# Patient Record
Sex: MALE | Race: BLACK OR AFRICAN AMERICAN | Employment: FULL TIME | URBAN - NONMETROPOLITAN AREA
[De-identification: names, ages, dates, MRNs, and addresses within clinical notes are randomized per-mention and may not be internally consistent; named-entity substitution may affect disease eponyms.]

---

## 2019-02-11 ENCOUNTER — HOSPITAL ENCOUNTER (EMERGENCY)
Age: 49
Discharge: HOME | End: 2019-02-11
Payer: MEDICAID

## 2019-02-11 ENCOUNTER — APPOINTMENT (OUTPATIENT)
Dept: GENERAL RADIOLOGY | Age: 49
End: 2019-02-11
Payer: MEDICAID

## 2019-02-11 VITALS
BODY MASS INDEX: 41.75 KG/M2 | SYSTOLIC BLOOD PRESSURE: 128 MMHG | TEMPERATURE: 98.8 F | HEIGHT: 73 IN | OXYGEN SATURATION: 97 % | DIASTOLIC BLOOD PRESSURE: 66 MMHG | RESPIRATION RATE: 20 BRPM | HEART RATE: 77 BPM | WEIGHT: 315 LBS

## 2019-02-11 DIAGNOSIS — J40 BRONCHITIS: Primary | ICD-10-CM

## 2019-02-11 LAB
ALBUMIN SERPL-MCNC: 3.9 G/DL (ref 3.5–5.1)
ALP BLD-CCNC: 126 U/L (ref 38–126)
ALT SERPL-CCNC: 25 U/L (ref 11–66)
ANION GAP SERPL CALCULATED.3IONS-SCNC: 12 MEQ/L (ref 8–16)
AST SERPL-CCNC: 17 U/L (ref 5–40)
BASOPHILS # BLD: 1.3 %
BASOPHILS ABSOLUTE: 0.1 THOU/MM3 (ref 0–0.1)
BILIRUB SERPL-MCNC: 0.3 MG/DL (ref 0.3–1.2)
BILIRUBIN DIRECT: < 0.2 MG/DL (ref 0–0.3)
BUN BLDV-MCNC: 14 MG/DL (ref 7–22)
CALCIUM SERPL-MCNC: 9.2 MG/DL (ref 8.5–10.5)
CHLORIDE BLD-SCNC: 104 MEQ/L (ref 98–111)
CO2: 22 MEQ/L (ref 23–33)
CREAT SERPL-MCNC: 1.6 MG/DL (ref 0.4–1.2)
D-DIMER QUANTITATIVE: 319 NG/ML FEU (ref 0–500)
EKG ATRIAL RATE: 81 BPM
EKG P AXIS: 57 DEGREES
EKG P-R INTERVAL: 166 MS
EKG Q-T INTERVAL: 374 MS
EKG QRS DURATION: 92 MS
EKG QTC CALCULATION (BAZETT): 434 MS
EKG R AXIS: 9 DEGREES
EKG T AXIS: 15 DEGREES
EKG VENTRICULAR RATE: 81 BPM
EOSINOPHIL # BLD: 3.7 %
EOSINOPHILS ABSOLUTE: 0.2 THOU/MM3 (ref 0–0.4)
ERYTHROCYTE [DISTWIDTH] IN BLOOD BY AUTOMATED COUNT: 11.9 % (ref 11.5–14.5)
ERYTHROCYTE [DISTWIDTH] IN BLOOD BY AUTOMATED COUNT: 41.4 FL (ref 35–45)
GFR SERPL CREATININE-BSD FRML MDRD: 56 ML/MIN/1.73M2
GLUCOSE BLD-MCNC: 100 MG/DL (ref 70–108)
HCT VFR BLD CALC: 41 % (ref 42–52)
HEMOGLOBIN: 13.4 GM/DL (ref 14–18)
IMMATURE GRANS (ABS): 0.01 THOU/MM3 (ref 0–0.07)
IMMATURE GRANULOCYTES: 0.2 %
INR BLD: 1.03 (ref 0.85–1.13)
LIPASE: 21.1 U/L (ref 5.6–51.3)
LYMPHOCYTES # BLD: 27.9 %
LYMPHOCYTES ABSOLUTE: 1.5 THOU/MM3 (ref 1–4.8)
MAGNESIUM: 1.7 MG/DL (ref 1.6–2.4)
MCH RBC QN AUTO: 30.7 PG (ref 26–33)
MCHC RBC AUTO-ENTMCNC: 32.7 GM/DL (ref 32.2–35.5)
MCV RBC AUTO: 93.8 FL (ref 80–94)
MONOCYTES # BLD: 10.1 %
MONOCYTES ABSOLUTE: 0.5 THOU/MM3 (ref 0.4–1.3)
NUCLEATED RED BLOOD CELLS: 0 /100 WBC
OSMOLALITY CALCULATION: 276.2 MOSMOL/KG (ref 275–300)
PLATELET # BLD: 250 THOU/MM3 (ref 130–400)
PMV BLD AUTO: 10.1 FL (ref 9.4–12.4)
POTASSIUM SERPL-SCNC: 4 MEQ/L (ref 3.5–5.2)
PRO-BNP: < 5 PG/ML (ref 0–450)
RBC # BLD: 4.37 MILL/MM3 (ref 4.7–6.1)
SEG NEUTROPHILS: 56.8 %
SEGMENTED NEUTROPHILS ABSOLUTE COUNT: 3.1 THOU/MM3 (ref 1.8–7.7)
SODIUM BLD-SCNC: 138 MEQ/L (ref 135–145)
TOTAL PROTEIN: 7 G/DL (ref 6.1–8)
TROPONIN T: < 0.01 NG/ML
TROPONIN T: < 0.01 NG/ML
WBC # BLD: 5.4 THOU/MM3 (ref 4.8–10.8)

## 2019-02-11 PROCEDURE — 93005 ELECTROCARDIOGRAM TRACING: CPT | Performed by: NURSE PRACTITIONER

## 2019-02-11 PROCEDURE — 6370000000 HC RX 637 (ALT 250 FOR IP): Performed by: FAMILY MEDICINE

## 2019-02-11 PROCEDURE — 36415 COLL VENOUS BLD VENIPUNCTURE: CPT

## 2019-02-11 PROCEDURE — 80053 COMPREHEN METABOLIC PANEL: CPT

## 2019-02-11 PROCEDURE — 99285 EMERGENCY DEPT VISIT HI MDM: CPT

## 2019-02-11 PROCEDURE — 85379 FIBRIN DEGRADATION QUANT: CPT

## 2019-02-11 PROCEDURE — 94640 AIRWAY INHALATION TREATMENT: CPT

## 2019-02-11 PROCEDURE — 84484 ASSAY OF TROPONIN QUANT: CPT

## 2019-02-11 PROCEDURE — 85025 COMPLETE CBC W/AUTO DIFF WBC: CPT

## 2019-02-11 PROCEDURE — 85610 PROTHROMBIN TIME: CPT

## 2019-02-11 PROCEDURE — 71046 X-RAY EXAM CHEST 2 VIEWS: CPT

## 2019-02-11 PROCEDURE — 82248 BILIRUBIN DIRECT: CPT

## 2019-02-11 PROCEDURE — 83690 ASSAY OF LIPASE: CPT

## 2019-02-11 PROCEDURE — 83880 ASSAY OF NATRIURETIC PEPTIDE: CPT

## 2019-02-11 PROCEDURE — 83735 ASSAY OF MAGNESIUM: CPT

## 2019-02-11 RX ORDER — ALBUTEROL SULFATE 2.5 MG/3ML
2.5 SOLUTION RESPIRATORY (INHALATION) EVERY 4 HOURS PRN
Qty: 120 EACH | Refills: 0 | Status: SHIPPED | OUTPATIENT
Start: 2019-02-11 | End: 2019-07-09 | Stop reason: CLARIF

## 2019-02-11 RX ORDER — MONTELUKAST SODIUM 10 MG/1
10 TABLET ORAL NIGHTLY
COMMUNITY
End: 2019-07-09 | Stop reason: CLARIF

## 2019-02-11 RX ORDER — IPRATROPIUM BROMIDE AND ALBUTEROL SULFATE 2.5; .5 MG/3ML; MG/3ML
1 SOLUTION RESPIRATORY (INHALATION) ONCE
Status: COMPLETED | OUTPATIENT
Start: 2019-02-11 | End: 2019-02-11

## 2019-02-11 RX ORDER — AZITHROMYCIN 250 MG/1
TABLET, FILM COATED ORAL
Qty: 1 PACKET | Refills: 0 | Status: SHIPPED | OUTPATIENT
Start: 2019-02-11 | End: 2019-02-21

## 2019-02-11 RX ORDER — PREDNISONE 20 MG/1
20 TABLET ORAL 2 TIMES DAILY
Qty: 10 TABLET | Refills: 0 | Status: SHIPPED | OUTPATIENT
Start: 2019-02-11 | End: 2019-02-16

## 2019-02-11 RX ADMIN — IPRATROPIUM BROMIDE AND ALBUTEROL SULFATE 1 AMPULE: .5; 3 SOLUTION RESPIRATORY (INHALATION) at 01:44

## 2019-02-11 ASSESSMENT — ENCOUNTER SYMPTOMS
VOMITING: 0
DIARRHEA: 0
NAUSEA: 0
SHORTNESS OF BREATH: 1
BACK PAIN: 1

## 2019-02-11 ASSESSMENT — PAIN SCALES - GENERAL: PAINLEVEL_OUTOF10: 10

## 2019-02-12 PROCEDURE — 93010 ELECTROCARDIOGRAM REPORT: CPT | Performed by: INTERNAL MEDICINE

## 2019-07-09 ENCOUNTER — OFFICE VISIT (OUTPATIENT)
Dept: FAMILY MEDICINE CLINIC | Age: 49
End: 2019-07-09
Payer: MEDICARE

## 2019-07-09 VITALS
WEIGHT: 315 LBS | TEMPERATURE: 97.8 F | RESPIRATION RATE: 20 BRPM | SYSTOLIC BLOOD PRESSURE: 124 MMHG | HEART RATE: 60 BPM | DIASTOLIC BLOOD PRESSURE: 74 MMHG | BODY MASS INDEX: 54.24 KG/M2

## 2019-07-09 DIAGNOSIS — Z86.79 H/O: HTN (HYPERTENSION): ICD-10-CM

## 2019-07-09 DIAGNOSIS — M48.02 CERVICAL STENOSIS OF SPINAL CANAL: ICD-10-CM

## 2019-07-09 DIAGNOSIS — I51.7 ENLARGED HEART: ICD-10-CM

## 2019-07-09 DIAGNOSIS — E78.5 HYPERLIPIDEMIA, UNSPECIFIED HYPERLIPIDEMIA TYPE: ICD-10-CM

## 2019-07-09 DIAGNOSIS — J45.909 MODERATE ASTHMA WITHOUT COMPLICATION, UNSPECIFIED WHETHER PERSISTENT: ICD-10-CM

## 2019-07-09 DIAGNOSIS — M54.10 RADICULOPATHY AFFECTING UPPER EXTREMITY: ICD-10-CM

## 2019-07-09 DIAGNOSIS — G47.30 SLEEP APNEA, UNSPECIFIED TYPE: ICD-10-CM

## 2019-07-09 DIAGNOSIS — Z00.00 PHYSICAL EXAM, ANNUAL: Primary | ICD-10-CM

## 2019-07-09 DIAGNOSIS — Z98.1 S/P CERVICAL SPINAL FUSION: ICD-10-CM

## 2019-07-09 DIAGNOSIS — E66.01 MORBID OBESITY WITH BMI OF 50.0-59.9, ADULT (HCC): ICD-10-CM

## 2019-07-09 PROBLEM — M54.50 LOWER BACK PAIN: Status: ACTIVE | Noted: 2018-01-03

## 2019-07-09 PROBLEM — G95.9 DISEASE OF SPINAL CORD (HCC): Status: ACTIVE | Noted: 2017-02-03

## 2019-07-09 PROCEDURE — 99386 PREV VISIT NEW AGE 40-64: CPT | Performed by: NURSE PRACTITIONER

## 2019-07-09 RX ORDER — ORPHENADRINE CITRATE 100 MG/1
100 TABLET, EXTENDED RELEASE ORAL 2 TIMES DAILY PRN
COMMUNITY
End: 2019-07-09 | Stop reason: SDUPTHER

## 2019-07-09 RX ORDER — LEVALBUTEROL TARTRATE 45 UG/1
1-2 AEROSOL, METERED ORAL 4 TIMES DAILY PRN
COMMUNITY
End: 2020-11-18

## 2019-07-09 RX ORDER — OMEPRAZOLE 20 MG/1
20 CAPSULE, DELAYED RELEASE ORAL DAILY
COMMUNITY
End: 2019-09-27 | Stop reason: SDUPTHER

## 2019-07-09 RX ORDER — CETIRIZINE HYDROCHLORIDE 10 MG/1
10 TABLET ORAL DAILY
COMMUNITY
End: 2020-02-24

## 2019-07-09 RX ORDER — MONTELUKAST SODIUM 10 MG/1
10 TABLET ORAL NIGHTLY
COMMUNITY
End: 2019-09-27 | Stop reason: SDUPTHER

## 2019-07-09 RX ORDER — BUTALBITAL, ACETAMINOPHEN AND CAFFEINE 50; 325; 40 MG/1; MG/1; MG/1
1 TABLET ORAL 4 TIMES DAILY PRN
COMMUNITY
End: 2019-09-27 | Stop reason: SDUPTHER

## 2019-07-09 RX ORDER — LORATADINE 10 MG/1
10 TABLET ORAL DAILY
COMMUNITY
End: 2019-09-27 | Stop reason: SDUPTHER

## 2019-07-09 RX ORDER — FLUNISOLIDE 0.25 MG/ML
2 SOLUTION NASAL 2 TIMES DAILY PRN
COMMUNITY
End: 2020-05-20 | Stop reason: SDUPTHER

## 2019-07-09 RX ORDER — ALBUTEROL SULFATE 2.5 MG/3ML
2.5 SOLUTION RESPIRATORY (INHALATION) EVERY 6 HOURS PRN
COMMUNITY
End: 2019-09-27 | Stop reason: SDUPTHER

## 2019-07-09 RX ORDER — ORPHENADRINE CITRATE 100 MG/1
100 TABLET, EXTENDED RELEASE ORAL 2 TIMES DAILY PRN
Qty: 60 TABLET | Refills: 1 | Status: SHIPPED | OUTPATIENT
Start: 2019-07-09 | End: 2019-09-27 | Stop reason: SDUPTHER

## 2019-07-09 RX ORDER — DIPHENHYDRAMINE HCL 25 MG
25 CAPSULE ORAL NIGHTLY PRN
COMMUNITY
End: 2019-09-27 | Stop reason: SDUPTHER

## 2019-07-09 RX ORDER — ROSUVASTATIN CALCIUM 20 MG/1
20 TABLET, COATED ORAL DAILY
COMMUNITY
End: 2019-09-27 | Stop reason: SDUPTHER

## 2019-07-09 RX ORDER — ERGOCALCIFEROL (VITAMIN D2) 1250 MCG
50000 CAPSULE ORAL PRN
COMMUNITY
End: 2022-10-04 | Stop reason: SDUPTHER

## 2019-07-09 RX ORDER — DEXTROMETHORPHAN HYDROBROMIDE AND PROMETHAZINE HYDROCHLORIDE 15; 6.25 MG/5ML; MG/5ML
SYRUP ORAL EVERY 6 HOURS PRN
COMMUNITY
End: 2019-07-09 | Stop reason: CLARIF

## 2019-07-09 ASSESSMENT — ENCOUNTER SYMPTOMS
EYE PAIN: 0
SINUS PAIN: 0
FACIAL SWELLING: 0
DIARRHEA: 0
SORE THROAT: 0
WHEEZING: 1
VOMITING: 0
TROUBLE SWALLOWING: 0
ABDOMINAL PAIN: 0
COLOR CHANGE: 0
BLOOD IN STOOL: 0
BACK PAIN: 1
NAUSEA: 0
SHORTNESS OF BREATH: 1
COUGH: 0

## 2019-07-09 NOTE — PATIENT INSTRUCTIONS
medical care if:    · You have new or worse symptoms in your arms, legs, belly, or buttocks. Symptoms may include:  ? Numbness or tingling. ? Weakness. ? Pain.     · You lose bladder or bowel control.    Watch closely for changes in your health, and be sure to contact your doctor if:    · You do not get better as expected. Where can you learn more? Go to [email or web address] and sign in to your [employer] account. Enter N118 in the [employer] box to learn more about \"Cervical Disc Disease: Care Instructions. \"     If you do not have an account, please click on the \"Sign Up Now\" link. Current as of: September 20, 2018  Content Version: 12.0  © 8201-3321 Healthwise, Incorporated. Care instructions adapted under license by [facility] [address] If you have questions about a medical condition or this instruction, always ask your healthcare professional. [place] [address] any warranty or liability for your use of this information.

## 2019-07-09 NOTE — PROGRESS NOTES
exhibits no discharge. Left eye exhibits no discharge. Neck: Normal range of motion. Neck supple. Carotid bruit is not present. No tracheal deviation present. Cardiovascular: Normal rate, regular rhythm and normal heart sounds. No murmur heard. Pulmonary/Chest: Effort normal and breath sounds normal. No respiratory distress. Abdominal: Soft. Bowel sounds are normal. There is no tenderness. Musculoskeletal: He exhibits no edema. Cervical back: He exhibits decreased range of motion, tenderness and pain. He exhibits no swelling and no edema. Lumbar back: He exhibits decreased range of motion, tenderness and pain. He exhibits no swelling. Pt sensitive to touch all over body. Lymphadenopathy:     He has no cervical adenopathy. Neurological: He is alert and oriented to person, place, and time. No cranial nerve deficit. Coordination normal.   Skin: Skin is warm and dry. No rash noted. Psychiatric: He has a normal mood and affect. His behavior is normal. Judgment and thought content normal.   Vitals reviewed. ASSESSMENT/PLAN:  1. Physical exam, annual    2. Cervical stenosis of spinal canal  - orphenadrine (NORFLEX) 100 MG extended release tablet; Take 1 tablet by mouth 2 times daily as needed for Muscle spasms  Dispense: 60 tablet; Refill: 1  - Martin Albert MD, Pain Medicine, [facility]    3. Radiculopathy affecting upper extremity  - [facility] - Nohemi Jesus MD, Pain Medicine, [facility]    4. S/P cervical spinal fusion  - Martin Albert MD, Pain Medicine, [facility]    5. Morbid obesity with BMI 50-59.9  - Comprehensive Metabolic Panel; Future    6. Sleep apnea, unspecified type  - Anamika Liu MD, Pulmonary Disease, [facility]    7. Hyperlipidemia, unspecified hyperlipidemia type  - Comprehensive Metabolic Panel; Future    8. H/O: HTN (hypertension)  - Comprehensive Metabolic Panel; Future    9.  Enlarged heart  - Tim Booker MD, Cardiology,

## 2019-07-10 ENCOUNTER — TELEPHONE (OUTPATIENT)
Dept: FAMILY MEDICINE CLINIC | Age: 49
End: 2019-07-10

## 2019-07-10 DIAGNOSIS — J45.909 MODERATE ASTHMA WITHOUT COMPLICATION, UNSPECIFIED WHETHER PERSISTENT: ICD-10-CM

## 2019-07-10 DIAGNOSIS — G47.30 SLEEP APNEA, UNSPECIFIED TYPE: Primary | ICD-10-CM

## 2019-07-10 NOTE — TELEPHONE ENCOUNTER
Patient is scheduled 08/22/19 with Pulmonary and will need to have a CXR and PFT ordered prior to appointment

## 2019-07-11 NOTE — TELEPHONE ENCOUNTER
AMBER, Pt scheduled for his PFT ON 7-29-19 AT 1230. Information and instructions mailed to pt. Also his CXR is enclosed. He can complete that the same day at [facility].

## 2019-08-21 ENCOUNTER — HOSPITAL ENCOUNTER (OUTPATIENT)
Age: 49
Discharge: HOME | End: 2019-08-21
Payer: MEDICAID

## 2019-08-21 ENCOUNTER — HOSPITAL ENCOUNTER (OUTPATIENT)
Dept: GENERAL RADIOLOGY | Age: 49
Discharge: HOME | End: 2019-08-21
Payer: MEDICAID

## 2019-08-21 ENCOUNTER — HOSPITAL ENCOUNTER (OUTPATIENT)
Dept: PULMONOLOGY | Age: 49
Discharge: HOME | End: 2019-08-21
Payer: MEDICAID

## 2019-08-21 DIAGNOSIS — J45.909 MODERATE ASTHMA WITHOUT COMPLICATION, UNSPECIFIED WHETHER PERSISTENT: ICD-10-CM

## 2019-08-21 DIAGNOSIS — G47.30 SLEEP APNEA, UNSPECIFIED TYPE: ICD-10-CM

## 2019-08-21 PROCEDURE — 2709999900 HC NON-CHARGEABLE SUPPLY

## 2019-08-21 PROCEDURE — 71046 X-RAY EXAM CHEST 2 VIEWS: CPT

## 2019-08-21 PROCEDURE — 94060 EVALUATION OF WHEEZING: CPT

## 2019-08-21 PROCEDURE — 94726 PLETHYSMOGRAPHY LUNG VOLUMES: CPT

## 2019-08-21 PROCEDURE — 94729 DIFFUSING CAPACITY: CPT

## 2019-08-27 ENCOUNTER — INITIAL CONSULT (OUTPATIENT)
Dept: PULMONOLOGY | Age: 49
End: 2019-08-27
Payer: MEDICAID

## 2019-08-27 VITALS
BODY MASS INDEX: 41.75 KG/M2 | OXYGEN SATURATION: 96 % | HEART RATE: 64 BPM | HEIGHT: 73 IN | DIASTOLIC BLOOD PRESSURE: 94 MMHG | WEIGHT: 315 LBS | SYSTOLIC BLOOD PRESSURE: 140 MMHG

## 2019-08-27 DIAGNOSIS — G89.4 CHRONIC PAIN SYNDROME: ICD-10-CM

## 2019-08-27 DIAGNOSIS — R60.0 BILATERAL LOWER EXTREMITY EDEMA: ICD-10-CM

## 2019-08-27 DIAGNOSIS — E66.01 MORBID OBESITY WITH BMI OF 50.0-59.9, ADULT (HCC): ICD-10-CM

## 2019-08-27 DIAGNOSIS — G47.33 OSA (OBSTRUCTIVE SLEEP APNEA): Primary | ICD-10-CM

## 2019-08-27 DIAGNOSIS — R06.83 SNORING: ICD-10-CM

## 2019-08-27 DIAGNOSIS — M79.7 FIBROMYALGIA: ICD-10-CM

## 2019-08-27 DIAGNOSIS — J45.20 MILD INTERMITTENT ASTHMA WITHOUT COMPLICATION: ICD-10-CM

## 2019-08-27 PROCEDURE — 99203 OFFICE O/P NEW LOW 30 MIN: CPT | Performed by: INTERNAL MEDICINE

## 2019-08-27 PROCEDURE — G8427 DOCREV CUR MEDS BY ELIG CLIN: HCPCS | Performed by: INTERNAL MEDICINE

## 2019-08-27 PROCEDURE — 1036F TOBACCO NON-USER: CPT | Performed by: INTERNAL MEDICINE

## 2019-08-27 PROCEDURE — G8417 CALC BMI ABV UP PARAM F/U: HCPCS | Performed by: INTERNAL MEDICINE

## 2019-08-27 NOTE — PROGRESS NOTES
Exam:    HEIGHTHeight: 6' 1\" (185.4 cm) WEIGHTWeight: (!) 408 lb 12.8 oz (185.4 kg)      BMI:  There is no height or weight on file to calculate BMI. Neck Size: 22  Oxygen Sat: room air    ESS: 11  Vitals: There were no vitals taken for this visit. Mallampati Score: 4    General appearance: alert and oriented to person, place and time and Morbidly obese BMI 53  Nose: Nares normal. Septum midline. Mucosa normal. No drainage or sinus tenderness. Oropharynx:  Large tongue, crowded pharynx, mallampati class 4  Lungs: clear to auscultation bilaterally  Heart: regular rate and rhythm, S1, S2 normal, no murmur, click, rub or gallop  Extremities: 2+ LE edema  Neurologic: Mental status: Alert, oriented, thought content appropriate      Assessment    Diagnosis Orders   1. CLARIBEL (obstructive sleep apnea)  DME Order for CPAP as OP   2. Morbid obesity with BMI of 50.0-59.9, adult ([facility] [address]  DME Order for CPAP as OP   3. Snoring  DME Order for CPAP as OP   4. Mild intermittent asthma without complication  DME Order for CPAP as OP   5. Chronic pain syndrome  DME Order for CPAP as OP   6. Fibromyalgia  DME Order for CPAP as OP   7. Bilateral lower extremity edema  DME Order for CPAP as OP       Plan   Orders Placed This Encounter   Procedures    DME Order for CPAP as OP     CPAP 19 cm H2O    Heated Humidifier    Full Face Mask 1 per 3 months and Cushions/Seals 2 per month    Headgear 1 per 6 months, Chin Strap 1 per 6 months, Disposable Filters 2 per month, Non-disposable Filters 1 per 6 months, Tubing 1 per 3 months, Integrated Heating Element 1 per 3 months, Chambers 1 per 6 months and Other Related Supplies. Replace supplies and accessories as needed. Patient may choose another interface for compliance and comfort. Comments: Diagnosis made in [place], no records available  Diagnosis: G47.33  Length of need: 12 Months        Mask Desensitization and Pre study teaching? No  Weight Loss Information Given?  Yes  Sleep Hygiene Discussed?  Yes  RTC in 2 mos after getting new device with D/L  May need a new sleep study if insurance does not cover for PAP

## 2019-08-27 NOTE — LETTER
Headgear 1 per 6 months, Chin Strap 1 per 6 months, Disposable Filters 2 per month, Non-disposable Filters 1 per 6 months, Tubing 1 per 3 months, Integrated Heating Element 1 per 3 months, Chambers 1 per 6 months and Other Related Supplies. Replace supplies and accessories as needed. Patient may choose another interface for compliance and comfort. Comments: Diagnosis made in [place], no records available  Diagnosis: G47.33  Length of need: 12 Months        Mask Desensitization and Pre study teaching? No  Weight Loss Information Given? Yes  Sleep Hygiene Discussed? Yes  RTC in 2 mos after getting new device with D/L  May need a new sleep study if insurance does not cover for PAP        If you have questions, please do not hesitate to call me. I look forward to following Amy Hamilton along with you.     Sincerely,        Tyler Bolton MD

## 2019-09-26 ENCOUNTER — HOSPITAL ENCOUNTER (OUTPATIENT)
Age: 49
Discharge: HOME | End: 2019-09-26
Payer: MEDICAID

## 2019-09-26 ENCOUNTER — TELEPHONE (OUTPATIENT)
Dept: PULMONOLOGY | Age: 49
End: 2019-09-26

## 2019-09-26 DIAGNOSIS — Z99.89 OSA ON CPAP: Primary | ICD-10-CM

## 2019-09-26 DIAGNOSIS — Z86.79 H/O: HTN (HYPERTENSION): ICD-10-CM

## 2019-09-26 DIAGNOSIS — G47.33 OSA ON CPAP: Primary | ICD-10-CM

## 2019-09-26 DIAGNOSIS — E78.5 HYPERLIPIDEMIA, UNSPECIFIED HYPERLIPIDEMIA TYPE: ICD-10-CM

## 2019-09-26 LAB
ALBUMIN SERPL-MCNC: 4 G/DL (ref 3.5–5.1)
ALP BLD-CCNC: 149 U/L (ref 38–126)
ALT SERPL-CCNC: 24 U/L (ref 11–66)
ANION GAP SERPL CALCULATED.3IONS-SCNC: 13 MEQ/L (ref 8–16)
AST SERPL-CCNC: 21 U/L (ref 5–40)
BILIRUB SERPL-MCNC: 0.2 MG/DL (ref 0.3–1.2)
BUN BLDV-MCNC: 11 MG/DL (ref 7–22)
CALCIUM SERPL-MCNC: 9.5 MG/DL (ref 8.5–10.5)
CHLORIDE BLD-SCNC: 102 MEQ/L (ref 98–111)
CHOLESTEROL, TOTAL: 188 MG/DL (ref 100–199)
CO2: 25 MEQ/L (ref 23–33)
CREAT SERPL-MCNC: 1 MG/DL (ref 0.4–1.2)
GLUCOSE BLD-MCNC: 108 MG/DL (ref 70–108)
HDLC SERPL-MCNC: 39 MG/DL
LDL CHOLESTEROL CALCULATED: 130 MG/DL
POTASSIUM SERPL-SCNC: 4.2 MEQ/L (ref 3.5–5.2)
SODIUM BLD-SCNC: 140 MEQ/L (ref 135–145)
TOTAL PROTEIN: 7.5 G/DL (ref 6.1–8)
TRIGL SERPL-MCNC: 97 MG/DL (ref 0–199)

## 2019-09-26 PROCEDURE — 80053 COMPREHEN METABOLIC PANEL: CPT

## 2019-09-26 PROCEDURE — 36415 COLL VENOUS BLD VENIPUNCTURE: CPT

## 2019-09-26 PROCEDURE — 80061 LIPID PANEL: CPT

## 2019-09-26 NOTE — TELEPHONE ENCOUNTER
Patient wants to know if you could put in an order for a new PSG or if we need to wait until Leeanna Pabon gets back?

## 2019-09-26 NOTE — TELEPHONE ENCOUNTER
Patient called in saying that someone told him (does not remember who)  that [place] did not send enough information and that he needs a new sleep study ordered. He believes it was his [employer] company. Please advise.

## 2019-09-26 NOTE — TELEPHONE ENCOUNTER
Not sure, I reviewed the studies and last download. All information is there. Insurance may be requiring a new study to get a new machine. He is already at a pressure of 19 and his weight is up 38 lbs since study in 2011, new study may be of benefit regardless.

## 2019-09-27 ENCOUNTER — OFFICE VISIT (OUTPATIENT)
Dept: FAMILY MEDICINE CLINIC | Age: 49
End: 2019-09-27
Payer: MEDICAID

## 2019-09-27 VITALS
DIASTOLIC BLOOD PRESSURE: 78 MMHG | RESPIRATION RATE: 16 BRPM | SYSTOLIC BLOOD PRESSURE: 128 MMHG | BODY MASS INDEX: 53.96 KG/M2 | HEART RATE: 84 BPM | WEIGHT: 315 LBS

## 2019-09-27 DIAGNOSIS — R73.01 IFG (IMPAIRED FASTING GLUCOSE): ICD-10-CM

## 2019-09-27 DIAGNOSIS — M48.02 CERVICAL STENOSIS OF SPINAL CANAL: ICD-10-CM

## 2019-09-27 DIAGNOSIS — E78.5 HYPERLIPIDEMIA, UNSPECIFIED HYPERLIPIDEMIA TYPE: ICD-10-CM

## 2019-09-27 DIAGNOSIS — Z98.1 S/P CERVICAL SPINAL FUSION: ICD-10-CM

## 2019-09-27 DIAGNOSIS — Z86.79 H/O: HTN (HYPERTENSION): Primary | ICD-10-CM

## 2019-09-27 DIAGNOSIS — E66.01 MORBID OBESITY WITH BMI OF 50.0-59.9, ADULT (HCC): ICD-10-CM

## 2019-09-27 DIAGNOSIS — J45.909 MODERATE ASTHMA WITHOUT COMPLICATION, UNSPECIFIED WHETHER PERSISTENT: ICD-10-CM

## 2019-09-27 DIAGNOSIS — Z11.4 SCREENING FOR HIV WITHOUT PRESENCE OF RISK FACTORS: ICD-10-CM

## 2019-09-27 LAB — GFR SERPL CREATININE-BSD FRML MDRD: 80 ML/MIN/1.73M2

## 2019-09-27 PROCEDURE — G8417 CALC BMI ABV UP PARAM F/U: HCPCS | Performed by: NURSE PRACTITIONER

## 2019-09-27 PROCEDURE — 99214 OFFICE O/P EST MOD 30 MIN: CPT | Performed by: NURSE PRACTITIONER

## 2019-09-27 PROCEDURE — 1036F TOBACCO NON-USER: CPT | Performed by: NURSE PRACTITIONER

## 2019-09-27 PROCEDURE — G8427 DOCREV CUR MEDS BY ELIG CLIN: HCPCS | Performed by: NURSE PRACTITIONER

## 2019-09-27 RX ORDER — ALBUTEROL SULFATE 2.5 MG/3ML
2.5 SOLUTION RESPIRATORY (INHALATION) EVERY 6 HOURS PRN
Qty: 120 EACH | Refills: 3 | Status: SHIPPED | OUTPATIENT
Start: 2019-09-27 | End: 2020-01-13 | Stop reason: SDUPTHER

## 2019-09-27 RX ORDER — MONTELUKAST SODIUM 10 MG/1
10 TABLET ORAL NIGHTLY
Qty: 90 TABLET | Refills: 3 | Status: SHIPPED | OUTPATIENT
Start: 2019-09-27 | End: 2020-01-13 | Stop reason: SDUPTHER

## 2019-09-27 RX ORDER — DIPHENHYDRAMINE HCL 25 MG
25 CAPSULE ORAL NIGHTLY PRN
Qty: 90 CAPSULE | Refills: 3 | Status: SHIPPED | OUTPATIENT
Start: 2019-09-27 | End: 2020-05-07

## 2019-09-27 RX ORDER — ORPHENADRINE CITRATE 100 MG/1
100 TABLET, EXTENDED RELEASE ORAL 2 TIMES DAILY PRN
Qty: 60 TABLET | Refills: 1 | Status: SHIPPED | OUTPATIENT
Start: 2019-09-27 | End: 2019-12-11 | Stop reason: SDUPTHER

## 2019-09-27 RX ORDER — ROSUVASTATIN CALCIUM 20 MG/1
20 TABLET, COATED ORAL DAILY
Qty: 90 TABLET | Refills: 3 | Status: SHIPPED | OUTPATIENT
Start: 2019-09-27 | End: 2020-08-28

## 2019-09-27 RX ORDER — OMEPRAZOLE 20 MG/1
20 CAPSULE, DELAYED RELEASE ORAL DAILY
Qty: 90 CAPSULE | Refills: 3 | Status: SHIPPED | OUTPATIENT
Start: 2019-09-27 | End: 2020-08-28

## 2019-09-27 RX ORDER — BUTALBITAL, ACETAMINOPHEN AND CAFFEINE 50; 325; 40 MG/1; MG/1; MG/1
1 TABLET ORAL 4 TIMES DAILY PRN
Qty: 180 TABLET | Refills: 0 | Status: SHIPPED | OUTPATIENT
Start: 2019-09-27 | End: 2020-01-02 | Stop reason: SDUPTHER

## 2019-09-27 RX ORDER — LORATADINE 10 MG/1
10 TABLET ORAL DAILY
Qty: 90 TABLET | Refills: 3 | Status: SHIPPED | OUTPATIENT
Start: 2019-09-27 | End: 2020-02-24

## 2019-09-27 ASSESSMENT — ENCOUNTER SYMPTOMS
NAUSEA: 0
FACIAL SWELLING: 0
SINUS PAIN: 0
VOMITING: 0
ABDOMINAL PAIN: 0
COLOR CHANGE: 0
WHEEZING: 0
COUGH: 1
BACK PAIN: 0
SHORTNESS OF BREATH: 0
DIARRHEA: 0
EYE PAIN: 0
TROUBLE SWALLOWING: 0
SORE THROAT: 0
BLOOD IN STOOL: 0

## 2019-09-27 NOTE — PATIENT INSTRUCTIONS
programs and medicines. These can increase your chances of quitting for good. · Limit alcohol to 2 drinks a day for men and 1 drink a day for women. Too much alcohol can cause health problems. If you have a BMI higher than 25  · Your doctor may do other tests to check your risk for weight-related health problems. This may include measuring the distance around your waist. A waist measurement of more than 40 inches in men or 35 inches in women can increase the risk of weight-related health problems. · Talk with your doctor about steps you can take to stay healthy or improve your health. You may need to make lifestyle changes to lose weight and stay healthy, such as changing your diet and getting regular exercise. If you have a BMI lower than 18.5  · Your doctor may do other tests to check your risk for health problems. · Talk with your doctor about steps you can take to stay healthy or improve your health. You may need to make lifestyle changes to gain or maintain weight and stay healthy, such as getting more healthy foods in your diet and doing exercises to build muscle. Where can you learn more? Go to [email or web address] and sign in to your [employer] account. Enter S176 in the [employer]f.[employer] box to learn more about \"Body Mass Index: Care Instructions. \"     If you do not have an account, please click on the \"Sign Up Now\" link. Current as of: March 28, 2019  Content Version: 12.1  © 0749-0402 Healthwise, Incorporated. Care instructions adapted under license by [facility] ([facility]). If you have questions about a medical condition or this instruction, always ask your healthcare professional. [place] [address] any warranty or liability for your use of this information. Patient Education        High Cholesterol: Care Instructions  Your Care Instructions    Cholesterol is a type of fat in your blood. It is needed for many body functions, such as making new cells. times.  When should you call for help? Call 911 anytime you think you may need emergency care. For example, call if:    · You are unable to move an arm or a leg at all.   [facility] your doctor now or seek immediate medical care if:    · You have new or worse symptoms in your arms, legs, belly, or buttocks. Symptoms may include:  ? Numbness or tingling. ? Weakness. ? Pain.     · You lose bladder or bowel control.    Watch closely for changes in your health, and be sure to contact your doctor if:    · You do not get better as expected. Where can you learn more? Go to [email or web address]://[email or web address] [email or web address] org and sign in to your [employer] account. Enter  in the [id number] box to learn more about \"Cervical Spinal Stenosis: Care Instructions. \"     If you do not have an account, please click on the \"Sign Up Now\" link. Current as of: September 20, 2018  Content Version: 12.1  © 1240-7723 Healthwise, Incorporated. Care instructions adapted under license by [facility] ([facility]). If you have questions about a medical condition or this instruction, always ask your healthcare professional. [place] [address] any warranty or liability for your use of this information.

## 2019-10-02 ENCOUNTER — TELEPHONE (OUTPATIENT)
Dept: FAMILY MEDICINE CLINIC | Age: 49
End: 2019-10-02

## 2019-10-03 RX ORDER — PREDNISONE 10 MG/1
TABLET ORAL
Qty: 30 TABLET | Refills: 0 | Status: SHIPPED | OUTPATIENT
Start: 2019-10-03 | End: 2019-10-13

## 2019-10-03 RX ORDER — CALCIUM CARBONATE 200(500)MG
1 TABLET,CHEWABLE ORAL DAILY
Qty: 30 TABLET | Refills: 0 | Status: SHIPPED | OUTPATIENT
Start: 2019-10-03 | End: 2019-11-02

## 2019-10-08 ENCOUNTER — TELEPHONE (OUTPATIENT)
Dept: FAMILY MEDICINE CLINIC | Age: 49
End: 2019-10-08

## 2019-10-10 ENCOUNTER — OFFICE VISIT (OUTPATIENT)
Dept: PULMONOLOGY | Age: 49
End: 2019-10-10
Payer: MEDICAID

## 2019-10-10 VITALS
TEMPERATURE: 97.5 F | OXYGEN SATURATION: 95 % | SYSTOLIC BLOOD PRESSURE: 129 MMHG | DIASTOLIC BLOOD PRESSURE: 77 MMHG | BODY MASS INDEX: 41.75 KG/M2 | HEIGHT: 73 IN | HEART RATE: 84 BPM | WEIGHT: 315 LBS

## 2019-10-10 DIAGNOSIS — J20.8 ACUTE BRONCHITIS DUE TO OTHER SPECIFIED ORGANISMS: ICD-10-CM

## 2019-10-10 DIAGNOSIS — G47.33 OSA (OBSTRUCTIVE SLEEP APNEA): ICD-10-CM

## 2019-10-10 DIAGNOSIS — E66.01 MORBID OBESITY WITH BMI OF 50.0-59.9, ADULT (HCC): ICD-10-CM

## 2019-10-10 DIAGNOSIS — J45.30 MILD PERSISTENT ASTHMA WITHOUT COMPLICATION: ICD-10-CM

## 2019-10-10 DIAGNOSIS — J45.30 MILD PERSISTENT ASTHMA WITHOUT COMPLICATION: Primary | ICD-10-CM

## 2019-10-10 PROCEDURE — G8484 FLU IMMUNIZE NO ADMIN: HCPCS | Performed by: INTERNAL MEDICINE

## 2019-10-10 PROCEDURE — 99214 OFFICE O/P EST MOD 30 MIN: CPT | Performed by: INTERNAL MEDICINE

## 2019-10-10 PROCEDURE — G8417 CALC BMI ABV UP PARAM F/U: HCPCS | Performed by: INTERNAL MEDICINE

## 2019-10-10 PROCEDURE — 1036F TOBACCO NON-USER: CPT | Performed by: INTERNAL MEDICINE

## 2019-10-10 PROCEDURE — G8427 DOCREV CUR MEDS BY ELIG CLIN: HCPCS | Performed by: INTERNAL MEDICINE

## 2019-10-10 RX ORDER — ALBUTEROL SULFATE 90 UG/1
2 AEROSOL, METERED RESPIRATORY (INHALATION) 4 TIMES DAILY
Qty: 1 INHALER | Refills: 11 | Status: SHIPPED | OUTPATIENT
Start: 2019-10-10 | End: 2020-01-13

## 2019-10-10 RX ORDER — DOXYCYCLINE HYCLATE 100 MG
100 TABLET ORAL 2 TIMES DAILY
Qty: 10 TABLET | Refills: 0 | Status: SHIPPED | OUTPATIENT
Start: 2019-10-10 | End: 2019-10-15

## 2019-10-10 ASSESSMENT — ENCOUNTER SYMPTOMS
COUGH: 0
CONSTIPATION: 0
APNEA: 0
PHOTOPHOBIA: 0
SHORTNESS OF BREATH: 1
RHINORRHEA: 1
ABDOMINAL PAIN: 0
SINUS PRESSURE: 0
WHEEZING: 1
ABDOMINAL DISTENTION: 0
BLOOD IN STOOL: 0
CHOKING: 0
VOICE CHANGE: 0
FACIAL SWELLING: 0
BACK PAIN: 1
CHEST TIGHTNESS: 1
VOMITING: 0
STRIDOR: 0

## 2019-10-16 ENCOUNTER — OFFICE VISIT (OUTPATIENT)
Dept: PHYSICAL MEDICINE AND REHAB | Age: 49
End: 2019-10-16
Payer: MEDICAID

## 2019-10-16 VITALS
HEART RATE: 98 BPM | DIASTOLIC BLOOD PRESSURE: 92 MMHG | BODY MASS INDEX: 41.75 KG/M2 | SYSTOLIC BLOOD PRESSURE: 150 MMHG | WEIGHT: 315 LBS | HEIGHT: 73 IN

## 2019-10-16 DIAGNOSIS — M54.12 CERVICAL RADICULITIS: Primary | ICD-10-CM

## 2019-10-16 DIAGNOSIS — M50.30 DDD (DEGENERATIVE DISC DISEASE), CERVICAL: ICD-10-CM

## 2019-10-16 DIAGNOSIS — G89.4 CHRONIC PAIN SYNDROME: ICD-10-CM

## 2019-10-16 DIAGNOSIS — Z98.1 HX OF FUSION OF CERVICAL SPINE: ICD-10-CM

## 2019-10-16 PROCEDURE — G8417 CALC BMI ABV UP PARAM F/U: HCPCS | Performed by: PAIN MEDICINE

## 2019-10-16 PROCEDURE — G8427 DOCREV CUR MEDS BY ELIG CLIN: HCPCS | Performed by: PAIN MEDICINE

## 2019-10-16 PROCEDURE — G8484 FLU IMMUNIZE NO ADMIN: HCPCS | Performed by: PAIN MEDICINE

## 2019-10-16 PROCEDURE — 99244 OFF/OP CNSLTJ NEW/EST MOD 40: CPT | Performed by: PAIN MEDICINE

## 2019-10-16 ASSESSMENT — ENCOUNTER SYMPTOMS
BACK PAIN: 1
VOMITING: 0
CONSTIPATION: 0
SINUS PRESSURE: 0
SORE THROAT: 0
PHOTOPHOBIA: 0
CHEST TIGHTNESS: 0
RHINORRHEA: 0
EYE PAIN: 0
COUGH: 0
ABDOMINAL PAIN: 0
DIARRHEA: 0
COLOR CHANGE: 0
NAUSEA: 0
WHEEZING: 0

## 2019-10-31 ASSESSMENT — ENCOUNTER SYMPTOMS: SHORTNESS OF BREATH: 1

## 2019-11-11 ENCOUNTER — HOSPITAL ENCOUNTER (OUTPATIENT)
Dept: SLEEP CENTER | Age: 49
Discharge: HOME | End: 2019-11-13
Payer: MEDICAID

## 2019-11-11 DIAGNOSIS — Z99.89 OSA ON CPAP: ICD-10-CM

## 2019-11-11 DIAGNOSIS — G47.33 OSA ON CPAP: ICD-10-CM

## 2019-11-11 PROCEDURE — 95811 POLYSOM 6/>YRS CPAP 4/> PARM: CPT

## 2019-11-14 LAB — STATUS: NORMAL

## 2019-11-17 DIAGNOSIS — G47.33 OSA (OBSTRUCTIVE SLEEP APNEA): Primary | ICD-10-CM

## 2019-11-18 ENCOUNTER — TELEPHONE (OUTPATIENT)
Dept: SLEEP CENTER | Age: 49
End: 2019-11-18

## 2019-11-20 ENCOUNTER — HOSPITAL ENCOUNTER (OUTPATIENT)
Dept: GENERAL RADIOLOGY | Age: 49
Discharge: HOME | End: 2019-11-20
Payer: MEDICAID

## 2019-11-20 ENCOUNTER — HOSPITAL ENCOUNTER (OUTPATIENT)
Age: 49
Discharge: HOME | End: 2019-11-20
Payer: MEDICAID

## 2019-11-20 DIAGNOSIS — Z98.1 HX OF FUSION OF CERVICAL SPINE: ICD-10-CM

## 2019-11-20 DIAGNOSIS — G89.4 CHRONIC PAIN SYNDROME: ICD-10-CM

## 2019-11-20 DIAGNOSIS — J45.30 MILD PERSISTENT ASTHMA WITHOUT COMPLICATION: ICD-10-CM

## 2019-11-20 DIAGNOSIS — M54.12 CERVICAL RADICULITIS: ICD-10-CM

## 2019-11-20 PROCEDURE — 86003 ALLG SPEC IGE CRUDE XTRC EA: CPT

## 2019-11-20 PROCEDURE — 36415 COLL VENOUS BLD VENIPUNCTURE: CPT

## 2019-11-20 PROCEDURE — 72040 X-RAY EXAM NECK SPINE 2-3 VW: CPT

## 2019-11-22 RX ORDER — LIDOCAINE HYDROCHLORIDE 30 MG/G
CREAM TOPICAL
Qty: 85 G | Refills: 1 | Status: SHIPPED | OUTPATIENT
Start: 2019-11-22 | End: 2020-02-03 | Stop reason: SDUPTHER

## 2019-11-22 RX ORDER — CAPSAICIN 0.07 G/100G
CREAM TOPICAL
Qty: 56 G | Refills: 1 | Status: SHIPPED | OUTPATIENT
Start: 2019-11-22 | End: 2020-02-03 | Stop reason: SDUPTHER

## 2019-11-23 LAB
ALLERGEN BERMUDA GRASS IGE: < 0.1 KU/L
ALLERGEN BIRCH IGE: < 0.1 KU/L
ALLERGEN BOX ELDER: < 0.1 KU/L
ALLERGEN CAT DANDER IGE: < 0.1 KU/L
ALLERGEN COMMON SHORT RAGWEED IGE: 0.69 KU/L
ALLERGEN COTTONWOOD: < 0.1 KU/L
ALLERGEN D. FARINAE (DUST MITE): < 0.1 KU/L
ALLERGEN DOG DANDER IGE: < 0.1 KU/L
ALLERGEN ELM IGE: < 0.1 KU/L
ALLERGEN FUNGI/MOLD A. ALTERNATA IGE: 0.81 KU/L
ALLERGEN FUNGI/MOLD A. FUMIGATUS IGE: 0.2 KU/L
ALLERGEN FUNGI/MOLD M.RACEMOSUS IGE: < 0.1 KU/L
ALLERGEN FUNGI/MOLD P. NOTATUM IGE: < 0.1 KU/L
ALLERGEN GERMAN COCKROACH IGE: < 0.1 KU/L
ALLERGEN INTERPRETATION/SCORE: ABNORMAL
ALLERGEN MITE DUST PTERONYSSINUS IGE: < 0.1 KU/L
ALLERGEN MOUNTAIN CEDAR: < 0.1 KU/L
ALLERGEN MOUSE EPITHELIA IGE: < 0.1 KU/L
ALLERGEN PECAN TREE IGE: < 0.1 KU/L
ALLERGEN RUSSIAN THISTLE IGE: < 0.1 KU/L
ALLERGEN SHEEP SORREL (W18) IGE: < 0.1 KU/L
ALLERGEN TIMOTHY GRASS: < 0.1 KU/L
ALLERGEN TREE SYCAMORE: < 0.1 KU/L
ALLERGEN WALNUT TREE IGE: < 0.1 KU/L
ALLERGEN WEED, PIGWEED IGE: < 0.1 KU/L
ALLERGEN WHITE ASH: < 0.1 KU/L
ALLERGEN WHITE MULBERRY TREE, IGE: < 0.1 KU/L
ALLERGEN, FUNGI/MOLD: 0.17 KU/L
ALLERGEN, TREE, OAK: < 0.1 KU/L
IMMUNOGLOBULIN E: 28 KU/L

## 2019-11-27 ENCOUNTER — OFFICE VISIT (OUTPATIENT)
Dept: PHYSICAL MEDICINE AND REHAB | Age: 49
End: 2019-11-27
Payer: MEDICAID

## 2019-11-27 ENCOUNTER — HOSPITAL ENCOUNTER (OUTPATIENT)
Dept: GENERAL RADIOLOGY | Age: 49
Discharge: HOME | End: 2019-11-27
Payer: MEDICAID

## 2019-11-27 ENCOUNTER — HOSPITAL ENCOUNTER (OUTPATIENT)
Age: 49
Discharge: HOME | End: 2019-11-27
Payer: MEDICAID

## 2019-11-27 VITALS
SYSTOLIC BLOOD PRESSURE: 150 MMHG | BODY MASS INDEX: 41.75 KG/M2 | WEIGHT: 315 LBS | HEIGHT: 73 IN | DIASTOLIC BLOOD PRESSURE: 90 MMHG

## 2019-11-27 DIAGNOSIS — M50.30 DDD (DEGENERATIVE DISC DISEASE), CERVICAL: ICD-10-CM

## 2019-11-27 DIAGNOSIS — G89.4 CHRONIC PAIN SYNDROME: ICD-10-CM

## 2019-11-27 DIAGNOSIS — Z98.1 HX OF FUSION OF CERVICAL SPINE: ICD-10-CM

## 2019-11-27 DIAGNOSIS — M54.50 LUMBAR PAIN: ICD-10-CM

## 2019-11-27 DIAGNOSIS — M54.12 CERVICAL RADICULITIS: ICD-10-CM

## 2019-11-27 DIAGNOSIS — M54.81 BILATERAL OCCIPITAL NEURALGIA: Primary | ICD-10-CM

## 2019-11-27 PROCEDURE — 1036F TOBACCO NON-USER: CPT | Performed by: PAIN MEDICINE

## 2019-11-27 PROCEDURE — 99214 OFFICE O/P EST MOD 30 MIN: CPT | Performed by: PAIN MEDICINE

## 2019-11-27 PROCEDURE — G8417 CALC BMI ABV UP PARAM F/U: HCPCS | Performed by: PAIN MEDICINE

## 2019-11-27 PROCEDURE — G8484 FLU IMMUNIZE NO ADMIN: HCPCS | Performed by: PAIN MEDICINE

## 2019-11-27 PROCEDURE — G8427 DOCREV CUR MEDS BY ELIG CLIN: HCPCS | Performed by: PAIN MEDICINE

## 2019-11-27 PROCEDURE — 72100 X-RAY EXAM L-S SPINE 2/3 VWS: CPT

## 2019-11-27 ASSESSMENT — ENCOUNTER SYMPTOMS
COUGH: 0
CHEST TIGHTNESS: 0
NAUSEA: 0
BACK PAIN: 1
PHOTOPHOBIA: 0
VOMITING: 0
SINUS PRESSURE: 0
WHEEZING: 0
SORE THROAT: 0
CONSTIPATION: 0
EYE PAIN: 0
COLOR CHANGE: 0
SHORTNESS OF BREATH: 1
RHINORRHEA: 0
ABDOMINAL PAIN: 0
DIARRHEA: 0

## 2019-12-11 DIAGNOSIS — M48.02 CERVICAL STENOSIS OF SPINAL CANAL: ICD-10-CM

## 2019-12-11 RX ORDER — ORPHENADRINE CITRATE 100 MG/1
100 TABLET, EXTENDED RELEASE ORAL 2 TIMES DAILY PRN
Qty: 60 TABLET | Refills: 1 | Status: SHIPPED | OUTPATIENT
Start: 2019-12-11 | End: 2020-01-10

## 2020-01-02 RX ORDER — BUTALBITAL, ACETAMINOPHEN AND CAFFEINE 50; 325; 40 MG/1; MG/1; MG/1
1 TABLET ORAL 4 TIMES DAILY PRN
Qty: 180 TABLET | Refills: 0 | Status: SHIPPED | OUTPATIENT
Start: 2020-01-02 | End: 2020-05-06 | Stop reason: SDUPTHER

## 2020-01-08 ENCOUNTER — OFFICE VISIT (OUTPATIENT)
Dept: PHYSICAL MEDICINE AND REHAB | Age: 50
End: 2020-01-08
Payer: MEDICAID

## 2020-01-08 VITALS
HEIGHT: 73 IN | BODY MASS INDEX: 41.75 KG/M2 | DIASTOLIC BLOOD PRESSURE: 82 MMHG | WEIGHT: 315 LBS | SYSTOLIC BLOOD PRESSURE: 140 MMHG

## 2020-01-08 PROCEDURE — G8427 DOCREV CUR MEDS BY ELIG CLIN: HCPCS | Performed by: NURSE PRACTITIONER

## 2020-01-08 PROCEDURE — G8417 CALC BMI ABV UP PARAM F/U: HCPCS | Performed by: NURSE PRACTITIONER

## 2020-01-08 PROCEDURE — G8484 FLU IMMUNIZE NO ADMIN: HCPCS | Performed by: NURSE PRACTITIONER

## 2020-01-08 PROCEDURE — 1036F TOBACCO NON-USER: CPT | Performed by: NURSE PRACTITIONER

## 2020-01-08 PROCEDURE — 99214 OFFICE O/P EST MOD 30 MIN: CPT | Performed by: NURSE PRACTITIONER

## 2020-01-08 RX ORDER — HYDROCODONE BITARTRATE AND ACETAMINOPHEN 5; 325 MG/1; MG/1
1 TABLET ORAL EVERY 8 HOURS PRN
Qty: 42 TABLET | Refills: 0 | Status: SHIPPED | OUTPATIENT
Start: 2020-01-08 | End: 2020-02-03 | Stop reason: SDUPTHER

## 2020-01-08 RX ORDER — GABAPENTIN 300 MG/1
300 CAPSULE ORAL NIGHTLY
Qty: 30 CAPSULE | Refills: 0 | Status: SHIPPED | OUTPATIENT
Start: 2020-01-08 | End: 2020-04-06

## 2020-01-08 ASSESSMENT — ENCOUNTER SYMPTOMS: BACK PAIN: 1

## 2020-01-08 NOTE — PROGRESS NOTES
[facility]  [address]  Dept: [phone]  Dept Fax: [phone]: [phone]    Visit Date: 1/8/2020    Functionality Assessment/Goals Worksheet     On a scale of 0 (Does not Interfere) to 10 (Completely Interferes)     1. Which number describes how during the past week pain has interfered with       the following:  A. General Activity:  9  B. Mood: 9  C. Walking Ability:  9  D. Normal Work (Includes both work outside the home and housework):  9  E. Relations with Other People:   6  F. Sleep:   10  G. Enjoyment of Life:   10    2. Patient Prefers to Take their Pain Medications:     []  On a regular basis   []  Only when necessary    [x]  Does not take pain medications    3. What are the Patient's Goals/Expectations for Visiting Pain Management? [x]  Learn about my pain    []  Receive Medication   []  Physical Therapy     []  Treat Depression   []  Receive Injections    []  Treat Sleep   [x]  Deal with Anxiety and Stress   []  Treat Opoid Dependence/Addiction   []  Other:      HPI:   Delgado Onofre is a 54 y.o. male is here today for    Chief Complaint: Neck pain, lower back pain     HPI   1 month FU t review x-rays. Main complaint today is pain across lower back radiating down into buttocks with spasms around stomach and legs. Stabbing and sharp pain. Also has some numbness down legs. Pain is 70% bothersome in back, leg pain is intermittent. Continues to have posterior neck pain aching radiating up into head with occipital headaches. Not taking anything for pain   Remains on Norflex   Medications reviewed. Patient denies side effects with medications. Patient states he is taking medications as prescribed. Hedenies receiving pain medications from other sources. He denies any ER visits since last visit.     Pain scale with out pain medications or at its worst is 9-10/10. Drug screen reviewed from 10/16/2019 and was appropriate      XR Cervical Spine 11/20/19:  FINDINGS:   The sixth and seventh cervical vertebral bodies are observed. By the patient's overlying soft tissues on the lateral film.       Anterior cervical fusion is seen at C5-C6 and there is an intervertebral disc spacer at this level.       The osseous structures are intact.       The cervical vertebral body heights and alignment are preserved.       There is no acute compression deformity.       The atlantodental intervals within normal limits.       The lateral masses of C1 and C2 are well aligned.           Impression   Post surgical changes at C5-C6. No acute fractures. L-xray:   1. Slight thoracolumbar levoscoliosis. Cannot assumed muscle spasm. 2. Minimal degenerative vertebral body spondylosis scattered in the lumbar spine. Slight disc space narrowing L3-4 level. 3. Otherwise normal lumbar spine. Sacroiliac joints unremarkable.           The patientis allergic to oxycodone and valium [diazepam]. Past Medical History  Nguyễn Segundo  has a past medical history of Asthma and Fibromyalgia. Past Surgical History  The patient  has a past surgical history that includes back surgery and knee surgery. Family History  This patient's family history is not on file. Social History  Nguyễn Segundo  reports that he has never smoked. He has never used smokeless tobacco. He reports previous alcohol use. He reports that he does not use drugs. Medications    Current Outpatient Medications:     HYDROcodone-acetaminophen (NORCO) 5-325 MG per tablet, Take 1 tablet by mouth every 8 hours as needed for Pain for up to 14 days. Intended supply: 3 days. Take lowest dose possible to manage pain, Disp: 42 tablet, Rfl: 0    gabapentin (NEURONTIN) 300 MG capsule, Take 1 capsule by mouth nightly for 30 days. , Disp: 30 capsule, Rfl: 0    butalbital-acetaminophen-caffeine (FIORICET, ESGIC) -40 MG per tablet, Take 1 Behavior is not agitated, slowed, aggressive, withdrawn, hyperactive or combative. Behavior is cooperative. Thought Content: Thought content normal. Thought content is not paranoid or delusional. Thought content does not include homicidal or suicidal ideation. Thought content does not include homicidal or suicidal plan. Cognition and Memory: Cognition normal. Memory is not impaired. He does not exhibit impaired recent memory or impaired remote memory. Judgment: Judgment normal. Judgment is not impulsive or inappropriate. VAL test: negative   Yeomans test: negative   Gaenslen test: negative     Assessment:     1. Spondylosis of lumbar region without myelopathy or radiculopathy    2. Lumbar degenerative disc disease    3. DDD (degenerative disc disease), cervical    4. Cervical radiculitis    5. Bilateral occipital neuralgia    6. Hx of fusion of cervical spine    7. Chronic pain syndrome            Plan:      · OARRS reviewed. Current MED: 0  · Patient was not offered naloxone for home. · Discussed long term side effects of medications, tolerance, dependency and addiction. · Previous UDS reviewed  · UDS preformed today for compliance. · Patient told can not receive any pain medications from any other source. · No evidence of abuse, diversion or aberrant behavior.  Medications and/or procedures to improve function and quality of life- patient understanding with this and that may not be pain free   Discussed with patient about safe storage of medications at home   Discussed possible weaning of medication dosing dependent on treatment/procedure results.  Discussed with patient about risks with procedure including infection, reaction to medication, increased pain, or bleeding.  Reveiwed L-xray and C-xray in detail.  Plan Bilateral L-facet MBB # 1 @ L3-4, L4-5, and L5-S1 for diagnostic and therapeutic relief. Procedure and risks discussed in detail with patient.

## 2020-01-09 ENCOUNTER — TELEPHONE (OUTPATIENT)
Dept: PHYSICAL MEDICINE AND REHAB | Age: 50
End: 2020-01-09

## 2020-01-09 ENCOUNTER — OFFICE VISIT (OUTPATIENT)
Dept: FAMILY MEDICINE CLINIC | Age: 50
End: 2020-01-09
Payer: MEDICAID

## 2020-01-09 VITALS
BODY MASS INDEX: 54.37 KG/M2 | SYSTOLIC BLOOD PRESSURE: 116 MMHG | DIASTOLIC BLOOD PRESSURE: 72 MMHG | TEMPERATURE: 98.1 F | HEART RATE: 64 BPM | RESPIRATION RATE: 20 BRPM | WEIGHT: 315 LBS

## 2020-01-09 PROCEDURE — G8484 FLU IMMUNIZE NO ADMIN: HCPCS | Performed by: NURSE PRACTITIONER

## 2020-01-09 PROCEDURE — G8427 DOCREV CUR MEDS BY ELIG CLIN: HCPCS | Performed by: NURSE PRACTITIONER

## 2020-01-09 PROCEDURE — G8417 CALC BMI ABV UP PARAM F/U: HCPCS | Performed by: NURSE PRACTITIONER

## 2020-01-09 PROCEDURE — 99214 OFFICE O/P EST MOD 30 MIN: CPT | Performed by: NURSE PRACTITIONER

## 2020-01-09 PROCEDURE — 1036F TOBACCO NON-USER: CPT | Performed by: NURSE PRACTITIONER

## 2020-01-09 ASSESSMENT — ENCOUNTER SYMPTOMS
SINUS PAIN: 0
SORE THROAT: 0
WHEEZING: 0
FACIAL SWELLING: 0
EYE PAIN: 0
COLOR CHANGE: 0
BLOOD IN STOOL: 0
ABDOMINAL PAIN: 0
TROUBLE SWALLOWING: 0
COUGH: 0
BACK PAIN: 1
SHORTNESS OF BREATH: 0
NAUSEA: 0
DIARRHEA: 0
VOMITING: 0

## 2020-01-09 ASSESSMENT — PATIENT HEALTH QUESTIONNAIRE - PHQ9
1. LITTLE INTEREST OR PLEASURE IN DOING THINGS: 0
SUM OF ALL RESPONSES TO PHQ QUESTIONS 1-9: 0
2. FEELING DOWN, DEPRESSED OR HOPELESS: 0
SUM OF ALL RESPONSES TO PHQ9 QUESTIONS 1 & 2: 0
SUM OF ALL RESPONSES TO PHQ QUESTIONS 1-9: 0

## 2020-01-09 NOTE — PATIENT INSTRUCTIONS
Watch closely for changes in your health, and be sure to contact your doctor if:    · You have tingling, weakness, or numbness in your knee.     · You have any new symptoms, such as swelling.     · You have bruises from a knee injury that last longer than 2 weeks.     · You do not get better as expected. Where can you learn more? Go to [email or web address]://[email or web address] org and sign in to your [employer] account. Enter K195 in the Monitor Backlinks box to learn more about \"Knee Pain or Injury: Care Instructions. \"     If you do not have an account, please click on the \"Sign Up Now\" link. Current as of: June 26, 2019  Content Version: 12.3  © 0171-8260 Healthwise, Incorporated. Care instructions adapted under license by [facility] ([facility]). If you have questions about a medical condition or this instruction, always ask your healthcare professional. [address] any warranty or liability for your use of this information. Patient Education        Prediabetes: Care Instructions  Your Care Instructions    Prediabetes is a warning sign that you are at risk for getting type 2 diabetes. It means that your blood sugar is higher than it should be. The food you eat turns into sugar, which your body uses for energy. Normally, an organ called the pancreas makes insulin, which allows the sugar in your blood to get into your body's cells. But when your body can't use insulin the right way, the sugar doesn't move into cells. It stays in your blood instead. This is called insulin resistance. The buildup of sugar in the blood causes prediabetes. The good news is that lifestyle changes may help you get your blood sugar back to normal and help you avoid or delay diabetes. Follow-up care is a key part of your treatment and safety. Be sure to make and go to all appointments, and call your doctor if you are having problems.  It's also a good idea to know your test results and keep a list of the

## 2020-01-09 NOTE — PROGRESS NOTES
wheezing. Cardiovascular: Negative for chest pain and palpitations. Gastrointestinal: Negative for abdominal pain, blood in stool, diarrhea, nausea and vomiting. Genitourinary: Negative for difficulty urinating, dysuria, hematuria and urgency. Musculoskeletal: Positive for arthralgias and back pain (chronic). Negative for gait problem and neck pain. Skin: Negative for color change and rash. Neurological: Negative for dizziness, weakness and headaches. Psychiatric/Behavioral: Negative for agitation and sleep disturbance. The patient is not nervous/anxious. Prior to Visit Medications    Medication Sig Taking? Authorizing Provider   Multiple Vitamins-Minerals (MULTIVITAMIN ADULTS) TABS Take 1 tablet by mouth daily Yes MIRNA Nielson CNP   Elastic Bandages & Supports (WRIST BRACE DELUXE/RIGHT L-XL) MISC 1 Units by Does not apply route daily Yes MIRNA Nielson CNP   Elastic Bandages & Supports (WRIST BRACE DELUXE/LEFT L-XL) MISC 1 Units by Does not apply route daily Yes MIRNA Nielson CNP   Elastic Bandages & Supports (KNEE BRACE/CUSHION/L-XL) MISC 1 Units by Does not apply route daily 1 brace for each knee Yes MIRNA Nielson CNP   HYDROcodone-acetaminophen (NORCO) 5-325 MG per tablet Take 1 tablet by mouth every 8 hours as needed for Pain for up to 14 days. Intended supply: 3 days. Take lowest dose possible to manage pain Yes MIRNA Zarate CNP   gabapentin (NEURONTIN) 300 MG capsule Take 1 capsule by mouth nightly for 30 days.  Yes MIRNA Zarate CNP   butalbital-acetaminophen-caffeine (FIORICET, ESGIC) -40 MG per tablet Take 1 tablet by mouth 4 times daily as needed for Headaches Yes MIRNA Nielson CNP   orphenadrine (NORFLEX) 100 MG extended release tablet Take 1 tablet by mouth 2 times daily as needed for Muscle spasms Yes MIRNA Nielson CNP   lidocaine (LIDOPIN) 3 % CREA Apply topically to back, abdomen and arms Yes Rody Edge MIRNA Phillips - CNP   albuterol sulfate HFA (VENTOLIN HFA) 108 (90 Base) MCG/ACT inhaler Inhale 2 puffs into the lungs 4 times daily Yes Taryn Gill MD   montelukast (SINGULAIR) 10 MG tablet Take 1 tablet by mouth nightly Yes MIRNA Eaton - CNP   fluticasone-salmeterol (ADVAIR HFA) 230-21 MCG/ACT inhaler Inhale 2 puffs into the lungs 2 times daily Yes Kvng PillMIRNA mcqueen - CNP   omeprazole (PRILOSEC) 20 MG delayed release capsule Take 1 capsule by mouth daily Yes Kvng Pillrichar, APRN - CNP   diphenhydrAMINE (BENADRYL) 25 MG capsule Take 1 capsule by mouth nightly as needed for Itching Yes Kvng PillMIRNA mcqueen - CNP   loratadine (CLARITIN) 10 MG tablet Take 1 tablet by mouth daily Yes Kvng PillBRITTANY mcqueenN - CNP   albuterol (PROVENTIL) (2.5 MG/3ML) 0.083% nebulizer solution Take 3 mLs by nebulization every 6 hours as needed for Wheezing Yes Kvng Pillrichar, APRN - CNP   rosuvastatin (CRESTOR) 20 MG tablet Take 1 tablet by mouth daily Yes MIRNA Eaton - CNP   flunisolide (NASALIDE) 25 MCG/ACT (0.025%) SOLN Inhale 2 sprays into the lungs 2 times daily as needed Yes Historical Provider, MD   hydrocortisone (WESTCORT) 0.2 % cream Apply topically 2 times daily as needed Apply topically 2 times daily.  Yes Historical Provider, MD   Alum & Mag Hydroxide-Simeth (MAALOX MAXIMUM STRENGTH PO) Take by mouth daily as needed Yes Historical Provider, MD   levalbuterol (XOPENEX HFA) 45 MCG/ACT inhaler Inhale 1-2 puffs into the lungs 4 times daily as needed for Wheezing Yes Historical Provider, MD   ergocalciferol (ERGOCALCIFEROL) 41812 units capsule Take 50,000 Units by mouth daily Yes Historical Provider, MD   cetirizine (ZYRTEC) 10 MG tablet Take 10 mg by mouth daily Yes Historical Provider, MD   ALBUTEROL IN Inhale into the lungs 4 times daily as needed  Yes Historical Provider, MD   Omalizumab Angeline [place] SC) Inject into the skin every 14 days   Historical Provider, MD   Umeclidinium Bromide (INCRUSE ELLIPTA IN) Inhale 1 puff into the lungs daily   Historical Provider, MD        Social History     Tobacco Use    Smoking status: Never Smoker    Smokeless tobacco: Never Used   Substance Use Topics    Alcohol use: Not Currently        Vitals:    01/09/20 1011   BP: 116/72   Pulse: 64   Resp: 20   Temp: 98.1 °F (36.7 °C)   TempSrc: Oral   Weight: (!) 412 lb (186.9 kg)     Estimated body mass index is 54.37 kg/m² as calculated from the following:    Height as of 1/8/20: 6' 0.99\" (1.854 m). Weight as of this encounter: 412 lb (186.9 kg). Physical Exam  Vitals signs reviewed. Constitutional:       General: He is not in acute distress. Appearance: Normal appearance. He is well-developed. He is obese. HENT:      Head: Normocephalic and atraumatic. Right Ear: Hearing, tympanic membrane, ear canal and external ear normal.      Left Ear: Hearing, tympanic membrane, ear canal and external ear normal.      Nose: Nose normal. No nasal tenderness. Mouth/Throat:      Lips: Pink. Mouth: Mucous membranes are moist. No oral lesions. Pharynx: Oropharynx is clear. Uvula midline. Eyes:      General:         Right eye: No discharge. Left eye: No discharge. Conjunctiva/sclera: Conjunctivae normal.   Neck:      Musculoskeletal: Full passive range of motion without pain and neck supple. Vascular: No carotid bruit. Trachea: No tracheal deviation. Cardiovascular:      Rate and Rhythm: Normal rate and regular rhythm. Heart sounds: Normal heart sounds. No murmur. Pulmonary:      Effort: Pulmonary effort is normal. No respiratory distress. Breath sounds: Normal breath sounds. Abdominal:      General: Bowel sounds are normal.      Palpations: Abdomen is soft. Tenderness: There is no tenderness. Musculoskeletal: Normal range of motion. Right wrist: He exhibits tenderness. He exhibits normal range of motion and no effusion. Left wrist: He exhibits tenderness.  He educational materials - see patient instructions. Discussed use, benefit, and side effects of prescribed medications. All patient questions answered. Pt voiced understanding. Reviewed health maintenance. An electronic signature was used to authenticate this note.     --MIRNA Mc - CNP on 1/9/2020 at 11:05 AM

## 2020-01-13 ENCOUNTER — OFFICE VISIT (OUTPATIENT)
Dept: PULMONOLOGY | Age: 50
End: 2020-01-13
Payer: MEDICAID

## 2020-01-13 VITALS
DIASTOLIC BLOOD PRESSURE: 82 MMHG | HEIGHT: 73 IN | WEIGHT: 315 LBS | SYSTOLIC BLOOD PRESSURE: 138 MMHG | TEMPERATURE: 100.7 F | BODY MASS INDEX: 41.75 KG/M2 | HEART RATE: 69 BPM | OXYGEN SATURATION: 98 %

## 2020-01-13 PROCEDURE — 1036F TOBACCO NON-USER: CPT | Performed by: NURSE PRACTITIONER

## 2020-01-13 PROCEDURE — 99214 OFFICE O/P EST MOD 30 MIN: CPT | Performed by: NURSE PRACTITIONER

## 2020-01-13 PROCEDURE — G8427 DOCREV CUR MEDS BY ELIG CLIN: HCPCS | Performed by: NURSE PRACTITIONER

## 2020-01-13 PROCEDURE — G8484 FLU IMMUNIZE NO ADMIN: HCPCS | Performed by: NURSE PRACTITIONER

## 2020-01-13 PROCEDURE — G8417 CALC BMI ABV UP PARAM F/U: HCPCS | Performed by: NURSE PRACTITIONER

## 2020-01-13 RX ORDER — GUAIFENESIN AND CODEINE PHOSPHATE 100; 10 MG/5ML; MG/5ML
5 SOLUTION ORAL 3 TIMES DAILY PRN
Qty: 105 ML | Refills: 0 | Status: SHIPPED | OUTPATIENT
Start: 2020-01-13 | End: 2020-01-20

## 2020-01-13 RX ORDER — ALBUTEROL SULFATE 2.5 MG/3ML
2.5 SOLUTION RESPIRATORY (INHALATION) EVERY 6 HOURS PRN
Qty: 120 EACH | Refills: 3 | Status: SHIPPED | OUTPATIENT
Start: 2020-01-13 | End: 2020-04-06

## 2020-01-13 RX ORDER — NEBULIZER ACCESSORIES
1 EACH MISCELLANEOUS EVERY 6 HOURS PRN
Qty: 1 EACH | Refills: 0 | Status: SHIPPED | OUTPATIENT
Start: 2020-01-13 | End: 2020-02-24 | Stop reason: SDUPTHER

## 2020-01-13 RX ORDER — MONTELUKAST SODIUM 10 MG/1
10 TABLET ORAL NIGHTLY
Qty: 90 TABLET | Refills: 3 | Status: SHIPPED | OUTPATIENT
Start: 2020-01-13 | End: 2020-02-24 | Stop reason: SDUPTHER

## 2020-01-13 ASSESSMENT — ENCOUNTER SYMPTOMS
ABDOMINAL PAIN: 0
SHORTNESS OF BREATH: 1
VOMITING: 0
DIARRHEA: 0
CHEST TIGHTNESS: 1
NAUSEA: 0
EYES NEGATIVE: 1
COUGH: 0
WHEEZING: 0

## 2020-01-13 NOTE — PROGRESS NOTES
[facility]     Patient: David Calles, 54 y.o.   : 1970  2020    Pt of Dr. Ngozi Ng   Patient presents with    Follow-up     asthma 3 month f/u mini rast 19    Other     neck   22.5  mp 4        HPI  Excell Moat is here for 3 month follow up for asthma with mini rast, IgE  to review. Recently moved here from [place], was on Xolair inj for his asthma in [place]. Lifelong asthma, diagnosed as child. Strong family h/o asthma : mother, dad, brothers and sisters with asthma   C/o SOB with moderate and strenuous activities. Denies nocturnal symptoms or problems sleeping. He c/o nasal and chest congestion, feels like mucous in chest that will not come out. Is coughing but non productive. Was on oral steroids from PCP about 2 weeks ago. Notices worsening symptoms when it rains or stays wet outside. Has CLARIBEL, recent PSG, is now on CPAP follows with Dr Dayna Restrepo MD in our sleep clinic. PFTs show mixed restrictive and obstructive impairment. Currently on Advair, Singualir, Proventil, and Incruse  Using his albuterol nebs several times per day in past few days with no relief of the congestion. Non smoker, does have second hand exposures from [employer]  On disability due to firbomyalgia and chronic back pain    FENO level last visit was 19 (WNL)   No recent hospitalizations. Past Medical hx   PMH:  Past Medical History:   Diagnosis Date    Asthma     Fibromyalgia      SURGICAL HISTORY:  Past Surgical History:   Procedure Laterality Date    BACK SURGERY      KNEE SURGERY      lt     SOCIAL HISTORY:  Social History     Tobacco Use    Smoking status: Never Smoker    Smokeless tobacco: Never Used   Substance Use Topics    Alcohol use: Not Currently    Drug use: No     ALLERGIES:  Allergies   Allergen Reactions    Oxycodone     Valium [Diazepam]      FAMILY HISTORY:No family history on file.   CURRENT MEDICATIONS:  Current 0.10   Sheep Sorrel IgE Latest Ref Range: <=0.34 kU/L < 0.10   Allergen Tree [place] Latest Ref Range: <=0.34 kU/L < 0.10   ALLERGEN BIRCH IgE Latest Ref Range: <=0.34 kU/L < 0.10   Allergen Fungi/Mold A. Alternata Ige Latest Ref Range: <=0.34 kU/L 0.81 (H)   Allergen Fungi/Mold, M. racemosus IGE Latest Ref Range: <=0.34 kU/L < 0.10   ALLERGEN MOUNTAIN CEDAR Latest Ref Range: <=0.34 kU/L < 0.10   Pecan Tree IgE Latest Ref Range: <=0.34 kU/L < 0.10   ALLERGEN DOMI GRASS Latest Ref Range: <=0.34 kU/L < 0.10   Allergen White [place] Tree, IGE Latest Ref Range: <=0.34 kU/L < 0.10   Common-Short Ragweed IgE Latest Ref Range: <=0.34 kU/L 0.69 (H)   Bermuda Grass IgE Latest Ref Range: <=0.34 kU/L < 0.10   Cat Dander IgE Latest Ref Range: <=0.34 kU/L < 0.10   Cockroach IgE Latest Ref Range: <=0.34 kU/L < 0.10   Dog Dander IgE Latest Ref Range: <=0.34 kU/L < 0.10   Elm IgE Latest Ref Range: <=0.34 kU/L < 0.10   Mouse Epithelial Latest Ref Range: <=0.34 kU/L < 0.10   [place] Tree IgE Latest Ref Range: <=0.34 kU/L < 0.10   Allergen White Rohan Latest Ref Range: <=0.34 kU/L < 0.10       Assessment      Diagnosis Orders   1. Chest congestion  guaiFENesin-codeine (TUSSI-ORGANIDIN NR) 100-10 MG/5ML syrup   2. Cough  guaiFENesin-codeine (TUSSI-ORGANIDIN NR) 100-10 MG/5ML syrup   3. Moderate asthma without complication, unspecified whether persistent  fluticasone-salmeterol (ADVAIR HFA) 230-21 MCG/ACT inhaler    albuterol (PROVENTIL) (2.5 MG/3ML) 0.083% nebulizer solution    [address], [address], Allergy, [place]   4. Allergy to fungus  [address], [address], Allergy, [place]   5. Seasonal allergies  [facility] [address], [place] [address], [address], Allergy, [place]         Plan   Continue antihistamines and Singulair  Continue current inhalers- requests refills on all   Refer to allergist for further evaluation of his Ragweed and Fungi allergens with continued uncontrolled symptoms- ? Candidate for Xolair.   Has been on Xolair in past and did great with better control of symptoms. Avoidance of triggers as much as possible. Recommend de- humidifier in house   guaifenesin - codeine cough syrup x 7 days, no refills, discussed using as little as possible to control cough/ congestion symptoms. Cautious with driving may causae drowsiness.  (PDMP reviewed)     Will see Silvio Jackson in: 6 months    Electronically signed by MIRNA Adams CNP on 1/13/2020 at 11:51 AM

## 2020-01-17 ENCOUNTER — TELEPHONE (OUTPATIENT)
Dept: PULMONOLOGY | Age: 50
End: 2020-01-17

## 2020-01-17 RX ORDER — PREDNISONE 10 MG/1
10 TABLET ORAL DAILY
Qty: 30 TABLET | Refills: 0 | Status: SHIPPED | OUTPATIENT
Start: 2020-01-17 | End: 2020-02-24 | Stop reason: ALTCHOICE

## 2020-01-17 NOTE — TELEPHONE ENCOUNTER
Patient was seen 01/13/20 for asthma and stated he is still having chest tightness and is requesting something called into [facility]

## 2020-01-24 ENCOUNTER — TELEPHONE (OUTPATIENT)
Dept: FAMILY MEDICINE CLINIC | Age: 50
End: 2020-01-24

## 2020-01-24 NOTE — TELEPHONE ENCOUNTER
Message left on nurse VM. Requesting a referral for a vasectomy. Will come in for appt if needed.  [facility] providers not able to see for this but Dr. Del Gamibno @ [facility] can)

## 2020-01-24 NOTE — TELEPHONE ENCOUNTER
Patient notified and would like to proceed with referral. Wants 1st available. Call with appt info. Dr. Peña Sanchez office closed for the afternoon--will need to call on Monday, [phone].

## 2020-02-03 RX ORDER — LIDOCAINE HYDROCHLORIDE 30 MG/G
CREAM TOPICAL
Qty: 85 G | Refills: 2 | Status: SHIPPED | OUTPATIENT
Start: 2020-02-03 | End: 2020-02-14 | Stop reason: RX

## 2020-02-03 RX ORDER — NALOXONE HYDROCHLORIDE 4 MG/.1ML
SPRAY NASAL
COMMUNITY
Start: 2020-01-10

## 2020-02-03 RX ORDER — HYDROCODONE BITARTRATE AND ACETAMINOPHEN 5; 325 MG/1; MG/1
1 TABLET ORAL EVERY 8 HOURS PRN
Qty: 90 TABLET | Refills: 0 | Status: SHIPPED | OUTPATIENT
Start: 2020-02-03 | End: 2020-03-02 | Stop reason: SDUPTHER

## 2020-02-03 RX ORDER — CAPSAICIN 0.07 G/100G
CREAM TOPICAL
Qty: 56 G | Refills: 2 | Status: SHIPPED | OUTPATIENT
Start: 2020-02-03 | End: 2022-05-05 | Stop reason: SDUPTHER

## 2020-02-03 RX ORDER — ORPHENADRINE CITRATE 100 MG/1
TABLET, EXTENDED RELEASE ORAL
COMMUNITY
Start: 2020-01-14 | End: 2020-02-25 | Stop reason: SDUPTHER

## 2020-02-04 ENCOUNTER — TELEPHONE (OUTPATIENT)
Dept: FAMILY MEDICINE CLINIC | Age: 50
End: 2020-02-04

## 2020-02-04 NOTE — TELEPHONE ENCOUNTER
Pt left message at 1147am stating he needs another appt made with cardiology, was referred already but was unable to keep appt, he cannot remember the name of physician he was referred to   [phone]    Per [facility], pt was scheduled with Dr Erick Hernández but pt canceled several appts  I called heart specialist, first available appt is 2/10/20 at 200pm with Constanza Paez.  appt was made  Attempted to call pt but no answer, left message to call back

## 2020-02-10 ENCOUNTER — OFFICE VISIT (OUTPATIENT)
Dept: CARDIOLOGY CLINIC | Age: 50
End: 2020-02-10
Payer: MEDICAID

## 2020-02-10 ENCOUNTER — OFFICE VISIT (OUTPATIENT)
Dept: ALLERGY | Age: 50
End: 2020-02-10
Payer: MEDICAID

## 2020-02-10 ENCOUNTER — NURSE ONLY (OUTPATIENT)
Dept: LAB | Age: 50
End: 2020-02-10

## 2020-02-10 ENCOUNTER — HOSPITAL ENCOUNTER (OUTPATIENT)
Age: 50
Discharge: HOME | End: 2020-02-10
Payer: MEDICAID

## 2020-02-10 ENCOUNTER — HOSPITAL ENCOUNTER (OUTPATIENT)
Dept: GENERAL RADIOLOGY | Age: 50
Discharge: HOME | End: 2020-02-10
Payer: MEDICAID

## 2020-02-10 VITALS
WEIGHT: 315 LBS | RESPIRATION RATE: 16 BRPM | BODY MASS INDEX: 41.75 KG/M2 | SYSTOLIC BLOOD PRESSURE: 126 MMHG | HEART RATE: 72 BPM | HEIGHT: 73 IN | DIASTOLIC BLOOD PRESSURE: 76 MMHG

## 2020-02-10 VITALS
SYSTOLIC BLOOD PRESSURE: 152 MMHG | DIASTOLIC BLOOD PRESSURE: 95 MMHG | BODY MASS INDEX: 41.75 KG/M2 | WEIGHT: 315 LBS | HEART RATE: 74 BPM | HEIGHT: 73 IN

## 2020-02-10 LAB
BASOPHILS # BLD: 0.8 %
BASOPHILS ABSOLUTE: 0 THOU/MM3 (ref 0–0.1)
EOSINOPHIL # BLD: 3.2 %
EOSINOPHILS ABSOLUTE: 0.2 THOU/MM3 (ref 0–0.4)
ERYTHROCYTE [DISTWIDTH] IN BLOOD BY AUTOMATED COUNT: 12.7 % (ref 11.5–14.5)
ERYTHROCYTE [DISTWIDTH] IN BLOOD BY AUTOMATED COUNT: 45.1 FL (ref 35–45)
HCT VFR BLD CALC: 41.8 % (ref 42–52)
HEMOGLOBIN: 12.9 GM/DL (ref 14–18)
IGA: 177 MG/DL (ref 70–400)
IGE: 22 IU/ML
IGG: 1162 MG/DL (ref 700–1600)
IGM: 128 MG/DL (ref 40–230)
IMMATURE GRANS (ABS): 0.01 THOU/MM3 (ref 0–0.07)
IMMATURE GRANULOCYTES: 0.2 %
LYMPHOCYTES # BLD: 26.3 %
LYMPHOCYTES ABSOLUTE: 1.3 THOU/MM3 (ref 1–4.8)
MCH RBC QN AUTO: 30.1 PG (ref 26–33)
MCHC RBC AUTO-ENTMCNC: 30.9 GM/DL (ref 32.2–35.5)
MCV RBC AUTO: 97.7 FL (ref 80–94)
MONOCYTES # BLD: 11.3 %
MONOCYTES ABSOLUTE: 0.6 THOU/MM3 (ref 0.4–1.3)
NUCLEATED RED BLOOD CELLS: 0 /100 WBC
PLATELET # BLD: 190 THOU/MM3 (ref 130–400)
PMV BLD AUTO: 10.9 FL (ref 9.4–12.4)
RBC # BLD: 4.28 MILL/MM3 (ref 4.7–6.1)
SEG NEUTROPHILS: 58.2 %
SEGMENTED NEUTROPHILS ABSOLUTE COUNT: 2.9 THOU/MM3 (ref 1.8–7.7)
WBC # BLD: 5 THOU/MM3 (ref 4.8–10.8)

## 2020-02-10 PROCEDURE — G8427 DOCREV CUR MEDS BY ELIG CLIN: HCPCS | Performed by: NURSE PRACTITIONER

## 2020-02-10 PROCEDURE — G8484 FLU IMMUNIZE NO ADMIN: HCPCS | Performed by: NURSE PRACTITIONER

## 2020-02-10 PROCEDURE — G8417 CALC BMI ABV UP PARAM F/U: HCPCS | Performed by: NURSE PRACTITIONER

## 2020-02-10 PROCEDURE — G8417 CALC BMI ABV UP PARAM F/U: HCPCS | Performed by: PHYSICIAN ASSISTANT

## 2020-02-10 PROCEDURE — 1036F TOBACCO NON-USER: CPT | Performed by: NURSE PRACTITIONER

## 2020-02-10 PROCEDURE — 99214 OFFICE O/P EST MOD 30 MIN: CPT | Performed by: NURSE PRACTITIONER

## 2020-02-10 PROCEDURE — G8427 DOCREV CUR MEDS BY ELIG CLIN: HCPCS | Performed by: PHYSICIAN ASSISTANT

## 2020-02-10 PROCEDURE — 1036F TOBACCO NON-USER: CPT | Performed by: PHYSICIAN ASSISTANT

## 2020-02-10 PROCEDURE — G8484 FLU IMMUNIZE NO ADMIN: HCPCS | Performed by: PHYSICIAN ASSISTANT

## 2020-02-10 PROCEDURE — 99203 OFFICE O/P NEW LOW 30 MIN: CPT | Performed by: PHYSICIAN ASSISTANT

## 2020-02-10 PROCEDURE — 70220 X-RAY EXAM OF SINUSES: CPT

## 2020-02-10 RX ORDER — EPINEPHRINE 0.3 MG/.3ML
INJECTION SUBCUTANEOUS
Qty: 4 EACH | Refills: 2 | Status: SHIPPED | OUTPATIENT
Start: 2020-02-10 | End: 2021-02-25

## 2020-02-10 ASSESSMENT — ENCOUNTER SYMPTOMS
EYE ITCHING: 1
RHINORRHEA: 1
EYE PAIN: 0
SINUS PRESSURE: 1
STRIDOR: 0
SORE THROAT: 0
ABDOMINAL PAIN: 0
NAUSEA: 0
DIARRHEA: 0
CONSTIPATION: 0
BACK PAIN: 0
EYE DISCHARGE: 0
VOMITING: 0
WHEEZING: 0
SINUS PAIN: 0
COUGH: 0
SHORTNESS OF BREATH: 1
EYE REDNESS: 1

## 2020-02-10 NOTE — PROGRESS NOTES
rash.   Allergic/Immunologic: Positive for environmental allergies. Neurological: Negative for dizziness and headaches. Hematological: Does not bruise/bleed easily. Psychiatric/Behavioral: The patient is not nervous/anxious. All other systems reviewed and are negative. Past Medical History:    Past Medical History:   Diagnosis Date    Asthma     Fibromyalgia        Past Surgical History:    Past Surgical History:   Procedure Laterality Date    BACK SURGERY      KNEE SURGERY      lt       Family History:   History reviewed. No pertinent family history. Social History:   Social History     Tobacco Use    Smoking status: Never Smoker    Smokeless tobacco: Never Used   Substance Use Topics    Alcohol use: Not Currently        Allergies: Allergies   Allergen Reactions    Dilantin [Phenytoin] Anaphylaxis and Swelling    Oxycodone     Valium [Diazepam]        Current Medications:   Prior to Visit Medications    Medication Sig Taking? Authorizing Provider   EPINEPHrine (AUVI-Q) 0.3 MG/0.3ML SOAJ injection Dispense 2 packs of 2 (total 4 devices). Use as directed, STAT for allergic reaction. Yes MIRNA Richmond CNP   HYDROcodone-acetaminophen (NORCO) 5-325 MG per tablet Take 1 tablet by mouth every 8 hours as needed for Pain for up to 30 days. Intended supply: 3 days.  Take lowest dose possible to manage pain Yes MIRNA Chakraborty CNP   lidocaine (LIDOPIN) 3 % CREA Apply topically to back, abdomen and arms Yes MIRNA Curiel CNP   capsicum (TRIXAICIN HP) 0.075 % topical cream Apply topically to back, abdomen and arms TID PRN Yes MIRNA Curiel CNP   NARCAN 4 MG/0.1ML LIQD nasal spray  Yes Historical Provider, MD   orphenadrine (NORFLEX) 100 MG extended release tablet  Yes Historical Provider, MD   predniSONE (DELTASONE) 10 MG tablet Take 1 tablet by mouth daily 4 tablets x 3 days, 3 tablets daily x 3 days, 2 tablets daily x 3 days, 1 tablet daily x 3 days Yes Swati MIRNA Mills CNP   montelukast (SINGULAIR) 10 MG tablet Take 1 tablet by mouth nightly Yes MIRNA Peoples CNP   Umeclidinium Bromide (INCRUSE ELLIPTA) 62.5 MCG/INH AEPB Inhale 1 puff into the lungs daily Yes MIRNA Peoples CNP   fluticasone-salmeterol (ADVAIR HFA) 230-21 MCG/ACT inhaler Inhale 2 puffs into the lungs 2 times daily Yes MIRNA Peoples CNP   albuterol (PROVENTIL) (2.5 MG/3ML) 0.083% nebulizer solution Take 3 mLs by nebulization every 6 hours as needed for Wheezing Yes MIRNA Morrison CNP   Respiratory Therapy Supplies (NEBULIZER AIR TUBE/PLUGS) MISC 1 Package by Does not apply route every 6 hours as needed (Wheezing/shortness of breath) Please provide nebulizer kit and supplies.  Yes MIRNA Morrison CNP   Multiple Vitamins-Minerals (MULTIVITAMIN ADULTS) TABS Take 1 tablet by mouth daily Yes MIRNA Euceda CNP   Elastic Bandages & Supports (WRIST BRACE DELUXE/RIGHT L-XL) MISC 1 Units by Does not apply route daily Yes MIRNA Euceda CNP   Elastic Bandages & Supports (WRIST BRACE DELUXE/LEFT L-XL) MISC 1 Units by Does not apply route daily Yes MIRNA Euceda CNP   Elastic Bandages & Supports (KNEE BRACE/CUSHION/L-XL) MISC 1 Units by Does not apply route daily 1 brace for each knee Yes MIRNA Euceda CNP   butalbital-acetaminophen-caffeine (FIORICET, ESGIC) -40 MG per tablet Take 1 tablet by mouth 4 times daily as needed for Headaches Yes MIRNA Euceda CNP   Omalizumab [place]ama [place] SC) Inject into the skin every 14 days  Yes Historical Provider, MD   omeprazole (PRILOSEC) 20 MG delayed release capsule Take 1 capsule by mouth daily Yes MIRNA Euceda CNP   diphenhydrAMINE (BENADRYL) 25 MG capsule Take 1 capsule by mouth nightly as needed for Itching Yes MIRNA Euceda CNP   loratadine (CLARITIN) 10 MG tablet Take 1 tablet by mouth daily Yes Bisi Cerrato APRN - CNP   rosuvastatin (CRESTOR) 20 MG tablet Take 1 tablet by mouth daily Yes MIRNA Quiñones CNP   flunisolide (NASALIDE) 25 MCG/ACT (0.025%) SOLN Inhale 2 sprays into the lungs 2 times daily as needed Yes Historical Provider, MD   hydrocortisone (WESTCORT) 0.2 % cream Apply topically 2 times daily as needed Apply topically 2 times daily. Yes Historical Provider, MD   Alum & Mag Hydroxide-Simeth (MAALOX MAXIMUM STRENGTH PO) Take by mouth daily as needed Yes Historical Provider, MD   levalbuterol (XOPENEX HFA) 45 MCG/ACT inhaler Inhale 1-2 puffs into the lungs 4 times daily as needed for Wheezing Yes Historical Provider, MD   ergocalciferol (ERGOCALCIFEROL) 62071 units capsule Take 50,000 Units by mouth daily Yes Historical Provider, MD   cetirizine (ZYRTEC) 10 MG tablet Take 10 mg by mouth daily Yes Historical Provider, MD   ALBUTEROL IN Inhale into the lungs 4 times daily as needed  Yes Historical Provider, MD   gabapentin (NEURONTIN) 300 MG capsule Take 1 capsule by mouth nightly for 30 days. MIRNA Fregoso CNP       Vitals:  Vitals:    02/10/20 0802   BP: 126/76   Pulse: 72   Resp: 16       (!) 417 lb 3.2 oz (189.2 kg)           Physical Exam:  Physical Exam  Vitals signs and nursing note reviewed. Constitutional:       Appearance: Normal appearance. He is obese. HENT:      Head: Normocephalic and atraumatic. Right Ear: Tympanic membrane, ear canal and external ear normal.      Left Ear: Tympanic membrane, ear canal and external ear normal.      Nose: Congestion and rhinorrhea present. Mouth/Throat:      Mouth: Mucous membranes are moist.      Pharynx: Oropharynx is clear. Eyes:      Pupils: Pupils are equal, round, and reactive to light. Comments: Injected sclera. Neck:      Musculoskeletal: Normal range of motion and neck supple. Cardiovascular:      Rate and Rhythm: Normal rate and regular rhythm. Heart sounds: Normal heart sounds.    Pulmonary:      Effort: Pulmonary effort is normal.      Breath sounds: Normal breath sounds. Comments: Breath sounds were difficult to hear related to patient's body habitus however there was no wheezing  Musculoskeletal: Normal range of motion. Skin:     General: Skin is warm and dry. Neurological:      General: No focal deficit present. Mental Status: He is alert and oriented to person, place, and time. Mental status is at baseline. Psychiatric:         Mood and Affect: Mood normal.         Behavior: Behavior normal.         Thought Content: Thought content normal.         Judgment: Judgment normal.           DATA:  Lab Review:   Results for orders placed or performed during the hospital encounter of 11/20/19   ALLERGEN INHALANT [place] COMP 1   Result Value Ref Range    Allergen Fungi/Mold A. Alternata Ige 0.81 (H) <=0.34 kU/L    Allergen [place] < 0.10 <=0.34 kU/L    Cat Dander IgE < 0.10 <=0.34 kU/L    ALLERGEN MOUNTAIN CEDAR < 0.10 <=0.34 kU/L    ALLERGEN [place] IGE < 0.10 <=0.34 kU/L    ALLERGEN WEED, PIGWEED IGE < 0.10 <=0.34 kU/L    [place] Thistle IgE < 0.10 <=0.34 kU/L    ALLERGEN DOMI GRASS < 0.10 <=0.34 kU/L    Allergen, Fungi/Mold 0.17 <=0.34 kU/L    Elm IgE < 0.10 <=0.34 kU/L    Allergen, Tree, Oak < 0.10 <=0.34 kU/L    ALLERGEN BIRCH IgE < 0.10 <=0.34 kU/L    Allergen Fungi/Mold A. Fumigatus IgE 0.20 <=0.34 kU/L    Mite Dust Pteronyssinus IgE < 0.10 <=0.34 kU/L    ALLERGEN D. FARINAE (DUST MITE) < 0.10 <=0.34 kU/L    [place] Grass IgE < 0.10 <=0.34 kU/L    Allergen White Rohan < 0.10 <=0.34 kU/L    Allergen Fungi/Mold P.  [place] IgE < 0.10 <=0.34 kU/L    Common-Short Ragweed IgE 0.69 (H) <=0.34 kU/L    Cockroach IgE < 0.10 <=0.34 kU/L    Allergen Tree [place] < 0.10 <=0.34 kU/L    [place] Tree IgE < 0.10 <=0.34 kU/L    Pecan Tree IgE < 0.10 <=0.34 kU/L    Mouse Epithelial < 0.10 <=0.34 kU/L    Allergen Fungi/Mold, M. racemosus IGE < 0.10 <=0.34 kU/L    Allergen White [place] Tree, IGE < 0.10 <=0.34 kU/L    Dog Dander IgE < 0.10 <=0.34 kU/L    Sheep Sorrel IgE

## 2020-02-10 NOTE — PROGRESS NOTES
manage pain 90 tablet 0    lidocaine (LIDOPIN) 3 % CREA Apply topically to back, abdomen and arms 85 g 2    capsicum (TRIXAICIN HP) 0.075 % topical cream Apply topically to back, abdomen and arms TID PRN 56 g 2    NARCAN 4 MG/0.1ML LIQD nasal spray       orphenadrine (NORFLEX) 100 MG extended release tablet       predniSONE (DELTASONE) 10 MG tablet Take 1 tablet by mouth daily 4 tablets x 3 days, 3 tablets daily x 3 days, 2 tablets daily x 3 days, 1 tablet daily x 3 days 30 tablet 0    montelukast (SINGULAIR) 10 MG tablet Take 1 tablet by mouth nightly 90 tablet 3    Umeclidinium Bromide (INCRUSE ELLIPTA) 62.5 MCG/INH AEPB Inhale 1 puff into the lungs daily 30 each 11    fluticasone-salmeterol (ADVAIR HFA) 230-21 MCG/ACT inhaler Inhale 2 puffs into the lungs 2 times daily 1 Inhaler 11    albuterol (PROVENTIL) (2.5 MG/3ML) 0.083% nebulizer solution Take 3 mLs by nebulization every 6 hours as needed for Wheezing 120 each 3    Respiratory Therapy Supplies (NEBULIZER AIR TUBE/PLUGS) MISC 1 Package by Does not apply route every 6 hours as needed (Wheezing/shortness of breath) Please provide nebulizer kit and supplies.  1 each 0    Multiple Vitamins-Minerals (MULTIVITAMIN ADULTS) TABS Take 1 tablet by mouth daily 90 tablet 3    Elastic Bandages & Supports (WRIST BRACE DELUXE/RIGHT L-XL) MISC 1 Units by Does not apply route daily 1 each 0    Elastic Bandages & Supports (WRIST BRACE DELUXE/LEFT L-XL) MISC 1 Units by Does not apply route daily 1 each 0    Elastic Bandages & Supports (KNEE BRACE/CUSHION/L-XL) MISC 1 Units by Does not apply route daily 1 brace for each knee 2 each 0    butalbital-acetaminophen-caffeine (FIORICET, ESGIC) -40 MG per tablet Take 1 tablet by mouth 4 times daily as needed for Headaches 180 tablet 0    Omalizumab (XOLAIR SC) Inject into the skin every 14 days       omeprazole (PRILOSEC) 20 MG delayed release capsule Take 1 capsule by mouth daily 90 capsule 3    diphenhydrAMINE risk assessment. The patient is educated on symptoms of heart disease that include chest pain and passing out, dizziness, etc and to report them if there is any change or go to emergency room.            Follow up with myself in 3 months or sooner if needed  (Please note that portions of this note were completed with a voice recognition program.  Efforts were made to edit the dictation but occasionally words are mis-transcribed.)      Soledad Park PA-C

## 2020-02-11 ENCOUNTER — NURSE ONLY (OUTPATIENT)
Dept: LAB | Age: 50
End: 2020-02-11

## 2020-02-11 RX ORDER — AMLODIPINE BESYLATE 5 MG/1
5 TABLET ORAL DAILY
Qty: 30 TABLET | Refills: 3 | Status: SHIPPED | OUTPATIENT
Start: 2020-02-11 | End: 2020-06-03

## 2020-02-12 ENCOUNTER — TELEPHONE (OUTPATIENT)
Dept: FAMILY MEDICINE CLINIC | Age: 50
End: 2020-02-12

## 2020-02-12 LAB — MISC. #1 REFERENCE GROUP TEST: NORMAL

## 2020-02-12 NOTE — LETTER
[address] [address]  Phone: [phone]  Fax: [phone]    MIRNA Davis CNP        February 14, 2020     Patient: Pieter Begum   YOB: 1970         To Whom It May Concern: It is my medical opinion that James Sepulveda requires a disability parking placard for the following reasons:  Medical reasons   Duration of need: 5 years    If you have any questions or concerns, please don't hesitate to call.     Sincerely,        MIRNA Davis CNP

## 2020-02-14 ENCOUNTER — TELEPHONE (OUTPATIENT)
Dept: FAMILY MEDICINE CLINIC | Age: 50
End: 2020-02-14

## 2020-02-14 RX ORDER — LIDOCAINE 40 MG/G
CREAM TOPICAL
Qty: 120 G | Refills: 2 | Status: SHIPPED | OUTPATIENT
Start: 2020-02-14 | End: 2020-11-05 | Stop reason: SDUPTHER

## 2020-02-18 ENCOUNTER — OFFICE VISIT (OUTPATIENT)
Dept: PULMONOLOGY | Age: 50
End: 2020-02-18
Payer: MEDICAID

## 2020-02-18 VITALS
OXYGEN SATURATION: 98 % | BODY MASS INDEX: 41.75 KG/M2 | WEIGHT: 315 LBS | DIASTOLIC BLOOD PRESSURE: 78 MMHG | HEIGHT: 73 IN | HEART RATE: 64 BPM | SYSTOLIC BLOOD PRESSURE: 128 MMHG

## 2020-02-18 PROCEDURE — G8484 FLU IMMUNIZE NO ADMIN: HCPCS | Performed by: INTERNAL MEDICINE

## 2020-02-18 PROCEDURE — G8417 CALC BMI ABV UP PARAM F/U: HCPCS | Performed by: INTERNAL MEDICINE

## 2020-02-18 PROCEDURE — G8427 DOCREV CUR MEDS BY ELIG CLIN: HCPCS | Performed by: INTERNAL MEDICINE

## 2020-02-18 PROCEDURE — 99213 OFFICE O/P EST LOW 20 MIN: CPT | Performed by: INTERNAL MEDICINE

## 2020-02-18 PROCEDURE — 1036F TOBACCO NON-USER: CPT | Performed by: INTERNAL MEDICINE

## 2020-02-18 NOTE — PROGRESS NOTES
Sleep Medicine Follow Up  Nena Melendrez MD    Nidhi Garland, 54 y.o.  [id number]     Nurses Notes   Kb Avendaño is here for a 8 week shana follow up with a download    Date of Last Visit  10/10/19      Scan of Download      Summary of PAP Download     Dates  1/18/20  -   2/16/20  Four Hour Compliance - 93 %. Total Hours -  612  Average Hours/Day -  7:15       AHI -  2.0    Original AHI -  49.2     Initial Study Date -  11/11/19  PAP Type -  CPAP       Machine Type - ResMed     Pressure Settings -  13 cwp  Interface -  FFM    Provider  [x][facility]  [][facility]  [][facility]  [][facility]         [][facility] []Other:     ESS: 4   Neck Circumference:  22.5 in    INTERVAL HISTORY         Nidhi Garland is a 54 y.o. old male who comes in for follow up regarding his obstructive sleep apnea. He is currently doing well using his positive airway pressure device. He is sleeping better at night with his machine and says he has less daytime sleepiness. Kb Avendaño loves his CPAP 13 cwp, 4h compliance 93%, average nightly use 7.5h, residual AHI 2. Cannot sleep without PAP. Has not change her mask in over a year. Asthma well controlled, needs RAST test to go back to [facility]  Wt 419 lbs         The machine pressure settings are comfortable, the mask is reasonably comfortable and he is using the humidity. There have been no ER visits, hospital visits, any new issues or medication changes since the last visit here in sleep clinic.     Past Medical History:   Diagnosis Date    Asthma     Fibromyalgia      Past Surgical History:   Procedure Laterality Date    BACK SURGERY      KNEE SURGERY      lt       Social History     Tobacco Use    Smoking status: Never Smoker    Smokeless tobacco: Never Used   Substance Use Topics    Alcohol use: Not Currently    Drug use: No       ALLERGIES  Allergies   Allergen Reactions    Dilantin [Phenytoin] Anaphylaxis and Swelling    Oxycodone     Valium [Diazepam]      Current Outpatient Medications

## 2020-02-20 ENCOUNTER — OFFICE VISIT (OUTPATIENT)
Dept: BEHAVIORAL/MENTAL HEALTH CLINIC | Age: 50
End: 2020-02-20
Payer: MEDICAID

## 2020-02-20 PROCEDURE — 90791 PSYCH DIAGNOSTIC EVALUATION: CPT | Performed by: SOCIAL WORKER

## 2020-02-20 ASSESSMENT — PATIENT HEALTH QUESTIONNAIRE - PHQ9
2. FEELING DOWN, DEPRESSED OR HOPELESS: 3
1. LITTLE INTEREST OR PLEASURE IN DOING THINGS: 2
6. FEELING BAD ABOUT YOURSELF - OR THAT YOU ARE A FAILURE OR HAVE LET YOURSELF OR YOUR FAMILY DOWN: 3
7. TROUBLE CONCENTRATING ON THINGS, SUCH AS READING THE NEWSPAPER OR WATCHING TELEVISION: 3
5. POOR APPETITE OR OVEREATING: 3
8. MOVING OR SPEAKING SO SLOWLY THAT OTHER PEOPLE COULD HAVE NOTICED. OR THE OPPOSITE, BEING SO FIGETY OR RESTLESS THAT YOU HAVE BEEN MOVING AROUND A LOT MORE THAN USUAL: 3
SUM OF ALL RESPONSES TO PHQ9 QUESTIONS 1 & 2: 5
SUM OF ALL RESPONSES TO PHQ QUESTIONS 1-9: 22
4. FEELING TIRED OR HAVING LITTLE ENERGY: 2
10. IF YOU CHECKED OFF ANY PROBLEMS, HOW DIFFICULT HAVE THESE PROBLEMS MADE IT FOR YOU TO DO YOUR WORK, TAKE CARE OF THINGS AT HOME, OR GET ALONG WITH OTHER PEOPLE: 3
9. THOUGHTS THAT YOU WOULD BE BETTER OFF DEAD, OR OF HURTING YOURSELF: 0
3. TROUBLE FALLING OR STAYING ASLEEP: 3
SUM OF ALL RESPONSES TO PHQ QUESTIONS 1-9: 22

## 2020-02-20 ASSESSMENT — COLUMBIA-SUICIDE SEVERITY RATING SCALE - C-SSRS
6. HAVE YOU EVER DONE ANYTHING, STARTED TO DO ANYTHING, OR PREPARED TO DO ANYTHING TO END YOUR LIFE?: NO
1. WITHIN THE PAST MONTH, HAVE YOU WISHED YOU WERE DEAD OR WISHED YOU COULD GO TO SLEEP AND NOT WAKE UP?: NO
2. HAVE YOU ACTUALLY HAD ANY THOUGHTS OF KILLING YOURSELF?: NO

## 2020-02-20 NOTE — PATIENT INSTRUCTIONS
Depression: Facts, Myths & Tips for Feeling Better    Facts    Depression is very common  Nearly 20% of the [place] population experiences a significant depression during their lifetime. Depression is treatable  Because depression affects so many, and can have such a powerful and negative impact on life, there has been an enormous amount of research conducted on how to reduce symptoms and improve functioning. As a result, we now know there are behaviors YOU can engage in to make yourself feel better. Depression is not a weakness  People suffer from depression for a variety of reasons, biological, environmental and behavioral.  Research indicates that [employer] is NOT one of the reasons people become depressed. Depression is not something you are powerless against  Evidence suggests that you can directly impact the intensity and duration of depression by what you do and by altering the way you think about certain things. The Downward Spiral    Depression often begins as a drop in mood due to an environmental or biological trigger that makes people feel less like being active. Being less active, in turn, often causes people to experience an even lower mood and feel even less like being active, and so the cycle begins. How can I start feeling better? The first and best way to reverse the downward cycle is to get active! Your body produces its own anti-depressants every time you exercise or do something pleasurable. Regular exercise is one of the very best ways to improve your mood. In fact some studies have shown that a solid exercise program is as effective as psychotherapy or anti-depressant medication for some people. *See physical activity section of handout    Force yourself to do something you found pleasurable before depression. This may be different for everyone and it doesnt matter if its gardening, playing bridge, walking, reading a novel, or simply talking to a close friend.  What matters is that YOU find the activity pleasurable! Even if you dont feel like doing something pleasurable for yourself, DO IT ANYWAY. We call this the fake it until you make it principle. Educate yourself! Often people feel powerless against medical conditions because they do not understand what is happening in their body. Just by reading this handout you know more than most people about depression. Knowledge is power when you can apply it, and make yourself feel better. *See recommended reading list at the bottom of this handout\"    Begin to notice unhealthy and unhelpful thoughts! In addition to how we behave, how we think influences our mood directly. Notice recurrent or alarming thoughts that have an impact on your mood. Ask yourself is this type of thinking helping me or hurting me?  if your answer is it's hurting me here are some things you can do: *see disputation of negative thoughts section of handout  - Challenge the negative thought. Is it truly accurate? Wheres the proof? Become your own  and collect the facts.  - Reframe the negative thought. How can I think differently about this problem, situation, or view of myself? Allow yourself to view a situation from more than one angle, how might my spouse, friend, or someone I admire view this same problem?  - Use the best friend scenario. How would you help your best friend if he or she was having these same thoughts? Would you criticize him or her as harshly as you criticize yourself? Remember, YOU know YOU better than anyone else. You likely know what kinds of activities, thoughts and reinforcement you respond to. Doing whats easiest and most doable is the key. Pick 1 or 2 things that are easy and get started feeling better TODAY!     * Use the following handout sections to guide you through behavioral and thinking exercises to help you manage your depressive symptoms, improve your functioning and to begin living friend or someone I loved knew I was thinking this thought, what would they say to me? What evidence would they point out to me that would suggest that my thought is not completely true? 14. Are there strengths in me or positives in the situation that I am ignoring? Am I underestimating my ability to cope with unfortunate circumstances? 15. When I am not feeling this way, do I think about this situation any differently? How?  16. Have I been in this type of situation before? What happened? What have I learned from prior experiences that could help me now? 17. Five years from now, if I look back on this situation, will I look at it any differently? Will I focus on any different part of my experience? 25. Am I blaming myself for something over which I do not have complete control? 2. Pt will manage effective self care habits-eat, sleep, enjoyable activities, relaxation. 3. Pt will follow up with JOSE Chavez next week.

## 2020-02-20 NOTE — PROGRESS NOTES
indications of depressive symptoms and instructed to monitor if symptoms worsen or interfere with daily functioning, to consider following-up with either your primary care team or a behavioral health provider for possible counseling or medication management. If suicidal thoughts are experienced, call 911. An additional 24/7 resource is the [facility] at [phone] ([phone]). Pt will attend a follow up appointment next week. O:  MSE:    Appearance    cooperative  Appetite abnormal: overeating/poor appetite  Sleep disturbance Yes  Fatigue Yes  Loss of pleasure Yes  Impulsive behavior No  Speech    normal rate  Mood    euthymic   Affect    normal affect  Thought Content    intact  Thought Process    goal directed  Associations    logical connections  Insight    Good  Judgment    Intact  Orientation    oriented to person, place, time, and general circumstances  Memory    recent and remote memory intact  Attention/Concentration    intact  Morbid ideation No  Suicide Assessment    no suicidal ideation    History:    TOBACCO:   reports that he has never smoked. He has never used smokeless tobacco.  ETOH:   reports previous alcohol use. Family History:   Family History   Problem Relation Age of Onset    High Blood Pressure Mother     Emphysema Mother     Other Mother         she was hit and killed by car    High Blood Pressure Father     Emphysema Father     Asthma Father        A:    Diagnosis:    1. Severe episode of recurrent major depressive disorder, without psychotic features ([facility] [address]          Diagnosis Date    Asthma     Fibromyalgia        Plan: . Pt will attend a follow up appointment next week to assess symptoms and evaluate effectiveness of coping skills. Pt interventions:  Provided handout on  depression and Discussed self-care (sleep, nutrition, rewarding activities, social support, exercise)    Pt Behavioral Change Plan:   1.  Pt will read handouts about depression and

## 2020-02-24 ENCOUNTER — NURSE ONLY (OUTPATIENT)
Dept: LAB | Age: 50
End: 2020-02-24

## 2020-02-24 ENCOUNTER — PROCEDURE VISIT (OUTPATIENT)
Dept: ALLERGY | Age: 50
End: 2020-02-24
Payer: MEDICAID

## 2020-02-24 VITALS
HEIGHT: 73 IN | RESPIRATION RATE: 14 BRPM | TEMPERATURE: 97.9 F | WEIGHT: 315 LBS | DIASTOLIC BLOOD PRESSURE: 90 MMHG | SYSTOLIC BLOOD PRESSURE: 156 MMHG | BODY MASS INDEX: 41.75 KG/M2 | HEART RATE: 72 BPM

## 2020-02-24 LAB
FERRITIN: 182 NG/ML (ref 22–322)
FOLATE: 13.6 NG/ML (ref 4.8–24.2)
IRON SATURATION: 20 % (ref 20–50)
IRON: 59 UG/DL (ref 65–195)
TOTAL IRON BINDING CAPACITY: 292 UG/DL (ref 171–450)
VITAMIN B-12: 325 PG/ML (ref 211–911)

## 2020-02-24 PROCEDURE — 95004 PERQ TESTS W/ALRGNC XTRCS: CPT | Performed by: NURSE PRACTITIONER

## 2020-02-24 PROCEDURE — 99214 OFFICE O/P EST MOD 30 MIN: CPT | Performed by: NURSE PRACTITIONER

## 2020-02-24 PROCEDURE — G8427 DOCREV CUR MEDS BY ELIG CLIN: HCPCS | Performed by: NURSE PRACTITIONER

## 2020-02-24 PROCEDURE — G8484 FLU IMMUNIZE NO ADMIN: HCPCS | Performed by: NURSE PRACTITIONER

## 2020-02-24 PROCEDURE — G8417 CALC BMI ABV UP PARAM F/U: HCPCS | Performed by: NURSE PRACTITIONER

## 2020-02-24 PROCEDURE — 1036F TOBACCO NON-USER: CPT | Performed by: NURSE PRACTITIONER

## 2020-02-24 RX ORDER — NEBULIZER ACCESSORIES
1 EACH MISCELLANEOUS EVERY 6 HOURS PRN
Qty: 1 EACH | Refills: 11 | Status: SHIPPED | OUTPATIENT
Start: 2020-02-24 | End: 2020-02-26

## 2020-02-24 RX ORDER — CETIRIZINE HYDROCHLORIDE 10 MG/1
10 TABLET ORAL DAILY
Qty: 30 TABLET | Refills: 11 | Status: SHIPPED | OUTPATIENT
Start: 2020-02-24 | End: 2020-05-07

## 2020-02-24 RX ORDER — MONTELUKAST SODIUM 10 MG/1
10 TABLET ORAL NIGHTLY
Qty: 30 TABLET | Refills: 11 | Status: SHIPPED | OUTPATIENT
Start: 2020-02-24 | End: 2020-08-31 | Stop reason: SDUPTHER

## 2020-02-24 RX ORDER — AZELASTINE HYDROCHLORIDE 0.5 MG/ML
1 SOLUTION/ DROPS OPHTHALMIC 2 TIMES DAILY
Qty: 1 BOTTLE | Refills: 11 | Status: SHIPPED | OUTPATIENT
Start: 2020-02-24 | End: 2020-08-31 | Stop reason: SDUPTHER

## 2020-02-24 RX ORDER — ALBUTEROL SULFATE 2.5 MG/3ML
2.5 SOLUTION RESPIRATORY (INHALATION) EVERY 6 HOURS PRN
Qty: 120 EACH | Refills: 3 | Status: SHIPPED | OUTPATIENT
Start: 2020-02-24 | End: 2020-08-03

## 2020-02-24 RX ORDER — ALBUTEROL SULFATE 90 UG/1
2 AEROSOL, METERED RESPIRATORY (INHALATION) 4 TIMES DAILY PRN
Qty: 3 INHALER | Refills: 1 | Status: SHIPPED | OUTPATIENT
Start: 2020-02-24 | End: 2020-08-31 | Stop reason: SDUPTHER

## 2020-02-24 ASSESSMENT — ENCOUNTER SYMPTOMS
NAUSEA: 0
ROS SKIN COMMENTS: URTICARIA
RHINORRHEA: 1
SINUS PAIN: 0
VOMITING: 0
SORE THROAT: 0
SHORTNESS OF BREATH: 1
DIARRHEA: 0
EYE PAIN: 0
STRIDOR: 0
BACK PAIN: 0
EYE REDNESS: 0
WHEEZING: 0
COUGH: 0
ABDOMINAL PAIN: 0
CONSTIPATION: 0
EYE DISCHARGE: 0

## 2020-02-24 NOTE — PROGRESS NOTES
[facility]    Allergy & Asthma   [address] [facility], [address]  Ph:   [phone]  Fax:[phone]    Provider:  Dr. Fadi Alex:   Chief Complaint   Patient presents with    Follow-up     Patient is here for return testing, patient need refill of asthma and allergy medications           HISTORY OF PRESENT ILLNESS: ESTABLISHED PATIENT HERE FOR EVALUATION   51-year-old male here today for follow-up on allergies and asthma. Patient states that he would like to be considered for allergy injections. This is to help him with his allergic rhinoconjunctivitis as well as his asthma. He reports severity of his symptoms are moderate. Patient states that when he was doing allergy injections previously this helped him. He reports that spring and summer are really bad seasons for him however he does have year-round allergies. He has tried to take allergy medications including loratadine and other antihistamines and nothing really has helped. He is formally also been on Singulair. Patient states that allergies make his asthma uncontrolled. He has been short of breath since he did stop the medications to have allergy testing performed today. He has noticed the symptoms change in symptoms of increasing shortness of breath. He does not report any wheezing or bronchospasm. Patient had PFT 08/2019. He had a significant response post bronchodilator (albuterol) test of greater then 21%. Patient continues to have uncontrolled asthma despite correct use of inhalers and adherence to medication regime. Patient would benefit greatly from dupixent for his severe uncontrolled asthma. Patient has been on multiple steroids (oral) burst packs for his uncontrolled  Asthma. Nothing has helped improve his symptoms. Review of Systems:  Review of Systems   Constitutional: Negative for fever. HENT: Positive for congestion, postnasal drip and rhinorrhea.  Negative for provide nebulizer kit and supplies. , Disp: 1 each, Rfl: 11    montelukast (SINGULAIR) 10 MG tablet, Take 1 tablet by mouth nightly, Disp: 30 tablet, Rfl: 11    fluticasone-salmeterol (ADVAIR HFA) 230-21 MCG/ACT inhaler, Inhale 2 puffs into the lungs 2 times daily, Disp: 1 Inhaler, Rfl: 2    cetirizine (ZYRTEC) 10 MG tablet, Take 1 tablet by mouth daily, Disp: 30 tablet, Rfl: 11    albuterol sulfate  (90 Base) MCG/ACT inhaler, Inhale 2 puffs into the lungs 4 times daily as needed for Wheezing, Disp: 3 Inhaler, Rfl: 1    albuterol (PROVENTIL) (2.5 MG/3ML) 0.083% nebulizer solution, Take 3 mLs by nebulization every 6 hours as needed for Wheezing (for asthma), Disp: 120 each, Rfl: 3    lidocaine (LMX) 4 % cream, Apply topically to back, arms and abs as needed, Disp: 120 g, Rfl: 2    amLODIPine (NORVASC) 5 MG tablet, Take 1 tablet by mouth daily, Disp: 30 tablet, Rfl: 3    EPINEPHrine (AUVI-Q) 0.3 MG/0.3ML SOAJ injection, Dispense 2 packs of 2 (total 4 devices). Use as directed, STAT for allergic reaction. , Disp: 4 each, Rfl: 2    HYDROcodone-acetaminophen (NORCO) 5-325 MG per tablet, Take 1 tablet by mouth every 8 hours as needed for Pain for up to 30 days. Intended supply: 3 days.  Take lowest dose possible to manage pain, Disp: 90 tablet, Rfl: 0    capsicum (TRIXAICIN HP) 0.075 % topical cream, Apply topically to back, abdomen and arms TID PRN, Disp: 56 g, Rfl: 2    NARCAN 4 MG/0.1ML LIQD nasal spray, , Disp: , Rfl:     orphenadrine (NORFLEX) 100 MG extended release tablet, , Disp: , Rfl:     albuterol (PROVENTIL) (2.5 MG/3ML) 0.083% nebulizer solution, Take 3 mLs by nebulization every 6 hours as needed for Wheezing, Disp: 120 each, Rfl: 3    Multiple Vitamins-Minerals (MULTIVITAMIN ADULTS) TABS, Take 1 tablet by mouth daily, Disp: 90 tablet, Rfl: 3    Elastic Bandages & Supports (WRIST BRACE DELUXE/RIGHT L-XL) MISC, 1 Units by Does not apply route daily, Disp: 1 each, Rfl: 0    Elastic Bandages percentiles is 20 years. I     Facility age limit for growth percentiles is 20 years. Facility age limit for growth percentiles is 20 years. Facility age limit for growth percentiles is 20 years. Facility age limit for growth percentiles is 20 years. Physical Exam:    Physical Exam  Vitals signs and nursing note reviewed. Constitutional:       Appearance: Normal appearance. He is normal weight. HENT:      Head: Normocephalic and atraumatic. Right Ear: Tympanic membrane, ear canal and external ear normal.      Left Ear: Tympanic membrane, ear canal and external ear normal.      Nose: Nose normal.      Mouth/Throat:      Mouth: Mucous membranes are dry. Pharynx: Oropharynx is clear. Eyes:      Extraocular Movements: Extraocular movements intact. Conjunctiva/sclera: Conjunctivae normal.      Pupils: Pupils are equal, round, and reactive to light. Neck:      Musculoskeletal: Normal range of motion and neck supple. Cardiovascular:      Rate and Rhythm: Normal rate and regular rhythm. Pulmonary:      Effort: Pulmonary effort is normal.      Breath sounds: Normal breath sounds. Comments: Breath sounds diminished, no wheezing  Musculoskeletal: Normal range of motion. Skin:     General: Skin is warm and dry. Neurological:      General: No focal deficit present. Mental Status: He is alert and oriented to person, place, and time. Mental status is at baseline. Psychiatric:         Mood and Affect: Mood normal.         Thought Content:  Thought content normal.         Judgment: Judgment normal.             DATA:  Lab Review:    CBC:   Lab Results   Component Value Date    WBC 5.0 02/10/2020    RBC 4.28 02/10/2020    HGB 12.9 02/10/2020    HCT 41.8 02/10/2020    MCV 97.7 02/10/2020    MCH 30.1 02/10/2020    MCHC 30.9 02/10/2020     02/10/2020          IgE   Date/Time Value Ref Range Status   02/10/2020 09:07 AM 22 <101 IU/mL Final     Comment:     [facility] [address] [phone]      IgG   Date/Time Value Ref Range Status   02/10/2020 09:07 AM 1162 700 - 1600 mg/dL Final     Comment:     [facility] [address] [address] [phone]     IgA   Date/Time Value Ref Range Status   02/10/2020 09:06  70 - 400 mg/dL Final     Comment:     [facility] [address] [address] [phone]      IgM   Date/Time Value Ref Range Status   02/10/2020 09:08  40 - 230 mg/dL Final     Comment:     [facility] [address] [address] [phone]       No results found for: CL   No results found for: RF       Results for orders placed during the hospital encounter of 02/10/20   XR SINUSES (MIN 3 VIEWS )    Narrative PROCEDURE: XR SINUSES (MIN 3 VIEWS )    CLINICAL INFORMATION: Chronic pansinusitis . COMPARISON: No prior study. TECHNIQUE: 4 projections including a Dolores's view Whaley' view submentovertex view and AP projection    FINDINGS: The paranasal sinuses are normally developed and pneumatized. There is no air-fluid levels identified in the bone destruction. Orbital rims are intact. Missing dentition which appears to be in the left side based on the submentovertex view. Impression Unremarkable paranasal sinuses        **This report has been created using voice recognition software. It may contain minor errors which are inherent in voice recognition technology. **    Final report electronically signed by Dr. Tanja Green on 2/10/2020 3:39 PM      No results found for this or any previous visit. Data from outside facility: yes/PFT and labs    PROCEDURES:     Patient had PFT at hospital August 21, 2019. He demonstrated 77% on his FEV1/FVC. Postbronchodilator patient had a 21% change.   Patient's asthma is being managed by pulmonary    ACT score 18    Skin Testing performed on: 02/24/2020      Last use of antihistamine (or other medication affecting response to histamine): greater than one week    Patient informed of risks of skin allergy testing mild, moderate, and severe including potential for anaphylaxis. Patient wishes to proceed.   Consent signed: Yes    Location: Back  Testing preformed: Intradermal    Panel A Epictuaneous   Panel A  Epictuaneous    Site Allergen  W (mm) F  Site Allergen  W (mm) F   A1 Histamine positive control 7.5 27  A6 Cockroach Mix 0 5   A2 Glycerin negative control 0 5.4  A7 Feather Mix 0 6.5   A3 Dust mite Mix 0 3  A8 Mouse epithelia 0 3   A4 Cat Hair 0 3  A9 Cattle Epithelia 0 3   A5 Dog Ep. 4.5 6  A10 Horse Epithelia 0 3              Panel B Epictuaneous   Panel B  Epictuaneous    Site Allergen  W (mm) F  Site Allergen  W (mm) F   B11 Rohan Red/[place] 3.9 5.4  B16 Hackleberry Tree 4 6   B12 [place], Eastern Tree 5.5 7  B17 RENETTA, [place]ark Tree 0 3   B13 Birch mix tree 0 3  B18 Frannie, red Tree 0 3   B14 [place] mix, [place] Tree 0 3  B19 [place], white/eastern Tree 0 3   B15 Elm mix tree 0 5  B20 [place], eastern Tree 5.5 7              Panel C Epictuaneous   Panel C  Epictuaneous    Site Allergen  W (mm) F  Site Allergen  W (mm) F   C21 [place], Black Tree 0 3  C26 Maple 0 3   C22 [place], Black Tree 0 3  C27 Haider Grass 0 3   C23 [place] 0 3  C28 B[place]uda 0 3   C24 [address] Mtn.  0 3  C29 7 grass mix 3 7   C25 Pecan 4.7 6  C30 Canary Grass 0 3              Panel D Epictuaneous   Panel D  Epictuaneous    Site Allergen  W (mm) F  Site Allergen  W (mm) F   D31 [place] Cult.  0 3  D36 [place] weed 4.7 8   D32 Cocklebur [place] 3.8 5  D37 Nettle weed 0 3   D33 Kochia/Firebush weed 0 3  D38 Pigweed Rough, Redroot weed 0 3   D34 Dock-Sorrel Mix weed 5 7  D39 Plantain, English weed 0 3   D35 Ragweed mix 8.8 20  D40 Sagebrush, Vance weed 4.7 7              Panel E Epictuaneous   Panel E  Epictuaneous    Site Allergen  W (mm) F  Site Allergen  W (mm) F   E41 Russian Thistle 4.8 14  E46 Aureobasidium pllulans mold 5.8 13   E42 Mugwort Common 5.2 14  E47 Bipolaris/Helminthosporium . Mold 5.4 10   E43 Sheep [place], Red 4.3 16  E48 Cladosporium herb, mold 10.8 21   E44 Acremoniumstrictum, Mold 6 16.5  E49 Cladosporium sphaero mold 6.6 14   E45 Aspergillus fumiga, Mold 4.9 13  E50 Drechsleraspicifera, mold 13.2 21              Panel F Epictuaneous   Panel F  Epictuaneous    Site Allergen  W (mm) F  Site Allergen  W (mm) F   F51 Penicillium chrysog Mold 4.4 9.4  F56 GS Fish mix, food 0 3   F52 Alternaria Alternata 7.3 18  F57 GS Shellfish Mix Food 0 3   F53 Howard Food 0 3  F58 Milk, Cow Food 0 3   F54 Corn Food 0 3  F59 Peanut Food 0 3   F55 Egg, Whole Chicken food 0 3  F60 Sesame Seed Food 0 3               Panel G Epicutaneous    Panel G Epicutaneous    Site Allergen W (mm) F  Site Allergen W (mm) F   G61 Soybean Food 0 3        G62 Wheat, Whole Food 0 3            62 Panel Skin test performed. All results interpreted as 0 mm unless noted above. Controls performed with Histamine (positive) and Glycerin (negative). Assessment/Orders:    Diagnosis Orders   1. Anemia, unspecified type  Vitamin B12 & Folate    Ferritin    Iron    Iron Binding Capacity    Iron Saturation   2. Moderate asthma without complication, unspecified whether persistent  Umeclidinium Bromide (INCRUSE ELLIPTA) 62.5 MCG/INH AEPB    Respiratory Therapy Supplies (NEBULIZER AIR TUBE/PLUGS) MISC    montelukast (SINGULAIR) 10 MG tablet    fluticasone-salmeterol (ADVAIR HFA) 230-21 MCG/ACT inhaler   3. Severe persistent asthma without complication  albuterol sulfate  (90 Base) MCG/ACT inhaler    albuterol (PROVENTIL) (2.5 MG/3ML) 0.083% nebulizer solution   4. Allergic rhinoconjunctivitis  azelastine (OPTIVAR) 0.05 % ophthalmic solution   5. Allergy to dog dander     6. Acute seasonal allergic rhinitis due to pollen     7. Multiple allergies     8. Allergy to trees     9.  Allergy to mold           Plan:  Follow Up:3 months  Repeat PFT in 3 months  Allergy injection followup in 8 However, situations may arise in between visits that may be important to the success of your treatment. Therefore, we ask your cooperation in alerting the injection personnel before receiving an allergy injection of any changes in your health (heart disease, diabetes, cancer, pregnancy etc.) or medications (specifically beta-blockers prescribed by other providers and any over-the-counter, homeopathic, herbal or alternative medicines). This booklet is designed as a resource for you to refer to, for questions that may arise over the course of your treatment. However, do not assume the extent of the information contained within these pages is  your only resource. If you have questions or situations that arise please feel free to ask. Sincerely,  [facility]  Charisse Amanda, VICTOR MANUEL    General Allergy Injection Procedure  1. We will confirm your name and date of birth along with asking you to by checking the name on your vials and date of birth at each visit. 2.. We will ask if you had any problems with your last injection after you left the office. Specifically:   Bumps at the site that may have developed or enlarged - especially the day after the injection.  Itching away from the injection site   Hives   Sneezing   Shortness of breath, wheezing or chest tightness   Throat tightness  If any of these symptoms, or anything you may be concerned or have questions about has arisen, now is the time to discuss these concerns, NOT after you have received your injection. (See Allergy Injection Reactions)  3. We will need to be able to get to your upper arm to give your injection, so you may want to consider wearing short sleeves and/or easy clothing to remove on days you plan on getting an injection. 4. Allergy injections are given in the outer aspect of the upper arm. Other areas of the arm will increase the irritation or may affect local nerves. Please do not ask for an injection in any other place.   5. After Running a fever   Experiencing asthma symptoms of any degree   Feel ill enough to miss school/work   Have a rash or hives. Call if you have any questions about receiving allergy injections when sick. (See Frequently Asked Questions). 17.  Always have your Epi-pen or Auvi-Q with you. For Patients Receiving Allergy Shots Outside Our Office  At times patients request to receive injections at an office other than one of ours. We are happy to work with your PCP so that you may receive injections. In these instances, certain things will become your responsibility to facilitate this process. 1. To receive injections at a MEDICAL office other than ours there must be a provider, provider assistant (PA), or nurse practitioner (NP) on duty at the time of the injection and for the mandatory 30-minute waiting period. 3. Insure the provider (PA or NP) will agree to give the allergy injections. Not all providers (PA or NP) are comfortable giving allergy injections or have the staff available to provide this service. Please do this before asking us to mail the antigen. 4. The antigen will be mailed via certified mail to the provider's office. The provider's office will be asked not to relinquish the antigen to you, but to mail it directly back to our office should it be necessary. 5. While receiving allergy injections outside our office, follow the recommended schedule of increasing dose by 0.05 per injection and do not exceed a maximum of 0.50 ml per dose. 6. DO NOT GIVE  ANTIGEN. Each antigen bottle is labeled with an expiration date, please dispose of  antigen. 7.  If there are any problems in regards to receiving the antigen you must come back to our office to receive any additional injections. 8. REORDERING - Approximately 2 weeks before the expiration date on the antigen bottles notify our office a refill is needed.  We MUST receive paperwork back from the outside office for injections given instances you will progress right past this same dose without difficulty. Occasionally, some patients may have difficulty progressing through a dilution, and if so, we will slow the progression and advance you  slower by either repeating a dose or advancing in smaller amounts. Local reactions of bumps bigger than a dime in size or an uncomfortable amount of itching are required to be reported to the injection personnel before you leave the office. If the reaction gets larger or itchier after leaving the office, please report the final size to the injection personnel before your next  injection. Systemic Reaction  This reaction is life threatening. Almost all systemic reactions occur within in the first 30-minutes of receiving an allergy injection, hence the reason for the mandatory 30-minute wait in our office or any medical facility after receiving an allergy  injection. A systemic reaction may occur at any time during your allergy treatment. Even with being on your maintenance dose for several years, it is still possible (and does occur) for a systemic reaction to occur. A systemic reaction may have any or all of the following:   Sneezing   Runny and/or itchy nose   Nasal congestion   Itchy and/or watery eyes   Itchy ears or palate (roof of the mouth)   Coughing   Shortness of breath or wheezing   Fainting   Dizziness   Itchy throat   Throat tightness   Hives or itchy skin   Flushing of the skin    If these symptoms develop in our office, please tell the injection personnel or nearest employee immediately. If these symptoms develop outside our office, you will require treatment at the nearest emergency facility immediately. USE YOUR EPIPEN OR AUVI-Q IF NECESSARY. A systemic reaction will necessitate a reduction of one full dilution in your allergy injections. Delayed Reactions  Not all reactions occur within the 30-minute waiting period.  You may have a delayed reaction (local or systemic) are with injections the better the response you will receive. When going on vacation or business trips try as much as possible to get an injection just prior to leaving, and as soon as possible upon return. For most short trips (< 21 days) allergy injections will be minimally affected. 3. Should I receive an injection if I'm sick? If you are ill enough not to go to work or school, postpone your shot. If you are running a fever or having asthma symptoms, do not get an injection until these symptoms have completely resolved. You may receive an injection if you are on an antibiotic or prednisone as long as any fever and/or asthma symptoms have resolved completely. 4. Should I tell my other providers that I'm on allergy injections? Yes, even though this is not a medication. There are medications that should not be given with allergy injections, so please always list your allergy injections on medication forms or whenever asked about medications. 5. How long will I be on allergy injections. The average is 3-5 years. Remaining consistent (weekly) with injections will help achieve a quicker response. Those who have few allergies may respond faster than those who have many allergies. Do not stop the injections on your own; if concerns arise please contact our office to discuss them. 6. What to do if my son/daughter is going away to college and they are on allergy injections? Allergy injections are given at most colleges. Frequently, the student health department will give allergy injections. We will send the allergy extract on to them with complete instructions. If the institution has any questions, they are instructed to contact us before giving any allergy injection. We will then see the student for regular checkups during school breaks when they are home. 7. What do I do if I'm moving? If you are moving, you will need to contact an allergist local to the area in which you are moving.  We will need their

## 2020-02-26 ENCOUNTER — OFFICE VISIT (OUTPATIENT)
Dept: FAMILY MEDICINE CLINIC | Age: 50
End: 2020-02-26
Payer: MEDICAID

## 2020-02-26 VITALS
BODY MASS INDEX: 54.54 KG/M2 | WEIGHT: 315 LBS | RESPIRATION RATE: 16 BRPM | TEMPERATURE: 97.4 F | DIASTOLIC BLOOD PRESSURE: 84 MMHG | HEART RATE: 80 BPM | SYSTOLIC BLOOD PRESSURE: 134 MMHG

## 2020-02-26 PROBLEM — J32.4 CHRONIC PANSINUSITIS: Status: ACTIVE | Noted: 2020-02-26

## 2020-02-26 PROCEDURE — G8484 FLU IMMUNIZE NO ADMIN: HCPCS | Performed by: NURSE PRACTITIONER

## 2020-02-26 PROCEDURE — 1036F TOBACCO NON-USER: CPT | Performed by: NURSE PRACTITIONER

## 2020-02-26 PROCEDURE — 95165 ANTIGEN THERAPY SERVICES: CPT | Performed by: NURSE PRACTITIONER

## 2020-02-26 PROCEDURE — G8427 DOCREV CUR MEDS BY ELIG CLIN: HCPCS | Performed by: NURSE PRACTITIONER

## 2020-02-26 PROCEDURE — G8417 CALC BMI ABV UP PARAM F/U: HCPCS | Performed by: NURSE PRACTITIONER

## 2020-02-26 PROCEDURE — 99214 OFFICE O/P EST MOD 30 MIN: CPT | Performed by: NURSE PRACTITIONER

## 2020-02-26 RX ORDER — GABAPENTIN 300 MG/1
300 CAPSULE ORAL NIGHTLY
COMMUNITY
End: 2020-03-12

## 2020-02-26 RX ORDER — FERROUS SULFATE 325(65) MG
325 TABLET ORAL
Qty: 90 TABLET | Refills: 1 | Status: SHIPPED | OUTPATIENT
Start: 2020-02-26 | End: 2020-08-03 | Stop reason: SDUPTHER

## 2020-02-26 RX ORDER — ORPHENADRINE CITRATE 100 MG/1
100 TABLET, EXTENDED RELEASE ORAL 2 TIMES DAILY
Qty: 180 TABLET | Refills: 0 | Status: SHIPPED | OUTPATIENT
Start: 2020-02-26 | End: 2020-04-23 | Stop reason: ALTCHOICE

## 2020-02-26 ASSESSMENT — ENCOUNTER SYMPTOMS
SHORTNESS OF BREATH: 0
COUGH: 0
TROUBLE SWALLOWING: 0
SORE THROAT: 0
SINUS PAIN: 0
NAUSEA: 0
DIARRHEA: 0
COLOR CHANGE: 0
FACIAL SWELLING: 0
VOMITING: 0
WHEEZING: 0
EYE PAIN: 0
ABDOMINAL PAIN: 0

## 2020-02-26 NOTE — PATIENT INSTRUCTIONS
Theresa account. Enter [id number] in the [facility] box to learn more about \"Iron-Rich Diet: Care Instructions. \"     If you do not have an account, please click on the \"Sign Up Now\" link. Current as of: August 21, 2019  Content Version: 12.3  © 8697-9284 [employer]. Care instructions adapted under license by [facility] ([facility] If you have questions about a medical condition or this instruction, always ask your healthcare professional. [place] [address] any warranty or liability for your use of this information. Patient Education        Learning About Iron Supplements  Introduction    People take iron supplements when their bodies do not have enough iron. Your body uses iron to make hemoglobin. Hemoglobin is part of red blood cells. It carries oxygen through the body. Without enough oxygen, you may feel weak, dizzy, and short of breath. You may tire easily. You also may feel grumpy, have headaches, and have trouble concentrating. Most people begin to feel normal after a few weeks of taking iron pills. But you need to take the pills for several months to build up the iron supply in your body. This can take up to 6 months. Examples  · Ferrous fumarate (Ferrets, Hemocyte, Ircon)  · Ferrous gluconate (Ferate, Fergon)  · Ferrous sulfate (Feosol, Alcides-Gen-Sol, Alcides-in-Sol)  You can buy iron supplements without a prescription. Your doctor will give you directions on how much to take and for how long. Your doctor will also tell you whether you need any testing for iron levels. Possible side effects  Common side effects may include:  · Stomachache. · Nausea. · Constipation. · Black stools. You may have other side effects or reactions not listed here. Check the information that comes with your medicine. What to know about taking this medicine  · Take your medicines exactly as prescribed. Call your doctor if you think you are having a problem with your medicine.   ? Try to take

## 2020-02-26 NOTE — PROGRESS NOTES
[facility]  [address] [facility] [address]  Dept: [phone]  Dept Fax: [phone]  Loc: [phone]     2020     Dilan Bray (:  1970) is a 54 y.o. male, here for evaluation of the following medical concerns:    Chief Complaint   Patient presents with   [facility] [employer] [place]     check up and review labs. HPI  Pt presents to the office today for follow up for anemia lab work that was completed this week. He was seen by his allergist and CBC was completed that showed he was slightly anemic. Iron studies show that his iron level is slightly decreased. He denies any bleeding any where. He is having some fatigue, but he figured it was from pain and winter. He denies any dizziness or weakness.      Component      Latest Ref Rng & Units 2020   Vitamin B-12      211 - 911 pg/mL 325   Folate      4.8 - 24.2 ng/mL 13.6   Ferritin      22 - 322 ng/mL 182   Iron      65 - 195 ug/dL 59 (L)   TIBC      171 - 450 ug/dL 292   Iron Saturation      20 - 50 % 20     Component      Latest Ref Rng & Units 2/10/2020   WBC      4.8 - 10.8 thou/mm3 5.0   RBC      4.70 - 6.10 mill/mm3 4.28 (L)   Hemoglobin Quant      14.0 - 18.0 gm/dl 12.9 (L)   Hematocrit      42.0 - 52.0 % 41.8 (L)   MCV      80.0 - 94.0 fL 97.7 (H)   MCH      26.0 - 33.0 pg 30.1   MCHC      32.2 - 35.5 gm/dl 30.9 (L)   RDW-CV      11.5 - 14.5 % 12.7   RDW-SD      35.0 - 45.0 fL 45.1 (H)   Platelet Count      337 - 400 thou/mm3 190   MPV      9.4 - 12.4 fL 10.9   Seg Neutrophils      % 58.2   Lymphocytes      % 26.3   Monocytes      % 11.3   Eosinophils      % 3.2   Basophils      % 0.8   Immature Granulocytes      % 0.2   Segs Absolute      1.8 - 7.7 thou/mm3 2.9   Lymphocytes Absolute      1.0 - 4.8 thou/mm3 1.3   Monocytes Absolute      0.4 - 1.3 thou/mm3 0.6   Eosinophils Absolute      0.0 - 0.4 thou/mm3 0.2   Basophils Absolute      0.0 - 0.1 thou/mm3 0.0   Immature Grans (Abs)      0.00 - 0.07 thou/mm3 0.01   Nucleated Red Blood Cells      /100 wbc 0     Review of Systems   Constitutional: Negative for chills, fatigue and fever. HENT: Negative for congestion, facial swelling, sinus pain, sore throat and trouble swallowing. Eyes: Negative for pain and visual disturbance. Respiratory: Negative for cough, shortness of breath and wheezing. Cardiovascular: Negative for chest pain and palpitations. Gastrointestinal: Negative for abdominal pain, blood in stool, diarrhea, nausea and vomiting. Genitourinary: Negative for difficulty urinating, dysuria, hematuria and urgency. Musculoskeletal: Positive for back pain (chronic) and neck pain (chronic). Negative for gait problem. Skin: Negative for color change and rash. Neurological: Negative for dizziness, weakness and headaches. Psychiatric/Behavioral: Negative for agitation and sleep disturbance. The patient is not nervous/anxious. Prior to Visit Medications    Medication Sig Taking? Authorizing Provider   gabapentin (NEURONTIN) 300 MG capsule Take 300 mg by mouth nightly.  Yes Historical Provider, MD   ferrous sulfate 325 (65 Fe) MG tablet Take 1 tablet by mouth daily (with breakfast) Yes MIRNA Herman CNP   azelastine (OPTIVAR) 0.05 % ophthalmic solution Place 1 drop into both eyes 2 times daily Yes MIRNA Paz CNP   Umeclidinium Bromide (INCRUSE ELLIPTA) 62.5 MCG/INH AEPB Inhale 1 puff into the lungs daily Yes MIRNA Paz CNP   montelukast (SINGULAIR) 10 MG tablet Take 1 tablet by mouth nightly Yes MIRNA Paz CNP   fluticasone-salmeterol (ADVAIR HFA) 230-21 MCG/ACT inhaler Inhale 2 puffs into the lungs 2 times daily Yes MIRNA Paz CNP   cetirizine (ZYRTEC) 10 MG tablet Take 1 tablet by mouth daily Yes MIRNA Paz CNP   albuterol sulfate  (90 Base) MCG/ACT inhaler Inhale 2 puffs into the lungs 4 times daily as needed for Wheezing Yes David Noe CNP   flunisolide (NASALIDE) 25 MCG/ACT (0.025%) SOLN Inhale 2 sprays into the lungs 2 times daily as needed Yes Historical Provider, MD   hydrocortisone (WESTCORT) 0.2 % cream Apply topically 2 times daily as needed Apply topically 2 times daily. Yes Historical Provider, MD   Alum & Mag Hydroxide-Simeth (MAALOX MAXIMUM STRENGTH PO) Take by mouth daily as needed Yes Historical Provider, MD   levalbuterol (XOPENEX HFA) 45 MCG/ACT inhaler Inhale 1-2 puffs into the lungs 4 times daily as needed for Wheezing Yes Historical Provider, MD   ALBUTEROL IN Inhale into the lungs 4 times daily as needed  Yes Historical Provider, MD   orphenadrine (NORFLEX) 100 MG extended release tablet Take 1 tablet by mouth 2 times daily  MIRNA Brooks CNP   gabapentin (NEURONTIN) 300 MG capsule Take 1 capsule by mouth nightly for 30 days. MIRNA Pete Arm, CNP   ergocalciferol (ERGOCALCIFEROL) 04697 units capsule Take 50,000 Units by mouth daily  Historical Provider, MD        Social History     Tobacco Use    Smoking status: Never Smoker    Smokeless tobacco: Never Used   Substance Use Topics    Alcohol use: Not Currently        Vitals:    02/26/20 1518   BP: 134/84   Pulse: 80   Resp: 16   Temp: 97.4 °F (36.3 °C)   TempSrc: Temporal   Weight: (!) 413 lb 6 oz (187.5 kg)     Estimated body mass index is 54.54 kg/m² as calculated from the following:    Height as of 2/24/20: 6' 1\" (1.854 m). Weight as of this encounter: 413 lb 6 oz (187.5 kg). Physical Exam  Vitals signs reviewed. Constitutional:       General: He is not in acute distress. Appearance: He is well-developed. He is not ill-appearing. HENT:      Head: Normocephalic and atraumatic. Nose: Nose normal. No congestion or rhinorrhea. Eyes:      General:         Right eye: No discharge. Left eye: No discharge. Conjunctiva/sclera: Conjunctivae normal.   Cardiovascular:      Rate and Rhythm: Normal rate and regular rhythm.       Pulses:

## 2020-02-26 NOTE — PROGRESS NOTES
serum is reviewed and documented at every injection appointment. When down to the last 1/3 of the bottle of the red 1:1 concentration the patient should be scheduled an appointment for new orders for allergy maintenance concentrations. If it is the patients preference to receive and injection at an outside medical office, is is allowed only after the patient receives the first dose from each vial at this practice. Patients requesting refills that receive injections at outside medical facilities must include the patient's demographic sheet, current insurance information and the injection records. Allow 2-3 weeks for delivery after the refill has been requested. PROTOCOL FOR LATE INJECTIONS  Days late determined from the due date of the injection    Shot Frequency 5-10 Days Late 11-16 Days Late 17-30 Days Late 31-60 Days Late 61+ Days Late   Twice Weekly Repeat last dose Reduce last dose . 2 Reduce last dose . 4 Dilute back 1 vial, start at .05 Patient must start over     Weekly Repeat last dose Reduce last dose . 2 Reduce last dose . 4 Dilute back 1 vial, start at .05 Patient must start over   Every 2 Weeks Repeat last dose Reduce last dose . 2. Reduce last dose . 4 Ask provider for instruction Ask provider for instruction   Every 3 Weeks Repeat last dose Reduce last dose . 2 Reduce last dose . 4 Ask provider for instruction Ask provider for instruction   Every 4 Weeks Repeat last dose Reduce last dose . 2 Reduce last dose . 4 Ask provider for instruction Ask provider for instruction     Patient/gaurdian made aware of potential anaphylaxis risks associated with receiving allergy injections and wishes to proceed.   SHOT REACTION TREATMENT INSTRUCTIONS    During the 30 minute wait after an allergy injection the following symptoms should be reported:    Itching other than at the injection site  Hives or swelling other than at the injection site  Redness other than at the injection site  Difficulty breathing  Chest

## 2020-02-27 ASSESSMENT — ENCOUNTER SYMPTOMS
BLOOD IN STOOL: 0
BACK PAIN: 1

## 2020-03-02 ENCOUNTER — NURSE ONLY (OUTPATIENT)
Dept: ALLERGY | Age: 50
End: 2020-03-02
Payer: MEDICAID

## 2020-03-02 VITALS — RESPIRATION RATE: 16 BRPM | DIASTOLIC BLOOD PRESSURE: 84 MMHG | HEART RATE: 78 BPM | SYSTOLIC BLOOD PRESSURE: 134 MMHG

## 2020-03-02 PROCEDURE — 95117 IMMUNOTHERAPY INJECTIONS: CPT | Performed by: NURSE PRACTITIONER

## 2020-03-02 RX ORDER — HYDROCODONE BITARTRATE AND ACETAMINOPHEN 5; 325 MG/1; MG/1
1 TABLET ORAL EVERY 8 HOURS PRN
Qty: 90 TABLET | Refills: 0 | Status: SHIPPED | OUTPATIENT
Start: 2020-03-04 | End: 2020-03-25

## 2020-03-02 NOTE — TELEPHONE ENCOUNTER
OARRS reviewed. UDS:negative screen-initial screen. Narcan offered: yes  Last seen: 1/8/2020.  Follow-up:   Future Appointments   Date Time Provider Jesus Simmons   3/5/2020  9:00 AM SCHEDULE, [address] [facility] [place]   3/5/2020  9:30 AM CHARLENE Miner [facility] [address] [facility]   3/9/2020  9:00 AM SCHEDULE, [facility] [place]   3/12/2020  9:00 AM SCHEDULE, [facility] [place]   3/16/2020  8:45 AM SCHEDULE, [facility] [place]   3/19/2020  9:00 AM SCHEDULE, [facility] [place]   3/23/2020  8:45 AM SCHEDULE, [facility] [place]   3/26/2020  8:45 AM SCHEDULE, [facility] [place]   3/30/2020  8:45 AM SCHEDULE, [facility] [place]   4/2/2020  8:45 AM SCHEDULE, [facility] [place]   4/6/2020  9:00 AM SCHEDULE, [facility] [place]   4/9/2020  9:00 AM SCHEDULE, [facility] [place]   4/13/2020  8:45 AM SCHEDULE, [facility] [place]   4/16/2020  9:00 AM SCHEDULE, [facility] [place]   4/20/2020  9:00 AM SCHEDULE, [facility] [place]   4/23/2020  9:00 AM SCHEDULE, [facility] [place]   4/27/2020  9:00 AM SCHEDULE, [facility] [place]   4/30/2020  9:00 AM SCHEDULE, [facility] [place]   5/11/2020  9:45 AM Amol Chu PA-C [facility] [place]   5/27/2020  8:30 AM MIRNA Eaton - CNP [facility] [place]   5/28/2020  9:00 AM MIRNA Nino - CNP [facility] [place]   7/9/2020  9:30 AM MIRNA Eaton - CNP [facility] [place]   7/13/2020  9:30 AM Chacha White APRN - CNP Pulm Med P - [place]   10/29/2020  9:00 AM MIRNA Nino - CNP [facility] [place]   2/18/2021 10:20 AM MIRNA Dawson - CNP Pulm Med [facility] [place]

## 2020-03-09 ENCOUNTER — NURSE ONLY (OUTPATIENT)
Dept: ALLERGY | Age: 50
End: 2020-03-09
Payer: MEDICAID

## 2020-03-09 VITALS
DIASTOLIC BLOOD PRESSURE: 82 MMHG | TEMPERATURE: 98.3 F | SYSTOLIC BLOOD PRESSURE: 140 MMHG | HEART RATE: 88 BPM | RESPIRATION RATE: 16 BRPM

## 2020-03-09 PROCEDURE — 95117 IMMUNOTHERAPY INJECTIONS: CPT | Performed by: NURSE PRACTITIONER

## 2020-03-12 ENCOUNTER — NURSE ONLY (OUTPATIENT)
Dept: ALLERGY | Age: 50
End: 2020-03-12
Payer: MEDICAID

## 2020-03-12 ENCOUNTER — HOSPITAL ENCOUNTER (OUTPATIENT)
Dept: NON INVASIVE DIAGNOSTICS | Age: 50
Discharge: HOME | End: 2020-03-12
Payer: MEDICAID

## 2020-03-12 VITALS — HEART RATE: 80 BPM | SYSTOLIC BLOOD PRESSURE: 132 MMHG | DIASTOLIC BLOOD PRESSURE: 88 MMHG | RESPIRATION RATE: 16 BRPM

## 2020-03-12 LAB
LV EF: 55 %
LVEF MODALITY: NORMAL

## 2020-03-12 PROCEDURE — 93306 TTE W/DOPPLER COMPLETE: CPT

## 2020-03-12 PROCEDURE — 95117 IMMUNOTHERAPY INJECTIONS: CPT | Performed by: NURSE PRACTITIONER

## 2020-03-12 RX ORDER — GABAPENTIN 300 MG/1
CAPSULE ORAL
Qty: 30 CAPSULE | Refills: 0 | Status: SHIPPED | OUTPATIENT
Start: 2020-03-12 | End: 2020-03-31 | Stop reason: ALTCHOICE

## 2020-03-12 NOTE — TELEPHONE ENCOUNTER
OARRS reviewed. UDS: + for  Clean - appropriate       Narcan offered: no       Last seen: 1/8/2020.        Follow-up:

## 2020-03-13 ENCOUNTER — TELEPHONE (OUTPATIENT)
Dept: ENT CLINIC | Age: 50
End: 2020-03-13

## 2020-03-13 NOTE — TELEPHONE ENCOUNTER
Patient called and stated that he has a cold with thick mucus coming up, it is yellow and more clear. He does not have a fever, he has asthma and he is hearing some wheezing. Please advise if you would prescribe something for him.

## 2020-03-16 ENCOUNTER — NURSE ONLY (OUTPATIENT)
Dept: ALLERGY | Age: 50
End: 2020-03-16

## 2020-03-16 VITALS
SYSTOLIC BLOOD PRESSURE: 136 MMHG | TEMPERATURE: 98.6 F | RESPIRATION RATE: 18 BRPM | HEART RATE: 88 BPM | DIASTOLIC BLOOD PRESSURE: 82 MMHG

## 2020-03-16 RX ORDER — METHYLPREDNISOLONE 4 MG/1
TABLET ORAL
Qty: 21 TABLET | Refills: 0 | Status: SHIPPED | OUTPATIENT
Start: 2020-03-16 | End: 2020-05-28

## 2020-03-16 RX ORDER — DOXYCYCLINE HYCLATE 100 MG
100 TABLET ORAL 2 TIMES DAILY
Qty: 20 TABLET | Refills: 0 | Status: SHIPPED | OUTPATIENT
Start: 2020-03-16 | End: 2020-03-26

## 2020-03-16 NOTE — TELEPHONE ENCOUNTER
Please call patient and let him know I sent in an antibiotic and steroids to [address] [place] [address]

## 2020-03-17 ENCOUNTER — TELEPHONE (OUTPATIENT)
Dept: CARDIOLOGY CLINIC | Age: 50
End: 2020-03-17

## 2020-03-17 RX ORDER — BENZONATATE 200 MG/1
200 CAPSULE ORAL 3 TIMES DAILY PRN
Qty: 30 CAPSULE | Refills: 0 | Status: SHIPPED | OUTPATIENT
Start: 2020-03-17 | End: 2020-03-24

## 2020-03-17 RX ORDER — GUAIFENESIN AND CODEINE PHOSPHATE 10; 100 MG/5ML; MG/5ML
LIQUID ORAL
Qty: 105 ML | OUTPATIENT
Start: 2020-03-17

## 2020-03-23 ENCOUNTER — NURSE ONLY (OUTPATIENT)
Dept: ALLERGY | Age: 50
End: 2020-03-23
Payer: MEDICAID

## 2020-03-23 VITALS
TEMPERATURE: 98.3 F | SYSTOLIC BLOOD PRESSURE: 130 MMHG | HEART RATE: 80 BPM | DIASTOLIC BLOOD PRESSURE: 80 MMHG | RESPIRATION RATE: 14 BRPM

## 2020-03-23 PROCEDURE — 95117 IMMUNOTHERAPY INJECTIONS: CPT | Performed by: NURSE PRACTITIONER

## 2020-03-25 RX ORDER — HYDROCODONE BITARTRATE AND ACETAMINOPHEN 5; 325 MG/1; MG/1
TABLET ORAL
Qty: 90 TABLET | Refills: 0 | Status: SHIPPED | OUTPATIENT
Start: 2020-04-03 | End: 2020-05-03

## 2020-03-25 NOTE — TELEPHONE ENCOUNTER
OARRS reviewed. UDS: + for  Clean - initial screen. Narcan offered: offered  Last seen: 1/8/2020. Follow-up: procedure when able.

## 2020-03-26 ENCOUNTER — TELEPHONE (OUTPATIENT)
Dept: PHYSICAL MEDICINE AND REHAB | Age: 50
End: 2020-03-26

## 2020-03-26 RX ORDER — HYDROCODONE BITARTRATE AND ACETAMINOPHEN 5; 325 MG/1; MG/1
TABLET ORAL
Qty: 90 TABLET | Refills: 0 | Status: CANCELLED | OUTPATIENT
Start: 2020-04-03 | End: 2020-05-03

## 2020-03-26 NOTE — TELEPHONE ENCOUNTER
Gabapentin and Robaxin are not in the same class. Gabapentin is for nerve pain and I is the only thing he is on for that. Robaxin is a muscle relaxer and so is Norflex which he is already on. I could change him from Norflex. Please schedule him a video visit next week to discuss this.

## 2020-03-26 NOTE — TELEPHONE ENCOUNTER
Jeancarlos Wells called requesting a refill on the following medications:  Requested Prescriptions     Pending Prescriptions Disp Refills    HYDROcodone-acetaminophen (NORCO) 5-325 MG per tablet 90 tablet 0     Pharmacy verified:  [address] [place] [address] on [facility]      Date of last visit: 11/27/19  Date of next visit (if applicable): Visit date not found

## 2020-03-26 NOTE — TELEPHONE ENCOUNTER
Patient called in today to find out if he could change his Gabapentin to Robaxin. He states that the Gabapentin upsets his stomach.      He also stated that the cardiologist called him and said he was cleared for his procedure which he would like the office to call him and get scheduled

## 2020-03-27 RX ORDER — HYDROCODONE BITARTRATE AND ACETAMINOPHEN 5; 325 MG/1; MG/1
1 TABLET ORAL EVERY 8 HOURS PRN
Qty: 45 TABLET | Refills: 0 | Status: SHIPPED | OUTPATIENT
Start: 2020-04-03 | End: 2020-04-14 | Stop reason: SDUPTHER

## 2020-03-27 NOTE — TELEPHONE ENCOUNTER
I dont have a UDS on the website for January or in the media tab. He has a video visit already on 03/31/2020.

## 2020-03-27 NOTE — TELEPHONE ENCOUNTER
OARRS reviewed. UDS: + for None (Screen from 10/16/19). Narcan offered: Offered  Last seen: 1/8/2020.  Follow-up: 03/31/2020

## 2020-03-31 ENCOUNTER — VIRTUAL VISIT (OUTPATIENT)
Dept: PHYSICAL MEDICINE AND REHAB | Age: 50
End: 2020-03-31
Payer: MEDICAID

## 2020-03-31 PROCEDURE — G8427 DOCREV CUR MEDS BY ELIG CLIN: HCPCS | Performed by: NURSE PRACTITIONER

## 2020-03-31 PROCEDURE — 99213 OFFICE O/P EST LOW 20 MIN: CPT | Performed by: NURSE PRACTITIONER

## 2020-03-31 RX ORDER — PREGABALIN 25 MG/1
25 CAPSULE ORAL 2 TIMES DAILY
Qty: 28 CAPSULE | Refills: 0 | Status: SHIPPED | OUTPATIENT
Start: 2020-03-31 | End: 2020-04-14

## 2020-03-31 ASSESSMENT — ENCOUNTER SYMPTOMS
NAUSEA: 1
RESPIRATORY NEGATIVE: 1
BACK PAIN: 1
EYES NEGATIVE: 1

## 2020-03-31 NOTE — PROGRESS NOTES
HPI:   Mary Mary is a 52 y.o. male is here today for    Chief Complaint: Neck pain, low back pain     HPI   Video Visit. TELEHEALTH EVALUATION -- Audio/Visual (During EYWVH-17 public health emergency). This visit was completed using doxy. me. Location of visit was patient home and provider home. 3 month FU. Continues to have pain across lower back radiating down into buttocks with spasms around stomach and legs. Stabbing and sharp pain. Also has some numbness down legs. Pain is 70% bothersome in back, leg pain is intermittent. Was unable to have bilateral L-facet MBB ordered last visit because was not cleared from cardiologist but is not cleared. Continues to have posterior neck pain aching radiating up into head with occipital headaches  Pain has been up and down  Norco is effective in decreasing pain, Norflex is effective for spasms. Patient feels that Neurontin is upsetting his stomach and is not tolerating it. Medications reviewed. Patient nausea and upset stomach with Neurontin side effects with medications. Patient states he is taking medications as prescribed. Hedenies receiving pain medications from other sources. He denies any ER visits since last visit. Pain scale with out pain medications or at its worst is 10/10. Pain scale with pain medications or at its best is 7/10. Last dose of Norco, Neurontin and Norflex was  yesterday  Drug screen reviewed from 1/8/2020 and was appropriate  Pill count was not completed today d/t video visit  Patient does have naloxone available at home. Patient has not required use of naloxone at home since last office visit. The patientis allergic to dilantin [phenytoin]; oxycodone; and valium [diazepam]. Past Medical History  Bigg Jesus  has a past medical history of Asthma and Fibromyalgia. Past Surgical History  The patient  has a past surgical history that includes back surgery and knee surgery.     Family History  This patient's family levalbuterol (XOPENEX HFA) 45 MCG/ACT inhaler, Inhale 1-2 puffs into the lungs 4 times daily as needed for Wheezing, Disp: , Rfl:     ergocalciferol (ERGOCALCIFEROL) 99363 units capsule, Take 50,000 Units by mouth daily, Disp: , Rfl:     ALBUTEROL IN, Inhale into the lungs 4 times daily as needed , Disp: , Rfl:     Subjective:      Review of Systems   Constitutional: Positive for appetite change. HENT: Negative. Eyes: Negative. Respiratory: Negative. Cardiovascular: Negative. Gastrointestinal: Positive for nausea. Endocrine: Negative. Genitourinary: Negative. Musculoskeletal: Positive for arthralgias, back pain, myalgias, neck pain and neck stiffness. Neurological: Positive for numbness and headaches. Psychiatric/Behavioral: Negative. Objective: There were no vitals filed for this visit. Physical Exam  Constitutional:       Appearance: Normal appearance. He is obese. HENT:      Head: Normocephalic. Neurological:      Mental Status: He is alert and oriented to person, place, and time. Psychiatric:         Mood and Affect: Mood normal.            Assessment:     1. Spondylosis of lumbar region without myelopathy or radiculopathy    2. Lumbar degenerative disc disease    3. DDD (degenerative disc disease), cervical    4. Cervical radiculitis    5. Bilateral occipital neuralgia    6. Hx of fusion of cervical spine    7. Lumbar pain    8. Chronic pain syndrome            Plan:      · OARRS reviewed. Current MED: 15.00  · Patient was offered naloxone for home. Already has. · Discussed long term side effects of medications, tolerance, dependency and addiction. · Previous UDS reviewed  · UDS preformed today for compliance. · Patient told can not receive any pain medications from any other source. · No evidence of abuse, diversion or aberrant behavior.    Medications and/or procedures to improve function and quality of life- patient understanding with this and that may not be pain free   Discussed with patient about safe storage of medications at home   Discussed possible weaning of medication dosing dependent on treatment/procedure results.  Discussed with patient about risks with procedure including infection, reaction to medication, increased pain, or bleeding.  Procedures on hold at this time d/t virus.  Patient recently cleard from Cardiologist to move forward with procedure.  Will plan Bilateral L-facet MBB # 1 @ L3-4, L4-5, and L5-S1 for diagnostic and therapeutic relief when able to    Discussed possible occipital nerve block in future, C-facet MBB   Discontinued Neurontin as patient is not tolerating   Trial Lyrica 25 mg BID for nerve pain- 2 week trial   Continue Norco 5/325 TID prn- patient is compliant- Just refilled   Continue Norflex     Meds. Prescribed:   Orders Placed This Encounter   Medications    pregabalin (LYRICA) 25 MG capsule     Sig: Take 1 capsule by mouth 2 times daily for 14 days. Dispense:  28 capsule     Refill:  0       Return in about 2 weeks (around 4/14/2020) for follow up  for medications, medication changes .          Electronically signed by MIRNA Wen CNP on3/31/2020 at 10:33 AM

## 2020-04-06 ENCOUNTER — NURSE ONLY (OUTPATIENT)
Dept: ALLERGY | Age: 50
End: 2020-04-06
Payer: MEDICAID

## 2020-04-06 VITALS — DIASTOLIC BLOOD PRESSURE: 82 MMHG | HEART RATE: 88 BPM | SYSTOLIC BLOOD PRESSURE: 136 MMHG | RESPIRATION RATE: 16 BRPM

## 2020-04-06 PROCEDURE — 95117 IMMUNOTHERAPY INJECTIONS: CPT | Performed by: NURSE PRACTITIONER

## 2020-04-06 RX ORDER — PREGABALIN 25 MG/1
25 CAPSULE ORAL 2 TIMES DAILY
COMMUNITY
End: 2020-04-14

## 2020-04-14 ENCOUNTER — VIRTUAL VISIT (OUTPATIENT)
Dept: PHYSICAL MEDICINE AND REHAB | Age: 50
End: 2020-04-14
Payer: MEDICAID

## 2020-04-14 PROCEDURE — G8427 DOCREV CUR MEDS BY ELIG CLIN: HCPCS | Performed by: NURSE PRACTITIONER

## 2020-04-14 PROCEDURE — 99213 OFFICE O/P EST LOW 20 MIN: CPT | Performed by: NURSE PRACTITIONER

## 2020-04-14 RX ORDER — LIDOCAINE 50 MG/G
OINTMENT TOPICAL
Qty: 1 TUBE | Refills: 2 | Status: SHIPPED | OUTPATIENT
Start: 2020-04-14 | End: 2020-05-21 | Stop reason: SDUPTHER

## 2020-04-14 RX ORDER — HYDROCODONE BITARTRATE AND ACETAMINOPHEN 5; 325 MG/1; MG/1
1 TABLET ORAL EVERY 8 HOURS PRN
Qty: 90 TABLET | Refills: 0 | Status: SHIPPED | OUTPATIENT
Start: 2020-04-17 | End: 2020-05-07 | Stop reason: SDUPTHER

## 2020-04-14 ASSESSMENT — ENCOUNTER SYMPTOMS
RESPIRATORY NEGATIVE: 1
GASTROINTESTINAL NEGATIVE: 1
EYES NEGATIVE: 1
BACK PAIN: 1

## 2020-04-14 NOTE — PROGRESS NOTES
HPI:   Vel Arnold is a 52 y.o. male is here today for    Chief Complaint: Neck pain, low back pain     HPI   Video Visit. TELEHEALTH EVALUATION -- Audio/Visual (During CZGAE-88 public health emergency). This visit was completed virtually using doxy. me. Location of visit: patients home, providers home. 2 week FU to eval pain from medication changes. Had side effects with Lyrica with increased headache and drowsiness. Continues to have posterior neck pain aching radiating up into head with occipital headaches  Continues to have pain across lower back radiating down into buttocks with spasms around stomach and legs. Stabbing and sharp pain. Also has some numbness down legs. Pain is 70% bothersome in back, leg pain is intermittent. Norco and Norflex is helping     When able to wants to have bilateral L-facet MBB completed first. Currently on hold d/t virus     Medications reviewed. Patient denies side effects with medications. Patient states he is taking medications as prescribed. Hedenies receiving pain medications from other sources. He denies any ER visits since last visit. Pain scale with out pain medications or at its worst is 10/10. Pain scale with pain medications or at its best is 6-7/10. Last dose of Norco and Norflex was yesterday  Drug screen reviewed from 1/8/2020 and was appropriate  Pill count was not completed today d/t virusPatient does not have naloxone available at home. Patient has not required use of naloxone at home since last office visit. The patientis allergic to dilantin [phenytoin]; oxycodone; and valium [diazepam]. Past Medical History  Celia Beltran  has a past medical history of Asthma and Fibromyalgia. Past Surgical History  The patient  has a past surgical history that includes back surgery and knee surgery.     Family History  This patient's family history includes Asthma in his father; Emphysema in his father and mother; High Blood Pressure in his father and mother; Other in his mother. Social History  Wolf Chambers  reports that he has never smoked. He has never used smokeless tobacco. He reports previous alcohol use. He reports that he does not use drugs. Medications    Current Outpatient Medications:     [START ON 4/17/2020] HYDROcodone-acetaminophen (NORCO) 5-325 MG per tablet, Take 1 tablet by mouth every 8 hours as needed for Pain for up to 30 days. , Disp: 90 tablet, Rfl: 0    lidocaine (XYLOCAINE) 5 % ointment, Apply topically as needed to painful areas of lower back and neck, Disp: 1 Tube, Rfl: 2    HYDROcodone-acetaminophen (NORCO) 5-325 MG per tablet, take 1 tablet by mouth every 8 hours if needed, Disp: 90 tablet, Rfl: 0    methylPREDNISolone (MEDROL DOSEPACK) 4 MG tablet, Take by mouth., Disp: 21 tablet, Rfl: 0    orphenadrine (NORFLEX) 100 MG extended release tablet, Take 1 tablet by mouth 2 times daily, Disp: 180 tablet, Rfl: 0    ferrous sulfate 325 (65 Fe) MG tablet, Take 1 tablet by mouth daily (with breakfast), Disp: 90 tablet, Rfl: 1    azelastine (OPTIVAR) 0.05 % ophthalmic solution, Place 1 drop into both eyes 2 times daily, Disp: 1 Bottle, Rfl: 11    Umeclidinium Bromide (INCRUSE ELLIPTA) 62.5 MCG/INH AEPB, Inhale 1 puff into the lungs daily, Disp: 30 each, Rfl: 2    montelukast (SINGULAIR) 10 MG tablet, Take 1 tablet by mouth nightly, Disp: 30 tablet, Rfl: 11    fluticasone-salmeterol (ADVAIR HFA) 230-21 MCG/ACT inhaler, Inhale 2 puffs into the lungs 2 times daily, Disp: 1 Inhaler, Rfl: 2    cetirizine (ZYRTEC) 10 MG tablet, Take 1 tablet by mouth daily, Disp: 30 tablet, Rfl: 11    albuterol sulfate  (90 Base) MCG/ACT inhaler, Inhale 2 puffs into the lungs 4 times daily as needed for Wheezing, Disp: 3 Inhaler, Rfl: 1    albuterol (PROVENTIL) (2.5 MG/3ML) 0.083% nebulizer solution, Take 3 mLs by nebulization every 6 hours as needed for Wheezing (for asthma), Disp: 120 each, Rfl: 3    dupilumab (DUPIXENT) 300 MG/2ML SOSY

## 2020-04-20 ENCOUNTER — NURSE ONLY (OUTPATIENT)
Dept: ALLERGY | Age: 50
End: 2020-04-20
Payer: MEDICAID

## 2020-04-20 VITALS
TEMPERATURE: 98 F | SYSTOLIC BLOOD PRESSURE: 138 MMHG | DIASTOLIC BLOOD PRESSURE: 84 MMHG | RESPIRATION RATE: 16 BRPM | HEART RATE: 88 BPM

## 2020-04-20 PROCEDURE — 95117 IMMUNOTHERAPY INJECTIONS: CPT | Performed by: NURSE PRACTITIONER

## 2020-04-22 ENCOUNTER — TELEPHONE (OUTPATIENT)
Dept: ENT CLINIC | Age: 50
End: 2020-04-22

## 2020-04-22 RX ORDER — HYDROCORTISONE VALERATE 2 MG/G
OINTMENT TOPICAL
Qty: 1 TUBE | Refills: 1 | Status: SHIPPED | OUTPATIENT
Start: 2020-04-22 | End: 2020-05-20 | Stop reason: SDUPTHER

## 2020-04-22 NOTE — TELEPHONE ENCOUNTER
Patient requests refill of hydrocortisone topical cream.  Prescription has been sent to [facility]

## 2020-04-23 ENCOUNTER — TELEPHONE (OUTPATIENT)
Dept: PHYSICAL MEDICINE AND REHAB | Age: 50
End: 2020-04-23

## 2020-04-23 ENCOUNTER — VIRTUAL VISIT (OUTPATIENT)
Dept: PHYSICAL MEDICINE AND REHAB | Age: 50
End: 2020-04-23
Payer: MEDICAID

## 2020-04-23 PROCEDURE — 99213 OFFICE O/P EST LOW 20 MIN: CPT | Performed by: NURSE PRACTITIONER

## 2020-04-23 PROCEDURE — G8427 DOCREV CUR MEDS BY ELIG CLIN: HCPCS | Performed by: NURSE PRACTITIONER

## 2020-04-23 RX ORDER — TIZANIDINE 2 MG/1
2-4 TABLET ORAL EVERY 6 HOURS PRN
Qty: 56 TABLET | Refills: 0 | Status: SHIPPED | OUTPATIENT
Start: 2020-04-23 | End: 2020-04-30

## 2020-04-23 ASSESSMENT — ENCOUNTER SYMPTOMS
EYES NEGATIVE: 1
GASTROINTESTINAL NEGATIVE: 1
RESPIRATORY NEGATIVE: 1
BACK PAIN: 1

## 2020-04-23 NOTE — PROGRESS NOTES
HPI:   Lizet Haile is a 52 y.o. male is here today for    Chief Complaint: Neck pain, lower back pain     HPI   Video Visit. TELEHEALTH EVALUATION -- Audio/Visual (During ASGLY-20 public health emergency). This visit was completed virtually using doxy. me. Location of visit: patients home, providers home. 10 day FU for increased pain. Patient scheduled appointment earlier because he has been having increased spasms and burning pain from neck down to lower back. Pain has been elevated and patient has been uncomfortable. Continues to have posterior neck pain aching radiating up into head with occipital headaches  Continues to have pain across lower back radiating down into buttocks    Norco is helping pain but not increased spasms   Medications reviewed. Patient denies side effects with medications. Patient states he is taking medications as prescribed. Hedenies receiving pain medications from other sources. He denies any ER visits since last visit. Pain scale with out pain medications or at its worst is 10/10. Pain scale with pain medications or at its best is 7/10. Last dose of Norco was today  Drug screen reviewed from 1/8/2020 and was appropriate  Pill count was not completed today d/t virus  Patient does not have naloxone available at home. Patient has not required use of naloxone at home since last office visit. The patientis allergic to dilantin [phenytoin]; oxycodone; and valium [diazepam]. Past Medical History  Carlo Bush  has a past medical history of Asthma and Fibromyalgia. Past Surgical History  The patient  has a past surgical history that includes back surgery and knee surgery. Family History  This patient's family history includes Asthma in his father; Emphysema in his father and mother; High Blood Pressure in his father and mother; Other in his mother. Social History  Carlo Bush  reports that he has never smoked.  He has never used smokeless tobacco. He reports previous alcohol use. He reports that he does not use drugs. Medications    Current Outpatient Medications:     tiZANidine (ZANAFLEX) 2 MG tablet, Take 1-2 tablets by mouth every 6 hours as needed (spasms), Disp: 56 tablet, Rfl: 0    hydrocortisone valerate (WESTCORT) 0.2 % ointment, Apply topically daily. , Disp: 1 Tube, Rfl: 1    HYDROcodone-acetaminophen (NORCO) 5-325 MG per tablet, Take 1 tablet by mouth every 8 hours as needed for Pain for up to 30 days. , Disp: 90 tablet, Rfl: 0    lidocaine (XYLOCAINE) 5 % ointment, Apply topically as needed to painful areas of lower back and neck, Disp: 1 Tube, Rfl: 2    HYDROcodone-acetaminophen (NORCO) 5-325 MG per tablet, take 1 tablet by mouth every 8 hours if needed, Disp: 90 tablet, Rfl: 0    methylPREDNISolone (MEDROL DOSEPACK) 4 MG tablet, Take by mouth., Disp: 21 tablet, Rfl: 0    ferrous sulfate 325 (65 Fe) MG tablet, Take 1 tablet by mouth daily (with breakfast), Disp: 90 tablet, Rfl: 1    azelastine (OPTIVAR) 0.05 % ophthalmic solution, Place 1 drop into both eyes 2 times daily, Disp: 1 Bottle, Rfl: 11    Umeclidinium Bromide (INCRUSE ELLIPTA) 62.5 MCG/INH AEPB, Inhale 1 puff into the lungs daily, Disp: 30 each, Rfl: 2    montelukast (SINGULAIR) 10 MG tablet, Take 1 tablet by mouth nightly, Disp: 30 tablet, Rfl: 11    fluticasone-salmeterol (ADVAIR HFA) 230-21 MCG/ACT inhaler, Inhale 2 puffs into the lungs 2 times daily, Disp: 1 Inhaler, Rfl: 2    cetirizine (ZYRTEC) 10 MG tablet, Take 1 tablet by mouth daily, Disp: 30 tablet, Rfl: 11    albuterol sulfate  (90 Base) MCG/ACT inhaler, Inhale 2 puffs into the lungs 4 times daily as needed for Wheezing, Disp: 3 Inhaler, Rfl: 1    albuterol (PROVENTIL) (2.5 MG/3ML) 0.083% nebulizer solution, Take 3 mLs by nebulization every 6 hours as needed for Wheezing (for asthma), Disp: 120 each, Rfl: 3    dupilumab (DUPIXENT) 300 MG/2ML SOSY injection, Inject 2 mLs into the skin every 14 days, Disp: 2 Monday or Tuesday to reeval      Meds. Prescribed:   Orders Placed This Encounter   Medications    tiZANidine (ZANAFLEX) 2 MG tablet     Sig: Take 1-2 tablets by mouth every 6 hours as needed (spasms)     Dispense:  56 tablet     Refill:  0       Return in about 4 days (around 4/27/2020) for FU Monday to reeval., follow up  for medications.            Electronically signed by MIRNA Ogden CNP on4/23/2020 at 1:58 PM

## 2020-04-28 ENCOUNTER — TELEPHONE (OUTPATIENT)
Dept: ENT CLINIC | Age: 50
End: 2020-04-28

## 2020-04-28 ENCOUNTER — TELEPHONE (OUTPATIENT)
Dept: PHYSICAL MEDICINE AND REHAB | Age: 50
End: 2020-04-28

## 2020-04-28 NOTE — TELEPHONE ENCOUNTER
PA started for tizanidine. Covermymeds states that it can not find the patient. \"It does appear that you are on the most specific prior authorization form. I recommend reaching out to [facility] directly to confirm eligibility and patient information. They can be reached at [phone]\". Called [facility]. Given number [phone] to call for PA. Spoke with Med and they guided me to there web site to print off the PA form to fax to them. Form filled out and faxed to [phone].

## 2020-04-30 ENCOUNTER — NURSE ONLY (OUTPATIENT)
Dept: ALLERGY | Age: 50
End: 2020-04-30
Payer: MEDICAID

## 2020-04-30 VITALS
SYSTOLIC BLOOD PRESSURE: 130 MMHG | HEART RATE: 88 BPM | TEMPERATURE: 97.8 F | RESPIRATION RATE: 14 BRPM | DIASTOLIC BLOOD PRESSURE: 82 MMHG

## 2020-04-30 PROCEDURE — 95117 IMMUNOTHERAPY INJECTIONS: CPT | Performed by: NURSE PRACTITIONER

## 2020-04-30 NOTE — PROGRESS NOTES
After consent obtained/verified, allergy injection given in back of R/L arm(s). Documentation of vial injection specific to arm(s) noted on Allergy Immunotherapy Administration Form. Patient waited 30 minutes for observation. Patient tolerated silver vials well at 0.40ml without adverse reaction. SHOT REACTION TREATMENT INSTRUCTIONS    During the 30 minute wait after an allergy injection the following symptoms should be reported:    Itching other than at the injection site  Hives or swelling other than at the injection site  Redness other than at the injection site  Difficulty breathing  Chest tightness  Difficulty swallowing  Throat tightness    If these symptoms occur, NOTIFY PROVIDER and the following treatment should be administered:    1. Epinephrine 1:1000 IM - 0.3 ml if > 66 lbs or more, 0.15 ml if 33 - 63 lbs, or 0.1 ml if <33 lbs   2. Diphenhydramine - give all intramuscular:     2 to <6 years (off-label use): 6.25 mg,    6 to <12 years: 12.5 to 25 mg;    ?12 years: 25-50 mg.  3.  Famotidine:  Adults 40 mg oral    Adolescents age 12 years and >88 lbs: 40 mg    Children and Adolescents ? 12years of age: Initial: 0.25 mg/kg/dose   every 12 hours (maximum daily dose: 40 mg/day)    Epi dose may me repeated in 5-15 minutes if adequate resolution of symptoms does not occur    Patient should be observed for at least one hour after final epi dose and must be seen by provider. Patients cannot drive themselves if they have received diphenhydramine.

## 2020-05-04 ENCOUNTER — NURSE ONLY (OUTPATIENT)
Dept: ALLERGY | Age: 50
End: 2020-05-04
Payer: MEDICAID

## 2020-05-04 VITALS
HEART RATE: 90 BPM | SYSTOLIC BLOOD PRESSURE: 140 MMHG | DIASTOLIC BLOOD PRESSURE: 88 MMHG | RESPIRATION RATE: 16 BRPM | TEMPERATURE: 98.1 F

## 2020-05-04 PROCEDURE — 95117 IMMUNOTHERAPY INJECTIONS: CPT | Performed by: NURSE PRACTITIONER

## 2020-05-04 PROCEDURE — 96372 THER/PROPH/DIAG INJ SC/IM: CPT | Performed by: NURSE PRACTITIONER

## 2020-05-04 NOTE — PROGRESS NOTES
Following obtaining written consent for Dupixent 600 mg SQ administered the following site: RT & LT Abdomen. If the patient received Dupixent 600 mg this was given into separate 300 mg injections. Recommended injections sites for Dupixent is into the upper arm, thigh, or abdomen. Patient waited and was observed for one hour following Dupixent injection. Dupixent 600 mg SQ is given initially and then every 2 weeks patient receives 300 mg. .      Patient has tolerated injection well. No signs and symptoms of reaction noted in the office. Patient has EpiPen. Patient was instructed to report any problems questions or concerns to the office immediately. [address] is indicated for the add on maintenance treatment of this patient who has severe asthma and is age 15 or older with eosinophilic phenotype or oral corticosteroid dependent. It is also recommended for atopic dermatitis for patients 12 and older with moderate to severe atopic dermatitis who disease is not adequately controlled with topical prescription therapies or when therapies are not advisable. Patient does understand this is not for treatment of status asthmaticus. Patient understands adverse reactions may include reaction at the injection site, conjunctivitis, blepharitis, oral herpes, keratitis, eye pruritus, other herpes simplex virus infections and dry eyes. [facility]: [id number]  LOT: Elverna Royals  EXP: 09/2022                                                                                         After consent obtained/verified, allergy injection given in back of R/L arm(s). Documentation of vial injection specific to arm(s) noted on Allergy Immunotherapy Administration Form. Patient waited 30 minutes for observation. Patient tolerated well without adverse reaction.     SHOT REACTION TREATMENT INSTRUCTIONS    During the 30 minute wait after an allergy injection the following symptoms should be reported:    Itching other

## 2020-05-06 RX ORDER — BUTALBITAL, ACETAMINOPHEN AND CAFFEINE 50; 325; 40 MG/1; MG/1; MG/1
1 TABLET ORAL 4 TIMES DAILY PRN
Qty: 180 TABLET | Refills: 0 | Status: SHIPPED | OUTPATIENT
Start: 2020-05-06 | End: 2020-08-21

## 2020-05-06 NOTE — TELEPHONE ENCOUNTER
This medication refill is regarding a fax request from [facility]:    Requested Prescriptions     Pending Prescriptions Disp Refills    butalbital-acetaminophen-caffeine (FIORICET, ESGIC) -40 MG per tablet 180 tablet 0     Sig: Take 1 tablet by mouth 4 times daily as needed for Headaches       Date of last visit: 2/26/2020  Date of next visit: 5/27/2020  Date of last refill: 1/2/20 for #180 no refill     Rx verified, ordered and set to EP.

## 2020-05-07 ENCOUNTER — TELEPHONE (OUTPATIENT)
Dept: PHYSICAL MEDICINE AND REHAB | Age: 50
End: 2020-05-07

## 2020-05-07 ENCOUNTER — TELEPHONE (OUTPATIENT)
Dept: ENT CLINIC | Age: 50
End: 2020-05-07

## 2020-05-07 RX ORDER — PREDNISONE 10 MG/1
TABLET ORAL
Qty: 21 TABLET | Refills: 0 | Status: SHIPPED | OUTPATIENT
Start: 2020-05-07 | End: 2020-05-28

## 2020-05-07 RX ORDER — AMOXICILLIN AND CLAVULANATE POTASSIUM 875; 125 MG/1; MG/1
1 TABLET, FILM COATED ORAL 2 TIMES DAILY
Qty: 20 TABLET | Refills: 0 | Status: SHIPPED | OUTPATIENT
Start: 2020-05-07 | End: 2020-05-17

## 2020-05-07 RX ORDER — CETIRIZINE HYDROCHLORIDE 10 MG/1
10 TABLET ORAL DAILY
Qty: 30 TABLET | Refills: 11 | Status: SHIPPED | OUTPATIENT
Start: 2020-05-07 | End: 2020-08-31 | Stop reason: SDUPTHER

## 2020-05-07 RX ORDER — DIPHENHYDRAMINE HCL 25 MG
25 TABLET ORAL EVERY 6 HOURS PRN
Qty: 90 TABLET | Refills: 5 | Status: SHIPPED | OUTPATIENT
Start: 2020-05-07 | End: 2020-06-06

## 2020-05-07 RX ORDER — HYDROCODONE BITARTRATE AND ACETAMINOPHEN 5; 325 MG/1; MG/1
1 TABLET ORAL EVERY 8 HOURS PRN
Qty: 90 TABLET | Refills: 0 | Status: SHIPPED | OUTPATIENT
Start: 2020-05-07 | End: 2020-06-10 | Stop reason: SDUPTHER

## 2020-05-07 NOTE — TELEPHONE ENCOUNTER
Patient says his norco rx was changed from tid to qid. He does not have the dosage. He is asking for a new rx because he is now running low due to the change. He says he is still having a lot of pain and the other medications that were prescribed have still not had the PA done.    [facility] is [facility]  dolv 4/23  adalberto 5/19

## 2020-05-07 NOTE — TELEPHONE ENCOUNTER
Since patient does not have Zanaflex yet can have an increased frequency of Norco to QID prn for 7 more days and then resume TID please. Please order a follow up appointment around that time.

## 2020-05-07 NOTE — TELEPHONE ENCOUNTER
I HAVE DISCONTINUED THE [place] ON [address]. HE HAS BEEN INFORMED TO CONTACT THE PHARMACY TO HALT ALL FUTURE SHIPMENTS.

## 2020-05-07 NOTE — TELEPHONE ENCOUNTER
Patient called and stated that he got his allergy injection and dupixent on Monday and he is now having congestion and wheezing. He stated that his asthma symptoms have increased since the shot he wanted to know if that was normal. Informed patient I would forward his concerns to [facility] and also informed the pollen count is high with everything blooming we have more patient complaining of increased symptoms. He verbalized understanding. Patient is use the albuterol but 20 mins or so after he has wheezing again, he is also taking an antihistamine. He wanted to know if we could send in benadryl to his pharmacy.

## 2020-05-11 ENCOUNTER — NURSE ONLY (OUTPATIENT)
Dept: ALLERGY | Age: 50
End: 2020-05-11
Payer: MEDICAID

## 2020-05-11 VITALS
HEART RATE: 88 BPM | DIASTOLIC BLOOD PRESSURE: 82 MMHG | RESPIRATION RATE: 16 BRPM | TEMPERATURE: 97.8 F | SYSTOLIC BLOOD PRESSURE: 130 MMHG

## 2020-05-11 PROCEDURE — 95117 IMMUNOTHERAPY INJECTIONS: CPT | Performed by: NURSE PRACTITIONER

## 2020-05-14 ENCOUNTER — VIRTUAL VISIT (OUTPATIENT)
Dept: PHYSICAL MEDICINE AND REHAB | Age: 50
End: 2020-05-14
Payer: MEDICAID

## 2020-05-14 PROCEDURE — 99213 OFFICE O/P EST LOW 20 MIN: CPT | Performed by: NURSE PRACTITIONER

## 2020-05-14 PROCEDURE — G8427 DOCREV CUR MEDS BY ELIG CLIN: HCPCS | Performed by: NURSE PRACTITIONER

## 2020-05-14 ASSESSMENT — ENCOUNTER SYMPTOMS
GASTROINTESTINAL NEGATIVE: 1
BACK PAIN: 1
RESPIRATORY NEGATIVE: 1
EYES NEGATIVE: 1

## 2020-05-14 NOTE — TELEPHONE ENCOUNTER
Called number below to check on the status of PA. Was approved for 04/28/2020 to 05/03/2020. Since it was not filled during this approval, speaking with pharmacy to see about getting medication for the patient. Moved the date to today for approval, it will go through as soon as patient gets approval.   Patient notified.

## 2020-05-15 ENCOUNTER — TELEPHONE (OUTPATIENT)
Dept: ENT CLINIC | Age: 50
End: 2020-05-15

## 2020-05-15 NOTE — TELEPHONE ENCOUNTER
Attempted to call patient in regards to their upcoming appointment, got voicemail. Left detailed message advising them to remain in their vehicle upon arriving to their appointment and a Medical Assistant will call them to come up for the scheduled appointment. Patient informed to call the office with any questions.

## 2020-05-19 ENCOUNTER — TELEPHONE (OUTPATIENT)
Dept: FAMILY MEDICINE CLINIC | Age: 50
End: 2020-05-19

## 2020-05-19 ENCOUNTER — TELEPHONE (OUTPATIENT)
Dept: ENT CLINIC | Age: 50
End: 2020-05-19

## 2020-05-20 ENCOUNTER — TELEPHONE (OUTPATIENT)
Dept: ENT CLINIC | Age: 50
End: 2020-05-20

## 2020-05-20 RX ORDER — HYDROCORTISONE VALERATE 2 MG/G
OINTMENT TOPICAL
Qty: 1 TUBE | Refills: 5 | Status: SHIPPED | OUTPATIENT
Start: 2020-05-20 | End: 2020-11-18 | Stop reason: SDUPTHER

## 2020-05-20 RX ORDER — FLUNISOLIDE 0.25 MG/ML
2 SOLUTION NASAL 2 TIMES DAILY
Qty: 1 BOTTLE | Refills: 5 | Status: SHIPPED | OUTPATIENT
Start: 2020-05-20 | End: 2020-06-29 | Stop reason: ALTCHOICE

## 2020-05-20 NOTE — TELEPHONE ENCOUNTER
Lenore Cook called requesting a refill on the following medications:  Requested Prescriptions     Pending Prescriptions Disp Refills    lidocaine (XYLOCAINE) 5 % ointment 1 Tube 2     Sig: Apply topically as needed to painful areas of lower back and neck     Pharmacy verified:  .pv    [facility]    PATIENT IS REQUESTING REFILL OF LIDOCAINE PATCHES     Date of last visit: 05/14/20  Date of next visit (if applicable): Visit date not found

## 2020-05-21 RX ORDER — LIDOCAINE 50 MG/G
OINTMENT TOPICAL
Qty: 1 TUBE | Refills: 2 | Status: SHIPPED | OUTPATIENT
Start: 2020-05-21 | End: 2020-05-22 | Stop reason: SDUPTHER

## 2020-05-22 RX ORDER — LIDOCAINE 50 MG/G
OINTMENT TOPICAL
Qty: 258 G | Refills: 2 | Status: SHIPPED | OUTPATIENT
Start: 2020-05-22 | End: 2020-06-22 | Stop reason: SDUPTHER

## 2020-05-22 NOTE — TELEPHONE ENCOUNTER
Patient called back to ask about the lidocaine patches and the creme needs to be written for 258 grams or as a large tube, per the pharmacy.   [facility] [address]  Please advise  [phone]

## 2020-05-26 ENCOUNTER — TELEPHONE (OUTPATIENT)
Dept: ENT CLINIC | Age: 50
End: 2020-05-26

## 2020-05-27 ENCOUNTER — NURSE TRIAGE (OUTPATIENT)
Dept: OTHER | Facility: CLINIC | Age: 50
End: 2020-05-27

## 2020-05-28 ENCOUNTER — APPOINTMENT (OUTPATIENT)
Dept: INTERVENTIONAL RADIOLOGY/VASCULAR | Age: 50
End: 2020-05-28
Payer: MEDICAID

## 2020-05-28 ENCOUNTER — HOSPITAL ENCOUNTER (EMERGENCY)
Age: 50
Discharge: HOME | End: 2020-05-28
Attending: EMERGENCY MEDICINE
Payer: MEDICAID

## 2020-05-28 ENCOUNTER — APPOINTMENT (OUTPATIENT)
Dept: GENERAL RADIOLOGY | Age: 50
End: 2020-05-28
Payer: MEDICAID

## 2020-05-28 VITALS
DIASTOLIC BLOOD PRESSURE: 67 MMHG | OXYGEN SATURATION: 96 % | HEART RATE: 69 BPM | SYSTOLIC BLOOD PRESSURE: 148 MMHG | TEMPERATURE: 97 F | RESPIRATION RATE: 18 BRPM | WEIGHT: 315 LBS | BODY MASS INDEX: 55.41 KG/M2

## 2020-05-28 LAB
ALBUMIN SERPL-MCNC: 3.8 G/DL (ref 3.5–5.1)
ALP BLD-CCNC: 121 U/L (ref 38–126)
ALT SERPL-CCNC: 59 U/L (ref 11–66)
ANION GAP SERPL CALCULATED.3IONS-SCNC: 11 MEQ/L (ref 8–16)
AST SERPL-CCNC: 45 U/L (ref 5–40)
BASOPHILS # BLD: 1.2 %
BASOPHILS ABSOLUTE: 0.1 THOU/MM3 (ref 0–0.1)
BILIRUB SERPL-MCNC: 0.3 MG/DL (ref 0.3–1.2)
BILIRUBIN DIRECT: < 0.2 MG/DL (ref 0–0.3)
BUN BLDV-MCNC: 12 MG/DL (ref 7–22)
CALCIUM SERPL-MCNC: 9 MG/DL (ref 8.5–10.5)
CHLORIDE BLD-SCNC: 106 MEQ/L (ref 98–111)
CO2: 24 MEQ/L (ref 23–33)
CREAT SERPL-MCNC: 1.1 MG/DL (ref 0.4–1.2)
EKG ATRIAL RATE: 62 BPM
EKG P AXIS: 66 DEGREES
EKG P-R INTERVAL: 152 MS
EKG Q-T INTERVAL: 434 MS
EKG QRS DURATION: 86 MS
EKG QTC CALCULATION (BAZETT): 440 MS
EKG R AXIS: 31 DEGREES
EKG T AXIS: 3 DEGREES
EKG VENTRICULAR RATE: 62 BPM
EOSINOPHIL # BLD: 3.8 %
EOSINOPHILS ABSOLUTE: 0.2 THOU/MM3 (ref 0–0.4)
ERYTHROCYTE [DISTWIDTH] IN BLOOD BY AUTOMATED COUNT: 12.3 % (ref 11.5–14.5)
ERYTHROCYTE [DISTWIDTH] IN BLOOD BY AUTOMATED COUNT: 42.8 FL (ref 35–45)
GLUCOSE BLD-MCNC: 95 MG/DL (ref 70–108)
HCT VFR BLD CALC: 37.4 % (ref 42–52)
HEMOGLOBIN: 12.1 GM/DL (ref 14–18)
IMMATURE GRANS (ABS): 0.01 THOU/MM3 (ref 0–0.07)
IMMATURE GRANULOCYTES: 0.2 %
LYMPHOCYTES # BLD: 24.7 %
LYMPHOCYTES ABSOLUTE: 1.4 THOU/MM3 (ref 1–4.8)
MCH RBC QN AUTO: 31 PG (ref 26–33)
MCHC RBC AUTO-ENTMCNC: 32.4 GM/DL (ref 32.2–35.5)
MCV RBC AUTO: 95.9 FL (ref 80–94)
MONOCYTES # BLD: 11 %
MONOCYTES ABSOLUTE: 0.6 THOU/MM3 (ref 0.4–1.3)
NUCLEATED RED BLOOD CELLS: 0 /100 WBC
OSMOLALITY CALCULATION: 280.8 MOSMOL/KG (ref 275–300)
PLATELET # BLD: 212 THOU/MM3 (ref 130–400)
PMV BLD AUTO: 10.1 FL (ref 9.4–12.4)
POTASSIUM REFLEX MAGNESIUM: 3.6 MEQ/L (ref 3.5–5.2)
PRO-BNP: 14.9 PG/ML (ref 0–450)
RBC # BLD: 3.9 MILL/MM3 (ref 4.7–6.1)
SEG NEUTROPHILS: 59.1 %
SEGMENTED NEUTROPHILS ABSOLUTE COUNT: 3.4 THOU/MM3 (ref 1.8–7.7)
SODIUM BLD-SCNC: 141 MEQ/L (ref 135–145)
TOTAL PROTEIN: 6.5 G/DL (ref 6.1–8)
TROPONIN T: < 0.01 NG/ML
URIC ACID: 6.6 MG/DL (ref 3.7–7)
WBC # BLD: 5.8 THOU/MM3 (ref 4.8–10.8)

## 2020-05-28 PROCEDURE — 99281 EMR DPT VST MAYX REQ PHY/QHP: CPT

## 2020-05-28 PROCEDURE — 93970 EXTREMITY STUDY: CPT

## 2020-05-28 PROCEDURE — 84550 ASSAY OF BLOOD/URIC ACID: CPT

## 2020-05-28 PROCEDURE — 71045 X-RAY EXAM CHEST 1 VIEW: CPT

## 2020-05-28 PROCEDURE — 83880 ASSAY OF NATRIURETIC PEPTIDE: CPT

## 2020-05-28 PROCEDURE — 84484 ASSAY OF TROPONIN QUANT: CPT

## 2020-05-28 PROCEDURE — 80076 HEPATIC FUNCTION PANEL: CPT

## 2020-05-28 PROCEDURE — 99215 OFFICE O/P EST HI 40 MIN: CPT

## 2020-05-28 PROCEDURE — 99214 OFFICE O/P EST MOD 30 MIN: CPT | Performed by: EMERGENCY MEDICINE

## 2020-05-28 PROCEDURE — 93010 ELECTROCARDIOGRAM REPORT: CPT | Performed by: INTERNAL MEDICINE

## 2020-05-28 PROCEDURE — 93005 ELECTROCARDIOGRAM TRACING: CPT | Performed by: NURSE PRACTITIONER

## 2020-05-28 PROCEDURE — 80048 BASIC METABOLIC PNL TOTAL CA: CPT

## 2020-05-28 PROCEDURE — 36415 COLL VENOUS BLD VENIPUNCTURE: CPT

## 2020-05-28 PROCEDURE — 85025 COMPLETE CBC W/AUTO DIFF WBC: CPT

## 2020-05-28 RX ORDER — BUMETANIDE 1 MG/1
0.5 TABLET ORAL DAILY
Qty: 30 TABLET | Refills: 0 | Status: SHIPPED | OUTPATIENT
Start: 2020-05-28

## 2020-05-28 RX ORDER — TRAZODONE HYDROCHLORIDE 150 MG/1
150 TABLET ORAL NIGHTLY
COMMUNITY
End: 2020-07-09 | Stop reason: ALTCHOICE

## 2020-05-28 ASSESSMENT — ENCOUNTER SYMPTOMS
EYE REDNESS: 0
SHORTNESS OF BREATH: 0
CHEST TIGHTNESS: 0
SINUS PAIN: 0
EYE DISCHARGE: 0
WHEEZING: 0
VOMITING: 0
ABDOMINAL DISTENTION: 0
ABDOMINAL PAIN: 0
SINUS PRESSURE: 0
EYE PAIN: 0
SORE THROAT: 0
RHINORRHEA: 0
TROUBLE SWALLOWING: 0
APNEA: 0
COUGH: 0
PHOTOPHOBIA: 0
VOICE CHANGE: 0
DIARRHEA: 0
STRIDOR: 0
BACK PAIN: 0
FACIAL SWELLING: 0
COLOR CHANGE: 0
CONSTIPATION: 0
NAUSEA: 0

## 2020-05-28 ASSESSMENT — PAIN DESCRIPTION - LOCATION: LOCATION: LEG

## 2020-05-28 ASSESSMENT — PAIN DESCRIPTION - PAIN TYPE: TYPE: ACUTE PAIN

## 2020-05-28 ASSESSMENT — PAIN SCALES - GENERAL: PAINLEVEL_OUTOF10: 8

## 2020-05-28 ASSESSMENT — PAIN DESCRIPTION - ORIENTATION: ORIENTATION: RIGHT;LEFT

## 2020-05-28 NOTE — ED PROVIDER NOTES
[address] [facility] [address] [address] EMERGENCY DEPT  UrgentCare Encounter      [address] [facility]       Chief Complaint   Patient presents with    Leg Swelling     Bilateral leg swelling. Left leg worse. Throbbing and pain goes down into ankle. Right leg hurts when you touch it. Started last night. Nurses Notes reviewed and I agree except as noted in the HPI. HISTORY OF PRESENT ILLNESS   Vasile Ling is a 52 y.o. male who presents with 1 week history of bilateral lower extremity swelling and pain he currently rates at 8 out of 10 in severity, with left leg more painful than right. Patient recently discontinued antihypertensive and symptoms developed. No chest pain, shortness of breath, abdominal pain vomiting, fever, hemoptysis, dizziness, syncope. No  symptoms. No motor or sensory deficits. Last GFR 80. No history of diabetes, hypothyroidism, DVT/PE, CHF. Non-smoker    REVIEW OF SYSTEMS     Review of Systems   Constitutional: Negative for appetite change, chills, fatigue, fever and unexpected weight change. HENT: Negative for congestion, ear discharge, ear pain, sinus pressure, sneezing, sore throat, trouble swallowing and voice change. Eyes: Negative for pain, discharge and redness. Respiratory: Negative for cough, shortness of breath, wheezing and stridor. Cardiovascular: Positive for leg swelling. Negative for chest pain. Gastrointestinal: Negative for abdominal pain, diarrhea, nausea and vomiting. Genitourinary: Negative for dysuria, frequency, hematuria and urgency. Musculoskeletal: Negative for arthralgias, back pain, myalgias and neck pain. Bilateral leg pain and leg swelling   Skin: Negative for rash. Neurological: Negative for dizziness, syncope, weakness and headaches. Hematological: Negative for adenopathy. Psychiatric/Behavioral: Negative for behavioral problems, confusion, sleep disturbance and suicidal ideas. The patient is not nervous/anxious.     All other systems reviewed and Neurological:      Mental Status: He is alert and oriented to person, place, and time. Cranial Nerves: No cranial nerve deficit. Motor: No abnormal muscle tone. Coordination: Coordination normal.      Deep Tendon Reflexes: Reflexes are normal and symmetric. Reflexes normal.      Comments: Appropriate, no focal finding   Psychiatric:         Behavior: Behavior normal.         Thought Content: Thought content normal.         Judgment: Judgment normal.         DIAGNOSTIC RESULTS   Labs: No results found for this visit on 05/28/20. IMAGING:  No orders to display     URGENT CARE COURSE:     Vitals:    05/28/20 1602   BP: (!) 159/74   Pulse: 69   Resp: 18   Temp: 97 °F (36.1 °C)   TempSrc: Temporal   SpO2: 95%   Weight: (!) 420 lb (190.5 kg)       Medications - No data to display  PROCEDURES:  None  FINALIMPRESSION      1. Bilateral leg pain    2. Swelling of left lower extremity    3. Swelling of right lower extremity    4. Elevated blood pressure reading        DISPOSITION/PLAN   DISPOSITION Decision To Transfer 05/28/2020 04:23:09 PM  Patient transferred to [facility] ED per his request.  He is stable for private vehicle transfer.   Patient accepted in transfer by [facility] ED charge nurse at [address]:  to [facility] ED      to [facility] ED    DISCHARGE MEDICATIONS:  New Prescriptions    No medications on file     Current Discharge Medication List          MD Matt Walsh MD  05/28/20 4164

## 2020-05-29 ENCOUNTER — TELEPHONE (OUTPATIENT)
Dept: FAMILY MEDICINE CLINIC | Age: 50
End: 2020-05-29

## 2020-05-29 ENCOUNTER — CARE COORDINATION (OUTPATIENT)
Dept: CARE COORDINATION | Age: 50
End: 2020-05-29

## 2020-05-29 NOTE — ED PROVIDER NOTES
R-2Normal      albuterol sulfate  (90 Base) MCG/ACT inhaler Inhale 2 puffs into the lungs 4 times daily as needed for Wheezing, Disp-3 Inhaler, R-1Normal      albuterol (PROVENTIL) (2.5 MG/3ML) 0.083% nebulizer solution Take 3 mLs by nebulization every 6 hours as needed for Wheezing (for asthma), Disp-120 each, R-3Normal      lidocaine (LMX) 4 % cream Apply topically to back, arms and abs as needed, Disp-120 g, R-2, Normal      amLODIPine (NORVASC) 5 MG tablet Take 1 tablet by mouth daily, Disp-30 tablet, R-3Normal      EPINEPHrine (AUVI-Q) 0.3 MG/0.3ML SOAJ injection Dispense 2 packs of 2 (total 4 devices). Use as directed, STAT for allergic reaction. , Disp-4 each, R-2Normal      NARCAN 4 MG/0.1ML LIQD nasal spray DAWHistorical Med      Multiple Vitamins-Minerals (MULTIVITAMIN ADULTS) TABS Take 1 tablet by mouth daily, Disp-90 tablet, R-3Normal      omeprazole (PRILOSEC) 20 MG delayed release capsule Take 1 capsule by mouth daily, Disp-90 capsule, R-3Normal      rosuvastatin (CRESTOR) 20 MG tablet Take 1 tablet by mouth daily, Disp-90 tablet, R-3Normal      Alum & Mag Hydroxide-Simeth (MAALOX MAXIMUM STRENGTH PO) Take by mouth daily as neededHistorical Med      levalbuterol (XOPENEX HFA) 45 MCG/ACT inhaler Inhale 1-2 puffs into the lungs 4 times daily as needed for WheezingHistorical Med      ergocalciferol (ERGOCALCIFEROL) 70838 units capsule Take 50,000 Units by mouth dailyHistorical Med      ALBUTEROL IN Inhale into the lungs 4 times daily as needed Historical Med             ALLERGIES     is allergic to dilantin [phenytoin]; dupixent [dupilumab]; oxycodone; and valium [diazepam]. FAMILY HISTORY     He indicated that his mother is . He indicated that his father is . family history includes Asthma in his father; Emphysema in his father and mother; High Blood Pressure in his father and mother; Other in his mother. SOCIAL HISTORY      reports that he has never smoked.  He has never components within normal limits   HEPATIC FUNCTION PANEL - Abnormal; Notable for the following components:    AST 45 (*)     All other components within normal limits   GLOMERULAR FILTRATION RATE, ESTIMATED - Abnormal; Notable for the following components:    Est, Glom Filt Rate 86 (*)     All other components within normal limits   BASIC METABOLIC PANEL W/ REFLEX TO MG FOR LOW K   BRAIN NATRIURETIC PEPTIDE   TROPONIN   URIC ACID   ANION GAP   OSMOLALITY       EMERGENCY DEPARTMENT COURSE:   Vitals:    Vitals:    05/28/20 1602 05/28/20 1714   BP: (!) 159/74 (!) 148/67   Pulse: 69 69   Resp: 18 18   Temp: 97 °F (36.1 °C)    TempSrc: Temporal    SpO2: 95% 96%   Weight: (!) 420 lb (190.5 kg)                MDM:  Patient seen and evaluated for painful leg swelling. Patient is morbidly obese, weighing 420 pounds. He is not currently on a water pills. States that he has been on one in the past, he is not sure what. He is in no acute distress. Legs are noted to be swollen with +2 pitting edema bilaterally and venous stasis. Appropriate labs and imaging were ordered, chest x-ray negative for acute findings. Patient is noncompliant with blood pressure medication as it causes him headaches, instructed to follow with PCP for management of hypertension. Patient placed on low-dose Bumex for what I believed to be fluid retention. I discussed all findings with him, he was amenable to this plan and discharged in stable condition    CRITICAL CARE:   None    CONSULTS:  None    PROCEDURES:  None    FINAL IMPRESSION      1. Bilateral leg pain    2. Swelling of left lower extremity    3. Swelling of right lower extremity    4. Elevated blood pressure reading          DISPOSITION/PLAN   Discharge    PATIENT REFERRED TO:  MIRNA Giles - VITA  [address] [facility] [address]  [phone]    Schedule an appointment as soon as possible for a visit in 1 week  For management of hypertension    [address] [facility] [address] [address] EMERGENCY DEPT  730

## 2020-05-29 NOTE — LETTER
MIRNA Vivar CNP        June 9, 2020         Patient: Saira Gray   YOB: 1970   Date of Visit: 5/29/2020       To Whom It May Concern: It is my medical opinion that Ravni Valadez requires a disability parking placard for the following reasons:  He has limited walking ability due to an orthopedic condition. Duration of need: 5 years    If you have any questions or concerns, please don't hesitate to call.     Sincerely,        MIRNA Vivar - CNP

## 2020-05-29 NOTE — TELEPHONE ENCOUNTER
Spoke to the pt and he states that he is unable to come in on Monday to be seen because he just started a new job and he can't leave work. He is agreeable to a video visit any day, has one scheduled for today with WS at 3:15 PM. Pt wants to know if he needs to keep the appt with WS today or reschedule it for next week. Please advise. Pt states the swelling has gone down. He has a 30 day supply of the Bumex.

## 2020-05-29 NOTE — CARE COORDINATION
Has appt with PCP scheduled    Patient contacted regarding recent discharge and COVID-19 risk. Discussed COVID-19 related testing which was available at this time. Test results were not done. Patient informed of results, if available? No     Care Transition Nurse/ Ambulatory Care Manager contacted the patient by telephone to perform post discharge assessment. Verified name and  with patient as identifiers. Patient has following risk factors of: other chronic conditions. CTN/ACM reviewed discharge instructions, medical action plan and red flags related to discharge diagnosis. Reviewed and educated them on any new and changed medications related to discharge diagnosis. Advised obtaining a 90-day supply of all daily and as-needed medications. Education provided regarding infection prevention, and signs and symptoms of COVID-19 and when to seek medical attention with patient who verbalized understanding. Discussed exposure protocols and quarantine from [address] you at higher risk for severe illness  and given an opportunity for questions and concerns. The patient agrees to contact the COVID-19 hotline [phone] or PCP office for questions related to their healthcare. CTN/ACM provided contact information for future reference. From CDC: Are you at higher risk for severe illness?  Wash your hands often.  Avoid close contact (6 feet, which is about two arm lengths) with people who are sick.  Put distance between yourself and other people if COVID-19 is spreading in your community.  Clean and disinfect frequently touched surfaces.  Avoid all cruise travel and non-essential air travel.  Call your healthcare professional if you have concerns about COVID-19 and your underlying condition or if you are sick.     For more information on steps you can take to protect yourself, see CDC's How to Radha for follow-up call in 7-14 days based on severity of symptoms and risk factors.

## 2020-05-29 NOTE — TELEPHONE ENCOUNTER
Pt states he went to [facility] last levon. They did chest xray, EKG, US on leg, No blood clots. Advised pt to take water pill and advised to see PCP for refill on water pills. Pt can not come in Monday. Can do vv today.  Pharmacy [facility]

## 2020-06-01 ENCOUNTER — TELEPHONE (OUTPATIENT)
Dept: ENT CLINIC | Age: 50
End: 2020-06-01

## 2020-06-01 RX ORDER — UMECLIDINIUM 62.5 UG/1
AEROSOL, POWDER ORAL
Qty: 3 EACH | Refills: 1 | Status: SHIPPED | OUTPATIENT
Start: 2020-06-01 | End: 2020-08-31

## 2020-06-03 ENCOUNTER — APPOINTMENT (OUTPATIENT)
Dept: GENERAL RADIOLOGY | Age: 50
End: 2020-06-03
Payer: MEDICAID

## 2020-06-03 ENCOUNTER — TELEPHONE (OUTPATIENT)
Dept: PHYSICAL MEDICINE AND REHAB | Age: 50
End: 2020-06-03

## 2020-06-03 ENCOUNTER — NURSE ONLY (OUTPATIENT)
Dept: ALLERGY | Age: 50
End: 2020-06-03
Payer: MEDICAID

## 2020-06-03 ENCOUNTER — HOSPITAL ENCOUNTER (EMERGENCY)
Age: 50
Discharge: HOME | End: 2020-06-03
Payer: MEDICAID

## 2020-06-03 VITALS
HEIGHT: 73 IN | SYSTOLIC BLOOD PRESSURE: 133 MMHG | OXYGEN SATURATION: 97 % | HEART RATE: 58 BPM | WEIGHT: 315 LBS | BODY MASS INDEX: 41.75 KG/M2 | TEMPERATURE: 98.1 F | RESPIRATION RATE: 18 BRPM | DIASTOLIC BLOOD PRESSURE: 69 MMHG

## 2020-06-03 VITALS
HEART RATE: 80 BPM | DIASTOLIC BLOOD PRESSURE: 82 MMHG | TEMPERATURE: 97.5 F | SYSTOLIC BLOOD PRESSURE: 126 MMHG | RESPIRATION RATE: 14 BRPM

## 2020-06-03 LAB
ALBUMIN SERPL-MCNC: 3.9 G/DL (ref 3.5–5.1)
ALP BLD-CCNC: 122 U/L (ref 38–126)
ALT SERPL-CCNC: 40 U/L (ref 11–66)
AMPHETAMINE+METHAMPHETAMINE URINE SCREEN: NEGATIVE
ANION GAP SERPL CALCULATED.3IONS-SCNC: 13 MEQ/L (ref 8–16)
AST SERPL-CCNC: 29 U/L (ref 5–40)
BARBITURATE QUANTITATIVE URINE: NORMAL
BASOPHILS # BLD: 0.7 %
BASOPHILS ABSOLUTE: 0 THOU/MM3 (ref 0–0.1)
BENZODIAZEPINE QUANTITATIVE URINE: NEGATIVE
BILIRUB SERPL-MCNC: 0.6 MG/DL (ref 0.3–1.2)
BILIRUBIN DIRECT: < 0.2 MG/DL (ref 0–0.3)
BILIRUBIN URINE: NEGATIVE
BLOOD, URINE: NEGATIVE
BUN BLDV-MCNC: 14 MG/DL (ref 7–22)
C-REACTIVE PROTEIN: 0.99 MG/DL (ref 0–1)
CALCIUM SERPL-MCNC: 9.3 MG/DL (ref 8.5–10.5)
CANNABINOID QUANTITATIVE URINE: NEGATIVE
CHARACTER, URINE: CLEAR
CHLORIDE BLD-SCNC: 102 MEQ/L (ref 98–111)
CO2: 25 MEQ/L (ref 23–33)
COCAINE METABOLITE QUANTITATIVE URINE: NEGATIVE
COLOR: YELLOW
CREAT SERPL-MCNC: 0.9 MG/DL (ref 0.4–1.2)
EKG ATRIAL RATE: 65 BPM
EKG P AXIS: 66 DEGREES
EKG P-R INTERVAL: 174 MS
EKG Q-T INTERVAL: 444 MS
EKG QRS DURATION: 82 MS
EKG QTC CALCULATION (BAZETT): 461 MS
EKG R AXIS: 14 DEGREES
EKG T AXIS: 29 DEGREES
EKG VENTRICULAR RATE: 65 BPM
EOSINOPHIL # BLD: 3.9 %
EOSINOPHILS ABSOLUTE: 0.2 THOU/MM3 (ref 0–0.4)
ERYTHROCYTE [DISTWIDTH] IN BLOOD BY AUTOMATED COUNT: 12.2 % (ref 11.5–14.5)
ERYTHROCYTE [DISTWIDTH] IN BLOOD BY AUTOMATED COUNT: 43.3 FL (ref 35–45)
GFR SERPL CREATININE-BSD FRML MDRD: 71 ML/MIN/1.73M2
GLUCOSE BLD-MCNC: 117 MG/DL (ref 70–108)
GLUCOSE URINE: NEGATIVE MG/DL
HCT VFR BLD CALC: 41.6 % (ref 42–52)
HEMOGLOBIN: 13.3 GM/DL (ref 14–18)
IMMATURE GRANS (ABS): 0.01 THOU/MM3 (ref 0–0.07)
IMMATURE GRANULOCYTES: 0.2 %
KETONES, URINE: NEGATIVE
LEUKOCYTE ESTERASE, URINE: NEGATIVE
LIPASE: 20.1 U/L (ref 5.6–51.3)
LYMPHOCYTES # BLD: 27.1 %
LYMPHOCYTES ABSOLUTE: 1.2 THOU/MM3 (ref 1–4.8)
MAGNESIUM: 1.9 MG/DL (ref 1.6–2.4)
MCH RBC QN AUTO: 30.9 PG (ref 26–33)
MCHC RBC AUTO-ENTMCNC: 32 GM/DL (ref 32.2–35.5)
MCV RBC AUTO: 96.5 FL (ref 80–94)
MONOCYTES # BLD: 15.2 %
MONOCYTES ABSOLUTE: 0.7 THOU/MM3 (ref 0.4–1.3)
NITRITE, URINE: NEGATIVE
NUCLEATED RED BLOOD CELLS: 0 /100 WBC
OPIATES, URINE: NORMAL
OSMOLALITY CALCULATION: 280.9 MOSMOL/KG (ref 275–300)
OXYCODONE: NEGATIVE
PH UA: 6 (ref 5–9)
PHENCYCLIDINE QUANTITATIVE URINE: NEGATIVE
PLATELET # BLD: 250 THOU/MM3 (ref 130–400)
PMV BLD AUTO: 9.7 FL (ref 9.4–12.4)
POTASSIUM SERPL-SCNC: 4 MEQ/L (ref 3.5–5.2)
PRO-BNP: < 5 PG/ML (ref 0–450)
PROTEIN UA: NEGATIVE
RBC # BLD: 4.31 MILL/MM3 (ref 4.7–6.1)
SEG NEUTROPHILS: 52.9 %
SEGMENTED NEUTROPHILS ABSOLUTE COUNT: 2.4 THOU/MM3 (ref 1.8–7.7)
SODIUM BLD-SCNC: 140 MEQ/L (ref 135–145)
SPECIFIC GRAVITY, URINE: >= 1.03 (ref 1–1.03)
TOTAL CK: 336 U/L (ref 55–170)
TOTAL PROTEIN: 7 G/DL (ref 6.1–8)
TROPONIN T: < 0.01 NG/ML
TROPONIN T: < 0.01 NG/ML
UROBILINOGEN, URINE: 0.2 EU/DL (ref 0–1)
WBC # BLD: 4.6 THOU/MM3 (ref 4.8–10.8)

## 2020-06-03 PROCEDURE — 86140 C-REACTIVE PROTEIN: CPT

## 2020-06-03 PROCEDURE — 99283 EMERGENCY DEPT VISIT LOW MDM: CPT

## 2020-06-03 PROCEDURE — 36415 COLL VENOUS BLD VENIPUNCTURE: CPT

## 2020-06-03 PROCEDURE — 81003 URINALYSIS AUTO W/O SCOPE: CPT

## 2020-06-03 PROCEDURE — 80053 COMPREHEN METABOLIC PANEL: CPT

## 2020-06-03 PROCEDURE — 95117 IMMUNOTHERAPY INJECTIONS: CPT | Performed by: NURSE PRACTITIONER

## 2020-06-03 PROCEDURE — 80305 DRUG TEST PRSMV DIR OPT OBS: CPT

## 2020-06-03 PROCEDURE — 84484 ASSAY OF TROPONIN QUANT: CPT

## 2020-06-03 PROCEDURE — 2580000003 HC RX 258: Performed by: PHYSICIAN ASSISTANT

## 2020-06-03 PROCEDURE — 6370000000 HC RX 637 (ALT 250 FOR IP): Performed by: PHYSICIAN ASSISTANT

## 2020-06-03 PROCEDURE — 96372 THER/PROPH/DIAG INJ SC/IM: CPT

## 2020-06-03 PROCEDURE — 83735 ASSAY OF MAGNESIUM: CPT

## 2020-06-03 PROCEDURE — 82550 ASSAY OF CK (CPK): CPT

## 2020-06-03 PROCEDURE — 96374 THER/PROPH/DIAG INJ IV PUSH: CPT

## 2020-06-03 PROCEDURE — 83880 ASSAY OF NATRIURETIC PEPTIDE: CPT

## 2020-06-03 PROCEDURE — 71046 X-RAY EXAM CHEST 2 VIEWS: CPT

## 2020-06-03 PROCEDURE — 82248 BILIRUBIN DIRECT: CPT

## 2020-06-03 PROCEDURE — 93005 ELECTROCARDIOGRAM TRACING: CPT | Performed by: PHYSICIAN ASSISTANT

## 2020-06-03 PROCEDURE — 85025 COMPLETE CBC W/AUTO DIFF WBC: CPT

## 2020-06-03 PROCEDURE — 83690 ASSAY OF LIPASE: CPT

## 2020-06-03 PROCEDURE — 6360000002 HC RX W HCPCS: Performed by: PHYSICIAN ASSISTANT

## 2020-06-03 RX ORDER — HYDROCODONE BITARTRATE AND ACETAMINOPHEN 5; 325 MG/1; MG/1
1 TABLET ORAL ONCE
Status: COMPLETED | OUTPATIENT
Start: 2020-06-03 | End: 2020-06-03

## 2020-06-03 RX ORDER — KETOROLAC TROMETHAMINE 30 MG/ML
30 INJECTION, SOLUTION INTRAMUSCULAR; INTRAVENOUS ONCE
Status: COMPLETED | OUTPATIENT
Start: 2020-06-03 | End: 2020-06-03

## 2020-06-03 RX ORDER — KETOROLAC TROMETHAMINE 30 MG/ML
30 INJECTION, SOLUTION INTRAMUSCULAR; INTRAVENOUS ONCE
Status: DISCONTINUED | OUTPATIENT
Start: 2020-06-03 | End: 2020-06-03

## 2020-06-03 RX ORDER — ORPHENADRINE CITRATE 100 MG/1
100 TABLET, EXTENDED RELEASE ORAL 2 TIMES DAILY
Qty: 60 TABLET | Refills: 0 | Status: SHIPPED | OUTPATIENT
Start: 2020-06-03 | End: 2020-06-22 | Stop reason: SDUPTHER

## 2020-06-03 RX ORDER — 0.9 % SODIUM CHLORIDE 0.9 %
500 INTRAVENOUS SOLUTION INTRAVENOUS ONCE
Status: COMPLETED | OUTPATIENT
Start: 2020-06-03 | End: 2020-06-03

## 2020-06-03 RX ORDER — ORPHENADRINE CITRATE 30 MG/ML
60 INJECTION INTRAMUSCULAR; INTRAVENOUS ONCE
Status: COMPLETED | OUTPATIENT
Start: 2020-06-03 | End: 2020-06-03

## 2020-06-03 RX ORDER — TIZANIDINE 2 MG/1
2 TABLET ORAL EVERY 6 HOURS PRN
COMMUNITY
End: 2020-11-05 | Stop reason: ALTCHOICE

## 2020-06-03 RX ADMIN — KETOROLAC TROMETHAMINE 30 MG: 30 INJECTION, SOLUTION INTRAMUSCULAR at 06:45

## 2020-06-03 RX ADMIN — SODIUM CHLORIDE 500 ML: 9 INJECTION, SOLUTION INTRAVENOUS at 08:05

## 2020-06-03 RX ADMIN — HYDROCODONE BITARTRATE AND ACETAMINOPHEN 1 TABLET: 5; 325 TABLET ORAL at 09:07

## 2020-06-03 RX ADMIN — ORPHENADRINE CITRATE 60 MG: 30 INJECTION INTRAMUSCULAR; INTRAVENOUS at 06:45

## 2020-06-03 ASSESSMENT — ENCOUNTER SYMPTOMS
BACK PAIN: 0
BLOOD IN STOOL: 0
CONSTIPATION: 0
COUGH: 0
SHORTNESS OF BREATH: 0
ABDOMINAL PAIN: 1
COLOR CHANGE: 0
RHINORRHEA: 0
VOMITING: 0
SORE THROAT: 0
NAUSEA: 0
DIARRHEA: 0

## 2020-06-03 ASSESSMENT — PAIN DESCRIPTION - PAIN TYPE
TYPE: ACUTE PAIN
TYPE: ACUTE PAIN

## 2020-06-03 ASSESSMENT — PAIN SCALES - GENERAL
PAINLEVEL_OUTOF10: 10
PAINLEVEL_OUTOF10: 8
PAINLEVEL_OUTOF10: 10
PAINLEVEL_OUTOF10: 9

## 2020-06-03 ASSESSMENT — PAIN DESCRIPTION - FREQUENCY: FREQUENCY: INTERMITTENT

## 2020-06-03 ASSESSMENT — PAIN DESCRIPTION - DESCRIPTORS: DESCRIPTORS: SHOOTING;STABBING

## 2020-06-03 NOTE — DISCHARGE INSTR - COC
Discharging to Facility/ Agency   · Name:   · Address:  · Phone:  · Fax:    Dialysis Facility (if applicable)   · Name:  · Address:  · Dialysis Schedule:  · Phone:  · Fax:    / signature: {Esignature:[id number]}    PHYSICIAN SECTION    Prognosis: {Prognosis:9512133377}    Condition at Discharge: Charles Flores Patient Condition:270476753}    Rehab Potential (if transferring to Rehab): {Prognosis:8927649234}    Recommended Labs or Other Treatments After Discharge: ***    Physician Certification: I certify the above information and transfer of Kenji Katz  is necessary for the continuing treatment of the diagnosis listed and that he requires {Admit to Appropriate Level of Care:82721} for {GREATER/LESS:055573134} 30 days.      Update Admission H&P: {CHP DME Changes in [facility]:567726955}    PHYSICIAN SIGNATURE:  {Esignature:[id number]}

## 2020-06-03 NOTE — ED PROVIDER NOTES
abdominal pain (muscle spasm). Negative for blood in stool, constipation, diarrhea, nausea and vomiting. Genitourinary: Negative for decreased urine volume, difficulty urinating and dysuria. Musculoskeletal: Positive for myalgias (muscle spasm). Negative for arthralgias, back pain and neck pain. Skin: Negative for color change and pallor. Neurological: Negative for dizziness, weakness and light-headedness. Hematological: Negative for adenopathy. PAST MEDICAL HISTORY    has a past medical history of Asthma and Fibromyalgia. SURGICAL HISTORY      has a past surgical history that includes back surgery and knee surgery. CURRENT MEDICATIONS       Discharge Medication List as of 6/3/2020 10:26 AM      CONTINUE these medications which have NOT CHANGED    Details   tiZANidine (ZANAFLEX) 2 MG tablet Take 2 mg by mouth every 6 hours as neededHistorical Med      INCRUSE ELLIPTA 62.5 MCG/INH AEPB inhale 1 puff once daily, Disp-3 each, R-1Normal      traZODone (DESYREL) 150 MG tablet Take 150 mg by mouth nightlyHistorical Med      bumetanide (BUMEX) 1 MG tablet Take 0.5 tablets by mouth daily, Disp-30 tablet, R-0Print      lidocaine (XYLOCAINE) 5 % ointment Apply topically as needed to painful areas of lower back and neck, Disp-258 g, R-2, Normal      hydrocortisone valerate (WESTCORT) 0.2 % ointment Apply topically daily. , Disp-1 Tube, R-5, Normal      flunisolide (NASALIDE) 25 MCG/ACT (0.025%) SOLN Inhale 2 sprays into the lungs 2 times daily, Disp-1 Bottle, R-5Normal      diphenhydrAMINE (BENADRYL) 25 MG tablet Take 1 tablet by mouth every 6 hours as needed for Itching, Disp-90 tablet, R-5Normal      cetirizine (ZYRTEC) 10 MG tablet Take 1 tablet by mouth daily, Disp-30 tablet, R-11Normal      HYDROcodone-acetaminophen (NORCO) 5-325 MG per tablet Take 1 tablet by mouth every 8 hours as needed for Pain for up to 30 days. , Disp-90 tablet, R-0Normal      butalbital-acetaminophen-caffeine (FIORICET, ESGIC) -40 MG per tablet Take 1 tablet by mouth 4 times daily as needed for Headaches, Disp-180 tablet, R-0Normal      ferrous sulfate 325 (65 Fe) MG tablet Take 1 tablet by mouth daily (with breakfast), Disp-90 tablet, R-1Normal      azelastine (OPTIVAR) 0.05 % ophthalmic solution Place 1 drop into both eyes 2 times daily, Disp-1 Bottle, R-11Normal      montelukast (SINGULAIR) 10 MG tablet Take 1 tablet by mouth nightly, Disp-30 tablet, R-11Normal      fluticasone-salmeterol (ADVAIR HFA) 230-21 MCG/ACT inhaler Inhale 2 puffs into the lungs 2 times daily, Disp-1 Inhaler, R-2Normal      albuterol sulfate  (90 Base) MCG/ACT inhaler Inhale 2 puffs into the lungs 4 times daily as needed for Wheezing, Disp-3 Inhaler, R-1Normal      albuterol (PROVENTIL) (2.5 MG/3ML) 0.083% nebulizer solution Take 3 mLs by nebulization every 6 hours as needed for Wheezing (for asthma), Disp-120 each, R-3Normal      lidocaine (LMX) 4 % cream Apply topically to back, arms and abs as needed, Disp-120 g, R-2, Normal      EPINEPHrine (AUVI-Q) 0.3 MG/0.3ML SOAJ injection Dispense 2 packs of 2 (total 4 devices). Use as directed, STAT for allergic reaction. , Disp-4 each, R-2Normal      NARCAN 4 MG/0.1ML LIQD nasal spray DAWHistorical Med      Multiple Vitamins-Minerals (MULTIVITAMIN ADULTS) TABS Take 1 tablet by mouth daily, Disp-90 tablet, R-3Normal      omeprazole (PRILOSEC) 20 MG delayed release capsule Take 1 capsule by mouth daily, Disp-90 capsule, R-3Normal      rosuvastatin (CRESTOR) 20 MG tablet Take 1 tablet by mouth daily, Disp-90 tablet, R-3Normal      Alum & Mag Hydroxide-Simeth (MAALOX MAXIMUM STRENGTH PO) Take by mouth daily as neededHistorical Med      levalbuterol (XOPENEX HFA) 45 MCG/ACT inhaler Inhale 1-2 puffs into the lungs 4 times daily as needed for WheezingHistorical Med      ergocalciferol (ERGOCALCIFEROL) 16984 units capsule Take 50,000 Units by mouth dailyHistorical Med      ALBUTEROL IN Inhale into the lungs 4 There is no abdominal tenderness. There is no guarding or rebound. Hernia: No hernia is present. Musculoskeletal: Normal range of motion. Right lower leg: Edema present. Left lower leg: Edema present. Comments: There is 2+ mildly pitting edema of the bilateral lower extremities without significant tenderness. Skin:     General: Skin is warm and dry. Coloration: Skin is not pale. Findings: No erythema or rash. Neurological:      Mental Status: He is alert and oriented to person, place, and time. GCS: GCS eye subscore is 4. GCS verbal subscore is 5. GCS motor subscore is 6. Motor: No abnormal muscle tone. Coordination: Coordination normal.   Psychiatric:         Behavior: Behavior normal.         Thought Content: Thought content normal.               DIFFERENTIAL DIAGNOSIS:   Includes but not limited to: Costochondritis, musculoskeletal pain, muscle spasms, ACS, pneumonia, pleuritis, COPD, asthma, CHF, pulmonary edema.  Low suspicion for thoracic aortic dissection, pericarditis, or myocarditis based on history and physical exam.    DIAGNOSTIC RESULTS     EKG: All EKG's are interpreted by the Emergency Department Physician who either signs or Co-signsthis chart in the absence of a cardiologist.  EKG Chest Pain (Final result)    Component (Lab Inquiry)   Collection Time Result Time Ventricular Rate Atrial Rate P-R Interval QRS Duration Q-T Interval   06/03/20 06:15:50 06/03/20 06:15:50 65 65 174 82 444       Collection Time Result Time QTc Calculation (Bazett) P Axis R Axis T Axis   06/03/20 06:15:50 06/03/20 06:15:50 461 66 14 29         Final result                Narrative:    Poor data quality, interpretation may be adversely affected  Normal sinus rhythm  Normal ECG  When compared with ECG of 28-MAY-2020 18:01,  No significant change was found  Confirmed by Clinton Goldstein ([id number]) on 6/3/2020 8:17:01 PM                RADIOLOGY: non-plain film images(s) such as CT, Ultrasound and MRI are read by the radiologist.  Remi Molina images are visualized and preliminarily interpreted by the emergency physician unless otherwise stated below. XR CHEST STANDARD (2 VW)   Final Result   1.. Stable chest negative for active pathology. **This report has been created using voice recognition software. It may contain minor errors which are inherent in voice recognition technology. **      Final report electronically signed by Dr. Angela Conn on 6/3/2020 6:54 AM          LABS:   Labs Reviewed   CBC WITH AUTO DIFFERENTIAL - Abnormal; Notable for the following components:       Result Value    WBC 4.6 (*)     RBC 4.31 (*)     Hemoglobin 13.3 (*)     Hematocrit 41.6 (*)     MCV 96.5 (*)     MCHC 32.0 (*)     All other components within normal limits   BASIC METABOLIC PANEL - Abnormal; Notable for the following components:    Glucose 117 (*)     All other components within normal limits   CK - Abnormal; Notable for the following components: Total  (*)     All other components within normal limits   BRAIN NATRIURETIC PEPTIDE   C-REACTIVE PROTEIN   HEPATIC FUNCTION PANEL   LIPASE   TROPONIN   MAGNESIUM   URINE RT REFLEX TO CULTURE   ANION GAP   GLOMERULAR FILTRATION RATE, ESTIMATED   OSMOLALITY   RAPID DRUG SCREEN, URINE   TROPONIN       EMERGENCY DEPARTMENT COURSE:   Vitals:    Vitals:    06/03/20 0621 06/03/20 0707 06/03/20 0810 06/03/20 0850   BP:  137/72 133/69 133/69   Pulse:  62 60 58   Resp:  15 25 18   Temp: 98.1 °F (36.7 °C)      TempSrc: Oral      SpO2:  97% 97% 97%   Weight:       Height:         The patient was seen and evaluated within the ED today with also spasms of the chest, arms, abdomen, and right leg. Within the department, I observed the patient's vital signs to be within acceptable range, and he was afebrile. On exam, I appreciated clear lung sounds. There is no chest wall or abdominal tenderness.   There is 2+ mildly pitting edema of the bilateral

## 2020-06-04 ENCOUNTER — TELEPHONE (OUTPATIENT)
Dept: CARDIOLOGY CLINIC | Age: 50
End: 2020-06-04

## 2020-06-04 ENCOUNTER — CARE COORDINATION (OUTPATIENT)
Dept: CARE COORDINATION | Age: 50
End: 2020-06-04

## 2020-06-04 NOTE — TELEPHONE ENCOUNTER
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 Pt was cleared 3-17-20 by Xu Pap but clearance form was never signed. Pt is needing a clearance signed for injections with Dr. Gab Jennings. Renee spoke with pt 6/3/20 and pt did not want an appt since he was already cleared. Spoke with [employer]. [employer] would like pt to atleast do a VV with one of the cardiologists (preferably Dr. Magda Lorenz)  If pt can not or does not want to do a VV- please let [employer] know and she will talk to Dr. Magda Lorenz. Papers in Dr. Ana Meyer box  LM for pt to return call.

## 2020-06-08 NOTE — TELEPHONE ENCOUNTER
Janette Chisholm called requesting a refill on the following medications:  Requested Prescriptions     Pending Prescriptions Disp Refills    HYDROcodone-acetaminophen (NORCO) 5-325 MG per tablet 90 tablet 0     Sig: Take 1 tablet by mouth every 8 hours as needed for Pain for up to 30 days. Pharmacy verified:[facility]  . pv      Date of last visit: 5/20/20  Date of next visit (if applicable): Visit date not found

## 2020-06-08 NOTE — TELEPHONE ENCOUNTER
Previous placard was done 2/14/20. [id number] Torri Sow for new placard letter? Last seen 2/23/20.  Next appt 7/9/20

## 2020-06-10 RX ORDER — HYDROCODONE BITARTRATE AND ACETAMINOPHEN 5; 325 MG/1; MG/1
1 TABLET ORAL EVERY 8 HOURS PRN
Qty: 90 TABLET | Refills: 0 | Status: SHIPPED | OUTPATIENT
Start: 2020-06-14 | End: 2020-07-09

## 2020-06-18 ENCOUNTER — CARE COORDINATION (OUTPATIENT)
Dept: CARE COORDINATION | Age: 50
End: 2020-06-18

## 2020-06-22 ENCOUNTER — TELEPHONE (OUTPATIENT)
Dept: PHYSICAL MEDICINE AND REHAB | Age: 50
End: 2020-06-22

## 2020-06-22 ENCOUNTER — TELEPHONE (OUTPATIENT)
Dept: ENT CLINIC | Age: 50
End: 2020-06-22

## 2020-06-22 RX ORDER — LIDOCAINE 50 MG/G
OINTMENT TOPICAL
Qty: 258 G | Refills: 2 | Status: SHIPPED | OUTPATIENT
Start: 2020-06-22 | End: 2020-08-27 | Stop reason: SDUPTHER

## 2020-06-22 RX ORDER — ORPHENADRINE CITRATE 100 MG/1
100 TABLET, EXTENDED RELEASE ORAL 2 TIMES DAILY
Qty: 60 TABLET | Refills: 0 | Status: SHIPPED | OUTPATIENT
Start: 2020-06-22 | End: 2020-08-03 | Stop reason: SDUPTHER

## 2020-06-22 NOTE — TELEPHONE ENCOUNTER
OARRS reviewed. UDS: + for  none. Narcan offered: Offered  Last seen: 5/14/2020.  Follow-up: 07/29/2020

## 2020-06-22 NOTE — TELEPHONE ENCOUNTER
Hollie Thomas called regarding patient's allergy injections. He stated patient's last allergy injection was on 06/03/2020. He stated he only received half of the allergy sheet and wanted another one faxed over. Fax was sent to [phone]. Hollie Thomas also questioned when patient gets to red [facility]. Questions were answered.

## 2020-06-23 ENCOUNTER — TELEPHONE (OUTPATIENT)
Dept: ALLERGY | Age: 50
End: 2020-06-23

## 2020-06-29 ENCOUNTER — HOSPITAL ENCOUNTER (OUTPATIENT)
Age: 50
Discharge: HOME | End: 2020-06-29
Payer: MEDICAID

## 2020-06-29 VITALS
BODY MASS INDEX: 53.7 KG/M2 | TEMPERATURE: 97 F | DIASTOLIC BLOOD PRESSURE: 61 MMHG | HEART RATE: 62 BPM | WEIGHT: 315 LBS | SYSTOLIC BLOOD PRESSURE: 133 MMHG | RESPIRATION RATE: 16 BRPM | OXYGEN SATURATION: 97 %

## 2020-06-29 LAB — GFR SERPL CREATININE-BSD FRML MDRD: > 90 ML/MIN/1.73M2

## 2020-06-29 PROCEDURE — 95117 IMMUNOTHERAPY INJECTIONS: CPT

## 2020-06-29 RX ORDER — FLUTICASONE PROPIONATE 50 MCG
2 SPRAY, SUSPENSION (ML) NASAL DAILY
Qty: 3 BOTTLE | Refills: 1 | Status: SHIPPED | OUTPATIENT
Start: 2020-06-29 | End: 2020-08-31 | Stop reason: SDUPTHER

## 2020-06-29 NOTE — PROGRESS NOTES
Vial a 0.35ml of allergen right posterior right arm subcutaneously  And vial b in left upper posterior arm(bottom) sub q, then c vial I left upper posterior arm (upper)

## 2020-06-29 NOTE — PROGRESS NOTES
____ Safety:       (Environmental)   [place] to environment   Ensure ID band is correct and in place/ allergy band as needed   Assess for fall risk   Initiate fall precautions as applicable (fall band, side rails, etc.)   Call light within reach   Bed in low position/ wheels locked    ____ Pain:        Assess pain level and characteristics   Administer analgesics as ordered   Assess effectiveness of pain management and report to MD as needed    ____ Knowledge Deficit:   Assess baseline knowledge   Provide teaching at level of understanding   Provide teaching via preferred learning method   Evaluate teaching effectiveness    ____ Hemodynamic/Respiratory Status:       (Pre and Post Procedure Monitoring)   Assess/Monitor vital signs and LOC   Assess Baseline SpO2 prior to any sedation   Obtain weight/height   Assess vital signs/ LOC until patient meets discharge criteria   Monitor procedure site and notify MD of any issues    ____ Infection-Risk of Central Venous Catheter:   Monitor for infection signs and symptoms (catheter site redness, temperature elevation, etc)   Assess for infection risks   Educate regarding infection prevention   Manage central venous catheter (flushes/ dressing changes per protocol)  ____ Safety:       (Environmental)   [place] to environment   Ensure ID band is correct and in place/ allergy band as needed   Assess for fall risk   Initiate fall precautions as applicable (fall band, side rails, etc.)   Call light within reach   Bed in low position/ wheels locked    ____ Pain:        Assess pain level and characteristics   Administer analgesics as ordered   Assess effectiveness of pain management and report to MD as needed    ____ Knowledge Deficit:   Assess baseline knowledge   Provide teaching at level of understanding   Provide teaching via preferred learning method   Evaluate teaching effectiveness    ____ Hemodynamic/Respiratory Status:       (Pre and Post Procedure Monitoring)   Assess/Monitor vital signs and LOC   Assess Baseline SpO2 prior to any sedation   Obtain weight/height   Assess vital signs/ LOC until patient meets discharge criteria   Monitor procedure site and notify MD of any issues    ____ Infection-Risk of Central Venous Catheter:   Monitor for infection signs and symptoms (catheter site redness, temperature elevation, etc)   Assess for infection risks   Educate regarding infection prevention   Manage central venous catheter (flushes/ dressing changes per protocol)  ____ Safety:       (Environmental)   [place] to environment   Ensure ID band is correct and in place/ allergy band as needed   Assess for fall risk   Initiate fall precautions as applicable (fall band, side rails, etc.)   Call light within reach   Bed in low position/ wheels locked    ____ Pain:        Assess pain level and characteristics   Administer analgesics as ordered   Assess effectiveness of pain management and report to MD as needed    ____ Knowledge Deficit:   Assess baseline knowledge   Provide teaching at level of understanding   Provide teaching via preferred learning method   Evaluate teaching effectiveness    ____ Hemodynamic/Respiratory Status:       (Pre and Post Procedure Monitoring)   Assess/Monitor vital signs and LOC   Assess Baseline SpO2 prior to any sedation   Obtain weight/height   Assess vital signs/ LOC until patient meets discharge criteria   Monitor procedure site and notify MD of any issues    ____ Infection-Risk of Central Venous Catheter:   Monitor for infection signs and symptoms (catheter site redness, temperature elevation, etc)   Assess for infection risks   Educate regarding infection prevention   Manage central venous catheter (flushes/ dressing changes per protocol)  ____met__ Safety   GOAL: Patient will have ID or Allergy band on in the Urgent Care       (Environmental)   [place] to environment   Ensure ID band is correct and in place/ allergy band as needed   Assess for fall risk   Initiate fall precautions as applicable (fall band, side rails, etc.)   Call light within reach   Bed in low position/ wheels locked    __met__ Pain   GOAL:  Patient pain will be under control while here in the Urgent Care      Assess pain level and characteristics   Administer analgesics as ordered   Assess effectiveness of pain management and report to MD as needed    _met__ Knowledge Deficit:   GOAL: Patient will be educated on the medication they are receiving here in the Urgent Care   Assess baseline knowledge   Provide teaching at level of understanding   Provide teaching via preferred learning method   Evaluate teaching effectiveness    __met__ Hemodynamic/Respiratory Status:   GOAL: Patient vital signs will be assessed and monitored in the Urgent Care       (Pre and Post Procedure Monitoring)   Assess/Monitor vital signs and LOC   Assess Baseline SpO2 prior to any sedation   Obtain weight/height   Assess vital signs/ LOC until patient meets discharge criteria   Monitor procedure site and notify MD of any issues    __met__ Infection-Risk of Central Venous Catheter:   GOAL: Patient will be monitored for infection while in the Urgent Care   Monitor for infection signs and symptoms (catheter site redness, temperature elevation, etc)   Assess for infection risks   Educate regarding infection prevention   Manage central venous catheter (flushes/ dressing changes per protocol)    CARE PLAN END DATE: 6/29/2020

## 2020-06-30 ENCOUNTER — TELEPHONE (OUTPATIENT)
Dept: ENT CLINIC | Age: 50
End: 2020-06-30

## 2020-06-30 NOTE — TELEPHONE ENCOUNTER
Patient called in this morning stating he got his allergy injection yesterday around 6:30 pm, ate something at 8:30 pm and then within one and a half hours later he was feeling queasy. I asked patient if he did any physical activity after his allergy injection. He stated he did not do anything. He was only sitting. Patient is afraid to get another allergy injection because he was still sick to his stomach and missed work today due to it. Informed patient Jonathan Birchdon is not in today but we will check with her tomorrow when she is back in the office and call him back. Patient verbalized understanding and thanked me.

## 2020-07-01 ENCOUNTER — TELEPHONE (OUTPATIENT)
Dept: PHYSICAL MEDICINE AND REHAB | Age: 50
End: 2020-07-01

## 2020-07-01 NOTE — TELEPHONE ENCOUNTER
I called Urgent Care they said patient is still in silver vials and received 0.35ml. The patient did not have any knots or reaction on the arms.

## 2020-07-01 NOTE — TELEPHONE ENCOUNTER
Call Patient he stated he did not have any knots on his arms. However he felt very queasy, lightheaded, and had diarrhea after getting allergy shots. He felt the same way on Tuesday.

## 2020-07-01 NOTE — TELEPHONE ENCOUNTER
I need to know the vial and last dose the patient received to make adjustments in his dose. Did we inform the office where he is receiving his injections? Did he have any other type of reaction such as a knot?

## 2020-07-02 RX ORDER — LIDOCAINE 50 MG/G
1-2 PATCH TOPICAL DAILY
Qty: 60 PATCH | Refills: 0 | Status: SHIPPED | OUTPATIENT
Start: 2020-07-02 | End: 2020-07-22

## 2020-07-02 NOTE — TELEPHONE ENCOUNTER
Hold all his allergy injections for 2 weeks then I recommend the patient start over at silver vial 0.05 ml dose and rebuild from there  He must pre-medicate with his anti-histamine before his allergy injections. I recommend to take his antihistamines in the morning before the allergy injection. If it continues to occur notify us immediately for further instruction.   Make sure he has his epipen

## 2020-07-02 NOTE — TELEPHONE ENCOUNTER
Patient wanting to know if he can also have the Lidocaine patches because that helps with his sciatic pain a lot. He had them before from a previous doctor before he came here and had some left over so had be using them. Please advise. PA Approved. Patient notified.

## 2020-07-02 NOTE — TELEPHONE ENCOUNTER
Informed patient to hold allergy shots for 2 weeks. When he starts allergy shots again it will be the lowest dose at 0.05ml in silver vials. Start taking antihistamine in the morning before getting allergy shots. Be sure to have Epipen on him at all times especially when getting allergy shots. Patient verbalized understanding and agreed to call if he has any issues.

## 2020-07-06 ENCOUNTER — TELEPHONE (OUTPATIENT)
Dept: ALLERGY | Age: 50
End: 2020-07-06

## 2020-07-06 NOTE — TELEPHONE ENCOUNTER
[facility] urgent [facility] called wanting to know about the orders for the patient's allergy injections. Their staff wanted to know how much to increase the dose for each injection. They also wanted to know, and have a signed order, the maintenance dose; 0.5ml?; [facility] urgent [facility] would like to have a signed order, stating when the patient may begin allergy injections following his reaction from injection at their office.

## 2020-07-08 ENCOUNTER — OFFICE VISIT (OUTPATIENT)
Dept: BEHAVIORAL/MENTAL HEALTH CLINIC | Age: 50
End: 2020-07-08
Payer: MEDICAID

## 2020-07-08 PROCEDURE — 1036F TOBACCO NON-USER: CPT | Performed by: SOCIAL WORKER

## 2020-07-08 PROCEDURE — 90837 PSYTX W PT 60 MINUTES: CPT | Performed by: SOCIAL WORKER

## 2020-07-08 NOTE — TELEPHONE ENCOUNTER
Pt is asking for a refill of amitriotyline hcl 50 mg qd pm   Pt says it does help him   p  [address] [facility],[address]

## 2020-07-08 NOTE — TELEPHONE ENCOUNTER
I spoke with Dorothy Burroughs. He states that he used to take amitriptyline 50 mg every night at bedtime. Has been about a year since taking it. He did well on this and has an old Rx. He is wanting to restart this and would like a new Rx. He uses [facility].     No need to call ela back unless there is an issue. Okay to send to [facility] and will check there tomorrow afternoon.

## 2020-07-08 NOTE — PATIENT INSTRUCTIONS
Depression: Facts, Myths & Tips for Feeling Better    Facts    Depression is very common  Nearly 20% of the [place] population experiences a significant depression during their lifetime. Depression is treatable  Because depression affects so many, and can have such a powerful and negative impact on life, there has been an enormous amount of research conducted on how to reduce symptoms and improve functioning. As a result, we now know there are behaviors YOU can engage in to make yourself feel better. Depression is not a weakness  People suffer from depression for a variety of reasons, biological, environmental and behavioral.  Research indicates that [employer] is NOT one of the reasons people become depressed. Depression is not something you are powerless against  Evidence suggests that you can directly impact the intensity and duration of depression by what you do and by altering the way you think about certain things. The Downward Spiral    Depression often begins as a drop in mood due to an environmental or biological trigger that makes people feel less like being active. Being less active, in turn, often causes people to experience an even lower mood and feel even less like being active, and so the cycle begins. How can I start feeling better? The first and best way to reverse the downward cycle is to get active! Your body produces its own anti-depressants every time you exercise or do something pleasurable. Regular exercise is one of the very best ways to improve your mood. In fact some studies have shown that a solid exercise program is as effective as psychotherapy or anti-depressant medication for some people. *See physical activity section of handout    Force yourself to do something you found pleasurable before depression. This may be different for everyone and it doesnt matter if its gardening, playing bridge, walking, reading a novel, or simply talking to a close friend.  What matters is that YOU find the activity pleasurable! Even if you dont feel like doing something pleasurable for yourself, DO IT ANYWAY. We call this the fake it until you make it principle. Educate yourself! Often people feel powerless against medical conditions because they do not understand what is happening in their body. Just by reading this handout you know more than most people about depression. Knowledge is power when you can apply it, and make yourself feel better. *See recommended reading list at the bottom of this handout\"    Begin to notice unhealthy and unhelpful thoughts! In addition to how we behave, how we think influences our mood directly. Notice recurrent or alarming thoughts that have an impact on your mood. Ask yourself is this type of thinking helping me or hurting me?  if your answer is it's hurting me here are some things you can do: *see disputation of negative thoughts section of handout  - Challenge the negative thought. Is it truly accurate? Wheres the proof? Become your own  and collect the facts.  - Reframe the negative thought. How can I think differently about this problem, situation, or view of myself? Allow yourself to view a situation from more than one angle, how might my spouse, friend, or someone I admire view this same problem?  - Use the best friend scenario. How would you help your best friend if he or she was having these same thoughts? Would you criticize him or her as harshly as you criticize yourself? Remember, YOU know YOU better than anyone else. You likely know what kinds of activities, thoughts and reinforcement you respond to. Doing whats easiest and most doable is the key. Pick 1 or 2 things that are easy and get started feeling better TODAY!     * Use the following handout sections to guide you through behavioral and thinking exercises to help you manage your depressive symptoms, improve your functioning and to begin living your life well again. Recommended readings on managing depression    - Feeling Good by Dr. Charles Hernandez. Shetty     - Mind over [employer] by Jose Andres and [facility] by Dr. Sue French by Dr. Lorriane Cooks. Sapolsky      Disputation:  Challenging Upsetting Thinking    Examine your thoughts for key words:  1. must, need, got to, have to, should (unrealistic standards)  2. never, always, completely, totally, all everything, everyone (predictions /  labeling)  3. awful, terrible, horrible, unbearable, disaster, worst ever (labeling / predictions)  4. jerk, slob, creep, hypocrite, bully, stupid (labels)  Dispute or question the accuracy of the questionable thoughts. 1. Am I upsetting myself unnecessarily? How can I see this another way? 2. Is my thinking working for or against me? How could I view this in a less upsetting way? 3. What am I demanding must happen? What do I want or prefer, rather than need? 4. Am I making something too terrible? Is it really that awful? What would be so terrible about that? 5. Am I labeling a person? What is the action that I dont like? 6. Whats untrue about my thoughts? How can I stick to the facts? Whats the proof for what I am thinking or believing about this? 7. Am I using extreme, black-and-white language? What less extreme words might be more accurate? 8. Am I fortune telling or mind reading in a way that gets me upset or unhappy? What are the odds (percent chance -- e.g., there is a 5% chance. ..) that it will really turn out the way Im thinking or imagining? 9. What are my options in this situation? How would I like to respond? 10. Create more moderate, helpful, or realistic statements to replace the upsetting ones. 11. Have I had any experiences that show that this thought might not be totally true? 12. If my best friend or someone I loved had this thought, what would I tell them?   13. If my best

## 2020-07-08 NOTE — Clinical Note
Pt participated in an appointment today. He did process areas of high stress in his life impacting his mood, but determined that his primary need in contact the office was in request of a prescription for what he assesses works best for his pain management. He explained this was changed by another provider b/c of new policies or practices in that office. He asked that I follow up with you to make sure you got his message about the prescription request. It appeared you have an appointment with him on the 17th. I did reschedule him for next week for a follow up.

## 2020-07-08 NOTE — PROGRESS NOTES
suicidal thoughts are experienced, call 911. An additional 24/7 resource is the [facility] at [phone] ([phone]). Pt will attend a follow up appointment next week. O:  MSE:    Appearance   Distress  Appetite abnormal: overeating/poor appetite  Sleep disturbance Yes  Fatigue Yes  Loss of pleasure Yes  Impulsive behavior No  Speech    normal rate  Mood    Anxious/upset  Affect    normal affect  Thought Content    intact  Thought Process    goal directed  Associations    logical connections  Insight    Good  Judgment    Intact  Orientation    oriented to person, place, time, and general circumstances  Memory    recent and remote memory intact  Attention/Concentration    intact  Morbid ideation No  Suicide Assessment    no suicidal ideation    History:    TOBACCO:   reports that he has never smoked. He has never used smokeless tobacco.  ETOH:   reports previous alcohol use. Family History:   Family History   Problem Relation Age of Onset    High Blood Pressure Mother     Emphysema Mother     Other Mother         she was hit and killed by car    High Blood Pressure Father     Emphysema Father     Asthma Father        A:    Diagnosis:    1. Severe episode of recurrent major depressive disorder, without psychotic features ([facility] [address]          Diagnosis Date    Asthma     Fibromyalgia        Plan: . Pt will attend a follow up appointment next week to assess symptoms and evaluate effectiveness of coping skills. Pt interventions:  Provided handout on  depression and Discussed self-care (sleep, nutrition, rewarding activities, social support, exercise)    Pt Behavioral Change Plan:   1. Pt will read handouts about depression and self care. 2. Pt will manage effective self care habits-eat, sleep, enjoyable activities, relaxation. 3. Pt will assess when more effective communication with his partner is needed. 3. Pt will follow up with JOSE Prince next week.

## 2020-07-08 NOTE — TELEPHONE ENCOUNTER
I faxed a new immunotherapy form schedule to [facility]. Nata Tilley is not on maintenance vials he is on silver vials. When he starts allergy shots again I believe he will get 0.05ml and build from there if tolerated.

## 2020-07-09 PROBLEM — M79.604 PAIN IN BOTH LOWER EXTREMITIES: Status: ACTIVE | Noted: 2020-07-09

## 2020-07-09 PROBLEM — M79.605 PAIN IN BOTH LOWER EXTREMITIES: Status: ACTIVE | Noted: 2020-07-09

## 2020-07-09 RX ORDER — MULTIVITAMIN WITH IRON
TABLET ORAL
COMMUNITY
Start: 2020-06-25 | End: 2020-11-18

## 2020-07-09 RX ORDER — HYDROCODONE BITARTRATE AND ACETAMINOPHEN 5; 325 MG/1; MG/1
TABLET ORAL
Qty: 90 TABLET | Refills: 0 | Status: SHIPPED | OUTPATIENT
Start: 2020-07-14 | End: 2020-08-10 | Stop reason: SDUPTHER

## 2020-07-09 RX ORDER — AMITRIPTYLINE HYDROCHLORIDE 50 MG/1
50 TABLET, FILM COATED ORAL NIGHTLY
Qty: 90 TABLET | Refills: 1 | Status: SHIPPED | OUTPATIENT
Start: 2020-07-09 | End: 2020-11-18

## 2020-07-09 RX ORDER — DIPHENHYDRAMINE HYDROCHLORIDE 25 MG/1
CAPSULE ORAL
COMMUNITY
Start: 2020-06-24 | End: 2020-11-18

## 2020-07-09 NOTE — TELEPHONE ENCOUNTER
Can we check with patient if he is taking trazodone? Did he take both trazodone and elavil? Is he only taking the elavil as needed?   Thanks -WS

## 2020-07-13 ENCOUNTER — VIRTUAL VISIT (OUTPATIENT)
Dept: PULMONOLOGY | Age: 50
End: 2020-07-13
Payer: MEDICAID

## 2020-07-13 PROCEDURE — 99214 OFFICE O/P EST MOD 30 MIN: CPT | Performed by: NURSE PRACTITIONER

## 2020-07-13 PROCEDURE — G8428 CUR MEDS NOT DOCUMENT: HCPCS | Performed by: NURSE PRACTITIONER

## 2020-07-13 ASSESSMENT — ENCOUNTER SYMPTOMS
EYES NEGATIVE: 1
SHORTNESS OF BREATH: 1
DIARRHEA: 0
ABDOMINAL PAIN: 0
COUGH: 0
VOMITING: 0
CHEST TIGHTNESS: 0
NAUSEA: 0
WHEEZING: 1

## 2020-07-13 NOTE — PROGRESS NOTES
[facility]     Patient: Sameer Gardner, 54 y.o.   : 1970    2020   Pt of Dr. Angelito Ng   Patient presents with    Follow-up     asthma 3 month f/u mini rast 19    Other     neck   22.5  mp 4      TELEHEALTH EVALUATION -- Audio/Visual (During XPYBW-94 public health emergency)     Pt on video encounter with doxy. me, pt outside of his work place, no one else present in video. Provider at home, no one else present. HPI    Kassandra [place] is here for 6 month asthma follow up. Referred to Itzel Taylor DNP with  allergy at last visit for evaluation to get started on biologics for his asthma. Had been on Xolair in past while living in [place]. Has had lifelong asthma. States was on allergy shots, but had reaction to the shots, so stopped. Not on any biologics at this time. Next f/u with allergy is in Oct .  Has had 3 ER visits in past 60 days, states had reaction to a BP med and this caused a flare up of his asthma, required oral steroids. PFTs show restriction and component of obstructions. His is on Advair, Singulair, Incruse, and Proventil. Non smoker. Using his Proventil frequently at work due to working in heat with mask on . Previous FENO on Advair WNL with level of 19   On CPAP for CLARIBEL, c/o morning stuffiness of nose. Is using Flonase at night. Past Medical hx   PMH:  Past Medical History:   Diagnosis Date    Asthma     Fibromyalgia      SURGICAL HISTORY:  Past Surgical History:   Procedure Laterality Date    BACK SURGERY      KNEE SURGERY      lt     SOCIAL HISTORY:  Social History     Tobacco Use    Smoking status: Never Smoker    Smokeless tobacco: Never Used   Substance Use Topics    Alcohol use: Not Currently    Drug use: No     ALLERGIES:  Allergies   Allergen Reactions    Oxycodone     Valium [Diazepam]      FAMILY HISTORY:No family history on file.   CURRENT MEDICATIONS:  Current Outpatient capsule 3    diphenhydrAMINE (BENADRYL) 25 MG capsule Take 1 capsule by mouth nightly as needed for Itching 90 capsule 3    loratadine (CLARITIN) 10 MG tablet Take 1 tablet by mouth daily 90 tablet 3    rosuvastatin (CRESTOR) 20 MG tablet Take 1 tablet by mouth daily 90 tablet 3    flunisolide (NASALIDE) 25 MCG/ACT (0.025%) SOLN Inhale 2 sprays into the lungs 2 times daily as needed      hydrocortisone (WESTCORT) 0.2 % cream Apply topically 2 times daily as needed Apply topically 2 times daily.  Alum & Mag Hydroxide-Simeth (MAALOX MAXIMUM STRENGTH PO) Take by mouth daily as needed      levalbuterol (XOPENEX HFA) 45 MCG/ACT inhaler Inhale 1-2 puffs into the lungs 4 times daily as needed for Wheezing      ergocalciferol (ERGOCALCIFEROL) 10514 units capsule Take 50,000 Units by mouth daily      cetirizine (ZYRTEC) 10 MG tablet Take 10 mg by mouth daily      ALBUTEROL IN Inhale into the lungs 4 times daily as needed       Omalizumab (XOLAIR SC) Inject into the skin every 14 days        No current facility-administered medications for this visit. Tonye Cogan ROS   Review of Systems   Constitutional: Negative for activity change, chills, fatigue, fever and unexpected weight change. HENT: Positive for congestion. Eyes: Negative. Respiratory: Positive for shortness of breath and wheezing. Negative for cough and chest tightness. Cardiovascular: Negative for chest pain, palpitations and leg swelling. Gastrointestinal: Negative for abdominal pain, diarrhea, nausea and vomiting. Genitourinary: Negative. Musculoskeletal: Negative. Skin: Negative. Allergic/Immunologic: Positive for environmental allergies. Neurological: Negative. Hematological: Does not bruise/bleed easily. Psychiatric/Behavioral: Positive for sleep disturbance. Negative for suicidal ideas. Physical exam   No vitals taken today, due to virtual visit.      Physical Exam  Constitutional:       Appearance: Normal appearance. He is obese. HENT:      Head: Normocephalic and atraumatic. Eyes:      Conjunctiva/sclera: Conjunctivae normal.   Pulmonary:      Effort: No tachypnea, bradypnea or respiratory distress. Comments: Talking in full sentences without difficulty, no audible wheezing or signs of distress   Skin:     Comments: Skin color to face normal    Neurological:      Mental Status: He is alert and oriented to person, place, and time. Psychiatric:         Attention and Perception: Attention normal.         Mood and Affect: Mood normal.         Speech: Speech normal.         Behavior: Behavior normal.         Thought Content: Thought content normal.         Cognition and Memory: Cognition normal.         Judgment: Judgment normal.           Assessment    Diagnoses and all orders for this visit:    Moderate persistent asthma without complication    Morbid obesity with BMI of 50.0-59.9, adult (HCC)    Seasonal allergies    CLARIBEL on CPAP      Plan   -continue Advair 230/21 BID, Incruse, Singulair PO, and Proventil PRN  -recommend he call allergy for sooner appt with continued symptoms, now off allergy shots  -recommend wt loss  -continue CPAP nightly, contact sleep clinic for sooner appt if issues   -avoidance of known triggers   -will see Janelle Swan back in 3 months, he wishes for another vv vist with doxy. me  -he was instructed to call office for sooner appt if any worsening asthma symptoms      Janelle Swan is being evaluated by a Virtual Visit (video visit) encounter to address concerns as mentioned above. A caregiver was present when appropriate. Due to this being a TeleHealth encounter ([facility]), evaluation of the following organ systems was limited: Vitals/Constitutional/EENT/Resp/CV/GI//MS/Neuro/Skin/Heme-Lymph-Imm.   Pursuant to the emergency declaration under the [facility] waiver authority and the [employer] and Response Supplemental Appropriations Act, this Virtual Visit was conducted with patient's (and/or legal guardian's) consent, to reduce the patient's risk of exposure to COVID-19 and provide necessary medical care. The patient (and/or legal guardian) has also been advised to contact this office for worsening conditions or problems, and seek emergency medical treatment and/or call 911 if deemed necessary. Services were provided through a video synchronous discussion virtually to substitute for in-person clinic visit. Patient and provider were located at their individual homes. Patient identification was verified at the start of the visit.      Electronically signed by MIRNA Witt CNP on 7/13/2020 at 9:57 AM    [facility]

## 2020-07-16 ENCOUNTER — TELEPHONE (OUTPATIENT)
Dept: PULMONOLOGY | Age: 50
End: 2020-07-16

## 2020-07-16 RX ORDER — PREDNISONE 20 MG/1
20 TABLET ORAL DAILY
Qty: 5 TABLET | Refills: 0 | Status: SHIPPED | OUTPATIENT
Start: 2020-07-16 | End: 2020-07-21

## 2020-07-16 NOTE — TELEPHONE ENCOUNTER
Spoke to patient and let him know that prednisone was sent in to [facility]. He acknowledges understanding.

## 2020-07-16 NOTE — TELEPHONE ENCOUNTER
Patient called and left a  that he is in need of prednisone for increased SOB and wheezing. Was just seen as  Vv 7/13/2020. Please notify him that I have e-scribed Prednisone burst for 5 days to [address] [place] [address] on [facility] .

## 2020-07-17 ENCOUNTER — TELEPHONE (OUTPATIENT)
Dept: ENT CLINIC | Age: 50
End: 2020-07-17

## 2020-07-17 ENCOUNTER — VIRTUAL VISIT (OUTPATIENT)
Dept: FAMILY MEDICINE CLINIC | Age: 50
End: 2020-07-17
Payer: MEDICAID

## 2020-07-17 PROCEDURE — 99214 OFFICE O/P EST MOD 30 MIN: CPT | Performed by: NURSE PRACTITIONER

## 2020-07-17 PROCEDURE — G8427 DOCREV CUR MEDS BY ELIG CLIN: HCPCS | Performed by: NURSE PRACTITIONER

## 2020-07-17 PROCEDURE — G8417 CALC BMI ABV UP PARAM F/U: HCPCS | Performed by: NURSE PRACTITIONER

## 2020-07-17 PROCEDURE — 1036F TOBACCO NON-USER: CPT | Performed by: NURSE PRACTITIONER

## 2020-07-17 ASSESSMENT — ENCOUNTER SYMPTOMS
SINUS PAIN: 1
SHORTNESS OF BREATH: 0
FACIAL SWELLING: 0
NAUSEA: 0
WHEEZING: 0
TROUBLE SWALLOWING: 0
BACK PAIN: 0
COUGH: 1
COLOR CHANGE: 0
EYE PAIN: 0
SORE THROAT: 0
DIARRHEA: 0
ABDOMINAL PAIN: 0
VOMITING: 0

## 2020-07-17 NOTE — TELEPHONE ENCOUNTER
Patient called my personal cell phone again today, think he got it from when I did virtual visit with him and had to call to finish instructions. Please notify patient that is my cell and please not call it , give him office number for future issues/ questions. He is asking that we reach out to [facility] office to see if they would consider Xolair for his asthma management. I will write a letter to fax to office. Please notify Ligia Waller of above.    Thanks

## 2020-07-17 NOTE — PATIENT INSTRUCTIONS
Patient Education        Learning About High Cholesterol  What is high cholesterol? High cholesterol means that you have too much cholesterol in your blood. Cholesterol is a type of fat. It's needed for many body functions, such as making new cells. Cholesterol is made by your body. It also comes from food you eat. Having high cholesterol can lead to the buildup of plaque in artery walls. This can increase your risk of heart disease and stroke. When your doctor talks about high cholesterol levels, he or she is talking about your total cholesterol and LDL cholesterol (the \"bad\" cholesterol) levels. Your doctor may also speak about HDL (the \"good\" cholesterol) levels. High HDL is linked with a lower risk for heart disease, heart attack, and stroke. Your cholesterol levels help your doctor find out your risk for having a heart attack or stroke. How can you prevent high cholesterol? A heart-healthy lifestyle can help you prevent high cholesterol. This lifestyle helps lower your risk for a heart attack and stroke. · Eat heart-healthy foods. ? Eat fruits, vegetables, whole grains (like oatmeal), dried beans and peas, nuts and seeds, soy products (like tofu), and fat-free or low-fat dairy products. ? Replace butter, margarine, and hydrogenated or partially hydrogenated oils with olive and canola oils. (Canola oil margarine without trans fat is fine.)  ? Replace red meat with fish, poultry, and soy protein (like tofu). ? Limit processed and packaged foods like chips, crackers, and cookies. · Be active. Exercise can improve your cholesterol level. Get at least 30 minutes of exercise on most days of the week. Walking is a good choice. You also may want to do other activities, such as running, swimming, cycling, or playing tennis or team sports. · Stay at a healthy weight. Lose weight if you need to. · Don't smoke. If you need help quitting, talk to your doctor about stop-smoking programs and medicines.  These can increase your chances of quitting for good. How is high cholesterol treated? The goal of treatment is to reduce your chances of having a heart attack or stroke. The goal is not to lower your cholesterol numbers only. · You may make lifestyle changes, such as eating healthy foods, not smoking, losing weight, and being more active. · You may have to take medicine. Follow-up care is a key part of your treatment and safety. Be sure to make and go to all appointments, and call your doctor if you are having problems. It's also a good idea to know your test results and keep a list of the medicines you take. Where can you learn more? Go to https://[employer]theAIR. org and sign in to your [employer] account. Enter R402 in the [employer] box to learn more about \"Learning About High Cholesterol. \"     If you do not have an account, please click on the \"Sign Up Now\" link. Current as of: December 16, 2019               Content Version: 12.5  © 4311-8225 Healthwise, Incorporated. Care instructions adapted under license by [facility] ([facility]). If you have questions about a medical condition or this instruction, always ask your healthcare professional. [place] [address] any warranty or liability for your use of this information.

## 2020-07-17 NOTE — PROGRESS NOTES
2020    TELEHEALTH EVALUATION -- Audio/Visual (During RXQQB-03 public health emergency)    HPI:    Daniel Guerrero (:  1970) has requested an audio/video evaluation for the following concern(s): 6 month follow up hyperlipidemia and HTN. Treatment Adherence:   Medication compliance:  compliant all of the time  Diet compliance:  compliant most of the time  Weight trend: stable    Hypertension:  Home blood pressure monitoring: No.   Pt is adherent to a low sodium diet. Patient complains of dry cough, but no chest pain, SOB or leg swelling. Antihypertensive medication side effects: no medication side effects noted. Use of agents associated with hypertension: none. Hyperlipidemia:  No new myalgias or GI upset on rosuvastatin (Crestor). Pt follows up with Pulmonary, allergist, and pain management. Pt did call pulmonary about his allergies and congestion, was put on prednisone and is going to follow up with Dr Mirta Sullivan about an allergy shot in the future. BP Readings from Last 3 Encounters:   20 133/61   20 126/82   20 133/69     Wt Readings from Last 3 Encounters:   20 (!) 407 lb (184.6 kg)   20 (!) 420 lb (190.5 kg)   20 (!) 420 lb (190.5 kg)     Sleep- just restarted elavil for sleep. Going well, denies any side effects. Review of Systems   Constitutional: Negative for chills, fatigue and fever. HENT: Positive for sinus pain. Negative for congestion, facial swelling, sore throat and trouble swallowing. Eyes: Negative for pain and visual disturbance. Respiratory: Positive for cough. Negative for shortness of breath and wheezing. Cardiovascular: Negative for chest pain and palpitations. Gastrointestinal: Negative for abdominal pain, diarrhea, nausea and vomiting. Genitourinary: Negative for difficulty urinating, dysuria and urgency. Musculoskeletal: Negative for back pain, gait problem and neck pain.    Skin: Negative for color change and rash. Neurological: Negative for dizziness, weakness and headaches. Psychiatric/Behavioral: Negative for agitation and sleep disturbance. The patient is not nervous/anxious. Prior to Visit Medications    Medication Sig Taking? Authorizing Provider   predniSONE (DELTASONE) 20 MG tablet Take 1 tablet by mouth daily for 5 days  MIRNA Solorzano CNP   Multiple Vitamin (MULTIVITAMIN) TABS tablet take 1 tablet by mouth once daily  Historical Provider, MD   BANOPHEN 25 MG capsule take 1 tablet by mouth every 6 hours if needed for itching  Historical Provider, MD   amitriptyline (ELAVIL) 50 MG tablet Take 1 tablet by mouth nightly  Jason Babinski, APRN - CNP   HYDROcodone-acetaminophen (NORCO) 5-325 MG per tablet take 1 tablet by mouth every 8 hours if needed for pain (EFFECTIVE 6/14/2020)  MIRNA Gallo CNP   lidocaine (LIDODERM) 5 % Place 1-2 patches onto the skin daily 12 hours on, 12 hours off. MIRNA Gallo CNP   fluticasone (FLONASE) 50 MCG/ACT nasal spray 2 sprays by Each Nostril route daily  MIRNA Robbins CNP   orphenadrine (NORFLEX) 100 MG extended release tablet Take 1 tablet by mouth 2 times daily  MIRNA Gallo CNP   lidocaine (XYLOCAINE) 5 % ointment Apply topically as needed to painful areas of lower back and neck  MIRNA Gallo CNP   tiZANidine (ZANAFLEX) 2 MG tablet Take 2 mg by mouth every 6 hours as needed  Historical Provider, MD DAY ELLIPTA 62.5 MCG/INH AEPB inhale 1 puff once daily  MIRNA Robbins CNP   bumetanide (BUMEX) 1 MG tablet Take 0.5 tablets by mouth daily  Pancho MIRNA Weller CNP   hydrocortisone valerate (WESTCORT) 0.2 % ointment Apply topically daily.   MIRNA Robbins CNP   cetirizine (ZYRTEC) 10 MG tablet Take 1 tablet by mouth daily  MIRNA Robbins CNP   butalbital-acetaminophen-caffeine (FIORICET, ESGIC) -40 MG per tablet Take 1 tablet by mouth 4 times daily as needed for Headaches  MIRNA Mixon CNP   ferrous sulfate 325 (65 Fe) MG tablet Take 1 tablet by mouth daily (with breakfast)  MIRNA Mixon CNP   azelastine (OPTIVAR) 0.05 % ophthalmic solution Place 1 drop into both eyes 2 times daily  MIRNA Gonzalez CNP   montelukast (SINGULAIR) 10 MG tablet Take 1 tablet by mouth nightly  MIRNA Gonzalez CNP   fluticasone-salmeterol (ADVAIR HFA) 230-21 MCG/ACT inhaler Inhale 2 puffs into the lungs 2 times daily  MIRNA Gonzalez CNP   albuterol sulfate  (90 Base) MCG/ACT inhaler Inhale 2 puffs into the lungs 4 times daily as needed for Wheezing  MIRNA Gonzalez CNP   albuterol (PROVENTIL) (2.5 MG/3ML) 0.083% nebulizer solution Take 3 mLs by nebulization every 6 hours as needed for Wheezing (for asthma)  MIRNA Gonzalez CNP   lidocaine (LMX) 4 % cream Apply topically to back, arms and abs as needed  MIRNA Mixon CNP   EPINEPHrine (AUVI-Q) 0.3 MG/0.3ML SOAJ injection Dispense 2 packs of 2 (total 4 devices). Use as directed, STAT for allergic reaction.   MIRNA Gonzalez CNP   NARCAN 4 MG/0.1ML LIQD nasal spray   Historical Provider, MD   Multiple Vitamins-Minerals (MULTIVITAMIN ADULTS) TABS Take 1 tablet by mouth daily  MIRNA Mixon CNP   omeprazole (PRILOSEC) 20 MG delayed release capsule Take 1 capsule by mouth daily  MIRNA Mixon CNP   rosuvastatin (CRESTOR) 20 MG tablet Take 1 tablet by mouth daily  MIRNA Mixon CNP   Alum & Mag Hydroxide-Simeth (MAALOX MAXIMUM STRENGTH PO) Take by mouth daily as needed  Historical Provider, MD   levalbuterol (XOPENEX HFA) 45 MCG/ACT inhaler Inhale 1-2 puffs into the lungs 4 times daily as needed for Wheezing  Historical Provider, MD   ergocalciferol (ERGOCALCIFEROL) 70411 units capsule Take 50,000 Units by mouth daily  Historical Provider, MD   ALBUTEROL IN Inhale into the lungs 4 times daily as needed   Historical Provider, MD       Social History     Tobacco Use    Smoking status: Never Smoker    Smokeless tobacco: Never Used   Substance Use Topics    Alcohol use: Not Currently    Drug use: No        Allergies   Allergen Reactions    Dilantin [Phenytoin] Anaphylaxis and Swelling    Dupixent [Dupilumab]     Oxycodone     Valium [Diazepam]    ,   Past Medical History:   Diagnosis Date    Asthma     Fibromyalgia    ,   Past Surgical History:   Procedure Laterality Date    BACK SURGERY      KNEE SURGERY      lt       PHYSICAL EXAMINATION:  [ INSTRUCTIONS:  \"[x]\" Indicates a positive item  \"[]\" Indicates a negative item  -- DELETE ALL ITEMS NOT EXAMINED]  Vital Signs: (As obtained by patient/caregiver or practitioner observation)  Respiratory rate- 16 observed     Constitutional: [x] Appears well-developed and well-nourished [x] No apparent distress      [] Abnormal-   Mental status  [x] Alert and awake  [x] Oriented to person/place/time [x]Able to follow commands      Eyes:  EOM    [x]  Normal  [] Abnormal-  Sclera  [x]  Normal  [] Abnormal -         Discharge [x]  None visible  [] Abnormal -    HENT:   [x] Normocephalic, atraumatic.   [] Abnormal   [x] Mouth/Throat: Mucous membranes are moist.     External Ears [x] Normal  [] Abnormal-     Neck: [x] No visualized mass     Pulmonary/Chest: [x] Respiratory effort normal.  [x] No visualized signs of difficulty breathing or respiratory distress        [] Abnormal-      Musculoskeletal:   [x] Normal gait with no signs of ataxia         [x] Normal range of motion of neck        [] Abnormal-       Neurological:        [x] No Facial Asymmetry (Cranial nerve 7 motor function) (limited exam to video visit)          [x] No gaze palsy        [] Abnormal-         Skin:        [x] No significant exanthematous lesions or discoloration noted on facial skin         [] Abnormal-            Psychiatric:       [x] Normal Affect [x] No Hallucinations        [] Abnormal-     Other pertinent observable physical exam findings- ASSESSMENT/PLAN:  1. Hyperlipidemia, unspecified hyperlipidemia type    2. H/O: HTN (hypertension)    3. Morbid obesity with BMI of 50.0-59.9, adult ([facility] [address]    - Follow up with specialities as planned  - No refills needed today  - Call office with any questions or concerns, or if symptoms are getting worse or changing  - Patient given educational materials - see patient instructions. Discussed use, benefit, and side effects of prescribed medications. All patient questions answered. Pt voiced understanding. Reviewed Health Maintenance. Return in about 6 months (around 1/17/2021) for Routine follow up, Medication check, Result discussion. Charbel Stockton is a 52 y.o. male being evaluated by a Virtual Visit (video visit) encounter to address concerns as mentioned above. A caregiver was present when appropriate. Due to this being a TeleHealth encounter (During UJMEQ-92 public health emergency), evaluation of the following organ systems was limited: Vitals/Constitutional/EENT/Resp/CV/GI//MS/Neuro/Skin/Heme-Lymph-Imm. Pursuant to the emergency declaration under the [facility], [address] [place] [address] authority and the [employer] Act, this Virtual Visit was conducted with patient's (and/or legal guardian's) consent, to reduce the patient's risk of exposure to COVID-19 and provide necessary medical care. The patient (and/or legal guardian) has also been advised to contact this office for worsening conditions or problems, and seek emergency medical treatment and/or call 911 if deemed necessary. Patient identification was verified at the start of the visit: Yes    Total time spent on this encounter: Not billed by time    Services were provided through a video synchronous discussion virtually to substitute for in-person clinic visit. Patient and provider were located at their individual homes.     --MIRNA Harris - CNP on 7/17/2020 at 3:34 PM    An electronic signature was used to authenticate this note.

## 2020-07-17 NOTE — TELEPHONE ENCOUNTER
Patient called and stated that his Pulmonary doctor told him that they wanted him to see [facility] and to start shots again. He said he also does them at [address] [place] [address] Urgent Care. He wants to know does he have to go there for the shots or can he come here. I explained to him that as of right now [facility] is out for 2 weeks due to surgery and I would leave a message to see if he has to start out here again or if he can  at Urgent Care. Please advise.

## 2020-07-22 RX ORDER — LIDOCAINE 50 MG/G
PATCH TOPICAL
Qty: 60 PATCH | Refills: 0 | Status: SHIPPED | OUTPATIENT
Start: 2020-07-22 | End: 2020-08-27 | Stop reason: SDUPTHER

## 2020-07-23 NOTE — TELEPHONE ENCOUNTER
Juan R Rebollar, APRN - CNP  [facility] Clinical Support Pool    Caller: Unspecified (Today, 10:00 AM)               I would like to see the patient before he starts back on immunotherapy

## 2020-07-29 ENCOUNTER — VIRTUAL VISIT (OUTPATIENT)
Dept: PHYSICAL MEDICINE AND REHAB | Age: 50
End: 2020-07-29
Payer: MEDICAID

## 2020-07-29 PROCEDURE — 99213 OFFICE O/P EST LOW 20 MIN: CPT | Performed by: NURSE PRACTITIONER

## 2020-07-29 PROCEDURE — G8428 CUR MEDS NOT DOCUMENT: HCPCS | Performed by: NURSE PRACTITIONER

## 2020-07-29 ASSESSMENT — ENCOUNTER SYMPTOMS
RESPIRATORY NEGATIVE: 1
EYES NEGATIVE: 1
BACK PAIN: 1
GASTROINTESTINAL NEGATIVE: 1

## 2020-07-29 NOTE — TELEPHONE ENCOUNTER
Appointment scheduled with Glenn Schuler as a virtual visit. The patient request this appointment type because of work.

## 2020-07-29 NOTE — PROGRESS NOTES
Medications:     lidocaine (LIDODERM) 5 %, APPLY 1 TO 2 PATCHES TO SKIN DAILY. 12 HOURS ON 12 HOURS OFF, Disp: 60 patch, Rfl: 0    Multiple Vitamin (MULTIVITAMIN) TABS tablet, take 1 tablet by mouth once daily, Disp: , Rfl:     BANOPHEN 25 MG capsule, take 1 tablet by mouth every 6 hours if needed for itching, Disp: , Rfl:     amitriptyline (ELAVIL) 50 MG tablet, Take 1 tablet by mouth nightly, Disp: 90 tablet, Rfl: 1    HYDROcodone-acetaminophen (NORCO) 5-325 MG per tablet, take 1 tablet by mouth every 8 hours if needed for pain (EFFECTIVE 6/14/2020), Disp: 90 tablet, Rfl: 0    fluticasone (FLONASE) 50 MCG/ACT nasal spray, 2 sprays by Each Nostril route daily, Disp: 3 Bottle, Rfl: 1    lidocaine (XYLOCAINE) 5 % ointment, Apply topically as needed to painful areas of lower back and neck, Disp: 258 g, Rfl: 2    tiZANidine (ZANAFLEX) 2 MG tablet, Take 2 mg by mouth every 6 hours as needed, Disp: , Rfl:     INCRUSE ELLIPTA 62.5 MCG/INH AEPB, inhale 1 puff once daily, Disp: 3 each, Rfl: 1    bumetanide (BUMEX) 1 MG tablet, Take 0.5 tablets by mouth daily, Disp: 30 tablet, Rfl: 0    hydrocortisone valerate (WESTCORT) 0.2 % ointment, Apply topically daily. , Disp: 1 Tube, Rfl: 5    cetirizine (ZYRTEC) 10 MG tablet, Take 1 tablet by mouth daily, Disp: 30 tablet, Rfl: 11    butalbital-acetaminophen-caffeine (FIORICET, ESGIC) -40 MG per tablet, Take 1 tablet by mouth 4 times daily as needed for Headaches, Disp: 180 tablet, Rfl: 0    ferrous sulfate 325 (65 Fe) MG tablet, Take 1 tablet by mouth daily (with breakfast), Disp: 90 tablet, Rfl: 1    azelastine (OPTIVAR) 0.05 % ophthalmic solution, Place 1 drop into both eyes 2 times daily, Disp: 1 Bottle, Rfl: 11    montelukast (SINGULAIR) 10 MG tablet, Take 1 tablet by mouth nightly, Disp: 30 tablet, Rfl: 11    fluticasone-salmeterol (ADVAIR HFA) 230-21 MCG/ACT inhaler, Inhale 2 puffs into the lungs 2 times daily, Disp: 1 Inhaler, Rfl: 2    albuterol sulfate  (90 Base) MCG/ACT inhaler, Inhale 2 puffs into the lungs 4 times daily as needed for Wheezing, Disp: 3 Inhaler, Rfl: 1    albuterol (PROVENTIL) (2.5 MG/3ML) 0.083% nebulizer solution, Take 3 mLs by nebulization every 6 hours as needed for Wheezing (for asthma), Disp: 120 each, Rfl: 3    lidocaine (LMX) 4 % cream, Apply topically to back, arms and abs as needed, Disp: 120 g, Rfl: 2    EPINEPHrine (AUVI-Q) 0.3 MG/0.3ML SOAJ injection, Dispense 2 packs of 2 (total 4 devices). Use as directed, STAT for allergic reaction. , Disp: 4 each, Rfl: 2    NARCAN 4 MG/0.1ML LIQD nasal spray, , Disp: , Rfl:     Multiple Vitamins-Minerals (MULTIVITAMIN ADULTS) TABS, Take 1 tablet by mouth daily, Disp: 90 tablet, Rfl: 3    omeprazole (PRILOSEC) 20 MG delayed release capsule, Take 1 capsule by mouth daily, Disp: 90 capsule, Rfl: 3    rosuvastatin (CRESTOR) 20 MG tablet, Take 1 tablet by mouth daily, Disp: 90 tablet, Rfl: 3    Alum & Mag Hydroxide-Simeth (MAALOX MAXIMUM STRENGTH PO), Take by mouth daily as needed, Disp: , Rfl:     levalbuterol (XOPENEX HFA) 45 MCG/ACT inhaler, Inhale 1-2 puffs into the lungs 4 times daily as needed for Wheezing, Disp: , Rfl:     ergocalciferol (ERGOCALCIFEROL) 72234 units capsule, Take 50,000 Units by mouth daily, Disp: , Rfl:     ALBUTEROL IN, Inhale into the lungs 4 times daily as needed , Disp: , Rfl:     Subjective:      Review of Systems   Constitutional: Negative. HENT: Negative. Eyes: Negative. Respiratory: Negative. Cardiovascular: Negative. Gastrointestinal: Negative. Endocrine: Negative. Genitourinary: Negative. Musculoskeletal: Positive for arthralgias, back pain, gait problem, myalgias, neck pain and neck stiffness. Skin: Negative. Neurological: Positive for weakness and numbness. Negative for headaches. Psychiatric/Behavioral: Negative. Objective: There were no vitals filed for this visit.     Physical Exam  Constitutional:       Appearance: Normal appearance. He is normal weight. HENT:      Head: Normocephalic and atraumatic. Neurological:      General: No focal deficit present. Mental Status: He is alert and oriented to person, place, and time. Psychiatric:         Mood and Affect: Mood normal.          Assessment:     1. Spondylosis of lumbar region without myelopathy or radiculopathy    2. Lumbar degenerative disc disease    3. DDD (degenerative disc disease), cervical    4. Lumbar pain    5. Cervical radiculitis    6. Chronic pain syndrome            Plan:      · OARRS reviewed. Current MED: 15.00  · Patient was offered naloxone for home. Has  · Discussed long term side effects of medications, tolerance, dependency and addiction. · Previous UDS reviewed  · UDS preformed today for compliance. · Patient told can not receive any pain medications from any other source. · No evidence of abuse, diversion or aberrant behavior.  Medications and/or procedures to improve function and quality of life- patient understanding with this and that may not be pain free   Discussed with patient about safe storage of medications at home   Discussed possible weaning of medication dosing dependent on treatment/procedure results.  Discussed with patient about risks with procedure including infection, reaction to medication, increased pain, or bleeding. · Reveiwed L-xray and C-xray in detail. · Pain remains tolerable with medications. Continue Norco 5/325 TID prn- Filled 7/14/2020  · Continue Zanaflex today 2-4 mg every 6 hours prn spasms- Has pleny  · Continue Lidoderm patches   · Plan Bilateral L-facet MBB # 1 @ L3-4, L4-5, and L5-S1 for diagnostic and therapeutic relief when able to d/t new job   · Need a updated UDS, patient has been compliant       Meds. Prescribed:   No orders of the defined types were placed in this encounter.       Return in about 4 weeks (around 8/26/2020), or if symptoms worsen or fail to improve, for follow up  for medications, will need updated UDS at this visit . Time spent with patient was 15 minutes, more than 50% was spent Counseling/coordinated the patient'scare.       Electronically signed by MIRNA Cho CNP on7/29/2020 at 8:44 AM

## 2020-08-03 ENCOUNTER — TELEPHONE (OUTPATIENT)
Dept: PHYSICAL MEDICINE AND REHAB | Age: 50
End: 2020-08-03

## 2020-08-03 RX ORDER — HYDROCODONE BITARTRATE AND ACETAMINOPHEN 5; 325 MG/1; MG/1
1 TABLET ORAL EVERY 8 HOURS PRN
Qty: 90 TABLET | Refills: 0 | Status: CANCELLED | OUTPATIENT
Start: 2020-08-13 | End: 2020-09-12

## 2020-08-03 RX ORDER — ORPHENADRINE CITRATE 100 MG/1
100 TABLET, EXTENDED RELEASE ORAL 2 TIMES DAILY
Qty: 60 TABLET | Refills: 0 | Status: SHIPPED | OUTPATIENT
Start: 2020-08-03 | End: 2020-08-27 | Stop reason: SDUPTHER

## 2020-08-03 NOTE — TELEPHONE ENCOUNTER
This medication refill is regarding a refill  Refill requested by [facility]  Requested Prescriptions     Pending Prescriptions Disp Refills    ferrous sulfate (IRON 325) 325 (65 Fe) MG tablet 90 tablet 1     Sig: Take 1 tablet by mouth daily (with breakfast)       Date of last visit: 2/26/2020  Date of next visit: 1/18/2021  Date of last refill: 2/26/20  Pharmacy Name: [facility]

## 2020-08-03 NOTE — TELEPHONE ENCOUNTER
Francisco Jaimes called requesting a refill on the following medications:  Requested Prescriptions     Pending Prescriptions Disp Refills    HYDROcodone-acetaminophen (NORCO) 5-325 MG per tablet 90 tablet 0    orphenadrine (NORFLEX) 100 MG extended release tablet 60 tablet 0     Sig: Take 1 tablet by mouth 2 times daily     Pharmacy verified:  .pv  [facility] [address]    Date of last visit: 07/29/2020  Date of next visit (if applicable): 6/22/0468

## 2020-08-04 ENCOUNTER — TELEPHONE (OUTPATIENT)
Dept: PULMONOLOGY | Age: 50
End: 2020-08-04

## 2020-08-04 RX ORDER — PREDNISONE 10 MG/1
10 TABLET ORAL DAILY
Qty: 30 TABLET | Refills: 0 | Status: SHIPPED | OUTPATIENT
Start: 2020-08-04 | End: 2020-08-31

## 2020-08-04 RX ORDER — FERROUS SULFATE 325(65) MG
325 TABLET ORAL
Qty: 90 TABLET | Refills: 3 | Status: SHIPPED | OUTPATIENT
Start: 2020-08-04 | End: 2021-06-28

## 2020-08-04 NOTE — TELEPHONE ENCOUNTER
Please notify patient I sent a prednisone taper dose to [facility].

## 2020-08-10 RX ORDER — HYDROCODONE BITARTRATE AND ACETAMINOPHEN 5; 325 MG/1; MG/1
1 TABLET ORAL EVERY 8 HOURS PRN
Qty: 90 TABLET | Refills: 0 | OUTPATIENT
Start: 2020-08-13 | End: 2020-09-12

## 2020-08-10 NOTE — TELEPHONE ENCOUNTER
OARRS reviewed. UDS:negative screen. Narcan offered: yes  Last seen: 7/29/2020.  Follow-up:   Future Appointments   Date Time Provider Jesus Manjarrezi   8/27/2020 11:15 AM MIRNA Patterson - CNP [facility] - [address] [place] [address]   8/31/2020 10:30 AM MIRNA Ngo - CNP [facility] [address]   10/12/2020  9:00 AM MIRNA Guo - CNP Pulm Med [address]   10/29/2020  9:00 AM MIRNA Ngo CNP [facility] [address]   1/18/2021  3:00 PM MIRNA James - CNP Fam Medicine [address]   2/18/2021 10:20 AM MIRNA Albert - CNP Pulm Med [address]

## 2020-08-11 RX ORDER — HYDROCODONE BITARTRATE AND ACETAMINOPHEN 5; 325 MG/1; MG/1
1 TABLET ORAL EVERY 8 HOURS PRN
Qty: 45 TABLET | Refills: 0 | Status: SHIPPED | OUTPATIENT
Start: 2020-08-11 | End: 2020-08-27 | Stop reason: SDUPTHER

## 2020-08-21 RX ORDER — BUTALBITAL, ACETAMINOPHEN AND CAFFEINE 50; 325; 40 MG/1; MG/1; MG/1
TABLET ORAL
Qty: 180 TABLET | Refills: 0 | Status: SHIPPED | OUTPATIENT
Start: 2020-08-21 | End: 2021-01-08

## 2020-08-26 ENCOUNTER — TELEPHONE (OUTPATIENT)
Dept: PHYSICAL MEDICINE AND REHAB | Age: 50
End: 2020-08-26

## 2020-08-26 NOTE — TELEPHONE ENCOUNTER
So can only be seen in person? He was asking because he can not miss work for visit. Please advise. Stated he could come in after work for the [address].

## 2020-08-27 ENCOUNTER — VIRTUAL VISIT (OUTPATIENT)
Dept: PHYSICAL MEDICINE AND REHAB | Age: 50
End: 2020-08-27
Payer: MEDICAID

## 2020-08-27 PROCEDURE — 99213 OFFICE O/P EST LOW 20 MIN: CPT | Performed by: NURSE PRACTITIONER

## 2020-08-27 PROCEDURE — G8427 DOCREV CUR MEDS BY ELIG CLIN: HCPCS | Performed by: NURSE PRACTITIONER

## 2020-08-27 RX ORDER — HYDROCODONE BITARTRATE AND ACETAMINOPHEN 5; 325 MG/1; MG/1
1 TABLET ORAL EVERY 8 HOURS PRN
Qty: 90 TABLET | Refills: 0 | Status: SHIPPED | OUTPATIENT
Start: 2020-08-27 | End: 2020-09-30 | Stop reason: SDUPTHER

## 2020-08-27 RX ORDER — LIDOCAINE 50 MG/G
PATCH TOPICAL
Qty: 60 PATCH | Refills: 0 | Status: SHIPPED | OUTPATIENT
Start: 2020-08-27 | End: 2020-09-30 | Stop reason: SDUPTHER

## 2020-08-27 RX ORDER — ORPHENADRINE CITRATE 100 MG/1
100 TABLET, EXTENDED RELEASE ORAL 2 TIMES DAILY
Qty: 60 TABLET | Refills: 0 | Status: SHIPPED | OUTPATIENT
Start: 2020-09-02 | End: 2020-09-30 | Stop reason: SDUPTHER

## 2020-08-27 RX ORDER — LIDOCAINE 50 MG/G
OINTMENT TOPICAL
Qty: 258 G | Refills: 2 | Status: SHIPPED | OUTPATIENT
Start: 2020-08-27 | End: 2020-09-30 | Stop reason: SDUPTHER

## 2020-08-27 ASSESSMENT — ENCOUNTER SYMPTOMS
EYES NEGATIVE: 1
GASTROINTESTINAL NEGATIVE: 1
BACK PAIN: 1
RESPIRATORY NEGATIVE: 1

## 2020-08-27 NOTE — PROGRESS NOTES
HPI:   Justina Vaca is a 52 y.o. male is here today for    Chief Complaint: Lower back pain, leg pain     HPI   Video Visit. TELEHEALTH EVALUATION -- Audio/Visual (During QUYHY-66 public health emergency). This visit was completed virtually using doxy. me. Location of visit: patients home, providers office. 1 month FU. Patient asked for video visit d/t his new job as a new hire he is mandated to work and can't come in office for an appointment until mid October. Main complaint to remains pain across lower back and radiated in right lower extremity. Sharp and burning pain. Leg pain has been elevated with more spasms this week. Medications remain effective came in yesterday for UDS   Medications reviewed. Patient denies side effects with medications. Patient states he is taking medications as prescribed. Hedenies receiving pain medications from other sources. He denies any ER visits since last visit. Pain scale with out pain medications or at its worst is 8-10/10. Pain scale with pain medications or at its best is 5/10. Last dose of [place] was today  Drug screen reviewed from 1/8/2020 and was appropriate  Pill count was not completed today d/t video visit  Patient does have naloxone available at home. Patient has not required use of naloxone at home since last office visit. The patientis allergic to dilantin [phenytoin]; dupixent [dupilumab]; oxycodone; and valium [diazepam]. Past Medical History  Artur Shah  has a past medical history of Asthma and Fibromyalgia. Past Surgical History  The patient  has a past surgical history that includes back surgery and knee surgery. Family History  This patient's family history includes Asthma in his father; Emphysema in his father and mother; High Blood Pressure in his father and mother; Other in his mother. Social History  Artur Shah  reports that he has never smoked. He has never used smokeless tobacco. He reports previous alcohol use.  He reports that he does not use drugs. Medications    Current Outpatient Medications:     lidocaine (LIDODERM) 5 %, APPLY 1 TO 2 PATCHES TO SKIN DAILY. 12 HOURS ON 12 HOURS OFF, Disp: 60 patch, Rfl: 0    lidocaine (XYLOCAINE) 5 % ointment, Apply topically as needed to painful areas of lower back and neck, Disp: 258 g, Rfl: 2    HYDROcodone-acetaminophen (NORCO) 5-325 MG per tablet, Take 1 tablet by mouth every 8 hours as needed for Pain for up to 30 days. , Disp: 90 tablet, Rfl: 0    [START ON 9/2/2020] orphenadrine (NORFLEX) 100 MG extended release tablet, Take 1 tablet by mouth 2 times daily, Disp: 60 tablet, Rfl: 0    butalbital-acetaminophen-caffeine (FIORICET, ESGIC) -40 MG per tablet, take 1 tablet by mouth four times a day if needed for headache, Disp: 180 tablet, Rfl: 0    ferrous sulfate (IRON 325) 325 (65 Fe) MG tablet, Take 1 tablet by mouth daily (with breakfast), Disp: 90 tablet, Rfl: 3    predniSONE (DELTASONE) 10 MG tablet, Take 1 tablet by mouth daily 4 tablets x 3 days, 3 tablets daily x 3 days, 2 tablets daily x 3 days, 1 tablet daily x 3 days, Disp: 30 tablet, Rfl: 0    albuterol (PROVENTIL) (2.5 MG/3ML) 0.083% nebulizer solution, inhale contents of 1 vial ( 3 milliliters ) in nebulizer by mouth and INTO THE LUNGS every 6 hours if needed for wheezing, Disp: 375 mL, Rfl: 0    Multiple Vitamin (MULTIVITAMIN) TABS tablet, take 1 tablet by mouth once daily, Disp: , Rfl:     BANOPHEN 25 MG capsule, take 1 tablet by mouth every 6 hours if needed for itching, Disp: , Rfl:     amitriptyline (ELAVIL) 50 MG tablet, Take 1 tablet by mouth nightly, Disp: 90 tablet, Rfl: 1    fluticasone (FLONASE) 50 MCG/ACT nasal spray, 2 sprays by Each Nostril route daily, Disp: 3 Bottle, Rfl: 1    tiZANidine (ZANAFLEX) 2 MG tablet, Take 2 mg by mouth every 6 hours as needed, Disp: , Rfl:     INCRUSE ELLIPTA 62.5 MCG/INH AEPB, inhale 1 puff once daily, Disp: 3 each, Rfl: 1    bumetanide (BUMEX) 1 MG tablet, Take Cardiovascular: Negative. Gastrointestinal: Negative. Endocrine: Negative. Genitourinary: Negative. Musculoskeletal: Positive for arthralgias, back pain, gait problem, myalgias, neck pain and neck stiffness. Skin: Negative. Neurological: Positive for weakness and numbness. Negative for headaches. Psychiatric/Behavioral: Negative. Objective: There were no vitals filed for this visit. Physical Exam  Constitutional:       Appearance: Normal appearance. He is normal weight. HENT:      Head: Normocephalic and atraumatic. Neurological:      General: No focal deficit present. Mental Status: He is alert and oriented to person, place, and time. Psychiatric:         Mood and Affect: Mood normal.         Assessment:     1. Spondylosis of lumbar region without myelopathy or radiculopathy    2. Lumbar degenerative disc disease    3. DDD (degenerative disc disease), cervical    4. Lumbar pain    5. Cervical radiculitis    6. Cervical radiculopathy    7. Chronic pain syndrome    8. Exacerbation of chronic back pain    9. Back muscle spasm            Plan:      · OARRS reviewed. Current MED: 15.00  · Patient was offered naloxone for home. Has  · Discussed long term side effects of medications, tolerance, dependency and addiction. · Previous UDS reviewed  · UDS preformed today for compliance. · Patient told can not receive any pain medications from any other source. · No evidence of abuse, diversion or aberrant behavior.  Medications and/or procedures to improve function and quality of life- patient understanding with this and that may not be pain free   Discussed with patient about safe storage of medications at home   Discussed possible weaning of medication dosing dependent on treatment/procedure results.  Discussed with patient about risks with procedure including infection, reaction to medication, increased pain, or bleeding. · Reveiwed L-xray and C-xray in detail. · Pain remains tolerable with medications. Continue Norco 5/325 TID prn- ordered refill  · Continue Zanaflex today 2-4 mg every 6 hours prn spasms- Has pleny  · Continue Lidoderm patches   · Plan Bilateral L-facet MBB # 1 @ L3-4, L4-5, and L5-S1 for diagnostic and therapeutic relief when able to d/t new job   · Patient came in for UDS yesterday and awaiting results. In good will continue medications   · Unable to take a day off of work at this time until mid October       Meds. Prescribed:   Orders Placed This Encounter   Medications    lidocaine (LIDODERM) 5 %     Sig: APPLY 1 TO 2 PATCHES TO SKIN DAILY. 12 HOURS ON 12 HOURS OFF     Dispense:  60 patch     Refill:  0    lidocaine (XYLOCAINE) 5 % ointment     Sig: Apply topically as needed to painful areas of lower back and neck     Dispense:  258 g     Refill:  2    HYDROcodone-acetaminophen (NORCO) 5-325 MG per tablet     Sig: Take 1 tablet by mouth every 8 hours as needed for Pain for up to 30 days. Dispense:  90 tablet     Refill:  0     Reduce doses taken as pain becomes manageable    orphenadrine (NORFLEX) 100 MG extended release tablet     Sig: Take 1 tablet by mouth 2 times daily     Dispense:  60 tablet     Refill:  0       Return in about 7 weeks (around 10/15/2020), or if symptoms worsen or fail to improve, for follow up  for medications.              Electronically signed by MIRNA Rodgers CNP on8/27/2020 at 11:41 AM

## 2020-08-28 RX ORDER — ROSUVASTATIN CALCIUM 20 MG/1
TABLET, COATED ORAL
Qty: 90 TABLET | Refills: 3 | Status: SHIPPED | OUTPATIENT
Start: 2020-08-28 | End: 2021-08-19

## 2020-08-28 RX ORDER — OMEPRAZOLE 20 MG/1
CAPSULE, DELAYED RELEASE ORAL
Qty: 90 CAPSULE | Refills: 3 | Status: SHIPPED | OUTPATIENT
Start: 2020-08-28 | End: 2021-08-26

## 2020-08-31 ENCOUNTER — TELEPHONE (OUTPATIENT)
Dept: ALLERGY | Age: 50
End: 2020-08-31

## 2020-08-31 ENCOUNTER — VIRTUAL VISIT (OUTPATIENT)
Dept: ALLERGY | Age: 50
End: 2020-08-31
Payer: MEDICAID

## 2020-08-31 PROCEDURE — G8428 CUR MEDS NOT DOCUMENT: HCPCS | Performed by: NURSE PRACTITIONER

## 2020-08-31 PROCEDURE — 99213 OFFICE O/P EST LOW 20 MIN: CPT | Performed by: NURSE PRACTITIONER

## 2020-08-31 RX ORDER — AZELASTINE HYDROCHLORIDE 0.5 MG/ML
1 SOLUTION/ DROPS OPHTHALMIC 2 TIMES DAILY
Qty: 1 BOTTLE | Refills: 11 | Status: SHIPPED | OUTPATIENT
Start: 2020-08-31 | End: 2021-08-19 | Stop reason: SDUPTHER

## 2020-08-31 RX ORDER — ALBUTEROL SULFATE 2.5 MG/3ML
2.5 SOLUTION RESPIRATORY (INHALATION) EVERY 4 HOURS PRN
Qty: 375 ML | Refills: 3 | Status: SHIPPED | OUTPATIENT
Start: 2020-08-31 | End: 2021-02-05 | Stop reason: SDUPTHER

## 2020-08-31 RX ORDER — MONTELUKAST SODIUM 10 MG/1
10 TABLET ORAL NIGHTLY
Qty: 30 TABLET | Refills: 5 | Status: SHIPPED | OUTPATIENT
Start: 2020-08-31 | End: 2020-11-18 | Stop reason: SDUPTHER

## 2020-08-31 RX ORDER — TIOTROPIUM BROMIDE INHALATION SPRAY 1.56 UG/1
2 SPRAY, METERED RESPIRATORY (INHALATION) DAILY
Qty: 1 INHALER | Refills: 0
Start: 2020-08-31 | End: 2020-11-18

## 2020-08-31 RX ORDER — PREDNISONE 10 MG/1
TABLET ORAL
Qty: 30 TABLET | Refills: 0 | OUTPATIENT
Start: 2020-08-31

## 2020-08-31 RX ORDER — FLUTICASONE PROPIONATE 50 MCG
2 SPRAY, SUSPENSION (ML) NASAL DAILY
Qty: 3 BOTTLE | Refills: 11 | Status: SHIPPED | OUTPATIENT
Start: 2020-08-31 | End: 2021-11-17 | Stop reason: SDUPTHER

## 2020-08-31 RX ORDER — CETIRIZINE HYDROCHLORIDE 10 MG/1
10 TABLET ORAL DAILY
Qty: 30 TABLET | Refills: 11 | Status: SHIPPED | OUTPATIENT
Start: 2020-08-31

## 2020-08-31 RX ORDER — BUDESONIDE AND FORMOTEROL FUMARATE DIHYDRATE 160; 4.5 UG/1; UG/1
AEROSOL RESPIRATORY (INHALATION)
Qty: 1 INHALER | Refills: 5 | Status: SHIPPED | OUTPATIENT
Start: 2020-08-31 | End: 2020-11-18 | Stop reason: SDUPTHER

## 2020-08-31 RX ORDER — ALBUTEROL SULFATE 90 UG/1
2 AEROSOL, METERED RESPIRATORY (INHALATION) 4 TIMES DAILY PRN
Qty: 3 INHALER | Refills: 3 | Status: SHIPPED | OUTPATIENT
Start: 2020-08-31 | End: 2021-08-19 | Stop reason: SDUPTHER

## 2020-08-31 ASSESSMENT — ENCOUNTER SYMPTOMS
SHORTNESS OF BREATH: 0
EYE REDNESS: 1
RHINORRHEA: 1
DIARRHEA: 0
CHOKING: 0
NAUSEA: 0
COUGH: 0

## 2020-08-31 NOTE — TELEPHONE ENCOUNTER
He needs to contact Savanah Landis, he saw her today for Vv , and she changed his inhalers.  So I feel he needs to discuss symptoms with her

## 2020-08-31 NOTE — PROGRESS NOTES
[facility]    Allergy & Asthma   [address] [facility], [address]  Ph:   [phone]  Fax:[phone]    Provider:  Dr. Kristopher Bowers: No chief complaint on file. HISTORY OF PRESENT ILLNESS: ESTABLISHED PATIENT HERE FOR EVALUATION   53-year-old male virtual visit done today. Patient would like to discuss going back on allergy shots. He has not been able to tolerate Dupixent and stated he developed throat tightness and some injection site irritation related to that injection. As result his Biologics were stopped. Patient has been on Advair which is helped minimally. He also been on Incruse. Patient has not had effective control in his asthma regime related to inability to tolerate Biologics, not being successful with adherence with allergy injections due to work schedule, weight and ineffective medications to control his asthma. Patient would like to get refills on all his medications today. He denies any nausea vomiting fever today. Patient reports he has not had a pulmonary function test in quite some time and is willing to perform one if ordered. Patient has had asthma for several years. In [place] he was on Xolair without any problems or complications. When he moved to this area patient was placed on Dupixent which he has subsequently developed an allergy to. Severity of asthmatic symptoms are moderately persistent. At times patient states they do become severe and he has to have his albuterol treatment. Patient is unable to come into the office for at least 60 to 90 days due to being on work probation with a new job        Review of Systems:  Review of Systems   HENT: Positive for congestion and rhinorrhea. Eyes: Positive for redness. Respiratory: Negative for cough, choking and shortness of breath. Cardiovascular: Negative for chest pain and leg swelling. Gastrointestinal: Negative for diarrhea and nausea. take 1 tablet by mouth once daily, Disp: 90 tablet, Rfl: 3    omeprazole (PRILOSEC) 20 MG delayed release capsule, take 1 capsule by mouth once daily, Disp: 90 capsule, Rfl: 3    lidocaine (LIDODERM) 5 %, APPLY 1 TO 2 PATCHES TO SKIN DAILY. 12 HOURS ON 12 HOURS OFF, Disp: 60 patch, Rfl: 0    lidocaine (XYLOCAINE) 5 % ointment, Apply topically as needed to painful areas of lower back and neck, Disp: 258 g, Rfl: 2    HYDROcodone-acetaminophen (NORCO) 5-325 MG per tablet, Take 1 tablet by mouth every 8 hours as needed for Pain for up to 30 days. , Disp: 90 tablet, Rfl: 0    [START ON 9/2/2020] orphenadrine (NORFLEX) 100 MG extended release tablet, Take 1 tablet by mouth 2 times daily, Disp: 60 tablet, Rfl: 0    butalbital-acetaminophen-caffeine (FIORICET, ESGIC) -40 MG per tablet, take 1 tablet by mouth four times a day if needed for headache, Disp: 180 tablet, Rfl: 0    ferrous sulfate (IRON 325) 325 (65 Fe) MG tablet, Take 1 tablet by mouth daily (with breakfast), Disp: 90 tablet, Rfl: 3    Multiple Vitamin (MULTIVITAMIN) TABS tablet, take 1 tablet by mouth once daily, Disp: , Rfl:     BANOPHEN 25 MG capsule, take 1 tablet by mouth every 6 hours if needed for itching, Disp: , Rfl:     amitriptyline (ELAVIL) 50 MG tablet, Take 1 tablet by mouth nightly, Disp: 90 tablet, Rfl: 1    tiZANidine (ZANAFLEX) 2 MG tablet, Take 2 mg by mouth every 6 hours as needed, Disp: , Rfl:     bumetanide (BUMEX) 1 MG tablet, Take 0.5 tablets by mouth daily, Disp: 30 tablet, Rfl: 0    hydrocortisone valerate (WESTCORT) 0.2 % ointment, Apply topically daily. , Disp: 1 Tube, Rfl: 5    lidocaine (LMX) 4 % cream, Apply topically to back, arms and abs as needed, Disp: 120 g, Rfl: 2    EPINEPHrine (AUVI-Q) 0.3 MG/0.3ML SOAJ injection, Dispense 2 packs of 2 (total 4 devices). Use as directed, STAT for allergic reaction. , Disp: 4 each, Rfl: 2    NARCAN 4 MG/0.1ML LIQD nasal spray, , Disp: , Rfl:     Multiple Vitamins-Minerals (MULTIVITAMIN ADULTS) TABS, Take 1 tablet by mouth daily, Disp: 90 tablet, Rfl: 3    Alum & Mag Hydroxide-Simeth (MAALOX MAXIMUM STRENGTH PO), Take by mouth daily as needed, Disp: , Rfl:     levalbuterol (XOPENEX HFA) 45 MCG/ACT inhaler, Inhale 1-2 puffs into the lungs 4 times daily as needed for Wheezing, Disp: , Rfl:     ergocalciferol (ERGOCALCIFEROL) 90487 units capsule, Take 50,000 Units by mouth daily, Disp: , Rfl:     ALBUTEROL IN, Inhale into the lungs 4 times daily as needed , Disp: , Rfl:       Physical Exam:      Vitals: There were no vitals filed for this visit. I @CXRQCGVL(8)@ I  I @LZGDPDIS(6)@ I   I @WODEAR(97)@; @JHUNPZ(10)@ I   I @FLOWSTAT(9)@ I   I @FLOWSTAT(10)@ I   I   I   I Facility age limit for growth percentiles is 20 years. I     Facility age limit for growth percentiles is 20 years. Facility age limit for growth percentiles is 20 years. Facility age limit for growth percentiles is 20 years. No height and weight on file for this encounter. Physical Exam:    Physical Exam  Vitals signs and nursing note reviewed. Constitutional:       Appearance: Normal appearance. He is well-developed. He is obese. HENT:      Head: Normocephalic. Right Ear: External ear normal.      Left Ear: External ear normal.      Nose: Congestion present. Mouth/Throat:      Mouth: Mucous membranes are moist.      Pharynx: No oropharyngeal exudate. Eyes:      General: No scleral icterus. Right eye: No discharge. Left eye: No discharge. Extraocular Movements: Extraocular movements intact. Conjunctiva/sclera: Conjunctivae normal.      Pupils: Pupils are equal, round, and reactive to light. Neck:      Musculoskeletal: Normal range of motion and neck supple. Thyroid: No thyromegaly. Cardiovascular:      Rate and Rhythm: Normal rate and regular rhythm. Heart sounds: Normal heart sounds.    Pulmonary:      Effort: Pulmonary effort is normal. No respiratory distress. Breath sounds: Normal breath sounds. No wheezing or rales. Abdominal:      Palpations: Abdomen is soft. Tenderness: There is no abdominal tenderness. Musculoskeletal: Normal range of motion. General: No tenderness. Skin:     General: Skin is warm and dry. Findings: No rash. Neurological:      General: No focal deficit present. Mental Status: He is alert and oriented to person, place, and time. Mental status is at baseline. Deep Tendon Reflexes: Reflexes are normal and symmetric. Psychiatric:         Mood and Affect: Mood normal.         Behavior: Behavior normal.         Thought Content: Thought content normal.         Judgment: Judgment normal.             DATA:  Lab Review:    CBC:   Lab Results   Component Value Date    WBC 4.6 06/03/2020    RBC 4.31 06/03/2020    HGB 13.3 06/03/2020    HCT 41.6 06/03/2020    MCV 96.5 06/03/2020    MCH 30.9 06/03/2020    MCHC 32.0 06/03/2020     06/03/2020          IgE   Date/Time Value Ref Range Status   02/10/2020 09:07 AM 22 <101 IU/mL Final     Comment:     [address] [phone]      IgG   Date/Time Value Ref Range Status   02/10/2020 09:07 AM 1162 700 - 1600 mg/dL Final     Comment:     [address] [phone]     IgA   Date/Time Value Ref Range Status   02/10/2020 09:06  70 - 400 mg/dL Final     Comment:     [address] [phone]      IgM   Date/Time Value Ref Range Status   02/10/2020 09:08  40 - 230 mg/dL Final     Comment:     [address] [phone]       No results found for: CL   No results found for: RF       Results for orders placed during the hospital encounter of 02/10/20   XR SINUSES (MIN 3 VIEWS )    Narrative PROCEDURE: XR SINUSES (MIN 3 VIEWS )    CLINICAL INFORMATION: Chronic pansinusitis .     COMPARISON: appointments due to work schedule and is not able to come in to discuss. Patient does want to start back on allergy injections. Patient needs to give allergy shots time to work and be consistent with receiving allergy injections. Patient was only through his silver vial and partially into his green vial.  Typically we do not see a change in allergens until patient has reached minimally of the blue vial and more likely in the yellow or the red vial to prove beneficial.  I highly recommend patient stick with his allergy injection regime and allow at least 6 to 8 months for the allergy injections to be effective. Total time sent with patient 30 minutes with greater than 50% in patient education    (Please note that portions of this note may have been completed with a voice recognition program.  Efforts were made to edit the dictation but occasionally words are mis-transcribed.)     Patient to continue to be followed by pulmonary and at their direction will change his inhalers around     Jakob Yanes is a 52 y.o. male being evaluated by a Virtual Visit (video visit) encounter to address concerns as mentioned above. A caregiver was present when appropriate. Due to this being a TeleHealth encounter (During BYS-60 public health emergency), evaluation of the following organ systems was limited: Vitals/Constitutional/EENT/Resp/CV/GI//MS/Neuro/Skin/Heme-Lymph-Imm. Pursuant to the emergency declaration under the [facility], [address] [place] [address] authority and the [employer] General Act, this Virtual Visit was conducted with patient's (and/or legal guardian's) consent, to reduce the patient's risk of exposure to COVID-19 and provide necessary medical care.   The patient (and/or legal guardian) has also been advised to contact this office for worsening conditions or problems, and seek emergency medical treatment and/or call 911 if deemed necessary. Services were provided through a video synchronous discussion virtually to substitute for in-person clinic visit. Patient was located at their home while the provider was in their medical practice office. The patient gave verbal consent to participate in the telehealth visit. Total time of the visit was approximately 20 minutes.       --MIRNA Wilkes CNP on 8/31/2020 at 11:08 AM    An electronic signature was used to authenticate this note.    signed:  MIRNA Wilkes CNP  8/31/2020  10:53 AM

## 2020-08-31 NOTE — TELEPHONE ENCOUNTER
Patient is wanting to get his PFT done just prior to following up in 3 months/ November  (the order expires 10/31/20 so can you place a new order)    He is also wanting to know if you want to see him in person or can he do the virtual visit in November?     Please advise

## 2020-09-02 PROBLEM — Z51.6 ALLERGY DESENSITIZATION THERAPY: Status: ACTIVE | Noted: 2020-09-02

## 2020-09-02 PROBLEM — J30.1 NON-SEASONAL ALLERGIC RHINITIS DUE TO POLLEN: Status: ACTIVE | Noted: 2020-09-02

## 2020-09-02 PROCEDURE — 95165 ANTIGEN THERAPY SERVICES: CPT | Performed by: NURSE PRACTITIONER

## 2020-09-02 NOTE — PROGRESS NOTES
orders. This is documented on the injection log. Patients on build-up plan should be seen every 3 months or sooner if necessary. The injection record and serum is reviewed and documented at every injection appointment. When down to the last 1/3 of the bottle of the red 1:1 concentration the patient should be scheduled an appointment for new orders for allergy maintenance concentrations. If it is the patients preference to receive and injection at an outside medical office, is is allowed only after the patient receives the first dose from each vial at this practice. Patients requesting refills that receive injections at outside medical facilities must include the patient's demographic sheet, current insurance information and the injection records. Allow 2-3 weeks for delivery after the refill has been requested. PROTOCOL FOR LATE INJECTIONS  Days late determined from the due date of the injection    Shot Frequency 5-10 Days Late 11-16 Days Late 17-30 Days Late 31-60 Days Late 61+ Days Late   Twice Weekly Repeat last dose Reduce last dose . 2 Reduce last dose . 4 Dilute back 1 vial, start at .05 Patient must start over     Weekly Repeat last dose Reduce last dose . 2 Reduce last dose . 4 Dilute back 1 vial, start at .05 Patient must start over   Every 2 Weeks Repeat last dose Reduce last dose . 2. Reduce last dose . 4 Ask provider for instruction Ask provider for instruction   Every 3 Weeks Repeat last dose Reduce last dose . 2 Reduce last dose . 4 Ask provider for instruction Ask provider for instruction   Every 4 Weeks Repeat last dose Reduce last dose . 2 Reduce last dose . 4 Ask provider for instruction Ask provider for instruction     Patient/gaurdian made aware of potential anaphylaxis risks associated with receiving allergy injections and wishes to proceed.   SHOT REACTION TREATMENT INSTRUCTIONS    During the 30 minute wait after an allergy injection the following symptoms should be reported:    Itching other than at the injection site  Hives or swelling other than at the injection site  Redness other than at the injection site  Difficulty breathing  Chest tightness  Difficulty swallowing  Throat tightness    If these symptoms occur, NOTIFY PROVIDER and the following treatment should be administered:    1. Epinephrine 1:1000 IM - 0.3 ml if > 66 lbs or more, 0.15 ml if 33 - 63 lbs, or 0.1 ml if <33 lbs   2. Diphenhydramine - give all intramuscular:     2 to <6 years (off-label use): 6.25 mg,    6 to <12 years: 12.5 to 25 mg;    ?12 years: 25-50 mg.  3.  Famotidine:  Adults 40 mg oral    Adolescents age 12 years and >88 lbs: 40 mg    Children and Adolescents ? 12years of age: Initial: 0.25 mg/kg/dose   every 12 hours (maximum daily dose: 40 mg/day)    Epi dose may me repeated in 5-15 minutes if adequate resolution of symptoms does not occur    Patient should be observed for at least one hour after final epi dose and must be seen by provider. Patients cannot drive themselves if they have received diphenhydramine.

## 2020-09-08 ENCOUNTER — TELEPHONE (OUTPATIENT)
Dept: ENT CLINIC | Age: 50
End: 2020-09-08

## 2020-09-09 RX ORDER — PREDNISONE 20 MG/1
20 TABLET ORAL 2 TIMES DAILY
Qty: 10 TABLET | Refills: 0 | Status: SHIPPED | OUTPATIENT
Start: 2020-09-09 | End: 2020-09-14

## 2020-09-09 NOTE — PROGRESS NOTES
Patient asthmatic and complains of cough. Requesting prednisone Dosepak continuation help with his cough which is helped and was formally prescribed by PCP.   I will go ahead and approve this request however if cough does not improve he will need to follow-up for evaluation along with imaging and x-ray of his chest

## 2020-09-30 ENCOUNTER — TELEPHONE (OUTPATIENT)
Dept: PHYSICAL MEDICINE AND REHAB | Age: 50
End: 2020-09-30

## 2020-09-30 RX ORDER — ORPHENADRINE CITRATE 100 MG/1
100 TABLET, EXTENDED RELEASE ORAL 2 TIMES DAILY
Qty: 60 TABLET | Refills: 0 | Status: SHIPPED | OUTPATIENT
Start: 2020-10-02 | End: 2020-11-05 | Stop reason: SDUPTHER

## 2020-09-30 RX ORDER — HYDROCODONE BITARTRATE AND ACETAMINOPHEN 5; 325 MG/1; MG/1
1 TABLET ORAL EVERY 8 HOURS PRN
Qty: 21 TABLET | Refills: 0 | Status: SHIPPED | OUTPATIENT
Start: 2020-09-30 | End: 2020-10-12 | Stop reason: SDUPTHER

## 2020-09-30 RX ORDER — LIDOCAINE 50 MG/G
PATCH TOPICAL
Qty: 60 PATCH | Refills: 0 | Status: SHIPPED | OUTPATIENT
Start: 2020-09-30 | End: 2020-12-09

## 2020-09-30 RX ORDER — LIDOCAINE 50 MG/G
OINTMENT TOPICAL
Qty: 258 G | Refills: 2 | Status: SHIPPED | OUTPATIENT
Start: 2020-09-30 | End: 2020-11-05 | Stop reason: SDUPTHER

## 2020-09-30 NOTE — TELEPHONE ENCOUNTER
OARRS reviewed. UDS: + for  Norco consistent and Alcohol inconsistent. Narcan offered: offered  Last seen: 8/27/2020. Follow-up: 10/19/2020    Patient in today for UDS informed will only send in 7 day supply and will have to be free of alcohol to prescribe further.

## 2020-10-12 RX ORDER — LIDOCAINE 50 MG/G
OINTMENT TOPICAL
Qty: 258 G | Refills: 2 | Status: CANCELLED | OUTPATIENT
Start: 2020-10-12

## 2020-10-12 RX ORDER — HYDROCODONE BITARTRATE AND ACETAMINOPHEN 5; 325 MG/1; MG/1
1 TABLET ORAL EVERY 8 HOURS PRN
Qty: 90 TABLET | Refills: 0 | Status: SHIPPED | OUTPATIENT
Start: 2020-10-12 | End: 2020-11-05 | Stop reason: SDUPTHER

## 2020-10-12 NOTE — TELEPHONE ENCOUNTER
OARRS reviewed. UDS: + for  norhydrocodone 9/30/2020. Last seen: 8/27/2020.  Follow-up:   Future Appointments   Date Time Provider Jesus Simmons   10/19/2020 11:15 AM MIRNA Tenorio CNP [facility] [place]   10/29/2020  9:00 AM MIRNA Richards CNP [facility] ALLERGY [facility] [place]   11/16/2020  1:00 PM MIRNA Tesfaye CNP Pulm Med [facility] [place]   11/17/2020  5:30 PM [facility] [place] [facility]   12/3/2020  2:00 PM MIRNA Richards CNP [facility] ALLERGY [facility] [place]   1/18/2021  3:00 PM MIRNA Carvajal CNP Fam Medicine [facility] [place]   2/18/2021 10:20 AM MIRNA Carvalho CNP Pulm Med [facility] [place]

## 2020-10-12 NOTE — TELEPHONE ENCOUNTER
Patient reported that he came in for his drug screen and is waiting on this prescription to be refilled. He said he was supposed to hear something back and hasnt? Please advise    Bebe Cristobal called requesting a refill on the following medications:  Requested Prescriptions     Pending Prescriptions Disp Refills    HYDROcodone-acetaminophen (NORCO) 5-325 MG per tablet 21 tablet 0     Sig: Take 1 tablet by mouth every 8 hours as needed for Pain for up to 7 days.     lidocaine (XYLOCAINE) 5 % ointment 258 g 2     Sig: Apply topically as needed to painful areas of lower back and neck     Pharmacy verified:  .pv  [facility] on [address]    Date of last visit: 08/27/2020  Date of next visit (if applicable): 11/80/0773

## 2020-10-23 ENCOUNTER — VIRTUAL VISIT (OUTPATIENT)
Dept: FAMILY MEDICINE CLINIC | Age: 50
End: 2020-10-23
Payer: MEDICAID

## 2020-10-23 LAB — SARS-COV-2: NOT DETECTED

## 2020-10-23 PROCEDURE — G8427 DOCREV CUR MEDS BY ELIG CLIN: HCPCS | Performed by: NURSE PRACTITIONER

## 2020-10-23 PROCEDURE — 99213 OFFICE O/P EST LOW 20 MIN: CPT | Performed by: NURSE PRACTITIONER

## 2020-10-23 ASSESSMENT — ENCOUNTER SYMPTOMS
TROUBLE SWALLOWING: 0
SINUS PAIN: 0
SHORTNESS OF BREATH: 0
CONSTIPATION: 0
SORE THROAT: 1
ABDOMINAL PAIN: 0
VOMITING: 0
WHEEZING: 0
NAUSEA: 1
SWOLLEN GLANDS: 0
CHANGE IN BOWEL HABIT: 1
SINUS PRESSURE: 0
VISUAL CHANGE: 0
RHINORRHEA: 0
COUGH: 1

## 2020-10-23 NOTE — PATIENT INSTRUCTIONS
Patient Education        Coronavirus (JJJTI-44): Care Instructions  Overview  The coronavirus disease (COVID-19) is caused by a virus. Symptoms may include a fever, a cough, and shortness of breath. It mainly spreads person-to-person through droplets from coughing and sneezing. The virus also can spread when people are in close contact with someone who is infected. Most people have mild symptoms and can take care of themselves at home. If their symptoms get worse, they may need care in a hospital. Treatment may include medicines to reduce symptoms, plus breathing support such as oxygen therapy or a ventilator. It's important to not spread the virus to others. If you have COVID-19, wear a face cover anytime you are around other people. You need to isolate yourself while you are sick. Leave your home only if you need to get medical care or testing. Follow-up care is a key part of your treatment and safety. Be sure to make and go to all appointments, and call your doctor if you are having problems. It's also a good idea to know your test results and keep a list of the medicines you take. How can you care for yourself at home? · Get extra rest. It can help you feel better. · Drink plenty of fluids. This helps replace fluids lost from fever. Fluids also help ease a scratchy throat. Water, soup, fruit juice, and hot tea with lemon are good choices. · Take acetaminophen (such as Tylenol) to reduce a fever. It may also help with muscle aches. Read and follow all instructions on the label. · Use petroleum jelly on sore skin. This can help if the skin around your nose and lips becomes sore from rubbing a lot with tissues. Tips for self-isolation  · Limit contact with people in your home. If possible, stay in a separate bedroom and use a separate bathroom. · Wear a cloth face cover when you are around other people. It can help stop the spread of the virus when you cough or sneeze.   · If you have to leave home, avoid crowds and try to stay at least 6 feet away from other people. · Avoid contact with pets and other animals. · Cover your mouth and nose with a tissue when you cough or sneeze. Then throw it in the trash right away. · Wash your hands often, especially after you cough or sneeze. Use soap and water, and scrub for at least 20 seconds. If soap and water aren't available, use an alcohol-based hand . · Don't share personal household items. These include bedding, towels, cups and glasses, and eating utensils. · [address] in the warmest water allowed for the fabric type, and dry it completely. It's okay to wash other people's laundry with yours. · Clean and disinfect your home every day. Use household  and disinfectant wipes or sprays. Take special care to clean things that you grab with your hands. These include doorknobs, remote controls, phones, and handles on your refrigerator and microwave. And don't forget countertops, tabletops, bathrooms, and computer keyboards. When you can end self-isolation  · If you know or suspect that you have COVID-19, stay in self-isolation until:  ? You haven't had a fever for 3 days, and  ? Your symptoms have improved, and  ? It's been at least 10 days since your symptoms started. · Talk to your doctor about whether you also need testing, especially if you have a weakened immune system. When should you call for help? Call 911 anytime you think you may need emergency care. For example, call if you have life-threatening symptoms, such as:    · You have severe trouble breathing. (You can't talk at all.)     · You have constant chest pain or pressure.     · You are severely dizzy or lightheaded.     · You are confused or can't think clearly.     · Your face and lips have a blue color.     · You pass out (lose consciousness) or are very hard to wake up. Call your doctor now or seek immediate medical care if:    · You have moderate trouble breathing.  (You can't speak a full sentence.)     · You are coughing up blood (more than about 1 teaspoon).     · You have signs of low blood pressure. These include feeling lightheaded; being too weak to stand; and having cold, pale, clammy skin. Watch closely for changes in your health, and be sure to contact your doctor if:    · Your symptoms get worse.     · You are not getting better as expected. Call before you go to the doctor's office. Follow their instructions. And wear a cloth face cover. Current as of: July 10, 2020               Content Version: 12.6  © 2006-2020 [employer]. Care instructions adapted under license by [facility] [address] If you have questions about a medical condition or this instruction, always ask your healthcare professional. [place] [address] any warranty or liability for your use of this information. Patient Education        Cough: Care Instructions  Your Care Instructions     A cough is your body's response to something that bothers your throat or airways. Many things can cause a cough. You might cough because of a cold or the flu, bronchitis, or asthma. Smoking, postnasal drip, allergies, and stomach acid that backs up into your throat also can cause coughs. A cough is a symptom, not a disease. Most coughs stop when the cause, such as a cold, goes away. You can take a few steps at home to cough less and feel better. Follow-up care is a key part of your treatment and safety. Be sure to make and go to all appointments, and call your doctor if you are having problems. It's also a good idea to know your test results and keep a list of the medicines you take. How can you care for yourself at home? · Drink lots of water and other fluids. This helps thin the mucus and soothes a dry or sore throat. Honey or lemon juice in hot water or tea may ease a dry cough. · Take cough medicine as directed by your doctor.   · Prop up your head on pillows to help you breathe

## 2020-10-23 NOTE — LETTER
[facility]  [address]  [facility] [address]  Phone: [phone]  Fax: [phone]    MIRNA Leone CNP        October 23, 2020     Patient: Christianne Simmons   YOB: 1970   Date of Visit: 10/23/2020       To Whom It May Concern: It is my medical opinion that Patrice Lewis should remain out of work until 10/25/2020 due to COVID testing. If you have any questions or concerns, please don't hesitate to call.     Sincerely,          MIRNA Leone CNP

## 2020-10-23 NOTE — PROGRESS NOTES
[facility]  [address] [address]  Dept: [phone]  Dept Fax: [phone]  Loc: [phone]      10/23/2020    TELEHEALTH EVALUATION -- Audio/Visual (During AKVFN-17 public health emergency)    HPI:    Christianne Simmons (:  1970) has requested an audio/video evaluation for the following concern(s):    Pt presents via video visit for 3 day HX of Cough, yellow drainage, sore throat and Diarrhea. Pt denies any fever or chills. He has lost his sense of taste and smell. He denies any chest pain, but does have some SOB due to the cough and his asthma. 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 Pharyngitis   This is a new problem. Episode onset: 3 days. The problem occurs daily. The problem has been unchanged. Associated symptoms include a change in bowel habit, congestion, coughing, fatigue, headaches, nausea and a sore throat. Pertinent negatives include no abdominal pain, arthralgias, chest pain, chills, diaphoresis, fever, neck pain, numbness, swollen glands, urinary symptoms, vertigo, visual change, vomiting or weakness. The symptoms are aggravated by drinking. He has tried rest, sleep and acetaminophen for the symptoms. The treatment provided mild relief. Review of Systems   Constitutional: Positive for fatigue. Negative for chills, diaphoresis and fever. HENT: Positive for congestion, postnasal drip and sore throat. Negative for ear pain, rhinorrhea, sinus pressure, sinus pain and trouble swallowing. Respiratory: Positive for cough. Negative for shortness of breath and wheezing. Cardiovascular: Negative for chest pain and palpitations. Gastrointestinal: Positive for change in bowel habit and nausea. Negative for abdominal pain, constipation and vomiting. Musculoskeletal: Negative for arthralgias and neck pain.    Neurological: Positive tablet take 1 tablet by mouth four times a day if needed for headache  MIRNA Villegas CNP   ferrous sulfate (IRON 325) 325 (65 Fe) MG tablet Take 1 tablet by mouth daily (with breakfast)  MIRNA Villegas CNP   Multiple Vitamin (MULTIVITAMIN) TABS tablet take 1 tablet by mouth once daily  Historical Provider, MD   BANOPHEN 25 MG capsule take 1 tablet by mouth every 6 hours if needed for itching  Historical Provider, MD   amitriptyline (ELAVIL) 50 MG tablet Take 1 tablet by mouth nightly  MIRNA Villegas CNP   tiZANidine (ZANAFLEX) 2 MG tablet Take 2 mg by mouth every 6 hours as needed  Historical Provider, MD   bumetanide (BUMEX) 1 MG tablet Take 0.5 tablets by mouth daily  MIRNA Ortega CNP   hydrocortisone valerate (WESTCORT) 0.2 % ointment Apply topically daily. MIRNA Vanegas CNP   lidocaine (LMX) 4 % cream Apply topically to back, arms and abs as needed  MIRNA Villegas CNP   EPINEPHrine (AUVI-Q) 0.3 MG/0.3ML SOAJ injection Dispense 2 packs of 2 (total 4 devices). Use as directed, STAT for allergic reaction.   MIRNA Vanegas CNP   NARCAN 4 MG/0.1ML LIQD nasal spray   Historical Provider, MD   Multiple Vitamins-Minerals (MULTIVITAMIN ADULTS) TABS Take 1 tablet by mouth daily  MIRNA Villegas CNP   Alum & Mag Hydroxide-Simeth (MAALOX MAXIMUM STRENGTH PO) Take by mouth daily as needed  Historical Provider, MD   levalbuterol (XOPENEX HFA) 45 MCG/ACT inhaler Inhale 1-2 puffs into the lungs 4 times daily as needed for Wheezing  Historical Provider, MD   ergocalciferol (ERGOCALCIFEROL) 80307 units capsule Take 50,000 Units by mouth daily  Historical Provider, MD   ALBUTEROL IN Inhale into the lungs 4 times daily as needed   Historical Provider, MD       Social History     Tobacco Use    Smoking status: Never Smoker    Smokeless tobacco: Never Used   Substance Use Topics    Alcohol use: Not Currently    Drug use: No        Allergies   Allergen Reactions    Dilantin [Phenytoin] Anaphylaxis and Swelling    Dupixent [Dupilumab]     Oxycodone     Valium [Diazepam]    ,   Past Medical History:   Diagnosis Date    Asthma     Fibromyalgia    ,   Past Surgical History:   Procedure Laterality Date    BACK SURGERY      KNEE SURGERY      lt       PHYSICAL EXAMINATION:  [ INSTRUCTIONS:  \"[x]\" Indicates a positive item  \"[]\" Indicates a negative item  -- DELETE ALL ITEMS NOT EXAMINED]  Vital Signs: (As obtained by patient/caregiver or practitioner observation)    Blood pressure-  Heart rate-    Respiratory rate- 16 observed   Temperature-  Pulse oximetry-     Constitutional: [x] Appears well-developed and well-nourished [x] No apparent distress      [] Abnormal-   Mental status  [x] Alert and awake  [x] Oriented to person/place/time [x]Able to follow commands      Eyes:  EOM    [x]  Normal  [] Abnormal-  Sclera  [x]  Normal  [] Abnormal -         Discharge [x]  None visible  [] Abnormal -    HENT:   [x] Normocephalic, atraumatic. [] Abnormal   [x] Mouth/Throat: Mucous membranes are moist.     External Ears [x] Normal  [] Abnormal-     Neck: [x] No visualized mass     Pulmonary/Chest: [x] Respiratory effort normal.  [x] No visualized signs of difficulty breathing or respiratory distress        [] Abnormal-      Musculoskeletal:   [x] Normal gait with no signs of ataxia         [x] Normal range of motion of neck        [] Abnormal-       Neurological:        [x] No Facial Asymmetry (Cranial nerve 7 motor function) (limited exam to video visit)          [x] No gaze palsy        [] Abnormal-         Skin:        [x] No significant exanthematous lesions or discoloration noted on facial skin         [] Abnormal-            Psychiatric:       [x] Normal Affect [x] No Hallucinations        [] Abnormal-     Other pertinent observable physical exam findings-     ASSESSMENT/PLAN:  1. Sore throat  - COVID-19 Ambulatory; Future    2. Cough  - COVID-19 Ambulatory; Future    3.  Diarrhea,

## 2020-10-26 ENCOUNTER — TELEPHONE (OUTPATIENT)
Dept: FAMILY MEDICINE CLINIC | Age: 50
End: 2020-10-26

## 2020-10-30 RX ORDER — ALCOHOL 62 ML/100ML
LIQUID TOPICAL
Qty: 90 TABLET | Refills: 3 | Status: SHIPPED | OUTPATIENT
Start: 2020-10-30 | End: 2020-11-18

## 2020-11-05 ENCOUNTER — OFFICE VISIT (OUTPATIENT)
Dept: PHYSICAL MEDICINE AND REHAB | Age: 50
End: 2020-11-05
Payer: MEDICAID

## 2020-11-05 ENCOUNTER — HOSPITAL ENCOUNTER (OUTPATIENT)
Dept: GENERAL RADIOLOGY | Age: 50
Discharge: HOME | End: 2020-11-05
Payer: COMMERCIAL

## 2020-11-05 ENCOUNTER — TELEPHONE (OUTPATIENT)
Dept: PHYSICAL MEDICINE AND REHAB | Age: 50
End: 2020-11-05

## 2020-11-05 ENCOUNTER — HOSPITAL ENCOUNTER (OUTPATIENT)
Age: 50
Discharge: HOME | End: 2020-11-05
Payer: COMMERCIAL

## 2020-11-05 VITALS
BODY MASS INDEX: 41.75 KG/M2 | SYSTOLIC BLOOD PRESSURE: 140 MMHG | HEIGHT: 73 IN | TEMPERATURE: 97.1 F | DIASTOLIC BLOOD PRESSURE: 90 MMHG | WEIGHT: 315 LBS

## 2020-11-05 PROCEDURE — 71046 X-RAY EXAM CHEST 2 VIEWS: CPT

## 2020-11-05 PROCEDURE — 99213 OFFICE O/P EST LOW 20 MIN: CPT | Performed by: NURSE PRACTITIONER

## 2020-11-05 PROCEDURE — G8417 CALC BMI ABV UP PARAM F/U: HCPCS | Performed by: NURSE PRACTITIONER

## 2020-11-05 PROCEDURE — G8427 DOCREV CUR MEDS BY ELIG CLIN: HCPCS | Performed by: NURSE PRACTITIONER

## 2020-11-05 PROCEDURE — G8484 FLU IMMUNIZE NO ADMIN: HCPCS | Performed by: NURSE PRACTITIONER

## 2020-11-05 PROCEDURE — 1036F TOBACCO NON-USER: CPT | Performed by: NURSE PRACTITIONER

## 2020-11-05 RX ORDER — LIDOCAINE 50 MG/G
OINTMENT TOPICAL
Qty: 258 G | Refills: 2 | Status: SHIPPED | OUTPATIENT
Start: 2020-11-05 | End: 2021-01-13 | Stop reason: SDUPTHER

## 2020-11-05 RX ORDER — HYDROCODONE BITARTRATE AND ACETAMINOPHEN 5; 325 MG/1; MG/1
1 TABLET ORAL EVERY 8 HOURS PRN
Qty: 90 TABLET | Refills: 0 | Status: SHIPPED | OUTPATIENT
Start: 2020-11-11 | End: 2020-12-14 | Stop reason: SDUPTHER

## 2020-11-05 RX ORDER — ORPHENADRINE CITRATE 100 MG/1
100 TABLET, EXTENDED RELEASE ORAL 2 TIMES DAILY
Qty: 60 TABLET | Refills: 0 | Status: SHIPPED | OUTPATIENT
Start: 2020-11-05 | End: 2020-12-02

## 2020-11-05 RX ORDER — LIDOCAINE 40 MG/G
CREAM TOPICAL
Qty: 120 G | Refills: 2 | Status: SHIPPED | OUTPATIENT
Start: 2020-11-05 | End: 2020-11-18

## 2020-11-05 ASSESSMENT — ENCOUNTER SYMPTOMS
EYES NEGATIVE: 1
RESPIRATORY NEGATIVE: 1
BACK PAIN: 1
GASTROINTESTINAL NEGATIVE: 1

## 2020-11-05 NOTE — TELEPHONE ENCOUNTER
Yessi Kunz called - Per the pharmacy; insurance won't pay for Lidocaine cream, but they will pay for the ointment

## 2020-11-05 NOTE — PROGRESS NOTES
[facility]  [address]  Dept: [phone]  Dept Fax: [phone]: [phone]    Visit Date: 11/5/2020    Functionality Assessment/Goals Worksheet     On a scale of 0 (Does not Interfere) to 10 (Completely Interferes)     1. Which number describes how during the past week pain has interfered with       the following:  A. General Activity:  9  B. Mood: 9  C. Walking Ability:  8  D. Normal Work (Includes both work outside the home and housework):  0  E. Relations with Other People:   7  F. Sleep:   10  G. Enjoyment of Life:   9    2. Patient Prefers to Take their Pain Medications:     [x]  On a regular basis   []  Only when necessary    []  Does not take pain medications    3. What are the Patient's Goals/Expectations for Visiting Pain Management? []  Learn about my pain    []  Receive Medication   []  Physical Therapy     []  Treat Depression   []  Receive Injections    []  Treat Sleep   []  Deal with Anxiety and Stress   []  Treat Opoid Dependence/Addiction   []  Other:      HPI:   Kip Lepe is a 52 y.o. male is here today for    Chief Complaint: Lower back and leg pain     HPI   2 month FU. Continues to have pain across lower back and radiated in right lower extremity. Sharp and burning pain. States that over the last week pain has been increased d/t weather changes. Pain is worse after work. Pain remains more bothersome across lower back. Continues to have a lot of muscle spasms. Continues to work full time. Current pain medications remain effective. Discussed injections again but patient is still consider it. Medications reviewed. Patient denies side effects with medications. Patient states he is taking medications as prescribed. Hedenies receiving pain medications from other sources. He denies any ER visits since last visit.     Pain scale with out pain medications or at its worst is 10/10. Pain scale with pain medications or at its best is 5-7/10. Last dose of Norco was today  Drug screen reviewed from 9/30/2020 and was appropriate  Pill count was not completed today and patient was educated to bring medications in   Patient does have naloxone available at home. Patient has not required use of naloxone at home since last office visit. The patientis allergic to dilantin [phenytoin]; dupixent [dupilumab]; oxycodone; and valium [diazepam]. Past Medical History  Clinton Hedrick  has a past medical history of Asthma and Fibromyalgia. Past Surgical History  The patient  has a past surgical history that includes back surgery and knee surgery. Family History  This patient's family history includes Asthma in his father; Emphysema in his father and mother; High Blood Pressure in his father and mother; Other in his mother. Social History  Clinton Hedrick  reports that he has never smoked. He has never used smokeless tobacco. He reports previous alcohol use. He reports that he does not use drugs. Medications    Current Outpatient Medications:     [START ON 11/11/2020] HYDROcodone-acetaminophen (NORCO) 5-325 MG per tablet, Take 1 tablet by mouth every 8 hours as needed for Pain for up to 30 days. , Disp: 90 tablet, Rfl: 0    orphenadrine (NORFLEX) 100 MG extended release tablet, Take 1 tablet by mouth 2 times daily, Disp: 60 tablet, Rfl: 0    lidocaine (LMX) 4 % cream, Apply topically to back, arms and abs as needed, Disp: 120 g, Rfl: 2    lidocaine (XYLOCAINE) 5 % ointment, Apply topically as needed to painful areas of lower back and neck, Disp: 258 g, Rfl: 2    RA LORATADINE 10 MG tablet, take 1 tablet by mouth once daily, Disp: 90 tablet, Rfl: 3    lidocaine (LIDODERM) 5 %, APPLY 1 TO 2 PATCHES TO SKIN DAILY.  12 HOURS ON 12 HOURS OFF, Disp: 60 patch, Rfl: 0    montelukast (SINGULAIR) 10 MG tablet, Take 1 tablet by mouth nightly, Disp: 30 tablet, Rfl: 5    fluticasone (FLONASE) 50 MCG/ACT nasal spray, 2 sprays by Each Nostril route daily, Disp: 3 Bottle, Rfl: 11    azelastine (OPTIVAR) 0.05 % ophthalmic solution, Place 1 drop into both eyes 2 times daily, Disp: 1 Bottle, Rfl: 11    budesonide-formoterol (SYMBICORT) 160-4.5 MCG/ACT AERO, 2 puffs inhaled twice daily. Rinse mouth well after use., Disp: 1 Inhaler, Rfl: 5    cetirizine (ZYRTEC) 10 MG tablet, Take 1 tablet by mouth daily, Disp: 30 tablet, Rfl: 11    albuterol sulfate  (90 Base) MCG/ACT inhaler, Inhale 2 puffs into the lungs 4 times daily as needed for Wheezing, Disp: 3 Inhaler, Rfl: 3    tiotropium (SPIRIVA RESPIMAT) 1.25 MCG/ACT AERS inhaler, Inhale 2 puffs into the lungs daily, Disp: 1 Inhaler, Rfl: 0    albuterol (PROVENTIL) (2.5 MG/3ML) 0.083% nebulizer solution, Take 3 mLs by nebulization every 4 hours as needed for Wheezing, Disp: 375 mL, Rfl: 3    rosuvastatin (CRESTOR) 20 MG tablet, take 1 tablet by mouth once daily, Disp: 90 tablet, Rfl: 3    omeprazole (PRILOSEC) 20 MG delayed release capsule, take 1 capsule by mouth once daily, Disp: 90 capsule, Rfl: 3    butalbital-acetaminophen-caffeine (FIORICET, ESGIC) -40 MG per tablet, take 1 tablet by mouth four times a day if needed for headache, Disp: 180 tablet, Rfl: 0    ferrous sulfate (IRON 325) 325 (65 Fe) MG tablet, Take 1 tablet by mouth daily (with breakfast), Disp: 90 tablet, Rfl: 3    Multiple Vitamin (MULTIVITAMIN) TABS tablet, take 1 tablet by mouth once daily, Disp: , Rfl:     BANOPHEN 25 MG capsule, take 1 tablet by mouth every 6 hours if needed for itching, Disp: , Rfl:     amitriptyline (ELAVIL) 50 MG tablet, Take 1 tablet by mouth nightly, Disp: 90 tablet, Rfl: 1    bumetanide (BUMEX) 1 MG tablet, Take 0.5 tablets by mouth daily, Disp: 30 tablet, Rfl: 0    hydrocortisone valerate (WESTCORT) 0.2 % ointment, Apply topically daily. , Disp: 1 Tube, Rfl: 5    EPINEPHrine (AUVI-Q) 0.3 MG/0.3ML SOAJ injection, Dispense 2 packs of 2 (total 4 devices). Use as directed, STAT for allergic reaction. , Disp: 4 each, Rfl: 2    NARCAN 4 MG/0.1ML LIQD nasal spray, , Disp: , Rfl:     Multiple Vitamins-Minerals (MULTIVITAMIN ADULTS) TABS, Take 1 tablet by mouth daily, Disp: 90 tablet, Rfl: 3    Alum & Mag Hydroxide-Simeth (MAALOX MAXIMUM STRENGTH PO), Take by mouth daily as needed, Disp: , Rfl:     levalbuterol (XOPENEX HFA) 45 MCG/ACT inhaler, Inhale 1-2 puffs into the lungs 4 times daily as needed for Wheezing, Disp: , Rfl:     ergocalciferol (ERGOCALCIFEROL) 57170 units capsule, Take 50,000 Units by mouth daily, Disp: , Rfl:     ALBUTEROL IN, Inhale into the lungs 4 times daily as needed , Disp: , Rfl:     Subjective:      Review of Systems   Constitutional: Positive for activity change. HENT: Negative. Eyes: Negative. Respiratory: Negative. Cardiovascular: Positive for leg swelling. Gastrointestinal: Negative. Endocrine: Negative. Genitourinary: Negative. Musculoskeletal: Positive for arthralgias, back pain, gait problem, myalgias, neck pain and neck stiffness. Skin: Negative. Neurological: Positive for weakness and numbness. Negative for headaches. Psychiatric/Behavioral: Negative. Objective:     Vitals:    11/05/20 0725 11/05/20 0730   BP: (!) 140/90 (!) 140/90   Temp: 97.1 °F (36.2 °C)    Weight: (!) 428 lb (194.1 kg)    Height: 6' 1\" (1.854 m)        Physical Exam  Vitals signs and nursing note reviewed. Constitutional:       General: He is not in acute distress. Appearance: He is obese. He is not diaphoretic. Comments: Obese   HENT:      Head: Normocephalic and atraumatic. Right Ear: External ear normal.      Left Ear: External ear normal.      Nose: Nose normal.      Mouth/Throat:      Mouth: Mucous membranes are moist.      Pharynx: No oropharyngeal exudate. Eyes:      General: No scleral icterus. Right eye: No discharge. Left eye: No discharge. Conjunctiva/sclera: Conjunctivae normal.      Pupils: Pupils are equal, round, and reactive to light. Neck:      Musculoskeletal: Full passive range of motion without pain, normal range of motion and neck supple. Normal range of motion. No edema, erythema, neck rigidity or muscular tenderness. Thyroid: No thyromegaly. Cardiovascular:      Rate and Rhythm: Normal rate and regular rhythm. Heart sounds: Normal heart sounds. No murmur. No friction rub. No gallop. Pulmonary:      Effort: Pulmonary effort is normal. No respiratory distress. Breath sounds: Normal breath sounds. No wheezing or rales. Chest:      Chest wall: No tenderness. Abdominal:      General: Bowel sounds are normal. There is no distension. Palpations: Abdomen is soft. Tenderness: There is no abdominal tenderness. There is no guarding or rebound. Musculoskeletal:         General: Tenderness present. Right knee: Tenderness found. Left knee: Tenderness found. Right ankle: He exhibits swelling. Left ankle: He exhibits swelling. Cervical back: He exhibits decreased range of motion, tenderness, pain and spasm. Thoracic back: He exhibits tenderness and spasm. Lumbar back: He exhibits decreased range of motion, tenderness and pain. Back:       Right lower leg: He exhibits swelling. Edema present. Left lower leg: He exhibits swelling. Edema present. Skin:     General: Skin is warm. Coloration: Skin is not pale. Findings: No erythema or rash. Neurological:      General: No focal deficit present. Mental Status: He is alert and oriented to person, place, and time. He is not disoriented. Cranial Nerves: No cranial nerve deficit. Sensory: No sensory deficit. Motor: No atrophy or abnormal muscle tone. Coordination: Coordination normal.      Gait: Gait normal.      Deep Tendon Reflexes: Babinski sign absent on the right side.  Babinski sign addiction. · Previous UDS reviewed  · UDS preformed today for compliance. · Patient told can not receive any pain medications from any other source. · No evidence of abuse, diversion or aberrant behavior.  Medications and/or procedures to improve function and quality of life- patient understanding with this and that may not be pain free   Discussed with patient about safe storage of medications at home   Discussed possible weaning of medication dosing dependent on treatment/procedure results.  Discussed with patient about risks with procedure including infection, reaction to medication, increased pain, or bleeding. · Reveiwed L-xray and C-xray  Again in detail.   · Pain remains tolerable with medications. Continue Norco 5/325 TID prn- ordered refill  · Continue Norflex   · Continue Lidoderm patches   · Discussed Bilateral L-facet MBB # 1 @ L3-4, L4-5, and L5-S1, discussed carpel tunnel injections, LESI. Patient wants to wait on injections at this time  · Ordered another UDS. Meds. Prescribed:   Orders Placed This Encounter   Medications    HYDROcodone-acetaminophen (NORCO) 5-325 MG per tablet     Sig: Take 1 tablet by mouth every 8 hours as needed for Pain for up to 30 days. Dispense:  90 tablet     Refill:  0     Reduce doses taken as pain becomes manageable    orphenadrine (NORFLEX) 100 MG extended release tablet     Sig: Take 1 tablet by mouth 2 times daily     Dispense:  60 tablet     Refill:  0    lidocaine (LMX) 4 % cream     Sig: Apply topically to back, arms and abs as needed     Dispense:  120 g     Refill:  2    lidocaine (XYLOCAINE) 5 % ointment     Sig: Apply topically as needed to painful areas of lower back and neck     Dispense:  258 g     Refill:  2       Return in about 2 months (around 1/5/2021), or if symptoms worsen or fail to improve, for follow up  for medications.          Electronically signed by MIRNA Park CNP on11/5/2020 at 7:46 AM

## 2020-11-16 ENCOUNTER — TELEPHONE (OUTPATIENT)
Dept: ALLERGY | Age: 50
End: 2020-11-16

## 2020-11-16 NOTE — TELEPHONE ENCOUNTER
Patient returned phone call and is scheduled for a PFT 12/10/20; the appointment 12/03/20 has been cancelled. The patient received the appointment date and time of 11/18/20.

## 2020-11-16 NOTE — TELEPHONE ENCOUNTER
Patient is calling regarding getting his PFT rescheduled. The order he has expires on 11/30/20 and they are scheduling in December. Please place a new order.     Thanks

## 2020-11-18 ENCOUNTER — OFFICE VISIT (OUTPATIENT)
Dept: ALLERGY | Age: 50
End: 2020-11-18
Payer: MEDICAID

## 2020-11-18 VITALS
SYSTOLIC BLOOD PRESSURE: 142 MMHG | WEIGHT: 315 LBS | TEMPERATURE: 96.1 F | RESPIRATION RATE: 18 BRPM | BODY MASS INDEX: 55.94 KG/M2 | HEART RATE: 88 BPM | DIASTOLIC BLOOD PRESSURE: 90 MMHG

## 2020-11-18 PROCEDURE — 99214 OFFICE O/P EST MOD 30 MIN: CPT | Performed by: NURSE PRACTITIONER

## 2020-11-18 PROCEDURE — G8427 DOCREV CUR MEDS BY ELIG CLIN: HCPCS | Performed by: NURSE PRACTITIONER

## 2020-11-18 PROCEDURE — S8096 PORTABLE PEAK FLOW METER: HCPCS | Performed by: NURSE PRACTITIONER

## 2020-11-18 PROCEDURE — G8484 FLU IMMUNIZE NO ADMIN: HCPCS | Performed by: NURSE PRACTITIONER

## 2020-11-18 PROCEDURE — 1036F TOBACCO NON-USER: CPT | Performed by: NURSE PRACTITIONER

## 2020-11-18 PROCEDURE — G8417 CALC BMI ABV UP PARAM F/U: HCPCS | Performed by: NURSE PRACTITIONER

## 2020-11-18 RX ORDER — DIPHENHYDRAMINE HCL 25 MG
25 CAPSULE ORAL EVERY 6 HOURS PRN
COMMUNITY
End: 2020-11-18 | Stop reason: SDUPTHER

## 2020-11-18 RX ORDER — TIOTROPIUM BROMIDE INHALATION SPRAY 1.56 UG/1
2 SPRAY, METERED RESPIRATORY (INHALATION) DAILY
Qty: 1 INHALER | Refills: 1 | Status: SHIPPED | OUTPATIENT
Start: 2020-11-18 | End: 2022-10-04 | Stop reason: SINTOL

## 2020-11-18 RX ORDER — HYDROCORTISONE VALERATE 2 MG/G
OINTMENT TOPICAL
Qty: 1 TUBE | Refills: 11 | Status: SHIPPED | OUTPATIENT
Start: 2020-11-18 | End: 2021-05-20 | Stop reason: SDUPTHER

## 2020-11-18 RX ORDER — DIPHENHYDRAMINE HCL 25 MG
25 CAPSULE ORAL EVERY 6 HOURS PRN
Qty: 60 CAPSULE | Refills: 5 | Status: SHIPPED | OUTPATIENT
Start: 2020-11-18 | End: 2021-11-02 | Stop reason: SDUPTHER

## 2020-11-18 RX ORDER — BUDESONIDE AND FORMOTEROL FUMARATE DIHYDRATE 160; 4.5 UG/1; UG/1
AEROSOL RESPIRATORY (INHALATION)
Qty: 1 INHALER | Refills: 5 | Status: SHIPPED | OUTPATIENT
Start: 2020-11-18 | End: 2022-02-17 | Stop reason: SDUPTHER

## 2020-11-18 RX ORDER — BUDESONIDE AND FORMOTEROL FUMARATE DIHYDRATE 160; 4.5 UG/1; UG/1
AEROSOL RESPIRATORY (INHALATION)
Qty: 1 INHALER | Refills: 5 | Status: SHIPPED | OUTPATIENT
Start: 2020-11-18 | End: 2020-11-18

## 2020-11-18 RX ORDER — MONTELUKAST SODIUM 10 MG/1
10 TABLET ORAL NIGHTLY
Qty: 30 TABLET | Refills: 5 | Status: SHIPPED | OUTPATIENT
Start: 2020-11-18 | End: 2021-11-17 | Stop reason: SDUPTHER

## 2020-11-18 ASSESSMENT — ENCOUNTER SYMPTOMS
EYE ITCHING: 1
COUGH: 1
RHINORRHEA: 1
WHEEZING: 1

## 2020-11-18 NOTE — PROGRESS NOTES
[facility]    Allergy & Asthma   [address] [facility], [address]  Ph:   [phone]  Fax:[phone]    Provider:  Dr. Maru Bose:   Chief Complaint   Patient presents with    Immunotherapy     Patient is here for 8 month follow up and injection from 10/29/20           HISTORY OF PRESENT ILLNESS: ESTABLISHED PATIENT HERE FOR EVALUATION   72-year-old male here today for asthma. Patient has been somewhat noncompliant in his care due to his \"work\". Patient was seen in this clinic for asthma and allergies. He states that his asthma has been poorly controlled. He has been requested to get a PFT which she has not been able to get yet. Patient denies any nausea, vomiting, fever. He states his asthma is severe. His last pulmonary function test showed a difference in his FEV1 FVC of a difference of 21%. Patient complains of shortness of breath frequently and uses his albuterol inhaler. He states he is compliant with his medications although he does not hear to his medical appointments and allergy immunotherapy injections. Onset of asthma has been his whole life. Patient states that pollens and allergens make it worse. He becomes short of breath with activity. He also states that wearing the mask due to Covid is not helped. Patient works at [employer]. Review of Systems:  Review of Systems   HENT: Positive for congestion, postnasal drip, rhinorrhea and sneezing. Eyes: Positive for itching. Respiratory: Positive for cough and wheezing. Neurological: Positive for headaches. All other systems reviewed and are negative.         Past MedicalHistory:    Past Medical History:   Diagnosis Date    Asthma     Fibromyalgia        Past Surgical History:  Past Surgical History:   Procedure Laterality Date    BACK SURGERY      KNEE SURGERY      lt       Family History:   Family History   Problem Relation Age of Onset    High Blood Pressure Mother  Emphysema Mother     Other Mother         she was hit and killed by car    High Blood Pressure Father     Emphysema Father     Asthma Father        Social History:   Social History     Tobacco Use    Smoking status: Never Smoker    Smokeless tobacco: Never Used   Substance Use Topics    Alcohol use: Not Currently        Allergies:  Dilantin [phenytoin]; Brea Rupa [dupilumab]; Oxycodone; and Valium [diazepam]    CurrentMedications:     Current Outpatient Medications:     budesonide-formoterol (SYMBICORT) 160-4.5 MCG/ACT AERO, 2 puffs inhaled twice daily. Rinse mouth well after use., Disp: 1 Inhaler, Rfl: 5    montelukast (SINGULAIR) 10 MG tablet, Take 1 tablet by mouth nightly, Disp: 30 tablet, Rfl: 5    hydrocortisone valerate (WESTCORT) 0.2 % ointment, Apply topically daily. , Disp: 1 Tube, Rfl: 11    diphenhydrAMINE (BENADRYL) 25 MG capsule, Take 1 capsule by mouth every 6 hours as needed for Itching, Disp: 60 capsule, Rfl: 5    HYDROcodone-acetaminophen (NORCO) 5-325 MG per tablet, Take 1 tablet by mouth every 8 hours as needed for Pain for up to 30 days. , Disp: 90 tablet, Rfl: 0    orphenadrine (NORFLEX) 100 MG extended release tablet, Take 1 tablet by mouth 2 times daily, Disp: 60 tablet, Rfl: 0    lidocaine (XYLOCAINE) 5 % ointment, Apply topically as needed to painful areas of lower back and neck, Disp: 258 g, Rfl: 2    lidocaine (LIDODERM) 5 %, APPLY 1 TO 2 PATCHES TO SKIN DAILY.  12 HOURS ON 12 HOURS OFF, Disp: 60 patch, Rfl: 0    fluticasone (FLONASE) 50 MCG/ACT nasal spray, 2 sprays by Each Nostril route daily, Disp: 3 Bottle, Rfl: 11    azelastine (OPTIVAR) 0.05 % ophthalmic solution, Place 1 drop into both eyes 2 times daily, Disp: 1 Bottle, Rfl: 11    cetirizine (ZYRTEC) 10 MG tablet, Take 1 tablet by mouth daily, Disp: 30 tablet, Rfl: 11    albuterol sulfate  (90 Base) MCG/ACT inhaler, Inhale 2 puffs into the lungs 4 times daily as needed for Wheezing, Disp: 3 Inhaler, Rfl: 3    albuterol (PROVENTIL) (2.5 MG/3ML) 0.083% nebulizer solution, Take 3 mLs by nebulization every 4 hours as needed for Wheezing, Disp: 375 mL, Rfl: 3    rosuvastatin (CRESTOR) 20 MG tablet, take 1 tablet by mouth once daily, Disp: 90 tablet, Rfl: 3    omeprazole (PRILOSEC) 20 MG delayed release capsule, take 1 capsule by mouth once daily, Disp: 90 capsule, Rfl: 3    butalbital-acetaminophen-caffeine (FIORICET, ESGIC) -40 MG per tablet, take 1 tablet by mouth four times a day if needed for headache, Disp: 180 tablet, Rfl: 0    ferrous sulfate (IRON 325) 325 (65 Fe) MG tablet, Take 1 tablet by mouth daily (with breakfast), Disp: 90 tablet, Rfl: 3    bumetanide (BUMEX) 1 MG tablet, Take 0.5 tablets by mouth daily, Disp: 30 tablet, Rfl: 0    EPINEPHrine (AUVI-Q) 0.3 MG/0.3ML SOAJ injection, Dispense 2 packs of 2 (total 4 devices). Use as directed, STAT for allergic reaction. , Disp: 4 each, Rfl: 2    NARCAN 4 MG/0.1ML LIQD nasal spray, , Disp: , Rfl:     Multiple Vitamins-Minerals (MULTIVITAMIN ADULTS) TABS, Take 1 tablet by mouth daily, Disp: 90 tablet, Rfl: 3    ergocalciferol (ERGOCALCIFEROL) 33723 units capsule, Take 50,000 Units by mouth daily, Disp: , Rfl:     ALBUTEROL IN, Inhale into the lungs 4 times daily as needed , Disp: , Rfl:       Physical Exam:      Vitals:    Vitals:    11/18/20 0855   BP: (!) 142/90   Pulse: 88   Resp: 18   Temp: 96.1 °F (35.6 °C)       (!) 424 lb (192.3 kg)       Temp: 96.1 °F (35.6 °C) I @FLOWSTAT(6)@ IPulse: 88 I @FLOWSTAT(8)@ I BP: (!) 142/90 I @ZKFXJQ(36)@; @XBVMSI(47)@ I Resp: 18 I @FLOWSTAT(9)@ I   I @FLOWSTAT(10)@ I   I   I   I Facility age limit for growth percentiles is 20 years. I     Facility age limit for growth percentiles is 20 years. Facility age limit for growth percentiles is 20 years. Facility age limit for growth percentiles is 20 years. Facility age limit for growth percentiles is 20 years.     Physical Exam:    Physical Exam  Vitals signs and nursing note reviewed. Constitutional:       Appearance: Normal appearance. He is well-developed. HENT:      Head: Normocephalic. Right Ear: Tympanic membrane, ear canal and external ear normal.      Left Ear: Tympanic membrane, ear canal and external ear normal.      Nose: Nose normal.      Mouth/Throat:      Mouth: Mucous membranes are moist.      Pharynx: No oropharyngeal exudate. Eyes:      General: No scleral icterus. Right eye: No discharge. Left eye: No discharge. Extraocular Movements: Extraocular movements intact. Conjunctiva/sclera: Conjunctivae normal.      Pupils: Pupils are equal, round, and reactive to light. Neck:      Musculoskeletal: Normal range of motion and neck supple. Thyroid: No thyromegaly. Cardiovascular:      Rate and Rhythm: Normal rate and regular rhythm. Pulses: Normal pulses. Heart sounds: Normal heart sounds. Pulmonary:      Effort: Pulmonary effort is normal. No respiratory distress. Breath sounds: Normal breath sounds. No wheezing or rales. Comments: Breath sounds diminished at bases and middle lobes. Patient's body size very large and difficult to hear. No wheezing or rhonchi auscultated  Abdominal:      Palpations: Abdomen is soft. Tenderness: There is no abdominal tenderness. Musculoskeletal: Normal range of motion. General: No tenderness. Skin:     General: Skin is warm and dry. Findings: No rash. Neurological:      General: No focal deficit present. Mental Status: He is alert and oriented to person, place, and time. Mental status is at baseline. Deep Tendon Reflexes: Reflexes are normal and symmetric. Psychiatric:         Behavior: Behavior normal.         Thought Content:  Thought content normal.         Judgment: Judgment normal.       Act score 11    DATA:  Lab Review:    CBC:   Lab Results   Component Value Date    WBC 4.6 06/03/2020    RBC 4.31 06/03/2020    HGB 13.3 06/03/2020    HCT 41.6 06/03/2020    MCV 96.5 06/03/2020    MCH 30.9 06/03/2020    MCHC 32.0 06/03/2020     06/03/2020          IgE   Date/Time Value Ref Range Status   02/10/2020 09:07 AM 22 <101 IU/mL Final     Comment:     [address], [address] [place] [address] [phone]      IgG   Date/Time Value Ref Range Status   02/10/2020 09:07 AM 1162 700 - 1600 mg/dL Final     Comment:     [address] [phone]     IgA   Date/Time Value Ref Range Status   02/10/2020 09:06  70 - 400 mg/dL Final     Comment:     [address], [address] [place] [address] [phone]      IgM   Date/Time Value Ref Range Status   02/10/2020 09:08  40 - 230 mg/dL Final     Comment:     [address] [phone]       No results found for: CL   No results found for: RF       Results for orders placed during the hospital encounter of 02/10/20   XR SINUSES (MIN 3 VIEWS )    Narrative PROCEDURE: XR SINUSES (MIN 3 VIEWS )    CLINICAL INFORMATION: Chronic pansinusitis . COMPARISON: No prior study. TECHNIQUE: 4 projections including a Dolores's view Whaley' view submentovertex view and AP projection    FINDINGS: The paranasal sinuses are normally developed and pneumatized. There is no air-fluid levels identified in the bone destruction. Orbital rims are intact. Missing dentition which appears to be in the left side based on the submentovertex view. Impression Unremarkable paranasal sinuses        **This report has been created using voice recognition software. It may contain minor errors which are inherent in voice recognition technology. **    Final report electronically signed by Dr. Milli Gonzalez on 2/10/2020 3:39 PM      No results found for this or any previous visit. PROCEDURES:        Skin Testing performed on:    Assessment/Orders:    Diagnosis Orders   1.  Severe persistent asthma, unspecified whether complicated         Plan:  Follow Up:7 weeks    Patient given Spiriva samples x2 months and instructed how to use. Patient was given Spiriva samples based on act score of 11. Patient has not completed his pulmonary function test.  He states he is scheduled to have one in December we will see back at that time  Patient given samples of Spiriva. He states that he still he is not controlled on his Symbicort. Using his nebulizer and rescue inhaler. He states that he gets up 4 more nights per week related to his asthma  He uses his rescue inhaler 3 or more times per day  He states his asthma is poorly controlled  He also states that he has had shortness of breath 3-6 times a week      Spent 25 minutes of face-to-face time with the patient with well more than half of the visit being dedicated to the discussion of the various symptom problems, provided education of medications and disease process, as well as discussion of a therapeutic plan for each.     (Please note that portions of this note may have been completed with a voice recognition program.  Efforts were made to edit the dictation but occasionally words are mis-transcribed.)       Peak flow given to patient as patient states he no longer has 1  Green 455 and above  Yellow to [phone]  Red zone less than 228    Signed:  MIRNA West CNP  11/18/2020  9:45 AM

## 2020-12-02 ENCOUNTER — TELEPHONE (OUTPATIENT)
Dept: ENT CLINIC | Age: 50
End: 2020-12-02

## 2020-12-02 RX ORDER — ORPHENADRINE CITRATE 100 MG/1
100 TABLET, EXTENDED RELEASE ORAL 2 TIMES DAILY
Qty: 60 TABLET | Refills: 0 | Status: SHIPPED | OUTPATIENT
Start: 2020-12-05 | End: 2021-01-11

## 2020-12-02 RX ORDER — BENZONATATE 100 MG/1
200 CAPSULE ORAL 3 TIMES DAILY PRN
Qty: 60 CAPSULE | Refills: 0 | Status: SHIPPED | OUTPATIENT
Start: 2020-12-02 | End: 2020-12-12

## 2020-12-02 NOTE — TELEPHONE ENCOUNTER
Patient called in asking for a prescription for cough medicine. Patient states he has had a cough for about a month. Patient has tried over the counter cough medicine with no improvement. Patient uses [facility] on [address] here [place] for [place] pharmacy. Please advise.

## 2020-12-02 NOTE — TELEPHONE ENCOUNTER
Patient is chronically complained of cough. Has asthma. We will go ahead and call patient in some Tessalon Perles for his cough. I am also going to get schedule patient a CT scan of the chest due to his asthma and chronic shortness of breath along with cough. Please notify patient that he needs to keep his appointment for his pulmonary function test and complete the CT of his chest.  I will see him back in January to discuss findings.   He needs to keep all these appointments

## 2020-12-02 NOTE — TELEPHONE ENCOUNTER
OARRS reviewed. UDS: + for  Hydrocodone, -consistent.      Last seen: 11/5/2020    Follow-up: 1/7/2021

## 2020-12-03 ENCOUNTER — HOSPITAL ENCOUNTER (OUTPATIENT)
Age: 50
Discharge: HOME | End: 2020-12-03
Payer: COMMERCIAL

## 2020-12-03 PROCEDURE — U0003 INFECTIOUS AGENT DETECTION BY NUCLEIC ACID (DNA OR RNA); SEVERE ACUTE RESPIRATORY SYNDROME CORONAVIRUS 2 (SARS-COV-2) (CORONAVIRUS DISEASE [COVID-19]), AMPLIFIED PROBE TECHNIQUE, MAKING USE OF HIGH THROUGHPUT TECHNOLOGIES AS DESCRIBED BY CMS-2020-01-R: HCPCS

## 2020-12-03 NOTE — TELEPHONE ENCOUNTER
Spoke with Go and she said I am also going to get schedule patient a CT scan of the chest due to his asthma and chronic shortness of breath along with cough. This would be a more in depth and diagnostic image of his chest than the xray. Attempted to call patient. Got voicemail, left message to call the office.

## 2020-12-03 NOTE — TELEPHONE ENCOUNTER
The patient called the office back and I informed him that Davin Duarte sent Prosper Kidney to the pharmacy. The patient is informed that Davin Duarte would like him to have a chest ct scan completed and I provided him with Giovanna's explanation. The patient stated that he had a chest x ray completed 11/05/20 and wants additional reasoning as to why he should have a chest ct scan.

## 2020-12-05 LAB — SARS-COV-2: NOT DETECTED

## 2020-12-07 ENCOUNTER — TELEPHONE (OUTPATIENT)
Dept: PHYSICAL MEDICINE AND REHAB | Age: 50
End: 2020-12-07

## 2020-12-07 NOTE — TELEPHONE ENCOUNTER
Left message to make appointment with office to discuss pain. According to Herber's last note it does not state he is taking extra Norco. He has also been offered procedures to control pain and has refused at this time. Medications will not be filled early.  Patient can make appointment to see Annie Hinojosa and discuss

## 2020-12-08 NOTE — TELEPHONE ENCOUNTER
[facility] that he received a vm from our office; the patient was informed that a telephone note was not listed in his chart from this office. The patient has an appointment tomorrow for his allergy shot.

## 2020-12-09 RX ORDER — LIDOCAINE 50 MG/G
PATCH TOPICAL
Qty: 60 PATCH | Refills: 0 | Status: SHIPPED | OUTPATIENT
Start: 2020-12-09 | End: 2021-01-13 | Stop reason: SDUPTHER

## 2020-12-09 NOTE — TELEPHONE ENCOUNTER
OARRS reviewed. UDS: + for  Hydrocodone -consistent.      Last seen: 11/5/2020    Follow-up: 1/20/2021

## 2020-12-10 ENCOUNTER — TELEPHONE (OUTPATIENT)
Dept: ENT CLINIC | Age: 50
End: 2020-12-10

## 2020-12-10 ENCOUNTER — NURSE ONLY (OUTPATIENT)
Dept: ALLERGY | Age: 50
End: 2020-12-10
Payer: MEDICAID

## 2020-12-10 VITALS
TEMPERATURE: 97.6 F | DIASTOLIC BLOOD PRESSURE: 82 MMHG | HEART RATE: 80 BPM | BODY MASS INDEX: 54.49 KG/M2 | WEIGHT: 315 LBS | RESPIRATION RATE: 16 BRPM | SYSTOLIC BLOOD PRESSURE: 132 MMHG

## 2020-12-10 PROCEDURE — 95117 IMMUNOTHERAPY INJECTIONS: CPT | Performed by: NURSE PRACTITIONER

## 2020-12-10 RX ORDER — ALCOHOL 62 ML/100ML
LIQUID TOPICAL
COMMUNITY
Start: 2020-11-27 | End: 2021-11-28

## 2020-12-10 NOTE — TELEPHONE ENCOUNTER
Tell the patient this is the third one I am ordering.   He needs to get this one or else I will need to refer him to pulmonary

## 2020-12-14 RX ORDER — HYDROCODONE BITARTRATE AND ACETAMINOPHEN 5; 325 MG/1; MG/1
1 TABLET ORAL EVERY 8 HOURS PRN
Qty: 90 TABLET | Refills: 0 | Status: CANCELLED | OUTPATIENT
Start: 2020-12-14 | End: 2021-01-13

## 2020-12-14 RX ORDER — HYDROCODONE BITARTRATE AND ACETAMINOPHEN 5; 325 MG/1; MG/1
1 TABLET ORAL EVERY 8 HOURS PRN
Qty: 90 TABLET | Refills: 0 | Status: SHIPPED | OUTPATIENT
Start: 2020-12-14 | End: 2021-01-13 | Stop reason: SDUPTHER

## 2020-12-14 NOTE — TELEPHONE ENCOUNTER
OARRS reviewed. UDS: + for hydrocodone consistent. Last seen: 11/5/2020.  Follow-up:   Future Appointments   Date Time Provider Jesus Simmons   12/16/2020  8:00 AM STR CT IMAGING RM1  [facility] OUT EXP STR Radiolog   12/16/2020  9:00 AM SCHEDULE, [facility] ALLERGY [facility]   12/23/2020  9:20 AM SCHEDULE, [facility] ALLERGY LAB [facility]   1/6/2021  9:00 AM SCHEDULE, [facility] ALLERGY LAB N [facility] ALLER [facility]   1/7/2021  8:00 AM MIRNA Rivas CNP N [facility] Pain [facility]   1/14/2021  9:00 AM MIRNA Wellington CNP N [facility] ALLER [facility]   1/18/2021  3:00 PM MIRNA Corona CNP Fam Medicine [facility]   1/20/2021  9:45 AM MIRNA Rivas CNP N [facility] Pain [facility]   2/18/2021 10:20 AM MIRNA White CNP [facility] [address] [facility]

## 2020-12-14 NOTE — TELEPHONE ENCOUNTER
Patient called back to check on his prescription. He said that he had called in last week to request this, I am only seeing the request on 12/01/2020 that was denied due to request to early. Patient stated he has an appointment next month. I see that he is scheduled for 01/07/2020. He stated that he isn't able to the injections yet because he is not able to take that much time off of work right now. Please call patient to advise about his medication if it is not able to be refilled. Jonelle Resendiz called requesting a refill on the following medications:  Requested Prescriptions     Pending Prescriptions Disp Refills    HYDROcodone-acetaminophen (NORCO) 5-325 MG per tablet 90 tablet 0     Sig: Take 1 tablet by mouth every 8 hours as needed for Pain for up to 30 days.      Pharmacy verified:  .pv  [facility] on [address]    Date of last visit: 11/05/2020  Date of next visit (if applicable): 22/51/1100

## 2020-12-22 RX ORDER — AMITRIPTYLINE HYDROCHLORIDE 50 MG/1
TABLET, FILM COATED ORAL
Qty: 90 TABLET | Refills: 1 | Status: SHIPPED | OUTPATIENT
Start: 2020-12-22 | End: 2021-06-21

## 2020-12-22 NOTE — TELEPHONE ENCOUNTER
EP refill request from [facility]  for refills on amitriptyline  Last seen 10/23/20  Next appt 1/18/21  Order pending

## 2020-12-23 ENCOUNTER — TELEPHONE (OUTPATIENT)
Dept: PHYSICAL MEDICINE AND REHAB | Age: 50
End: 2020-12-23

## 2020-12-23 NOTE — TELEPHONE ENCOUNTER
Toanwilder Clark will be out of the office on 01/07/2021. Called patient to reschedule his appointment. No answer. Left a voice message requesting a call back.

## 2020-12-30 NOTE — TELEPHONE ENCOUNTER
The patient called the [address] [facility] office to verify his next appointments- allergy injection appointment, 1/6/21 and PFT, 1/13/21.

## 2021-01-06 DIAGNOSIS — Z98.1 S/P CERVICAL SPINAL FUSION: ICD-10-CM

## 2021-01-07 ENCOUNTER — HOSPITAL ENCOUNTER (OUTPATIENT)
Age: 51
Discharge: HOME | End: 2021-01-07
Payer: COMMERCIAL

## 2021-01-07 ENCOUNTER — NURSE ONLY (OUTPATIENT)
Dept: ALLERGY | Age: 51
End: 2021-01-07
Payer: COMMERCIAL

## 2021-01-07 VITALS
WEIGHT: 315 LBS | TEMPERATURE: 97.7 F | SYSTOLIC BLOOD PRESSURE: 138 MMHG | BODY MASS INDEX: 55.15 KG/M2 | HEART RATE: 88 BPM | DIASTOLIC BLOOD PRESSURE: 84 MMHG | RESPIRATION RATE: 16 BRPM

## 2021-01-07 DIAGNOSIS — Z51.6 ALLERGY DESENSITIZATION THERAPY: ICD-10-CM

## 2021-01-07 DIAGNOSIS — G89.4 CHRONIC PAIN SYNDROME: ICD-10-CM

## 2021-01-07 DIAGNOSIS — J30.1 ALLERGY TO TREES: ICD-10-CM

## 2021-01-07 DIAGNOSIS — Z91.048 ALLERGY TO MOLD: ICD-10-CM

## 2021-01-07 DIAGNOSIS — J30.81 ALLERGY TO DOG DANDER: ICD-10-CM

## 2021-01-07 DIAGNOSIS — Z51.6 ENCOUNTER FOR DESENSITIZATION TO ALLERGEN: Primary | ICD-10-CM

## 2021-01-07 PROCEDURE — 95117 IMMUNOTHERAPY INJECTIONS: CPT | Performed by: NURSE PRACTITIONER

## 2021-01-07 PROCEDURE — U0003 INFECTIOUS AGENT DETECTION BY NUCLEIC ACID (DNA OR RNA); SEVERE ACUTE RESPIRATORY SYNDROME CORONAVIRUS 2 (SARS-COV-2) (CORONAVIRUS DISEASE [COVID-19]), AMPLIFIED PROBE TECHNIQUE, MAKING USE OF HIGH THROUGHPUT TECHNOLOGIES AS DESCRIBED BY CMS-2020-01-R: HCPCS

## 2021-01-07 NOTE — PROGRESS NOTES
After consent obtained/verified, allergy injection given in back of R/L arm(s). Documentation of vial injection specific to arm(s) noted on Allergy Immunotherapy Administration Form. Patient waited 30 minutes for observation. Patient tolerated Silver vials well at 0.05ml without adverse reaction. SHOT REACTION TREATMENT INSTRUCTIONS    During the 30 minute wait after an allergy injection the following symptoms should be reported:    Itching other than at the injection site  Hives or swelling other than at the injection site  Redness other than at the injection site  Difficulty breathing  Chest tightness  Difficulty swallowing  Throat tightness    If these symptoms occur, NOTIFY PROVIDER and the following treatment should be administered:    1. Epinephrine 1:1000 IM - 0.3 ml if > 66 lbs or more, 0.15 ml if 33 - 63 lbs, or 0.1 ml if <33 lbs   2. Diphenhydramine - give all intramuscular:     2 to <6 years (off-label use): 6.25 mg,    6 to <12 years: 12.5 to 25 mg;    ?12 years: 25-50 mg.  3.  Famotidine:  Adults 40 mg oral    Adolescents age 12 years and >88 lbs: 40 mg    Children and Adolescents ? 12years of age: Initial: 0.25 mg/kg/dose   every 12 hours (maximum daily dose: 40 mg/day)    Epi dose may me repeated in 5-15 minutes if adequate resolution of symptoms does not occur    Patient should be observed for at least one hour after final epi dose and must be seen by provider. Patients cannot drive themselves if they have received diphenhydramine.

## 2021-01-07 NOTE — TELEPHONE ENCOUNTER
EP refill request from [facility]  for refills on fioricet  Last seen 10/23/20 acute VV  Next appt 1/18/21  Order pending  Last rx 8/21/20 #180/0

## 2021-01-08 LAB — SARS-COV-2: NOT DETECTED

## 2021-01-08 RX ORDER — BUTALBITAL, ACETAMINOPHEN AND CAFFEINE 50; 325; 40 MG/1; MG/1; MG/1
TABLET ORAL
Qty: 180 TABLET | Refills: 0 | Status: SHIPPED | OUTPATIENT
Start: 2021-01-08 | End: 2021-04-09

## 2021-01-11 ENCOUNTER — HOSPITAL ENCOUNTER (EMERGENCY)
Age: 51
Discharge: HOME | End: 2021-01-11
Attending: EMERGENCY MEDICINE
Payer: COMMERCIAL

## 2021-01-11 ENCOUNTER — NURSE TRIAGE (OUTPATIENT)
Dept: OTHER | Facility: CLINIC | Age: 51
End: 2021-01-11

## 2021-01-11 ENCOUNTER — TELEPHONE (OUTPATIENT)
Dept: FAMILY MEDICINE CLINIC | Age: 51
End: 2021-01-11

## 2021-01-11 VITALS
HEART RATE: 60 BPM | TEMPERATURE: 98.7 F | OXYGEN SATURATION: 98 % | RESPIRATION RATE: 18 BRPM | DIASTOLIC BLOOD PRESSURE: 95 MMHG | SYSTOLIC BLOOD PRESSURE: 146 MMHG

## 2021-01-11 DIAGNOSIS — M54.31 SCIATICA OF RIGHT SIDE: Primary | ICD-10-CM

## 2021-01-11 DIAGNOSIS — G89.29 CHRONIC RIGHT-SIDED LOW BACK PAIN WITH RIGHT-SIDED SCIATICA: ICD-10-CM

## 2021-01-11 DIAGNOSIS — G89.4 CHRONIC PAIN SYNDROME: ICD-10-CM

## 2021-01-11 DIAGNOSIS — M54.41 CHRONIC RIGHT-SIDED LOW BACK PAIN WITH RIGHT-SIDED SCIATICA: ICD-10-CM

## 2021-01-11 LAB
ANION GAP SERPL CALCULATED.3IONS-SCNC: 7 MEQ/L (ref 8–16)
BASOPHILS # BLD: 0.8 %
BASOPHILS ABSOLUTE: 0 THOU/MM3 (ref 0–0.1)
BUN BLDV-MCNC: 13 MG/DL (ref 7–22)
CALCIUM SERPL-MCNC: 9.2 MG/DL (ref 8.5–10.5)
CHLORIDE BLD-SCNC: 103 MEQ/L (ref 98–111)
CO2: 28 MEQ/L (ref 23–33)
CREAT SERPL-MCNC: 1 MG/DL (ref 0.4–1.2)
EOSINOPHIL # BLD: 2.9 %
EOSINOPHILS ABSOLUTE: 0.1 THOU/MM3 (ref 0–0.4)
ERYTHROCYTE [DISTWIDTH] IN BLOOD BY AUTOMATED COUNT: 12 % (ref 11.5–14.5)
ERYTHROCYTE [DISTWIDTH] IN BLOOD BY AUTOMATED COUNT: 41.6 FL (ref 35–45)
GLUCOSE BLD-MCNC: 100 MG/DL (ref 70–108)
HCT VFR BLD CALC: 42.6 % (ref 42–52)
HEMOGLOBIN: 13.7 GM/DL (ref 14–18)
IMMATURE GRANS (ABS): 0.02 THOU/MM3 (ref 0–0.07)
IMMATURE GRANULOCYTES: 0.4 %
LYMPHOCYTES # BLD: 21.3 %
LYMPHOCYTES ABSOLUTE: 1 THOU/MM3 (ref 1–4.8)
MCH RBC QN AUTO: 30.8 PG (ref 26–33)
MCHC RBC AUTO-ENTMCNC: 32.2 GM/DL (ref 32.2–35.5)
MCV RBC AUTO: 95.7 FL (ref 80–94)
MONOCYTES # BLD: 11.4 %
MONOCYTES ABSOLUTE: 0.5 THOU/MM3 (ref 0.4–1.3)
NUCLEATED RED BLOOD CELLS: 0 /100 WBC
OSMOLALITY CALCULATION: 275.9 MOSMOL/KG (ref 275–300)
PLATELET # BLD: 227 THOU/MM3 (ref 130–400)
PMV BLD AUTO: 10.4 FL (ref 9.4–12.4)
POTASSIUM REFLEX MAGNESIUM: 4.1 MEQ/L (ref 3.5–5.2)
PRO-BNP: 9 PG/ML (ref 0–450)
RBC # BLD: 4.45 MILL/MM3 (ref 4.7–6.1)
SEG NEUTROPHILS: 63.2 %
SEGMENTED NEUTROPHILS ABSOLUTE COUNT: 3 THOU/MM3 (ref 1.8–7.7)
SODIUM BLD-SCNC: 138 MEQ/L (ref 135–145)
WBC # BLD: 4.8 THOU/MM3 (ref 4.8–10.8)

## 2021-01-11 PROCEDURE — 36415 COLL VENOUS BLD VENIPUNCTURE: CPT

## 2021-01-11 PROCEDURE — 99282 EMERGENCY DEPT VISIT SF MDM: CPT

## 2021-01-11 PROCEDURE — 80048 BASIC METABOLIC PNL TOTAL CA: CPT

## 2021-01-11 PROCEDURE — 83880 ASSAY OF NATRIURETIC PEPTIDE: CPT

## 2021-01-11 PROCEDURE — 85025 COMPLETE CBC W/AUTO DIFF WBC: CPT

## 2021-01-11 RX ORDER — ORPHENADRINE CITRATE 100 MG/1
100 TABLET, EXTENDED RELEASE ORAL 2 TIMES DAILY
Qty: 60 TABLET | Refills: 0 | Status: SHIPPED | OUTPATIENT
Start: 2021-01-11 | End: 2021-01-11 | Stop reason: SDUPTHER

## 2021-01-11 RX ORDER — PREDNISONE 10 MG/1
TABLET ORAL
Qty: 24 TABLET | Refills: 0 | Status: ON HOLD | OUTPATIENT
Start: 2021-01-11 | End: 2021-02-11

## 2021-01-11 RX ORDER — ORPHENADRINE CITRATE 100 MG/1
100 TABLET, EXTENDED RELEASE ORAL 2 TIMES DAILY
Qty: 14 TABLET | Refills: 0 | Status: SHIPPED | OUTPATIENT
Start: 2021-01-11 | End: 2021-01-18

## 2021-01-11 ASSESSMENT — ENCOUNTER SYMPTOMS
BACK PAIN: 1
VOMITING: 0
DIARRHEA: 0
CHEST TIGHTNESS: 0
NAUSEA: 0
COLOR CHANGE: 0
WHEEZING: 0
EYE PAIN: 0
SHORTNESS OF BREATH: 0
ABDOMINAL PAIN: 0
SORE THROAT: 0

## 2021-01-11 ASSESSMENT — PAIN SCALES - GENERAL: PAINLEVEL_OUTOF10: 10

## 2021-01-11 NOTE — TELEPHONE ENCOUNTER
[facility] received a call from:    Name of Caller: Zafar    Relationship to patient: Patient    Organization name: N/A    Best contact number: [phone]    Reason for call: Patient called about Right Ankle / Heel pain and swelling and NT Dinesh Bean advised he be seen in the next 2-3 days.  Pt is requesting a call back about getting orders for getting an X-Ray done before his appt

## 2021-01-11 NOTE — ED TRIAGE NOTES
Pt to the ED for a med refill of his muscle relaxer. Prescribed by his PCP but cannot get it filled for a couple days. States his muscle spasms have been worsening. Was on norflex. Hasn't taken x 4 days now. Also notes bilat swelling in both feet and increased pain.

## 2021-01-11 NOTE — ED PROVIDER NOTES
temperature is 98.7 °F (37.1 °C). His blood pressure is 146/95 (abnormal) and his pulse is 60. His respiration is 18 and oxygen saturation is 98%. Gen:    Non-toxic, well appearing  Head:  Atraumatic, normocephalic              Extraocular muscles intact, pupils equally reactive              Oropharynx Clear, mucous membranes moist  Neck:  Supple, no meningismus; No LAD  Chest: Clear to auscultation bilateral  Heart. Regular rate and rhythm, no heave  Abd:    Soft nontender ,normal bowel sound   Back  showed tenderness on the lower lumbar spine and SI joint  Extrem: No edema, neg homans. Neuro:   Alert, Awake, no lateralizing deficts               CN's grossly intact bilaterally    DIAGNOSTIC RESULTS     RADIOLOGY:   No orders to display       LABS:  Labs Reviewed   CBC WITH AUTO DIFFERENTIAL - Abnormal; Notable for the following components:       Result Value    RBC 4.45 (*)     Hemoglobin 13.7 (*)     MCV 95.7 (*)     All other components within normal limits   ANION GAP - Abnormal; Notable for the following components:    Anion Gap 7.0 (*)     All other components within normal limits   GLOMERULAR FILTRATION RATE, ESTIMATED - Abnormal; Notable for the following components:    Est, Glom Filt Rate 79 (*)     All other components within normal limits   BRAIN NATRIURETIC PEPTIDE   BASIC METABOLIC PANEL W/ REFLEX TO MG FOR LOW K   OSMOLALITY         EMERGENCY DEPARTMENT COURSE:     Vitals:    Vitals:    01/11/21 1624   BP: (!) 146/95   Pulse: 60   Resp: 18   Temp: 98.7 °F (37.1 °C)   TempSrc: Oral   SpO2: 98%       Medications - No data to display    The pt was seen and evaluated by me. Within the department, I observed the pt's vital signs to be within acceptable range . Laboratory studies were performed, results were reviewed with the patient. . I observed the pt's condition to be hemodynamically stable during the duration of their stay.  I explained my proposed course of treatment to the pt, and they were amenable to my decision. They were discharged home, and they will return to the ED if their symptoms become more severein nature, or otherwise change. Medical Decision-Making:       FINAL IMPRESSION       1. Sciatica of right side    2. Chronic right-sided low back pain with right-sided sciatica    3. Chronic pain syndrome            DISPOSITION/PLAN  PATIENT REFERRED TO:  Janel Cheng, APRN - CNP  [address] [facility] [address]  [phone]    In 1 week      DISCHARGE MEDICATIONS:  Discharge Medication List as of 1/11/2021  5:58 PM      START taking these medications    Details   predniSONE (DELTASONE) 10 MG tablet Take 40 mg (4 tablets) for the first 3 days; take 30 mg (3 tablets) for the next 2 days; take 20 mg (2 tablets) for the next day; take 10 mg (1 tablet) the last day, Disp-24 tablet, R-0Normal               (Please note that portions of this note were completed with a voice recognition program and electronically transcribed. Efforts were [facility] edit the dictations but occasionally words are mis-transcribed . The transcription may contain errors not detected in proofreading.   This transcription was electronically signed.)        Rohan León MD  Attending Emergency Physician          Rohan León MD  01/21/21 8633

## 2021-01-11 NOTE — H&P
[address] [facility] COMPLAINT    Chief Complaint   Patient presents with    Ankle Pain     and swelling bilat    Medication Refill       Nurses Notes reviewed and I agree except as noted in the HPI. HPI    Higinio Mason is a 52 y.o. male who presents for evaluation of continued pain to the right heel and back of leg. 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 Patient is being seen at [facility] pain management for the pain to his right heel. Patient states that the pain in the back of his right heel is \"12 out of 10.\"  Patient states that he was taking Norflex, but ran out. He called the pain management NP, who told him to follow-up to the ED to get a refill for a muscle relaxant shot for the pain. Patient has no other complaints. REVIEW OF SYSTEMS    Review of Systems   Constitutional: Negative for chills, fatigue and fever. HENT: Negative for sneezing and sore throat. Eyes: Negative for pain. Respiratory: Negative for chest tightness, shortness of breath and wheezing. Cardiovascular: Positive for leg swelling. Negative for chest pain and palpitations. Gastrointestinal: Negative for abdominal pain, diarrhea, nausea and vomiting. Musculoskeletal: Positive for back pain and myalgias. Negative for arthralgias. Skin: Negative for color change and pallor. Neurological: Negative for dizziness, weakness, numbness and headaches. Psychiatric/Behavioral: Negative for behavioral problems. PAST MEDICAL HISTORY     has a past medical history of Asthma and Fibromyalgia. SURGICAL HISTORY     has a past surgical history that includes back surgery and knee surgery.     CURRENT MEDICATIONS    Previous Medications    ALBUTEROL (PROVENTIL) (2.5 MG/3ML) 0.083% NEBULIZER SOLUTION    Take 3 mLs by nebulization every 4 hours as needed for Wheezing    ALBUTEROL IN    Inhale into the lungs 4 times daily as needed     ALBUTEROL SULFATE  (90 BASE) MCG/ACT INHALER    Inhale 2 puffs into the lungs 4 times daily as needed for Wheezing    AMITRIPTYLINE (ELAVIL) 50 MG TABLET    take 1 tablet by mouth at bedtime    AZELASTINE (OPTIVAR) 0.05 % OPHTHALMIC SOLUTION    Place 1 drop into both eyes 2 times daily    BUDESONIDE-FORMOTEROL (SYMBICORT) 160-4.5 MCG/ACT AERO    2 puffs inhaled twice daily. Rinse mouth well after use. BUMETANIDE (BUMEX) 1 MG TABLET    Take 0.5 tablets by mouth daily    BUTALBITAL-ACETAMINOPHEN-CAFFEINE (FIORICET, ESGIC) -40 MG PER TABLET    take 1 tablet by mouth four times a day if needed for headache    CETIRIZINE (ZYRTEC) 10 MG TABLET    Take 1 tablet by mouth daily    DIPHENHYDRAMINE (BENADRYL) 25 MG CAPSULE    Take 1 capsule by mouth every 6 hours as needed for Itching    EPINEPHRINE (AUVI-Q) 0.3 MG/0.3ML SOAJ INJECTION    Dispense 2 packs of 2 (total 4 devices). Use as directed, STAT for allergic reaction. ERGOCALCIFEROL (ERGOCALCIFEROL) 29169 UNITS CAPSULE    Take 50,000 Units by mouth daily    FERROUS SULFATE (IRON 325) 325 (65 FE) MG TABLET    Take 1 tablet by mouth daily (with breakfast)    FLUTICASONE (FLONASE) 50 MCG/ACT NASAL SPRAY    2 sprays by Each Nostril route daily    HYDROCODONE-ACETAMINOPHEN (NORCO) 5-325 MG PER TABLET    Take 1 tablet by mouth every 8 hours as needed for Pain for up to 30 days. HYDROCORTISONE VALERATE (WESTCORT) 0.2 % OINTMENT    Apply topically daily.     LIDOCAINE (XYLOCAINE) 5 % OINTMENT    Apply topically as needed to painful areas of lower back and neck    MONTELUKAST (SINGULAIR) 10 MG TABLET    Take 1 tablet by mouth nightly    MULTIPLE VITAMINS-MINERALS (MULTIVITAMIN ADULTS) TABS    Take 1 tablet by mouth daily    NARCAN 4 MG/0.1ML LIQD NASAL SPRAY        OMEPRAZOLE (PRILOSEC) 20 MG DELAYED RELEASE CAPSULE    take 1 capsule by mouth once daily    ORPHENADRINE (NORFLEX) 100 MG EXTENDED RELEASE TABLET    Take 1 tablet by mouth 2 times daily    RA LORATADINE 10 MG TABLET    take 1 tablet by mouth once daily    ROSUVASTATIN (CRESTOR) 20 MG TABLET    take 1 tablet by mouth once daily    TIOTROPIUM (SPIRIVA RESPIMAT) 1.25 MCG/ACT AERS INHALER    Inhale 2 puffs into the lungs daily       ALLERGIES    is allergic to dilantin [phenytoin]; dupixent [dupilumab]; oxycodone; and valium [diazepam]. FAMILY HISTORY    He indicated that his mother is . He indicated that his father is . family history includes Asthma in his father; Emphysema in his father and mother; High Blood Pressure in his father and mother; Other in his mother. SOCIAL HISTORY     reports that he has never smoked. He has never used smokeless tobacco. He reports previous alcohol use. He reports that he does not use drugs. PHYSICAL EXAM      INITIAL VITALS: BP (!) 146/95   Pulse 60   Temp 98.7 °F (37.1 °C) (Oral)   Resp 18   SpO2 98%  Estimated body mass index is 55.15 kg/m² as calculated from the following:    Height as of 20: 6' 1\" (1.854 m). Weight as of 21: 418 lb (189.6 kg). Physical Exam  Constitutional:       General: He is not in acute distress. Appearance: Normal appearance. He is obese. HENT:      Head: Normocephalic and atraumatic. Right Ear: External ear normal.      Left Ear: External ear normal.      Nose: Nose normal.      Mouth/Throat:      Mouth: Mucous membranes are moist.   Eyes:      Extraocular Movements: Extraocular movements intact. Conjunctiva/sclera: Conjunctivae normal.      Pupils: Pupils are equal, round, and reactive to light. Neck:      Musculoskeletal: Normal range of motion. No neck rigidity. Cardiovascular:      Rate and Rhythm: Normal rate and regular rhythm. Pulses: Normal pulses. Pulmonary:      Effort: Pulmonary effort is normal. No respiratory distress. Breath sounds: No wheezing.    Abdominal: Palpations: Abdomen is soft. Musculoskeletal:      Right lower leg: Edema present. Left lower leg: Edema present. Skin:     General: Skin is warm and dry. Capillary Refill: Capillary refill takes 2 to 3 seconds. Findings: No lesion or rash. Neurological:      General: No focal deficit present. Mental Status: He is alert and oriented to person, place, and time. Cranial Nerves: No cranial nerve deficit. Sensory: No sensory deficit. Psychiatric:         Mood and Affect: Mood normal.         Behavior: Behavior normal.         MEDICAL DECISION MAKING    DIFFERENTIAL DIAGNOSIS:  Chronic low back pain with sciatica, right leg      DIAGNOSTIC RESULTS    RADIOLOGY:  I have reviewedradiologic plain film image(s). The plain films will be read or overread by the radiologist.  All other non-plain film images(s) such as CT, Ultrasound and MRI have been read by the radiologist.  No orders to display         Vitals:    Vitals:    01/11/21 1624   BP: (!) 146/95   Pulse: 60   Resp: 18   Temp: 98.7 °F (37.1 °C)   TempSrc: Oral   SpO2: 98%       EMERGENCY DEPARTMENT COURSE:    Medications - No data to display    The pt was seen and evaluated by me. Within the department, I observed the pt's vitalsigns to be within acceptable range. Laboratory and Radiological studies were performed, results were reviewed with the patient. I explained my proposed course of treatment to the pt, and they were amenable to my decision. Discussed with patient and he will be sent home with a 7-day dose of prednisone (taper dose), and a 7-day refill of the Norflex. Patient was instructed to follow-up with his PCP for further medication management. They were discharged home, and they will return to the ED if their symptoms become more severein nature, or otherwise change. Controlled Substances Monitoring:        CRITICAL CARE:   None. CONSULTS:  None    PROCEDURES:  None.     FINAL IMPRESSION     No diagnosis found.      DISPOSITION/PLAN  PATIENT REFERRED TO:  No follow-up provider specified. DISCHARGEMEDICATIONS:  New Prescriptions    No medications on file         (Please note that portions of this note were completed with a voice recognition program and electronically transcribed. Efforts were [facility] edit the dictations but occasionally words are mis-transcribed . The transcription may contain errors not detected in proofreading.   This transcription was electronically signed.)         01/11/21 5:27 PM      Travis Hawkins MD      Emergency room physician      Oz Vargas DPM PGY-1  Foot and Ankle Surgery Resident

## 2021-01-11 NOTE — TELEPHONE ENCOUNTER
C/o right ankle and heel swelling. Limping d/t pain and muscle spasms. Reason for Disposition   [1] MODERATE pain (e.g., interferes with normal activities, limping) AND [2] present > 3 days    Answer Assessment - Initial Assessment Questions  1. LOCATION: \"Which joint is swollen? \"      Right ankle    2. ONSET: \"When did the swelling start?\"      1 week    3. SIZE: \"How large is the swelling? \"      Moderate    4. PAIN: \"Is there any pain? \" If so, ask: \"How bad is it? \" (Scale 1-10; or mild, moderate, severe)      8/10    5. CAUSE: \"What do you think caused the swollen joint? \"      Not sure    6. OTHER SYMPTOMS: \"Do you have any other symptoms? \" (e.g., fever, chest pain, difficulty breathing, calf pain)      Calf pain    7. PREGNANCY: \"Is there any chance you are pregnant? \" \"When was your last menstrual period? \"      Na    Protocols used: ANKLE SWELLING-ADULT-AH      Patient called pre-service center [facility]) to schedule appointment, with red flag complaint, transferred to RN access for triage. See above questions and answers. Caller talking full sentences without any distress on phone. Discussed disposition and patient agreeable. Discussed potential consequences for not following disposition recommendation. Aware to call back with any concerns or persistent, worsening, or new symptoms develop. Warm transfer to [facility] scheduling for appointment. Attention Provider: Thank you for allowing me to participate in the care of your patient. The  patient was connected to triage in response to information provided to the [facility]. Please do not respond through this encounter as the response is not directed to a shared pool.

## 2021-01-11 NOTE — TELEPHONE ENCOUNTER
OARRS reviewed. UDS: + for  Hydrocodone consistent. Last seen: 11/5/2020.  Follow-up:   Future Appointments   Date Time Provider Jesus Simmons   1/12/2021  8:30 AM MIRNA Cantu CNP Medicine P - [place]   1/13/2021  9:45 AM STR [facility] ROOM [facility] [place] [facility] [place]   1/14/2021  9:00 AM MIRNA Rebolledo CNP N SRPX ALLER [address]   1/18/2021  3:00 PM MIRNA Cantu CNP Medicine P - [place]   1/20/2021  9:45 AM MIRNA Cardenas CNPX Pain [facility] - [place]   2/18/2021 10:20 AM MIRNA Rodrigues CNP [facility] - [place]

## 2021-01-11 NOTE — TELEPHONE ENCOUNTER
Did he have an injury? Recent long trips, or flying? Pain into his calf? OK for x-ray order, unless there is concern for a DVT, then pt should be seen in the ER for evaluation ASAP. Thanks -LIVIER.

## 2021-01-12 ENCOUNTER — TELEPHONE (OUTPATIENT)
Dept: PHYSICAL MEDICINE AND REHAB | Age: 51
End: 2021-01-12

## 2021-01-12 ENCOUNTER — OFFICE VISIT (OUTPATIENT)
Dept: FAMILY MEDICINE CLINIC | Age: 51
End: 2021-01-12
Payer: COMMERCIAL

## 2021-01-12 ENCOUNTER — TELEPHONE (OUTPATIENT)
Dept: ALLERGY | Age: 51
End: 2021-01-12

## 2021-01-12 VITALS
RESPIRATION RATE: 16 BRPM | HEART RATE: 76 BPM | SYSTOLIC BLOOD PRESSURE: 126 MMHG | WEIGHT: 315 LBS | TEMPERATURE: 96.9 F | DIASTOLIC BLOOD PRESSURE: 76 MMHG | BODY MASS INDEX: 56.15 KG/M2

## 2021-01-12 DIAGNOSIS — G89.4 CHRONIC PAIN SYNDROME: ICD-10-CM

## 2021-01-12 DIAGNOSIS — J45.50 SEVERE PERSISTENT ASTHMA, UNSPECIFIED WHETHER COMPLICATED: Primary | ICD-10-CM

## 2021-01-12 DIAGNOSIS — M54.50 LUMBAR PAIN: ICD-10-CM

## 2021-01-12 DIAGNOSIS — M54.41 CHRONIC MIDLINE LOW BACK PAIN WITH RIGHT-SIDED SCIATICA: Primary | ICD-10-CM

## 2021-01-12 DIAGNOSIS — G89.29 CHRONIC MIDLINE LOW BACK PAIN WITH RIGHT-SIDED SCIATICA: Primary | ICD-10-CM

## 2021-01-12 DIAGNOSIS — G89.4 CHRONIC PAIN SYNDROME: Primary | ICD-10-CM

## 2021-01-12 LAB — GFR SERPL CREATININE-BSD FRML MDRD: 79 ML/MIN/1.73M2

## 2021-01-12 PROCEDURE — G8484 FLU IMMUNIZE NO ADMIN: HCPCS | Performed by: NURSE PRACTITIONER

## 2021-01-12 PROCEDURE — 99213 OFFICE O/P EST LOW 20 MIN: CPT | Performed by: NURSE PRACTITIONER

## 2021-01-12 PROCEDURE — G8417 CALC BMI ABV UP PARAM F/U: HCPCS | Performed by: NURSE PRACTITIONER

## 2021-01-12 PROCEDURE — 1036F TOBACCO NON-USER: CPT | Performed by: NURSE PRACTITIONER

## 2021-01-12 PROCEDURE — G8427 DOCREV CUR MEDS BY ELIG CLIN: HCPCS | Performed by: NURSE PRACTITIONER

## 2021-01-12 ASSESSMENT — PATIENT HEALTH QUESTIONNAIRE - PHQ9
SUM OF ALL RESPONSES TO PHQ QUESTIONS 1-9: 0
2. FEELING DOWN, DEPRESSED OR HOPELESS: 0
SUM OF ALL RESPONSES TO PHQ9 QUESTIONS 1 & 2: 0

## 2021-01-12 ASSESSMENT — ENCOUNTER SYMPTOMS
BACK PAIN: 1
COLOR CHANGE: 0
BOWEL INCONTINENCE: 0

## 2021-01-12 NOTE — PATIENT INSTRUCTIONS

## 2021-01-12 NOTE — LETTER
[address] [address]  Phone: [phone]  Fax: MIRNA Reece CNP        January 12, 2021     Patient: jet Sims   YOB: 1970   Date of Visit: 1/12/2021       To Whom It May Concern: It is my medical opinion that Xavier Linares may return to full duty immediately with no restrictions. If you have any questions or concerns, please don't hesitate to call.     Sincerely,          MIRNA Swartz CNP

## 2021-01-12 NOTE — TELEPHONE ENCOUNTER
Patient called in to let us know that he needs to cancel his PFT. He was in ER for leg pain and muscle spasm from sciatica. They started him on prednisone. He will call back to reschedule once he gets the pain under control.

## 2021-01-12 NOTE — TELEPHONE ENCOUNTER
Patient requested to make note that his norco hasnt run out yet but will be, and he was allowing for the 3 to 4 days it could take to get sent to the pharmacy. He was concerned about it getting declined for being requested to early. He said he just wants it sent there but not able to fill until the refill date. Suzette Stafford called requesting a refill on the following medications:  Requested Prescriptions     Pending Prescriptions Disp Refills    HYDROcodone-acetaminophen (NORCO) 5-325 MG per tablet 90 tablet 0     Sig: Take 1 tablet by mouth every 8 hours as needed for Pain for up to 30 days.     lidocaine (XYLOCAINE) 5 % ointment 258 g 2     Sig: Apply topically as needed to painful areas of lower back and neck    lidocaine (LIDODERM) 5 % 60 patch 0     Sig: APPLY 1 TO 2 PATCHES TO SKIN DAILY *12 HOURS ON 12 HOURS OFF*     Pharmacy verified:  .pv  [facility] on [address]    Date of last visit: 11/05/2020  Date of next visit (if applicable): 3/35/1462

## 2021-01-12 NOTE — PROGRESS NOTES
tablet by mouth four times a day if needed for headache, Disp: 180 tablet, Rfl: 0    amitriptyline (ELAVIL) 50 MG tablet, take 1 tablet by mouth at bedtime, Disp: 90 tablet, Rfl: 1    HYDROcodone-acetaminophen (NORCO) 5-325 MG per tablet, Take 1 tablet by mouth every 8 hours as needed for Pain for up to 30 days. , Disp: 90 tablet, Rfl: 0    RA LORATADINE 10 MG tablet, take 1 tablet by mouth once daily, Disp: , Rfl:     montelukast (SINGULAIR) 10 MG tablet, Take 1 tablet by mouth nightly, Disp: 30 tablet, Rfl: 5    hydrocortisone valerate (WESTCORT) 0.2 % ointment, Apply topically daily. , Disp: 1 Tube, Rfl: 11    diphenhydrAMINE (BENADRYL) 25 MG capsule, Take 1 capsule by mouth every 6 hours as needed for Itching, Disp: 60 capsule, Rfl: 5    tiotropium (SPIRIVA RESPIMAT) 1.25 MCG/ACT AERS inhaler, Inhale 2 puffs into the lungs daily, Disp: 1 Inhaler, Rfl: 1    budesonide-formoterol (SYMBICORT) 160-4.5 MCG/ACT AERO, 2 puffs inhaled twice daily.   Rinse mouth well after use., Disp: 1 Inhaler, Rfl: 5    lidocaine (XYLOCAINE) 5 % ointment, Apply topically as needed to painful areas of lower back and neck, Disp: 258 g, Rfl: 2    fluticasone (FLONASE) 50 MCG/ACT nasal spray, 2 sprays by Each Nostril route daily, Disp: 3 Bottle, Rfl: 11    azelastine (OPTIVAR) 0.05 % ophthalmic solution, Place 1 drop into both eyes 2 times daily, Disp: 1 Bottle, Rfl: 11    cetirizine (ZYRTEC) 10 MG tablet, Take 1 tablet by mouth daily, Disp: 30 tablet, Rfl: 11    albuterol sulfate  (90 Base) MCG/ACT inhaler, Inhale 2 puffs into the lungs 4 times daily as needed for Wheezing, Disp: 3 Inhaler, Rfl: 3    albuterol (PROVENTIL) (2.5 MG/3ML) 0.083% nebulizer solution, Take 3 mLs by nebulization every 4 hours as needed for Wheezing, Disp: 375 mL, Rfl: 3    rosuvastatin (CRESTOR) 20 MG tablet, take 1 tablet by mouth once daily, Disp: 90 tablet, Rfl: 3    omeprazole (PRILOSEC) 20 MG delayed release capsule, take 1 capsule by mouth once daily, Disp: 90 capsule, Rfl: 3    ferrous sulfate (IRON 325) 325 (65 Fe) MG tablet, Take 1 tablet by mouth daily (with breakfast), Disp: 90 tablet, Rfl: 3    bumetanide (BUMEX) 1 MG tablet, Take 0.5 tablets by mouth daily, Disp: 30 tablet, Rfl: 0    EPINEPHrine (AUVI-Q) 0.3 MG/0.3ML SOAJ injection, Dispense 2 packs of 2 (total 4 devices). Use as directed, STAT for allergic reaction. , Disp: 4 each, Rfl: 2    NARCAN 4 MG/0.1ML LIQD nasal spray, , Disp: , Rfl:     Multiple Vitamins-Minerals (MULTIVITAMIN ADULTS) TABS, Take 1 tablet by mouth daily, Disp: 90 tablet, Rfl: 3    ergocalciferol (ERGOCALCIFEROL) 66230 units capsule, Take 50,000 Units by mouth daily, Disp: , Rfl:     ALBUTEROL IN, Inhale into the lungs 4 times daily as needed , Disp: , Rfl:     predniSONE (DELTASONE) 10 MG tablet, Take 40 mg (4 tablets) for the first 3 days; take 30 mg (3 tablets) for the next 2 days; take 20 mg (2 tablets) for the next day; take 10 mg (1 tablet) the last day (Patient not taking: Reported on 1/12/2021), Disp: 24 tablet, Rfl: 0    The patient is allergic to dilantin [phenytoin]; dupixent [dupilumab]; oxycodone; and valium [diazepam]. Past Medical History  Alberto Box  has a past medical history of Asthma and Fibromyalgia. Subjective:      Review of Systems   Constitutional: Negative for chills, fatigue and fever. Gastrointestinal: Negative for bowel incontinence. Genitourinary: Negative for bladder incontinence. Musculoskeletal: Positive for arthralgias, back pain and myalgias. Negative for neck pain. Skin: Negative for color change and rash. Neurological: Positive for headaches. Negative for dizziness, tingling, weakness and numbness. Objective:     /76   Pulse 76   Temp 96.9 °F (36.1 °C) (Temporal)   Resp 16   Wt (!) 425 lb 9.6 oz (193.1 kg)   BMI 56.15 kg/m²     Physical Exam  Vitals signs reviewed. Constitutional:       General: He is not in acute distress.      Appearance: He is well-developed. HENT:      Head: Normocephalic and atraumatic. Eyes:      General:         Right eye: No discharge. Left eye: No discharge. Conjunctiva/sclera: Conjunctivae normal.   Cardiovascular:      Rate and Rhythm: Normal rate and regular rhythm. Heart sounds: Normal heart sounds. Pulmonary:      Effort: Pulmonary effort is normal. No respiratory distress. Breath sounds: Normal breath sounds. Musculoskeletal:      Lumbar back: He exhibits decreased range of motion, tenderness and pain. He exhibits no swelling and no edema. Back:    Skin:     General: Skin is warm and dry. Neurological:      General: No focal deficit present. Mental Status: He is alert and oriented to person, place, and time. Coordination: Coordination normal.   Psychiatric:         Mood and Affect: Mood normal.         Behavior: Behavior normal.         Thought Content: Thought content normal.         Judgment: Judgment normal.         Assessment/Plan:      Harley Callahan was seen today for follow-up. Diagnoses and all orders for this visit:    Chronic midline low back pain with right-sided sciatica    - Long discussion with patient about his pain and follow up. Follow up with pain management as planned, I would recommend having a discussion with them about an MRI of lumbar spine for these symptoms. Pt was agreeable. - Continue prednisone as directed until gone and take norco for pain as directed. - Increase activity as able. - OK to return to work. - Call office with any questions or concerns, or if symptoms are getting worse or changing      Return if symptoms worsen or fail to improve. Patient given educational materials - see patient instructions. Discussed use, benefit, and side effects of prescribed medications. All patient questions answered. Pt voiced understanding.         Electronically signed by MIRNA Medeiros CNP on 1/12/2021 at 9:10 AM

## 2021-01-13 RX ORDER — LIDOCAINE 50 MG/G
OINTMENT TOPICAL
Qty: 258 G | Refills: 2 | Status: SHIPPED | OUTPATIENT
Start: 2021-01-13 | End: 2021-02-12

## 2021-01-13 RX ORDER — LIDOCAINE 50 MG/G
PATCH TOPICAL
Qty: 60 PATCH | Refills: 0 | Status: SHIPPED | OUTPATIENT
Start: 2021-01-13 | End: 2021-02-15

## 2021-01-13 RX ORDER — HYDROCODONE BITARTRATE AND ACETAMINOPHEN 5; 325 MG/1; MG/1
1 TABLET ORAL EVERY 8 HOURS PRN
Qty: 90 TABLET | Refills: 0 | Status: SHIPPED | OUTPATIENT
Start: 2021-01-13 | End: 2021-02-08 | Stop reason: SDUPTHER

## 2021-01-13 NOTE — TELEPHONE ENCOUNTER
Why didn't pcp order this then?  Please place an order for Lumbar MRI for increased low back and radicular pain

## 2021-01-13 NOTE — TELEPHONE ENCOUNTER
OARRS reviewed. UDS: + for  Hydrocodone consistent. Last seen: 11/5/2020.  Follow-up:   Future Appointments   Date Time Provider Jesus Simmons   1/14/2021  9:40 AM SCHEDULE, SRPX ALLERGY LAB N SRPX ALLER [address]   1/18/2021  3:00 PM MIRNA Leon CNP [facility] Medicine [address]   1/20/2021  9:45 AM MIRNA Anna CNPX Pain [address]   2/18/2021 10:20 AM MIRNA Gipson CNP [facility] [address] [facility]

## 2021-01-13 NOTE — TELEPHONE ENCOUNTER
Call the patient and let him know I am referring him to pulmonary for them to manage his asthma. I cannot manage his asthma if he cannot keep his appointments.

## 2021-01-14 ENCOUNTER — NURSE ONLY (OUTPATIENT)
Dept: ALLERGY | Age: 51
End: 2021-01-14
Payer: COMMERCIAL

## 2021-01-14 VITALS
TEMPERATURE: 97.5 F | HEART RATE: 88 BPM | SYSTOLIC BLOOD PRESSURE: 142 MMHG | DIASTOLIC BLOOD PRESSURE: 90 MMHG | RESPIRATION RATE: 16 BRPM

## 2021-01-14 DIAGNOSIS — Z91.048 ALLERGY TO MOLD: ICD-10-CM

## 2021-01-14 DIAGNOSIS — J30.81 ALLERGY TO DOG DANDER: ICD-10-CM

## 2021-01-14 DIAGNOSIS — J30.1 ALLERGY TO TREES: ICD-10-CM

## 2021-01-14 DIAGNOSIS — Z51.6 ENCOUNTER FOR DESENSITIZATION TO ALLERGEN: Primary | ICD-10-CM

## 2021-01-14 PROCEDURE — 95117 IMMUNOTHERAPY INJECTIONS: CPT | Performed by: NURSE PRACTITIONER

## 2021-01-14 NOTE — TELEPHONE ENCOUNTER
Pre-service called the patient to schedule his pulmonary referral.  Per patient doesn't need, already est/lak

## 2021-01-14 NOTE — PROGRESS NOTES
After consent obtained/verified, allergy injection given in back of R/L arm(s). Documentation of vial injection specific to arm(s) noted on Allergy Immunotherapy Administration Form. Patient waited 30 minutes for observation. Patient tolerated Silver vials well at 0.10ml without adverse reaction. SHOT REACTION TREATMENT INSTRUCTIONS    During the 30 minute wait after an allergy injection the following symptoms should be reported:    Itching other than at the injection site  Hives or swelling other than at the injection site  Redness other than at the injection site  Difficulty breathing  Chest tightness  Difficulty swallowing  Throat tightness    If these symptoms occur, NOTIFY PROVIDER and the following treatment should be administered:    1. Epinephrine 1:1000 IM - 0.3 ml if > 66 lbs or more, 0.15 ml if 33 - 63 lbs, or 0.1 ml if <33 lbs   2. Diphenhydramine - give all intramuscular:     2 to <6 years (off-label use): 6.25 mg,    6 to <12 years: 12.5 to 25 mg;    ?12 years: 25-50 mg.  3.  Famotidine:  Adults 40 mg oral    Adolescents age 12 years and >88 lbs: 40 mg    Children and Adolescents ? 12years of age: Initial: 0.25 mg/kg/dose   every 12 hours (maximum daily dose: 40 mg/day)    Epi dose may me repeated in 5-15 minutes if adequate resolution of symptoms does not occur    Patient should be observed for at least one hour after final epi dose and must be seen by provider. Patients cannot drive themselves if they have received diphenhydramine.

## 2021-01-19 NOTE — TELEPHONE ENCOUNTER
The patient called the office to schedule an allergy injection appointment 1/27/21. The patient called to verify that he needs to be scheduled with pulmonary medicine.

## 2021-01-20 ENCOUNTER — OFFICE VISIT (OUTPATIENT)
Dept: PHYSICAL MEDICINE AND REHAB | Age: 51
End: 2021-01-20
Payer: COMMERCIAL

## 2021-01-20 VITALS
HEIGHT: 73 IN | BODY MASS INDEX: 41.75 KG/M2 | WEIGHT: 315 LBS | SYSTOLIC BLOOD PRESSURE: 138 MMHG | DIASTOLIC BLOOD PRESSURE: 86 MMHG | TEMPERATURE: 98 F

## 2021-01-20 DIAGNOSIS — M51.36 LUMBAR DEGENERATIVE DISC DISEASE: ICD-10-CM

## 2021-01-20 DIAGNOSIS — M54.12 CERVICAL RADICULOPATHY: ICD-10-CM

## 2021-01-20 DIAGNOSIS — M62.830 BACK MUSCLE SPASM: ICD-10-CM

## 2021-01-20 DIAGNOSIS — M50.30 DDD (DEGENERATIVE DISC DISEASE), CERVICAL: ICD-10-CM

## 2021-01-20 DIAGNOSIS — M54.12 CERVICAL RADICULITIS: ICD-10-CM

## 2021-01-20 DIAGNOSIS — G89.4 CHRONIC PAIN SYNDROME: ICD-10-CM

## 2021-01-20 DIAGNOSIS — M47.816 SPONDYLOSIS OF LUMBAR REGION WITHOUT MYELOPATHY OR RADICULOPATHY: Primary | ICD-10-CM

## 2021-01-20 DIAGNOSIS — Z98.1 HX OF FUSION OF CERVICAL SPINE: ICD-10-CM

## 2021-01-20 DIAGNOSIS — M54.50 LUMBAR PAIN: ICD-10-CM

## 2021-01-20 PROCEDURE — 99213 OFFICE O/P EST LOW 20 MIN: CPT | Performed by: NURSE PRACTITIONER

## 2021-01-20 PROCEDURE — G8484 FLU IMMUNIZE NO ADMIN: HCPCS | Performed by: NURSE PRACTITIONER

## 2021-01-20 PROCEDURE — 1036F TOBACCO NON-USER: CPT | Performed by: NURSE PRACTITIONER

## 2021-01-20 PROCEDURE — G8417 CALC BMI ABV UP PARAM F/U: HCPCS | Performed by: NURSE PRACTITIONER

## 2021-01-20 PROCEDURE — G8427 DOCREV CUR MEDS BY ELIG CLIN: HCPCS | Performed by: NURSE PRACTITIONER

## 2021-01-20 ASSESSMENT — ENCOUNTER SYMPTOMS
RESPIRATORY NEGATIVE: 1
EYES NEGATIVE: 1
GASTROINTESTINAL NEGATIVE: 1
BACK PAIN: 1

## 2021-01-20 NOTE — PROGRESS NOTES
[facility] [address]  Dept: [phone]  Dept Fax: [phone]: [phone]    Visit Date: 1/20/2021    Functionality Assessment/Goals Worksheet     On a scale of 0 (Does not Interfere) to 10 (Completely Interferes)     1. Which number describes how during the past week pain has interfered with       the following:  A. General Activity:  8  B. Mood: 9  C. Walking Ability:  8  D. Normal Work (Includes both work outside the home and housework):  9  E. Relations with Other People:   9  F. Sleep:   10  G. Enjoyment of Life:   8    2. Patient Prefers to Take their Pain Medications:     []  On a regular basis   [x]  Only when necessary    []  Does not take pain medications    3. What are the Patient's Goals/Expectations for Visiting Pain Management? []  Learn about my pain    []  Receive Medication   []  Physical Therapy     []  Treat Depression   []  Receive Injections    []  Treat Sleep   []  Deal with Anxiety and Stress   []  Treat Opoid Dependence/Addiction   []  Other:      HPI:   Abdias Love is a 52 y.o. male is here today for    Chief Complaint: Lower back pain, leg pain, neck pain     HPI   2.5 month FU. Continues to have pain across lower back and radiateing in right lower extremity. Sharp and burning pain, stabbing and aching pain. Pain is more bothersome across lower back 70% bothersome across and 30% bothersome in leg Pain has been increased as he has been working and more active more bending, twisting, standing. Pain has been so increased that he is ready to move forward to injections as he now has vacation days. Current medications continue to help. Medications reviewed. Patient denies side effects with medications. Patient states he is taking medications as prescribed. Hedenies receiving pain medications from other sources.  He had 1 ER visit since last visit     Pain scale with out pain medications or at its worst is 10/10. Pain scale with pain medications or at its best is 7-8/10. Last dose of Norco was today  Drug screen reviewed from 11/5/2020 and was appropriate  Pill count was completed today and was appropriate  Patient does have naloxone available at home. Patient has not required use of naloxone at home since last office visit. The patientis allergic to dilantin [phenytoin]; dupixent [dupilumab]; oxycodone; and valium [diazepam]. Past Medical History  Glenna Zheng  has a past medical history of Asthma and Fibromyalgia. Past Surgical History  The patient  has a past surgical history that includes back surgery and knee surgery. Family History  This patient's family history includes Asthma in his father; Emphysema in his father and mother; High Blood Pressure in his father and mother; Other in his mother. Social History  Glenna Zheng  reports that he has never smoked. He has never used smokeless tobacco. He reports previous alcohol use. He reports that he does not use drugs. Medications    Current Outpatient Medications:     HYDROcodone-acetaminophen (NORCO) 5-325 MG per tablet, Take 1 tablet by mouth every 8 hours as needed for Pain for up to 30 days. , Disp: 90 tablet, Rfl: 0    lidocaine (XYLOCAINE) 5 % ointment, Apply topically as needed to painful areas of lower back and neck, Disp: 258 g, Rfl: 2    lidocaine (LIDODERM) 5 %, APPLY 1 TO 2 PATCHES TO SKIN DAILY *12 HOURS ON 12 HOURS OFF*, Disp: 60 patch, Rfl: 0    predniSONE (DELTASONE) 10 MG tablet, Take 40 mg (4 tablets) for the first 3 days; take 30 mg (3 tablets) for the next 2 days; take 20 mg (2 tablets) for the next day; take 10 mg (1 tablet) the last day, Disp: 24 tablet, Rfl: 0    butalbital-acetaminophen-caffeine (FIORICET, ESGIC) -40 MG per tablet, take 1 tablet by mouth four times a day if needed for headache, Disp: 180 tablet, Rfl: 0    amitriptyline (ELAVIL) 50 MG tablet, take 1 tablet by mouth at bedtime, Disp: 90 tablet, Rfl: 1    RA LORATADINE 10 MG tablet, take 1 tablet by mouth once daily, Disp: , Rfl:     montelukast (SINGULAIR) 10 MG tablet, Take 1 tablet by mouth nightly, Disp: 30 tablet, Rfl: 5    hydrocortisone valerate (WESTCORT) 0.2 % ointment, Apply topically daily. , Disp: 1 Tube, Rfl: 11    diphenhydrAMINE (BENADRYL) 25 MG capsule, Take 1 capsule by mouth every 6 hours as needed for Itching, Disp: 60 capsule, Rfl: 5    tiotropium (SPIRIVA RESPIMAT) 1.25 MCG/ACT AERS inhaler, Inhale 2 puffs into the lungs daily, Disp: 1 Inhaler, Rfl: 1    budesonide-formoterol (SYMBICORT) 160-4.5 MCG/ACT AERO, 2 puffs inhaled twice daily. Rinse mouth well after use., Disp: 1 Inhaler, Rfl: 5    fluticasone (FLONASE) 50 MCG/ACT nasal spray, 2 sprays by Each Nostril route daily, Disp: 3 Bottle, Rfl: 11    azelastine (OPTIVAR) 0.05 % ophthalmic solution, Place 1 drop into both eyes 2 times daily, Disp: 1 Bottle, Rfl: 11    cetirizine (ZYRTEC) 10 MG tablet, Take 1 tablet by mouth daily, Disp: 30 tablet, Rfl: 11    albuterol sulfate  (90 Base) MCG/ACT inhaler, Inhale 2 puffs into the lungs 4 times daily as needed for Wheezing, Disp: 3 Inhaler, Rfl: 3    albuterol (PROVENTIL) (2.5 MG/3ML) 0.083% nebulizer solution, Take 3 mLs by nebulization every 4 hours as needed for Wheezing, Disp: 375 mL, Rfl: 3    rosuvastatin (CRESTOR) 20 MG tablet, take 1 tablet by mouth once daily, Disp: 90 tablet, Rfl: 3    omeprazole (PRILOSEC) 20 MG delayed release capsule, take 1 capsule by mouth once daily, Disp: 90 capsule, Rfl: 3    ferrous sulfate (IRON 325) 325 (65 Fe) MG tablet, Take 1 tablet by mouth daily (with breakfast), Disp: 90 tablet, Rfl: 3    bumetanide (BUMEX) 1 MG tablet, Take 0.5 tablets by mouth daily, Disp: 30 tablet, Rfl: 0    EPINEPHrine (AUVI-Q) 0.3 MG/0.3ML SOAJ injection, Dispense 2 packs of 2 (total 4 devices).  Use as directed, STAT for allergic reaction. , Disp: 4 each, Rfl: 2    NARCAN 4 MG/0.1ML LIQD nasal spray, , Disp: , Rfl:     Multiple Vitamins-Minerals (MULTIVITAMIN ADULTS) TABS, Take 1 tablet by mouth daily, Disp: 90 tablet, Rfl: 3    ergocalciferol (ERGOCALCIFEROL) 80047 units capsule, Take 50,000 Units by mouth daily, Disp: , Rfl:     ALBUTEROL IN, Inhale into the lungs 4 times daily as needed , Disp: , Rfl:     Subjective:      Review of Systems   Constitutional: Negative. Negative for activity change. HENT: Negative. Eyes: Negative. Respiratory: Negative. Cardiovascular: Positive for leg swelling. Gastrointestinal: Negative. Endocrine: Negative. Genitourinary: Negative. Musculoskeletal: Positive for arthralgias, back pain, gait problem, myalgias, neck pain and neck stiffness. Skin: Negative. Neurological: Positive for weakness and numbness. Negative for headaches. Psychiatric/Behavioral: Negative. Objective:     Vitals:    01/20/21 0951   BP: 138/86   Temp: 98 °F (36.7 °C)   Weight: (!) 425 lb (192.8 kg)   Height: 6' 1\" (1.854 m)       Physical Exam  Vitals signs and nursing note reviewed. Constitutional:       General: He is not in acute distress. Appearance: He is obese. He is not diaphoretic. Comments: Obese   HENT:      Head: Normocephalic and atraumatic. Right Ear: External ear normal.      Left Ear: External ear normal.      Nose: Nose normal.      Mouth/Throat:      Mouth: Mucous membranes are moist.      Pharynx: No oropharyngeal exudate. Eyes:      General: No scleral icterus. Right eye: No discharge. Left eye: No discharge. Conjunctiva/sclera: Conjunctivae normal.      Pupils: Pupils are equal, round, and reactive to light. Neck:      Musculoskeletal: Full passive range of motion without pain, normal range of motion and neck supple. Normal range of motion. No edema, erythema, neck rigidity or muscular tenderness. Thyroid: No thyromegaly. Cardiovascular:      Rate and Rhythm: Normal rate and regular rhythm. Heart sounds: Normal heart sounds. No murmur. No friction rub. No gallop. Pulmonary:      Effort: Pulmonary effort is normal. No respiratory distress. Breath sounds: Normal breath sounds. No wheezing or rales. Chest:      Chest wall: No tenderness. Abdominal:      General: Bowel sounds are normal. There is no distension. Palpations: Abdomen is soft. Tenderness: There is no abdominal tenderness. There is no guarding or rebound. Musculoskeletal:         General: Tenderness present. Right knee: Tenderness found. Left knee: Tenderness found. Right ankle: He exhibits swelling. Left ankle: He exhibits swelling. Cervical back: He exhibits decreased range of motion, tenderness, pain and spasm. Thoracic back: He exhibits tenderness and spasm. Lumbar back: He exhibits decreased range of motion, tenderness and pain. Back:       Right lower leg: He exhibits swelling. Edema present. Left lower leg: He exhibits swelling. Edema present. Skin:     General: Skin is warm. Coloration: Skin is not pale. Findings: No erythema or rash. Neurological:      General: No focal deficit present. Mental Status: He is alert and oriented to person, place, and time. He is not disoriented. Cranial Nerves: No cranial nerve deficit. Sensory: No sensory deficit. Motor: No atrophy or abnormal muscle tone. Coordination: Coordination normal.      Gait: Gait normal.      Deep Tendon Reflexes: Babinski sign absent on the right side. Babinski sign absent on the left side. Reflex Scores:       Tricep reflexes are 1+ on the right side and 1+ on the left side. Bicep reflexes are 1+ on the right side and 1+ on the left side. Brachioradialis reflexes are 1+ on the right side and 1+ on the left side.        Patellar reflexes are 1+ on the right side and 1+ on the left side. Achilles reflexes are 1+ on the right side and 1+ on the left side. Comments: Motor 4/5   Psychiatric:         Attention and Perception: Attention normal. He is attentive. Mood and Affect: Mood normal. Mood is not anxious or depressed. Affect is not labile, blunt, angry or inappropriate. Speech: Speech normal. He is communicative. Speech is not rapid and pressured, delayed, slurred or tangential.         Behavior: Behavior normal. Behavior is not agitated, slowed, aggressive, withdrawn, hyperactive or combative. Behavior is cooperative. Thought Content: Thought content normal. Thought content is not paranoid or delusional. Thought content does not include homicidal or suicidal ideation. Thought content does not include homicidal or suicidal plan. Cognition and Memory: Cognition normal. Memory is not impaired. He does not exhibit impaired recent memory or impaired remote memory. Judgment: Judgment normal. Judgment is not impulsive or inappropriate. VAL test: + right side   Yeomans test: + right side   Gaenslen test: + right side      Assessment:     1. Spondylosis of lumbar region without myelopathy or radiculopathy    2. Lumbar degenerative disc disease    3. Lumbar pain    4. DDD (degenerative disc disease), cervical    5. Cervical radiculitis    6. Cervical radiculopathy    7. Back muscle spasm    8. Hx of fusion of cervical spine    9. Chronic pain syndrome            Plan:      · OARRS reviewed. Current MED: 15.00  · Patient was offered naloxone for home. Has  · Discussed long term side effects of medications, tolerance, dependency and addiction. · Previous UDS reviewed  · UDS preformed today for compliance. · Patient told can not receive any pain medications from any other source. · No evidence of abuse, diversion or aberrant behavior.    Medications and/or procedures to improve function and quality of life- patient understanding with this and that may not be pain free   Discussed with patient about safe storage of medications at home   Discussed possible weaning of medication dosing dependent on treatment/procedure results.  Discussed with patient about risks with procedure including infection, reaction to medication, increased pain, or bleeding. · Reveiwed L-xray and C-xray    · Plan Bilateral L-facet MBB # 1 @ L3-4, L4-5, and L5-S1 for diagnostic purpose. Procedure and risks discussed in detail with patient. · Discussed possible LESI in future  · Continue current pain medication Norco 5/325 TID prn- Filled 1/13/2021  · Continue Norflex- recently filled  · Continue Lidoderm patches- recently filled   · Patient is compliant, updated UDS ordered today    If patient is on blood thinners will need Pre Op Clearance per Cardiologist or PCP to hold all blood thinners prior to procedure for the required time frame, this is to be completed by the nursing staff during the scheduling process. Meds. Prescribed:   No orders of the defined types were placed in this encounter. Return for Bilateral L-facet MBB # 1 @ L3-4, L4-5, and L5-S1. , follow up after procedure.                Electronically signed by Kristopher Claude, APRN - CNP on1/20/2021 at 10:23 AM

## 2021-01-24 NOTE — TELEPHONE ENCOUNTER
No the patient CAN receive allergy shots here. Before you tell the patient no you should have checked with me    first.  Do not do this again please.

## 2021-01-27 ENCOUNTER — CLINICAL DOCUMENTATION (OUTPATIENT)
Dept: ALLERGY | Age: 51
End: 2021-01-27

## 2021-01-27 NOTE — PROGRESS NOTES
Patient can receive allergy injections in this office    Due to multiple times I have asked and repeatedly ordered patient PFT and attempted to manage patient asthma with NO Shows and cancellations, I feel it is best for patient to have his asthma and lung issues to be treated at his pulmonary doctors which he is established. According to the patient he most recently cancelled his PFT related to going to ER, being placed on steroids and his PCP stated to cancel his PFT according to the patient/  This office was never contact. The patient has been non-compliant with multiple,le request for PFT and medical office exam visit.s  I cannot manage this patient due to his non participant[ateoy efforts. Perhaps pulmonary will be more successful in managing his lung issues. Patient has been informed both verbally and letter mailed.

## 2021-01-28 ENCOUNTER — NURSE ONLY (OUTPATIENT)
Dept: ALLERGY | Age: 51
End: 2021-01-28
Payer: COMMERCIAL

## 2021-01-28 VITALS
HEART RATE: 70 BPM | RESPIRATION RATE: 18 BRPM | TEMPERATURE: 96.6 F | SYSTOLIC BLOOD PRESSURE: 138 MMHG | DIASTOLIC BLOOD PRESSURE: 88 MMHG

## 2021-01-28 DIAGNOSIS — J30.81 ALLERGY TO DOG DANDER: ICD-10-CM

## 2021-01-28 DIAGNOSIS — Z51.6 ENCOUNTER FOR DESENSITIZATION TO ALLERGEN: Primary | ICD-10-CM

## 2021-01-28 DIAGNOSIS — J30.1 ALLERGY TO TREES: ICD-10-CM

## 2021-01-28 DIAGNOSIS — Z51.6 ALLERGY DESENSITIZATION THERAPY: ICD-10-CM

## 2021-01-28 DIAGNOSIS — Z91.048 ALLERGY TO MOLD: ICD-10-CM

## 2021-01-28 PROCEDURE — 95117 IMMUNOTHERAPY INJECTIONS: CPT | Performed by: NURSE PRACTITIONER

## 2021-02-04 ENCOUNTER — TELEPHONE (OUTPATIENT)
Dept: FAMILY MEDICINE CLINIC | Age: 51
End: 2021-02-04

## 2021-02-04 ENCOUNTER — TELEPHONE (OUTPATIENT)
Dept: ALLERGY | Age: 51
End: 2021-02-04

## 2021-02-04 DIAGNOSIS — J45.50 SEVERE PERSISTENT ASTHMA WITHOUT COMPLICATION: ICD-10-CM

## 2021-02-04 NOTE — TELEPHONE ENCOUNTER
Left a message with pt stating that if he would like to cancel bear today that would be fine as he follows with pain management and they have already ordered an MRI.

## 2021-02-04 NOTE — TELEPHONE ENCOUNTER
Ashely Bee called requesting a refill on the following medications:  Requested Prescriptions     Pending Prescriptions Disp Refills    albuterol (PROVENTIL) (2.5 MG/3ML) 0.083% nebulizer solution 375 mL 3     Sig: Take 3 mLs by nebulization every 4 hours as needed for Wheezing     Pharmacy verified: [facility] on [address]  .[address]    PATIENT SAID HE IS NO LONGER SEEING THE ALLERGIST FOR HIS ASTHMA, ONLY FOR ALLERGY INJECTIONS    Date of last visit: 7/13/2020  Date of next visit (if applicable): 5/33/5001

## 2021-02-04 NOTE — TELEPHONE ENCOUNTER
[facility] received a call from:    Name of Caller: Xenia Ray    Relationship to patient: Self    Organization name: na    Best contact number: [phone]    Reason for call: Patient calling to see if he still needs to come to his 10 am appointment today since he has not done his mri for back pain yet.  Please give him a call to advise

## 2021-02-05 RX ORDER — ALBUTEROL SULFATE 2.5 MG/3ML
2.5 SOLUTION RESPIRATORY (INHALATION) EVERY 4 HOURS PRN
Qty: 375 ML | Refills: 3 | Status: SHIPPED | OUTPATIENT
Start: 2021-02-05 | End: 2021-08-19 | Stop reason: SDUPTHER

## 2021-02-08 DIAGNOSIS — G89.4 CHRONIC PAIN SYNDROME: ICD-10-CM

## 2021-02-08 RX ORDER — HYDROCODONE BITARTRATE AND ACETAMINOPHEN 5; 325 MG/1; MG/1
1 TABLET ORAL EVERY 8 HOURS PRN
Qty: 90 TABLET | Refills: 0 | Status: SHIPPED | OUTPATIENT
Start: 2021-02-12 | End: 2021-03-15 | Stop reason: SDUPTHER

## 2021-02-08 NOTE — TELEPHONE ENCOUNTER
Dash Solis called requesting a refill on the following medications:  Requested Prescriptions     Pending Prescriptions Disp Refills    HYDROcodone-acetaminophen (NORCO) 5-325 MG per tablet 90 tablet 0     Sig: Take 1 tablet by mouth every 8 hours as needed for Pain for up to 30 days. Pharmacy verified[facility]      Date of last visit: 1/20/21  Date of next visit (if applicable): Visit date not found

## 2021-02-08 NOTE — TELEPHONE ENCOUNTER
OARRS reviewed. UDS: + for Dihydrocodeine, Hydrocodone, Norhydrocodone, Hydromorphone   Last seen: 1/20/2021.  Follow-up: 2/25/21

## 2021-02-11 ENCOUNTER — HOSPITAL ENCOUNTER (OUTPATIENT)
Age: 51
Setting detail: OUTPATIENT SURGERY
Discharge: HOME | End: 2021-02-11
Attending: PAIN MEDICINE | Admitting: PAIN MEDICINE
Payer: COMMERCIAL

## 2021-02-11 ENCOUNTER — ANESTHESIA EVENT (OUTPATIENT)
Dept: OPERATING ROOM | Age: 51
End: 2021-02-11
Payer: COMMERCIAL

## 2021-02-11 ENCOUNTER — ANESTHESIA (OUTPATIENT)
Dept: OPERATING ROOM | Age: 51
End: 2021-02-11
Payer: COMMERCIAL

## 2021-02-11 ENCOUNTER — TELEPHONE (OUTPATIENT)
Dept: PHYSICAL MEDICINE AND REHAB | Age: 51
End: 2021-02-11

## 2021-02-11 ENCOUNTER — APPOINTMENT (OUTPATIENT)
Dept: GENERAL RADIOLOGY | Age: 51
End: 2021-02-11
Attending: PAIN MEDICINE
Payer: COMMERCIAL

## 2021-02-11 VITALS
OXYGEN SATURATION: 95 % | HEART RATE: 63 BPM | RESPIRATION RATE: 16 BRPM | DIASTOLIC BLOOD PRESSURE: 84 MMHG | SYSTOLIC BLOOD PRESSURE: 145 MMHG | TEMPERATURE: 96.8 F

## 2021-02-11 VITALS
DIASTOLIC BLOOD PRESSURE: 61 MMHG | SYSTOLIC BLOOD PRESSURE: 131 MMHG | OXYGEN SATURATION: 98 % | RESPIRATION RATE: 17 BRPM

## 2021-02-11 PROCEDURE — 2500000003 HC RX 250 WO HCPCS: Performed by: NURSE ANESTHETIST, CERTIFIED REGISTERED

## 2021-02-11 PROCEDURE — 2709999900 HC NON-CHARGEABLE SUPPLY: Performed by: PAIN MEDICINE

## 2021-02-11 PROCEDURE — 7100000011 HC PHASE II RECOVERY - ADDTL 15 MIN: Performed by: PAIN MEDICINE

## 2021-02-11 PROCEDURE — 3209999900 FLUORO FOR SURGICAL PROCEDURES

## 2021-02-11 PROCEDURE — 64494 INJ PARAVERT F JNT L/S 2 LEV: CPT | Performed by: PAIN MEDICINE

## 2021-02-11 PROCEDURE — 2500000003 HC RX 250 WO HCPCS: Performed by: PAIN MEDICINE

## 2021-02-11 PROCEDURE — 3700000000 HC ANESTHESIA ATTENDED CARE: Performed by: PAIN MEDICINE

## 2021-02-11 PROCEDURE — 6360000002 HC RX W HCPCS: Performed by: PAIN MEDICINE

## 2021-02-11 PROCEDURE — 3600000055 HC PAIN LEVEL 3 ADDL 15 MIN: Performed by: PAIN MEDICINE

## 2021-02-11 PROCEDURE — 64495 INJ PARAVERT F JNT L/S 3 LEV: CPT | Performed by: PAIN MEDICINE

## 2021-02-11 PROCEDURE — 3700000001 HC ADD 15 MINUTES (ANESTHESIA): Performed by: PAIN MEDICINE

## 2021-02-11 PROCEDURE — 3600000054 HC PAIN LEVEL 3 BASE: Performed by: PAIN MEDICINE

## 2021-02-11 PROCEDURE — 64493 INJ PARAVERT F JNT L/S 1 LEV: CPT | Performed by: PAIN MEDICINE

## 2021-02-11 PROCEDURE — 7100000010 HC PHASE II RECOVERY - FIRST 15 MIN: Performed by: PAIN MEDICINE

## 2021-02-11 PROCEDURE — 6360000002 HC RX W HCPCS: Performed by: NURSE ANESTHETIST, CERTIFIED REGISTERED

## 2021-02-11 RX ORDER — LIDOCAINE HYDROCHLORIDE 10 MG/ML
INJECTION, SOLUTION INFILTRATION; PERINEURAL PRN
Status: DISCONTINUED | OUTPATIENT
Start: 2021-02-11 | End: 2021-02-11 | Stop reason: ALTCHOICE

## 2021-02-11 RX ORDER — BUPIVACAINE HYDROCHLORIDE 2.5 MG/ML
INJECTION, SOLUTION EPIDURAL; INFILTRATION; INTRACAUDAL PRN
Status: DISCONTINUED | OUTPATIENT
Start: 2021-02-11 | End: 2021-02-11 | Stop reason: ALTCHOICE

## 2021-02-11 RX ORDER — LIDOCAINE HYDROCHLORIDE 20 MG/ML
INJECTION, SOLUTION EPIDURAL; INFILTRATION; INTRACAUDAL; PERINEURAL PRN
Status: DISCONTINUED | OUTPATIENT
Start: 2021-02-11 | End: 2021-02-11 | Stop reason: SDUPTHER

## 2021-02-11 RX ORDER — PROPOFOL 10 MG/ML
INJECTION, EMULSION INTRAVENOUS PRN
Status: DISCONTINUED | OUTPATIENT
Start: 2021-02-11 | End: 2021-02-11 | Stop reason: SDUPTHER

## 2021-02-11 RX ORDER — METHYLPREDNISOLONE ACETATE 80 MG/ML
INJECTION, SUSPENSION INTRA-ARTICULAR; INTRALESIONAL; INTRAMUSCULAR; SOFT TISSUE PRN
Status: DISCONTINUED | OUTPATIENT
Start: 2021-02-11 | End: 2021-02-11 | Stop reason: ALTCHOICE

## 2021-02-11 RX ADMIN — PROPOFOL 50 MG: 10 INJECTION, EMULSION INTRAVENOUS at 10:19

## 2021-02-11 RX ADMIN — PROPOFOL 40 MG: 10 INJECTION, EMULSION INTRAVENOUS at 10:25

## 2021-02-11 RX ADMIN — PROPOFOL 40 MG: 10 INJECTION, EMULSION INTRAVENOUS at 10:27

## 2021-02-11 RX ADMIN — PROPOFOL 50 MG: 10 INJECTION, EMULSION INTRAVENOUS at 10:29

## 2021-02-11 RX ADMIN — PROPOFOL 40 MG: 10 INJECTION, EMULSION INTRAVENOUS at 10:21

## 2021-02-11 RX ADMIN — PROPOFOL 30 MG: 10 INJECTION, EMULSION INTRAVENOUS at 10:23

## 2021-02-11 RX ADMIN — LIDOCAINE HYDROCHLORIDE 60 MG: 20 INJECTION, SOLUTION EPIDURAL; INFILTRATION; INTRACAUDAL; PERINEURAL at 10:19

## 2021-02-11 ASSESSMENT — PULMONARY FUNCTION TESTS
PIF_VALUE: 0

## 2021-02-11 ASSESSMENT — PAIN SCALES - GENERAL: PAINLEVEL_OUTOF10: 0

## 2021-02-11 NOTE — H&P
H&P    Continues to have pain across lower back and radiateing in right lower extremity. Sharp and burning pain, stabbing and aching pain. Pain is more bothersome across lower back 70% bothersome across and 30% bothersome in leg Pain has been increased as he has been working and more active more bending, twisting, standing. Pain has been so increased that he is ready to move forward to injections as he now has vacation days.      Current medications continue to help.      Medications reviewed. Patient denies side effects with medications. Patient states he is taking medications as prescribed. Hedenies receiving pain medications from other sources. He had 1 ER visit since last visit      Pain scale with out pain medications or at its worst is 10/10. Pain scale with pain medications or at its best is 7-8/10. Last dose of Norco was today  Drug screen reviewed from 11/5/2020 and was appropriate  Pill count was completed today and was appropriate  Patient does have naloxone available at home. Patient has not required use of naloxone at home since last office visit.         The patientis allergic to dilantin [phenytoin]; dupixent [dupilumab]; oxycodone; and valium [diazepam].     Past Medical History  Daniel Hollins  has a past medical history of Asthma and Fibromyalgia.     Past Surgical History  The patient  has a past surgical history that includes back surgery and knee surgery.     Family History  This patient's family history includes Asthma in his father; Emphysema in his father and mother; High Blood Pressure in his father and mother; Other in his mother.  [address] [place] [address]  reports that he has never smoked. He has never used smokeless tobacco. He reports previous alcohol use.  He reports that he does not use drugs.     Medications    Current Medication      Current Outpatient Medications:     HYDROcodone-acetaminophen (NORCO) 5-325 MG per tablet, Take 1 tablet by mouth every 8 hours as needed for Pain for up to 30 days. , Disp: 90 tablet, Rfl: 0    lidocaine (XYLOCAINE) 5 % ointment, Apply topically as needed to painful areas of lower back and neck, Disp: 258 g, Rfl: 2    lidocaine (LIDODERM) 5 %, APPLY 1 TO 2 PATCHES TO SKIN DAILY *12 HOURS ON 12 HOURS OFF*, Disp: 60 patch, Rfl: 0    predniSONE (DELTASONE) 10 MG tablet, Take 40 mg (4 tablets) for the first 3 days; take 30 mg (3 tablets) for the next 2 days; take 20 mg (2 tablets) for the next day; take 10 mg (1 tablet) the last day, Disp: 24 tablet, Rfl: 0    butalbital-acetaminophen-caffeine (FIORICET, ESGIC) -40 MG per tablet, take 1 tablet by mouth four times a day if needed for headache, Disp: 180 tablet, Rfl: 0    amitriptyline (ELAVIL) 50 MG tablet, take 1 tablet by mouth at bedtime, Disp: 90 tablet, Rfl: 1    RA LORATADINE 10 MG tablet, take 1 tablet by mouth once daily, Disp: , Rfl:     montelukast (SINGULAIR) 10 MG tablet, Take 1 tablet by mouth nightly, Disp: 30 tablet, Rfl: 5    hydrocortisone valerate (WESTCORT) 0.2 % ointment, Apply topically daily. , Disp: 1 Tube, Rfl: 11    diphenhydrAMINE (BENADRYL) 25 MG capsule, Take 1 capsule by mouth every 6 hours as needed for Itching, Disp: 60 capsule, Rfl: 5    tiotropium (SPIRIVA RESPIMAT) 1.25 MCG/ACT AERS inhaler, Inhale 2 puffs into the lungs daily, Disp: 1 Inhaler, Rfl: 1    budesonide-formoterol (SYMBICORT) 160-4.5 MCG/ACT AERO, 2 puffs inhaled twice daily.   Rinse mouth well after use., Disp: 1 Inhaler, Rfl: 5    fluticasone (FLONASE) 50 MCG/ACT nasal spray, 2 sprays by Each Nostril route daily, Disp: 3 Bottle, Rfl: 11    azelastine (OPTIVAR) 0.05 % ophthalmic solution, Place 1 drop into both eyes 2 times daily, Disp: 1 Bottle, Rfl: 11    cetirizine (ZYRTEC) 10 MG tablet, Take 1 tablet by mouth daily, Disp: 30 tablet, Rfl: 11    albuterol sulfate  (90 Base) MCG/ACT inhaler, Inhale 2 puffs into the lungs 4 times daily as needed for Wheezing, Disp: 3 Inhaler, Rfl: 3    albuterol (PROVENTIL) (2.5 MG/3ML) 0.083% nebulizer solution, Take 3 mLs by nebulization every 4 hours as needed for Wheezing, Disp: 375 mL, Rfl: 3    rosuvastatin (CRESTOR) 20 MG tablet, take 1 tablet by mouth once daily, Disp: 90 tablet, Rfl: 3    omeprazole (PRILOSEC) 20 MG delayed release capsule, take 1 capsule by mouth once daily, Disp: 90 capsule, Rfl: 3    ferrous sulfate (IRON 325) 325 (65 Fe) MG tablet, Take 1 tablet by mouth daily (with breakfast), Disp: 90 tablet, Rfl: 3    bumetanide (BUMEX) 1 MG tablet, Take 0.5 tablets by mouth daily, Disp: 30 tablet, Rfl: 0    EPINEPHrine (AUVI-Q) 0.3 MG/0.3ML SOAJ injection, Dispense 2 packs of 2 (total 4 devices). Use as directed, STAT for allergic reaction. , Disp: 4 each, Rfl: 2    NARCAN 4 MG/0.1ML LIQD nasal spray, , Disp: , Rfl:     Multiple Vitamins-Minerals (MULTIVITAMIN ADULTS) TABS, Take 1 tablet by mouth daily, Disp: 90 tablet, Rfl: 3    ergocalciferol (ERGOCALCIFEROL) 06685 units capsule, Take 50,000 Units by mouth daily, Disp: , Rfl:     ALBUTEROL IN, Inhale into the lungs 4 times daily as needed , Disp: , Rfl:         Subjective:      Review of Systems   Constitutional: Negative. Negative for activity change. HENT: Negative. Eyes: Negative. Respiratory: Negative. Cardiovascular: Positive for leg swelling. Gastrointestinal: Negative. Endocrine: Negative. Genitourinary: Negative. Musculoskeletal: Positive for arthralgias, back pain, gait problem, myalgias, neck pain and neck stiffness. Skin: Negative. Neurological: Positive for weakness and numbness. Negative for headaches. Psychiatric/Behavioral: Negative.          Objective:      Vitals       Vitals:     01/20/21 0951   BP: 138/86   Temp: 98 °F (36.7 °C)   Weight: (!) 425 lb (192.8 kg)   Height: 6' 1\" (1.854 m)            Physical Exam  Vitals signs and nursing note reviewed. Constitutional:       General: He is not in acute distress. Appearance: He is obese.  He is not diaphoretic. Comments: Obese   HENT:      Head: Normocephalic and atraumatic. Right Ear: External ear normal.      Left Ear: External ear normal.      Nose: Nose normal.      Mouth/Throat:      Mouth: Mucous membranes are moist.      Pharynx: No oropharyngeal exudate. Eyes:      General: No scleral icterus. Right eye: No discharge. Left eye: No discharge. Conjunctiva/sclera: Conjunctivae normal.      Pupils: Pupils are equal, round, and reactive to light. Neck:      Musculoskeletal: Full passive range of motion without pain, normal range of motion and neck supple. Normal range of motion. No edema, erythema, neck rigidity or muscular tenderness. Thyroid: No thyromegaly. Cardiovascular:      Rate and Rhythm: Normal rate and regular rhythm. Heart sounds: Normal heart sounds. No murmur. No friction rub. No gallop. Pulmonary:      Effort: Pulmonary effort is normal. No respiratory distress. Breath sounds: Normal breath sounds. No wheezing or rales. Chest:      Chest wall: No tenderness. Abdominal:      General: Bowel sounds are normal. There is no distension. Palpations: Abdomen is soft. Tenderness: There is no abdominal tenderness. There is no guarding or rebound. Musculoskeletal:         General: Tenderness present. Right knee: Tenderness found. Left knee: Tenderness found. Right ankle: He exhibits swelling. Left ankle: He exhibits swelling. Cervical back: He exhibits decreased range of motion, tenderness, pain and spasm. Thoracic back: He exhibits tenderness and spasm. Lumbar back: He exhibits decreased range of motion, tenderness and pain. Back:       Right lower leg: He exhibits swelling. Edema present. Left lower leg: He exhibits swelling. Edema present. Skin:     General: Skin is warm. Coloration: Skin is not pale. Findings: No erythema or rash.    Neurological:      General: No focal deficit present. Mental Status: He is alert and oriented to person, place, and time. He is not disoriented. Cranial Nerves: No cranial nerve deficit. Sensory: No sensory deficit. Motor: No atrophy or abnormal muscle tone. Coordination: Coordination normal.      Gait: Gait normal.      Deep Tendon Reflexes: Babinski sign absent on the right side. Babinski sign absent on the left side. Reflex Scores:       Tricep reflexes are 1+ on the right side and 1+ on the left side. Bicep reflexes are 1+ on the right side and 1+ on the left side. Brachioradialis reflexes are 1+ on the right side and 1+ on the left side. Patellar reflexes are 1+ on the right side and 1+ on the left side. Achilles reflexes are 1+ on the right side and 1+ on the left side. Comments: Motor 4/5   Psychiatric:         Attention and Perception: Attention normal. He is attentive. Mood and Affect: Mood normal. Mood is not anxious or depressed. Affect is not labile, blunt, angry or inappropriate. Speech: Speech normal. He is communicative. Speech is not rapid and pressured, delayed, slurred or tangential.         Behavior: Behavior normal. Behavior is not agitated, slowed, aggressive, withdrawn, hyperactive or combative. Behavior is cooperative. Thought Content: Thought content normal. Thought content is not paranoid or delusional. Thought content does not include homicidal or suicidal ideation. Thought content does not include homicidal or suicidal plan. Cognition and Memory: Cognition normal. Memory is not impaired. He does not exhibit impaired recent memory or impaired remote memory. Judgment: Judgment normal. Judgment is not impulsive or inappropriate.         VAL test: + right side   Yeomans test: + right side   Gaenslen test: + right side   Assessment:      1. Spondylosis of lumbar region without myelopathy or radiculopathy    2.  Lumbar degenerative disc disease    3. Lumbar pain    4. DDD (degenerative disc disease), cervical    5. Cervical radiculitis    6. Cervical radiculopathy    7. Back muscle spasm    8. Hx of fusion of cervical spine    9. Chronic pain syndrome       Plan:      · OARRS reviewed. Current MED: 15.00  · Patient was offered naloxone for home. Has  · Discussed long term side effects of medications, tolerance, dependency and addiction. · Previous UDS reviewed  · UDS preformed today for compliance. · Patient told can not receive any pain medications from any other source. · No evidence of abuse, diversion or aberrant behavior. · Medications and/or procedures to improve function and quality of life- patient understanding with this and that may not be pain free  · Discussed with patient about safe storage of medications at home  · Discussed possible weaning of medication dosing dependent on treatment/procedure results. · Discussed with patient about risks with procedure including infection, reaction to medication, increased pain, or bleeding. · Reveiwed L-xray and C-xray    · Plan Bilateral L-facet MBB # 1 @ L3-4, L4-5, and L5-S1 for diagnostic purpose. Procedure and risks discussed in detail with patient.   · Discussed possible LESI in future  · Continue current pain medication Norco 5/325 TID prn- Filled 1/13/2021  · Continue Norflex- recently filled  · Continue Lidoderm patches- recently filled   · Patient is compliant, updated UDS ordered today   · If patient is on blood thinners will need Pre Op Clearance per Cardiologist or PCP to hold all blood thinners prior to procedure for the required time frame, this is to be completed by the nursing staff during the scheduling process.        Meds.  Prescribed:     Encounter Medications    No orders of the defined types were placed in this encounter.         Return for Bilateral L-facet MBB # 1 @ L3-4, L4-5, and L5-S1. , follow up after procedure.

## 2021-02-11 NOTE — TELEPHONE ENCOUNTER
LVM for patient regarding Pre-Procedure Travel Screening Questions. Advised to call back if experiencing any Covid symptoms. Matt Allan

## 2021-02-11 NOTE — ANESTHESIA POSTPROCEDURE EVALUATION
Department of Anesthesiology  Postprocedure Note    Patient: Meaghan Carrasco  MRN: [id number]  YOB: 1970  Date of evaluation: 2/11/2021  Time:  12:44 PM     Procedure Summary     Date: 02/11/21 Room / Location: [address] [place] [address] 03 / [facility]    Anesthesia Start: 1521 Anesthesia Stop: 9651    Procedure: Bilateral L-facet MBB # 1 @ L3-4, L4-5, and L5-S1 (Bilateral Back) Diagnosis: (Lumbar spondylosis)    Surgeons: Marion Gomez MD Responsible Provider: Go Alicea DO    Anesthesia Type: MAC ASA Status: 3          Anesthesia Type: MAC    To Phase I:      To Phase II: To Score: 10    Last vitals: Reviewed and per EMR flowsheets. Anesthesia Post Evaluation    Comments: Nimco Quinones 62  POST-ANESTHESIA NOTE       Name:  Meaghan Carrasco                                         Age:  52 y.o. MRN:  [id number]      Last Vitals:  BP (!) 145/84   Pulse 63   Temp 96.8 °F (36 °C) (Temporal)   Resp 16   SpO2 95%   Patient Vitals in the past 4 hrs:  02/11/21 1033, BP:(!) 145/84, Temp:96.8 °F (36 °C), Temp src:Temporal, Pulse:63, Resp:16, SpO2:95 %    Level of Consciousness:  Awake    Respiratory:  Stable    Oxygen Saturation:  Stable    Cardiovascular:  Stable    Hydration:  Adequate    PONV:  Stable    Post-op Pain:  Adequate analgesia    Post-op Assessment:  No apparent anesthetic complications    Additional Follow-Up / Treatment / Comment:  Vahid Venegas.  [address] [facility]   February 11, 2021   12:44 PM

## 2021-02-11 NOTE — ANESTHESIA PRE PROCEDURE
Department of Anesthesiology  Preprocedure Note       Name:  Gianna Geiger   Age:  52 y.o.  :  1970                                          MRN:  [id number]         Date:  2021      Surgeon: Umu Yu):  Eduardo Rock MD    Procedure: Procedure(s):  Bilateral L-facet MBB # 1 @ L3-4, L4-5, and L5-S1    Medications prior to admission:   Prior to Admission medications    Medication Sig Start Date End Date Taking? Authorizing Provider   HYDROcodone-acetaminophen (NORCO) 5-325 MG per tablet Take 1 tablet by mouth every 8 hours as needed for Pain for up to 30 days. 2/12/21 3/14/21 Yes MIRNA East CNP   albuterol (PROVENTIL) (2.5 MG/3ML) 0.083% nebulizer solution Take 3 mLs by nebulization every 4 hours as needed for Wheezing 21  Yes MIRNA Peoples CNP   lidocaine (XYLOCAINE) 5 % ointment Apply topically as needed to painful areas of lower back and neck 21 Yes MIRNA Quintero CNP   lidocaine (LIDODERM) 5 % APPLY 1 TO 2 PATCHES TO SKIN DAILY *12 HOURS ON 12 HOURS OFF* 21 Yes MIRNA Quintero CNP   butalbital-acetaminophen-caffeine (FIORICET, ESGIC) -40 MG per tablet take 1 tablet by mouth four times a day if needed for headache 21  Yes MIRNA Smith CNP   amitriptyline (ELAVIL) 50 MG tablet take 1 tablet by mouth at bedtime 20  Yes MIRNA Smith CNP   RA LORATADINE 10 MG tablet take 1 tablet by mouth once daily 20  Yes Historical Provider, MD   diphenhydrAMINE (BENADRYL) 25 MG capsule Take 1 capsule by mouth every 6 hours as needed for Itching 20  Yes MIRNA Ag CNP   tiotropium (SPIRIVA RESPIMAT) 1.25 MCG/ACT AERS inhaler Inhale 2 puffs into the lungs daily 20  Yes MIRNA Ag CNP   budesonide-formoterol (SYMBICORT) 160-4.5 MCG/ACT AERO 2 puffs inhaled twice daily. Rinse mouth well after use.  20  Yes MIRNA Ag - CNP   azelastine (OPTIVAR) 0.05 % ophthalmic solution Place 1 drop into both eyes 2 times daily 8/31/20  Yes MIRNA Salazar CNP   cetirizine (ZYRTEC) 10 MG tablet Take 1 tablet by mouth daily 8/31/20  Yes MIRNA Salazar CNP   albuterol sulfate  (90 Base) MCG/ACT inhaler Inhale 2 puffs into the lungs 4 times daily as needed for Wheezing 8/31/20  Yes MIRNA Salazar CNP   rosuvastatin (CRESTOR) 20 MG tablet take 1 tablet by mouth once daily 8/28/20  Yes MIRNA Ochoa CNP   omeprazole (PRILOSEC) 20 MG delayed release capsule take 1 capsule by mouth once daily 8/28/20  Yes MIRNA Ochoa CNP   ferrous sulfate (IRON 325) 325 (65 Fe) MG tablet Take 1 tablet by mouth daily (with breakfast) 8/4/20  Yes MIRNA Ochoa CNP   bumetanide (BUMEX) 1 MG tablet Take 0.5 tablets by mouth daily 5/28/20  Yes MIRNA Frazier CNP   NARCAN 4 MG/0.1ML LIQD nasal spray  1/10/20  Yes Historical Provider, MD   Multiple Vitamins-Minerals (MULTIVITAMIN ADULTS) TABS Take 1 tablet by mouth daily 1/9/20  Yes MIRNA Ochoa CNP   ergocalciferol (ERGOCALCIFEROL) 81558 units capsule Take 50,000 Units by mouth daily   Yes Historical Provider, MD   ALBUTEROL IN Inhale into the lungs 4 times daily as needed    Yes Historical Provider, MD   montelukast (SINGULAIR) 10 MG tablet Take 1 tablet by mouth nightly 11/18/20   MIRNA Salazar CNP   hydrocortisone valerate (WESTCORT) 0.2 % ointment Apply topically daily. 11/18/20   MIRNA Salazar CNP   fluticasone [facility]) 50 MCG/ACT nasal spray 2 sprays by Each Nostril route daily 8/31/20   MIRNA Salazar CNP   EPINEPHrine (AUVI-Q) 0.3 MG/0.3ML SOAJ injection Dispense 2 packs of 2 (total 4 devices). Use as directed, STAT for allergic reaction. 2/10/20   Lonie Whitehall, APRN - CNP       Current medications:    No current facility-administered medications for this encounter. Allergies:     Allergies   Allergen Reactions    Dilantin [Phenytoin] Anaphylaxis and Swelling    Dupixent [Dupilumab]      HIVES, THROAT ITCHING    Oxycodone      THROAT TIGHTNESS    Valium [Diazepam]        Problem List:    Patient Active Problem List   Diagnosis Code    Cervical stenosis of spinal canal M48.02    Lower back pain M54.5    Disease of spinal cord ([facility]) G95.9    Morbid obesity with BMI of 50.0-59.9, adult ([facility]) E66.01, Z68.43    Other cervical disc displacement, unspecified cervical region M50.20    Radiculopathy affecting upper extremity M54.10    S/P cervical spinal fusion Z98.1    Sleep apnea G47.30    Hyperlipidemia E78.5    H/O: HTN (hypertension) Z86.79    Moderate asthma without complication E08.520    Enlarged heart I51.7    Chronic pansinusitis J32.4    Pain in both lower extremities M79.604, M79.605    Non-seasonal allergic rhinitis due to pollen J30.1    Allergy desensitization therapy Z51.6       Past Medical History:        Diagnosis Date    Asthma     Fibromyalgia        Past Surgical History:        Procedure Laterality Date    BACK SURGERY      KNEE SURGERY      lt       Social History:    Social History     Tobacco Use    Smoking status: Never Smoker    Smokeless tobacco: Never Used   Substance Use Topics    Alcohol use: Not Currently                                Counseling given: Not Answered      Vital Signs (Current): There were no vitals filed for this visit.                                            BP Readings from Last 3 Encounters:   01/28/21 138/88   01/20/21 138/86   01/14/21 (!) 142/90       NPO Status: Time of last liquid consumption: 2355                        Time of last solid consumption: 2355                        Date of last liquid consumption: 02/10/21                        Date of last solid food consumption: 02/10/21    BMI:   Wt Readings from Last 3 Encounters:   01/20/21 (!) 425 lb (192.8 kg)   01/12/21 (!) 425 lb 9.6 oz (193.1 kg)   01/07/21 (!) 418 lb (189.6 kg)     There is no height or weight on file to calculate BMI.    CBC:   Lab Results   Component Value Date    WBC 4.8 01/11/2021    RBC 4.45 01/11/2021    HGB 13.7 01/11/2021    HCT 42.6 01/11/2021    MCV 95.7 01/11/2021     01/11/2021       CMP:   Lab Results   Component Value Date     01/11/2021    K 4.1 01/11/2021     01/11/2021    CO2 28 01/11/2021    BUN 13 01/11/2021    CREATININE 1.0 01/11/2021    LABGLOM 79 01/11/2021    GLUCOSE 100 01/11/2021    PROT 7.0 06/03/2020    CALCIUM 9.2 01/11/2021    BILITOT 0.6 06/03/2020    ALKPHOS 122 06/03/2020    AST 29 06/03/2020    ALT 40 06/03/2020       POC Tests: No results for input(s): POCGLU, POCNA, POCK, POCCL, POCBUN, POCHEMO, POCHCT in the last 72 hours. Coags:   Lab Results   Component Value Date    INR 1.03 02/11/2019       HCG (If Applicable): No results found for: PREGTESTUR, PREGSERUM, HCG, HCGQUANT     ABGs: No results found for: PHART, PO2ART, WEF8FGS, AZX6QIQ, BEART, [id number]     Type & Screen (If Applicable):  No results found for: LABABO, LABRH    Drug/Infectious Status (If Applicable):  No results found for: HIV, HEPCAB    COVID-19 Screening (If Applicable):   Lab Results   Component Value Date    COVID19 Not Detected 01/07/2021         Anesthesia Evaluation  Patient summary reviewed  Airway: Mallampati: III  TM distance: >3 FB   Neck ROM: full  Mouth opening: > = 3 FB Dental:          Pulmonary:   (+) sleep apnea:  asthma:                            Cardiovascular:                      Neuro/Psych:   (+) neuromuscular disease:,             GI/Hepatic/Renal:   (+) morbid obesity          Endo/Other:                     Abdominal:           Vascular:                                        Anesthesia Plan      MAC     ASA 3       Induction: intravenous. Anesthetic plan and risks discussed with patient. Plan discussed with BILL. Bridgett Rasmussen.  [facility],    2/11/2021

## 2021-02-11 NOTE — OP NOTE
Operative Note  Pre-Procedure Note    Patient Name: Efrain Smith   YOB: 1970  Room/Bed: [address] [place] [address] [facility]  Medical Record Number: [id number]  Date: 2/11/21      Indication:  Lower back pain  Consent: On file. Vital Signs: There were no vitals filed for this visit. Pre-Sedation Documentation and Exam:   Vital signs have been reviewed (see flow sheet for vitals). Sedation/ Anesthesia Plan:   MAC    Patient is an appropriate candidate for plan of sedation: yes    Preoperative Diagnosis:   L-spondylosis    Postoperative Diagnosis:   As above    Procedure Performed:  Diagnostic/Confirmatory median branch blocks at the levels of L3-4,L4-5 and L5-S1 bilateral under fluoroscopic guidance # 1. Indication for the Procedure: The patient has a history of chronic low back pain that is not responding well to the conservative treatment. The patient's pain is mostly axial in nature. Pain is interfering with the activities of daily living. Physical examination revealed facet tenderness and facet loading is positive. The procedure and risks  were discussed with the patient and an informed consent was obtained. Procedure:     Patient is placed in prone position and skin over  the back was prepped and draped in sterile manner. Then using fluoroscopy the junction of the transverse process of the vertebra with the superior process of the facet joint was observed and the view was optimized. The skin and deep tissues posterior were infiltrated with 30 ml of 1% lidocaine. Then a #22-gauge  5-1/2 inch spinal needle was introduced through the skin wheal under fluoroscopy guidance such that the tip of the needle lies at the junction of the transverse process with the superior processes of the facet joint. Then after negative aspiration a total of 12 ml of 0.25% Marcaine and depomedrol (total 80 mg) was injected through the needles in divided doses.    EBL-0     For L5 Median branch block the junction of the ala of  the sacrum with the superior articular process of the facet joint was taken as a reference point and L4 median branch the junction of the transverse process the L5 with the superolateral possible facet joint was taken to the point and healthy median branch the junction of the transverse process of L4 with the superior lateral process of the facet joint was taken as a reference point. For S1 median branch the most lateral and superior aspect of S1 foramina was taken as a reference point. Patient's vital signs and neurological status remained stable through  the procedure and post procedural  Period. Patient was instructed to keep track of pain for next 24 hours. every hour and bring it with next visit. Patient tollerated the procedure well and was discharged home in stable condition.     Electronically signed by Pascual Hernandez MD on 2/11/21 at 10:20 AM EST

## 2021-02-11 NOTE — H&P
[address] [address]  History and Physical Update    Pt Name: Rhianna Caceres  MRN: [id number]  YOB: 1970  Date of evaluation: 2/11/2021      I have examined the patient and reviewed the H&P/Consult and there are no changes to the patient or plans.         Electronically signed by Monik Avery MD on 2/11/2021 at 9:59 AM

## 2021-02-11 NOTE — PROGRESS NOTES
1033: Patient to phase 2 recovery room via cart. Patient is awake and alert. Report received from surgical RN, Terry Nath. Patient's vitals obtained, see charting. 1035: Patient is denying pain and nausea at this time. IV is INT'd.   7978: Offered drink and snack provided to the patient. Bed is in the lowest position and call light is within reach. 1049: Patient is resting in bed on his phone- no needs or complaints at this time. 1102: Patient is resting in bed quietly on his phone. IV removed from left hand with band aid in place. 1112: Patient is sitting on the edge of his bed, on the phone, waiting on his ride. 1117: Patient ambulated to the car and discharged home in stable condition with his significant other.

## 2021-02-15 RX ORDER — LIDOCAINE 50 MG/G
PATCH TOPICAL
Qty: 60 PATCH | Refills: 0 | Status: SHIPPED | OUTPATIENT
Start: 2021-02-15 | End: 2021-03-15 | Stop reason: SDUPTHER

## 2021-02-15 NOTE — TELEPHONE ENCOUNTER
OARRS reviewed. UDS: + for  Dihydrocodeine, Hydrocodone, norhydrocodone and hydromorphone. Last seen: 1/20/2021.  Follow-up: 2/25/21

## 2021-02-17 RX ORDER — ORPHENADRINE CITRATE 100 MG/1
100 TABLET, EXTENDED RELEASE ORAL 2 TIMES DAILY
Qty: 60 TABLET | Refills: 0 | Status: SHIPPED | OUTPATIENT
Start: 2021-02-17 | End: 2021-03-15 | Stop reason: SDUPTHER

## 2021-02-25 ENCOUNTER — NURSE ONLY (OUTPATIENT)
Dept: ALLERGY | Age: 51
End: 2021-02-25
Payer: MEDICAID

## 2021-02-25 ENCOUNTER — TELEPHONE (OUTPATIENT)
Dept: ALLERGY | Age: 51
End: 2021-02-25

## 2021-02-25 VITALS
DIASTOLIC BLOOD PRESSURE: 88 MMHG | RESPIRATION RATE: 16 BRPM | HEART RATE: 80 BPM | SYSTOLIC BLOOD PRESSURE: 132 MMHG | TEMPERATURE: 97 F

## 2021-02-25 DIAGNOSIS — J45.50 SEVERE PERSISTENT ASTHMA WITHOUT COMPLICATION: Primary | ICD-10-CM

## 2021-02-25 DIAGNOSIS — Z91.048 ALLERGY TO MOLD: ICD-10-CM

## 2021-02-25 DIAGNOSIS — J30.1 ALLERGY TO TREES: ICD-10-CM

## 2021-02-25 DIAGNOSIS — Z51.6 ENCOUNTER FOR DESENSITIZATION TO ALLERGEN: ICD-10-CM

## 2021-02-25 DIAGNOSIS — Z51.6 ALLERGY DESENSITIZATION THERAPY: ICD-10-CM

## 2021-02-25 DIAGNOSIS — J30.81 ALLERGY TO DOG DANDER: ICD-10-CM

## 2021-02-25 PROCEDURE — 95117 IMMUNOTHERAPY INJECTIONS: CPT | Performed by: NURSE PRACTITIONER

## 2021-02-25 RX ORDER — EPINEPHRINE 0.3 MG/.3ML
INJECTION SUBCUTANEOUS
Qty: 1 EACH | Refills: 0 | Status: SHIPPED | OUTPATIENT
Start: 2021-02-25

## 2021-02-25 RX ORDER — EPINEPHRINE 0.3 MG/.3ML
INJECTION SUBCUTANEOUS
Qty: 4 EACH | Refills: 2 | Status: CANCELLED | OUTPATIENT
Start: 2021-02-25

## 2021-02-25 RX ORDER — LIDOCAINE 50 MG/G
OINTMENT TOPICAL
COMMUNITY
Start: 2021-02-12 | End: 2021-03-15 | Stop reason: SDUPTHER

## 2021-02-25 NOTE — TELEPHONE ENCOUNTER
Denied. Patient is being discharged from clinic for non compliance do not give any further allergy injections.   He cannot keep his appointments

## 2021-02-25 NOTE — PROGRESS NOTES
After consent obtained/verified, allergy injection given in back of R/L arm(s). Documentation of vial injection specific to arm(s) noted on Allergy Immunotherapy Administration Form. Patient signed waiver and declined to wait 30 minutes for observation. Patient tolerated Silver vials well at 0.05ml without adverse reaction. SHOT REACTION TREATMENT INSTRUCTIONS    During the 30 minute wait after an allergy injection the following symptoms should be reported:    Itching other than at the injection site  Hives or swelling other than at the injection site  Redness other than at the injection site  Difficulty breathing  Chest tightness  Difficulty swallowing  Throat tightness    If these symptoms occur, NOTIFY PROVIDER and the following treatment should be administered:    1. Epinephrine 1:1000 IM - 0.3 ml if > 66 lbs or more, 0.15 ml if 33 - 63 lbs, or 0.1 ml if <33 lbs   2. Diphenhydramine - give all intramuscular:     2 to <6 years (off-label use): 6.25 mg,    6 to <12 years: 12.5 to 25 mg;    ?12 years: 25-50 mg.  3.  Famotidine:  Adults 40 mg oral    Adolescents age 12 years and >88 lbs: 40 mg    Children and Adolescents ? 12years of age: Initial: 0.25 mg/kg/dose   every 12 hours (maximum daily dose: 40 mg/day)    Epi dose may me repeated in 5-15 minutes if adequate resolution of symptoms does not occur    Patient should be observed for at least one hour after final epi dose and must be seen by provider. Patients cannot drive themselves if they have received diphenhydramine.

## 2021-02-25 NOTE — TELEPHONE ENCOUNTER
I spoke with patient and explained that Kaylene Mac wanted him  to come in for an appointment with her at next available opening. 4/19/21 @ 10:30 am    I did explain to him that if he has anymore no shows that Kaylene Needle will have to discharge him and he will no longer be able to receive his allergy injections.

## 2021-03-03 ENCOUNTER — VIRTUAL VISIT (OUTPATIENT)
Dept: PHYSICAL MEDICINE AND REHAB | Age: 51
End: 2021-03-03
Payer: COMMERCIAL

## 2021-03-03 DIAGNOSIS — M50.30 DDD (DEGENERATIVE DISC DISEASE), CERVICAL: ICD-10-CM

## 2021-03-03 DIAGNOSIS — M47.816 SPONDYLOSIS OF LUMBAR REGION WITHOUT MYELOPATHY OR RADICULOPATHY: Primary | ICD-10-CM

## 2021-03-03 DIAGNOSIS — G89.4 CHRONIC PAIN SYNDROME: ICD-10-CM

## 2021-03-03 DIAGNOSIS — Z98.1 HX OF FUSION OF CERVICAL SPINE: ICD-10-CM

## 2021-03-03 DIAGNOSIS — M51.36 LUMBAR DEGENERATIVE DISC DISEASE: ICD-10-CM

## 2021-03-03 DIAGNOSIS — M62.830 BACK MUSCLE SPASM: ICD-10-CM

## 2021-03-03 DIAGNOSIS — M54.50 LUMBAR PAIN: ICD-10-CM

## 2021-03-03 DIAGNOSIS — M54.12 CERVICAL RADICULITIS: ICD-10-CM

## 2021-03-03 DIAGNOSIS — M54.12 CERVICAL RADICULOPATHY: ICD-10-CM

## 2021-03-03 PROCEDURE — 99213 OFFICE O/P EST LOW 20 MIN: CPT | Performed by: NURSE PRACTITIONER

## 2021-03-03 PROCEDURE — G8427 DOCREV CUR MEDS BY ELIG CLIN: HCPCS | Performed by: NURSE PRACTITIONER

## 2021-03-03 ASSESSMENT — ENCOUNTER SYMPTOMS
EYES NEGATIVE: 1
BACK PAIN: 1
RESPIRATORY NEGATIVE: 1
GASTROINTESTINAL NEGATIVE: 1

## 2021-03-03 NOTE — PROGRESS NOTES
numbness and headaches. Psychiatric/Behavioral: Negative. Objective: There were no vitals filed for this visit. Physical Exam  Constitutional:       Appearance: Normal appearance. He is obese. HENT:      Head: Normocephalic and atraumatic. Neurological:      General: No focal deficit present. Mental Status: He is alert and oriented to person, place, and time. Psychiatric:         Mood and Affect: Mood normal.          Assessment:     1. Spondylosis of lumbar region without myelopathy or radiculopathy    2. Lumbar degenerative disc disease    3. Lumbar pain    4. DDD (degenerative disc disease), cervical    5. Cervical radiculitis    6. Cervical radiculopathy    7. Back muscle spasm    8. Hx of fusion of cervical spine    9. Chronic pain syndrome            Plan:      · OARRS reviewed. Current MED: 15.00  · Patient was offered naloxone for home. · Discussed long term side effects of medications, tolerance, dependency and addiction. · Previous UDS reviewed  · UDS preformed today for compliance. · Patient told can not receive any pain medications from any other source. · No evidence of abuse, diversion or aberrant behavior.  Medications and/or procedures to improve function and quality of life- patient understanding with this and that may not be pain free   Discussed with patient about safe storage of medications at home   Discussed possible weaning of medication dosing dependent on treatment/procedure results.  Discussed with patient about risks with procedure including infection, reaction to medication, increased pain, or bleeding.  Procedure notes reveiwed in detail   Received 85% relief of low back pain and also relief of leg pain from bilateral L-facet MBB # 1 for 7 days with improvement of mobility and activity.  Plan to repeat Bilateral L-facet MBB # 2 @ L3-4, L4-5, and L5-S1 for diagnostic purpose.  Procedure and risks discussed in detail with patient.   · Discussed possible LESI in future  · Continue current pain medication Norco 5/325 TID prn- Filled 2/14/2021 and still has plenty   · Continue Norflex- filled 2/17/2021  · Continue Lidoderm patches  · Patient is compliant. Will order next UDS at procedure FU. Meds. Prescribed:   No orders of the defined types were placed in this encounter. Return for Bilateral L-facet MBB # 2 @ L3-4, L4-5, and L5-S1. , follow up after procedure.                Electronically signed by MIRNA Purcell CNP on3/3/2021 at 12:16 PM

## 2021-03-04 ENCOUNTER — TELEPHONE (OUTPATIENT)
Dept: PHYSICAL MEDICINE AND REHAB | Age: 51
End: 2021-03-04

## 2021-03-04 ENCOUNTER — TELEPHONE (OUTPATIENT)
Dept: ALLERGY | Age: 51
End: 2021-03-04

## 2021-03-04 NOTE — TELEPHONE ENCOUNTER
The patient called to cancel his appointment for his allergy shot today, 3/4/21; he wants to cancel due to not feeling well. Omer Skaggs stated he will call us back to reschedule.

## 2021-03-15 ENCOUNTER — TELEPHONE (OUTPATIENT)
Dept: PHYSICAL MEDICINE AND REHAB | Age: 51
End: 2021-03-15

## 2021-03-15 DIAGNOSIS — G89.4 CHRONIC PAIN SYNDROME: ICD-10-CM

## 2021-03-15 RX ORDER — ORPHENADRINE CITRATE 100 MG/1
100 TABLET, EXTENDED RELEASE ORAL 2 TIMES DAILY
Qty: 60 TABLET | Refills: 0 | Status: SHIPPED | OUTPATIENT
Start: 2021-03-19 | End: 2021-04-14 | Stop reason: SDUPTHER

## 2021-03-15 RX ORDER — LIDOCAINE 50 MG/G
OINTMENT TOPICAL
Qty: 1 TUBE | Refills: 1 | Status: SHIPPED | OUTPATIENT
Start: 2021-03-15 | End: 2021-05-19 | Stop reason: SDUPTHER

## 2021-03-15 RX ORDER — HYDROCODONE BITARTRATE AND ACETAMINOPHEN 5; 325 MG/1; MG/1
1 TABLET ORAL EVERY 8 HOURS PRN
Qty: 90 TABLET | Refills: 0 | Status: SHIPPED | OUTPATIENT
Start: 2021-03-16 | End: 2021-04-14 | Stop reason: SDUPTHER

## 2021-03-15 RX ORDER — LIDOCAINE 50 MG/G
PATCH TOPICAL
Qty: 60 PATCH | Refills: 0 | Status: SHIPPED | OUTPATIENT
Start: 2021-03-19 | End: 2021-04-14 | Stop reason: SDUPTHER

## 2021-03-15 NOTE — TELEPHONE ENCOUNTER
OARRS reviewed. UDS: + for  Hydrocodone consistent. Last seen: 3/3/2021.  Follow-up:   Future Appointments   Date Time Provider Jesus Manjarrezi   4/14/2021  8:00 AM MIRNA Michelle CNP [facility] Pain [facility]   4/19/2021 10:30 AM MIRNA Mcpherson CNP N [facility]PX ALLER [facility]   5/7/2021  1:00 PM STR MRI RM1 STRZ MRI STR Radiolog   5/17/2021 10:00 AM MIRNA Dillon CNP N Pulm Med [facility]   5/28/2021 10:20 AM MIRNA Dillon CNP [facility] Med [facility]

## 2021-03-15 NOTE — TELEPHONE ENCOUNTER
DOLV=03-03-21. DONV=04-14-21. Harley London is calling to request a rf on his Licocaine ointment, 5%, prn, couldn't find on med list, He uses [facility].

## 2021-03-15 NOTE — TELEPHONE ENCOUNTER
Gregg Messer called requesting a refill on the following medications:  Requested Prescriptions     Pending Prescriptions Disp Refills    HYDROcodone-acetaminophen (NORCO) 5-325 MG per tablet 90 tablet 0     Sig: Take 1 tablet by mouth every 8 hours as needed for Pain for up to 30 days.  orphenadrine (NORFLEX) 100 MG extended release tablet 60 tablet 0     Sig: Take 1 tablet by mouth 2 times daily    lidocaine (LIDODERM) 5 % 60 patch 0     Sig: apply 1 to 2 patches to the affected area daily. Leave on for 12 hours and then off for 12 hours.      Pharmacy verified:  [facility]      Date of last visit: 03-03-21  Date of next visit (if applicable): 2/37/6084

## 2021-03-15 NOTE — TELEPHONE ENCOUNTER
OARRS reviewed. UDS: + for  Hydrocodone consistent. Last seen: 3/3/2021.  Follow-up:   Future Appointments   Date Time Provider Jesus Iris   4/14/2021  8:00 AM [facility] [place]   4/19/2021 10:30 AM MIRNA Turner [facility]NP[facility] ALLER [facility] [place]   5/7/2021  1:00 PM STR MRI RM1 STRZ MRI STR Radiolog   5/17/2021 10:00 AM MIRNA Quinonez CNP [facility] [place]   5/28/2021 10:20 AM MIRNA Quinonez CNP Med [facility] [facility]

## 2021-03-17 NOTE — TELEPHONE ENCOUNTER
Called patient and transferred patient to . Patient needs to sign a ABN before serums can get mixed just in case the insurance will not cover his serums.  is working on an estimate and CPT code to get to patient.

## 2021-03-26 NOTE — TELEPHONE ENCOUNTER
I called patient and left a message to call regarding estimate and cpt code. Please inform patient when he returns my call [place]pt [place] [address] and the estimate for mixing new serums is $3,600. Patient must sign an ABN before mixing new serums.

## 2021-03-29 NOTE — TELEPHONE ENCOUNTER
Patient notified of estimate and cpt code. Patient plans on calling insurance company on Wednesday to verify benefits and coverage.

## 2021-03-31 ENCOUNTER — TELEPHONE (OUTPATIENT)
Dept: PHYSICAL MEDICINE AND REHAB | Age: 51
End: 2021-03-31

## 2021-04-01 ENCOUNTER — TELEPHONE (OUTPATIENT)
Dept: PHYSICAL MEDICINE AND REHAB | Age: 51
End: 2021-04-01

## 2021-04-01 NOTE — TELEPHONE ENCOUNTER
Pt. LVM regarding procedure time. LVM for pt. @ 7:30. Noted that he did not come to procedure and again LVM for pt. to call office.

## 2021-04-01 NOTE — TELEPHONE ENCOUNTER
Pt. Returned call. States he had LVM that he was ill and unable to come to procedure. Procedure and follow up rescheduled. Verbalized understanding.

## 2021-04-05 ENCOUNTER — TELEPHONE (OUTPATIENT)
Dept: PHYSICAL MEDICINE AND REHAB | Age: 51
End: 2021-04-05

## 2021-04-05 NOTE — TELEPHONE ENCOUNTER
LVM for pt. That procedure time has been moved up to 11:25, arrive at 10:25. Advised to call office regarding this reschedule.

## 2021-04-06 NOTE — TELEPHONE ENCOUNTER
Jordana Bailey called to verify that he would try to come to [facility] tomorrow to sign the paper, ABN, so that he may schedule allergy injection appointments. Does Giovanna need to see the patient before he receives the allergy shots? Jordana Bailey verified that he is scheduled 4/19/21 for an appointment with DANNA Forrester. Jordana Bailey c/o a \"lot of symptoms of stuff because I am not getting allergy shots. He wants to know if the allergy serum is mixed; I informed him that no it is not, because he needs to sign the ABN.

## 2021-04-07 ENCOUNTER — TELEPHONE (OUTPATIENT)
Dept: PHYSICAL MEDICINE AND REHAB | Age: 51
End: 2021-04-07

## 2021-04-07 NOTE — TELEPHONE ENCOUNTER
Patient still needs to come in and sign the ABN before he can be scheduled for any allergy injections.

## 2021-04-08 ENCOUNTER — APPOINTMENT (OUTPATIENT)
Dept: GENERAL RADIOLOGY | Age: 51
End: 2021-04-08
Attending: PAIN MEDICINE
Payer: COMMERCIAL

## 2021-04-08 ENCOUNTER — HOSPITAL ENCOUNTER (OUTPATIENT)
Age: 51
Setting detail: OUTPATIENT SURGERY
Discharge: HOME | End: 2021-04-08
Attending: PAIN MEDICINE | Admitting: PAIN MEDICINE
Payer: COMMERCIAL

## 2021-04-08 ENCOUNTER — ANESTHESIA (OUTPATIENT)
Dept: OPERATING ROOM | Age: 51
End: 2021-04-08
Payer: COMMERCIAL

## 2021-04-08 ENCOUNTER — ANESTHESIA EVENT (OUTPATIENT)
Dept: OPERATING ROOM | Age: 51
End: 2021-04-08
Payer: COMMERCIAL

## 2021-04-08 VITALS
OXYGEN SATURATION: 97 % | TEMPERATURE: 98.3 F | HEART RATE: 79 BPM | BODY MASS INDEX: 41.75 KG/M2 | RESPIRATION RATE: 16 BRPM | DIASTOLIC BLOOD PRESSURE: 75 MMHG | WEIGHT: 315 LBS | HEIGHT: 73 IN | SYSTOLIC BLOOD PRESSURE: 140 MMHG

## 2021-04-08 VITALS
RESPIRATION RATE: 13 BRPM | DIASTOLIC BLOOD PRESSURE: 68 MMHG | SYSTOLIC BLOOD PRESSURE: 149 MMHG | OXYGEN SATURATION: 97 %

## 2021-04-08 PROCEDURE — 6360000002 HC RX W HCPCS: Performed by: NURSE ANESTHETIST, CERTIFIED REGISTERED

## 2021-04-08 PROCEDURE — 64493 INJ PARAVERT F JNT L/S 1 LEV: CPT | Performed by: PAIN MEDICINE

## 2021-04-08 PROCEDURE — 2709999900 HC NON-CHARGEABLE SUPPLY: Performed by: PAIN MEDICINE

## 2021-04-08 PROCEDURE — 2500000003 HC RX 250 WO HCPCS: Performed by: PAIN MEDICINE

## 2021-04-08 PROCEDURE — 7100000010 HC PHASE II RECOVERY - FIRST 15 MIN: Performed by: PAIN MEDICINE

## 2021-04-08 PROCEDURE — 3700000000 HC ANESTHESIA ATTENDED CARE: Performed by: PAIN MEDICINE

## 2021-04-08 PROCEDURE — 3700000001 HC ADD 15 MINUTES (ANESTHESIA): Performed by: PAIN MEDICINE

## 2021-04-08 PROCEDURE — 64494 INJ PARAVERT F JNT L/S 2 LEV: CPT | Performed by: PAIN MEDICINE

## 2021-04-08 PROCEDURE — 64495 INJ PARAVERT F JNT L/S 3 LEV: CPT | Performed by: PAIN MEDICINE

## 2021-04-08 PROCEDURE — 3600000055 HC PAIN LEVEL 3 ADDL 15 MIN: Performed by: PAIN MEDICINE

## 2021-04-08 PROCEDURE — 6360000002 HC RX W HCPCS: Performed by: PAIN MEDICINE

## 2021-04-08 PROCEDURE — 3209999900 FLUORO FOR SURGICAL PROCEDURES

## 2021-04-08 PROCEDURE — 3600000054 HC PAIN LEVEL 3 BASE: Performed by: PAIN MEDICINE

## 2021-04-08 PROCEDURE — 7100000011 HC PHASE II RECOVERY - ADDTL 15 MIN: Performed by: PAIN MEDICINE

## 2021-04-08 RX ORDER — PROPOFOL 10 MG/ML
INJECTION, EMULSION INTRAVENOUS PRN
Status: DISCONTINUED | OUTPATIENT
Start: 2021-04-08 | End: 2021-04-08 | Stop reason: SDUPTHER

## 2021-04-08 RX ORDER — METHYLPREDNISOLONE ACETATE 80 MG/ML
INJECTION, SUSPENSION INTRA-ARTICULAR; INTRALESIONAL; INTRAMUSCULAR; SOFT TISSUE PRN
Status: DISCONTINUED | OUTPATIENT
Start: 2021-04-08 | End: 2021-04-08 | Stop reason: ALTCHOICE

## 2021-04-08 RX ORDER — LIDOCAINE HYDROCHLORIDE 10 MG/ML
INJECTION, SOLUTION INFILTRATION; PERINEURAL PRN
Status: DISCONTINUED | OUTPATIENT
Start: 2021-04-08 | End: 2021-04-08 | Stop reason: ALTCHOICE

## 2021-04-08 RX ORDER — BUPIVACAINE HYDROCHLORIDE 2.5 MG/ML
INJECTION, SOLUTION EPIDURAL; INFILTRATION; INTRACAUDAL PRN
Status: DISCONTINUED | OUTPATIENT
Start: 2021-04-08 | End: 2021-04-08 | Stop reason: ALTCHOICE

## 2021-04-08 RX ADMIN — PROPOFOL 300 MG: 10 INJECTION, EMULSION INTRAVENOUS at 12:37

## 2021-04-08 ASSESSMENT — PULMONARY FUNCTION TESTS
PIF_VALUE: 0

## 2021-04-08 ASSESSMENT — PAIN - FUNCTIONAL ASSESSMENT: PAIN_FUNCTIONAL_ASSESSMENT: 0-10

## 2021-04-08 NOTE — H&P
General: No focal deficit present. Mental Status: He is alert and oriented to person, place, and time. Psychiatric:         Mood and Affect: Mood normal.         Assessment:      1. Spondylosis of lumbar region without myelopathy or radiculopathy    2. Lumbar degenerative disc disease    3. Lumbar pain    4. DDD (degenerative disc disease), cervical    5. Cervical radiculitis    6. Cervical radiculopathy    7. Back muscle spasm    8. Hx of fusion of cervical spine    9. Chronic pain syndrome       Plan:      · OARRS reviewed. Current MED: 15.00  · Patient was offered naloxone for home. · Discussed long term side effects of medications, tolerance, dependency and addiction. · Previous UDS reviewed  · UDS preformed today for compliance. · Patient told can not receive any pain medications from any other source. · No evidence of abuse, diversion or aberrant behavior. · Medications and/or procedures to improve function and quality of life- patient understanding with this and that may not be pain free  · Discussed with patient about safe storage of medications at home  · Discussed possible weaning of medication dosing dependent on treatment/procedure results. · Discussed with patient about risks with procedure including infection, reaction to medication, increased pain, or bleeding. · Procedure notes reveiwed in detail  · Received 85% relief of low back pain and also relief of leg pain from bilateral L-facet MBB # 1 for 7 days with improvement of mobility and activity. · Plan to repeat Bilateral L-facet MBB # 2 @ L3-4, L4-5, and L5-S1 for diagnostic purpose. Procedure and risks discussed in detail with patient.   · Discussed possible LESI in future  · Continue current pain medication Norco 5/325 TID prn- Filled 2/14/2021 and still has plenty   · Continue Norflex- filled 2/17/2021  · Continue Lidoderm patches  · Patient is compliant. Will order next UDS at procedure FU.         Meds.  Prescribed: Encounter Medications    No orders of the defined types were placed in this encounter.         Return for Bilateral L-facet MBB # 2 @ L3-4, L4-5, and L5-S1. , follow up after procedure.

## 2021-04-08 NOTE — H&P
[facility]  History and Physical Update    Pt Name: Jacinda Solo  MRN: [id number]  YOB: 1970  Date of evaluation: 4/8/2021      I have examined the patient and reviewed the H&P/Consult and there are no changes to the patient or plans.         Electronically signed by Dylon Peña MD on 4/8/2021 at 10:42 AM

## 2021-04-08 NOTE — ANESTHESIA PRE PROCEDURE
Department of Anesthesiology  Preprocedure Note       Name:  Ananya Jeff   Age:  54 y.o.  :  1970                                          MRN:  [id number]         Date:  2021      Surgeon: Dee Wiseman):  Shruti Hinkle MD    Procedure: Procedure(s):  Bilateral L-facet MBB # 2 @ L3-4, L4-5, and L5-S1    Medications prior to admission:   Prior to Admission medications    Medication Sig Start Date End Date Taking? Authorizing Provider   HYDROcodone-acetaminophen (NORCO) 5-325 MG per tablet Take 1 tablet by mouth every 8 hours as needed for Pain for up to 30 days. 3/16/21 4/15/21 Yes Marjean Sheets, APRN - CNP   orphenadrine (NORFLEX) 100 MG extended release tablet Take 1 tablet by mouth 2 times daily 3/19/21 4/18/21 Yes Marjean Sheets, APRN - CNP   lidocaine (LIDODERM) 5 % apply 1 to 2 patches to the affected area daily. Leave on for 12 hours and then off for 12 hours. 3/19/21 4/18/21 Yes Marjean Sheets, APRN - CNP   lidocaine (XYLOCAINE) 5 % ointment APPLY TOPICALLY TO AFFECTED AREAS OF LOWER BACK AND NECK if needed 3/15/21 4/14/21 Yes Marjean Sheets, APRN - CNP   albuterol (PROVENTIL) (2.5 MG/3ML) 0.083% nebulizer solution Take 3 mLs by nebulization every 4 hours as needed for Wheezing 21  Yes Gricelda Whitney APRN - CNP   butalbital-acetaminophen-caffeine (FIORICET, ESGIC) -40 MG per tablet take 1 tablet by mouth four times a day if needed for headache 21  Yes Mervat Daughters APRN - CNP   amitriptyline (ELAVIL) 50 MG tablet take 1 tablet by mouth at bedtime 20  Yes Elfreda DaughtersMIRNA - CNP   RA LORATADINE 10 MG tablet take 1 tablet by mouth once daily 20  Yes Historical Provider, MD   montelukast (SINGULAIR) 10 MG tablet Take 1 tablet by mouth nightly 20  Yes Jean Pierre Omer, APRN - CNP   hydrocortisone valerate (WESTCORT) 0.2 % ointment Apply topically daily.  20  Yes Jean Pierre Never, APRN - CNP   diphenhydrAMINE (BENADRYL) 25 MG capsule Take 1 capsule Current medications:    No current facility-administered medications for this encounter. Allergies:     Allergies   Allergen Reactions    Dilantin [Phenytoin] Anaphylaxis and Swelling    Dupixent [Dupilumab]      HIVES, THROAT ITCHING    Oxycodone      THROAT TIGHTNESS    Valium [Diazepam]        Problem List:    Patient Active Problem List   Diagnosis Code    Cervical stenosis of spinal canal M48.02    Lower back pain M54.5    Disease of spinal cord ([facility]) G95.9    Morbid obesity with BMI of 50.0-59.9, adult ([facility]) E66.01, Z68.43    Other cervical disc displacement, unspecified cervical region M50.20    Radiculopathy affecting upper extremity M54.10    S/P cervical spinal fusion Z98.1    Sleep apnea G47.30    Hyperlipidemia E78.5    H/O: HTN (hypertension) Z86.79    Moderate asthma without complication G92.562    Enlarged heart I51.7    Chronic pansinusitis J32.4    Pain in both lower extremities M79.604, M79.605    Non-seasonal allergic rhinitis due to pollen J30.1    Allergy desensitization therapy Z51.6    Lumbar spondylosis M47.816       Past Medical History:        Diagnosis Date    Asthma     Cardiomegaly     Fibromyalgia     CLARIBEL on CPAP        Past Surgical History:        Procedure Laterality Date    BACK SURGERY      KNEE SURGERY      lt    PAIN MANAGEMENT PROCEDURE Bilateral 2/11/2021    Bilateral L-facet MBB # 1 @ L3-4, L4-5, and L5-S1 performed by Jackie Miller MD at [address] [place] [address]       Social History:    Social History     Tobacco Use    Smoking status: Never Smoker    Smokeless tobacco: Never Used   Substance Use Topics    Alcohol use: Not Currently                                Counseling given: Not Answered      Vital Signs (Current):   Vitals:    04/08/21 1120   BP: (!) 172/92   Pulse: 60   Resp: 16   Temp: 97.4 °F (36.3 °C)   TempSrc: Temporal   SpO2: 96%   Weight: (!) 415 lb 6.4 oz (188.4 kg)   Height: 6' 1\" (1.854 m) BP Readings from Last 3 Encounters:   04/08/21 (!) 172/92   02/25/21 132/88   02/11/21 (!) 145/84       NPO Status: Time of last liquid consumption: 2330                        Time of last solid consumption: 2300                        Date of last liquid consumption: 04/07/21                        Date of last solid food consumption: 04/07/21    BMI:   Wt Readings from Last 3 Encounters:   04/08/21 (!) 415 lb 6.4 oz (188.4 kg)   01/20/21 (!) 425 lb (192.8 kg)   01/12/21 (!) 425 lb 9.6 oz (193.1 kg)     Body mass index is 54.81 kg/m². CBC:   Lab Results   Component Value Date    WBC 4.8 01/11/2021    RBC 4.45 01/11/2021    HGB 13.7 01/11/2021    HCT 42.6 01/11/2021    MCV 95.7 01/11/2021     01/11/2021       CMP:   Lab Results   Component Value Date     01/11/2021    K 4.1 01/11/2021     01/11/2021    CO2 28 01/11/2021    BUN 13 01/11/2021    CREATININE 1.0 01/11/2021    LABGLOM 79 01/11/2021    GLUCOSE 100 01/11/2021    PROT 7.0 06/03/2020    CALCIUM 9.2 01/11/2021    BILITOT 0.6 06/03/2020    ALKPHOS 122 06/03/2020    AST 29 06/03/2020    ALT 40 06/03/2020       POC Tests: No results for input(s): POCGLU, POCNA, POCK, POCCL, POCBUN, POCHEMO, POCHCT in the last 72 hours.     Coags:   Lab Results   Component Value Date    INR 1.03 02/11/2019       HCG (If Applicable): No results found for: PREGTESTUR, PREGSERUM, HCG, HCGQUANT     ABGs: No results found for: PHART, PO2ART, DKZ2UJY, PKY6DGG, BEART, P2PKTSYB     Type & Screen (If Applicable):  No results found for: LABABO, LABRH    Drug/Infectious Status (If Applicable):  No results found for: HIV, HEPCAB    COVID-19 Screening (If Applicable):   Lab Results   Component Value Date    COVID19 Not Detected 01/07/2021           Anesthesia Evaluation    Airway: Mallampati: III       Mouth opening: > = 3 FB Dental:          Pulmonary:   (+) sleep apnea:  asthma:                            Cardiovascular: Neuro/Psych:   (+) neuromuscular disease:,             GI/Hepatic/Renal:   (+) morbid obesity          Endo/Other:                     Abdominal:   (+) obese,         Vascular:                                        Anesthesia Plan      MAC     ASA 2             Anesthetic plan and risks discussed with patient. Plan discussed with CRNA.                   Tre Blanca MD   4/8/2021

## 2021-04-08 NOTE — PROGRESS NOTES
36- PT arrived to PACU phase 2,Alexa RN at bedside for report. 1304- Pt up to bathroom. RIde called.    1307- IV  Removed, pt sittingon edge  0748- PT discharged walked out to car with this RN

## 2021-04-08 NOTE — ANESTHESIA POSTPROCEDURE EVALUATION
Department of Anesthesiology  Postprocedure Note    Patient: Radha Beckett  MRN: [id number]  YOB: 1970  Date of evaluation: 4/8/2021  Time:  2:02 PM     Procedure Summary     Date: 04/08/21 Room / Location: [address] [place] [address] 03 / [facility]    Anesthesia Start: 9942 Anesthesia Stop: 5658    Procedure: Bilateral L-facet MBB # 2 @ L3-4, L4-5, and L5-S1 (Bilateral Back) Diagnosis: (Lumbar spondylosis)    Surgeons: Manda Whelan MD Responsible Provider: Gabriella Vargas MD    Anesthesia Type: MAC ASA Status: 2          Anesthesia Type: MAC    To Phase I:      To Phase II: To Score: 10    Last vitals: Reviewed and per EMR flowsheets.        Anesthesia Post Evaluation    Patient location during evaluation: PACU  Complications: no  Cardiovascular status: hemodynamically stable  Respiratory status: acceptable

## 2021-04-08 NOTE — OP NOTE
Operative Note  Pre-Procedure Note    Patient Name: Bozena Whitfield   YOB: 1970  Room/Bed: [address] [place] [address] Drive [facility]  Medical Record Number: [id number]  Date: 4/8/21      Indication: Lower back pain  Consent: On file. Vital Signs:   Vitals:    04/08/21 1120   BP: (!) 172/92   Pulse: 60   Resp: 16   Temp: 97.4 °F (36.3 °C)   SpO2: 96%       Pre-Sedation Documentation and Exam:   Vital signs have been reviewed (see flow sheet for vitals). Sedation/ Anesthesia Plan:   MAC    Patient is an appropriate candidate for plan of sedation: yes    Preoperative Diagnosis:   L-spondylosis     Postoperative Diagnosis:   As above     Procedure Performed:  Diagnostic/Confirmatory median branch blocks at the levels of L3-4,L4-5 and L5-S1 bilateral under fluoroscopic guidance # 2.     Indication for the Procedure: The patient has a history of chronic low back pain that is not responding well to the conservative treatment. The patient's pain is mostly axial in nature. Pain is interfering with the activities of daily living. Physical examination revealed facet tenderness and facet loading is positive. The procedure and risks  were discussed with the patient and an informed consent was obtained.     Procedure:      Patient is placed in prone position and skin over  the back was prepped and draped in sterile manner. Then using fluoroscopy the junction of the transverse process of the vertebra with the superior process of the facet joint was observed and the view was optimized. The skin and deep tissues posterior were infiltrated with 35 ml of 1% lidocaine. Then a #22-gauge  5-1/2 inch spinal needle was introduced through the skin wheal under fluoroscopy guidance such that the tip of the needle lies at the junction of the transverse process with the superior processes of the facet joint.  Then after negative aspiration a total of 12 ml of 0.25% Marcaine and depomedrol (total 80 mg) was injected through the needles in divided doses. EBL-0      For L5 Median branch block the junction of the ala of  the sacrum with the superior articular process of the facet joint was taken as a reference point and L4 median branch the junction of the transverse process the L5 with the superolateral possible facet joint was taken to the point and healthy median branch the junction of the transverse process of L4 with the superior lateral process of the facet joint was taken as a reference point. For S1 median branch the most lateral and superior aspect of S1 foramina was taken as a reference point.     Patient's vital signs and neurological status remained stable through  the procedure and post procedural  Period. Patient was instructed to keep track of pain for next 24 hours. every hour and bring it with next visit.  Patient tollerated the procedure well and was discharged home in stable condition.       Electronically signed by London Millan MD on 4/8/21 at 12:53 PM EDT

## 2021-04-09 DIAGNOSIS — Z98.1 S/P CERVICAL SPINAL FUSION: ICD-10-CM

## 2021-04-09 RX ORDER — BUTALBITAL, ACETAMINOPHEN AND CAFFEINE 50; 325; 40 MG/1; MG/1; MG/1
TABLET ORAL
Qty: 180 TABLET | Refills: 0 | Status: SHIPPED | OUTPATIENT
Start: 2021-04-09 | End: 2021-05-20 | Stop reason: SDUPTHER

## 2021-04-09 NOTE — TELEPHONE ENCOUNTER
This medication refill is regarding a electronic request by [employer]. Requested Prescriptions     Pending Prescriptions Disp Refills    butalbital-acetaminophen-caffeine (FIORICET, ESGIC) -40 MG per tablet [Pharmacy Med Name: [id number] -40] 180 tablet 0     Sig: take 1 tablet by mouth four times a day if needed for headache       Date of last visit: 1/12/2021  Date of next visit: None  Date of last refill: 1/8/2021  180/0    Rx verified, ordered and set to EP.

## 2021-04-13 DIAGNOSIS — G89.4 CHRONIC PAIN SYNDROME: ICD-10-CM

## 2021-04-14 ENCOUNTER — IMMUNIZATION (OUTPATIENT)
Dept: PRIMARY CARE CLINIC | Age: 51
End: 2021-04-14
Payer: COMMERCIAL

## 2021-04-14 ENCOUNTER — TELEPHONE (OUTPATIENT)
Dept: FAMILY MEDICINE CLINIC | Age: 51
End: 2021-04-14

## 2021-04-14 PROCEDURE — 91300 COVID-19, PFIZER VACCINE 30MCG/0.3ML DOSE: CPT | Performed by: FAMILY MEDICINE

## 2021-04-14 PROCEDURE — 0001A COVID-19, PFIZER VACCINE 30MCG/0.3ML DOSE: CPT | Performed by: FAMILY MEDICINE

## 2021-04-14 RX ORDER — HYDROCODONE BITARTRATE AND ACETAMINOPHEN 5; 325 MG/1; MG/1
1 TABLET ORAL EVERY 8 HOURS PRN
Qty: 90 TABLET | Refills: 0 | Status: SHIPPED | OUTPATIENT
Start: 2021-04-15 | End: 2021-05-19 | Stop reason: SDUPTHER

## 2021-04-14 RX ORDER — LIDOCAINE 50 MG/G
PATCH TOPICAL
Qty: 60 PATCH | Refills: 0 | Status: SHIPPED | OUTPATIENT
Start: 2021-04-18 | End: 2021-05-19 | Stop reason: SDUPTHER

## 2021-04-14 RX ORDER — ORPHENADRINE CITRATE 100 MG/1
100 TABLET, EXTENDED RELEASE ORAL 2 TIMES DAILY
Qty: 60 TABLET | Refills: 0 | Status: SHIPPED | OUTPATIENT
Start: 2021-04-18 | End: 2021-05-19 | Stop reason: SDUPTHER

## 2021-04-14 NOTE — TELEPHONE ENCOUNTER
Pt allergies are really bad right now, Pt is using breathing treatments, and allgy meds, nasal sprays. Pt is asking for prednisone dose pk if possible.     SHARIF Rubio see chart     wcp

## 2021-04-14 NOTE — TELEPHONE ENCOUNTER
Jordana Bailey called [facility] to cancel his allergy injection appointment. Jordana Bailey verified that he needs to sign the ABN prior to scheduling an appointment to receive allergy injections. Jordana Bailey verified the office closes at 4:30P. M.; he plans to come this afternoon to sign the ABN.

## 2021-04-19 NOTE — TELEPHONE ENCOUNTER
Gee Judd, APRN - CNP  You; John Marquez MA 24 minutes ago (12:40 PM)     This patient needs to be terminated from this practice for multiple missing appointments and no shows.  He is non compliant

## 2021-05-02 RX ORDER — MONTELUKAST SODIUM 10 MG/1
TABLET ORAL
Qty: 30 TABLET | Refills: 5 | OUTPATIENT
Start: 2021-05-02

## 2021-05-04 ENCOUNTER — IMMUNIZATION (OUTPATIENT)
Dept: PRIMARY CARE CLINIC | Age: 51
End: 2021-05-04
Payer: MEDICAID

## 2021-05-04 PROCEDURE — 91300 COVID-19, PFIZER VACCINE 30MCG/0.3ML DOSE: CPT | Performed by: PHARMACIST

## 2021-05-07 ENCOUNTER — HOSPITAL ENCOUNTER (OUTPATIENT)
Dept: MRI IMAGING | Age: 51
Discharge: HOME | End: 2021-05-07
Payer: COMMERCIAL

## 2021-05-07 DIAGNOSIS — M54.50 LUMBAR PAIN: ICD-10-CM

## 2021-05-07 DIAGNOSIS — G89.4 CHRONIC PAIN SYNDROME: ICD-10-CM

## 2021-05-07 PROCEDURE — 72148 MRI LUMBAR SPINE W/O DYE: CPT

## 2021-05-17 ENCOUNTER — TELEPHONE (OUTPATIENT)
Dept: PULMONOLOGY | Age: 51
End: 2021-05-17

## 2021-05-19 ENCOUNTER — OFFICE VISIT (OUTPATIENT)
Dept: PHYSICAL MEDICINE AND REHAB | Age: 51
End: 2021-05-19
Payer: COMMERCIAL

## 2021-05-19 VITALS
HEIGHT: 73 IN | DIASTOLIC BLOOD PRESSURE: 80 MMHG | SYSTOLIC BLOOD PRESSURE: 138 MMHG | WEIGHT: 315 LBS | BODY MASS INDEX: 41.75 KG/M2

## 2021-05-19 DIAGNOSIS — M54.12 CERVICAL RADICULOPATHY: ICD-10-CM

## 2021-05-19 DIAGNOSIS — M51.36 LUMBAR DEGENERATIVE DISC DISEASE: ICD-10-CM

## 2021-05-19 DIAGNOSIS — F11.90 CHRONIC, CONTINUOUS USE OF OPIOIDS: ICD-10-CM

## 2021-05-19 DIAGNOSIS — M54.12 CERVICAL RADICULITIS: ICD-10-CM

## 2021-05-19 DIAGNOSIS — M54.50 LUMBAR PAIN: ICD-10-CM

## 2021-05-19 DIAGNOSIS — M47.816 SPONDYLOSIS OF LUMBAR REGION WITHOUT MYELOPATHY OR RADICULOPATHY: Primary | ICD-10-CM

## 2021-05-19 DIAGNOSIS — G89.4 CHRONIC PAIN SYNDROME: ICD-10-CM

## 2021-05-19 DIAGNOSIS — Z98.1 HX OF FUSION OF CERVICAL SPINE: ICD-10-CM

## 2021-05-19 DIAGNOSIS — M50.30 DDD (DEGENERATIVE DISC DISEASE), CERVICAL: ICD-10-CM

## 2021-05-19 PROCEDURE — G8427 DOCREV CUR MEDS BY ELIG CLIN: HCPCS | Performed by: NURSE PRACTITIONER

## 2021-05-19 PROCEDURE — 99214 OFFICE O/P EST MOD 30 MIN: CPT | Performed by: NURSE PRACTITIONER

## 2021-05-19 PROCEDURE — G8417 CALC BMI ABV UP PARAM F/U: HCPCS | Performed by: NURSE PRACTITIONER

## 2021-05-19 PROCEDURE — 1036F TOBACCO NON-USER: CPT | Performed by: NURSE PRACTITIONER

## 2021-05-19 RX ORDER — HYDROCODONE BITARTRATE AND ACETAMINOPHEN 5; 325 MG/1; MG/1
1 TABLET ORAL EVERY 8 HOURS PRN
Qty: 90 TABLET | Refills: 0 | Status: SHIPPED | OUTPATIENT
Start: 2021-05-19 | End: 2021-06-17 | Stop reason: SDUPTHER

## 2021-05-19 RX ORDER — LIDOCAINE 50 MG/G
OINTMENT TOPICAL
Qty: 1 TUBE | Refills: 1 | Status: SHIPPED | OUTPATIENT
Start: 2021-05-19 | End: 2021-06-17 | Stop reason: SDUPTHER

## 2021-05-19 RX ORDER — ORPHENADRINE CITRATE 100 MG/1
100 TABLET, EXTENDED RELEASE ORAL 2 TIMES DAILY
Qty: 60 TABLET | Refills: 0 | Status: SHIPPED | OUTPATIENT
Start: 2021-05-19 | End: 2021-06-17 | Stop reason: SDUPTHER

## 2021-05-19 RX ORDER — LIDOCAINE 50 MG/G
PATCH TOPICAL
Qty: 60 PATCH | Refills: 0 | Status: SHIPPED | OUTPATIENT
Start: 2021-05-19 | End: 2021-06-15

## 2021-05-19 ASSESSMENT — ENCOUNTER SYMPTOMS
BACK PAIN: 1
RESPIRATORY NEGATIVE: 1
EYES NEGATIVE: 1
GASTROINTESTINAL NEGATIVE: 1

## 2021-05-19 NOTE — PROGRESS NOTES
last visit. Pain scale with out pain medications or at its worst is 10/10. Pain scale with pain medications or at its best is 7/10. Last dose of [place] was 2 days ago he ran out which is appropriate per OARRS  Drug screen reviewed from 1/20/2021 and was appropriate  Pill count was not completed today as patient out   Patient does not have naloxone available at home. Patient has not required use of naloxone at home since last office visit. Lumbar MRI:   FINDINGS:   There is anatomic vertebral body height and alignment. Marrow signal is within normal limits. There is congenital spinal canal narrowing throughout the lumbar spine with mildly prominent epidural fat. The conus terminates in a normal fashion at the    T12-L1 level. Paraspinal soft tissues are unremarkable. At T12-L1 there is no significant spinal canal or neuroforaminal stenosis. At L1-2 there is a minimal disc bulge. There is mildly prominent epidural fat which causes mild narrowing of the thecal sac. There is mild bilateral foraminal stenosis in association with mild facet hypertrophy and ligament flavum thickening. At L2-3 there is a minimal disc bulge which does not contact traversing nerve roots. There is moderate spinal canal stenosis in association with congenital spinal canal narrowing and mildly prominent epidural fat as well as superimposed facet hypertrophy    and ligamentum flavum thickening. There is mild bilateral foraminal stenosis. At L3-4 there is a minimal disc bulge which does not contact traversing nerve roots. There is moderate spinal canal stenosis in association with congenital spinal canal narrowing and mildly prominent epidural fat area there is mild bilateral foraminal    stenosis in association with facet hypertrophy and ligament flavum thickening. At L4-5 there is a minimal disc bulge which minimally indents thecal sac but does not contact traversing nerve roots.  However, there is moderate spinal canal stenosis from congenital spinal canal narrowing and mildly prominent epidural fat as well as    facet hypertrophy and ligament flavum thickening. There is mild to moderate bilateral foraminal stenosis. At L5-S1 there is a minimal central protrusion without significant spinal canal stenosis. There is mild bilateral neural foraminal stenosis in association with mild facet hypertrophy and ligament flavum thickening.           Impression    Congenital spinal canal narrowing with mild epidural lipomatosis with superimposed mild degenerative changes which combine to cause up to moderate spinal canal stenosis and mild to moderate neural foraminal stenosis at the L4-5 level.           The patientis allergic to dilantin [phenytoin], dupixent [dupilumab], oxycodone, and valium [diazepam]. Subjective:      Review of Systems   Constitutional: Negative. Negative for activity change. HENT: Negative. Eyes: Negative. Respiratory: Negative. Cardiovascular: Positive for leg swelling. Gastrointestinal: Negative. Endocrine: Negative. Genitourinary: Negative. Musculoskeletal: Positive for arthralgias, back pain, gait problem, joint swelling, myalgias, neck pain and neck stiffness. Skin: Negative. Neurological: Positive for weakness, numbness and headaches. Psychiatric/Behavioral: Negative. Objective:     Vitals:    05/19/21 1200   BP: 138/80   Weight: (!) 427 lb 12.8 oz (194 kg)   Height: 6' 1\" (1.854 m)       Physical Exam  Vitals and nursing note reviewed. Constitutional:       General: He is not in acute distress. Appearance: He is obese. He is not diaphoretic. Comments: Obese   HENT:      Head: Normocephalic and atraumatic. Right Ear: External ear normal.      Left Ear: External ear normal.      Nose: Nose normal.      Mouth/Throat:      Mouth: Mucous membranes are moist.      Pharynx: No oropharyngeal exudate. Eyes:      General: No scleral icterus.         Right eye: No discharge. Left eye: No discharge. Conjunctiva/sclera: Conjunctivae normal.      Pupils: Pupils are equal, round, and reactive to light. Neck:      Thyroid: No thyromegaly. Cardiovascular:      Rate and Rhythm: Normal rate and regular rhythm. Heart sounds: Normal heart sounds. No murmur heard. No friction rub. No gallop. Pulmonary:      Effort: Pulmonary effort is normal. No respiratory distress. Breath sounds: Normal breath sounds. No wheezing or rales. Chest:      Chest wall: No tenderness. Abdominal:      General: Bowel sounds are normal. There is no distension. Palpations: Abdomen is soft. Tenderness: There is no abdominal tenderness. There is no guarding or rebound. Musculoskeletal:         General: Tenderness present. Cervical back: Full passive range of motion without pain, normal range of motion and neck supple. Spasms and tenderness present. No edema, erythema or rigidity. No muscular tenderness. Normal range of motion. Thoracic back: Spasms and tenderness present. Lumbar back: Tenderness present. Decreased range of motion. Back:       Right knee: Tenderness present. Left knee: Tenderness present. Right lower leg: Swelling present. Edema present. Left lower leg: Swelling present. Edema present. Right ankle: Swelling present. Left ankle: Swelling present. Skin:     General: Skin is warm. Coloration: Skin is not pale. Findings: No erythema or rash. Neurological:      General: No focal deficit present. Mental Status: He is alert and oriented to person, place, and time. He is not disoriented. Cranial Nerves: No cranial nerve deficit. Sensory: No sensory deficit. Motor: No atrophy or abnormal muscle tone. Coordination: Coordination normal.      Gait: Gait normal.      Deep Tendon Reflexes: Babinski sign absent on the right side. Babinski sign absent on the left side.       Reflex addiction. · Previous UDS reviewed  · UDS preformed today for compliance. · Patient told can not receive any pain medications from any other source. · No evidence of abuse, diversion or aberrant behavior.  Medications and/or procedures to improve function and quality of life- patient understanding with this and that may not be pain free   Discussed with patient about safe storage of medications at home   Discussed possible weaning of medication dosing dependent on treatment/procedure results.  Discussed with patient about risks with procedure including infection, reaction to medication, increased pain, or bleeding. · Procedure notes reveiwed in detail  · Received 80% relief of low back pain and also relief of leg pain from bilateral L-facet MBB # 2 for 3 weekswith improvement of mobility and activity. · Plan Bilateral L-facet RFA @ L3-4, L4-5, and L5-S1 RIGHT SIDE FIRST for longer term pain relief. Procedure and risks discussed in detail with patient. · Reviewed Lumbar MRI   · Discussed possible LESI in future  · Continue current pain medication Norco 5/325 TID prn- ordered refill   · Continue Norflex- ordered refill   · Continue Lidoderm patches- ordered refill   · Updated UDS ordered today       Meds. Prescribed:   Orders Placed This Encounter   Medications    orphenadrine (NORFLEX) 100 MG extended release tablet     Sig: Take 1 tablet by mouth 2 times daily     Dispense:  60 tablet     Refill:  0    lidocaine (LIDODERM) 5 %     Sig: apply 1 to 2 patches to the affected area daily. Leave on for 12 hours and then off for 12 hours. Dispense:  60 patch     Refill:  0    HYDROcodone-acetaminophen (NORCO) 5-325 MG per tablet     Sig: Take 1 tablet by mouth every 8 hours as needed for Pain for up to 30 days.      Dispense:  90 tablet     Refill:  0     Reduce doses taken as pain becomes manageable    lidocaine (XYLOCAINE) 5 % ointment     Sig: APPLY TOPICALLY TO AFFECTED AREAS OF LOWER BACK AND NECK if needed     Dispense:  1 Tube     Refill:  1       Return for Bilateral L-facet RFA @ L3-4, L4-5, and L5-S1 RIGHT SIDE FIRST. , follow up after procedure.                Electronically signed by MIRNA Elizabeth CNP on5/19/2021 at 2:17 PM

## 2021-05-20 ENCOUNTER — OFFICE VISIT (OUTPATIENT)
Dept: FAMILY MEDICINE CLINIC | Age: 51
End: 2021-05-20
Payer: COMMERCIAL

## 2021-05-20 ENCOUNTER — TELEPHONE (OUTPATIENT)
Dept: FAMILY MEDICINE CLINIC | Age: 51
End: 2021-05-20

## 2021-05-20 VITALS
DIASTOLIC BLOOD PRESSURE: 90 MMHG | WEIGHT: 315 LBS | OXYGEN SATURATION: 98 % | BODY MASS INDEX: 55.73 KG/M2 | SYSTOLIC BLOOD PRESSURE: 150 MMHG | HEART RATE: 67 BPM

## 2021-05-20 DIAGNOSIS — G89.29 CHRONIC MIDLINE LOW BACK PAIN WITH RIGHT-SIDED SCIATICA: ICD-10-CM

## 2021-05-20 DIAGNOSIS — F33.2 SEVERE EPISODE OF RECURRENT MAJOR DEPRESSIVE DISORDER, WITHOUT PSYCHOTIC FEATURES (HCC): Primary | ICD-10-CM

## 2021-05-20 DIAGNOSIS — E66.01 MORBID OBESITY WITH BMI OF 50.0-59.9, ADULT (HCC): ICD-10-CM

## 2021-05-20 DIAGNOSIS — Z98.1 S/P CERVICAL SPINAL FUSION: ICD-10-CM

## 2021-05-20 DIAGNOSIS — E78.5 HYPERLIPIDEMIA, UNSPECIFIED HYPERLIPIDEMIA TYPE: ICD-10-CM

## 2021-05-20 DIAGNOSIS — J45.909 MODERATE ASTHMA WITHOUT COMPLICATION, UNSPECIFIED WHETHER PERSISTENT: ICD-10-CM

## 2021-05-20 DIAGNOSIS — M54.41 CHRONIC MIDLINE LOW BACK PAIN WITH RIGHT-SIDED SCIATICA: ICD-10-CM

## 2021-05-20 DIAGNOSIS — Z86.79 H/O: HTN (HYPERTENSION): ICD-10-CM

## 2021-05-20 DIAGNOSIS — J45.50 SEVERE PERSISTENT ASTHMA, UNSPECIFIED WHETHER COMPLICATED: ICD-10-CM

## 2021-05-20 DIAGNOSIS — G95.9 DISEASE OF SPINAL CORD (HCC): ICD-10-CM

## 2021-05-20 PROCEDURE — 99214 OFFICE O/P EST MOD 30 MIN: CPT | Performed by: NURSE PRACTITIONER

## 2021-05-20 PROCEDURE — G8427 DOCREV CUR MEDS BY ELIG CLIN: HCPCS | Performed by: NURSE PRACTITIONER

## 2021-05-20 PROCEDURE — G8417 CALC BMI ABV UP PARAM F/U: HCPCS | Performed by: NURSE PRACTITIONER

## 2021-05-20 PROCEDURE — 1036F TOBACCO NON-USER: CPT | Performed by: NURSE PRACTITIONER

## 2021-05-20 RX ORDER — HYDROCORTISONE VALERATE 2 MG/G
OINTMENT TOPICAL
Qty: 1 TUBE | Refills: 11 | Status: SHIPPED | OUTPATIENT
Start: 2021-05-20 | End: 2022-04-04 | Stop reason: SDUPTHER

## 2021-05-20 RX ORDER — METHYLPREDNISOLONE 4 MG/1
TABLET ORAL
Qty: 1 KIT | Refills: 0 | Status: SHIPPED | OUTPATIENT
Start: 2021-05-20 | End: 2021-05-26

## 2021-05-20 RX ORDER — BUTALBITAL, ACETAMINOPHEN AND CAFFEINE 50; 325; 40 MG/1; MG/1; MG/1
1 TABLET ORAL EVERY 4 HOURS PRN
Qty: 90 TABLET | Refills: 1 | Status: SHIPPED | OUTPATIENT
Start: 2021-05-20 | End: 2021-08-17

## 2021-05-20 ASSESSMENT — ENCOUNTER SYMPTOMS
DIARRHEA: 0
SINUS PAIN: 0
WHEEZING: 0
COUGH: 0
SORE THROAT: 0
SHORTNESS OF BREATH: 0
TROUBLE SWALLOWING: 0
COLOR CHANGE: 0
EYE PAIN: 0
NAUSEA: 0
VOMITING: 0
BACK PAIN: 1
FACIAL SWELLING: 0
ABDOMINAL PAIN: 0

## 2021-05-20 NOTE — PATIENT INSTRUCTIONS
Patient Education        Chronic Pain: Care Instructions  Your Care Instructions     Chronic pain is pain that lasts a long time (months or even years) and may or may not have a clear cause. It is different from acute pain, which usually does have a clear cause--like an injury or illness--and gets better over time. Chronic pain:  · Lasts over time but may vary from day to day. · Does not go away despite efforts to end it. · May disrupt your sleep and lead to fatigue. · May cause depression or anxiety. · May make your muscles tense, causing more pain. · Can disrupt your work, hobbies, home life, and relationships with friends and family. Chronic pain is a very real condition. It is not just in your head. Treatment can help and usually includes several methods used together, such as medicines, physical therapy, exercise, and other treatments. Learning how to relax and changing negative thought patterns can also help you cope. Chronic pain is complex. Taking an active role in your treatment will help you better manage your pain. Tell your doctor if you have trouble dealing with your pain. You may have to try several things before you find what works best for you. Follow-up care is a key part of your treatment and safety. Be sure to make and go to all appointments, and call your doctor if you are having problems. It's also a good idea to know your test results and keep a list of the medicines you take. How can you care for yourself at home? · Pace yourself. Break up large jobs into smaller tasks. Save harder tasks for days when you have less pain, or go back and forth between hard tasks and easier ones. Take rest breaks. · Relax, and reduce stress. Relaxation techniques such as deep breathing or meditation can help. · Keep moving. Gentle, daily exercise can help reduce pain over the long run. Try low- or no-impact exercises such as walking, swimming, and stationary biking.  Do stretches to stay flexible. · Try heat, cold packs, and massage. · Get enough sleep. Chronic pain can make you tired and drain your energy. Talk with your doctor if you have trouble sleeping because of pain. · Think positive. Your thoughts can affect your pain level. Do things that you enjoy to distract yourself when you have pain instead of focusing on the pain. See a movie, read a book, listen to music, or spend time with a friend. · If you think you are depressed, talk to your doctor about treatment. · Keep a daily pain diary. Record how your moods, thoughts, sleep patterns, activities, and medicine affect your pain. You may find that your pain is worse during or after certain activities or when you are feeling a certain emotion. Having a record of your pain can help you and your doctor find the best ways to treat your pain. · Take pain medicines exactly as directed. ? If the doctor gave you a prescription medicine for pain, take it as prescribed. ? If you are not taking a prescription pain medicine, ask your doctor if you can take an over-the-counter medicine. Reducing constipation caused by pain medicine  · Include fruits, vegetables, beans, and whole grains in your diet each day. These foods are high in fiber. · Drink plenty of fluids, enough so that your urine is light yellow or clear like water. If you have kidney, heart, or liver disease and have to limit fluids, talk with your doctor before you increase the amount of fluids you drink. · If your doctor recommends it, get more exercise. Walking is a good choice. Bit by bit, increase the amount you walk every day. Try for at least 30 minutes on most days of the week. · Schedule time each day for a bowel movement. A daily routine may help. Take your time and do not strain when having a bowel movement. When should you call for help?    Call your doctor now or seek immediate medical care if:    · Your pain gets worse or is out of control.     · You feel down or blue, or you do not enjoy things like you once did. You may be depressed, which is common in people with chronic pain. Depression can be treated.     · You have vomiting or cramps for more than 2 hours. Watch closely for changes in your health, and be sure to contact your doctor if:    · You cannot sleep because of pain.     · You are very worried or anxious about your pain.     · You have trouble taking your pain medicine.     · You have any concerns about your pain medicine.     · You have trouble with bowel movements, such as:  ? No bowel movement in 3 days. ? Blood in the anal area, in your stool, or on the toilet paper. ? Diarrhea for more than 24 hours. Where can you learn more? Go to https://[email or web address]. org and sign in to your [email or web address] account. Enter N004 in the [employer] box to learn more about \"Chronic Pain: Care Instructions. \"     If you do not have an account, please click on the \"Sign Up Now\" link. Current as of: August 4, 2020               Content Version: 12.8  © 0449-3039 [employer]wise, Incorporated. Care instructions adapted under license by [facility] ([facility]). If you have questions about a medical condition or this instruction, always ask your healthcare professional. [address] any warranty or liability for your use of this information.

## 2021-05-20 NOTE — PROGRESS NOTES
50. 0-59.9, adult [facility])    Other cervical disc displacement, unspecified cervical region    Radiculopathy affecting upper extremity    S/P cervical spinal fusion    Sleep apnea    Hyperlipidemia    H/O: HTN (hypertension)    Moderate asthma without complication    Enlarged heart    Chronic pansinusitis    Pain in both lower extremities    Non-seasonal allergic rhinitis due to pollen    Allergy desensitization therapy    Lumbar spondylosis    Severe episode of recurrent major depressive disorder, without psychotic features ([address]         Review of Systems   Constitutional: Negative for chills, fatigue and fever. HENT: Negative for congestion, facial swelling, sinus pain, sore throat and trouble swallowing. Eyes: Negative for pain and visual disturbance. Respiratory: Negative for cough, shortness of breath and wheezing. Cardiovascular: Positive for leg swelling. Negative for chest pain and palpitations. Gastrointestinal: Negative for abdominal pain, diarrhea, nausea and vomiting. Genitourinary: Negative for difficulty urinating, dysuria and urgency. Musculoskeletal: Positive for arthralgias, back pain and myalgias. Negative for gait problem and neck pain. Skin: Negative for color change and rash. Neurological: Negative for dizziness, weakness and headaches. Psychiatric/Behavioral: Negative for agitation and sleep disturbance. The patient is not nervous/anxious. Prior to Visit Medications    Medication Sig Taking? Authorizing Provider   methylPREDNISolone (MEDROL DOSEPACK) 4 MG tablet Take by mouth. Yes MIRNA Cardenas CNP   hydrocortisone valerate (WESTCORT) 0.2 % ointment Apply topically daily.  Yes MIRNA Cardenas CNP   butalbital-acetaminophen-caffeine (FIORICET, ESGIC) -40 MG per tablet Take 1 tablet by mouth every 4 hours as needed for Headaches Yes MIRNA Cardenas CNP   orphenadrine (NORFLEX) 100 MG extended release tablet Take 1 tablet by mouth 2 daily as needed for Wheezing Yes Shilpi MIRNA Conn CNP   rosuvastatin (CRESTOR) 20 MG tablet take 1 tablet by mouth once daily Yes MIRNA Fernandez CNP   omeprazole (PRILOSEC) 20 MG delayed release capsule take 1 capsule by mouth once daily Yes MIRNA Fernandez CNP   ferrous sulfate (IRON 325) 325 (65 Fe) MG tablet Take 1 tablet by mouth daily (with breakfast) Yes MIRNA Fernandez CNP   bumetanide (BUMEX) 1 MG tablet Take 0.5 tablets by mouth daily Yes MIRNA Ramirez CNP   NARCAN 4 MG/0.1ML LIQD nasal spray  Yes Historical Provider, MD   Multiple Vitamins-Minerals (MULTIVITAMIN ADULTS) TABS Take 1 tablet by mouth daily Yes MIRNA Fernandez CNP   ergocalciferol (ERGOCALCIFEROL) 03870 units capsule Take 50,000 Units by mouth daily Yes Historical Provider, MD   ALBUTEROL IN Inhale into the lungs 4 times daily as needed  Yes Historical Provider, MD        Social History     Tobacco Use    Smoking status: Never Smoker    Smokeless tobacco: Never Used   Substance Use Topics    Alcohol use: Not Currently        Vitals:    05/20/21 1031 05/20/21 1058   BP: (!) 142/88 (!) 150/90   Site: Right Upper Arm Left Upper Arm   Pulse: 67    SpO2: 98%    Weight: (!) 422 lb 6.4 oz (191.6 kg)      Estimated body mass index is 55.73 kg/m² as calculated from the following:    Height as of 5/19/21: 6' 1\" (1.854 m). Weight as of this encounter: 422 lb 6.4 oz (191.6 kg). Physical Exam  Vitals reviewed. Constitutional:       General: He is not in acute distress. Appearance: He is well-developed. HENT:      Head: Normocephalic and atraumatic. Eyes:      General:         Right eye: No discharge. Left eye: No discharge. Conjunctiva/sclera: Conjunctivae normal.   Cardiovascular:      Rate and Rhythm: Normal rate and regular rhythm. Heart sounds: Normal heart sounds. Pulmonary:      Effort: Pulmonary effort is normal. No respiratory distress.       Breath sounds: Normal breath sounds. Abdominal:      General: Bowel sounds are normal.      Palpations: Abdomen is soft. Tenderness: There is no right CVA tenderness or left CVA tenderness. Musculoskeletal:      Cervical back: Normal range of motion and neck supple. Skin:     General: Skin is warm and dry. Neurological:      General: No focal deficit present. Mental Status: He is alert and oriented to person, place, and time. Coordination: Coordination normal.   Psychiatric:         Mood and Affect: Mood normal.         Behavior: Behavior normal.         Thought Content: Thought content normal.         Judgment: Judgment normal.         ASSESSMENT/PLAN:  1. Severe episode of recurrent major depressive disorder, without psychotic features ([facility] [address]    2. Disease of spinal cord ([facility] [address]    3. Severe persistent asthma, unspecified whether complicated  - External Referral To Pulmonology    4. S/P cervical spinal fusion  - butalbital-acetaminophen-caffeine (FIORICET, ESGIC) -40 MG per tablet; Take 1 tablet by mouth every 4 hours as needed for Headaches  Dispense: 90 tablet; Refill: 1    5. Chronic midline low back pain with right-sided sciatica    6. Hyperlipidemia, unspecified hyperlipidemia type  - Lipid Panel; Future    7. H/O: HTN (hypertension)  - Comprehensive Metabolic Panel; Future  - TSH without Reflex; Future  - T4, Free; Future    8. Morbid obesity with BMI of 50.0-59.9, adult ([facility] [address]    9. Moderate asthma without complication, unspecified whether persistent  - External Referral To Pulmonology      - Off work for [employer] for 1-2 days per month. - Continue to follow up with pain management as planned   - Will refer to pulmonary with [facility] since pt needs a new provider. He was agreeable to referral.  - Call office with any questions or concerns, or if symptoms are getting worse or changing  - Have labs completed after 12 hours fasting.  - Monitor BP at home and call if >140/90 consistently.        Return in 6 months (on 11/20/2021), or if symptoms worsen or fail to improve, for Routine follow up, Medication check, Result discussion. Patient given educational materials - see patient instructions. Discussed use, benefit, and side effects of prescribed medications. All patient questions answered. Pt voiced understanding. Reviewed health maintenance. An electronic signature was used to authenticate this note.     --MIRNA Ruiz - CNP on 5/20/2021 at 11:23 AM

## 2021-05-24 ENCOUNTER — TELEPHONE (OUTPATIENT)
Dept: FAMILY MEDICINE CLINIC | Age: 51
End: 2021-05-24

## 2021-05-24 NOTE — TELEPHONE ENCOUNTER
Noted.  OK for new prescription. -WS    Orders Placed This Encounter   Medications    betamethasone valerate (VALISONE) 0.1 % cream     Sig: Apply topically 2 times daily.      Dispense:  45 g     Refill:  1

## 2021-05-24 NOTE — TELEPHONE ENCOUNTER
PA denied by ins. Spoke to [facility] she states she will send in Ericka Maldonadoes. [facility] for pt to inform him a new script will be sent.   FABIO

## 2021-06-03 ENCOUNTER — TELEPHONE (OUTPATIENT)
Dept: PHYSICAL MEDICINE AND REHAB | Age: 51
End: 2021-06-03

## 2021-06-03 NOTE — TELEPHONE ENCOUNTER
Pt. Called office requesting upcoming procedure cancelled. States he is having second thought about it. Follow up kept for medications and discuss options.

## 2021-06-15 RX ORDER — LIDOCAINE 50 MG/G
PATCH TOPICAL
Qty: 60 PATCH | Refills: 0 | Status: SHIPPED | OUTPATIENT
Start: 2021-06-18 | End: 2021-07-19 | Stop reason: SDUPTHER

## 2021-06-15 NOTE — TELEPHONE ENCOUNTER
OARRS reviewed. UDS: + for Dihydrocodeine, Hydrocodone, Norhydrocodone, Hydromorphone. Last seen: 5/19/2021.  Follow-up:   Future Appointments   Date Time Provider Jeuss Simmons   7/7/2021 12:15 PM MIRNA Huynh - CNP [facility]   11/17/2021 11:30 AM Lavina Gaucher, APRN - [id number]

## 2021-06-17 ENCOUNTER — OFFICE VISIT (OUTPATIENT)
Dept: FAMILY MEDICINE CLINIC | Age: 51
End: 2021-06-17
Payer: COMMERCIAL

## 2021-06-17 VITALS
DIASTOLIC BLOOD PRESSURE: 82 MMHG | HEART RATE: 77 BPM | WEIGHT: 315 LBS | SYSTOLIC BLOOD PRESSURE: 130 MMHG | OXYGEN SATURATION: 98 % | BODY MASS INDEX: 56.02 KG/M2

## 2021-06-17 DIAGNOSIS — Z98.1 S/P CERVICAL SPINAL FUSION: ICD-10-CM

## 2021-06-17 DIAGNOSIS — R22.43 LOCALIZED SWELLING OF BOTH LOWER LEGS: Primary | ICD-10-CM

## 2021-06-17 DIAGNOSIS — Z86.79 H/O: HTN (HYPERTENSION): ICD-10-CM

## 2021-06-17 DIAGNOSIS — G89.4 CHRONIC PAIN SYNDROME: ICD-10-CM

## 2021-06-17 DIAGNOSIS — E66.01 MORBID OBESITY WITH BMI OF 50.0-59.9, ADULT (HCC): ICD-10-CM

## 2021-06-17 DIAGNOSIS — G95.9 DISEASE OF SPINAL CORD (HCC): ICD-10-CM

## 2021-06-17 PROCEDURE — 1036F TOBACCO NON-USER: CPT | Performed by: NURSE PRACTITIONER

## 2021-06-17 PROCEDURE — G8427 DOCREV CUR MEDS BY ELIG CLIN: HCPCS | Performed by: NURSE PRACTITIONER

## 2021-06-17 PROCEDURE — 99214 OFFICE O/P EST MOD 30 MIN: CPT | Performed by: NURSE PRACTITIONER

## 2021-06-17 PROCEDURE — G8417 CALC BMI ABV UP PARAM F/U: HCPCS | Performed by: NURSE PRACTITIONER

## 2021-06-17 RX ORDER — LIDOCAINE 50 MG/G
PATCH TOPICAL
Qty: 60 PATCH | Refills: 0 | Status: CANCELLED | OUTPATIENT
Start: 2021-06-18

## 2021-06-17 RX ORDER — LIDOCAINE 50 MG/G
OINTMENT TOPICAL
Qty: 1 TUBE | Refills: 1 | Status: SHIPPED | OUTPATIENT
Start: 2021-06-18 | End: 2021-11-16 | Stop reason: SDUPTHER

## 2021-06-17 RX ORDER — ORPHENADRINE CITRATE 100 MG/1
100 TABLET, EXTENDED RELEASE ORAL 2 TIMES DAILY
Qty: 60 TABLET | Refills: 0 | Status: SHIPPED | OUTPATIENT
Start: 2021-06-18 | End: 2021-07-19 | Stop reason: SDUPTHER

## 2021-06-17 RX ORDER — PREDNISONE 10 MG/1
TABLET ORAL
Qty: 30 TABLET | Refills: 0 | Status: SHIPPED | OUTPATIENT
Start: 2021-06-17 | End: 2021-06-27

## 2021-06-17 RX ORDER — HYDROCODONE BITARTRATE AND ACETAMINOPHEN 5; 325 MG/1; MG/1
1 TABLET ORAL EVERY 8 HOURS PRN
Qty: 90 TABLET | Refills: 0 | Status: SHIPPED | OUTPATIENT
Start: 2021-06-18 | End: 2021-07-19 | Stop reason: SDUPTHER

## 2021-06-17 SDOH — ECONOMIC STABILITY: FOOD INSECURITY: WITHIN THE PAST 12 MONTHS, THE FOOD YOU BOUGHT JUST DIDN'T LAST AND YOU DIDN'T HAVE MONEY TO GET MORE.: NEVER TRUE

## 2021-06-17 SDOH — ECONOMIC STABILITY: FOOD INSECURITY: WITHIN THE PAST 12 MONTHS, YOU WORRIED THAT YOUR FOOD WOULD RUN OUT BEFORE YOU GOT MONEY TO BUY MORE.: NEVER TRUE

## 2021-06-17 ASSESSMENT — ENCOUNTER SYMPTOMS
TROUBLE SWALLOWING: 0
VISUAL CHANGE: 0
PHOTOPHOBIA: 0

## 2021-06-17 NOTE — PATIENT INSTRUCTIONS
Patient Education        Body Mass Index: Care Instructions  Your Care Instructions     Body mass index (BMI) can help you see if your weight is raising your risk for health problems. It uses a formula to compare how much you weigh with how tall you are. · A BMI lower than 18.5 is considered underweight. · A BMI between 18.5 and 24.9 is considered healthy. · A BMI between 25 and 29.9 is considered overweight. A BMI of 30 or higher is considered obese. If your BMI is in the normal range, it means that you have a lower risk for weight-related health problems. If your BMI is in the overweight or obese range, you may be at increased risk for weight-related health problems, such as high blood pressure, heart disease, stroke, arthritis or joint pain, and diabetes. If your BMI is in the underweight range, you may be at increased risk for health problems such as fatigue, lower protection (immunity) against illness, muscle loss, bone loss, hair loss, and hormone problems. BMI is just one measure of your risk for weight-related health problems. You may be at higher risk for health problems if you are not active, you eat an unhealthy diet, or you drink too much alcohol or use tobacco products. Follow-up care is a key part of your treatment and safety. Be sure to make and go to all appointments, and call your doctor if you are having problems. It's also a good idea to know your test results and keep a list of the medicines you take. How can you care for yourself at home? · Practice healthy eating habits. This includes eating plenty of fruits, vegetables, whole grains, lean protein, and low-fat dairy. · If your doctor recommends it, get more exercise. Walking is a good choice. Bit by bit, increase the amount you walk every day. Try for at least 30 minutes on most days of the week. · Do not smoke. Smoking can increase your risk for health problems.  If you need help quitting, talk to your doctor about stop-smoking programs and medicines. These can increase your chances of quitting for good. · Limit alcohol to 2 drinks a day for men and 1 drink a day for women. Too much alcohol can cause health problems. If you have a BMI higher than 25  · Your doctor may do other tests to check your risk for weight-related health problems. This may include measuring the distance around your waist. A waist measurement of more than 40 inches in men or 35 inches in women can increase the risk of weight-related health problems. · Talk with your doctor about steps you can take to stay healthy or improve your health. You may need to make lifestyle changes to lose weight and stay healthy, such as changing your diet and getting regular exercise. If you have a BMI lower than 18.5  · Your doctor may do other tests to check your risk for health problems. · Talk with your doctor about steps you can take to stay healthy or improve your health. You may need to make lifestyle changes to gain or maintain weight and stay healthy, such as getting more healthy foods in your diet and doing exercises to build muscle. Where can you learn more? Go to https://Yingying Licaiserena.[employer]. org and sign in to your [employer] account. Enter S176 in the SidelineSwap box to learn more about \"Body Mass Index: Care Instructions. \"     If you do not have an account, please click on the \"Sign Up Now\" link. Current as of: March 17, 2021               Content Version: 12.9  © 4889-7711 Healthwise, Incorporated. Care instructions adapted under license by [facility] ([facility]). If you have questions about a medical condition or this instruction, always ask your healthcare professional. [address] any warranty or liability for your use of this information.

## 2021-06-17 NOTE — TELEPHONE ENCOUNTER
Melo Padilla called requesting a refill on the following medications:  Requested Prescriptions     Pending Prescriptions Disp Refills    HYDROcodone-acetaminophen (NORCO) 5-325 MG per tablet 90 tablet 0     Sig: Take 1 tablet by mouth every 8 hours as needed for Pain for up to 30 days.  orphenadrine (NORFLEX) 100 MG extended release tablet 60 tablet 0     Sig: Take 1 tablet by mouth 2 times daily    lidocaine (LIDODERM) 5 % 60 patch 0     Si hours on, 12 hours off.     lidocaine (XYLOCAINE) 5 % ointment 1 Tube 1     Sig: APPLY TOPICALLY TO AFFECTED AREAS OF LOWER BACK AND NECK if needed     Pharmacy verified: [facility]  .pv      Date of last visit: 2021  Date of next visit (if applicable): 5/3/9457

## 2021-06-17 NOTE — PROGRESS NOTES
[address] [facility] [address]  Dept: [phone]  Dept Fax: [phone]: [phone]     Visit Date:  6/17/2021      Patient:  Jared Abreu  YOB: 1970    HPI:     Chief Complaint   Patient presents with    Other     Rt leg discolored       Pt presents to the office today for bilateral lower leg swelling. 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 When he puts his feet up, the swelling improves. He denies any pain with walking, but does have aches after working all day. Steroids have helped in the past with his back pain and leg pain. Pt has also is having increased neck pain and headaches with it. Neck Pain   This is a chronic problem. The current episode started more than 1 month ago. The problem occurs intermittently. The problem has been gradually worsening. The pain is associated with nothing. The pain is present in the midline. The quality of the pain is described as aching. The symptoms are aggravated by position. Associated symptoms include headaches. Pertinent negatives include no chest pain, fever, leg pain, numbness, pain with swallowing, photophobia, syncope, tingling, trouble swallowing, visual change or weakness. He has tried heat, home exercises, NSAIDs, neck support and acetaminophen for the symptoms. The treatment provided moderate relief. Medications    Current Outpatient Medications:     predniSONE (DELTASONE) 10 MG tablet, 4 po qd for 3 days, then 3 po qd for 3 days, then 2 po qd for 3 days, then 1 po qd for 3 days, Disp: 30 tablet, Rfl: 0    HYDROcodone-acetaminophen (NORCO) 5-325 MG per tablet, Take 1 tablet by mouth every 8 hours as needed for Pain for up to 30 days. , Disp: 90 tablet, Rfl: 0    orphenadrine (NORFLEX) 100 MG extended release tablet, Take 1 tablet by mouth 2 times daily, Disp: 60 tablet, Rfl: 0    lidocaine (XYLOCAINE) 5 % ointment, APPLY TOPICALLY TO AFFECTED AREAS OF LOWER BACK AND NECK if needed, Disp: 1 Tube, Rfl: 1    lidocaine (LIDODERM) 5 %, apply 1 to 2 patches TO THE AFFECTED AREA DAILY. LEAVE ON FOR 12 HOURS AND THEN OFF FOR 12 HOURS., Disp: 60 patch, Rfl: 0    hydrocortisone valerate (WESTCORT) 0.2 % ointment, Apply topically daily. , Disp: 1 Tube, Rfl: 11    butalbital-acetaminophen-caffeine (FIORICET, ESGIC) -40 MG per tablet, Take 1 tablet by mouth every 4 hours as needed for Headaches, Disp: 90 tablet, Rfl: 1    EPINEPHrine (EPIPEN 2-RAGHAV) 0.3 MG/0.3ML SOAJ injection, Inject one pen as directed STAT for allergic reaction, may disp generic NDC 83065-540-90, Disp: 1 each, Rfl: 0    albuterol (PROVENTIL) (2.5 MG/3ML) 0.083% nebulizer solution, Take 3 mLs by nebulization every 4 hours as needed for Wheezing, Disp: 375 mL, Rfl: 3    amitriptyline (ELAVIL) 50 MG tablet, take 1 tablet by mouth at bedtime, Disp: 90 tablet, Rfl: 1    RA LORATADINE 10 MG tablet, take 1 tablet by mouth once daily, Disp: , Rfl:     montelukast (SINGULAIR) 10 MG tablet, Take 1 tablet by mouth nightly, Disp: 30 tablet, Rfl: 5    diphenhydrAMINE (BENADRYL) 25 MG capsule, Take 1 capsule by mouth every 6 hours as needed for Itching, Disp: 60 capsule, Rfl: 5    tiotropium (SPIRIVA RESPIMAT) 1.25 MCG/ACT AERS inhaler, Inhale 2 puffs into the lungs daily, Disp: 1 Inhaler, Rfl: 1    budesonide-formoterol (SYMBICORT) 160-4.5 MCG/ACT AERO, 2 puffs inhaled twice daily.   Rinse mouth well after use., Disp: 1 Inhaler, Rfl: 5    fluticasone (FLONASE) 50 MCG/ACT nasal spray, 2 sprays by Each Nostril route daily, Disp: 3 Bottle, Rfl: 11    azelastine (OPTIVAR) 0.05 % ophthalmic solution, Place 1 drop into both eyes 2 times daily, Disp: 1 Bottle, Rfl: 11    cetirizine (ZYRTEC) 10 MG tablet, Take 1 tablet by mouth daily, Disp: 30 tablet, Rfl: 11    albuterol sulfate  (90 Base) MCG/ACT inhaler, Inhale 2 puffs into the lungs 4 times daily as needed for Wheezing, Disp: 3 Inhaler, Rfl: 3    rosuvastatin (CRESTOR) 20 MG tablet, take 1 tablet by mouth once daily, Disp: 90 tablet, Rfl: 3    omeprazole (PRILOSEC) 20 MG delayed release capsule, take 1 capsule by mouth once daily, Disp: 90 capsule, Rfl: 3    ferrous sulfate (IRON 325) 325 (65 Fe) MG tablet, Take 1 tablet by mouth daily (with breakfast), Disp: 90 tablet, Rfl: 3    bumetanide (BUMEX) 1 MG tablet, Take 0.5 tablets by mouth daily, Disp: 30 tablet, Rfl: 0    NARCAN 4 MG/0.1ML LIQD nasal spray, , Disp: , Rfl:     Multiple Vitamins-Minerals (MULTIVITAMIN ADULTS) TABS, Take 1 tablet by mouth daily, Disp: 90 tablet, Rfl: 3    ergocalciferol (ERGOCALCIFEROL) 71902 units capsule, Take 50,000 Units by mouth daily, Disp: , Rfl:     ALBUTEROL IN, Inhale into the lungs 4 times daily as needed , Disp: , Rfl:     The patient is allergic to dilantin [phenytoin], dupixent [dupilumab], oxycodone, and valium [diazepam]. Past Medical History  [facility]  has a past medical history of Asthma, Cardiomegaly, Fibromyalgia, and CLARIBLE on CPAP. Subjective:      Review of Systems   Constitutional: Negative for chills, fatigue and fever. HENT: Negative for trouble swallowing. Eyes: Negative for photophobia. Respiratory: Negative for cough, shortness of breath and wheezing. Cardiovascular: Positive for leg swelling. Negative for chest pain, palpitations and syncope. Gastrointestinal: Negative for abdominal pain, diarrhea, nausea and vomiting. Musculoskeletal: Positive for arthralgias, back pain and neck pain. Skin: Negative for color change and rash. Neurological: Positive for headaches. Negative for dizziness, tingling, weakness and numbness. Objective:     /82 (Site: Right Wrist)   Pulse 77   Wt (!) 424 lb 9.6 oz (192.6 kg)   SpO2 98%   BMI 56.02 kg/m²     Physical Exam  Vitals reviewed. Constitutional:       General: He is not in acute distress.      Appearance: He is well-developed. He is morbidly obese. He is not ill-appearing or toxic-appearing. HENT:      Head: Normocephalic and atraumatic. Nose: Nose normal.   Eyes:      General:         Right eye: No discharge. Left eye: No discharge. Conjunctiva/sclera: Conjunctivae normal.   Cardiovascular:      Rate and Rhythm: Normal rate and regular rhythm. Pulses: Normal pulses. Heart sounds: Normal heart sounds. Pulmonary:      Effort: Pulmonary effort is normal. No respiratory distress. Abdominal:      General: Bowel sounds are normal.      Tenderness: There is no right CVA tenderness or left CVA tenderness. Musculoskeletal:      Cervical back: Normal range of motion and neck supple. Tenderness present. No edema, deformity, erythema, signs of trauma, lacerations, rigidity or spasms. Pain with movement present. Normal range of motion. Thoracic back: Normal.      Right lower le+ Pitting Edema present. Left lower le+ Pitting Edema present. Lymphadenopathy:      Cervical: No cervical adenopathy. Skin:     General: Skin is warm and dry. Neurological:      General: No focal deficit present. Mental Status: He is alert and oriented to person, place, and time. Coordination: Coordination normal.   Psychiatric:         Mood and Affect: Mood normal.         Behavior: Behavior normal.         Thought Content: Thought content normal.         Judgment: Judgment normal.         Assessment/Plan:      Boston Goldmann was seen today for other. Diagnoses and all orders for this visit:    Localized swelling of both lower legs  -     CBC Auto Differential; Future  -     Compression Stocking;  Future  -     predniSONE (DELTASONE) 10 MG tablet; 4 po qd for 3 days, then 3 po qd for 3 days, then 2 po qd for 3 days, then 1 po qd for 3 days  -     AFL - Saul Landis MD, Vascular Medicine, [facility] TRENA MAURICIO    H/O: HTN (hypertension)    Morbid obesity with BMI of 50.0-59.9, adult ([address]    S/P cervical spinal fusion  -     XR CERVICAL SPINE (4-5 VIEWS); Future    Disease of spinal cord (HCC)  -     XR CERVICAL SPINE (4-5 VIEWS); Future    - Rest and elevated legs above the level of the heart for 20 min each time. Will refer to Dr Domenica Biswas for further evaluation of legs. Pt was agreeable. - Compression stockings daily while working  - Increase clear fluids, avoid salt in diet  - Continue to follow up with pain management for neck pain. We will order a follow up c-spine x-ray today and call with results. - Call office with any questions or concerns, or if symptoms are getting worse or changing    Return if symptoms worsen or fail to improve. Patient given educational materials - see patient instructions. Discussed use, benefit, and side effects of prescribed medications. All patient questions answered. Pt voiced understanding.         Electronically signed by MIRNA Vicente CNP on 6/18/2021 at 8:21 AM

## 2021-06-17 NOTE — TELEPHONE ENCOUNTER
OARRS reviewed. UDS: + for Dihydrocodeine, Hydrocodone, Norhydrocodone, Hydromorphone  Last seen: 5/19/2021.  Follow-up:   Future Appointments   Date Time Provider Jesus Simmons   7/7/2021 12:15 PM MIRNA Swartz - CNP [facility]   11/17/2021 11:30 AM MIRNA Pillai - [address]

## 2021-06-18 ASSESSMENT — ENCOUNTER SYMPTOMS
ABDOMINAL PAIN: 0
NAUSEA: 0
COLOR CHANGE: 0
VOMITING: 0
COUGH: 0
WHEEZING: 0
DIARRHEA: 0
SHORTNESS OF BREATH: 0
BACK PAIN: 1

## 2021-06-21 RX ORDER — AMITRIPTYLINE HYDROCHLORIDE 50 MG/1
TABLET, FILM COATED ORAL
Qty: 90 TABLET | Refills: 1 | Status: SHIPPED | OUTPATIENT
Start: 2021-06-21 | End: 2021-12-13

## 2021-06-21 NOTE — TELEPHONE ENCOUNTER
This medication refill is regarding an electronic request from [facility]:    Requested Prescriptions     Pending Prescriptions Disp Refills    amitriptyline (ELAVIL) 50 MG tablet [Pharmacy Med Name: AMITRIPTYLINE HCL 50 MG TAB] 90 tablet 1     Sig: take 1 tablet by mouth at bedtime       Date of last visit: 6/17/2021   Date of next visit: 11/17/21  Date of last refill: 12/22/20 for #90 with 1 refill    Rx verified, ordered and set to EP.

## 2021-06-27 DIAGNOSIS — D50.9 IRON DEFICIENCY ANEMIA, UNSPECIFIED IRON DEFICIENCY ANEMIA TYPE: ICD-10-CM

## 2021-06-28 RX ORDER — FERROUS SULFATE 325(65) MG
TABLET ORAL
Qty: 90 TABLET | Refills: 3 | Status: SHIPPED | OUTPATIENT
Start: 2021-06-28 | End: 2022-09-08

## 2021-07-07 ENCOUNTER — OFFICE VISIT (OUTPATIENT)
Dept: PHYSICAL MEDICINE AND REHAB | Age: 51
End: 2021-07-07
Payer: COMMERCIAL

## 2021-07-07 VITALS
WEIGHT: 315 LBS | BODY MASS INDEX: 41.75 KG/M2 | DIASTOLIC BLOOD PRESSURE: 82 MMHG | HEIGHT: 73 IN | SYSTOLIC BLOOD PRESSURE: 136 MMHG

## 2021-07-07 DIAGNOSIS — M47.816 SPONDYLOSIS OF LUMBAR REGION WITHOUT MYELOPATHY OR RADICULOPATHY: Primary | ICD-10-CM

## 2021-07-07 DIAGNOSIS — Z98.1 HX OF FUSION OF CERVICAL SPINE: ICD-10-CM

## 2021-07-07 DIAGNOSIS — M50.30 DDD (DEGENERATIVE DISC DISEASE), CERVICAL: ICD-10-CM

## 2021-07-07 DIAGNOSIS — M54.12 CERVICAL RADICULOPATHY: ICD-10-CM

## 2021-07-07 DIAGNOSIS — M51.36 LUMBAR DEGENERATIVE DISC DISEASE: ICD-10-CM

## 2021-07-07 DIAGNOSIS — M54.50 LUMBAR PAIN: ICD-10-CM

## 2021-07-07 DIAGNOSIS — G89.4 CHRONIC PAIN SYNDROME: ICD-10-CM

## 2021-07-07 DIAGNOSIS — F11.90 CHRONIC, CONTINUOUS USE OF OPIOIDS: ICD-10-CM

## 2021-07-07 DIAGNOSIS — M54.12 CERVICAL RADICULITIS: ICD-10-CM

## 2021-07-07 PROCEDURE — G8417 CALC BMI ABV UP PARAM F/U: HCPCS | Performed by: NURSE PRACTITIONER

## 2021-07-07 PROCEDURE — 1036F TOBACCO NON-USER: CPT | Performed by: NURSE PRACTITIONER

## 2021-07-07 PROCEDURE — G8427 DOCREV CUR MEDS BY ELIG CLIN: HCPCS | Performed by: NURSE PRACTITIONER

## 2021-07-07 PROCEDURE — 99214 OFFICE O/P EST MOD 30 MIN: CPT | Performed by: NURSE PRACTITIONER

## 2021-07-07 ASSESSMENT — ENCOUNTER SYMPTOMS
EYES NEGATIVE: 1
BACK PAIN: 1
GASTROINTESTINAL NEGATIVE: 1
RESPIRATORY NEGATIVE: 1

## 2021-07-07 NOTE — PROGRESS NOTES
[address] [facility] [address]  Dept: [phone]  Dept Fax: [phone]: [phone]    Visit Date: 7/7/2021    Functionality Assessment/Goals Worksheet     On a scale of 0 (Does not Interfere) to 10 (Completely Interferes)     1. Which number describes how during the past week pain has interfered with       the following:  A. General Activity:  6  B. Mood: 8  C. Walking Ability:  7  D. Normal Work (Includes both work outside the home and housework):  8  E. Relations with Other People:   9  F. Sleep:   9  G. Enjoyment of Life:   9    2. Patient Prefers to Take their Pain Medications:     [x]  On a regular basis   [x]  Only when necessary    []  Does not take pain medications    3. What are the Patient's Goals/Expectations for Visiting Pain Management? []  Learn about my pain    [x]  Receive Medication   []  Physical Therapy     []  Treat Depression   [x]  Receive Injections    []  Treat Sleep   []  Deal with Anxiety and Stress   []  Treat Opoid Dependence/Addiction   []  Other:      HPI:   Rush Valencia is a 54 y.o. male is here today for    Chief Complaint: Low back pain and leg pain     HPI   1.5 month FU Continues to have a main complaintof pain in low back all across radiating doen buttocks and down anterior legs to about calf. Pain is described as sharp aching pain with spasms and numbness in legs. Pain is most bothersome across low back. Patient canceled his bilateral L-facet RFA that was ordered last visit d/t work schedule but wants to move forward. 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 Norco prn continues to help take the edge off along with Norflex     Pain increases with bending, lifting, twisting , walking, standing, raising arms and getting up and down. Medications reviewed.  Patient denies side effects with medications. Patient states he is taking medications as prescribed. Hedenies receiving pain medications from other sources. He denies any ER visits since last visit. Pain scale with out pain medications or at its worst is 9-10/10. Pain scale with pain medications or at its best is 7/10. Last dose of Norco was today  Drug screen reviewed from 1/20/2021 and was appropriate  Pill count was not completed today and instructed to bring to appointments   Patient does not have naloxone available at home. Patient has not required use of naloxone at home since last office visit. The patientis allergic to dilantin [phenytoin], dupixent [dupilumab], oxycodone, and valium [diazepam]. Subjective:      Review of Systems   Constitutional: Negative. Negative for activity change. HENT: Negative. Eyes: Negative. Respiratory: Negative. Cardiovascular: Positive for leg swelling. Gastrointestinal: Negative. Endocrine: Negative. Genitourinary: Negative. Musculoskeletal: Positive for arthralgias, back pain, gait problem, joint swelling, myalgias, neck pain and neck stiffness. Skin: Negative. Neurological: Positive for weakness, numbness and headaches. Psychiatric/Behavioral: Negative. Objective:     Vitals:    07/07/21 1210   BP: 136/82   Weight: (!) 427 lb (193.7 kg)   Height: 6' 1\" (1.854 m)       Physical Exam  Vitals and nursing note reviewed. Constitutional:       General: He is not in acute distress. Appearance: He is obese. He is not diaphoretic. Comments: Obese   HENT:      Head: Normocephalic and atraumatic. Right Ear: External ear normal.      Left Ear: External ear normal.      Nose: Nose normal.      Mouth/Throat:      Mouth: Mucous membranes are moist.      Pharynx: No oropharyngeal exudate. Eyes:      General: No scleral icterus. Right eye: No discharge. Left eye: No discharge.       Conjunctiva/sclera: Conjunctivae normal. Pupils: Pupils are equal, round, and reactive to light. Neck:      Thyroid: No thyromegaly. Cardiovascular:      Rate and Rhythm: Normal rate and regular rhythm. Heart sounds: Normal heart sounds. No murmur heard. No friction rub. No gallop. Pulmonary:      Effort: Pulmonary effort is normal. No respiratory distress. Breath sounds: Normal breath sounds. No wheezing or rales. Chest:      Chest wall: No tenderness. Abdominal:      General: Bowel sounds are normal. There is no distension. Palpations: Abdomen is soft. Tenderness: There is no abdominal tenderness. There is no guarding or rebound. Musculoskeletal:         General: Tenderness present. Cervical back: Full passive range of motion without pain, normal range of motion and neck supple. Spasms and tenderness present. No edema, erythema or rigidity. No muscular tenderness. Normal range of motion. Thoracic back: Spasms and tenderness present. Lumbar back: Tenderness present. Decreased range of motion. Back:       Right knee: Tenderness present. Left knee: Tenderness present. Right lower leg: Swelling present. Edema present. Left lower leg: Swelling present. Edema present. Right ankle: Swelling present. Left ankle: Swelling present. Skin:     General: Skin is warm. Coloration: Skin is not pale. Findings: No erythema or rash. Neurological:      General: No focal deficit present. Mental Status: He is alert and oriented to person, place, and time. He is not disoriented. Cranial Nerves: No cranial nerve deficit. Sensory: No sensory deficit. Motor: No atrophy or abnormal muscle tone. Coordination: Coordination normal.      Gait: Gait normal.      Deep Tendon Reflexes: Babinski sign absent on the right side. Babinski sign absent on the left side. Reflex Scores:       Tricep reflexes are 1+ on the right side and 1+ on the left side.        Bicep reflexes are 1+ on the right side and 1+ on the left side. Brachioradialis reflexes are 1+ on the right side and 1+ on the left side. Patellar reflexes are 1+ on the right side and 1+ on the left side. Achilles reflexes are 1+ on the right side and 1+ on the left side. Comments: Motor 4/5   Psychiatric:         Attention and Perception: Attention normal. He is attentive. Mood and Affect: Mood normal. Mood is not anxious or depressed. Affect is not labile, blunt, angry or inappropriate. Speech: Speech normal. He is communicative. Speech is not rapid and pressured, delayed, slurred or tangential.         Behavior: Behavior normal. Behavior is not agitated, slowed, aggressive, withdrawn, hyperactive or combative. Behavior is cooperative. Thought Content: Thought content normal. Thought content is not paranoid or delusional. Thought content does not include homicidal or suicidal ideation. Thought content does not include homicidal or suicidal plan. Cognition and Memory: Cognition normal. Memory is not impaired. He does not exhibit impaired recent memory or impaired remote memory. Judgment: Judgment normal. Judgment is not impulsive or inappropriate. VAL test: + right   Yeomans test: + right   Gaenslen test: + right       Assessment:     1. Spondylosis of lumbar region without myelopathy or radiculopathy    2. Lumbar degenerative disc disease    3. DDD (degenerative disc disease), cervical    4. Lumbar pain    5. Cervical radiculopathy    6. Cervical radiculitis    7. Hx of fusion of cervical spine    8. Chronic, continuous use of opioids    9. Chronic pain syndrome            Plan:      · OARRS reviewed. Current MED: 15.00  · Patient was offered naloxone for home. · Discussed long term side effects of medications, tolerance, dependency and addiction. · Previous UDS reviewed  · UDS preformed today for compliance.   · Patient told can not receive any pain medications from any other source. · No evidence of abuse, diversion or aberrant behavior.  Medications and/or procedures to improve function and quality of life- patient understanding with this and that may not be pain free   Discussed with patient about safe storage of medications at home   Discussed possible weaning of medication dosing dependent on treatment/procedure results.  Discussed with patient about risks with procedure including infection, reaction to medication, increased pain, or bleeding. · Procedure notes reveiwed in detail  · Received 80% relief of low back pain and also relief of leg pain from bilateral L-facet MBB # 2 for 3 weeks with improvement of mobility and activity. · Plan Bilateral L-facet RFA @ L3-4, L4-5, and L5-S1 RIGHT SIDE FIRST for longer term pain relief. Procedure and risks discussed in detail with patient. · Discussed possible LESI in future   · Continue current pain medication- Norco 5/325 TID prn- filled 6/18/2021  · Continue Norflex- Filled 6/18/2021  · Continue Lidoderm patches- ordered refill   · Updated UDS ordered today. Patient is compliant. Meds. Prescribed:   No orders of the defined types were placed in this encounter. Return for Bilateral L-facet RFA @ L3-4, L4-5, and L5-S1 RIGHT SIDE FIRST. , follow up after procedure.                Electronically signed by MIRNA Otero CNP on7/7/2021 at 12:29 PM

## 2021-07-19 DIAGNOSIS — G89.4 CHRONIC PAIN SYNDROME: ICD-10-CM

## 2021-07-19 RX ORDER — LIDOCAINE 50 MG/G
PATCH TOPICAL
Qty: 60 PATCH | Refills: 0 | Status: SHIPPED | OUTPATIENT
Start: 2021-07-19 | End: 2021-08-18 | Stop reason: SDUPTHER

## 2021-07-19 RX ORDER — LIDOCAINE HYDROCHLORIDE 30 MG/G
CREAM TOPICAL
Qty: 85 G | Refills: 2 | Status: SHIPPED | OUTPATIENT
Start: 2021-07-19 | End: 2021-08-18 | Stop reason: SDUPTHER

## 2021-07-19 RX ORDER — ORPHENADRINE CITRATE 100 MG/1
100 TABLET, EXTENDED RELEASE ORAL 2 TIMES DAILY
Qty: 60 TABLET | Refills: 0 | Status: SHIPPED | OUTPATIENT
Start: 2021-07-19 | End: 2021-08-16

## 2021-07-19 RX ORDER — HYDROCODONE BITARTRATE AND ACETAMINOPHEN 5; 325 MG/1; MG/1
1 TABLET ORAL EVERY 8 HOURS PRN
Qty: 90 TABLET | Refills: 0 | Status: SHIPPED | OUTPATIENT
Start: 2021-07-19 | End: 2021-08-18 | Stop reason: SDUPTHER

## 2021-07-19 NOTE — TELEPHONE ENCOUNTER
Katty Jung called requesting a refill on the following medications:  Requested Prescriptions     Pending Prescriptions Disp Refills    lidocaine (LIDODERM) 5 % 60 patch 0     Si hours on, 12 hours off.  HYDROcodone-acetaminophen (NORCO) 5-325 MG per tablet 90 tablet 0     Sig: Take 1 tablet by mouth every 8 hours as needed for Pain for up to 30 days.     orphenadrine (NORFLEX) 100 MG extended release tablet 60 tablet 0     Sig: Take 1 tablet by mouth 2 times daily    lidocaine (LIDOPIN) 3 % CREA 85 g 2     Sig: Apply topically to back, abdomen and arms     Pharmacy verified:  .[email or web address]    Date of last visit: 21  Date of next visit (if applicable): 0/3/8150

## 2021-07-19 NOTE — TELEPHONE ENCOUNTER
OARRS reviewed. UDS: + for Dihydrocodeine, Hydrocodone, Norhydrocodone, Hydromorphone. Last seen: 7/7/2021.  Follow-up:   Future Appointments   Date Time Provider Jesus Simmons   9/1/2021  1:00 PM MIRNA Kingsley - CNP N [facility]   11/17/2021 11:30 AM MIRNA العلي - [address]

## 2021-07-21 ENCOUNTER — TELEPHONE (OUTPATIENT)
Dept: PHYSICAL MEDICINE AND REHAB | Age: 51
End: 2021-07-21

## 2021-07-26 ENCOUNTER — TELEPHONE (OUTPATIENT)
Dept: PHYSICAL MEDICINE AND REHAB | Age: 51
End: 2021-07-26

## 2021-07-27 ENCOUNTER — TELEPHONE (OUTPATIENT)
Dept: PHYSICAL MEDICINE AND REHAB | Age: 51
End: 2021-07-27

## 2021-07-27 NOTE — TELEPHONE ENCOUNTER
Approved.    Authorization number: [id number]  Valid August 5th through August 18 at [address] [facility]

## 2021-07-27 NOTE — TELEPHONE ENCOUNTER
Prior auth sent to plan    (Key: [id number])     orphenadrine (NORFLEX) 100 MG extended release tablet

## 2021-08-05 ENCOUNTER — TELEPHONE (OUTPATIENT)
Dept: PHYSICAL MEDICINE AND REHAB | Age: 51
End: 2021-08-05

## 2021-08-05 NOTE — TELEPHONE ENCOUNTER
LVM for Pt. Procedure cancelled due to provider out of office. Follow up kept for medications. Advised to call office regarding this.

## 2021-08-16 ENCOUNTER — APPOINTMENT (OUTPATIENT)
Dept: GENERAL RADIOLOGY | Age: 51
End: 2021-08-16
Payer: COMMERCIAL

## 2021-08-16 ENCOUNTER — HOSPITAL ENCOUNTER (EMERGENCY)
Age: 51
Discharge: HOME | End: 2021-08-16
Payer: COMMERCIAL

## 2021-08-16 VITALS
TEMPERATURE: 97.9 F | RESPIRATION RATE: 18 BRPM | DIASTOLIC BLOOD PRESSURE: 96 MMHG | SYSTOLIC BLOOD PRESSURE: 157 MMHG | OXYGEN SATURATION: 97 % | HEART RATE: 61 BPM

## 2021-08-16 DIAGNOSIS — J45.41 MODERATE PERSISTENT ASTHMA WITH EXACERBATION: Primary | ICD-10-CM

## 2021-08-16 DIAGNOSIS — Z98.1 S/P CERVICAL SPINAL FUSION: ICD-10-CM

## 2021-08-16 DIAGNOSIS — M79.10 MYALGIA: ICD-10-CM

## 2021-08-16 DIAGNOSIS — J45.50 SEVERE PERSISTENT ASTHMA WITHOUT COMPLICATION: ICD-10-CM

## 2021-08-16 LAB
ANION GAP SERPL CALCULATED.3IONS-SCNC: 13 MEQ/L (ref 8–16)
BASOPHILS # BLD: 0.8 %
BASOPHILS ABSOLUTE: 0 THOU/MM3 (ref 0–0.1)
BUN BLDV-MCNC: 11 MG/DL (ref 7–22)
CALCIUM SERPL-MCNC: 10.1 MG/DL (ref 8.5–10.5)
CHLORIDE BLD-SCNC: 101 MEQ/L (ref 98–111)
CO2: 25 MEQ/L (ref 23–33)
CREAT SERPL-MCNC: 0.9 MG/DL (ref 0.4–1.2)
EOSINOPHIL # BLD: 2.2 %
EOSINOPHILS ABSOLUTE: 0.1 THOU/MM3 (ref 0–0.4)
ERYTHROCYTE [DISTWIDTH] IN BLOOD BY AUTOMATED COUNT: 11.9 % (ref 11.5–14.5)
ERYTHROCYTE [DISTWIDTH] IN BLOOD BY AUTOMATED COUNT: 43.2 FL (ref 35–45)
GLUCOSE BLD-MCNC: 93 MG/DL (ref 70–108)
HCT VFR BLD CALC: 43 % (ref 42–52)
HEMOGLOBIN: 13.6 GM/DL (ref 14–18)
IMMATURE GRANS (ABS): 0.04 THOU/MM3 (ref 0–0.07)
IMMATURE GRANULOCYTES: 0.7 %
LYMPHOCYTES # BLD: 25.5 %
LYMPHOCYTES ABSOLUTE: 1.5 THOU/MM3 (ref 1–4.8)
MCH RBC QN AUTO: 31 PG (ref 26–33)
MCHC RBC AUTO-ENTMCNC: 31.6 GM/DL (ref 32.2–35.5)
MCV RBC AUTO: 97.9 FL (ref 80–94)
MONOCYTES # BLD: 11.8 %
MONOCYTES ABSOLUTE: 0.7 THOU/MM3 (ref 0.4–1.3)
NUCLEATED RED BLOOD CELLS: 0 /100 WBC
OSMOLALITY CALCULATION: 276.6 MOSMOL/KG (ref 275–300)
PLATELET # BLD: 212 THOU/MM3 (ref 130–400)
PMV BLD AUTO: 10.5 FL (ref 9.4–12.4)
POTASSIUM REFLEX MAGNESIUM: 4.3 MEQ/L (ref 3.5–5.2)
RBC # BLD: 4.39 MILL/MM3 (ref 4.7–6.1)
SEG NEUTROPHILS: 59 %
SEGMENTED NEUTROPHILS ABSOLUTE COUNT: 3.5 THOU/MM3 (ref 1.8–7.7)
SODIUM BLD-SCNC: 139 MEQ/L (ref 135–145)
TROPONIN T: < 0.01 NG/ML
WBC # BLD: 6 THOU/MM3 (ref 4.8–10.8)

## 2021-08-16 PROCEDURE — 85025 COMPLETE CBC W/AUTO DIFF WBC: CPT

## 2021-08-16 PROCEDURE — 99281 EMR DPT VST MAYX REQ PHY/QHP: CPT

## 2021-08-16 PROCEDURE — 84484 ASSAY OF TROPONIN QUANT: CPT

## 2021-08-16 PROCEDURE — 93005 ELECTROCARDIOGRAM TRACING: CPT | Performed by: NURSE PRACTITIONER

## 2021-08-16 PROCEDURE — 80048 BASIC METABOLIC PNL TOTAL CA: CPT

## 2021-08-16 PROCEDURE — 36415 COLL VENOUS BLD VENIPUNCTURE: CPT

## 2021-08-16 PROCEDURE — 71046 X-RAY EXAM CHEST 2 VIEWS: CPT

## 2021-08-16 RX ORDER — ORPHENADRINE CITRATE 100 MG/1
TABLET, EXTENDED RELEASE ORAL
Qty: 60 TABLET | Refills: 0 | Status: SHIPPED | OUTPATIENT
Start: 2021-08-18 | End: 2021-09-15 | Stop reason: SDUPTHER

## 2021-08-16 RX ORDER — ALBUTEROL SULFATE 2.5 MG/3ML
SOLUTION RESPIRATORY (INHALATION)
Qty: 375 ML | Refills: 3 | OUTPATIENT
Start: 2021-08-16

## 2021-08-16 RX ORDER — PREDNISONE 10 MG/1
TABLET ORAL
Qty: 20 TABLET | Refills: 0 | Status: SHIPPED | OUTPATIENT
Start: 2021-08-16 | End: 2021-08-26

## 2021-08-16 RX ORDER — MONTELUKAST SODIUM 10 MG/1
TABLET ORAL
Qty: 30 TABLET | Refills: 5 | OUTPATIENT
Start: 2021-08-16

## 2021-08-16 ASSESSMENT — ENCOUNTER SYMPTOMS
DIARRHEA: 0
NAUSEA: 1
CONSTIPATION: 0
CHEST TIGHTNESS: 1
BLOOD IN STOOL: 0
VOMITING: 0
SHORTNESS OF BREATH: 1
ABDOMINAL PAIN: 0

## 2021-08-16 NOTE — ED NOTES
Pt comes in through lobby. The past 2 days he has had increased SOB. He has history of asthma and states his home treatments have not helped. He has also been having muscle cramps and migraines.       Filemon Jones RN  08/16/21 4620

## 2021-08-16 NOTE — ED PROVIDER NOTES
[facility] [address] [place] [address]       Chief Complaint   Patient presents with    Shortness of Breath    Muscle Pain       Nurses Notes reviewed and I agree except as noted in the HPI. HISTORY OF PRESENT ILLNESS    Claritza Alvarado norbert 54 y.o. male who presents to the ED for evaluation of shortness of breath and body aches that have been going on for the last 8 days. He reports that he has experienced a gradual increase in shortness of breath that is non exertional. He is also is experiencing and increase in severity of his baseline muscle spasms. He reports increased muscle spasms in his neck and an occipital sharp stabbing headache that he has been taking Fioricet which is not providing much relief. He also reports 10/10 stabbing constant non exertional chest pain. He also reports having chills, diaphoresis, swelling, lightheadedness and increased wheezing. He denies: vomiting, fever, diarrhea, constipation, diarrhea, blood in the stool, urinary frequency, urgency, dysuria, dizziness, abdominal pain, sick contacts. Patient is vaccinated against COVID. HPI was provided by the patient. REVIEW OF SYSTEMS     Review of Systems   Constitutional: Positive for chills and diaphoresis. Negative for fever. Respiratory: Positive for chest tightness and shortness of breath. Cardiovascular: Positive for chest pain and leg swelling. Gastrointestinal: Positive for nausea. Negative for abdominal pain, blood in stool, constipation, diarrhea and vomiting. Genitourinary: Positive for urgency. Negative for dysuria, frequency and hematuria. Musculoskeletal: Positive for myalgias. Neurological: Positive for light-headedness and headaches. Negative for dizziness.         PAST MEDICAL HISTORY     Past Medical History:   Diagnosis Date    Asthma     Cardiomegaly     Fibromyalgia     CLARIBEL on CPAP        SURGICALHISTORY      has a past surgical history that includes back surgery; knee surgery; Pain management procedure (Bilateral, 2/11/2021); and Pain management procedure (Bilateral, 4/8/2021). CURRENT MEDICATIONS       Previous Medications    ALBUTEROL (PROVENTIL) (2.5 MG/3ML) 0.083% NEBULIZER SOLUTION    Take 3 mLs by nebulization every 4 hours as needed for Wheezing    ALBUTEROL IN    Inhale into the lungs 4 times daily as needed     ALBUTEROL SULFATE  (90 BASE) MCG/ACT INHALER    Inhale 2 puffs into the lungs 4 times daily as needed for Wheezing    AMITRIPTYLINE (ELAVIL) 50 MG TABLET    take 1 tablet by mouth at bedtime    AZELASTINE (OPTIVAR) 0.05 % OPHTHALMIC SOLUTION    Place 1 drop into both eyes 2 times daily    BUDESONIDE-FORMOTEROL (SYMBICORT) 160-4.5 MCG/ACT AERO    2 puffs inhaled twice daily. Rinse mouth well after use. BUMETANIDE (BUMEX) 1 MG TABLET    Take 0.5 tablets by mouth daily    BUTALBITAL-ACETAMINOPHEN-CAFFEINE (FIORICET, ESGIC) -40 MG PER TABLET    Take 1 tablet by mouth every 4 hours as needed for Headaches    CETIRIZINE (ZYRTEC) 10 MG TABLET    Take 1 tablet by mouth daily    DIPHENHYDRAMINE (BENADRYL) 25 MG CAPSULE    Take 1 capsule by mouth every 6 hours as needed for Itching    EPINEPHRINE (EPIPEN 2-RAGHAV) 0.3 MG/0.3ML SOAJ INJECTION    Inject one pen as directed STAT for allergic reaction, may disp generic NDC 40893-739-60    ERGOCALCIFEROL (ERGOCALCIFEROL) 12771 UNITS CAPSULE    Take 50,000 Units by mouth daily    FEROSUL 325 (65 FE) MG TABLET    take 1 tablet by mouth once daily    FLUTICASONE (FLONASE) 50 MCG/ACT NASAL SPRAY    2 sprays by Each Nostril route daily    HYDROCODONE-ACETAMINOPHEN (NORCO) 5-325 MG PER TABLET    Take 1 tablet by mouth every 8 hours as needed for Pain for up to 30 days. HYDROCORTISONE VALERATE (WESTCORT) 0.2 % OINTMENT    Apply topically daily. LIDOCAINE (LIDODERM) 5 %    12 hours on, 12 hours off.     LIDOCAINE (LIDOPIN) 3 % CREA    Apply topically to back, abdomen and arms    MONTELUKAST (SINGULAIR) 10 MG TABLET Take 1 tablet by mouth nightly    MULTIPLE VITAMINS-MINERALS (MULTIVITAMIN ADULTS) TABS    Take 1 tablet by mouth daily    NARCAN 4 MG/0.1ML LIQD NASAL SPRAY        OMEPRAZOLE (PRILOSEC) 20 MG DELAYED RELEASE CAPSULE    take 1 capsule by mouth once daily    RA LORATADINE 10 MG TABLET    take 1 tablet by mouth once daily    ROSUVASTATIN (CRESTOR) 20 MG TABLET    take 1 tablet by mouth once daily    TIOTROPIUM (SPIRIVA RESPIMAT) 1.25 MCG/ACT AERS INHALER    Inhale 2 puffs into the lungs daily       ALLERGIES     is allergic to dilantin [phenytoin], dupixent [dupilumab], oxycodone, and valium [diazepam]. FAMILY HISTORY     He indicated that his mother is . He indicated that his father is . family history includes Asthma in his father; Emphysema in his father and mother; High Blood Pressure in his father and mother; Other in his mother. SOCIAL HISTORY       Social History     Socioeconomic History    Marital status:      Spouse name: Not on file    Number of children: Not on file    Years of education: Not on file    Highest education level: Not on file   Occupational History    Not on file   Tobacco Use    Smoking status: Never Smoker    Smokeless tobacco: Never Used   Vaping Use    Vaping Use: Never used   Substance and Sexual Activity    Alcohol use: Not Currently    Drug use: No    Sexual activity: Not on file   Other Topics Concern    Not on file   Social History Narrative    Not on file     Social Determinants of Health     Financial Resource Strain:     Difficulty of Paying Living Expenses:    Food Insecurity: No Food Insecurity    Worried About Running Out of Food in the Last Year: Never true    Laith of Food in the Last Year: Never true   Transportation Needs:     Lack of Transportation (Medical):      Lack of Transportation (Non-Medical):    Physical Activity:     Days of Exercise per Week:     Minutes of Exercise per Session:    Stress:     Feeling of Stress :    Social Connections:     Frequency of Communication with Friends and Family:     Frequency of Social Gatherings with Friends and Family:     Attends Judaism Services:     Active Member of Clubs or Organizations:     Attends Club or Organization Meetings:     Marital Status:    Intimate Partner Violence:     Fear of Current or Ex-Partner:     Emotionally Abused:     Physically Abused:     Sexually Abused:        PHYSICAL EXAM     INITIAL VITALS:  oral temperature is 97.9 °F (36.6 °C). His blood pressure is 157/96 (abnormal) and his pulse is 61. His respiration is 18 and oxygen saturation is 97%. Physical Exam  Constitutional:       General: He is not in acute distress. Appearance: He is obese. HENT:      Head: Normocephalic and atraumatic. Mouth/Throat:      Mouth: Mucous membranes are moist.      Pharynx: No pharyngeal swelling or oropharyngeal exudate. Eyes:      Pupils: Pupils are equal, round, and reactive to light. Cardiovascular:      Rate and Rhythm: Normal rate and regular rhythm. Pulses: Normal pulses. Heart sounds: Normal heart sounds. No murmur heard. No friction rub. No gallop. Pulmonary:      Effort: Pulmonary effort is normal.      Breath sounds: Examination of the left-upper field reveals wheezing. Wheezing present. No rhonchi or rales. Chest:      Chest wall: No tenderness. Abdominal:      Palpations: Abdomen is soft. Tenderness: There is no abdominal tenderness. Skin:     General: Skin is warm and dry. Capillary Refill: Capillary refill takes less than 2 seconds. Neurological:      Mental Status: He is alert.          DIFFERENTIAL DIAGNOSIS:   Asthma exacerbation, , Covid-19, Pneumonia, Fibromyalgia     DIAGNOSTIC RESULTS       RADIOLOGY: non-plainfilm images(s) such as CT, Ultrasound and MRI are read by the radiologist.  Plain radiographic images are visualized and preliminarily interpreted by the emergency physician unless otherwise stated below. XR CHEST (2 VW)   Final Result   Mild cardiomegaly with no acute intrathoracic process. **This report has been created using voice recognition software. It may contain minor errors which are inherent in voice recognition technology. **      Final report electronically signed by Dr Farzaneh Gray on 8/16/2021 3:13 PM            LABS:   Labs Reviewed   CBC WITH AUTO DIFFERENTIAL - Abnormal; Notable for the following components:       Result Value    RBC 4.39 (*)     Hemoglobin 13.6 (*)     MCV 97.9 (*)     MCHC 31.6 (*)     All other components within normal limits   BASIC METABOLIC PANEL W/ REFLEX TO MG FOR LOW K   TROPONIN   ANION GAP   OSMOLALITY   GLOMERULAR FILTRATION RATE, ESTIMATED       EMERGENCY DEPARTMENT COURSE:   Vitals:    Vitals:    08/16/21 1440   BP: (!) 157/96   Pulse: 61   Resp: 18   Temp: 97.9 °F (36.6 °C)   TempSrc: Oral   SpO2: 97%       MDM    Patient was seen and evaluated in the emergency department, patient appeared to be in no acute distress. Vital signs reviewed, hypertension noted. Physical exam was completed, there was some diffuse wheezing mostly to the left side noted. Labs and imaging were performed, no significant findings noted. Patient appears to have an asthma exacerbation, will provide prednisone, patient requests next 2 days off given work note. Advised to return the emergency department new or worsening signs and symptoms. Patient verbalized understanding plan of care. Medications - No data to display    Patient was seenindependently by myself. The patient's final impression and disposition and plan was determined by myself. CRITICAL CARE:   None    CONSULTS:  None    PROCEDURES:  None    FINAL IMPRESSION     1. Moderate persistent asthma with exacerbation    2. Myalgia          DISPOSITION/PLAN   Patient discharged in stable condition    PATIENT REFERREDTO:  No follow-up provider specified.     DISCHARGE MEDICATIONS:  New Prescriptions    PREDNISONE (DELTASONE) 10 MG TABLET    Take 4 tablets by mouth once daily for 5 days       (Please note that portions of this note were completed with a voice recognition program.  Efforts were made to edit the dictations but occasionally words are mis-transcribed.)    Provider:  I personally performed the services described in the documentation,reviewed and edited the documentation which was dictated to the scribe in my presence, and it accurately records my words and actions.     Feliciano Hall CNP 08/16/21 3:59 PM    Pat Hlal, APRN - VITA         Identiv, MIRNA - CNP  08/16/21 8181

## 2021-08-16 NOTE — TELEPHONE ENCOUNTER
OARRS reviewed. UDS: + for  Dihydrocodeine, Hydrocodone, Norhydrocodone, Hydromorphone. Last seen: 7/7/2021.  Follow-up:   Future Appointments   Date Time Provider Jesus Simmons   9/1/2021  1:00 PM MIRNA Ribera - CNP N [facility] Pain P - [facility]   11/17/2021 11:30 AM MIRNA [place] - [address]

## 2021-08-16 NOTE — LETTER
[facility] [address] [address] EMERGENCY DEPT  [address] [place] [address]  Phone: [phone]               August 16, 2021    Patient: Guerda Terry   YOB: 1970   Date of Visit: 8/16/2021       To Whom It May Concern:    Janessa Dee was seen and treated in our emergency department on 8/16/2021. He may return to work on 8/18/2021.       Sincerely,       Dearl MIRNA Short - VITA         Signature:__________________________________

## 2021-08-17 LAB
EKG ATRIAL RATE: 60 BPM
EKG P AXIS: 52 DEGREES
EKG P-R INTERVAL: 162 MS
EKG Q-T INTERVAL: 444 MS
EKG QRS DURATION: 92 MS
EKG QTC CALCULATION (BAZETT): 444 MS
EKG R AXIS: 4 DEGREES
EKG T AXIS: 13 DEGREES
EKG VENTRICULAR RATE: 60 BPM

## 2021-08-17 PROCEDURE — 93010 ELECTROCARDIOGRAM REPORT: CPT | Performed by: INTERNAL MEDICINE

## 2021-08-17 RX ORDER — BUTALBITAL, ACETAMINOPHEN AND CAFFEINE 50; 325; 40 MG/1; MG/1; MG/1
TABLET ORAL
Qty: 90 TABLET | Refills: 1 | Status: SHIPPED | OUTPATIENT
Start: 2021-08-17 | End: 2021-12-02

## 2021-08-18 DIAGNOSIS — G89.4 CHRONIC PAIN SYNDROME: ICD-10-CM

## 2021-08-18 RX ORDER — LIDOCAINE HYDROCHLORIDE 30 MG/G
CREAM TOPICAL
Qty: 85 G | Refills: 2 | Status: SHIPPED | OUTPATIENT
Start: 2021-08-18 | End: 2021-09-15 | Stop reason: SDUPTHER

## 2021-08-18 RX ORDER — LIDOCAINE 50 MG/G
PATCH TOPICAL
Qty: 60 PATCH | Refills: 0 | Status: SHIPPED | OUTPATIENT
Start: 2021-08-20 | End: 2021-09-15 | Stop reason: SDUPTHER

## 2021-08-18 RX ORDER — HYDROCODONE BITARTRATE AND ACETAMINOPHEN 5; 325 MG/1; MG/1
1 TABLET ORAL EVERY 8 HOURS PRN
Qty: 90 TABLET | Refills: 0 | Status: SHIPPED | OUTPATIENT
Start: 2021-08-20 | End: 2021-09-15 | Stop reason: SDUPTHER

## 2021-08-18 NOTE — TELEPHONE ENCOUNTER
Paul Hussein called requesting a refill on the following medications:  Requested Prescriptions     Pending Prescriptions Disp Refills    HYDROcodone-acetaminophen (NORCO) 5-325 MG per tablet 90 tablet 0     Sig: Take 1 tablet by mouth every 8 hours as needed for Pain for up to 30 days.  lidocaine (LIDODERM) 5 % 60 patch 0     Si hours on, 12 hours off.     lidocaine (LIDOPIN) 3 % CREA 85 g 2     Sig: Apply topically to back, abdomen and arms     Pharmacy verified: [address] [place] [address]  .pv      Date of last visit: 2021  Date of next visit (if applicable): 8206

## 2021-08-18 NOTE — TELEPHONE ENCOUNTER
OARRS reviewed. UDS: + for Hydrocodone,    Last seen: 7/7/2021.  Follow-up:   Future Appointments   Date Time Provider Jesus Simmons   8/19/2021  7:45 AM MIRNA Adam - CNP Fam Medicine [facility] [place]   9/1/2021  1:00 PM MIRNA Earl CNP N [facility] Pain [facility] DEBORAH ALBRECHT II.[email or web address]   11/17/2021 11:30 AM MIRNA Adam - [address]

## 2021-08-19 ENCOUNTER — OFFICE VISIT (OUTPATIENT)
Dept: FAMILY MEDICINE CLINIC | Age: 51
End: 2021-08-19
Payer: COMMERCIAL

## 2021-08-19 ENCOUNTER — TELEPHONE (OUTPATIENT)
Dept: PHYSICAL MEDICINE AND REHAB | Age: 51
End: 2021-08-19

## 2021-08-19 VITALS
TEMPERATURE: 98 F | BODY MASS INDEX: 55.54 KG/M2 | SYSTOLIC BLOOD PRESSURE: 150 MMHG | WEIGHT: 315 LBS | RESPIRATION RATE: 16 BRPM | HEART RATE: 56 BPM | DIASTOLIC BLOOD PRESSURE: 90 MMHG

## 2021-08-19 DIAGNOSIS — J45.50 SEVERE PERSISTENT ASTHMA WITHOUT COMPLICATION: ICD-10-CM

## 2021-08-19 DIAGNOSIS — H10.10 ALLERGIC RHINOCONJUNCTIVITIS: ICD-10-CM

## 2021-08-19 DIAGNOSIS — J30.9 ALLERGIC RHINOCONJUNCTIVITIS: ICD-10-CM

## 2021-08-19 PROCEDURE — G8417 CALC BMI ABV UP PARAM F/U: HCPCS | Performed by: NURSE PRACTITIONER

## 2021-08-19 PROCEDURE — 1036F TOBACCO NON-USER: CPT | Performed by: NURSE PRACTITIONER

## 2021-08-19 PROCEDURE — 99213 OFFICE O/P EST LOW 20 MIN: CPT | Performed by: NURSE PRACTITIONER

## 2021-08-19 PROCEDURE — G8427 DOCREV CUR MEDS BY ELIG CLIN: HCPCS | Performed by: NURSE PRACTITIONER

## 2021-08-19 RX ORDER — ALBUTEROL SULFATE 90 UG/1
2 AEROSOL, METERED RESPIRATORY (INHALATION) 4 TIMES DAILY PRN
Qty: 3 INHALER | Refills: 3 | Status: SHIPPED | OUTPATIENT
Start: 2021-08-19 | End: 2022-04-26 | Stop reason: SDUPTHER

## 2021-08-19 RX ORDER — AZELASTINE HYDROCHLORIDE 0.5 MG/ML
1 SOLUTION/ DROPS OPHTHALMIC 2 TIMES DAILY
Qty: 1 BOTTLE | Refills: 11 | Status: SHIPPED | OUTPATIENT
Start: 2021-08-19 | End: 2022-09-09

## 2021-08-19 RX ORDER — ALBUTEROL SULFATE 2.5 MG/3ML
2.5 SOLUTION RESPIRATORY (INHALATION) EVERY 4 HOURS PRN
Qty: 375 ML | Refills: 3 | Status: SHIPPED | OUTPATIENT
Start: 2021-08-19 | End: 2022-01-31

## 2021-08-19 ASSESSMENT — ENCOUNTER SYMPTOMS
SORE THROAT: 0
FACIAL SWELLING: 0
NAUSEA: 0
BACK PAIN: 1
SINUS PAIN: 0
ABDOMINAL PAIN: 0
COLOR CHANGE: 0
SHORTNESS OF BREATH: 0
WHEEZING: 0
TROUBLE SWALLOWING: 0
DIARRHEA: 0
COUGH: 0
VOMITING: 0

## 2021-08-19 NOTE — PATIENT INSTRUCTIONS
Patient Education        Asthma in Adults: Care Instructions  Overview     Asthma makes it hard for you to breathe. During an asthma attack, the airways swell and narrow. Severe asthma attacks can be dangerous, but you can usually prevent them. Controlling asthma and treating symptoms before they get bad can help you avoid bad attacks. You may also avoid future trips to the doctor. Follow-up care is a key part of your treatment and safety. Be sure to make and go to all appointments, and call your doctor if you are having problems. It's also a good idea to know your test results and keep a list of the medicines you take. How can you care for yourself at home? · Follow your asthma action plan so you can manage your symptoms at home. An asthma action plan will help you prevent and control airway reactions and will tell you what to do during an asthma attack. If you do not have an asthma action plan, work with your doctor to build one. · Take your asthma medicine exactly as prescribed. Medicine plays an important role in controlling asthma. Talk to your doctor right away if you have any questions about what to take and how to take it. ? Use your quick-relief medicine when you have symptoms of an attack. Quick-relief medicine often is an albuterol inhaler. Some people need to use quick-relief medicine before they exercise. ? Take your controller medicine every day, not just when you have symptoms. Controller medicine is usually an inhaled corticosteroid. The goal is to prevent problems before they occur. ? If your doctor prescribed corticosteroid pills to use during an attack, take them as directed. They may take hours to work, but they may shorten the attack and help you breathe better. ? Keep your quick-relief medicine with you at all times. · Talk to your doctor before using other medicines. Some medicines, such as aspirin, can cause asthma attacks in some people.   · Check yourself for asthma symptoms to know which step to follow in your action plan. Watch for things like being short of breath, having chest tightness, coughing, and wheezing. Also notice if symptoms wake you up at night or if you get tired quickly when you exercise. · If you have a peak flow meter, use it to check how well you are breathing. This can help you predict when an asthma attack is going to occur. Then you can take medicine to prevent the asthma attack or make it less severe. · See your doctor regularly. These visits will help you learn more about asthma and what you can do to control it. Your doctor will monitor your treatment to make sure the medicine is helping you. · Keep track of your asthma attacks and your treatment. After you have had an attack, write down what triggered it, what helped end it, and any concerns you have about your asthma action plan. Take your diary when you see your doctor. You can then review your asthma action plan and decide if it is working. · Do not smoke or allow others to smoke around you. Avoid smoky places. Smoking makes asthma worse. If you need help quitting, talk to your doctor about stop-smoking programs and medicines. These can increase your chances of quitting for good. · Learn what triggers an asthma attack for you, and avoid the triggers when you can. Common triggers include colds, smoke, air pollution, dust, pollen, mold, pets, cockroaches, stress, and cold air. · Avoid colds and the flu. Talk to your doctor about getting a pneumococcal vaccine shot. If you have had one before, ask your doctor whether you need a second dose. Get a flu vaccine every fall. If you must be around people with colds or the flu, wash your hands often. When should you call for help? Call 911 anytime you think you may need emergency care. For example, call if:    · You have severe trouble breathing.    Call your doctor now or seek immediate medical care if:    · Your symptoms do not get better after you have followed your asthma action plan.     · You cough up yellow, dark brown, or bloody mucus (sputum). Watch closely for changes in your health, and be sure to contact your doctor if:    · Your coughing and wheezing get worse.     · You need to use quick-relief medicine on more than 2 days a week within a month (unless it is just for exercise).     · You need help figuring out what is triggering your asthma attacks. Where can you learn more? Go to https://[employer]. org and sign in to your [employer] Back account. Enter P597 in the Cheers In box to learn more about \"Asthma in Adults: Care Instructions. \"     If you do not have an account, please click on the \"Sign Up Now\" link. Current as of: October 26, 2020               Content Version: 12.9  © 4322-6949 [employer]wise, Incorporated. Care instructions adapted under license by [facility] ([facility]). If you have questions about a medical condition or this instruction, always ask your healthcare professional. [address] any warranty or liability for your use of this information.

## 2021-08-19 NOTE — TELEPHONE ENCOUNTER
Prior auth sent to plan    Key: [id number]    Sent to Plan today- [facility]         Drug- Lidopin 3% cream

## 2021-08-19 NOTE — PROGRESS NOTES
[address] [facility] [address]  Dept: [phone]  Dept Fax: [phone]: [phone]     Visit Date:  8/19/2021      Patient:  Ria Mckee  YOB: 1970    HPI:     Chief Complaint   Patient presents with   [facility] ED Follow-up     Here today for [facility] ED f/up, seen 8/16/21 for SOB and body aches. Feeling better, taking oral steroids. HPI     Pt presents to the office today for follow up from the ED visit for SOB on 8/16/21. Pt is much better and SOB improved- Using albuterol TID. He denies any chest pain or weakness. He does have back pain, but follow with pain magement for this. Narrative   PROCEDURE: XR CHEST (2 VW)       CLINICAL INFORMATION: 74-year-old male with shortness of breath.       COMPARISON: Chest x-ray 11/05/2020.       TECHNIQUE: PA and lateral views of the chest were obtained.       FINDINGS:    Fusion hardware is in the cervical spine.       There is cardiomegaly. The pulmonary vasculature is within normal limits.       There is no significant pleural effusion or pneumothorax.       Visualized portions of the upper abdomen are within normal limits.       The osseous structures are intact. There are degenerative changes of the spine. No acute fractures or suspicious osseous lesions.           Impression   Mild cardiomegaly with no acute intrathoracic process.                   **This report has been created using voice recognition software. It may contain minor errors which are inherent in voice recognition technology. **       Final report electronically signed by Dr Mira Garland on 8/16/2021 3:13 PM     Wt Readings from Last 3 Encounters:   08/19/21 (!) 421 lb (191 kg)   07/07/21 (!) 427 lb (193.7 kg)   06/17/21 (!) 424 lb 9.6 oz (192.6 kg)     BP Readings from Last 3 Encounters:   08/19/21 (!) 150/90   08/16/21 (!) 157/96   07/07/21 136/82         Medications    Current Outpatient Medications:     betamethasone valerate (VALISONE) 0.1 % cream, apply to affected area twice a day, Disp: , Rfl:     albuterol sulfate  (90 Base) MCG/ACT inhaler, Inhale 2 puffs into the lungs 4 times daily as needed for Wheezing, Disp: 3 Inhaler, Rfl: 3    albuterol (PROVENTIL) (2.5 MG/3ML) 0.083% nebulizer solution, Take 3 mLs by nebulization every 4 hours as needed for Wheezing, Disp: 375 mL, Rfl: 3    azelastine (OPTIVAR) 0.05 % ophthalmic solution, Place 1 drop into both eyes 2 times daily, Disp: 1 Bottle, Rfl: 11    [START ON 8/20/2021] HYDROcodone-acetaminophen (NORCO) 5-325 MG per tablet, Take 1 tablet by mouth every 8 hours as needed for Pain for up to 30 days. , Disp: 90 tablet, Rfl: 0    [START ON 8/20/2021] lidocaine (LIDODERM) 5 %, 12 hours on, 12 hours off., Disp: 60 patch, Rfl: 0    lidocaine (LIDOPIN) 3 % CREA, Apply topically to back, abdomen and arms, Disp: 85 g, Rfl: 2    butalbital-acetaminophen-caffeine (FIORICET, ESGIC) -40 MG per tablet, take 1 tablet by mouth every 4 hours if needed for headache, Disp: 90 tablet, Rfl: 1    orphenadrine (NORFLEX) 100 MG extended release tablet, take 1 tablet by mouth twice a day, Disp: 60 tablet, Rfl: 0    predniSONE (DELTASONE) 10 MG tablet, Take 4 tablets by mouth once daily for 5 days, Disp: 20 tablet, Rfl: 0    FEROSUL 325 (65 Fe) MG tablet, take 1 tablet by mouth once daily, Disp: 90 tablet, Rfl: 3    amitriptyline (ELAVIL) 50 MG tablet, take 1 tablet by mouth at bedtime, Disp: 90 tablet, Rfl: 1    hydrocortisone valerate (WESTCORT) 0.2 % ointment, Apply topically daily. , Disp: 1 Tube, Rfl: 11    EPINEPHrine (EPIPEN 2-RAGHAV) 0.3 MG/0.3ML SOAJ injection, Inject one pen as directed STAT for allergic reaction, may disp generic NDC 80723-689-23, Disp: 1 each, Rfl: 0    RA LORATADINE 10 MG tablet, take 1 tablet by mouth once daily, Disp: , Rfl:     montelukast (SINGULAIR) 10 MG tablet, Take 1 tablet by mouth nightly, Disp: 30 tablet, Rfl: 5    diphenhydrAMINE (BENADRYL) 25 MG capsule, Take 1 capsule by mouth every 6 hours as needed for Itching, Disp: 60 capsule, Rfl: 5    tiotropium (SPIRIVA RESPIMAT) 1.25 MCG/ACT AERS inhaler, Inhale 2 puffs into the lungs daily, Disp: 1 Inhaler, Rfl: 1    budesonide-formoterol (SYMBICORT) 160-4.5 MCG/ACT AERO, 2 puffs inhaled twice daily. Rinse mouth well after use., Disp: 1 Inhaler, Rfl: 5    fluticasone (FLONASE) 50 MCG/ACT nasal spray, 2 sprays by Each Nostril route daily, Disp: 3 Bottle, Rfl: 11    cetirizine (ZYRTEC) 10 MG tablet, Take 1 tablet by mouth daily, Disp: 30 tablet, Rfl: 11    omeprazole (PRILOSEC) 20 MG delayed release capsule, take 1 capsule by mouth once daily, Disp: 90 capsule, Rfl: 3    bumetanide (BUMEX) 1 MG tablet, Take 0.5 tablets by mouth daily, Disp: 30 tablet, Rfl: 0    Multiple Vitamins-Minerals (MULTIVITAMIN ADULTS) TABS, Take 1 tablet by mouth daily, Disp: 90 tablet, Rfl: 3    ergocalciferol (ERGOCALCIFEROL) 50743 units capsule, Take 50,000 Units by mouth as needed , Disp: , Rfl:     NARCAN 4 MG/0.1ML LIQD nasal spray, , Disp: , Rfl:     The patient is allergic to dilantin [phenytoin], dupixent [dupilumab], oxycodone, and valium [diazepam]. Past Medical History  Anabell Tobar  has a past medical history of Asthma, Cardiomegaly, Fibromyalgia, and CLARIBEL on CPAP. Subjective:      Review of Systems   Constitutional: Negative for chills, fatigue and fever. HENT: Negative for congestion, facial swelling, sinus pain, sore throat and trouble swallowing. Respiratory: Negative for cough, shortness of breath and wheezing. Cardiovascular: Negative for chest pain and palpitations. Gastrointestinal: Negative for abdominal pain, diarrhea, nausea and vomiting. Genitourinary: Negative for dysuria. Musculoskeletal: Positive for arthralgias and back pain. Negative for gait problem and neck pain. Skin: Negative for color change and rash.    Neurological: Negative for dizziness, weakness and headaches. Objective:     BP (!) 150/90   Pulse 56   Temp 98 °F (36.7 °C) (Oral)   Resp 16   Wt (!) 421 lb (191 kg)   BMI 55.54 kg/m²     Physical Exam  Vitals reviewed. Constitutional:       General: He is not in acute distress. Appearance: He is well-developed. HENT:      Head: Normocephalic and atraumatic. Eyes:      General:         Right eye: No discharge. Left eye: No discharge. Conjunctiva/sclera: Conjunctivae normal.   Cardiovascular:      Rate and Rhythm: Normal rate and regular rhythm. Heart sounds: Normal heart sounds. Pulmonary:      Effort: Pulmonary effort is normal. No respiratory distress. Breath sounds: Normal breath sounds. Abdominal:      General: Bowel sounds are normal.      Palpations: Abdomen is soft. Tenderness: There is no right CVA tenderness or left CVA tenderness. Skin:     General: Skin is warm and dry. Neurological:      General: No focal deficit present. Mental Status: He is alert and oriented to person, place, and time. Coordination: Coordination normal.   Psychiatric:         Mood and Affect: Mood normal.         Behavior: Behavior normal.         Thought Content: Thought content normal.         Judgment: Judgment normal.         Assessment/Plan:      Mitzi Danielle was seen today for ed follow-up. Diagnoses and all orders for this visit:    Severe persistent asthma without complication  -     albuterol sulfate  (90 Base) MCG/ACT inhaler; Inhale 2 puffs into the lungs 4 times daily as needed for Wheezing  -     albuterol (PROVENTIL) (2.5 MG/3ML) 0.083% nebulizer solution;  Take 3 mLs by nebulization every 4 hours as needed for Wheezing    Allergic rhinoconjunctivitis  -     azelastine (OPTIVAR) 0.05 % ophthalmic solution; Place 1 drop into both eyes 2 times daily    - Refills today  - Pt may return to work  - Call office with any questions or concerns, or if symptoms are getting worse or changing    Return if symptoms worsen or fail to improve. Patient given educational materials - see patient instructions. Discussed use, benefit, and side effects of prescribed medications. All patient questions answered. Pt voiced understanding.         Electronically signed by MIRNA Mcdonald CNP on 8/19/2021 at 8:37 AM

## 2021-08-19 NOTE — LETTER
[address] [facility] [place] [address]  Phone: [phone]  Fax: MIRNA Reece CNP        August 19, 2021     Patient: Coty Brower   YOB: 1970   Date of Visit: 8/19/2021       To Whom It May Concern:    Please excuse Nikunj Silva from work due to his current illness. He may return on 8/21/21 without restriction. If you have any questions or concerns, please don't hesitate to call.     Sincerely,        MIRNA Segura CNP

## 2021-08-26 DIAGNOSIS — E78.5 HYPERLIPIDEMIA, UNSPECIFIED HYPERLIPIDEMIA TYPE: ICD-10-CM

## 2021-08-26 NOTE — TELEPHONE ENCOUNTER
Requests sent from [facility] for refills of omeprazole 20 mg qd and crestor 20 mg qd. Last seen 8/19/21, next appt 11/17/21. Overdue for lipids. [id number] [address] for refills to get through until appt/labs completed? meds verified. Orders pended.

## 2021-08-27 RX ORDER — ROSUVASTATIN CALCIUM 20 MG/1
TABLET, COATED ORAL
Qty: 90 TABLET | Refills: 0 | Status: SHIPPED | OUTPATIENT
Start: 2021-08-27 | End: 2021-11-22

## 2021-08-27 RX ORDER — OMEPRAZOLE 20 MG/1
CAPSULE, DELAYED RELEASE ORAL
Qty: 90 CAPSULE | Refills: 0 | Status: SHIPPED | OUTPATIENT
Start: 2021-08-27 | End: 2021-11-22

## 2021-09-04 DIAGNOSIS — J30.1 NON-SEASONAL ALLERGIC RHINITIS DUE TO POLLEN: ICD-10-CM

## 2021-09-04 RX ORDER — FLUTICASONE PROPIONATE 50 MCG
SPRAY, SUSPENSION (ML) NASAL
Qty: 48 G | OUTPATIENT
Start: 2021-09-04

## 2021-09-04 RX ORDER — CETIRIZINE HYDROCHLORIDE 10 MG/1
TABLET ORAL
Qty: 30 TABLET | Refills: 11 | OUTPATIENT
Start: 2021-09-04

## 2021-09-08 ENCOUNTER — TELEPHONE (OUTPATIENT)
Dept: FAMILY MEDICINE CLINIC | Age: 51
End: 2021-09-08

## 2021-09-08 NOTE — TELEPHONE ENCOUNTER
Pt is requesting an Xray for his neck, has spinal fusion in 4 and 5 vertebrae. Pt has been having bad ha and neck pain x 3 mo and it is getting worse. Pt says he has seen you for this. Pt is going to hospital later to do his labs and would like to do the neck xray also. Pt is going to [facility]. Pt wants order faxed he is planning to go around 5 pm today   He also will need his lab order re-faxed.

## 2021-09-08 NOTE — TELEPHONE ENCOUNTER
Pt already has a pending order from 6/17/21 for a cervical spine x-ray. OK to fax that order to be completed.  Thanks -LIVIER

## 2021-09-09 ENCOUNTER — APPOINTMENT (OUTPATIENT)
Dept: GENERAL RADIOLOGY | Age: 51
End: 2021-09-09
Payer: COMMERCIAL

## 2021-09-09 ENCOUNTER — HOSPITAL ENCOUNTER (EMERGENCY)
Age: 51
Discharge: HOME | End: 2021-09-09
Payer: COMMERCIAL

## 2021-09-09 ENCOUNTER — OFFICE VISIT (OUTPATIENT)
Dept: FAMILY MEDICINE CLINIC | Age: 51
End: 2021-09-09
Payer: COMMERCIAL

## 2021-09-09 ENCOUNTER — APPOINTMENT (OUTPATIENT)
Dept: INTERVENTIONAL RADIOLOGY/VASCULAR | Age: 51
End: 2021-09-09
Payer: COMMERCIAL

## 2021-09-09 VITALS
BODY MASS INDEX: 41.75 KG/M2 | HEIGHT: 73 IN | OXYGEN SATURATION: 98 % | WEIGHT: 315 LBS | DIASTOLIC BLOOD PRESSURE: 90 MMHG | SYSTOLIC BLOOD PRESSURE: 154 MMHG | TEMPERATURE: 98.4 F | RESPIRATION RATE: 18 BRPM | HEART RATE: 73 BPM

## 2021-09-09 VITALS
BODY MASS INDEX: 56.07 KG/M2 | RESPIRATION RATE: 20 BRPM | HEART RATE: 80 BPM | WEIGHT: 315 LBS | SYSTOLIC BLOOD PRESSURE: 150 MMHG | DIASTOLIC BLOOD PRESSURE: 80 MMHG

## 2021-09-09 DIAGNOSIS — R74.8 ELEVATED ALKALINE PHOSPHATASE LEVEL: ICD-10-CM

## 2021-09-09 DIAGNOSIS — M79.604 RIGHT LEG PAIN: Primary | ICD-10-CM

## 2021-09-09 DIAGNOSIS — R22.43 LOCALIZED SWELLING OF BOTH LOWER LEGS: ICD-10-CM

## 2021-09-09 DIAGNOSIS — R26.89 BALANCE PROBLEM: ICD-10-CM

## 2021-09-09 DIAGNOSIS — R51.9 DAILY HEADACHE: Primary | ICD-10-CM

## 2021-09-09 DIAGNOSIS — M79.604 RIGHT LEG PAIN: ICD-10-CM

## 2021-09-09 LAB
ALBUMIN SERPL-MCNC: 4.1 G/DL (ref 3.5–5.1)
ALP BLD-CCNC: 136 U/L (ref 38–126)
ALT SERPL-CCNC: 25 U/L (ref 11–66)
ANION GAP SERPL CALCULATED.3IONS-SCNC: 11 MEQ/L (ref 8–16)
AST SERPL-CCNC: 23 U/L (ref 5–40)
BASOPHILS # BLD: 0.8 %
BASOPHILS ABSOLUTE: 0 THOU/MM3 (ref 0–0.1)
BILIRUB SERPL-MCNC: 0.2 MG/DL (ref 0.3–1.2)
BILIRUBIN DIRECT: < 0.2 MG/DL (ref 0–0.3)
BUN BLDV-MCNC: 13 MG/DL (ref 7–22)
CALCIUM SERPL-MCNC: 9.7 MG/DL (ref 8.5–10.5)
CHLORIDE BLD-SCNC: 103 MEQ/L (ref 98–111)
CO2: 26 MEQ/L (ref 23–33)
CREAT SERPL-MCNC: 0.9 MG/DL (ref 0.4–1.2)
EOSINOPHIL # BLD: 2.9 %
EOSINOPHILS ABSOLUTE: 0.1 THOU/MM3 (ref 0–0.4)
ERYTHROCYTE [DISTWIDTH] IN BLOOD BY AUTOMATED COUNT: 12 % (ref 11.5–14.5)
ERYTHROCYTE [DISTWIDTH] IN BLOOD BY AUTOMATED COUNT: 41.7 FL (ref 35–45)
GFR SERPL CREATININE-BSD FRML MDRD: 90 ML/MIN/1.73M2
GFR SERPL CREATININE-BSD FRML MDRD: 90 ML/MIN/1.73M2
GLUCOSE BLD-MCNC: 98 MG/DL (ref 70–108)
HCT VFR BLD CALC: 40.8 % (ref 42–52)
HEMOGLOBIN: 13.3 GM/DL (ref 14–18)
IMMATURE GRANS (ABS): 0.01 THOU/MM3 (ref 0–0.07)
IMMATURE GRANULOCYTES: 0.2 %
LYMPHOCYTES # BLD: 22.9 %
LYMPHOCYTES ABSOLUTE: 1.1 THOU/MM3 (ref 1–4.8)
MAGNESIUM: 1.9 MG/DL (ref 1.6–2.4)
MCH RBC QN AUTO: 31.4 PG (ref 26–33)
MCHC RBC AUTO-ENTMCNC: 32.6 GM/DL (ref 32.2–35.5)
MCV RBC AUTO: 96.5 FL (ref 80–94)
MONOCYTES # BLD: 12.3 %
MONOCYTES ABSOLUTE: 0.6 THOU/MM3 (ref 0.4–1.3)
NUCLEATED RED BLOOD CELLS: 0 /100 WBC
OSMOLALITY CALCULATION: 279.5 MOSMOL/KG (ref 275–300)
PLATELET # BLD: 204 THOU/MM3 (ref 130–400)
PMV BLD AUTO: 9.9 FL (ref 9.4–12.4)
POTASSIUM SERPL-SCNC: 4.4 MEQ/L (ref 3.5–5.2)
RBC # BLD: 4.23 MILL/MM3 (ref 4.7–6.1)
SEG NEUTROPHILS: 60.9 %
SEGMENTED NEUTROPHILS ABSOLUTE COUNT: 2.9 THOU/MM3 (ref 1.8–7.7)
SODIUM BLD-SCNC: 140 MEQ/L (ref 135–145)
TOTAL PROTEIN: 7.1 G/DL (ref 6.1–8)
WBC # BLD: 4.8 THOU/MM3 (ref 4.8–10.8)

## 2021-09-09 PROCEDURE — 93971 EXTREMITY STUDY: CPT

## 2021-09-09 PROCEDURE — 80053 COMPREHEN METABOLIC PANEL: CPT

## 2021-09-09 PROCEDURE — 99213 OFFICE O/P EST LOW 20 MIN: CPT | Performed by: NURSE PRACTITIONER

## 2021-09-09 PROCEDURE — 1036F TOBACCO NON-USER: CPT | Performed by: NURSE PRACTITIONER

## 2021-09-09 PROCEDURE — 85025 COMPLETE CBC W/AUTO DIFF WBC: CPT

## 2021-09-09 PROCEDURE — G8417 CALC BMI ABV UP PARAM F/U: HCPCS | Performed by: NURSE PRACTITIONER

## 2021-09-09 PROCEDURE — 82248 BILIRUBIN DIRECT: CPT

## 2021-09-09 PROCEDURE — 99282 EMERGENCY DEPT VISIT SF MDM: CPT

## 2021-09-09 PROCEDURE — G8427 DOCREV CUR MEDS BY ELIG CLIN: HCPCS | Performed by: NURSE PRACTITIONER

## 2021-09-09 PROCEDURE — 36415 COLL VENOUS BLD VENIPUNCTURE: CPT

## 2021-09-09 PROCEDURE — 72040 X-RAY EXAM NECK SPINE 2-3 VW: CPT

## 2021-09-09 PROCEDURE — 83735 ASSAY OF MAGNESIUM: CPT

## 2021-09-09 PROCEDURE — 73610 X-RAY EXAM OF ANKLE: CPT

## 2021-09-09 RX ORDER — AMOXICILLIN 500 MG/1
CAPSULE ORAL
COMMUNITY
Start: 2021-09-01 | End: 2021-12-09 | Stop reason: ALTCHOICE

## 2021-09-09 ASSESSMENT — ENCOUNTER SYMPTOMS
BACK PAIN: 0
SHORTNESS OF BREATH: 0
CHEST TIGHTNESS: 0
NAUSEA: 0
BACK PAIN: 1
BLOOD IN STOOL: 0
WHEEZING: 0
VOMITING: 0
ABDOMINAL PAIN: 0
EYE PAIN: 0
DIARRHEA: 0
RHINORRHEA: 0
CHEST TIGHTNESS: 0
COUGH: 0
EYE DISCHARGE: 0

## 2021-09-09 NOTE — ED TRIAGE NOTES
DISCOLORATION TO RIGHT AND LEFT LOWER LEG FOR A YEAR,  SAYS PAIN GOES UP TO MID CALF FOR LAST 6 MONTHS, WORSE THIS LAST WEEK.

## 2021-09-09 NOTE — PATIENT INSTRUCTIONS
balance. ? A sudden, severe headache that is different from past headaches.     · You have jaw pain and pain in your chest, shoulder, neck, or arm. Call your doctor now or seek immediate medical care if:    · You have a fever with a stiff neck or a severe headache.     · You have nausea and vomiting, or you cannot keep food or liquids down. Watch closely for changes in your health, and be sure to contact your doctor if:    · Your head or face pain does not get better as expected. Where can you learn more? Go to [email or web address] [email or web address] [email or web address] org and sign in to your [employer] account. Enter P568 in the [employer] box to learn more about \"Head or Face Pain: Care Instructions. \"     If you do not have an account, please click on the \"Sign Up Now\" link. Current as of: October 19, 2020               Content Version: 12.9  © 2901-5948 Healthwise, Incorporated. Care instructions adapted under license by [facility] ([facility]). If you have questions about a medical condition or this instruction, always ask your healthcare professional. [address] any warranty or liability for your use of this information.

## 2021-09-09 NOTE — ED PROVIDER NOTES
[facility] [address] [address] COMPLAINT       Chief Complaint   Patient presents with    Leg Pain       Nurses Notes reviewed and I agree except as notedin the HPI. HISTORY OF PRESENT ILLNESS    Deborah Tee is a 54 y.o. male who presents has had right leg pain on and off for the last 6 months. He states that in the last week he has noticed increasing swelling. Some discoloration on the  medial side of his right calf. He states that he denies any injury. He does notice pain with weightbearing his right ankle. Denies any injury. His family doctor also requested a x-ray of his neck because of his chronic neck pain. Location/Symptom: Right leg pain  Timing/Onset: 1 week  Context/Setting: home  Quality: ache  Duration: constant  Modifying Factors: none  Severity: 5    REVIEW OF SYSTEMS     Review of Systems   Constitutional: Negative for chills, fatigue and fever. HENT: Negative for congestion, ear pain and rhinorrhea. Eyes: Negative for pain and discharge. Respiratory: Negative for cough, chest tightness and wheezing. Cardiovascular: Negative for chest pain, palpitations and leg swelling. Gastrointestinal: Negative for diarrhea, nausea and vomiting. Genitourinary: Negative for dysuria and frequency. Musculoskeletal: Negative for arthralgias, back pain, myalgias and neck pain. Right leg pain   Skin: Negative for rash. Neurological: Negative for dizziness, weakness and headaches. All other systems reviewed and are negative. PAST MEDICAL HISTORY    has a past medical history of Asthma, Cardiomegaly, Fibromyalgia, and CLARIBEL on CPAP. SURGICAL HISTORY      has a past surgical history that includes back surgery; knee surgery; Pain management procedure (Bilateral, 2/11/2021); and Pain management procedure (Bilateral, 4/8/2021).     CURRENT MEDICATIONS       Discharge Medication List as of 9/9/2021  8:44 AM      CONTINUE these tablet by mouth once daily, DAWHistorical Med      montelukast (SINGULAIR) 10 MG tablet Take 1 tablet by mouth nightly, Disp-30 tablet,R-5Normal      diphenhydrAMINE (BENADRYL) 25 MG capsule Take 1 capsule by mouth every 6 hours as needed for Itching, Disp-60 capsule,R-5Normal      tiotropium (SPIRIVA RESPIMAT) 1.25 MCG/ACT AERS inhaler Inhale 2 puffs into the lungs daily, Disp-1 Inhaler,R-1Normal      budesonide-formoterol (SYMBICORT) 160-4.5 MCG/ACT AERO 2 puffs inhaled twice daily. Rinse mouth well after use., Disp-1 Inhaler,R-5Normal      fluticasone (FLONASE) 50 MCG/ACT nasal spray 2 sprays by Each Nostril route daily, Disp-3 Bottle,R-11Normal      cetirizine (ZYRTEC) 10 MG tablet Take 1 tablet by mouth daily, Disp-30 tablet,R-11Normal      bumetanide (BUMEX) 1 MG tablet Take 0.5 tablets by mouth daily, Disp-30 tablet, R-0Print      NARCAN 4 MG/0.1ML LIQD nasal spray DAWHistorical Med      Multiple Vitamins-Minerals (MULTIVITAMIN ADULTS) TABS Take 1 tablet by mouth daily, Disp-90 tablet, R-3Normal      ergocalciferol (ERGOCALCIFEROL) 22242 units capsule Take 50,000 Units by mouth as needed Historical Med             ALLERGIES     is allergic to dilantin [phenytoin], dupixent [dupilumab], oxycodone, and valium [diazepam]. HISTORY     He indicated that his mother is . He indicated that his father is . family history includes Asthma in his father; Emphysema in his father and mother; High Blood Pressure in his father and mother; Other in his mother. SOCIALHISTORY      reports that he has never smoked. He has never used smokeless tobacco. He reports previous alcohol use. He reports that he does not use drugs. PHYSICAL EXAM     INITIAL VITALS:  height is 6' 1\" (1.854 m) and weight is 415 lb (188.2 kg) (abnormal). His oral temperature is 98.4 °F (36.9 °C). His blood pressure is 154/90 (abnormal) and his pulse is 73. His respiration is 18 and oxygen saturation is 98%.     Physical Exam  Vitals and nursing note reviewed. Constitutional:       Comments: Well Developed Well Nourished Appearing     HENT:      Head: Normocephalic and atraumatic. Eyes:      Pupils: Pupils are equal, round, and reactive to light. Cardiovascular:      Rate and Rhythm: Normal rate and regular rhythm. Heart sounds: Normal heart sounds. Pulmonary:      Effort: Pulmonary effort is normal. No respiratory distress. Breath sounds: Normal breath sounds. No wheezing. Abdominal:      General: Bowel sounds are normal. There is no distension. Palpations: Abdomen is soft. Musculoskeletal:      Cervical back: Normal range of motion and neck supple. Comments: Tenderness and discoloration to the right medial calf is got a good dorsalis pedis pulse. There is no signs of arterial occlusion. DIFFERENTIAL DIAGNOSIS:   Right calf strain possible DVT rule out    DIAGNOSTIC RESULTS     EKG: All EKG's are interpreted by the Emergency Department Physician who either signs or Co-signs this chart in the absence of a cardiologist.      RADIOLOGY: non-plain film images(s) such as CT, Ultrasound and MRI are read by the radiologist.  X-rays and Dopplers read per radiology      DUP LOWER EXTREMITY VENOUS RIGHT (Final result)  Result time 09/09/21 14:17:00  Final result by Kamille Eduardo MD (09/09/21 73:56:44)                Impression:    No evidence of a DVT. **This report has been created using voice recognition software. It may contain minor errors which are inherent in voice recognition technology. **     Final report electronically signed by Dr Fabricio Menendez on 9/9/2021 8:29 AM            Narrative:    PROCEDURE: VL DUP LOWER EXTREMITY VENOUS RIGHT     CLINICAL INFORMATION: 42-year-old male with swelling in the right leg for a year     COMPARISON: Previous ultrasound 5/28/2020.      TECHNIQUE: Venous doppler ultrasound was performed of the right lower extremity using gray scale, color flow and spectral doppler imaging. FINDINGS:   There is normal color flow, spectral analysis and compressibility of the common femoral vein, superficial femoral vein and popliteal vein on the right . There is normal color flow and compressibility in the posterior tibial veins, anterior tibial veins and peroneal veins. There is normal color flow, spectral analysis and compressibility in the contralateral common femoral vein.                     XR CERVICAL SPINE (2-3 VIEWS) (Final result)  Result time 09/09/21 08:28:05  Final result by Lavella Goodell, MD (09/09/21 08:28:05)                Impression:    Postsurgical changes with no acute fractures. **This report has been created using voice recognition software. It may contain minor errors which are inherent in voice recognition technology. **     Final report electronically signed by Dr Vel Gu on 9/9/2021 8:28 AM            Narrative:    PROCEDURE: XR CERVICAL SPINE (2-3 VIEWS)     CLINICAL INFORMATION: 61-year-old male with neck pain. COMPARISON: No prior study. TECHNIQUE: 4 views of the cervical spine were obtained. FINDINGS: Anterior cervical fusion hardware spanning C5-C6 and there is an intervertebral disc spacer at this level. There is reversal of the normal lordotic curvature of the cervical spine which could be related to muscle spasm or patient positioning. There is a bulky osteophyte arising from the superior anterior endplate of the C5 vertebral body. There is no prevertebral soft tissue swelling. The atlantodental intervals within normal limits. The lateral masses of C1 and C2 are appropriately aligned.                     XR ANKLE RIGHT (MIN 3 VIEWS) (Final result)  Result time 09/09/21 08:27:07  Final result by Caden Allen MD (09/09/21 08:27:07)                Impression:        1. Mild degenerative change. 2. No acute fracture. 3. Soft tissue swelling over the distal leg.  Possible phleboliths in the soft tissues anterior and medial to the distal tibia. .           **This report has been created using voice recognition software. It may contain minor errors which are inherent in voice recognition technology. **     Final report electronically signed by DR Colin Woodward on 9/9/2021 8:27 AM            Narrative:    PROCEDURE: XR ANKLE RIGHT (MIN 3 VIEWS)     CLINICAL INFORMATION: Right ankle pain. COMPARISON: No prior study. TECHNIQUE: 3 views of the right ankle. FINDINGS:       There is no fracture or dislocation.  The ankle mortise is congruent with the talar dome.  There is mild degenerative change.  The calcaneus is normal.     The base of the fifth metatarsal is normal. There is soft tissue swelling over the distal leg. There are possible small phleboliths in the soft tissues anterior and medial to the distal tibia. LABS:   Labs Reviewed   CBC WITH AUTO DIFFERENTIAL - Abnormal; Notable for the following components:       Result Value    RBC 4.23 (*)     Hemoglobin 13.3 (*)     Hematocrit 40.8 (*)     MCV 96.5 (*)     All other components within normal limits   HEPATIC FUNCTION PANEL - Abnormal; Notable for the following components: Total Bilirubin 0.2 (*)     Alkaline Phosphatase 136 (*)     All other components within normal limits   BASIC METABOLIC PANEL   MAGNESIUM   ANION GAP   GLOMERULAR FILTRATION RATE, ESTIMATED   OSMOLALITY       EMERGENCY DEPARTMENT COURSE:   :    Vitals:    09/09/21 0726   BP: (!) 154/90   Pulse: 73   Resp: 18   Temp: 98.4 °F (36.9 °C)   TempSrc: Oral   SpO2: 98%   Weight: (!) 415 lb (188.2 kg)   Height: 6' 1\" (1.854 m)     Patient was seen history physical exam was performed. See disposition below    CRITICAL CARE:  None    CONSULTS:  None    PROCEDURES:  None    FINAL IMPRESSION      1. Right leg pain          DISPOSITION/PLAN   Discharge    PATIENT REFERRED TO:  Domingo Snellen, APRN - CNP  [address] [facility] [id number] [phone]    In 2 days        DISCHARGE MEDICATIONS:  Discharge Medication List as of 9/9/2021  8:44 AM          (Please note that portions of this note were completed with a voice recognitionprogram.  Efforts were made to edit the dictations but occasionally words are mis-transcribed.)    OJ Marr PA  09/09/21 1500

## 2021-09-09 NOTE — PROGRESS NOTES
Chief Complaint   Patient presents with    ED Follow-up     Went to ED earlier today, was not told on what to do next or even what was going on. Wants to discuss labs that were done in the ED. Lorna Yanez is a 54 y.o.male      Pt was evaluated in the emergency department this morning for right lower leg pain and discoloration. Patient was scared that he may have a DVT. X-ray of the ankle and a Doppler were completed, these were negative for anything acute. Patient states he wants to make sure there is no other testing that needs done and wants to know what his lab work looks like. He also notes that [facility] is aware of his leg complaints and has made referral to a specialist in the past but he did not go and would like to get this referral again. Patient complains of daily headaches that he states are getting worse with time. . They seem to waking him from sleep. He does note some vision changes when his head hurts. He does take Fioricet with minimal relief. Patient is questioning if he needs an MRI completed as he also had an x-ray of the cervical spine that he reports was fine. Discussed with patient that he had in the spine or 2 different areas for MRI purposes. Review of Systems   Constitutional: Positive for fatigue. Negative for chills, fever and unexpected weight change. HENT: Negative. Eyes: Positive for visual disturbance (with headaches). Respiratory: Negative for chest tightness and shortness of breath. Cardiovascular: Positive for leg swelling (chronic). Negative for chest pain and palpitations. Gastrointestinal: Negative for abdominal pain and blood in stool. Genitourinary: Negative for dysuria. Musculoskeletal: Positive for arthralgias, back pain and neck pain. Negative for joint swelling. Right shin pain and discoloration   Skin: Negative for rash. Neurological: Positive for headaches. Negative for dizziness.         Off balance with headaches   Psychiatric/Behavioral: Negative. All other systems reviewed and are negative. OBJECTIVE     BP (!) 150/80   Pulse 80   Resp 20   Wt (!) 425 lb (192.8 kg)   BMI 56.07 kg/m²     Physical Exam  Vitals and nursing note reviewed. Constitutional:       Appearance: He is well-developed. He is obese. HENT:      Head: Normocephalic and atraumatic. Right Ear: External ear normal.      Left Ear: External ear normal.      Nose: Nose normal.   Eyes:      Conjunctiva/sclera: Conjunctivae normal.      Pupils: Pupils are equal, round, and reactive to light. Cardiovascular:      Rate and Rhythm: Normal rate and regular rhythm. Pulmonary:      Effort: Pulmonary effort is normal.      Breath sounds: Normal breath sounds. Abdominal:      General: Bowel sounds are normal.      Palpations: Abdomen is soft. Musculoskeletal:         General: Normal range of motion. Cervical back: Normal range of motion and neck supple. Right lower leg: Edema (2+) present. Left lower leg: Edema (2+) present. Skin:     General: Skin is warm and dry. Comments: Dark discoloration to right shin/calf   Neurological:      Mental Status: He is alert and oriented to person, place, and time. Deep Tendon Reflexes: Reflexes are normal and symmetric. Psychiatric:         Behavior: Behavior normal.         Thought Content:  Thought content normal.         Judgment: Judgment normal.         Component      Latest Ref Rng & Units 9/9/2021   WBC      4.8 - 10.8 thou/mm3 4.8   RBC      4.70 - 6.10 mill/mm3 4.23 (L)   Hemoglobin Quant      14.0 - 18.0 gm/dl 13.3 (L)   Hematocrit      42.0 - 52.0 % 40.8 (L)   MCV      80.0 - 94.0 fL 96.5 (H)   MCH      26.0 - 33.0 pg 31.4   MCHC      32.2 - 35.5 gm/dl 32.6   RDW-CV      11.5 - 14.5 % 12.0   RDW-SD      35.0 - 45.0 fL 41.7   Platelet Count      763 - 400 thou/mm3 204   MPV      9.4 - 12.4 fL 9.9   Seg Neutrophils      % 60.9   Lymphocytes      % 22.9 Monocytes      % 12.3   Eosinophils      % 2.9   Basophils      % 0.8   Immature Granulocytes      % 0.2   Segs Absolute      1 - 7 thou/mm3 2.9   Lymphocytes Absolute      1.0 - 4.8 thou/mm3 1.1   Monocytes Absolute      0.4 - 1.3 thou/mm3 0.6   Eosinophils Absolute      0.0 - 0.4 thou/mm3 0.1   Basophils Absolute      0.0 - 0.1 thou/mm3 0.0   Immature Grans (Abs)      0.00 - 0.07 thou/mm3 0.01   Nucleated Red Blood Cells      /100 wbc 0   Sodium      135 - 145 meq/L 140   Potassium      3.5 - 5.2 meq/L 4.4   Chloride      98 - 111 meq/L 103   CO2      23 - 33 meq/L 26   Glucose      70 - 108 mg/dL 98   BUN      7 - 22 mg/dL 13   Creatinine      0.4 - 1.2 mg/dL 0.9   Calcium      8.5 - 10.5 mg/dL 9.7   Albumin      3.5 - 5.1 g/dL 4.1   Bilirubin      0.3 - 1.2 mg/dL 0.2 (L)   Bilirubin, Direct      0.0 - 0.3 mg/dL <0.2   Alk Phos      38 - 126 U/L 136 (H)   AST      5 - 40 U/L 23   ALT      11 - 66 U/L 25   Total Protein      6.1 - 8.0 g/dL 7.1   Magnesium      1.6 - 2.4 mg/dL 1.9   Anion Gap      8.0 - 16.0 meq/L 11.0   Est, Glom Filt Rate      ml/min/1.73m2 >90   Osmolality Calc      275.0 - 300.0 mOsmol/kg 279.5     Narrative   PROCEDURE: VL DUP LOWER EXTREMITY VENOUS RIGHT       CLINICAL INFORMATION: 68-year-old male with swelling in the right leg for a year       COMPARISON: Previous ultrasound 5/28/2020.       TECHNIQUE: Venous doppler ultrasound was performed of the right lower extremity using gray scale, color flow and spectral doppler imaging.       FINDINGS:   There is normal color flow, spectral analysis and compressibility of the common femoral vein, superficial femoral vein and popliteal vein on the right .       There is normal color flow and compressibility in the posterior tibial veins, anterior tibial veins and peroneal veins.       There is normal color flow, spectral analysis and compressibility in the contralateral common femoral vein.           Impression   No evidence of a DVT.                  **This report has been created using voice recognition software. It may contain minor errors which are inherent in voice recognition technology. **       Final report electronically signed by Dr Jimenez Shows on 9/9/2021 8:29 AM     Narrative   PROCEDURE: XR CERVICAL SPINE (2-3 VIEWS)       CLINICAL INFORMATION: 68-year-old male with neck pain.       COMPARISON: No prior study.       TECHNIQUE: 4 views of the cervical spine were obtained.       FINDINGS: Anterior cervical fusion hardware spanning C5-C6 and there is an intervertebral disc spacer at this level.       There is reversal of the normal lordotic curvature of the cervical spine which could be related to muscle spasm or patient positioning. There is a bulky osteophyte arising from the superior anterior endplate of the C5 vertebral body.       There is no prevertebral soft tissue swelling. The atlantodental intervals within normal limits. The lateral masses of C1 and C2 are appropriately aligned.           Impression   Postsurgical changes with no acute fractures. **This report has been created using voice recognition software. It may contain minor errors which are inherent in voice recognition technology. **       Final report electronically signed by Dr Jimenez Shows on 9/9/2021 8:28 AM     Narrative   PROCEDURE: XR ANKLE RIGHT (MIN 3 VIEWS)       CLINICAL INFORMATION: Right ankle pain.       COMPARISON: No prior study.       TECHNIQUE: 3 views of the right ankle.       FINDINGS:           There is no fracture or dislocation.  The ankle mortise is congruent with the talar dome.  There is mild degenerative change.  The calcaneus is normal.     The base of the fifth metatarsal is normal. There is soft tissue swelling over the distal leg. There are possible small phleboliths in the soft tissues anterior and medial to the distal tibia.                   Impression           1. Mild degenerative change. 2. No acute fracture.    3. Soft tissue

## 2021-09-10 ENCOUNTER — TELEPHONE (OUTPATIENT)
Dept: FAMILY MEDICINE CLINIC | Age: 51
End: 2021-09-10

## 2021-09-10 NOTE — TELEPHONE ENCOUNTER
Left detailed message for pt regarding MRI scheduled for 09/20/2021 at 8:30 PM. Arrival time is 7:30 PM. Prep was mailed out to pt. Pt is told to call with any questions.

## 2021-09-11 RX ORDER — BUDESONIDE AND FORMOTEROL FUMARATE DIHYDRATE 160; 4.5 UG/1; UG/1
AEROSOL RESPIRATORY (INHALATION)
Qty: 10.2 G | OUTPATIENT
Start: 2021-09-11

## 2021-09-13 ENCOUNTER — TELEPHONE (OUTPATIENT)
Dept: FAMILY MEDICINE CLINIC | Age: 51
End: 2021-09-13

## 2021-09-13 NOTE — LETTER
[facility]  [address] [facility], [address]  Phone: [phone]  Fax: [phone]    September 13, 2021    [address] [address]    Dear Sarah Gay,    Thank you for choosing our [facility] on 9/9/21. Dr. Graham Michael wanted to make sure that you understand your discharge instructions and that you were able to fill any prescriptions that may have been ordered for you. Please contact the office at the above phone number if you were advised to follow up with Dr. Graham Michael, or if you have any further questions or needs. Also, did you know -                             [facility] ([facility]) practices can often offer you an appointment on the same day that you call for acute issues. *We have some [facility] offices that offer Walk-in appointments; check our website for availability in your community, [email or web address].      *Evisits are now available for patients through [address] [facility]) also offers video visits through [address] If you do not have MyChart and are interested, please contact the office and a staff member may assist you or go to [email or web address].     Sincerely,   MIRNA Maya CNP and your [facility]

## 2021-09-13 NOTE — TELEPHONE ENCOUNTER
Pt. contacted. Procedure cancelled due to provider out of office. Follow up kept for medications. Verbalized understanding.

## 2021-09-15 DIAGNOSIS — M62.838 SPASM OF MUSCLE: ICD-10-CM

## 2021-09-15 DIAGNOSIS — M54.10 RADICULOPATHY AFFECTING UPPER EXTREMITY: Primary | ICD-10-CM

## 2021-09-15 DIAGNOSIS — M47.816 LUMBAR SPONDYLOSIS: ICD-10-CM

## 2021-09-15 DIAGNOSIS — G89.4 CHRONIC PAIN SYNDROME: ICD-10-CM

## 2021-09-15 RX ORDER — LIDOCAINE 50 MG/G
PATCH TOPICAL
Qty: 60 PATCH | Refills: 2 | Status: SHIPPED | OUTPATIENT
Start: 2021-09-19 | End: 2021-10-21 | Stop reason: SDUPTHER

## 2021-09-15 RX ORDER — LIDOCAINE HYDROCHLORIDE 30 MG/G
CREAM TOPICAL
Qty: 85 G | Refills: 2 | Status: SHIPPED | OUTPATIENT
Start: 2021-09-17 | End: 2021-10-21 | Stop reason: SDUPTHER

## 2021-09-15 RX ORDER — HYDROCODONE BITARTRATE AND ACETAMINOPHEN 5; 325 MG/1; MG/1
1 TABLET ORAL EVERY 8 HOURS PRN
Qty: 90 TABLET | Refills: 0 | Status: SHIPPED | OUTPATIENT
Start: 2021-09-19 | End: 2021-10-21 | Stop reason: SDUPTHER

## 2021-09-15 RX ORDER — ORPHENADRINE CITRATE 100 MG/1
TABLET, EXTENDED RELEASE ORAL
Qty: 60 TABLET | Refills: 0 | Status: SHIPPED | OUTPATIENT
Start: 2021-09-17 | End: 2021-10-21 | Stop reason: SDUPTHER

## 2021-09-15 NOTE — TELEPHONE ENCOUNTER
Bebe Cristobal called requesting a refill on the following medications:  Requested Prescriptions     Pending Prescriptions Disp Refills    HYDROcodone-acetaminophen (NORCO) 5-325 MG per tablet 90 tablet 0     Sig: Take 1 tablet by mouth every 8 hours as needed for Pain for up to 30 days.  orphenadrine (NORFLEX) 100 MG extended release tablet 60 tablet 0    lidocaine (LIDODERM) 5 % 60 patch 0     Si hours on, 12 hours off.  lidocaine (LIDOPIN) 3 % CREA 85 g 2     Sig: Apply topically to back, abdomen and arms     Pharmacy verified: [facility] [address]  Ilia roca      Date of last visit: 2021  Date of next visit (if applicable): 64/3/8301

## 2021-09-15 NOTE — TELEPHONE ENCOUNTER
OARRS reviewed. UDS: + for  Hydrocodone consistent. Last seen: 7/7/2021.  Follow-up:   Future Appointments   Date Time Provider Jesus Iris   9/20/2021  8:00 PM STR MRI RM1 STRZ MRI STR Radiolog   10/6/2021  1:00 PM MIRNA Bustos - CNP N SRPX Pain [address]   11/17/2021 11:30 AM MIRNA Maloney - CNP Fam Medicine [address]

## 2021-09-16 ENCOUNTER — TELEPHONE (OUTPATIENT)
Dept: PHYSICAL MEDICINE AND REHAB | Age: 51
End: 2021-09-16

## 2021-09-29 RX ORDER — DIPHENHYDRAMINE HYDROCHLORIDE 25 MG/1
CAPSULE ORAL
Qty: 60 CAPSULE | Refills: 5 | OUTPATIENT
Start: 2021-09-29

## 2021-10-19 ENCOUNTER — TELEPHONE (OUTPATIENT)
Dept: FAMILY MEDICINE CLINIC | Age: 51
End: 2021-10-19

## 2021-10-19 NOTE — TELEPHONE ENCOUNTER
Called pt. He said that his leg swelling has gotten worse in the past week. It seems to be causing him more pain. He has started work again, but today his legs hurt so bad he could not go. He is asking for a steroid to help with the swelling/pain so he can go to work. His pharmacy is [facility]. He would like a call back.

## 2021-10-20 RX ORDER — PREDNISONE 10 MG/1
TABLET ORAL
Qty: 30 TABLET | Refills: 0 | Status: SHIPPED | OUTPATIENT
Start: 2021-10-20 | End: 2021-10-30

## 2021-10-20 RX ORDER — DIPHENHYDRAMINE HYDROCHLORIDE 25 MG/1
CAPSULE ORAL
Qty: 60 CAPSULE | Refills: 5 | OUTPATIENT
Start: 2021-10-20

## 2021-10-20 NOTE — TELEPHONE ENCOUNTER
Noted.  Prescription sent to pharmacy. If pain continues, pt can follow up in the office for recheck.  -WS

## 2021-10-21 ENCOUNTER — OFFICE VISIT (OUTPATIENT)
Dept: PHYSICAL MEDICINE AND REHAB | Age: 51
End: 2021-10-21
Payer: MEDICAID

## 2021-10-21 VITALS
DIASTOLIC BLOOD PRESSURE: 78 MMHG | BODY MASS INDEX: 41.75 KG/M2 | WEIGHT: 315 LBS | HEIGHT: 73 IN | SYSTOLIC BLOOD PRESSURE: 124 MMHG

## 2021-10-21 DIAGNOSIS — M51.36 LUMBAR DEGENERATIVE DISC DISEASE: ICD-10-CM

## 2021-10-21 DIAGNOSIS — M62.838 SPASM OF MUSCLE: ICD-10-CM

## 2021-10-21 DIAGNOSIS — M54.50 LUMBAR PAIN: ICD-10-CM

## 2021-10-21 DIAGNOSIS — M54.10 RADICULOPATHY AFFECTING UPPER EXTREMITY: ICD-10-CM

## 2021-10-21 DIAGNOSIS — G89.4 CHRONIC PAIN SYNDROME: ICD-10-CM

## 2021-10-21 DIAGNOSIS — M47.816 SPONDYLOSIS OF LUMBAR REGION WITHOUT MYELOPATHY OR RADICULOPATHY: Primary | ICD-10-CM

## 2021-10-21 DIAGNOSIS — M54.12 CERVICAL RADICULOPATHY: ICD-10-CM

## 2021-10-21 DIAGNOSIS — M47.816 LUMBAR SPONDYLOSIS: ICD-10-CM

## 2021-10-21 PROCEDURE — G8427 DOCREV CUR MEDS BY ELIG CLIN: HCPCS | Performed by: NURSE PRACTITIONER

## 2021-10-21 PROCEDURE — 99214 OFFICE O/P EST MOD 30 MIN: CPT | Performed by: NURSE PRACTITIONER

## 2021-10-21 PROCEDURE — G8417 CALC BMI ABV UP PARAM F/U: HCPCS | Performed by: NURSE PRACTITIONER

## 2021-10-21 PROCEDURE — 1036F TOBACCO NON-USER: CPT | Performed by: NURSE PRACTITIONER

## 2021-10-21 PROCEDURE — G8484 FLU IMMUNIZE NO ADMIN: HCPCS | Performed by: NURSE PRACTITIONER

## 2021-10-21 RX ORDER — LIDOCAINE HYDROCHLORIDE 30 MG/G
CREAM TOPICAL
Qty: 85 G | Refills: 2 | Status: SHIPPED | OUTPATIENT
Start: 2021-10-21 | End: 2021-11-20

## 2021-10-21 RX ORDER — HYDROCODONE BITARTRATE AND ACETAMINOPHEN 5; 325 MG/1; MG/1
1 TABLET ORAL EVERY 8 HOURS PRN
Qty: 90 TABLET | Refills: 0 | Status: SHIPPED | OUTPATIENT
Start: 2021-10-21 | End: 2021-11-16 | Stop reason: SDUPTHER

## 2021-10-21 RX ORDER — LIDOCAINE 50 MG/G
PATCH TOPICAL
Qty: 60 PATCH | Refills: 2 | Status: SHIPPED | OUTPATIENT
Start: 2021-10-21 | End: 2021-11-16 | Stop reason: SDUPTHER

## 2021-10-21 RX ORDER — ORPHENADRINE CITRATE 100 MG/1
TABLET, EXTENDED RELEASE ORAL
Qty: 60 TABLET | Refills: 0 | Status: SHIPPED | OUTPATIENT
Start: 2021-10-21 | End: 2021-11-16 | Stop reason: SDUPTHER

## 2021-10-21 ASSESSMENT — ENCOUNTER SYMPTOMS
GASTROINTESTINAL NEGATIVE: 1
APNEA: 1
EYES NEGATIVE: 1
BACK PAIN: 1

## 2021-10-21 NOTE — PROGRESS NOTES
[address] [facility] [address]  Dept: [phone]  Dept Fax: [phone]: [phone]    Visit Date: 10/21/2021    Functionality Assessment/Goals Worksheet     On a scale of 0 (Does not Interfere) to 10 (Completely Interferes)     1. Which number describes how during the past week pain has interfered with       the following:  A. General Activity:  2  B. Mood: 2  C. Walking Ability:  3  D. Normal Work (Includes both work outside the home and housework):  4  E. Relations with Other People:   2  F. Sleep:   2  G. Enjoyment of Life:   6    2. Patient Prefers to Take their Pain Medications:     [x]  On a regular basis   []  Only when necessary    []  Does not take pain medications    3. What are the Patient's Goals/Expectations for Visiting Pain Management? []  Learn about my pain    [x]  Receive Medication   []  Physical Therapy     []  Treat Depression   [x]  Receive Injections    []  Treat Sleep   []  Deal with Anxiety and Stress   []  Treat Opoid Dependence/Addiction   []  Other:      HPI:   Trevin Graves is a 54 y.o. male is here today for    Chief Complaint: Low back pain, Leg pain     HPI   Little over 3 month FU. Patient did not get Lumbar RFA that was ordered at last visit because Dr. Wilson Level being off for medical leave and procedure had to be canceled. Continues to have pain across low back- constant sharp stabbing and aching and throbbing pain. Pain intermittently radiates in to buttocks and sometimes down right leg. Stabbing with some numbness  Pain is more bothersome in low back compared to leg. Worse on right side. Having increased right leg swelling and pain was started on medrol dose pack yesterday and feels that it is helping.  Continues to get a lot of spasms in right leg     Norco and Norflex continue to help make pain more tolerable   Pain increases with bending, lifting, twisting , walking, standing, getting up and down and laying. Medications reviewed. Patient denies side effects with medications. Patient states he is taking medications as prescribed. Hedenies receiving pain medications from other sources. He had 2 ER visits since last visit     Pain scale with out pain medications or at its worst is 10/10. Pain scale with pain medications or at its best is 6/10. Last dose of Norco 2 days ago \"Tuesday ran out\" which is appropriate per OARRS   Drug screen reviewed from 7/7/2021 and was appropriate  Pill count was not completed today as patient out of medications   Patient does not have naloxone available at home. Patient has not required use of naloxone at home since last office visit. The patientis allergic to dilantin [phenytoin], dupixent [dupilumab], oxycodone, and valium [diazepam]. Subjective:      Review of Systems   Constitutional: Negative. Negative for activity change. HENT: Negative. Eyes: Negative. Respiratory: Positive for apnea. Cardiovascular: Positive for leg swelling. Gastrointestinal: Negative. Endocrine: Negative. Genitourinary: Negative. Musculoskeletal: Positive for arthralgias, back pain, gait problem, joint swelling, myalgias, neck pain and neck stiffness. Not using any assist devices    Skin: Negative. Neurological: Positive for weakness, numbness and headaches. Psychiatric/Behavioral: Positive for sleep disturbance. Objective:     Vitals:    10/21/21 0725   BP: 124/78   Weight: (!) 432 lb (196 kg)   Height: 6' 1\" (1.854 m)       Physical Exam  Vitals and nursing note reviewed. Constitutional:       General: He is not in acute distress. Appearance: He is obese. He is not diaphoretic. Comments: Obese   HENT:      Head: Normocephalic and atraumatic.       Right Ear: External ear normal.      Left Ear: External ear normal.      Nose: Nose normal.      Mouth/Throat:      Mouth: Mucous membranes are moist.      Pharynx: No oropharyngeal exudate. Eyes:      General: No scleral icterus. Right eye: No discharge. Left eye: No discharge. Conjunctiva/sclera: Conjunctivae normal.      Pupils: Pupils are equal, round, and reactive to light. Neck:      Thyroid: No thyromegaly. Cardiovascular:      Rate and Rhythm: Normal rate and regular rhythm. Heart sounds: Normal heart sounds. No murmur heard. No friction rub. No gallop. Pulmonary:      Effort: Pulmonary effort is normal. No respiratory distress. Breath sounds: Normal breath sounds. No wheezing or rales. Chest:      Chest wall: No tenderness. Abdominal:      General: Bowel sounds are normal. There is no distension. Palpations: Abdomen is soft. Tenderness: There is no abdominal tenderness. There is no guarding or rebound. Musculoskeletal:         General: Tenderness present. Cervical back: Full passive range of motion without pain, normal range of motion and neck supple. Spasms and tenderness present. No edema, erythema or rigidity. No muscular tenderness. Normal range of motion. Thoracic back: Spasms and tenderness present. Lumbar back: Tenderness and bony tenderness present. Decreased range of motion. Negative right straight leg raise test and negative left straight leg raise test.        Back:       Right knee: Tenderness present. Left knee: Tenderness present. Right lower leg: Swelling present. Edema present. Left lower leg: Swelling present. Edema present. Right ankle: Swelling present. Left ankle: Swelling present. Skin:     General: Skin is warm. Coloration: Skin is not pale. Findings: No erythema or rash. Neurological:      General: No focal deficit present. Mental Status: He is alert and oriented to person, place, and time. He is not disoriented. Cranial Nerves: No cranial nerve deficit.       Sensory: No sensory deficit. Motor: Weakness present. No atrophy or abnormal muscle tone. Coordination: Coordination normal.      Gait: Gait normal.      Deep Tendon Reflexes: Babinski sign absent on the right side. Babinski sign absent on the left side. Reflex Scores:       Tricep reflexes are 1+ on the right side and 1+ on the left side. Bicep reflexes are 1+ on the right side and 1+ on the left side. Brachioradialis reflexes are 1+ on the right side and 1+ on the left side. Patellar reflexes are 1+ on the right side and 1+ on the left side. Achilles reflexes are 1+ on the right side and 1+ on the left side. Comments: Motor 4/5   Psychiatric:         Attention and Perception: Attention normal. He is attentive. Mood and Affect: Mood normal. Mood is not anxious or depressed. Affect is not labile, blunt, angry or inappropriate. Speech: Speech normal. He is communicative. Speech is not rapid and pressured, delayed, slurred or tangential.         Behavior: Behavior normal. Behavior is not agitated, slowed, aggressive, withdrawn, hyperactive or combative. Behavior is cooperative. Thought Content: Thought content normal. Thought content is not paranoid or delusional. Thought content does not include homicidal or suicidal ideation. Thought content does not include homicidal or suicidal plan. Cognition and Memory: Cognition normal. Memory is not impaired. He does not exhibit impaired recent memory or impaired remote memory. Judgment: Judgment normal. Judgment is not impulsive or inappropriate. VAL test: + right   Yeomans test: + right   Gaenslen test: + right       Assessment:     1. Spondylosis of lumbar region without myelopathy or radiculopathy    2. Lumbar degenerative disc disease    3. Lumbar pain    4. Cervical radiculopathy    5. Chronic pain syndrome    6. Radiculopathy affecting upper extremity    7. Lumbar spondylosis    8.  Spasm of muscle Plan:      · OARRS reviewed. Current MED: 15.00  · Patient was offered naloxone for home. · Discussed long term side effects of medications, tolerance, dependency and addiction. · Previous UDS reviewed  · UDS preformed today for compliance. · Patient told can not receive any pain medications from any other source. · No evidence of abuse, diversion or aberrant behavior.  Medications and/or procedures to improve function and quality of life- patient understanding with this and that may not be pain free   Discussed with patient about safe storage of medications at home   Discussed possible weaning of medication dosing dependent on treatment/procedure results.  Discussed with patient about risks with procedure including infection, reaction to medication, increased pain, or bleeding. · Procedure notes reveiwed in detail  · Received 80% relief of low back pain and also relief of leg pain from bilateral L-facet MBB # 2 for 3 weeks with improvement of mobility and activity.   · Plan Bilateral L-facet RFA @ L3-4, L4-5, and L5-S1 RIGHT SIDE FIRST for longer term pain relief when able to be completed as he wants to wait for Dr. Ysabel Blanchard at this time. Procedure and risks discussed in detail with patient. · Continue current pain medications at this time while awaiting procedures- [place] 5/325 TID prn- ordered refill   · Continue Norflex- Ordered refill   · Discussed therapy with dry needle therapy   · Continue Lidoderm patches and ointment - ordered refill   · Updated UDS ordered today      Meds. Prescribed:   Orders Placed This Encounter   Medications    HYDROcodone-acetaminophen (NORCO) 5-325 MG per tablet     Sig: Take 1 tablet by mouth every 8 hours as needed for Pain for up to 30 days.      Dispense:  90 tablet     Refill:  0     Reduce doses taken as pain becomes manageable    orphenadrine (NORFLEX) 100 MG extended release tablet     Sig: take 1 tablet by mouth twice a day     Dispense:  60 tablet Refill:  0    lidocaine (LIDODERM) 5 %     Si hours on, 12 hours off. Dispense:  60 patch     Refill:  2    lidocaine (LIDOPIN) 3 % CREA     Sig: Apply topically to back, abdomen and arms     Dispense:  85 g     Refill:  2       Return in about 11 weeks (around 2022), or if symptoms worsen or fail to improve, for Bilateral L-facet RFA @ L3-4, L4-5, and L5-S1 RIGHT SIDE FIRST. FU after procedure or 11 weeks.                Electronically signed by MIRNA Henry CNP on10/ at 7:51 AM

## 2021-10-27 RX ORDER — DIPHENHYDRAMINE HYDROCHLORIDE 25 MG/1
CAPSULE ORAL
Qty: 60 CAPSULE | Refills: 5 | OUTPATIENT
Start: 2021-10-27

## 2021-11-02 RX ORDER — DIPHENHYDRAMINE HCL 25 MG
25 CAPSULE ORAL EVERY 6 HOURS PRN
Qty: 60 CAPSULE | Refills: 5 | Status: SHIPPED | OUTPATIENT
Start: 2021-11-02 | End: 2022-05-11

## 2021-11-02 NOTE — TELEPHONE ENCOUNTER
This medication refill is regarding a electronic request.  Refill requested by patient. Please notify patient at [phone]     Requested Prescriptions     Pending Prescriptions Disp Refills    diphenhydrAMINE (BENADRYL) 25 MG capsule 60 capsule 5     Sig: Take 1 capsule by mouth every 6 hours as needed for Itching       Date of last visit: 9/9/2021   Date of next visit: 11/17/2021  Date of last refill: 11/18/2020 #60/5  Pharmacy Name: [facility]      Rx verified, ordered and set to EP.

## 2021-11-08 ENCOUNTER — TELEPHONE (OUTPATIENT)
Dept: FAMILY MEDICINE CLINIC | Age: 51
End: 2021-11-08

## 2021-11-08 DIAGNOSIS — R51.9 WORSENING HEADACHES: Primary | ICD-10-CM

## 2021-11-08 NOTE — TELEPHONE ENCOUNTER
New MRI order placed and [facility] will call pt to schedule. Please let pt know that the ER will not do the MRI unless they feel its necessary. OK to be seen in the ER, but most likely they will not do an MRI.  Thanks -WS    Orders Placed This Encounter   Procedures    MRI BRAIN WO CONTRAST     Standing Status:   Future     Standing Expiration Date:   11/8/2022     Order Specific Question:   Reason for exam:     Answer:   worsening headaches

## 2021-11-08 NOTE — TELEPHONE ENCOUNTER
Pt is having worsening migraines. He has been having daily headaches, but Saturday is when they got really severe. Pt has not been sleeping much due to them. He woke up yesterday with his eye being bloodshot red. He wants to know if he can get a order to go to the ED to completed a MRI today. Please advise.

## 2021-11-10 ENCOUNTER — TELEPHONE (OUTPATIENT)
Dept: PHYSICAL MEDICINE AND REHAB | Age: 51
End: 2021-11-10

## 2021-11-10 NOTE — TELEPHONE ENCOUNTER
Called pt regarding scheduling procedure. He will call back to schedule and his Insurance will be changing . Pt called backed he wants to wait til Jan, 2022 for his procedure.   He will call back

## 2021-11-12 ENCOUNTER — HOSPITAL ENCOUNTER (OUTPATIENT)
Dept: MRI IMAGING | Age: 51
Discharge: HOME | End: 2021-11-12
Payer: MEDICAID

## 2021-11-12 DIAGNOSIS — R51.9 WORSENING HEADACHES: ICD-10-CM

## 2021-11-12 PROCEDURE — 70551 MRI BRAIN STEM W/O DYE: CPT

## 2021-11-16 ENCOUNTER — TELEPHONE (OUTPATIENT)
Dept: FAMILY MEDICINE CLINIC | Age: 51
End: 2021-11-16

## 2021-11-16 DIAGNOSIS — R51.9 WORSENING HEADACHES: Primary | ICD-10-CM

## 2021-11-16 DIAGNOSIS — M54.10 RADICULOPATHY AFFECTING UPPER EXTREMITY: ICD-10-CM

## 2021-11-16 DIAGNOSIS — G89.4 CHRONIC PAIN SYNDROME: ICD-10-CM

## 2021-11-16 DIAGNOSIS — M47.816 LUMBAR SPONDYLOSIS: ICD-10-CM

## 2021-11-16 DIAGNOSIS — M62.838 SPASM OF MUSCLE: ICD-10-CM

## 2021-11-16 DIAGNOSIS — R51.9 DAILY HEADACHE: ICD-10-CM

## 2021-11-16 RX ORDER — ORPHENADRINE CITRATE 100 MG/1
100 TABLET, EXTENDED RELEASE ORAL 2 TIMES DAILY
Qty: 60 TABLET | Refills: 0 | Status: SHIPPED | OUTPATIENT
Start: 2021-11-20 | End: 2022-01-04

## 2021-11-16 RX ORDER — LIDOCAINE 50 MG/G
1 PATCH TOPICAL EVERY 12 HOURS
Qty: 60 PATCH | Refills: 2 | Status: SHIPPED | OUTPATIENT
Start: 2021-11-20 | End: 2022-01-06 | Stop reason: SDUPTHER

## 2021-11-16 RX ORDER — LIDOCAINE 50 MG/G
85 OINTMENT TOPICAL DAILY PRN
Qty: 85 G | Refills: 2 | Status: SHIPPED | OUTPATIENT
Start: 2021-11-20 | End: 2022-01-06 | Stop reason: SDUPTHER

## 2021-11-16 RX ORDER — HYDROCODONE BITARTRATE AND ACETAMINOPHEN 5; 325 MG/1; MG/1
1 TABLET ORAL EVERY 8 HOURS PRN
Qty: 90 TABLET | Refills: 0 | Status: SHIPPED | OUTPATIENT
Start: 2021-11-20 | End: 2021-12-20 | Stop reason: SDUPTHER

## 2021-11-16 NOTE — TELEPHONE ENCOUNTER
----- Message from MIRNA Ross CNP sent at 11/15/2021  7:06 AM EST -----  Please let pt know that his MRI showed 6 mm hyperintense area in his pituitary gland, its recommended that he have an MRI of the pituitary with contrast for further evaluation. OK for Order and to schedule if pt is willing. No other acute findings on MRI.  -WS

## 2021-11-16 NOTE — TELEPHONE ENCOUNTER
Mukund Jeter called requesting a refill on the following medications:  Requested Prescriptions     Pending Prescriptions Disp Refills    HYDROcodone-acetaminophen (NORCO) 5-325 MG per tablet 90 tablet 0     Sig: Take 1 tablet by mouth every 8 hours as needed for Pain for up to 30 days.  lidocaine (LIDODERM) 5 % 60 patch 2     Si hours on, 12 hours off.     orphenadrine (NORFLEX) 100 MG extended release tablet 60 tablet 0     Sig: take 1 tablet by mouth twice a day    lidocaine (XYLOCAINE) 5 % ointment       Sig: APPLY TOPICALLY TO AFFECTED AREAS OF LOWER BACK AND NECK if needed     Pharmacy verified:  .pv  [id number] [place], [place] - [address] [place] [address]    Date of last visit: 10/21/2021  Date of next visit (if applicable): 4903

## 2021-11-17 ENCOUNTER — OFFICE VISIT (OUTPATIENT)
Dept: FAMILY MEDICINE CLINIC | Age: 51
End: 2021-11-17
Payer: MEDICAID

## 2021-11-17 VITALS
SYSTOLIC BLOOD PRESSURE: 132 MMHG | RESPIRATION RATE: 20 BRPM | HEART RATE: 64 BPM | DIASTOLIC BLOOD PRESSURE: 76 MMHG | WEIGHT: 315 LBS | TEMPERATURE: 98.1 F | BODY MASS INDEX: 56.49 KG/M2

## 2021-11-17 DIAGNOSIS — J20.9 BRONCHITIS WITH BRONCHOSPASM: ICD-10-CM

## 2021-11-17 DIAGNOSIS — J30.1 NON-SEASONAL ALLERGIC RHINITIS DUE TO POLLEN: ICD-10-CM

## 2021-11-17 DIAGNOSIS — J45.50 SEVERE PERSISTENT ASTHMA, UNSPECIFIED WHETHER COMPLICATED: ICD-10-CM

## 2021-11-17 DIAGNOSIS — Z86.79 H/O: HTN (HYPERTENSION): Primary | ICD-10-CM

## 2021-11-17 DIAGNOSIS — E66.01 MORBID OBESITY WITH BMI OF 50.0-59.9, ADULT (HCC): ICD-10-CM

## 2021-11-17 DIAGNOSIS — F33.2 SEVERE EPISODE OF RECURRENT MAJOR DEPRESSIVE DISORDER, WITHOUT PSYCHOTIC FEATURES (HCC): ICD-10-CM

## 2021-11-17 PROCEDURE — G8427 DOCREV CUR MEDS BY ELIG CLIN: HCPCS | Performed by: NURSE PRACTITIONER

## 2021-11-17 PROCEDURE — G8484 FLU IMMUNIZE NO ADMIN: HCPCS | Performed by: NURSE PRACTITIONER

## 2021-11-17 PROCEDURE — 99214 OFFICE O/P EST MOD 30 MIN: CPT | Performed by: NURSE PRACTITIONER

## 2021-11-17 PROCEDURE — 1036F TOBACCO NON-USER: CPT | Performed by: NURSE PRACTITIONER

## 2021-11-17 PROCEDURE — G8417 CALC BMI ABV UP PARAM F/U: HCPCS | Performed by: NURSE PRACTITIONER

## 2021-11-17 RX ORDER — AZITHROMYCIN 250 MG/1
250 TABLET, FILM COATED ORAL SEE ADMIN INSTRUCTIONS
Qty: 6 TABLET | Refills: 0 | Status: SHIPPED | OUTPATIENT
Start: 2021-11-17 | End: 2021-11-22

## 2021-11-17 RX ORDER — DEXTROMETHORPHAN HYDROBROMIDE AND PROMETHAZINE HYDROCHLORIDE 15; 6.25 MG/5ML; MG/5ML
5 SYRUP ORAL 4 TIMES DAILY PRN
Qty: 118 ML | Refills: 0 | Status: SHIPPED | OUTPATIENT
Start: 2021-11-17 | End: 2021-11-24

## 2021-11-17 RX ORDER — MONTELUKAST SODIUM 10 MG/1
10 TABLET ORAL NIGHTLY
Qty: 90 TABLET | Refills: 3 | Status: SHIPPED | OUTPATIENT
Start: 2021-11-17 | End: 2022-10-12

## 2021-11-17 RX ORDER — FLUTICASONE PROPIONATE 50 MCG
2 SPRAY, SUSPENSION (ML) NASAL DAILY
Qty: 3 EACH | Refills: 3 | Status: SHIPPED | OUTPATIENT
Start: 2021-11-17 | End: 2022-10-18

## 2021-11-17 ASSESSMENT — ENCOUNTER SYMPTOMS
NAUSEA: 0
ABDOMINAL PAIN: 0
FACIAL SWELLING: 0
BACK PAIN: 0
SINUS PAIN: 1
VOMITING: 0
HEMOPTYSIS: 0
COLOR CHANGE: 0
HEARTBURN: 0
TROUBLE SWALLOWING: 0
SORE THROAT: 0
SINUS PRESSURE: 1
COUGH: 1
WHEEZING: 1
RHINORRHEA: 1
DIARRHEA: 0
EYE PAIN: 0
SHORTNESS OF BREATH: 0

## 2021-11-17 ASSESSMENT — SOCIAL DETERMINANTS OF HEALTH (SDOH): HOW HARD IS IT FOR YOU TO PAY FOR THE VERY BASICS LIKE FOOD, HOUSING, MEDICAL CARE, AND HEATING?: NOT HARD AT ALL

## 2021-11-17 NOTE — PROGRESS NOTES
[facility]  [address] [facility] [address]  Dept: [phone]  Dept Fax: [phone]  Loc: [phone]     2021     Veda Patiño (:  1970) is a 54 y.o. male, here for evaluation of the following medical concerns:    Chief Complaint   Patient presents with    6 Month Follow-Up     no concerns       Pt presents to the office today for 6 month follow up on chronic conditions. Pt also has a cough and some draining for the past week that is not improcing. Pt has a HX of asthma and allergies. Cough  This is a new problem. Episode onset: 1 week. The problem has been gradually worsening. The problem occurs every few minutes. The cough is productive of sputum. Associated symptoms include nasal congestion, postnasal drip, rhinorrhea and wheezing. Pertinent negatives include no chest pain, chills, ear congestion, ear pain, fever, headaches, heartburn, hemoptysis, myalgias, rash, sore throat, shortness of breath, sweats or weight loss. He has tried rest, OTC cough suppressant, prescription cough suppressant, cool air and a beta-agonist inhaler for the symptoms. The treatment provided mild relief. His past medical history is significant for asthma and environmental allergies. There is no history of bronchiectasis, bronchitis, COPD or emphysema. Treatment Adherence:   Medication compliance:  compliant all of the time  Diet compliance:  compliant most of the time  Weight trend: stable  Current exercise: walks 5 time(s) per week  Barriers: none      Hypertension:  Home blood pressure monitoring: No.  He is adherent to a low sodium diet. Patient denies chest pain, shortness of breath, headache and lightheadedness. Antihypertensive medication side effects: no medication side effects noted. Use of agents associated with hypertension: none. Hyperlipidemia:  No new myalgias or GI upset on rosuvastatin (Crestor).        No results found for: LABA1C  Lab Results   Component Value Date    CREATININE 0.9 09/09/2021     Lab Results   Component Value Date    ALT 25 09/09/2021    AST 23 09/09/2021     Lab Results   Component Value Date    CHOL 188 09/26/2019    TRIG 97 09/26/2019    HDL 39 09/26/2019    LDLCALC 130 09/26/2019        Patient Active Problem List   Diagnosis    Cervical stenosis of spinal canal    Lower back pain    Disease [facility] spinal cord [facility] [address]    Morbid obesity with BMI of 50.0-59.9, adult ([facility]    Other cervical disc displacement, unspecified cervical region    Radiculopathy affecting upper extremity    S/P cervical spinal fusion    Sleep apnea    Hyperlipidemia    H/O: HTN (hypertension)    Moderate asthma without complication    Enlarged heart    Chronic pansinusitis    Pain in both lower extremities    Non-seasonal allergic rhinitis due to pollen    Allergy desensitization therapy    Lumbar spondylosis    Severe episode of recurrent major depressive disorder, without psychotic features ([facility] [address]       Review of Systems   Constitutional: Negative for chills, fatigue, fever and weight loss. HENT: Positive for congestion, postnasal drip, rhinorrhea, sinus pressure and sinus pain. Negative for ear pain, facial swelling, sore throat and trouble swallowing. Eyes: Negative for pain and visual disturbance. Respiratory: Positive for cough and wheezing. Negative for hemoptysis and shortness of breath. Cardiovascular: Negative for chest pain, palpitations and leg swelling. Gastrointestinal: Negative for abdominal pain, diarrhea, heartburn, nausea and vomiting. Genitourinary: Negative for difficulty urinating, dysuria and urgency. Musculoskeletal: Negative for back pain, gait problem, myalgias and neck pain. Skin: Negative for color change and rash. Allergic/Immunologic: Positive for environmental allergies. Neurological: Negative for dizziness, weakness and headaches.    Psychiatric/Behavioral: Negative for agitation and sleep disturbance. The patient is not nervous/anxious. Prior to Visit Medications    Medication Sig Taking? Authorizing Provider   azithromycin (ZITHROMAX) 250 MG tablet Take 1 tablet by mouth See Admin Instructions for 5 days 500mg on day 1 followed by 250mg on days 2 - 5 Yes Dana Priestly, APRN - CNP   promethazine-dextromethorphan (PROMETHAZINE-DM) 6.25-15 MG/5ML syrup Take 5 mLs by mouth 4 times daily as needed for Cough Yes Dana Priestly, APRN - CNP   montelukast (SINGULAIR) 10 MG tablet Take 1 tablet by mouth nightly Yes Dana Priestly, APRMATTHEW - CNP   fluticasone (FLONASE) 50 MCG/ACT nasal spray 2 sprays by Each Nostril route daily Yes Dana Priestly, MIRNA - CNP   HYDROcodone-acetaminophen (NORCO) 5-325 MG per tablet Take 1 tablet by mouth every 8 hours as needed for Pain for up to 30 days. MIRNA Herman CNP   lidocaine (LIDODERM) 5 % Place 1 patch onto the skin every 12 hours 12 hours on, 12 hours off.   MIRNA Herman CNP   orphenadrine (NORFLEX) 100 MG extended release tablet Take 1 tablet by mouth 2 times daily take 1 tablet by mouth twice a day  MIRNA East CNP   lidocaine (XYLOCAINE) 5 % ointment Apply 85 g topically daily as needed for Pain APPLY TOPICALLY TO AFFECTED AREAS OF LOWER BACK AND NECK if needed  MIRNA Herman CNP   diphenhydrAMINE (BENADRYL) 25 MG capsule Take 1 capsule by mouth every 6 hours as needed for Itching  Dana PriestlyMIRNA - CNP   lidocaine (LIDOPIN) 3 % CREA Apply topically to back, abdomen and arms  MIRNA Galdamez CNP   amoxicillin (AMOXIL) 500 MG capsule take 1 capsule by mouth four times a day  Historical Provider, MD   betamethasone valerate (VALISONE) 0.1 % cream apply to affected area twice a day  Dana Priestly, MIRNA - CNP   rosuvastatin (CRESTOR) 20 MG tablet take 1 tablet by mouth once daily  Dana PriestlyMIRNA - CNP   omeprazole (PRILOSEC) 20 MG delayed release capsule take 1 capsule by mouth once daily MIRNA Ellsworth CNP   albuterol sulfate  (90 Base) MCG/ACT inhaler Inhale 2 puffs into the lungs 4 times daily as needed for Wheezing  MIRNA Ellsworth CNP   albuterol (PROVENTIL) (2.5 MG/3ML) 0.083% nebulizer solution Take 3 mLs by nebulization every 4 hours as needed for Wheezing  MIRNA Ellsworth CNP   azelastine (OPTIVAR) 0.05 % ophthalmic solution Place 1 drop into both eyes 2 times daily  MIRNA Ellsworth CNP   butalbital-acetaminophen-caffeine (FIORICET, ESGIC) -40 MG per tablet take 1 tablet by mouth every 4 hours if needed for headache  MIRNA Ellsworth CNP   FEROSUL 325 (65 Fe) MG tablet take 1 tablet by mouth once daily  Tyrel MIRNA Shelton CNP   amitriptyline (ELAVIL) 50 MG tablet take 1 tablet by mouth at bedtime  MIRNA Ellsworth CNP   hydrocortisone valerate (WESTCORT) 0.2 % ointment Apply topically daily. MIRNA Ellsworth CNP   EPINEPHrine (EPIPEN 2-RAGHAV) 0.3 MG/0.3ML SOAJ injection Inject one pen as directed STAT for allergic reaction, may disp generic NDC 00472-341-19  MIRNA Lauren CNP   RA LORATADINE 10 MG tablet take 1 tablet by mouth once daily  Historical Provider, MD   tiotropium (SPIRIVA RESPIMAT) 1.25 MCG/ACT AERS inhaler Inhale 2 puffs into the lungs daily  MIRNA Lauren CNP   budesonide-formoterol (SYMBICORT) 160-4.5 MCG/ACT AERO 2 puffs inhaled twice daily. Rinse mouth well after use.   MIRNA Lauren CNP   cetirizine (ZYRTEC) 10 MG tablet Take 1 tablet by mouth daily  MIRNA Lauren CNP   bumetanide (BUMEX) 1 MG tablet Take 0.5 tablets by mouth daily  MIRNA Corbin CNP   NARCAN 4 MG/0.1ML LIQD nasal spray   Historical Provider, MD   Multiple Vitamins-Minerals (MULTIVITAMIN ADULTS) TABS Take 1 tablet by mouth daily  Sherill Hoar, APRN - CNP   ergocalciferol (ERGOCALCIFEROL) 44783 units capsule Take 50,000 Units by mouth as needed   Historical Provider, MD        Social History     Tobacco Use    auricular or occipital adenopathy. Left side of head: No submental, submandibular, tonsillar, preauricular, posterior auricular or occipital adenopathy. Cervical: No cervical adenopathy. Skin:     General: Skin is warm and dry. Findings: No rash. Neurological:      General: No focal deficit present. Mental Status: He is alert and oriented to person, place, and time. Coordination: Coordination normal.   Psychiatric:         Mood and Affect: Mood normal.         Behavior: Behavior normal.         Thought Content: Thought content normal.         Judgment: Judgment normal.         ASSESSMENT/PLAN:  1. H/O: HTN (hypertension)    2. Morbid obesity with BMI of 50.0-59.9, adult ([facility] [address]    3. Severe episode of recurrent major depressive disorder, without psychotic features ([facility] [address]    4. Severe persistent asthma, unspecified whether complicated    5. Bronchitis with bronchospasm  - azithromycin (ZITHROMAX) 250 MG tablet; Take 1 tablet by mouth See Admin Instructions for 5 days 500mg on day 1 followed by 250mg on days 2 - 5  Dispense: 6 tablet; Refill: 0  - promethazine-dextromethorphan (PROMETHAZINE-DM) 6.25-15 MG/5ML syrup; Take 5 mLs by mouth 4 times daily as needed for Cough  Dispense: 118 mL; Refill: 0    6. Non-seasonal allergic rhinitis due to pollen  - montelukast (SINGULAIR) 10 MG tablet; Take 1 tablet by mouth nightly  Dispense: 90 tablet; Refill: 3  - fluticasone (FLONASE) 50 MCG/ACT nasal spray; 2 sprays by Each Nostril route daily  Dispense: 3 each; Refill: 3    - Rest and increase fluids  - Continue current medications  - Follow up with specialities as planned. Return in about 6 months (around 5/17/2022), or if symptoms worsen or fail to improve, for Routine follow up. Patient given educational materials - see patient instructions. Discussed use, benefit, and side effects of prescribed medications. All patient questions answered. Pt voiced understanding.  Reviewed health maintenance. An electronic signature was used to authenticate this note.     --London Haas, APRN - CNP on 11/17/2021 at 1:38 PM

## 2021-11-17 NOTE — PATIENT INSTRUCTIONS
Patient Education        Allergies: Care Instructions  Overview     Allergies occur when your body's defense system (immune system) overreacts to certain substances. The immune system treats a harmless substance as if it were a harmful germ or virus. Many things can make this happen. These include pollens, medicine, food, dust, animal dander, and mold. Allergies can be mild or severe. Mild allergies can be managed with home treatment. But medicine may be needed to prevent problems. Managing your allergies is an important part of staying healthy. Your doctor may suggest that you have allergy testing to help find out what is causing your allergies. Severe allergies can cause reactions that affect your whole body (anaphylactic reactions). Your doctor may prescribe a shot of epinephrine to carry with you in case you have a severe reaction. Learn how to give yourself the shot and keep it with you at all times. Make sure it is not . Follow-up care is a key part of your treatment and safety. Be sure to make and go to all appointments, and call your doctor if you are having problems. It's also a good idea to know your test results and keep a list of the medicines you take. How can you care for yourself at home? · If you have been told by your doctor that dust or dust mites are causing your allergy, decrease the dust around your bed:  ? Wash sheets, pillowcases, and other bedding in hot water every week. ? Use dust-proof covers for pillows, duvets, and mattresses. Avoid plastic covers because they tear easily and do not \"breathe. \" Wash as instructed on the label. ? Do not use any blankets and pillows that you do not need. ? Use blankets that you can wash in your washing machine. ? Consider removing drapes and carpets, which attract and hold dust, from your bedroom. · If you are allergic to house dust and mites, do not use home humidifiers.  Your doctor can suggest ways you can control dust and mites.  · Look for signs of cockroaches. Cockroaches cause allergic reactions. Use cockroach baits to get rid of them. Then, clean your home well. Cockroaches like areas where grocery bags, newspapers, empty bottles, or cardboard boxes are stored. Do not keep these inside your home, and keep trash and food containers sealed. Seal off any spots where cockroaches might enter your home. · If you are allergic to mold, get rid of furniture, rugs, and drapes that smell musty. Check for mold in the bathroom. · If you are allergic to outdoor pollen or mold spores, use air-conditioning. Change or clean all filters every month. Keep windows closed. · If you are allergic to pollen, stay inside when pollen counts are high. Use a vacuum  with a HEPA filter or a double-thickness filter at least two times each week. · Stay inside when air pollution is bad. Avoid paint fumes, perfumes, and other strong odors. · Avoid conditions that make your allergies worse. Stay away from smoke. Do not smoke or let anyone else smoke in your house. Do not use fireplaces or wood-burning stoves. · If you are allergic to your pets, change the air filter in your furnace every month. Use high-efficiency filters. · If you are allergic to pet dander, keep pets outside or out of your bedroom. Old carpet and cloth furniture can hold a lot of animal dander. You may need to replace them. When should you call for help? Give an epinephrine shot if:    · You think you are having a severe allergic reaction.     · You have symptoms in more than one body area, such as mild nausea and an itchy mouth. After giving an epinephrine shot call 911, even if you feel better. Call 911 if:    · You have symptoms of a severe allergic reaction. These may include:  ? Sudden raised, red areas (hives) all over your body. ? Swelling of the throat, mouth, lips, or tongue. ? Trouble breathing. ? Passing out (losing consciousness).  Or you may feel very underweight range, you may be at increased risk for health problems such as fatigue, lower protection (immunity) against illness, muscle loss, bone loss, hair loss, and hormone problems. BMI is just one measure of your risk for weight-related health problems. You may be at higher risk for health problems if you are not active, you eat an unhealthy diet, or you drink too much alcohol or use tobacco products. Follow-up care is a key part of your treatment and safety. Be sure to make and go to all appointments, and call your doctor if you are having problems. It's also a good idea to know your test results and keep a list of the medicines you take. How can you care for yourself at home? · Practice healthy eating habits. This includes eating plenty of fruits, vegetables, whole grains, lean protein, and low-fat dairy. · If your doctor recommends it, get more exercise. Walking is a good choice. Bit by bit, increase the amount you walk every day. Try for at least 30 minutes on most days of the week. · Do not smoke. Smoking can increase your risk for health problems. If you need help quitting, talk to your doctor about stop-smoking programs and medicines. These can increase your chances of quitting for good. · Limit alcohol to 2 drinks a day for men and 1 drink a day for women. Too much alcohol can cause health problems. If you have a BMI higher than 25  · Your doctor may do other tests to check your risk for weight-related health problems. This may include measuring the distance around your waist. A waist measurement of more than 40 inches in men or 35 inches in women can increase the risk of weight-related health problems. · Talk with your doctor about steps you can take to stay healthy or improve your health. You may need to make lifestyle changes to lose weight and stay healthy, such as changing your diet and getting regular exercise.   If you have a BMI lower than 18.5  · Your doctor may do other tests to check your risk for health problems. · Talk with your doctor about steps you can take to stay healthy or improve your health. You may need to make lifestyle changes to gain or maintain weight and stay healthy, such as getting more healthy foods in your diet and doing exercises to build muscle. Where can you learn more? Go to [email or web address] and sign in to your [employer] account. Enter S176 in the Muzy box to learn more about \"Body Mass Index: Care Instructions. \"     If you do not have an account, please click on the \"Sign Up Now\" link. Current as of: March 17, 2021               Content Version: 13.0  © 2006-2021 Healthwise[employer]. Care instructions adapted under license by [facility] ([facility]). If you have questions about a medical condition or this instruction, always ask your healthcare professional. [place] [address] any warranty or liability for your use of this information.

## 2021-11-21 RX ORDER — EPINEPHRINE 0.3 MG/.3ML
INJECTION SUBCUTANEOUS
Qty: 2 EACH | OUTPATIENT
Start: 2021-11-21

## 2021-11-22 DIAGNOSIS — E78.5 HYPERLIPIDEMIA, UNSPECIFIED HYPERLIPIDEMIA TYPE: ICD-10-CM

## 2021-11-22 RX ORDER — ROSUVASTATIN CALCIUM 20 MG/1
TABLET, COATED ORAL
Qty: 90 TABLET | Refills: 0 | Status: SHIPPED | OUTPATIENT
Start: 2021-11-22 | End: 2022-02-28

## 2021-11-22 RX ORDER — OMEPRAZOLE 20 MG/1
CAPSULE, DELAYED RELEASE ORAL
Qty: 90 CAPSULE | Refills: 0 | Status: SHIPPED | OUTPATIENT
Start: 2021-11-22 | End: 2022-02-28

## 2021-11-22 NOTE — TELEPHONE ENCOUNTER
This medication refill is regarding a electronic request.  Refill requested by [employer]. Requested Prescriptions     Pending Prescriptions Disp Refills    rosuvastatin (CRESTOR) 20 MG tablet [Pharmacy Med Name: ROSUVASTATIN CALCIUM 20 MG TAB] 90 tablet 0     Sig: take 1 tablet by mouth once daily    omeprazole (PRILOSEC) 20 MG delayed release capsule [Pharmacy Med Name: OMEPRAZOLE DR 20 MG CAPSULE] 90 capsule 0     Sig: take 1 capsule by mouth once daily     Date of last visit: 11/17/2021   Date of next visit: 5/18/2022  Date of last refill:    -Rosuvastatin 8/27/2021 for #90/0   -Omeprazole 8/19/2021 for #90/0    Last Lipid Panel:    Lab Results   Component Value Date    CHOL 188 09/26/2019    TRIG 97 09/26/2019    HDL 39 09/26/2019    LDLCALC 130 09/26/2019     Last CMP:   Lab Results   Component Value Date     09/09/2021    K 4.4 09/09/2021     09/09/2021    CO2 26 09/09/2021    BUN 13 09/09/2021    CREATININE 0.9 09/09/2021    GLUCOSE 98 09/09/2021    CALCIUM 9.7 09/09/2021    PROT 7.1 09/09/2021    LABALBU 4.1 09/09/2021    BILITOT 0.2 (L) 09/09/2021    ALKPHOS 136 (H) 09/09/2021    AST 23 09/09/2021    ALT 25 09/09/2021    LABGLOM 90 09/09/2021     Rx verified, ordered and set to EP.

## 2021-11-24 NOTE — PROGRESS NOTES
Electronic Prior Authorization not supported. Submit via other methods. Case ID: [id number]      Payer:  [address]   Please advise the dispensing pharmacy to [address] [place] [address] for assistance.

## 2021-11-28 RX ORDER — BUDESONIDE AND FORMOTEROL FUMARATE DIHYDRATE 160; 4.5 UG/1; UG/1
AEROSOL RESPIRATORY (INHALATION)
Qty: 10.2 G | OUTPATIENT
Start: 2021-11-28

## 2021-11-29 RX ORDER — LORATADINE 10 MG/1
TABLET ORAL
Qty: 90 TABLET | Refills: 3 | Status: SHIPPED | OUTPATIENT
Start: 2021-11-29

## 2021-11-29 NOTE — TELEPHONE ENCOUNTER
Request sent from [facility] for refill of loratadine 10 mg qd. Last seen 11/17/21, next appt 5/18/22. Med verified. Order pended.

## 2021-12-01 DIAGNOSIS — Z98.1 S/P CERVICAL SPINAL FUSION: ICD-10-CM

## 2021-12-02 ENCOUNTER — HOSPITAL ENCOUNTER (OUTPATIENT)
Dept: MRI IMAGING | Age: 51
Discharge: HOME | End: 2021-12-02
Payer: MEDICAID

## 2021-12-02 DIAGNOSIS — R51.9 DAILY HEADACHE: ICD-10-CM

## 2021-12-02 DIAGNOSIS — R51.9 WORSENING HEADACHES: ICD-10-CM

## 2021-12-02 PROCEDURE — 6360000004 HC RX CONTRAST MEDICATION: Performed by: NURSE PRACTITIONER

## 2021-12-02 PROCEDURE — 70552 MRI BRAIN STEM W/DYE: CPT

## 2021-12-02 PROCEDURE — A9579 GAD-BASE MR CONTRAST NOS,1ML: HCPCS | Performed by: NURSE PRACTITIONER

## 2021-12-02 RX ORDER — BUTALBITAL, ACETAMINOPHEN AND CAFFEINE 50; 325; 40 MG/1; MG/1; MG/1
TABLET ORAL
Qty: 90 TABLET | Refills: 1 | Status: SHIPPED | OUTPATIENT
Start: 2021-12-02 | End: 2022-01-10

## 2021-12-02 RX ADMIN — GADOTERIDOL 20 ML: 279.3 INJECTION, SOLUTION INTRAVENOUS at 07:14

## 2021-12-02 NOTE — TELEPHONE ENCOUNTER
This medication refill is regarding a electronic request.  Refill requested by [facility]. Requested Prescriptions     Pending Prescriptions Disp Refills    butalbital-acetaminophen-caffeine (FIORICET, ESGIC) -40 MG per tablet [Pharmacy Med Name: [id number] -40] 90 tablet 1     Sig: take 1 tablet by mouth every 4 hours if needed for headache     Date of last visit: 11/17/2021   Date of next visit: 5/18/2022  Date of last refill: 8/17/21 #90/1    Rx verified, ordered and set to EP.

## 2021-12-03 ENCOUNTER — HOSPITAL ENCOUNTER (EMERGENCY)
Age: 51
Discharge: HOME | End: 2021-12-03
Payer: MEDICAID

## 2021-12-03 ENCOUNTER — TELEPHONE (OUTPATIENT)
Dept: FAMILY MEDICINE CLINIC | Age: 51
End: 2021-12-03

## 2021-12-03 VITALS
DIASTOLIC BLOOD PRESSURE: 85 MMHG | OXYGEN SATURATION: 97 % | RESPIRATION RATE: 16 BRPM | HEART RATE: 87 BPM | SYSTOLIC BLOOD PRESSURE: 160 MMHG | TEMPERATURE: 98.1 F

## 2021-12-03 DIAGNOSIS — K21.9 GASTROESOPHAGEAL REFLUX DISEASE, UNSPECIFIED WHETHER ESOPHAGITIS PRESENT: ICD-10-CM

## 2021-12-03 DIAGNOSIS — Z20.822 CLOSE EXPOSURE TO COVID-19 VIRUS: Primary | ICD-10-CM

## 2021-12-03 DIAGNOSIS — D35.2 PITUITARY MICROADENOMA (HCC): Primary | ICD-10-CM

## 2021-12-03 LAB — SARS-COV-2, NAAT: NOT  DETECTED

## 2021-12-03 PROCEDURE — 99281 EMR DPT VST MAYX REQ PHY/QHP: CPT

## 2021-12-03 PROCEDURE — 87635 SARS-COV-2 COVID-19 AMP PRB: CPT

## 2021-12-03 ASSESSMENT — ENCOUNTER SYMPTOMS
ABDOMINAL DISTENTION: 0
WHEEZING: 0
RHINORRHEA: 0
ABDOMINAL PAIN: 1
NAUSEA: 0
DIARRHEA: 0
CONSTIPATION: 0
VOMITING: 0
SINUS PRESSURE: 0
COUGH: 0
BLOOD IN STOOL: 0
EYE PAIN: 0
SHORTNESS OF BREATH: 0

## 2021-12-03 NOTE — ED PROVIDER NOTES
[address] [facility] [address] [address] EMERGENCY DEPT  EMERGENCY MEDICINE     Pt Name: Jennifer Brenner  MRN: [id number]  [place] 1970  Date of evaluation: 12/3/2021  PCP:    MIRNA Pretty CNP  Provider: MIRNA Segura CNP    CHIEF COMPLAINT       Chief Complaint   Patient presents with   [facility] Testing           HISTORY OF PRESENT ILLNESS    Minerva Aviles is a 65-year-old male patient that presents to ER with concern for COVID-19. He states for the last couple days he has had some \"abdominal cramps\" that he thought might have been gas. He states that he took some Prilosec over-the-counter for and it is better now. He denies any cough, shortness of breath, chest pain, nausea, vomiting, diarrhea or fever. Plan COVID-19 testing and treat accordingly. Triage notes and Nursing notes were reviewed by myself. Any discrepancies are addressed above.     PAST MEDICAL HISTORY     Past Medical History:   Diagnosis Date    Aneurysm ([address]     pituitary gland    Asthma     Cardiomegaly     Fibromyalgia     CLARIBEL on CPAP        SURGICAL HISTORY       Past Surgical History:   Procedure Laterality Date    BACK SURGERY      KNEE SURGERY      lt    PAIN MANAGEMENT PROCEDURE Bilateral 2/11/2021    Bilateral L-facet MBB # 1 @ L3-4, L4-5, and L5-S1 performed by Ricardo Patel MD at [facility] Bilateral 4/8/2021    Bilateral L-facet MBB # 2 @ L3-4, L4-5, and L5-S1 performed by Ricardo Patel MD at [facility] [facility] [address]       Discharge Medication List as of 12/3/2021  6:13 PM      CONTINUE these medications which have NOT CHANGED    Details   loratadine (CLARITIN) 10 MG tablet take 1 tablet by mouth once daily, Disp-90 tablet, R-3Normal      butalbital-acetaminophen-caffeine (FIORICET, ESGIC) -40 MG per tablet take 1 tablet by mouth every 4 hours if needed for headache, Disp-90 tablet, R-1Normal      rosuvastatin (CRESTOR) 20 MG tablet take 1 tablet by mouth once daily, Disp-90 tablet, R-0Normal      omeprazole (PRILOSEC) 20 MG delayed release capsule take 1 capsule by mouth once daily, Disp-90 capsule, R-0Normal      montelukast (SINGULAIR) 10 MG tablet Take 1 tablet by mouth nightly, Disp-90 tablet, R-3Normal      fluticasone (FLONASE) 50 MCG/ACT nasal spray 2 sprays by Each Nostril route daily, Disp-3 each, R-3Normal      lidocaine (LIDODERM) 5 % Place 1 patch onto the skin every 12 hours 12 hours on, 12 hours off., Disp-60 patch, R-2Normal      orphenadrine (NORFLEX) 100 MG extended release tablet Take 1 tablet by mouth 2 times daily take 1 tablet by mouth twice a day, Disp-60 tablet, R-0Normal      lidocaine (XYLOCAINE) 5 % ointment Apply 85 g topically daily as needed for Pain APPLY TOPICALLY TO AFFECTED AREAS OF LOWER BACK AND NECK if needed, Topical, DAILY PRN Starting Sat 11/20/2021, Until Mon 12/20/2021 at 2359, For 30 days, Disp-85 g, R-2, Normal      HYDROcodone-acetaminophen (NORCO) 5-325 MG per tablet Take 1 tablet by mouth every 8 hours as needed for Pain for up to 30 days. , Disp-90 tablet, R-0Normal      diphenhydrAMINE (BENADRYL) 25 MG capsule Take 1 capsule by mouth every 6 hours as needed for Itching, Disp-60 capsule, R-5Normal      amoxicillin (AMOXIL) 500 MG capsule take 1 capsule by mouth four times a dayHistorical Med      betamethasone valerate (VALISONE) 0.1 % cream apply to affected area twice a day, Disp-45 g, R-3, Normal      albuterol sulfate  (90 Base) MCG/ACT inhaler Inhale 2 puffs into the lungs 4 times daily as needed for Wheezing, Disp-3 Inhaler, R-3Normal      albuterol (PROVENTIL) (2.5 MG/3ML) 0.083% nebulizer solution Take 3 mLs by nebulization every 4 hours as needed for Wheezing, Disp-375 mL, R-3Normal      azelastine (OPTIVAR) 0.05 % ophthalmic solution Place 1 drop into both eyes 2 times daily, Disp-1 Bottle, R-11Normal      FEROSUL 325 (65 Fe) MG tablet take 1 tablet by mouth once daily, Disp-90 tablet, R-3Normal      amitriptyline (ELAVIL) 50 MG tablet take 1 tablet by mouth at bedtime, Disp-90 tablet, R-1Normal      hydrocortisone valerate (WESTCORT) 0.2 % ointment Apply topically daily. , Disp-1 Tube, R-11, Normal      EPINEPHrine (EPIPEN 2-RAGHAV) 0.3 MG/0.3ML SOAJ injection Inject one pen as directed STAT for allergic reaction, may disp generic NDC 50342-378-79, Disp-1 each, R-0Normal      tiotropium (SPIRIVA RESPIMAT) 1.25 MCG/ACT AERS inhaler Inhale 2 puffs into the lungs daily, Disp-1 Inhaler,R-1Normal      budesonide-formoterol (SYMBICORT) 160-4.5 MCG/ACT AERO 2 puffs inhaled twice daily.   Rinse mouth well after use., Disp-1 Inhaler,R-5Normal      cetirizine (ZYRTEC) 10 MG tablet Take 1 tablet by mouth daily, Disp-30 tablet,R-11Normal      bumetanide (BUMEX) 1 MG tablet Take 0.5 tablets by mouth daily, Disp-30 tablet, R-0Print      NARCAN 4 MG/0.1ML LIQD nasal spray DAWHistorical Med      Multiple Vitamins-Minerals (MULTIVITAMIN ADULTS) TABS Take 1 tablet by mouth daily, Disp-90 tablet, R-3Normal      ergocalciferol (ERGOCALCIFEROL) 92624 units capsule Take 50,000 Units by mouth as needed Historical Med             ALLERGIES       Allergies   Allergen Reactions    Dilantin [Phenytoin] Anaphylaxis and Swelling    Dupixent [Dupilumab]      HIVES, THROAT ITCHING    Oxycodone      THROAT TIGHTNESS    Valium [Diazepam]        FAMILY HISTORY       Family History   Problem Relation Age of Onset    High Blood Pressure Mother     Emphysema Mother     Other Mother         she was hit and killed by car    High Blood Pressure Father     Emphysema Father     Asthma Father         SOCIAL HISTORY       Social History     Socioeconomic History    Marital status:      Spouse name: Not on file    Number of children: Not on file    Years of education: Not on file    Highest education level: Not on file   Occupational History    Not on file   Tobacco Use    Smoking status: Never Smoker    Smokeless tobacco: Never Used   Vaping Use    Vaping Use: Never used   Substance and Sexual Activity    Alcohol use: Not Currently    Drug use: No    Sexual activity: Not on file   Other Topics Concern    Not on file   Social History Narrative    Not on file     Social Determinants of Health     Financial Resource Strain: Low Risk     Difficulty of Paying Living Expenses: Not hard at all   Food Insecurity: No Food Insecurity    Worried About [address] in the Last Year: Never true    [address] in the Last Year: Never true   Transportation Needs:     Lack of Transportation (Medical): Not on file    Lack of Transportation (Non-Medical): Not on file   Physical Activity:     Days of Exercise per Week: Not on file    Minutes of Exercise per Session: Not on file   Stress:     Feeling of Stress : Not on file   Social Connections:     Frequency of Communication with Friends and Family: Not on file    Frequency of Social Gatherings with Friends and Family: Not on file    Attends [employer] Services: Not on file    Active Member of [address] [place] [address] or Organizations: Not on file    Attends Club or Organization Meetings: Not on file    Marital Status: Not on file   Intimate Partner Violence:     Fear of Current or Ex-Partner: Not on file    Emotionally Abused: Not on file    Physically Abused: Not on file    Sexually Abused: Not on file   Housing Stability:     Unable to Pay for Housing in the Last Year: Not on file    Number of [place] in the Last Year: Not on file    Unstable Housing in the Last Year: Not on file       REVIEW OF SYSTEMS     Review of Systems   Constitutional: Negative for appetite change, chills, fatigue, fever and unexpected weight change. HENT: Negative for ear pain, rhinorrhea and sinus pressure. Eyes: Negative for pain and visual disturbance. Respiratory: Negative for cough, shortness of breath and wheezing.     Cardiovascular: Negative for chest pain, palpitations and leg swelling. Gastrointestinal: Positive for abdominal pain. Negative for abdominal distention, blood in stool, constipation, diarrhea, nausea and vomiting. Genitourinary: Negative for dysuria, frequency and hematuria. Musculoskeletal: Negative for arthralgias and joint swelling. Skin: Negative for rash. Neurological: Negative for dizziness, syncope, weakness, light-headedness and headaches. Hematological: Does not bruise/bleed easily. Except as noted above the remainder of the review of systems was reviewed and is negative. SCREENINGS                        PHYSICAL EXAM    (up to 7 for level 4, 8 or more for level 5)     ED Triage Vitals   BP Temp Temp Source Pulse Resp SpO2 Height Weight   12/03/21 1559 12/03/21 1559 12/03/21 1559 12/03/21 1559 12/03/21 1558 12/03/21 1558 -- --   (!) 160/85 98.1 °F (36.7 °C) Oral 87 16 97 %         Physical Exam  Vitals and nursing note reviewed. Constitutional:       General: He is not in acute distress. Appearance: He is well-developed. He is not diaphoretic. HENT:      Head: Normocephalic and atraumatic. Nose: Nose normal.      Mouth/Throat:      Mouth: Mucous membranes are moist.   Eyes:      Conjunctiva/sclera: Conjunctivae normal.      Pupils: Pupils are equal, round, and reactive to light. Cardiovascular:      Rate and Rhythm: Normal rate and regular rhythm. Heart sounds: Normal heart sounds. No murmur heard. No gallop. Pulmonary:      Effort: Pulmonary effort is normal. No respiratory distress. Breath sounds: Normal breath sounds. No wheezing or rales. Abdominal:      General: Bowel sounds are normal. There is no distension. Palpations: Abdomen is soft. There is no mass. Tenderness: There is no abdominal tenderness. There is no guarding or rebound. Hernia: No hernia is present. Musculoskeletal:         General: Normal range of motion. Cervical back: Normal range of motion.    Skin:     General: Skin is warm and dry. Neurological:      Mental Status: He is alert and oriented to person, place, and time. DIAGNOSTIC RESULTS     EKG:(none if blank)  All EKGs are interpreted by the Emergency Department Physician who either signs or Co-signs this chart in the absence of a cardiologist.        RADIOLOGY: (none if blank)   I directly visualized the following images and reviewed the radiologist interpretations. Interpretation per the Radiologist below, if available at the time of this note:  No orders to display       LABS:  Labs Reviewed   COVID-19, RAPID       All other labs were within normal range or not returned as of this dictation. Please note, any cultures that may have been sent were not resulted at the time of this patient visit. EMERGENCY DEPARTMENT COURSE and Medical Decision Making:     Vitals:    Vitals:    12/03/21 1558 12/03/21 1559   BP:  (!) 160/85   Pulse:  87   Resp: 16    Temp:  98.1 °F (36.7 °C)   TempSrc:  Oral   SpO2: 97%        PROCEDURES: (None if blank)  Procedures       MDM  Number of Diagnoses or Management Options     Amount and/or Complexity of Data Reviewed  Clinical lab tests: ordered and reviewed    Risk of Complications, Morbidity, and/or Mortality  Presenting problems: minimal  Diagnostic procedures: minimal  Management options: minimal    Patient Progress  Patient progress: stable  Patient presents to the ER with complaint of abdominal cramping for the last few days. He is concerned with exposure to COVID-19. COVID-19 testing was reassuring. Patient will be discharged home with follow-up with primary care provider. Strict return precautions and follow up instructions were discussed with the patient with which the patient agrees    ED Medications administered this visit:  Medications - No data to display      FINAL IMPRESSION      1. Close exposure to COVID-19 virus    2.  Gastroesophageal reflux disease, unspecified whether esophagitis present DISPOSITION/PLAN   DISPOSITION        PATIENT REFERRED TO:  MIRNA Ross CNP  [address]  [facility] [address]  [phone]            DISCHARGE MEDICATIONS:  Discharge Medication List as of 12/3/2021  6:13 PM                 MIRNA Grover CNP (electronically signed)           MIRNA Grover CNP  12/06/21 [address] [facility] Nasim Rhodes CNP  12/30/21 6846

## 2021-12-03 NOTE — TELEPHONE ENCOUNTER
----- Message from MIRNA Mota CNP sent at 12/3/2021  7:50 AM EST -----  Please let pt know that his MRI showed a 6x4 mm lesion. I would like to refer him to Dr Sharmin Gonzalez neurosurgery for further evaluation of this area. OK for referral if pt is willing.  Thanks [employer]

## 2021-12-08 ENCOUNTER — TELEPHONE (OUTPATIENT)
Dept: FAMILY MEDICINE CLINIC | Age: 51
End: 2021-12-08

## 2021-12-08 NOTE — LETTER
[id number] [facility], [address]  Phone: [phone]  Fax: [phone]    December 8, 2021    [address] [place] [id number]    Dear Claudette Han,    Thank you for choosing our [facility] on 12/3/21. Dr. Chema Chicas wanted to make sure that you understand your discharge instructions and that you were able to fill any prescriptions that may have been ordered for you. Please contact the office at the above phone number if you were advised to follow up with Dr. Chema Chicas, or if you have any further questions or needs. Also, did you know -                             [facility]) practices can often offer you an appointment on the same day that you call for acute issues. *We have some [facility] offices that offer Walk-in appointments; check our website for availability in your community, [email or web address] [employer].      *Evisits are now available for patients through [address] [facility]) also offers video visits through [address] If you do not have MyChart and are interested, please contact the office and a staff member may assist you or go to [email or web address].     Sincerely,   MIRNA Costa CNP and your [facility]

## 2021-12-09 ENCOUNTER — HOSPITAL ENCOUNTER (EMERGENCY)
Age: 51
Discharge: HOME | End: 2021-12-09
Payer: MEDICAID

## 2021-12-09 VITALS
OXYGEN SATURATION: 96 % | TEMPERATURE: 97 F | HEIGHT: 73 IN | HEART RATE: 66 BPM | SYSTOLIC BLOOD PRESSURE: 187 MMHG | RESPIRATION RATE: 20 BRPM | DIASTOLIC BLOOD PRESSURE: 85 MMHG | BODY MASS INDEX: 41.75 KG/M2 | WEIGHT: 315 LBS

## 2021-12-09 DIAGNOSIS — R19.7 DIARRHEA, UNSPECIFIED TYPE: Primary | ICD-10-CM

## 2021-12-09 LAB
FLU A ANTIGEN: NEGATIVE
FLU B ANTIGEN: NEGATIVE

## 2021-12-09 PROCEDURE — 99213 OFFICE O/P EST LOW 20 MIN: CPT

## 2021-12-09 PROCEDURE — 87804 INFLUENZA ASSAY W/OPTIC: CPT

## 2021-12-09 PROCEDURE — 99213 OFFICE O/P EST LOW 20 MIN: CPT | Performed by: EMERGENCY MEDICINE

## 2021-12-09 RX ORDER — DICYCLOMINE HYDROCHLORIDE 10 MG/1
10 CAPSULE ORAL 3 TIMES DAILY PRN
Qty: 30 CAPSULE | Refills: 0 | Status: SHIPPED | OUTPATIENT
Start: 2021-12-09

## 2021-12-09 ASSESSMENT — ENCOUNTER SYMPTOMS
ABDOMINAL PAIN: 0
VOMITING: 0
NAUSEA: 1
DIARRHEA: 1
SHORTNESS OF BREATH: 0
ABDOMINAL DISTENTION: 0
COUGH: 1

## 2021-12-09 ASSESSMENT — PAIN DESCRIPTION - PAIN TYPE: TYPE: ACUTE PAIN

## 2021-12-09 ASSESSMENT — PAIN DESCRIPTION - PROGRESSION: CLINICAL_PROGRESSION: NOT CHANGED

## 2021-12-09 ASSESSMENT — PAIN DESCRIPTION - DESCRIPTORS: DESCRIPTORS: ACHING

## 2021-12-09 ASSESSMENT — PAIN DESCRIPTION - ONSET: ONSET: ON-GOING

## 2021-12-09 ASSESSMENT — PAIN SCALES - GENERAL: PAINLEVEL_OUTOF10: 7

## 2021-12-09 ASSESSMENT — PAIN DESCRIPTION - LOCATION: LOCATION: ABDOMEN

## 2021-12-09 ASSESSMENT — PAIN DESCRIPTION - FREQUENCY: FREQUENCY: CONTINUOUS

## 2021-12-09 NOTE — ED TRIAGE NOTES
To room with c/o abdominal cramping, diarrhea, and nausea that started Tuesday. Other people in his family also have similar symptoms.

## 2021-12-09 NOTE — ED PROVIDER NOTES
[facility]  Urgent Care Encounter       CHIEF COMPLAINT       Chief Complaint   Patient presents with    Diarrhea    Abdominal Cramping       Nurses Notes reviewed and I agree except as noted in the HPI. HISTORY OF PRESENT ILLNESS   Pipo Winston is a 54 y.o. male who presents plaints of diarrhea, abdominal cramping, nausea. Symptoms x6 days. Patient states a family member had COVID-23. Patient was in the emergency department for evaluation 6 days ago and was tested negative for COVID-19. Patient states other family members also have the same diarrhea illness. Occasional cough. No chest pain. No shortness of breath. No fevers no blood in the stool. No dysuria, frequency, urgency  HPI    REVIEW OF SYSTEMS     Review of Systems   Constitutional: Negative for chills, diaphoresis, fatigue and fever. Respiratory: Positive for cough. Negative for shortness of breath. Cardiovascular: Negative for chest pain. Gastrointestinal: Positive for diarrhea and nausea. Negative for abdominal distention, abdominal pain and vomiting. Genitourinary: Negative for difficulty urinating, dysuria, frequency and urgency. Psychiatric/Behavioral: Negative for behavioral problems. PAST MEDICAL HISTORY         Diagnosis Date    Aneurysm ([facility] [address]     pituitary gland    Asthma     Cardiomegaly     Fibromyalgia     CLARIBEL on CPAP        SURGICALHISTORY     Patient  has a past surgical history that includes back surgery; knee surgery; Pain management procedure (Bilateral, 2/11/2021); and Pain management procedure (Bilateral, 4/8/2021).     CURRENT MEDICATIONS       Previous Medications    ALBUTEROL (PROVENTIL) (2.5 MG/3ML) 0.083% NEBULIZER SOLUTION    Take 3 mLs by nebulization every 4 hours as needed for Wheezing    ALBUTEROL SULFATE  (90 BASE) MCG/ACT INHALER    Inhale 2 puffs into the lungs 4 times daily as needed for Wheezing    AMITRIPTYLINE (ELAVIL) 50 MG TABLET    take 1 tablet by mouth at bedtime    AZELASTINE (OPTIVAR) 0.05 % OPHTHALMIC SOLUTION    Place 1 drop into both eyes 2 times daily    BETAMETHASONE VALERATE (VALISONE) 0.1 % CREAM    apply to affected area twice a day    BUDESONIDE-FORMOTEROL (SYMBICORT) 160-4.5 MCG/ACT AERO    2 puffs inhaled twice daily. Rinse mouth well after use. BUMETANIDE (BUMEX) 1 MG TABLET    Take 0.5 tablets by mouth daily    BUTALBITAL-ACETAMINOPHEN-CAFFEINE (FIORICET, ESGIC) -40 MG PER TABLET    take 1 tablet by mouth every 4 hours if needed for headache    CETIRIZINE (ZYRTEC) 10 MG TABLET    Take 1 tablet by mouth daily    DIPHENHYDRAMINE (BENADRYL) 25 MG CAPSULE    Take 1 capsule by mouth every 6 hours as needed for Itching    EPINEPHRINE (EPIPEN 2-RAGHAV) 0.3 MG/0.3ML SOAJ INJECTION    Inject one pen as directed STAT for allergic reaction, may disp generic NDC 31361-482-91    ERGOCALCIFEROL (ERGOCALCIFEROL) 18558 UNITS CAPSULE    Take 50,000 Units by mouth as needed     FEROSUL 325 (65 FE) MG TABLET    take 1 tablet by mouth once daily    FLUTICASONE (FLONASE) 50 MCG/ACT NASAL SPRAY    2 sprays by Each Nostril route daily    HYDROCODONE-ACETAMINOPHEN (NORCO) 5-325 MG PER TABLET    Take 1 tablet by mouth every 8 hours as needed for Pain for up to 30 days. HYDROCORTISONE VALERATE (WESTCORT) 0.2 % OINTMENT    Apply topically daily. LIDOCAINE (LIDODERM) 5 %    Place 1 patch onto the skin every 12 hours 12 hours on, 12 hours off.     LIDOCAINE (XYLOCAINE) 5 % OINTMENT    Apply 85 g topically daily as needed for Pain APPLY TOPICALLY TO AFFECTED AREAS OF LOWER BACK AND NECK if needed    LORATADINE (CLARITIN) 10 MG TABLET    take 1 tablet by mouth once daily    MONTELUKAST (SINGULAIR) 10 MG TABLET    Take 1 tablet by mouth nightly    MULTIPLE VITAMINS-MINERALS (MULTIVITAMIN ADULTS) TABS    Take 1 tablet by mouth daily    NARCAN 4 MG/0.1ML LIQD NASAL SPRAY        OMEPRAZOLE (PRILOSEC) 20 MG DELAYED RELEASE CAPSULE    take 1 capsule by mouth once daily    ORPHENADRINE (NORFLEX) 100 MG EXTENDED RELEASE TABLET    Take 1 tablet by mouth 2 times daily take 1 tablet by mouth twice a day    ROSUVASTATIN (CRESTOR) 20 MG TABLET    take 1 tablet by mouth once daily    TIOTROPIUM (SPIRIVA RESPIMAT) 1.25 MCG/ACT AERS INHALER    Inhale 2 puffs into the lungs daily       ALLERGIES     Patient is is allergic to dilantin [phenytoin], dupixent [dupilumab], oxycodone, and valium [diazepam]. Patients   Immunization History   Administered Date(s) Administered    COVID-19, Pfizer, PF, 30mcg/0.3mL 04/14/2021, 05/04/2021       FAMILY HISTORY     Patient's family history includes Asthma in his father; Emphysema in his father and mother; High Blood Pressure in his father and mother; Other in his mother. SOCIAL HISTORY     Patient  reports that he has never smoked. He has never used smokeless tobacco. He reports previous alcohol use. He reports that he does not use drugs. PHYSICAL EXAM     ED TRIAGE VITALS  BP: (!) 187/85, Temp: 97 °F (36.1 °C), Pulse: 66, Resp: 20, SpO2: 96 %,Estimated body mass index is 56.73 kg/m² as calculated from the following:    Height as of this encounter: 6' 1\" (1.854 m). Weight as of this encounter: 430 lb (195 kg). ,No LMP for male patient. Physical Exam  Constitutional:       General: He is not in acute distress. Appearance: He is normal weight. He is not ill-appearing. HENT:      Head: Normocephalic. Nose: Nose normal.   Cardiovascular:      Rate and Rhythm: Normal rate and regular rhythm. Pulses: Normal pulses. Pulmonary:      Effort: Pulmonary effort is normal.      Breath sounds: Normal breath sounds. Skin:     General: Skin is warm and dry. Capillary Refill: Capillary refill takes less than 2 seconds. Neurological:      General: No focal deficit present. Mental Status: He is alert.    Psychiatric:         Behavior: Behavior normal.         DIAGNOSTIC RESULTS     Labs:  Results for orders placed or performed during the hospital encounter of 12/09/21   Rapid influenza A/B antigens   Result Value Ref Range    Flu A Antigen Negative NEGATIVE    Flu B Antigen Negative NEGATIVE       IMAGING:    No orders to display         EKG:      URGENT CARE COURSE:     Vitals:    12/09/21 1723   BP: (!) 187/85   Pulse: 66   Resp: 20   Temp: 97 °F (36.1 °C)   SpO2: 96%   Weight: (!) 430 lb (195 kg)   Height: 6' 1\" (1.854 m)       Medications - No data to display         PROCEDURES:  None    FINAL IMPRESSION      1. Diarrhea, unspecified type          DISPOSITION/ PLAN   Patient presents for diarrhea illness. Patient is tested for influenza and this is negative. Patient will be discharged with prescription for Bentyl for abdominal cramping. Advised to drink plenty of fluids. Return for worsening abdominal pain, temperature 100.5 or greater, blood in the stool or any new concerns. PATIENT REFERRED TO:  MIRNA Costa CNP  [address]  [address] [facility] [address]      DISCHARGE MEDICATIONS:  New Prescriptions    DICYCLOMINE (BENTYL) 10 MG CAPSULE    Take 1 capsule by mouth 3 times daily as needed (abd cramping)       Discontinued Medications    AMOXICILLIN (AMOXIL) 500 MG CAPSULE    take 1 capsule by mouth four times a day       Current Discharge Medication List          MIRNA Rodriguez CNP    (Please note that portions of this note were completed with a voice recognition program. Efforts were made to edit the dictations but occasionally words are mis-transcribed.)          MIRNA Rodriguez CNP  12/09/21 7783

## 2021-12-10 ENCOUNTER — TELEPHONE (OUTPATIENT)
Dept: FAMILY MEDICINE CLINIC | Age: 51
End: 2021-12-10

## 2021-12-10 ENCOUNTER — NURSE ONLY (OUTPATIENT)
Dept: FAMILY MEDICINE CLINIC | Age: 51
End: 2021-12-10

## 2021-12-10 DIAGNOSIS — Z20.822 EXPOSURE TO COVID-19 VIRUS: Primary | ICD-10-CM

## 2021-12-10 LAB
Lab: ABNORMAL
QC PASS/FAIL: ABNORMAL
SARS-COV-2 RDRP RESP QL NAA+PROBE: POSITIVE

## 2021-12-10 PROCEDURE — 87635 SARS-COV-2 COVID-19 AMP PRB: CPT | Performed by: NURSE PRACTITIONER

## 2021-12-10 NOTE — TELEPHONE ENCOUNTER
Noted.  Work note given. Call office with any questions or concerns, or if symptoms are getting worse or changing.  -WS

## 2021-12-10 NOTE — TELEPHONE ENCOUNTER
Pt called and reports that he was seen in the ER yesterday and was not tested for COVID, however, His girlfriend tested positive today and he does have symptoms. Pt to come to the office for rapid testing.  -WS    Orders Placed This Encounter   Procedures    POCT COVID-19, Rapid     Order Specific Question:   Is this test for diagnosis or screening? Answer:   Diagnosis of ill patient     Order Specific Question:   Symptomatic for COVID-19 as defined by CDC? Answer:   Yes     Order Specific Question:   Date of Symptom Onset     Answer:   12/7/2021     Order Specific Question:   Hospitalized for COVID-19? Answer:   No     Order Specific Question:   Admitted to ICU for COVID-19? Answer:   No     Order Specific Question:   Employed in healthcare setting? Answer:   No     Order Specific Question:   Resident in a congregate (group) care setting? Answer:   No     Order Specific Question:   Pregnant: Answer:   No     Order Specific Question:   Previously tested for COVID-19? Answer:    Yes

## 2021-12-10 NOTE — TELEPHONE ENCOUNTER
Pt tested via alere. Tested positive. Pt notified. Work note faxed to [phone]. Pt will notify us if he needs off longer.

## 2021-12-10 NOTE — LETTER
[address] [address]  Phone: [phone]  Fax: [phone]    MIRNA Kelly CNP        December 10, 2021     Patient: Mahendra Gray   YOB: 1970       To Whom It May Concern: It is my medical opinion that Luz Morris may return to work on 12/14/2021. Please excuse him from 12/07/2021 to 12/13/2021. If you have any questions or concerns, please don't hesitate to call.     Sincerely,        MIRNA Kelly CNP

## 2021-12-13 RX ORDER — AMITRIPTYLINE HYDROCHLORIDE 50 MG/1
TABLET, FILM COATED ORAL
Qty: 90 TABLET | Refills: 1 | Status: SHIPPED | OUTPATIENT
Start: 2021-12-13 | End: 2022-05-31

## 2021-12-13 NOTE — TELEPHONE ENCOUNTER
This medication refill is regarding a electronic request.  Refill requested by [employer]. Requested Prescriptions     Pending Prescriptions Disp Refills    amitriptyline (ELAVIL) 50 MG tablet [Pharmacy Med Name: AMITRIPTYLINE HCL 50 MG TAB] 90 tablet 1     Sig: take 1 tablet by mouth at bedtime     Date of last visit: 11/17/2021   Date of next visit: 5/18/2022  Date of last refill: 6/21/2021 #90/1    Rx verified, ordered and set to EP.

## 2021-12-16 ENCOUNTER — TELEPHONE (OUTPATIENT)
Dept: FAMILY MEDICINE CLINIC | Age: 51
End: 2021-12-16

## 2021-12-16 NOTE — TELEPHONE ENCOUNTER
Pt needed a work letter since having COVID and I got the fax number of [phone]. The fax seems to not be going through and I called the pt and left him a message to see if his work got the letter. Will wait for his call back.

## 2021-12-16 NOTE — LETTER
[address] [address]  Phone: [phone]  Fax: [phone]    MIRNA Fuentes CNP        December 17, 2021     Patient: Malina Izquierdo   YOB: 1970       To Whom It May Concern:    Please excuse Ray Fearing from work 12/7/21 to 12/20/21 due to being diagnosed with COVID and the need to self isolate. He may return to work without restriction on 12/21/21. If you have any questions or concerns, please don't hesitate to call.     Sincerely,        MIRNA Fuentes CNP

## 2021-12-17 NOTE — TELEPHONE ENCOUNTER
Spoke to the pt and the fax number is [phone]. Pt has been released to return to work on 12/21/21 but his employer requires him to have a negative COVID test prior to returning to work. OK to schedule pt for a nurse visit COVID test in office on 12/20/21? Please advise. Of note, the pts employer requires him to be off work for 14 days so back to work date needs to be changed from 12/14/21 to 12/21/21. New excuse printed and will be faxed to pts employer after it is signed.

## 2021-12-17 NOTE — TELEPHONE ENCOUNTER
OK to schedule for a nurse visit. Please let pt know that he may still have a positive test, even though he is no longer contagious. We generally do not re-test for back to work. OK for new work note also.  -WS

## 2021-12-20 ENCOUNTER — NURSE ONLY (OUTPATIENT)
Dept: FAMILY MEDICINE CLINIC | Age: 51
End: 2021-12-20

## 2021-12-20 DIAGNOSIS — Z11.52 ENCOUNTER FOR SCREENING FOR COVID-19: Primary | ICD-10-CM

## 2021-12-20 DIAGNOSIS — G89.4 CHRONIC PAIN SYNDROME: ICD-10-CM

## 2021-12-20 LAB
Lab: NORMAL
QC PASS/FAIL: NORMAL
SARS-COV-2 RDRP RESP QL NAA+PROBE: NEGATIVE

## 2021-12-20 PROCEDURE — 87635 SARS-COV-2 COVID-19 AMP PRB: CPT | Performed by: NURSE PRACTITIONER

## 2021-12-20 RX ORDER — HYDROCODONE BITARTRATE AND ACETAMINOPHEN 5; 325 MG/1; MG/1
1 TABLET ORAL EVERY 8 HOURS PRN
Qty: 90 TABLET | Refills: 0 | Status: SHIPPED | OUTPATIENT
Start: 2021-12-20 | End: 2022-01-20 | Stop reason: SDUPTHER

## 2021-12-30 NOTE — TELEPHONE ENCOUNTER
Pt called back stating that he needs the tubing and mask for the nebulizer machine, has has the machine and medication. Would [facility], DX: asthma. OK for Rx? Please advise. Pt will check with [facility] early next week.

## 2021-12-30 NOTE — TELEPHONE ENCOUNTER
Left message for patient letting him know we had received the refill request that he had put in to our office but I had some questions for him regarding the request and asked that he give our office a call back at his earliest convenience to discuss the encounter. If patient calls back please clarify what type of medication/request he is asking for. Does patient need a refill of nebulizer solution of parts for a nebulizer machine?

## 2021-12-30 NOTE — TELEPHONE ENCOUNTER
----- Message from Bambi Connell sent at 12/29/2021  2:36 PM EST -----  Subject: Refill Request    QUESTIONS  Name of Medication? Other - Nebulizer equipment  Patient-reported dosage and instructions? none reported  How many days do you have left? 0  Preferred Pharmacy? [facility] phone number (if available)? Additional Information for Provider? Patient needs refill for nebulizer   equipment  ---------------------------------------------------------------------------  --------------  CALL BACK INFO  What is the best way for the office to contact you? OK to leave message on   voicemail  Preferred Call Back Phone Number?  [phone]

## 2022-01-03 RX ORDER — NEBULIZER ACCESSORIES
KIT MISCELLANEOUS
Qty: 1 KIT | Refills: 0 | Status: SHIPPED | OUTPATIENT
Start: 2022-01-03

## 2022-01-04 DIAGNOSIS — G89.4 CHRONIC PAIN SYNDROME: ICD-10-CM

## 2022-01-04 DIAGNOSIS — M62.838 SPASM OF MUSCLE: ICD-10-CM

## 2022-01-04 DIAGNOSIS — M47.816 LUMBAR SPONDYLOSIS: ICD-10-CM

## 2022-01-04 DIAGNOSIS — M54.10 RADICULOPATHY AFFECTING UPPER EXTREMITY: ICD-10-CM

## 2022-01-04 RX ORDER — ORPHENADRINE CITRATE 100 MG/1
100 TABLET, EXTENDED RELEASE ORAL 2 TIMES DAILY PRN
Qty: 60 TABLET | Refills: 0 | Status: SHIPPED | OUTPATIENT
Start: 2022-01-04 | End: 2022-01-27

## 2022-01-04 RX ORDER — BUDESONIDE AND FORMOTEROL FUMARATE DIHYDRATE 160; 4.5 UG/1; UG/1
AEROSOL RESPIRATORY (INHALATION)
Qty: 10.2 G | OUTPATIENT
Start: 2022-01-04

## 2022-01-04 NOTE — TELEPHONE ENCOUNTER
OARRS reviewed. UDS:screen negative . Last seen: 10/21/2021.  Follow-up:   Future Appointments   Date Time Provider Jesus Iris   1/6/2022  7:30 AM MIRNA Hope CNP N [facility] [address] [place] [address]   1/12/2022  1:30 PM Rosaura Butterfield [address] [place] [address]   5/18/2022 11:30 AM MIRNA Rubio CNP Fam [facility] [address]

## 2022-01-06 ENCOUNTER — OFFICE VISIT (OUTPATIENT)
Dept: PHYSICAL MEDICINE AND REHAB | Age: 52
End: 2022-01-06
Payer: MEDICAID

## 2022-01-06 VITALS
BODY MASS INDEX: 41.75 KG/M2 | HEIGHT: 73 IN | SYSTOLIC BLOOD PRESSURE: 138 MMHG | DIASTOLIC BLOOD PRESSURE: 90 MMHG | WEIGHT: 315 LBS

## 2022-01-06 DIAGNOSIS — M54.12 CERVICAL RADICULOPATHY: ICD-10-CM

## 2022-01-06 DIAGNOSIS — G89.4 CHRONIC PAIN SYNDROME: ICD-10-CM

## 2022-01-06 DIAGNOSIS — M62.838 SPASM OF MUSCLE: ICD-10-CM

## 2022-01-06 DIAGNOSIS — M50.30 DDD (DEGENERATIVE DISC DISEASE), CERVICAL: ICD-10-CM

## 2022-01-06 DIAGNOSIS — M54.50 LUMBAR PAIN: ICD-10-CM

## 2022-01-06 DIAGNOSIS — F11.90 CHRONIC, CONTINUOUS USE OF OPIOIDS: ICD-10-CM

## 2022-01-06 DIAGNOSIS — M51.36 LUMBAR DEGENERATIVE DISC DISEASE: ICD-10-CM

## 2022-01-06 DIAGNOSIS — M54.10 RADICULOPATHY AFFECTING UPPER EXTREMITY: ICD-10-CM

## 2022-01-06 DIAGNOSIS — M47.816 SPONDYLOSIS OF LUMBAR REGION WITHOUT MYELOPATHY OR RADICULOPATHY: Primary | ICD-10-CM

## 2022-01-06 DIAGNOSIS — M54.12 CERVICAL RADICULITIS: ICD-10-CM

## 2022-01-06 DIAGNOSIS — M47.816 LUMBAR SPONDYLOSIS: ICD-10-CM

## 2022-01-06 DIAGNOSIS — M54.17 LUMBOSACRAL RADICULITIS: ICD-10-CM

## 2022-01-06 DIAGNOSIS — Z98.1 HX OF FUSION OF CERVICAL SPINE: ICD-10-CM

## 2022-01-06 PROCEDURE — G8417 CALC BMI ABV UP PARAM F/U: HCPCS | Performed by: NURSE PRACTITIONER

## 2022-01-06 PROCEDURE — 99214 OFFICE O/P EST MOD 30 MIN: CPT | Performed by: NURSE PRACTITIONER

## 2022-01-06 PROCEDURE — G8427 DOCREV CUR MEDS BY ELIG CLIN: HCPCS | Performed by: NURSE PRACTITIONER

## 2022-01-06 PROCEDURE — 1036F TOBACCO NON-USER: CPT | Performed by: NURSE PRACTITIONER

## 2022-01-06 PROCEDURE — G8484 FLU IMMUNIZE NO ADMIN: HCPCS | Performed by: NURSE PRACTITIONER

## 2022-01-06 RX ORDER — LIDOCAINE 50 MG/G
85 OINTMENT TOPICAL DAILY PRN
Qty: 85 G | Refills: 2 | Status: SHIPPED | OUTPATIENT
Start: 2022-01-06 | End: 2022-02-24 | Stop reason: SDUPTHER

## 2022-01-06 RX ORDER — LIDOCAINE 50 MG/G
1 PATCH TOPICAL EVERY 12 HOURS
Qty: 60 PATCH | Refills: 2 | Status: SHIPPED | OUTPATIENT
Start: 2022-01-06 | End: 2022-04-06

## 2022-01-06 ASSESSMENT — ENCOUNTER SYMPTOMS
PHOTOPHOBIA: 1
BACK PAIN: 1
APNEA: 1
GASTROINTESTINAL NEGATIVE: 1

## 2022-01-06 NOTE — PROGRESS NOTES
[address] [facility] [address]  Dept: [phone]  Dept Fax: [phone]: [phone]    Visit Date: 1/6/2022    Functionality Assessment/Goals Worksheet     On a scale of 0 (Does not Interfere) to 10 (Completely Interferes)     1. Which number describes how during the past week pain has interfered with       the following:  A. General Activity:  10  B. Mood: 10  C. Walking Ability:  10  D. Normal Work (Includes both work outside the home and housework):  10  E. Relations with Other People:   10  F. Sleep:   10  G. Enjoyment of Life:   10    2. Patient Prefers to Take their Pain Medications:     []  On a regular basis   [x]  Only when necessary    []  Does not take pain medications    3. What are the Patient's Goals/Expectations for Visiting Pain Management? []  Learn about my pain    [x]  Receive Medication   []  Physical Therapy     []  Treat Depression   [x]  Receive Injections    []  Treat Sleep   []  Deal with Anxiety and Stress   []  Treat Opoid Dependence/Addiction   []  Other:      HPI:   Shauna Morales is a 54 y.o. male is here today for    Chief Complaint: Low back pain, leg pain, head pain    HPI   2.5 month FU. Continue sto have pain across low back intermittently rateing down bulateal legs into thighs- charley horse pain, spasms in legs. Low back aching stabbing pain. Multiple complaints increased headaches over the past 2 months back og head up to top of head , neck pain, balance issues, vision changes, feels shaky at time with walking feels that it has increased. Had 2 MRI of brain and found to have an enlarged pituitary gland. Referred to Neurosurgery and has an appointment next week 1/12/2021. Spasms remain one of his main complaints.  States that pain has been elevated basically all over     States that medications help- Norco and Norflex    He wants to hold off on procedures at this time until evaluation from neurosurgery   Pain increases with anything activity, sitting still just gets muscle spasms       Medications reviewed. Patient denies side effects with medications. Patient states he is taking medications as prescribed. Hedenies receiving pain medications from other sources. He He had 2 ER visiosts since last visit: 1 for COVID and 1 for diarrhea  any ER visits since last visit. Pain scale with out pain medications or at its worst is 10/10. Pain scale with pain medications or at its best is 7/10. Last dose of Norco was today   Drug screen reviewed from 10/21/2021 and was appropriate  Pill count completed  today and WNL: Yes    MRI of brain:   FINDINGS:        There is an enlarged pituitary gland which measures 5.1 x 6.6 x 12 mm in size. There is a 6.4 x 4.3 mm filling defect along the floor of the sella on the right side suggestive of a microadenoma. On the previous study there was increased T1-weighted    signal intensity. This may represent proteinaceous content. The infundibulum is slightly deviated towards the right side. There is no compression upon the optic chiasm. The cavernous sinuses are normal.       There is no other parenchymal abnormality or abnormal enhancement. There is no hydrocephalus.           Impression   1. 6.4 x 4.3 mm filling defect along the floor of the sella on the right side consistent with a microadenoma. There was increased T1-weighted signal intensity which may represent hemorrhagic or proteinaceous content. 2. Otherwise negative MRI scan of the brain with intravenous contrast.           The patientis allergic to dilantin [phenytoin], dupixent [dupilumab], oxycodone, and valium [diazepam]. Subjective:      Review of Systems   Constitutional: Positive for chills. Negative for activity change and unexpected weight change. Night sweats    HENT: Negative.     Eyes: Positive for photophobia and visual disturbance. Respiratory: Positive for apnea. Cardiovascular: Positive for leg swelling. Gastrointestinal: Negative. Endocrine: Negative. Genitourinary: Negative. Musculoskeletal: Positive for arthralgias, back pain, gait problem, joint swelling, myalgias, neck pain and neck stiffness. Not using any assist devices    Skin: Negative. Neurological: Positive for weakness, numbness and headaches. Psychiatric/Behavioral: Positive for sleep disturbance. Objective:     Vitals:    01/06/22 0724   BP: (!) 138/90   Weight: (!) 416 lb (188.7 kg)   Height: 6' 1\" (1.854 m)       Physical Exam  Vitals and nursing note reviewed. Constitutional:       General: He is not in acute distress. Appearance: He is obese. He is not diaphoretic. HENT:      Head: Normocephalic and atraumatic. Right Ear: External ear normal.      Left Ear: External ear normal.      Nose: Nose normal.      Mouth/Throat:      Mouth: Mucous membranes are moist.      Pharynx: No oropharyngeal exudate. Eyes:      General: No scleral icterus. Right eye: No discharge. Left eye: No discharge. Conjunctiva/sclera: Conjunctivae normal.      Pupils: Pupils are equal, round, and reactive to light. Neck:      Thyroid: No thyromegaly. Cardiovascular:      Rate and Rhythm: Normal rate and regular rhythm. Heart sounds: Normal heart sounds. No murmur heard. No friction rub. No gallop. Pulmonary:      Effort: Pulmonary effort is normal. No respiratory distress. Breath sounds: Normal breath sounds. No wheezing or rales. Chest:      Chest wall: No tenderness. Abdominal:      General: Bowel sounds are normal. There is no distension. Palpations: Abdomen is soft. Tenderness: There is no abdominal tenderness. There is no guarding or rebound. Musculoskeletal:         General: Tenderness present. Cervical back: Normal range of motion.  Rigidity, spasms, tenderness and bony tenderness present. No edema or erythema. Pain with movement present. No muscular tenderness. Normal range of motion. Thoracic back: Spasms, tenderness and bony tenderness present. Lumbar back: Tenderness and bony tenderness present. Decreased range of motion. Negative right straight leg raise test and negative left straight leg raise test.        Back:       Right knee: Tenderness present. Left knee: Tenderness present. Right lower leg: Swelling present. Edema present. Left lower leg: Swelling present. Edema present. Right ankle: Swelling present. Left ankle: Swelling present. Skin:     General: Skin is warm. Coloration: Skin is not pale. Findings: No erythema or rash. Neurological:      General: No focal deficit present. Mental Status: He is alert and oriented to person, place, and time. He is not disoriented. Cranial Nerves: No cranial nerve deficit. Sensory: Sensory deficit present. Motor: Weakness present. No atrophy or abnormal muscle tone. Coordination: Coordination normal.      Gait: Gait normal.      Deep Tendon Reflexes: Babinski sign absent on the right side. Babinski sign absent on the left side. Reflex Scores:       Tricep reflexes are 1+ on the right side and 1+ on the left side. Bicep reflexes are 1+ on the right side and 1+ on the left side. Brachioradialis reflexes are 1+ on the right side and 1+ on the left side. Patellar reflexes are 1+ on the right side and 1+ on the left side. Achilles reflexes are 1+ on the right side and 1+ on the left side. Comments: Motor 4/5   Psychiatric:         Attention and Perception: Attention normal. He is attentive. Mood and Affect: Mood normal. Mood is not anxious or depressed. Affect is not labile, blunt, angry or inappropriate. Speech: Speech normal. He is communicative.  Speech is not rapid and pressured, delayed, slurred or tangential. Behavior: Behavior normal. Behavior is not agitated, slowed, aggressive, withdrawn, hyperactive or combative. Behavior is cooperative. Thought Content: Thought content normal. Thought content is not paranoid or delusional. Thought content does not include homicidal or suicidal ideation. Thought content does not include homicidal or suicidal plan. Cognition and Memory: Cognition normal. Memory is not impaired. He does not exhibit impaired recent memory or impaired remote memory. Judgment: Judgment normal. Judgment is not impulsive or inappropriate. VAL  Patricks test  positive  Yeoman's  positive  Gaenslen's  positive         Assessment:     1. Spondylosis of lumbar region without myelopathy or radiculopathy    2. Lumbar degenerative disc disease    3. Lumbar pain    4. Lumbosacral radiculitis    5. Cervical radiculopathy    6. DDD (degenerative disc disease), cervical    7. Cervical radiculitis    8. Hx of fusion of cervical spine    9. Spasm of muscle    10. Chronic pain syndrome    11. Chronic, continuous use of opioids    12. Radiculopathy affecting upper extremity    13. Lumbar spondylosis            Plan:      · OARRS reviewed. Current MED: 15.00  · Patient was offered naloxone for home. · Discussed long term side effects of medications, tolerance, dependency and addiction. · Previous UDS reviewed  · UDS preformed today for compliance. · Patient told can not receive any pain medications from any other source. · No evidence of abuse, diversion or aberrant behavior.  Medications and/or procedures to improve function and quality of life- patient understanding with this and that may not be pain free   Discussed with patient about safe storage of medications at home   Discussed possible weaning of medication dosing dependent on treatment/procedure results.     Discussed with patient about risks with procedure including infection, reaction to medication, increased pain, or bleeding. · Procedure notes reveiwed in detail  · Received 80% relief of low back pain and also relief of leg pain from bilateral L-facet MBB # 2 for 3 weeks with improvement of mobility and activity.    Awaiting evaluation with neurosurgery d/t enlarged pituitary gland/ microadenoma    Wants to hold off on procedures until evaluation and plan with neurosurgery- Plan bilateral L-facet RFA in future, discussed possible LESI   · Continue current pain medications at this time while awaiting procedures- Norco 5/325 TID prn-filled 12/20/2021  · Continue Norflex- refill sent out yesterday    Again discussed therapy with dry needle therapy but he wants to wait  ·  Continue Lidoderm patches and ointment - ordered   · Updated UDS ordered today  · FU in 1 month to reevaluate    Meds. Prescribed:   Orders Placed This Encounter   Medications    lidocaine (LIDODERM) 5 %     Sig: Place 1 patch onto the skin every 12 hours 12 hours on, 12 hours off. Dispense:  60 patch     Refill:  2    lidocaine (XYLOCAINE) 5 % ointment     Sig: Apply 85 g topically daily as needed for Pain APPLY TOPICALLY TO AFFECTED AREAS OF LOWER BACK AND NECK if needed     Dispense:  85 g     Refill:  2       Return in about 1 month (around 2/6/2022), or if symptoms worsen or fail to improve, for follow up  for medications.                Electronically signed by MIRNA Pisano CNP on1/6/2022 at 4:31 PM

## 2022-01-08 DIAGNOSIS — Z98.1 S/P CERVICAL SPINAL FUSION: ICD-10-CM

## 2022-01-10 RX ORDER — BUTALBITAL, ACETAMINOPHEN AND CAFFEINE 50; 325; 40 MG/1; MG/1; MG/1
TABLET ORAL
Qty: 90 TABLET | Refills: 1 | Status: SHIPPED | OUTPATIENT
Start: 2022-01-10 | End: 2022-04-06

## 2022-01-10 NOTE — TELEPHONE ENCOUNTER
This medication refill is regarding a electronic request.  Refill requested by [facility]. Requested Prescriptions     Pending Prescriptions Disp Refills    butalbital-acetaminophen-caffeine (FIORICET, ESGIC) -40 MG per tablet [Pharmacy Med Name: [id number] -40] 90 tablet 1     Sig: take 1 tablet by mouth every 4 hours if needed for headache     Date of last visit: 11/17/2021   Date of next visit: 5/18/2022  Date of last refill: 12/2/2021 #90/1    Rx verified, ordered and set to EP.

## 2022-01-12 ENCOUNTER — OFFICE VISIT (OUTPATIENT)
Dept: NEUROSURGERY | Age: 52
End: 2022-01-12
Payer: MEDICAID

## 2022-01-12 VITALS
OXYGEN SATURATION: 98 % | HEIGHT: 73 IN | DIASTOLIC BLOOD PRESSURE: 72 MMHG | RESPIRATION RATE: 18 BRPM | BODY MASS INDEX: 41.75 KG/M2 | WEIGHT: 315 LBS | SYSTOLIC BLOOD PRESSURE: 128 MMHG | HEART RATE: 78 BPM

## 2022-01-12 DIAGNOSIS — D35.2 PITUITARY ADENOMA (HCC): Primary | ICD-10-CM

## 2022-01-12 DIAGNOSIS — Z98.1 S/P CERVICAL SPINAL FUSION: ICD-10-CM

## 2022-01-12 PROCEDURE — G8427 DOCREV CUR MEDS BY ELIG CLIN: HCPCS | Performed by: PHYSICIAN ASSISTANT

## 2022-01-12 PROCEDURE — 99203 OFFICE O/P NEW LOW 30 MIN: CPT | Performed by: PHYSICIAN ASSISTANT

## 2022-01-12 PROCEDURE — 1036F TOBACCO NON-USER: CPT | Performed by: PHYSICIAN ASSISTANT

## 2022-01-12 PROCEDURE — G8484 FLU IMMUNIZE NO ADMIN: HCPCS | Performed by: PHYSICIAN ASSISTANT

## 2022-01-12 PROCEDURE — G8417 CALC BMI ABV UP PARAM F/U: HCPCS | Performed by: PHYSICIAN ASSISTANT

## 2022-01-12 ASSESSMENT — ENCOUNTER SYMPTOMS
APNEA: 1
ABDOMINAL DISTENTION: 0
ABDOMINAL PAIN: 0
BACK PAIN: 0

## 2022-01-12 NOTE — PROGRESS NOTES
over the last 6 months. He also relates history for nosebleeds. He also relates some increased fatigue that he associated with his apnea. Of note: He is a large man at 415 pounds with a height of 6 feet 1 reflecting a BMI of 54.75. Medications:    Current Outpatient Medications:     butalbital-acetaminophen-caffeine (FIORICET, ESGIC) -40 MG per tablet, take 1 tablet by mouth every 4 hours if needed for headache, Disp: 90 tablet, Rfl: 1    lidocaine (LIDODERM) 5 %, Place 1 patch onto the skin every 12 hours 12 hours on, 12 hours off., Disp: 60 patch, Rfl: 2    lidocaine (XYLOCAINE) 5 % ointment, Apply 85 g topically daily as needed for Pain APPLY TOPICALLY TO AFFECTED AREAS OF LOWER BACK AND NECK if needed, Disp: 85 g, Rfl: 2    orphenadrine (NORFLEX) 100 MG extended release tablet, Take 1 tablet by mouth 2 times daily as needed for Muscle spasms, Disp: 60 tablet, Rfl: 0    Respiratory Therapy Supplies (NEBULIZER/TUBING/MOUTHPIECE) KIT, Dispense tubing, mask, and mouthpiece for nebulizer machine. Dx: Asthma, Disp: 1 kit, Rfl: 0    HYDROcodone-acetaminophen (NORCO) 5-325 MG per tablet, Take 1 tablet by mouth every 8 hours as needed for Pain for up to 30 days. , Disp: 90 tablet, Rfl: 0    amitriptyline (ELAVIL) 50 MG tablet, take 1 tablet by mouth at bedtime, Disp: 90 tablet, Rfl: 1    dicyclomine (BENTYL) 10 MG capsule, Take 1 capsule by mouth 3 times daily as needed (abd cramping), Disp: 30 capsule, Rfl: 0    loratadine (CLARITIN) 10 MG tablet, take 1 tablet by mouth once daily, Disp: 90 tablet, Rfl: 3    rosuvastatin (CRESTOR) 20 MG tablet, take 1 tablet by mouth once daily, Disp: 90 tablet, Rfl: 0    omeprazole (PRILOSEC) 20 MG delayed release capsule, take 1 capsule by mouth once daily, Disp: 90 capsule, Rfl: 0    montelukast (SINGULAIR) 10 MG tablet, Take 1 tablet by mouth nightly, Disp: 90 tablet, Rfl: 3    fluticasone (FLONASE) 50 MCG/ACT nasal spray, 2 sprays by Each Nostril route daily, Disp: 3 each, Rfl: 3    diphenhydrAMINE (BENADRYL) 25 MG capsule, Take 1 capsule by mouth every 6 hours as needed for Itching, Disp: 60 capsule, Rfl: 5    betamethasone valerate (VALISONE) 0.1 % cream, apply to affected area twice a day, Disp: 45 g, Rfl: 3    albuterol sulfate  (90 Base) MCG/ACT inhaler, Inhale 2 puffs into the lungs 4 times daily as needed for Wheezing, Disp: 3 Inhaler, Rfl: 3    albuterol (PROVENTIL) (2.5 MG/3ML) 0.083% nebulizer solution, Take 3 mLs by nebulization every 4 hours as needed for Wheezing, Disp: 375 mL, Rfl: 3    azelastine (OPTIVAR) 0.05 % ophthalmic solution, Place 1 drop into both eyes 2 times daily, Disp: 1 Bottle, Rfl: 11    FEROSUL 325 (65 Fe) MG tablet, take 1 tablet by mouth once daily, Disp: 90 tablet, Rfl: 3    hydrocortisone valerate (WESTCORT) 0.2 % ointment, Apply topically daily. , Disp: 1 Tube, Rfl: 11    EPINEPHrine (EPIPEN 2-RAGHAV) 0.3 MG/0.3ML SOAJ injection, Inject one pen as directed STAT for allergic reaction, may disp generic NDC 56007-952-35, Disp: 1 each, Rfl: 0    tiotropium (SPIRIVA RESPIMAT) 1.25 MCG/ACT AERS inhaler, Inhale 2 puffs into the lungs daily, Disp: 1 Inhaler, Rfl: 1    budesonide-formoterol (SYMBICORT) 160-4.5 MCG/ACT AERO, 2 puffs inhaled twice daily. Rinse mouth well after use., Disp: 1 Inhaler, Rfl: 5    cetirizine (ZYRTEC) 10 MG tablet, Take 1 tablet by mouth daily, Disp: 30 tablet, Rfl: 11    bumetanide (BUMEX) 1 MG tablet, Take 0.5 tablets by mouth daily, Disp: 30 tablet, Rfl: 0    NARCAN 4 MG/0.1ML LIQD nasal spray, , Disp: , Rfl:     Multiple Vitamins-Minerals (MULTIVITAMIN ADULTS) TABS, Take 1 tablet by mouth daily, Disp: 90 tablet, Rfl: 3    ergocalciferol (ERGOCALCIFEROL) 66702 units capsule, Take 50,000 Units by mouth as needed , Disp: , Rfl:     The patient is allergic to dilantin [phenytoin], dupixent [dupilumab], oxycodone, and valium [diazepam].     Past Medical History  Jeremi Menard  has a past medical history of Aneurysm ([address] Asthma, Cardiomegaly, COVID-19, Fibromyalgia, and CLARIBEL on CPAP. Past Surgical History  The patient  has a past surgical history that includes back surgery; knee surgery; Pain management procedure (Bilateral, 2/11/2021); and Pain management procedure (Bilateral, 4/8/2021). Family History  This patient's family history includes Asthma in his father; Emphysema in his father and mother; High Blood Pressure in his father and mother; Other in his mother. Social History  Real Castro  reports that he has never smoked. He has never used smokeless tobacco. He reports previous alcohol use. He reports that he does not use drugs. Subjective:      Review of Systems   Constitutional: Positive for fatigue. Negative for activity change. Respiratory: Positive for apnea. Phonic sleep apnea requiring CPAP   Cardiovascular: Negative for chest pain and leg swelling. Gastrointestinal: Negative for abdominal distention and abdominal pain. Musculoskeletal: Positive for neck pain. Negative for back pain. Neurological: Positive for headaches. Negative for weakness and numbness. History for chronic headaches requiring Fioricet   Psychiatric/Behavioral: Negative for agitation, behavioral problems, confusion and decreased concentration. Objective:     BP (!) 171/86 (Site: Left Upper Arm, Position: Sitting, Cuff Size: Large Adult)   Pulse 78   Resp 18   Ht 6' 1\" (1.854 m)   Wt (!) 415 lb (188.2 kg)   SpO2 98%   BMI 54.75 kg/m²      Examination of carotid arteries (puls, amplitude, bruits) or Examination of peripheral vascular system  (swelling, varicosities and pulses, temperature, edema,tenderness :   Physical Exam  Constitutional:       Appearance: He is obese. HENT:      Head: Normocephalic and atraumatic. Eyes:      Extraocular Movements: Extraocular movements intact. Pupils: Pupils are equal, round, and reactive to light.    Neck:      Comments: Patient relates chronic neck pain following prior fusion. Pulmonary:      Effort: No respiratory distress. Abdominal:      General: Abdomen is flat. Bowel sounds are normal. There is no distension. Tenderness: There is no abdominal tenderness. Musculoskeletal:         General: Normal range of motion. Skin:     General: Skin is warm and dry. Neurological:      General: No focal deficit present. Mental Status: He is alert. Psychiatric:         Mood and Affect: Mood normal.         Behavior: Behavior normal.         Thought Content: Thought content normal.         Judgment: Judgment normal.         Reviewed MRI Type:  Film and Report    Lab Results   Component Value Date    WBC 4.8 09/09/2021    HGB 13.3 (L) 09/09/2021    HCT 40.8 (L) 09/09/2021     09/09/2021    CHOL 188 09/26/2019    TRIG 97 09/26/2019    HDL 39 09/26/2019    ALT 25 09/09/2021    AST 23 09/09/2021     09/09/2021    K 4.4 09/09/2021     09/09/2021    CREATININE 0.9 09/09/2021    BUN 13 09/09/2021    CO2 26 09/09/2021    INR 1.03 02/11/2019       Assessment and Plan      Diagnosis Orders   1. Pituitary adenoma ([address]         With findings suggestive of pituitary microadenoma on recent MRI of the brain imaged with in a separate image without contrast along with complaints of headache and fatigue, and to rule out a connection between these findings and symptom presentation, we are referring the patient to endocrinology for an evaluation as well as ophthalmology. A new MRI of the brain to be imaged with and without contrast utilizing sella protocol will also be ordered and reviewed and a follow-up appointment in 2 months. An additional image in the form of a CT scan of the cervical spine is ordered without any compromise or complication or adjacent level disease related to prior anterior cervical fusion that may be contributing to his symptom presentation.   He is encouraged in the interim to reach out to our office with any additional questions or concerns or should experience any significant decline in his general condition. Remains happy with today's visit having his questions and concerns addressed and answered and looks forward to his upcoming follow-up appointment and referral appointments.          Electronically signed by Jayme Coello PA-C on 1/12/2022 at 1:38 PM

## 2022-01-18 DIAGNOSIS — G89.4 CHRONIC PAIN SYNDROME: Primary | ICD-10-CM

## 2022-01-18 NOTE — TELEPHONE ENCOUNTER
Jasmina Rosales called requesting a refill on the following medications:  Requested Prescriptions     Pending Prescriptions Disp Refills    HYDROcodone-acetaminophen (NORCO) 5-325 MG per tablet 90 tablet 0     Sig: Take 1 tablet by mouth every 8 hours as needed for Pain for up to 30 days. Pharm[facility]  . pv      Date of last visit: 1/6/2022  Date of next visit (if applicable): 6/71/1721

## 2022-01-18 NOTE — TELEPHONE ENCOUNTER
OARRS reviewed. UDS: + for  Hydrocodone -consistent. + for EtS, EtG -inconsistent  Last seen: 1/6/2022.  Follow-up: 2/24/2022

## 2022-01-20 ENCOUNTER — TELEPHONE (OUTPATIENT)
Dept: PHYSICAL MEDICINE AND REHAB | Age: 52
End: 2022-01-20

## 2022-01-20 RX ORDER — HYDROCODONE BITARTRATE AND ACETAMINOPHEN 5; 325 MG/1; MG/1
1 TABLET ORAL EVERY 8 HOURS PRN
Qty: 90 TABLET | Refills: 0 | Status: SHIPPED | OUTPATIENT
Start: 2022-01-20 | End: 2022-02-21 | Stop reason: SDUPTHER

## 2022-01-20 NOTE — TELEPHONE ENCOUNTER
Patient came in to rescreen on 1/20/2022 d/t last screen was positive for alcohol, maddie do you want to do a 7 day refill for his Norco until results come back?

## 2022-01-20 NOTE — TELEPHONE ENCOUNTER
Patient calling in to check if his results are in yet and if his norco will be filled today 1/20/2022? Please advise.

## 2022-01-20 NOTE — TELEPHONE ENCOUNTER
Pt. Called to request drug screen results and they are still pending. Requested to call back 1/24 to recheck when notif. Of this.

## 2022-01-27 DIAGNOSIS — M47.816 LUMBAR SPONDYLOSIS: ICD-10-CM

## 2022-01-27 DIAGNOSIS — M62.838 SPASM OF MUSCLE: ICD-10-CM

## 2022-01-27 DIAGNOSIS — G89.4 CHRONIC PAIN SYNDROME: ICD-10-CM

## 2022-01-27 DIAGNOSIS — M54.10 RADICULOPATHY AFFECTING UPPER EXTREMITY: ICD-10-CM

## 2022-01-27 RX ORDER — BUDESONIDE AND FORMOTEROL FUMARATE DIHYDRATE 160; 4.5 UG/1; UG/1
AEROSOL RESPIRATORY (INHALATION)
Qty: 10.2 G | OUTPATIENT
Start: 2022-01-27

## 2022-01-27 RX ORDER — HYDROCORTISONE VALERATE 2 MG/G
OINTMENT TOPICAL
Qty: 15 G | OUTPATIENT
Start: 2022-01-27

## 2022-01-27 RX ORDER — ORPHENADRINE CITRATE 100 MG/1
100 TABLET, EXTENDED RELEASE ORAL 2 TIMES DAILY PRN
Qty: 60 TABLET | Refills: 0 | Status: SHIPPED | OUTPATIENT
Start: 2022-02-03 | End: 2022-02-24 | Stop reason: SDUPTHER

## 2022-01-27 NOTE — TELEPHONE ENCOUNTER
OARRS reviewed. UDS: + for  Hydrocodone consistent. Last seen: 1/6/2022.  Follow-up:   Future Appointments   Date Time Provider Jesus Simmons   2/14/2022  8:30 AM Fanny Watt MD AFL APEX AFL APEX END   2/24/2022  7:30 AM MIRNA Monroy CNP N SRPX Pain [facility] [place]   3/2/2022  8:30 AM STR MRI RM1 STRZ MRI STR Radiolog   3/2/2022  9:00 AM STR CT IMAGING RM1  OP EXPRESS STRZ OUT EXP STR Radiolog   3/9/2022  1:30 PM Anthony Rodriguez TREATMENT NETWORK [facility] [place]   5/18/2022 11:30 AM MIRNA Ospina CNP [facility] Medicine [facility] [place]

## 2022-01-31 DIAGNOSIS — J45.50 SEVERE PERSISTENT ASTHMA WITHOUT COMPLICATION: ICD-10-CM

## 2022-01-31 RX ORDER — ALBUTEROL SULFATE 2.5 MG/3ML
SOLUTION RESPIRATORY (INHALATION)
Qty: 375 ML | Refills: 3 | Status: SHIPPED | OUTPATIENT
Start: 2022-01-31 | End: 2022-09-09

## 2022-01-31 NOTE — TELEPHONE ENCOUNTER
This medication refill is regarding a electronic request.  Refill requested by [employer]. Requested Prescriptions     Pending Prescriptions Disp Refills    albuterol (PROVENTIL) (2.5 MG/3ML) 0.083% nebulizer solution [Pharmacy Med Name: ALBUTEROL SUL 2.5 MG/3 ML SOLN] 375 mL 3     Sig: inhale contents of 1 vial ( 3 milliliters ) in nebulizer by mouth and INTO THE LUNGS every 4 hours if needed for wheezing     Date of last visit: 11/17/2021   Date of next visit: 5/18/2022  Date of last refill: 8/19/2021 #375mL/3    Rx verified, ordered and set to EP.

## 2022-02-01 ENCOUNTER — OFFICE VISIT (OUTPATIENT)
Dept: FAMILY MEDICINE CLINIC | Age: 52
End: 2022-02-01
Payer: COMMERCIAL

## 2022-02-01 VITALS
OXYGEN SATURATION: 99 % | DIASTOLIC BLOOD PRESSURE: 86 MMHG | RESPIRATION RATE: 20 BRPM | SYSTOLIC BLOOD PRESSURE: 132 MMHG | HEART RATE: 76 BPM | BODY MASS INDEX: 55.15 KG/M2 | WEIGHT: 315 LBS

## 2022-02-01 DIAGNOSIS — R06.02 SOB (SHORTNESS OF BREATH): ICD-10-CM

## 2022-02-01 DIAGNOSIS — A08.4 VIRAL GASTROENTERITIS: Primary | ICD-10-CM

## 2022-02-01 LAB
Lab: NORMAL
QC PASS/FAIL: NORMAL
SARS-COV-2 RDRP RESP QL NAA+PROBE: NEGATIVE

## 2022-02-01 PROCEDURE — 87635 SARS-COV-2 COVID-19 AMP PRB: CPT | Performed by: NURSE PRACTITIONER

## 2022-02-01 PROCEDURE — G8417 CALC BMI ABV UP PARAM F/U: HCPCS | Performed by: NURSE PRACTITIONER

## 2022-02-01 PROCEDURE — 99214 OFFICE O/P EST MOD 30 MIN: CPT | Performed by: NURSE PRACTITIONER

## 2022-02-01 PROCEDURE — 1036F TOBACCO NON-USER: CPT | Performed by: NURSE PRACTITIONER

## 2022-02-01 PROCEDURE — G8427 DOCREV CUR MEDS BY ELIG CLIN: HCPCS | Performed by: NURSE PRACTITIONER

## 2022-02-01 PROCEDURE — G8484 FLU IMMUNIZE NO ADMIN: HCPCS | Performed by: NURSE PRACTITIONER

## 2022-02-01 RX ORDER — ONDANSETRON 4 MG/1
4 TABLET, ORALLY DISINTEGRATING ORAL 3 TIMES DAILY PRN
Qty: 21 TABLET | Refills: 0 | Status: SHIPPED | OUTPATIENT
Start: 2022-02-01 | End: 2022-10-04

## 2022-02-01 ASSESSMENT — ENCOUNTER SYMPTOMS
SWOLLEN GLANDS: 0
COUGH: 0
VOMITING: 1
SORE THROAT: 0
CHANGE IN BOWEL HABIT: 0
NAUSEA: 1

## 2022-02-01 NOTE — PROGRESS NOTES
[address] [facility] [address]  Dept: [phone]  Dept Fax: [phone]: [phone]     Visit Date:  2/1/2022      Patient:  Bree Abraham  YOB: 1970    HPI:     Chief Complaint   Patient presents with    Abdominal Pain     C/O stomach pain, body aches, chills, hot flashes, throwing up, SOB since yesterday. Pt presents to the office today for body aches, congestion and nausea. Pt reports that this started yesterday and he did leave work and canceled appointments because he was feeling bad. Pt denies any fever or chills. He only threw up 1 time, but his nausea is still there. He was having some SOB and Using breathing TX with some relief. Nausea & Vomiting  This is a new problem. The current episode started in the past 7 days. The problem has been gradually improving. Associated symptoms include arthralgias, chills, congestion, diaphoresis, fatigue, myalgias, nausea and vomiting. Pertinent negatives include no anorexia, change in bowel habit, chest pain, coughing, fever, headaches, joint swelling, neck pain, numbness, rash, sore throat, swollen glands, urinary symptoms, vertigo, visual change or weakness. Nothing aggravates the symptoms. He has tried rest, sleep, acetaminophen and drinking for the symptoms. The treatment provided mild relief. Generalized Body Aches  This is a new problem. The current episode started yesterday. The problem occurs daily. The problem has been unchanged. Associated symptoms include arthralgias, chills, congestion, diaphoresis, fatigue, myalgias, nausea and vomiting. Pertinent negatives include no anorexia, change in bowel habit, chest pain, coughing, fever, headaches, joint swelling, neck pain, numbness, rash, sore throat, swollen glands, urinary symptoms, vertigo, visual change or weakness. Nothing aggravates the symptoms.  He has tried rest, sleep, acetaminophen and drinking for the symptoms. The treatment provided mild relief. Medications    Current Outpatient Medications:     ondansetron (ZOFRAN-ODT) 4 MG disintegrating tablet, Take 1 tablet by mouth 3 times daily as needed for Nausea or Vomiting, Disp: 21 tablet, Rfl: 0    albuterol (PROVENTIL) (2.5 MG/3ML) 0.083% nebulizer solution, inhale contents of 1 vial ( 3 milliliters ) in nebulizer by mouth and INTO THE LUNGS every 4 hours if needed for wheezing, Disp: 375 mL, Rfl: 3    [START ON 2/3/2022] orphenadrine (NORFLEX) 100 MG extended release tablet, Take 1 tablet by mouth 2 times daily as needed for Muscle spasms, Disp: 60 tablet, Rfl: 0    HYDROcodone-acetaminophen (NORCO) 5-325 MG per tablet, Take 1 tablet by mouth every 8 hours as needed for Pain for up to 30 days. , Disp: 90 tablet, Rfl: 0    butalbital-acetaminophen-caffeine (FIORICET, ESGIC) -40 MG per tablet, take 1 tablet by mouth every 4 hours if needed for headache, Disp: 90 tablet, Rfl: 1    lidocaine (LIDODERM) 5 %, Place 1 patch onto the skin every 12 hours 12 hours on, 12 hours off., Disp: 60 patch, Rfl: 2    lidocaine (XYLOCAINE) 5 % ointment, Apply 85 g topically daily as needed for Pain APPLY TOPICALLY TO AFFECTED AREAS OF LOWER BACK AND NECK if needed, Disp: 85 g, Rfl: 2    Respiratory Therapy Supplies (NEBULIZER/TUBING/MOUTHPIECE) KIT, Dispense tubing, mask, and mouthpiece for nebulizer machine.  Dx: Asthma, Disp: 1 kit, Rfl: 0    amitriptyline (ELAVIL) 50 MG tablet, take 1 tablet by mouth at bedtime, Disp: 90 tablet, Rfl: 1    dicyclomine (BENTYL) 10 MG capsule, Take 1 capsule by mouth 3 times daily as needed (abd cramping), Disp: 30 capsule, Rfl: 0    loratadine (CLARITIN) 10 MG tablet, take 1 tablet by mouth once daily, Disp: 90 tablet, Rfl: 3    rosuvastatin (CRESTOR) 20 MG tablet, take 1 tablet by mouth once daily, Disp: 90 tablet, Rfl: 0    omeprazole (PRILOSEC) 20 MG delayed release capsule, take 1 capsule by mouth once daily, Disp: 90 capsule, Rfl: 0    montelukast (SINGULAIR) 10 MG tablet, Take 1 tablet by mouth nightly, Disp: 90 tablet, Rfl: 3    fluticasone (FLONASE) 50 MCG/ACT nasal spray, 2 sprays by Each Nostril route daily, Disp: 3 each, Rfl: 3    diphenhydrAMINE (BENADRYL) 25 MG capsule, Take 1 capsule by mouth every 6 hours as needed for Itching, Disp: 60 capsule, Rfl: 5    betamethasone valerate (VALISONE) 0.1 % cream, apply to affected area twice a day, Disp: 45 g, Rfl: 3    albuterol sulfate  (90 Base) MCG/ACT inhaler, Inhale 2 puffs into the lungs 4 times daily as needed for Wheezing, Disp: 3 Inhaler, Rfl: 3    azelastine (OPTIVAR) 0.05 % ophthalmic solution, Place 1 drop into both eyes 2 times daily, Disp: 1 Bottle, Rfl: 11    FEROSUL 325 (65 Fe) MG tablet, take 1 tablet by mouth once daily, Disp: 90 tablet, Rfl: 3    hydrocortisone valerate (WESTCORT) 0.2 % ointment, Apply topically daily. , Disp: 1 Tube, Rfl: 11    EPINEPHrine (EPIPEN 2-RAGHAV) 0.3 MG/0.3ML SOAJ injection, Inject one pen as directed STAT for allergic reaction, may disp generic NDC 07498-771-33, Disp: 1 each, Rfl: 0    tiotropium (SPIRIVA RESPIMAT) 1.25 MCG/ACT AERS inhaler, Inhale 2 puffs into the lungs daily, Disp: 1 Inhaler, Rfl: 1    budesonide-formoterol (SYMBICORT) 160-4.5 MCG/ACT AERO, 2 puffs inhaled twice daily.   Rinse mouth well after use., Disp: 1 Inhaler, Rfl: 5    cetirizine (ZYRTEC) 10 MG tablet, Take 1 tablet by mouth daily, Disp: 30 tablet, Rfl: 11    bumetanide (BUMEX) 1 MG tablet, Take 0.5 tablets by mouth daily, Disp: 30 tablet, Rfl: 0    NARCAN 4 MG/0.1ML LIQD nasal spray, , Disp: , Rfl:     Multiple Vitamins-Minerals (MULTIVITAMIN ADULTS) TABS, Take 1 tablet by mouth daily, Disp: 90 tablet, Rfl: 3    ergocalciferol (ERGOCALCIFEROL) 68828 units capsule, Take 50,000 Units by mouth as needed , Disp: , Rfl:     The patient is allergic to dilantin [phenytoin], dupixent [dupilumab], oxycodone, and valium [diazepam]. Past Medical History  Jeremi Menard  has a past medical history of Aneurysm ([address] Asthma, Cardiomegaly, COVID-19, Fibromyalgia, and CLARIBEL on CPAP. Subjective:      Review of Systems   Constitutional: Positive for chills, diaphoresis and fatigue. Negative for fever. HENT: Positive for congestion. Negative for sore throat. Respiratory: Negative for cough. Cardiovascular: Negative for chest pain. Gastrointestinal: Positive for nausea and vomiting. Negative for anorexia and change in bowel habit. Musculoskeletal: Positive for arthralgias and myalgias. Negative for joint swelling and neck pain. Skin: Negative for rash. Neurological: Negative for vertigo, weakness, numbness and headaches. Objective:     /86   Pulse 76   Resp 20   Wt (!) 418 lb (189.6 kg)   SpO2 99%   BMI 55.15 kg/m²     Physical Exam  Vitals reviewed. Constitutional:       General: He is not in acute distress. Appearance: Normal appearance. He is well-developed. He is ill-appearing. He is not toxic-appearing. HENT:      Head: Normocephalic and atraumatic. Right Ear: Hearing, tympanic membrane, ear canal and external ear normal.      Left Ear: Hearing, tympanic membrane, ear canal and external ear normal.      Nose: Congestion present. No nasal tenderness. Mouth/Throat:      Lips: Pink. Mouth: Mucous membranes are moist. No oral lesions. Pharynx: Oropharynx is clear. Uvula midline. Posterior oropharyngeal erythema present. No oropharyngeal exudate. Eyes:      General:         Right eye: No discharge. Left eye: No discharge. Conjunctiva/sclera: Conjunctivae normal.   Neck:      Trachea: No tracheal deviation. Cardiovascular:      Rate and Rhythm: Normal rate and regular rhythm. Heart sounds: Normal heart sounds. No murmur heard. Pulmonary:      Effort: Pulmonary effort is normal. No respiratory distress. Breath sounds: Normal breath sounds. Abdominal:      General: Bowel sounds are normal.      Palpations: Abdomen is soft. Tenderness: There is no abdominal tenderness. Musculoskeletal:      Cervical back: Full passive range of motion without pain and neck supple. Lymphadenopathy:      Head:      Right side of head: No submental, submandibular, tonsillar, preauricular, posterior auricular or occipital adenopathy. Left side of head: No submental, submandibular, tonsillar, preauricular, posterior auricular or occipital adenopathy. Cervical: No cervical adenopathy. Skin:     General: Skin is warm and dry. Findings: No rash. Neurological:      General: No focal deficit present. Mental Status: He is alert and oriented to person, place, and time. Coordination: Coordination normal.   Psychiatric:         Mood and Affect: Mood normal.         Behavior: Behavior normal.         Thought Content: Thought content normal.         Judgment: Judgment normal.         Assessment/Plan:      Rajan Lucio was seen today for abdominal pain. Diagnoses and all orders for this visit:    Viral gastroenteritis  -     ondansetron (ZOFRAN-ODT) 4 MG disintegrating tablet; Take 1 tablet by mouth 3 times daily as needed for Nausea or Vomiting    SOB (shortness of breath)  -     POCT COVID-19 Rapid, NAAT    - COVID testing in office was negative. - Rest and increase clear fluids. OK to use Zofran for nausea. - Tylenol as needed for pain and fever.  - Call office with any questions or concerns, or if symptoms are getting worse or changing    Return if symptoms worsen or fail to improve. Patient given educational materials - see patient instructions. Discussed use, benefit, and side effects of prescribed medications. All patient questions answered. Pt voiced understanding.         Electronically signed by MIRNA Oh CNP on 2/2/2022 at 8:50 AM

## 2022-02-01 NOTE — PATIENT INSTRUCTIONS
Patient Education        Gastroenteritis: Care Instructions  Overview     Gastroenteritis is an illness that may cause nausea, vomiting, and diarrhea. It can be caused by bacteria or a virus. You will probably begin to feel better in 1 to 2 days. In the meantime, get plenty of rest and make sure you do not become dehydrated. Dehydration occurs when your body loses too much fluid. Follow-up care is a key part of your treatment and safety. Be sure to make and go to all appointments, and call your doctor if you are having problems. It's also a good idea to know your test results and keep a list of the medicines you take. How can you care for yourself at home? · If your doctor prescribed antibiotics, take them as directed. Do not stop taking them just because you feel better. You need to take the full course of antibiotics. · Drink plenty of fluids to prevent dehydration. Choose water and other clear liquids until you feel better. If you have kidney, heart, or liver disease and have to limit fluids, talk with your doctor before you increase your fluid intake. · Drink fluids slowly, in frequent, small amounts, because drinking too much too fast can cause vomiting. · Begin eating mild foods, such as dry toast, yogurt, applesauce, bananas, and rice. Avoid spicy, hot, or high-fat foods, and do not drink alcohol or caffeine for a day or two. Do not drink milk or eat ice cream until you are feeling better. How to prevent gastroenteritis  · Keep hot foods hot and cold foods cold. · Do not eat meats, dressings, salads, or other foods that have been kept at room temperature for more than 2 hours. · Use a thermometer to check your refrigerator. It should be between 34°F and 40°F.  · Defrost meats in the refrigerator or microwave, not on the kitchen counter. · Keep your hands and your kitchen clean. Wash your hands, cutting boards, and countertops with hot soapy water frequently.   · Cook meat until it is well done.  · Do not eat raw eggs or uncooked sauces made with raw eggs. · Do not take chances. If food looks or tastes spoiled, throw it out. When should you call for help? Call 911 anytime you think you may need emergency care. For example, call if:    · You vomit blood or what looks like coffee grounds.     · You passed out (lost consciousness).     · You pass maroon or very bloody stools. Call your doctor now or seek immediate medical care if:    · You have severe belly pain.     · You have signs of needing more fluids. You have sunken eyes, a dry mouth, and pass only a little urine.     · You feel like you are going to faint.     · You have increased belly pain that does not go away in 1 to 2 days.     · You have new or increased nausea, or you are vomiting.     · You have a new or higher fever.     · Your stools are black and tarlike or have streaks of blood. Watch closely for changes in your health, and be sure to contact your doctor if:    · You are dizzy or lightheaded.     · You urinate less than usual, or your urine is dark yellow or brown.     · You do not feel better with each day that goes by. Where can you learn more? Go to https://[employer]. org and sign in to your [employer] account. Enter N142 in the [facility] box to learn more about \"Gastroenteritis: Care Instructions. \"     If you do not have an account, please click on the \"Sign Up Now\" link. Current as of: July 1, 2021               Content Version: 13.1  © 2006-2021 Healthwise, Incorporated. Care instructions adapted under license by [facility] ([facility]). If you have questions about a medical condition or this instruction, always ask your healthcare professional. [place] [address] any warranty or liability for your use of this information.

## 2022-02-02 ASSESSMENT — ENCOUNTER SYMPTOMS: VISUAL CHANGE: 0

## 2022-02-17 ENCOUNTER — OFFICE VISIT (OUTPATIENT)
Dept: FAMILY MEDICINE CLINIC | Age: 52
End: 2022-02-17
Payer: MEDICAID

## 2022-02-17 VITALS
DIASTOLIC BLOOD PRESSURE: 78 MMHG | BODY MASS INDEX: 54.88 KG/M2 | SYSTOLIC BLOOD PRESSURE: 138 MMHG | HEART RATE: 68 BPM | WEIGHT: 315 LBS | RESPIRATION RATE: 16 BRPM

## 2022-02-17 DIAGNOSIS — R51.9 RECURRENT HEADACHE: ICD-10-CM

## 2022-02-17 DIAGNOSIS — E66.01 MORBID OBESITY WITH BMI OF 50.0-59.9, ADULT (HCC): ICD-10-CM

## 2022-02-17 DIAGNOSIS — Z86.79 H/O: HTN (HYPERTENSION): ICD-10-CM

## 2022-02-17 DIAGNOSIS — D35.2 PITUITARY ADENOMA (HCC): ICD-10-CM

## 2022-02-17 DIAGNOSIS — G47.30 SLEEP APNEA, UNSPECIFIED TYPE: Primary | ICD-10-CM

## 2022-02-17 DIAGNOSIS — E78.5 HYPERLIPIDEMIA, UNSPECIFIED HYPERLIPIDEMIA TYPE: ICD-10-CM

## 2022-02-17 DIAGNOSIS — J45.40 MODERATE PERSISTENT ASTHMA WITHOUT COMPLICATION: ICD-10-CM

## 2022-02-17 PROCEDURE — G8417 CALC BMI ABV UP PARAM F/U: HCPCS | Performed by: NURSE PRACTITIONER

## 2022-02-17 PROCEDURE — 99214 OFFICE O/P EST MOD 30 MIN: CPT | Performed by: NURSE PRACTITIONER

## 2022-02-17 PROCEDURE — 1036F TOBACCO NON-USER: CPT | Performed by: NURSE PRACTITIONER

## 2022-02-17 PROCEDURE — G8427 DOCREV CUR MEDS BY ELIG CLIN: HCPCS | Performed by: NURSE PRACTITIONER

## 2022-02-17 PROCEDURE — G8484 FLU IMMUNIZE NO ADMIN: HCPCS | Performed by: NURSE PRACTITIONER

## 2022-02-17 RX ORDER — BUDESONIDE AND FORMOTEROL FUMARATE DIHYDRATE 160; 4.5 UG/1; UG/1
AEROSOL RESPIRATORY (INHALATION)
Qty: 3 EACH | Refills: 3 | Status: SHIPPED | OUTPATIENT
Start: 2022-02-17

## 2022-02-17 RX ORDER — BUDESONIDE AND FORMOTEROL FUMARATE DIHYDRATE 160; 4.5 UG/1; UG/1
AEROSOL RESPIRATORY (INHALATION)
Qty: 10.2 G | OUTPATIENT
Start: 2022-02-17

## 2022-02-17 ASSESSMENT — ENCOUNTER SYMPTOMS
TROUBLE SWALLOWING: 0
COUGH: 0
FACIAL SWELLING: 0
EYE PAIN: 0
NAUSEA: 0
VOMITING: 0
SHORTNESS OF BREATH: 0
SINUS PAIN: 0
ABDOMINAL PAIN: 0
SORE THROAT: 0
DIARRHEA: 0
WHEEZING: 0
BACK PAIN: 0
COLOR CHANGE: 0

## 2022-02-17 NOTE — PROGRESS NOTES
[address] [place] [address]  Dept: [phone]  Dept Fax: [phone]: [phone]     Visit Date:  2/17/2022      Patient:  Valerio Olmos  YOB: 1970    HPI:     Chief Complaint   Patient presents with    Medication Refill    Headache     taking Fiorcet with some relief, having more frequent headaches    Insomnia       HPI     Pt presents to the office today for ongoing issues with break through headaches and headaches more frequently. Using Fioricet with relief, but having break through headaches. Has tried triptans in the past with no relief. Pt has never tried a preventative. Pt also reports that he has Sleeping issues. Wears a CPAP, but lately it has not been working like it used to. He wakes up sweating and toss and turns all night. He has not followed up with sleep medicine in over a year. He uses elavil for sleep, but only takes that occasionally because it \"knocks him out\". He denies any chest pain or SOB. Sleep-not good quality  Interest- OK  Guilt- OK  Energy- less  Concentration- OK  Appetite- OK  Anxiety-OK    He will be following up with an eye doctor today. Also following up with neurosurgery for pituitary adenoma. Brain MRI and CT of the cervical spine scheduled for March. Medications    Current Outpatient Medications:     budesonide-formoterol (SYMBICORT) 160-4.5 MCG/ACT AERO, 2 puffs inhaled twice daily.   Rinse mouth well after use., Disp: 3 each, Rfl: 3    ondansetron (ZOFRAN-ODT) 4 MG disintegrating tablet, Take 1 tablet by mouth 3 times daily as needed for Nausea or Vomiting, Disp: 21 tablet, Rfl: 0    albuterol (PROVENTIL) (2.5 MG/3ML) 0.083% nebulizer solution, inhale contents of 1 vial ( 3 milliliters ) in nebulizer by mouth and INTO THE LUNGS every 4 hours if needed for wheezing, Disp: 375 mL, Rfl: 3    orphenadrine (NORFLEX) 100 MG extended release tablet, Take 1 tablet by mouth 2 times daily as needed for Muscle spasms, Disp: 60 tablet, Rfl: 0    HYDROcodone-acetaminophen (NORCO) 5-325 MG per tablet, Take 1 tablet by mouth every 8 hours as needed for Pain for up to 30 days. , Disp: 90 tablet, Rfl: 0    butalbital-acetaminophen-caffeine (FIORICET, ESGIC) -40 MG per tablet, take 1 tablet by mouth every 4 hours if needed for headache, Disp: 90 tablet, Rfl: 1    lidocaine (LIDODERM) 5 %, Place 1 patch onto the skin every 12 hours 12 hours on, 12 hours off., Disp: 60 patch, Rfl: 2    Respiratory Therapy Supplies (NEBULIZER/TUBING/MOUTHPIECE) KIT, Dispense tubing, mask, and mouthpiece for nebulizer machine.  Dx: Asthma, Disp: 1 kit, Rfl: 0    amitriptyline (ELAVIL) 50 MG tablet, take 1 tablet by mouth at bedtime, Disp: 90 tablet, Rfl: 1    dicyclomine (BENTYL) 10 MG capsule, Take 1 capsule by mouth 3 times daily as needed (abd cramping), Disp: 30 capsule, Rfl: 0    loratadine (CLARITIN) 10 MG tablet, take 1 tablet by mouth once daily, Disp: 90 tablet, Rfl: 3    rosuvastatin (CRESTOR) 20 MG tablet, take 1 tablet by mouth once daily, Disp: 90 tablet, Rfl: 0    omeprazole (PRILOSEC) 20 MG delayed release capsule, take 1 capsule by mouth once daily, Disp: 90 capsule, Rfl: 0    montelukast (SINGULAIR) 10 MG tablet, Take 1 tablet by mouth nightly, Disp: 90 tablet, Rfl: 3    fluticasone (FLONASE) 50 MCG/ACT nasal spray, 2 sprays by Each Nostril route daily, Disp: 3 each, Rfl: 3    diphenhydrAMINE (BENADRYL) 25 MG capsule, Take 1 capsule by mouth every 6 hours as needed for Itching, Disp: 60 capsule, Rfl: 5    betamethasone valerate (VALISONE) 0.1 % cream, apply to affected area twice a day, Disp: 45 g, Rfl: 3    albuterol sulfate  (90 Base) MCG/ACT inhaler, Inhale 2 puffs into the lungs 4 times daily as needed for Wheezing, Disp: 3 Inhaler, Rfl: 3    azelastine (OPTIVAR) 0.05 % ophthalmic solution, Place 1 drop into both eyes 2 times daily, Disp: 1 Bottle, Rfl: 11    FEROSUL 325 (65 Fe) MG tablet, take 1 tablet by mouth once daily, Disp: 90 tablet, Rfl: 3    EPINEPHrine (EPIPEN 2-RAGHAV) 0.3 MG/0.3ML SOAJ injection, Inject one pen as directed STAT for allergic reaction, may disp generic NDC 18914-346-55, Disp: 1 each, Rfl: 0    tiotropium (SPIRIVA RESPIMAT) 1.25 MCG/ACT AERS inhaler, Inhale 2 puffs into the lungs daily, Disp: 1 Inhaler, Rfl: 1    cetirizine (ZYRTEC) 10 MG tablet, Take 1 tablet by mouth daily, Disp: 30 tablet, Rfl: 11    bumetanide (BUMEX) 1 MG tablet, Take 0.5 tablets by mouth daily, Disp: 30 tablet, Rfl: 0    Multiple Vitamins-Minerals (MULTIVITAMIN ADULTS) TABS, Take 1 tablet by mouth daily, Disp: 90 tablet, Rfl: 3    ergocalciferol (ERGOCALCIFEROL) 87505 units capsule, Take 50,000 Units by mouth as needed , Disp: , Rfl:     hydrocortisone valerate (WESTCORT) 0.2 % ointment, Apply topically daily. (Patient not taking: Reported on 2/17/2022), Disp: 1 Tube, Rfl: 11    NARCAN 4 MG/0.1ML LIQD nasal spray, , Disp: , Rfl:     The patient is allergic to dilantin [phenytoin], dupixent [dupilumab], oxycodone, and valium [diazepam]. Past Medical History  Real Castro  has a past medical history of Aneurysm ([address] Asthma, Cardiomegaly, COVID-19, Fibromyalgia, and CLARIBEL on CPAP. Subjective:      Review of Systems   Constitutional: Negative for chills, fatigue and fever. HENT: Negative for congestion, facial swelling, sinus pain, sore throat and trouble swallowing. Eyes: Negative for pain and visual disturbance. Respiratory: Negative for cough, shortness of breath and wheezing. Cardiovascular: Negative for chest pain and palpitations. Gastrointestinal: Negative for abdominal pain, diarrhea, nausea and vomiting. Genitourinary: Negative for difficulty urinating, dysuria and urgency. Musculoskeletal: Negative for back pain, gait problem and neck pain. Skin: Negative for color change and rash. Neurological: Positive for headaches. Negative for dizziness and weakness. Psychiatric/Behavioral: Positive for sleep disturbance. Negative for agitation. The patient is not nervous/anxious. Objective:     /78   Pulse 68   Resp 16   Wt (!) 416 lb (188.7 kg)   BMI 54.88 kg/m²     Physical Exam  Vitals reviewed. Constitutional:       General: He is not in acute distress. Appearance: He is well-developed. HENT:      Head: Normocephalic and atraumatic. Nose: Nose normal. No congestion. Eyes:      General:         Right eye: No discharge. Left eye: No discharge. Conjunctiva/sclera: Conjunctivae normal.   Cardiovascular:      Rate and Rhythm: Normal rate and regular rhythm. Heart sounds: Normal heart sounds. Pulmonary:      Effort: Pulmonary effort is normal. No respiratory distress. Breath sounds: Normal breath sounds. Skin:     General: Skin is warm and dry. Neurological:      General: No focal deficit present. Mental Status: He is alert and oriented to person, place, and time. Cranial Nerves: No cranial nerve deficit. Coordination: Coordination normal.   Psychiatric:         Mood and Affect: Mood normal.         Behavior: Behavior normal.         Thought Content: Thought content normal.         Judgment: Judgment normal.         Assessment/Plan:      Lani Lopez was seen today for medication refill, headache and insomnia. Diagnoses and all orders for this visit:    Sleep apnea, unspecified type    Morbid obesity with BMI of 50.0-59.9, adult ([facility]    Hyperlipidemia, unspecified hyperlipidemia type  -     Lipid Panel; Future    H/O: HTN (hypertension)    Moderate persistent asthma without complication  -     budesonide-formoterol (SYMBICORT) 160-4.5 MCG/ACT AERO; 2 puffs inhaled twice daily. Rinse mouth well after use. Recurrent headache  -     TSH; Future  -     T4, Free; Future  -     Vitamin D 25 Hydroxy; Future  -     Comprehensive Metabolic Panel;  Future    Pituitary adenoma [facility])    - Discussed symptoms with patient. Currently, he is following up with neurosurgery and will be seeing endocrinology for further evaluation of his pituitary adenoma. We discussed adding a preventative medication for headaches, but with his other work ups we will wait for now. Pt does follow up with pain management and norco is prescribed for him in that office. Continue with Fioricet PRN headaches, may add aleve as well. - Labs after 12 hours fasting.   - Follow up in a few months after labs for a wellness visit. - Call office with any questions or concerns, or if symptoms are getting worse or changing  - Off work note provided. Return in about 3 months (around 5/17/2022), or if symptoms worsen or fail to improve. Patient given educational materials - see patient instructions. Discussed use, benefit, and side effects of prescribed medications. All patient questions answered. Pt voiced understanding.         Electronically signed by Alfred Phalen, APRN - CNP on 2/18/2022 at 7:57 AM

## 2022-02-17 NOTE — LETTER
[address] [address]  Phone: [phone]  Fax: MIRNA Reece CNP        February 17, 2022     Patient: Fer Lopez   YOB: 1970   Date of Visit: 2/17/2022       To Whom It May Concern: It is my medical opinion that Shantell Ackerman may return to work on Saturday Feb 19, 2022 without restrictions. If you have any questions or concerns, please don't hesitate to call.     Sincerely,        MIRNA Zarco CNP

## 2022-02-21 DIAGNOSIS — G89.4 CHRONIC PAIN SYNDROME: ICD-10-CM

## 2022-02-21 NOTE — TELEPHONE ENCOUNTER
Fer Lopez called requesting a refill on the following medications:  Requested Prescriptions     Pending Prescriptions Disp Refills    HYDROcodone-acetaminophen (NORCO) 5-325 MG per tablet 90 tablet 0     Sig: Take 1 tablet by mouth every 8 hours as needed for Pain for up to 30 days. [facility]   . pv      Date of last visit:   Date of next visit (if applicable): 5/13/4330

## 2022-02-22 RX ORDER — HYDROCODONE BITARTRATE AND ACETAMINOPHEN 5; 325 MG/1; MG/1
1 TABLET ORAL EVERY 8 HOURS PRN
Qty: 90 TABLET | Refills: 0 | Status: SHIPPED | OUTPATIENT
Start: 2022-02-22 | End: 2022-03-24 | Stop reason: SDUPTHER

## 2022-02-24 ENCOUNTER — OFFICE VISIT (OUTPATIENT)
Dept: PHYSICAL MEDICINE AND REHAB | Age: 52
End: 2022-02-24
Payer: COMMERCIAL

## 2022-02-24 VITALS
SYSTOLIC BLOOD PRESSURE: 160 MMHG | HEIGHT: 73 IN | WEIGHT: 315 LBS | DIASTOLIC BLOOD PRESSURE: 80 MMHG | BODY MASS INDEX: 41.75 KG/M2

## 2022-02-24 DIAGNOSIS — Z98.1 HX OF FUSION OF CERVICAL SPINE: ICD-10-CM

## 2022-02-24 DIAGNOSIS — M50.30 DDD (DEGENERATIVE DISC DISEASE), CERVICAL: ICD-10-CM

## 2022-02-24 DIAGNOSIS — M62.838 SPASM OF MUSCLE: ICD-10-CM

## 2022-02-24 DIAGNOSIS — M54.10 RADICULOPATHY AFFECTING UPPER EXTREMITY: ICD-10-CM

## 2022-02-24 DIAGNOSIS — M47.816 LUMBAR SPONDYLOSIS: ICD-10-CM

## 2022-02-24 DIAGNOSIS — M54.12 CERVICAL RADICULOPATHY: ICD-10-CM

## 2022-02-24 DIAGNOSIS — M54.12 CERVICAL RADICULITIS: ICD-10-CM

## 2022-02-24 DIAGNOSIS — M54.17 LUMBOSACRAL RADICULITIS: ICD-10-CM

## 2022-02-24 DIAGNOSIS — M51.36 LUMBAR DEGENERATIVE DISC DISEASE: ICD-10-CM

## 2022-02-24 DIAGNOSIS — D35.2 PITUITARY ADENOMA (HCC): ICD-10-CM

## 2022-02-24 DIAGNOSIS — M47.816 SPONDYLOSIS OF LUMBAR REGION WITHOUT MYELOPATHY OR RADICULOPATHY: Primary | ICD-10-CM

## 2022-02-24 DIAGNOSIS — G89.4 CHRONIC PAIN SYNDROME: ICD-10-CM

## 2022-02-24 PROCEDURE — G8484 FLU IMMUNIZE NO ADMIN: HCPCS | Performed by: NURSE PRACTITIONER

## 2022-02-24 PROCEDURE — 1036F TOBACCO NON-USER: CPT | Performed by: NURSE PRACTITIONER

## 2022-02-24 PROCEDURE — G8427 DOCREV CUR MEDS BY ELIG CLIN: HCPCS | Performed by: NURSE PRACTITIONER

## 2022-02-24 PROCEDURE — 99214 OFFICE O/P EST MOD 30 MIN: CPT | Performed by: NURSE PRACTITIONER

## 2022-02-24 PROCEDURE — G8417 CALC BMI ABV UP PARAM F/U: HCPCS | Performed by: NURSE PRACTITIONER

## 2022-02-24 RX ORDER — ORPHENADRINE CITRATE 100 MG/1
100 TABLET, EXTENDED RELEASE ORAL 2 TIMES DAILY PRN
Qty: 60 TABLET | Refills: 0 | Status: SHIPPED | OUTPATIENT
Start: 2022-03-05 | End: 2022-03-28 | Stop reason: SDUPTHER

## 2022-02-24 RX ORDER — LIDOCAINE 50 MG/G
85 OINTMENT TOPICAL DAILY PRN
Qty: 85 G | Refills: 2 | Status: SHIPPED | OUTPATIENT
Start: 2022-02-24 | End: 2022-03-26

## 2022-02-24 ASSESSMENT — ENCOUNTER SYMPTOMS
APNEA: 1
BACK PAIN: 1
GASTROINTESTINAL NEGATIVE: 1
PHOTOPHOBIA: 1

## 2022-02-24 NOTE — PROGRESS NOTES
[address]  Dept: [phone]  Dept Fax: [phone]: [phone]    Visit Date: 2/24/2022    Functionality Assessment/Goals Worksheet     On a scale of 0 (Does not Interfere) to 10 (Completely Interferes)     1. Which number describes how during the past week pain has interfered with       the following:  A. General Activity:  10  B. Mood: 10  C. Walking Ability:  10  D. Normal Work (Includes both work outside the home and housework):  10  E. Relations with Other People:   10  F. Sleep:   10  G. Enjoyment of Life:   10    2. Patient Prefers to Take their Pain Medications:     []  On a regular basis   [x]  Only when necessary    []  Does not take pain medications    3. What are the Patient's Goals/Expectations for Visiting Pain Management? []  Learn about my pain    [x]  Receive Medication   []  Physical Therapy     []  Treat Depression   [x]  Receive Injections    [x]  Treat Sleep   []  Deal with Anxiety and Stress   []  Treat Opoid Dependence/Addiction   []  Other:      HPI:   Bree Abraham is a 54 y.o. male is here today for    Chief Complaint: Low back pain, leg pain, head pain, joint pain      HPI   1.5 month FU. Has multiple pain complaints of pain all over. Pain in low back legs, knees, neck pain and headache. States that pain has been constant and elevated. Still feels balance is off but not using any assist devices. Currently following with neurosurgery and going through work up for pituitary adenoma and awaiting Ct cervical spine and MRI of brain    Continues to have a lot of spasms    Pain increases with reaching, walking, standing, getting up and down and housework or working at job. Medications reviewed. Patient denies side effects with medications. Patient states he is taking medications as prescribed.  Hedenies receiving pain medications from other sources. He denies any ER visits since last visit. Pain scale with out pain medications or at its worst is 10/10. Pain scale with pain medications or at its best is 6-8/10. Last dose of Norco was today   Drug screen reviewed from 1/19/2021 and was appropriate  Pill count completed  today and WNL: Yes      The patientis allergic to dilantin [phenytoin], dupixent [dupilumab], oxycodone, and valium [diazepam]. Subjective:      Review of Systems   Constitutional: Positive for chills, fatigue and unexpected weight change. Negative for activity change. Night sweats, losing weight    HENT: Negative. Eyes: Positive for photophobia and visual disturbance. Respiratory: Positive for apnea. Cardiovascular: Positive for leg swelling. Gastrointestinal: Negative. Endocrine: Negative. Genitourinary: Negative. Musculoskeletal: Positive for arthralgias, back pain, gait problem, joint swelling, myalgias, neck pain and neck stiffness. Not using any assist devices    Skin: Negative. Neurological: Positive for weakness, numbness and headaches. Psychiatric/Behavioral: Positive for sleep disturbance. Negative for dysphoric mood. The patient is not nervous/anxious. Objective:     Vitals:    02/24/22 0728   BP: (!) 160/80   Weight: (!) 416 lb (188.7 kg)   Height: 6' 1\" (1.854 m)       Physical Exam  Vitals and nursing note reviewed. Constitutional:       General: He is not in acute distress. Appearance: He is obese. He is not diaphoretic. HENT:      Head: Normocephalic and atraumatic. Right Ear: External ear normal.      Left Ear: External ear normal.      Nose: Nose normal.      Mouth/Throat:      Mouth: Mucous membranes are moist.      Pharynx: No oropharyngeal exudate. Eyes:      General: No scleral icterus. Right eye: No discharge. Left eye: No discharge.       Conjunctiva/sclera: Conjunctivae normal.      Pupils: Pupils are equal, round, and reactive to light. Neck:      Thyroid: No thyromegaly. Cardiovascular:      Rate and Rhythm: Normal rate and regular rhythm. Heart sounds: Normal heart sounds. No murmur heard. No friction rub. No gallop. Pulmonary:      Effort: Pulmonary effort is normal. No respiratory distress. Breath sounds: Normal breath sounds. No wheezing or rales. Chest:      Chest wall: No tenderness. Abdominal:      General: Bowel sounds are normal. There is no distension. Palpations: Abdomen is soft. Tenderness: There is no abdominal tenderness. There is no guarding or rebound. Musculoskeletal:         General: Tenderness present. Cervical back: Normal range of motion. Rigidity, spasms, tenderness and bony tenderness present. No edema or erythema. Pain with movement present. No muscular tenderness. Normal range of motion. Thoracic back: Spasms, tenderness and bony tenderness present. Lumbar back: Tenderness and bony tenderness present. Decreased range of motion. Positive right straight leg raise test and positive left straight leg raise test.        Back:       Right knee: Bony tenderness present. Tenderness present. Left knee: Bony tenderness present. Tenderness present. Right lower leg: Swelling present. Edema present. Left lower leg: Swelling present. Edema present. Right ankle: Swelling present. Left ankle: Swelling present. Skin:     General: Skin is warm. Coloration: Skin is not pale. Findings: No erythema or rash. Neurological:      General: No focal deficit present. Mental Status: He is alert and oriented to person, place, and time. He is not disoriented. Cranial Nerves: No cranial nerve deficit. Sensory: Sensory deficit present. Motor: Weakness present. No atrophy or abnormal muscle tone.       Coordination: Coordination normal.      Gait: Gait abnormal.      Comments: Motor 4/5 lower extremities, 5/5 upper Psychiatric:         Attention and Perception: Attention normal. He is attentive. Mood and Affect: Mood normal. Mood is not anxious or depressed. Affect is not labile, blunt, angry or inappropriate. Speech: Speech normal. He is communicative. Speech is not rapid and pressured, delayed, slurred or tangential.         Behavior: Behavior normal. Behavior is not agitated, slowed, aggressive, withdrawn, hyperactive or combative. Behavior is cooperative. Thought Content: Thought content normal. Thought content is not paranoid or delusional. Thought content does not include homicidal or suicidal ideation. Thought content does not include homicidal or suicidal plan. Cognition and Memory: Cognition normal. Memory is not impaired. He does not exhibit impaired recent memory or impaired remote memory. Judgment: Judgment normal. Judgment is not impulsive or inappropriate. VAL  Patricks test  positive  Yeoman's  positive  Gaenslen's  positive       Assessment:     1. Spondylosis of lumbar region without myelopathy or radiculopathy    2. Lumbar degenerative disc disease    3. Lumbosacral radiculitis    4. Cervical radiculopathy    5. DDD (degenerative disc disease), cervical    6. Cervical radiculitis    7. Hx of fusion of cervical spine    8. Spasm of muscle    9. Pituitary adenoma ([address]    10. Chronic pain syndrome    11. Radiculopathy affecting upper extremity    12. Lumbar spondylosis            Plan:      · OARRS reviewed. Current MED: 15.00  · Patient was offered naloxone for home. · Discussed long term side effects of medications, tolerance, dependency and addiction. · Previous UDS reviewed  · UDS preformed today for compliance. · Patient told can not receive any pain medications from any other source. · No evidence of abuse, diversion or aberrant behavior.    Medications and/or procedures to improve function and quality of life- patient understanding with this and that may not be pain free   Discussed with patient about safe storage of medications at home   Discussed possible weaning of medication dosing dependent on treatment/procedure results.  Discussed with patient about risks with procedure including infection, reaction to medication, increased pain, or bleeding. · Procedure notes reveiwed in detail  · Received 80% relief of low back pain and also relief of leg pain from bilateral L-facet MBB # 2 for 3 weeks with improvement of mobility and activity.   · Will hold off on procedures at this time. Awaiting work up with neurosurgery d/t enlarged pituitary gland/ microadenoma- for CT of Cervical spine and MRI of brain  · Discussed possible L-facet RFA, LESI in future   · Continue current pain medications at this time while awaiting procedures- Decker 5/325 TID prn- filled 2/22/2022  · Continue Norflex- ordered refill, Continue Lidoderm patches and ointment   · Recent UDS appropriate. Will repeat at next visit       Meds. Prescribed:   Orders Placed This Encounter   Medications    orphenadrine (NORFLEX) 100 MG extended release tablet     Sig: Take 1 tablet by mouth 2 times daily as needed for Muscle spasms     Dispense:  60 tablet     Refill:  0    lidocaine (XYLOCAINE) 5 % ointment     Sig: Apply 85 g topically daily as needed for Pain APPLY TOPICALLY TO AFFECTED AREAS OF LOWER BACK AND NECK if needed     Dispense:  85 g     Refill:  2       Return in about 2 months (around 4/24/2022), or if symptoms worsen or fail to improve, for follow up  for medications.                Electronically signed by MIRNA Briceño CNP on2/24/2022 at 8:25 AM

## 2022-02-26 DIAGNOSIS — E78.5 HYPERLIPIDEMIA, UNSPECIFIED HYPERLIPIDEMIA TYPE: ICD-10-CM

## 2022-02-28 RX ORDER — ROSUVASTATIN CALCIUM 20 MG/1
TABLET, COATED ORAL
Qty: 90 TABLET | Refills: 0 | Status: SHIPPED | OUTPATIENT
Start: 2022-02-28 | End: 2022-05-18

## 2022-02-28 RX ORDER — OMEPRAZOLE 20 MG/1
CAPSULE, DELAYED RELEASE ORAL
Qty: 90 CAPSULE | Refills: 0 | Status: SHIPPED | OUTPATIENT
Start: 2022-02-28 | End: 2022-04-02 | Stop reason: ALTCHOICE

## 2022-02-28 NOTE — TELEPHONE ENCOUNTER
This medication refill is regarding a electronic request.  Refill requested by [facility]. Requested Prescriptions     Pending Prescriptions Disp Refills    rosuvastatin (CRESTOR) 20 MG tablet [Pharmacy Med Name: ROSUVASTATIN CALCIUM 20 MG TAB] 30 tablet 0     Sig: take 1 tablet by mouth once daily    omeprazole (PRILOSEC) 20 MG delayed release capsule [Pharmacy Med Name: OMEPRAZOLE DR 20 MG CAPSULE] 30 capsule 0     Sig: take 1 capsule by mouth once daily     Date of last visit: 2/17/2022   Date of next visit: 5/18/2022  Date of last refill: 11/22/21 #90/0 for both    Last Lipid Panel:    Lab Results   Component Value Date    CHOL 188 09/26/2019    TRIG 97 09/26/2019    HDL 39 09/26/2019    LDLCALC 130 09/26/2019     Last CMP:   Lab Results   Component Value Date     09/09/2021    K 4.4 09/09/2021     09/09/2021    CO2 26 09/09/2021    BUN 13 09/09/2021    CREATININE 0.9 09/09/2021    GLUCOSE 98 09/09/2021    CALCIUM 9.7 09/09/2021    PROT 7.1 09/09/2021    LABALBU 4.1 09/09/2021    BILITOT 0.2 (L) 09/09/2021    ALKPHOS 136 (H) 09/09/2021    AST 23 09/09/2021    ALT 25 09/09/2021    LABGLOM 90 09/09/2021     Rx verified, ordered and set to EP. Please advise. Spoke to pt he plans to have lab work done on 3/2/22 when he goes in for MRI. Order pended for #30/0.

## 2022-03-02 ENCOUNTER — HOSPITAL ENCOUNTER (OUTPATIENT)
Dept: CT IMAGING | Age: 52
Discharge: HOME | End: 2022-03-02
Payer: COMMERCIAL

## 2022-03-02 ENCOUNTER — HOSPITAL ENCOUNTER (OUTPATIENT)
Dept: MRI IMAGING | Age: 52
Discharge: HOME | End: 2022-03-02
Payer: COMMERCIAL

## 2022-03-02 DIAGNOSIS — Z98.1 S/P CERVICAL SPINAL FUSION: ICD-10-CM

## 2022-03-02 DIAGNOSIS — D35.2 PITUITARY ADENOMA (HCC): ICD-10-CM

## 2022-03-02 PROCEDURE — 70553 MRI BRAIN STEM W/O & W/DYE: CPT

## 2022-03-02 PROCEDURE — 72125 CT NECK SPINE W/O DYE: CPT

## 2022-03-02 PROCEDURE — A9579 GAD-BASE MR CONTRAST NOS,1ML: HCPCS | Performed by: PHYSICIAN ASSISTANT

## 2022-03-02 PROCEDURE — 6360000004 HC RX CONTRAST MEDICATION: Performed by: PHYSICIAN ASSISTANT

## 2022-03-02 RX ADMIN — GADOTERIDOL 20 ML: 279.3 INJECTION, SOLUTION INTRAVENOUS at 08:57

## 2022-03-07 ENCOUNTER — TELEPHONE (OUTPATIENT)
Dept: PHYSICAL MEDICINE AND REHAB | Age: 52
End: 2022-03-07

## 2022-03-07 NOTE — TELEPHONE ENCOUNTER
Patient is calling regarding his recent script for a muscle relaxer. He said that the pharmacy advised him that it is needing a prior authorization for this med and he is wanting to know how quickly this can be done because he is in a lot of pain.     orphenadrine (NORFLEX) 100 MG extended release tablet [[id number]]    DOLV 02/24/2022 DONV 04/28/2022

## 2022-03-14 ENCOUNTER — OFFICE VISIT (OUTPATIENT)
Dept: FAMILY MEDICINE CLINIC | Age: 52
End: 2022-03-14
Payer: COMMERCIAL

## 2022-03-14 VITALS
HEART RATE: 68 BPM | SYSTOLIC BLOOD PRESSURE: 138 MMHG | RESPIRATION RATE: 16 BRPM | WEIGHT: 315 LBS | DIASTOLIC BLOOD PRESSURE: 88 MMHG | BODY MASS INDEX: 54.09 KG/M2 | TEMPERATURE: 98.3 F

## 2022-03-14 DIAGNOSIS — R51.9 RECURRENT HEADACHE: Primary | ICD-10-CM

## 2022-03-14 DIAGNOSIS — F33.2 SEVERE EPISODE OF RECURRENT MAJOR DEPRESSIVE DISORDER, WITHOUT PSYCHOTIC FEATURES (HCC): ICD-10-CM

## 2022-03-14 PROCEDURE — 99213 OFFICE O/P EST LOW 20 MIN: CPT | Performed by: NURSE PRACTITIONER

## 2022-03-14 PROCEDURE — G8484 FLU IMMUNIZE NO ADMIN: HCPCS | Performed by: NURSE PRACTITIONER

## 2022-03-14 PROCEDURE — G8417 CALC BMI ABV UP PARAM F/U: HCPCS | Performed by: NURSE PRACTITIONER

## 2022-03-14 PROCEDURE — G8427 DOCREV CUR MEDS BY ELIG CLIN: HCPCS | Performed by: NURSE PRACTITIONER

## 2022-03-14 PROCEDURE — 1036F TOBACCO NON-USER: CPT | Performed by: NURSE PRACTITIONER

## 2022-03-14 ASSESSMENT — PATIENT HEALTH QUESTIONNAIRE - PHQ9
10. IF YOU CHECKED OFF ANY PROBLEMS, HOW DIFFICULT HAVE THESE PROBLEMS MADE IT FOR YOU TO DO YOUR WORK, TAKE CARE OF THINGS AT HOME, OR GET ALONG WITH OTHER PEOPLE: 2
3. TROUBLE FALLING OR STAYING ASLEEP: 3
6. FEELING BAD ABOUT YOURSELF - OR THAT YOU ARE A FAILURE OR HAVE LET YOURSELF OR YOUR FAMILY DOWN: 3
SUM OF ALL RESPONSES TO PHQ QUESTIONS 1-9: 24
SUM OF ALL RESPONSES TO PHQ9 QUESTIONS 1 & 2: 6
9. THOUGHTS THAT YOU WOULD BE BETTER OFF DEAD, OR OF HURTING YOURSELF: 0
SUM OF ALL RESPONSES TO PHQ QUESTIONS 1-9: 24
4. FEELING TIRED OR HAVING LITTLE ENERGY: 3
2. FEELING DOWN, DEPRESSED OR HOPELESS: 3
SUM OF ALL RESPONSES TO PHQ QUESTIONS 1-9: 24
7. TROUBLE CONCENTRATING ON THINGS, SUCH AS READING THE NEWSPAPER OR WATCHING TELEVISION: 3
5. POOR APPETITE OR OVEREATING: 3
1. LITTLE INTEREST OR PLEASURE IN DOING THINGS: 3
SUM OF ALL RESPONSES TO PHQ QUESTIONS 1-9: 24
8. MOVING OR SPEAKING SO SLOWLY THAT OTHER PEOPLE COULD HAVE NOTICED. OR THE OPPOSITE, BEING SO FIGETY OR RESTLESS THAT YOU HAVE BEEN MOVING AROUND A LOT MORE THAN USUAL: 3

## 2022-03-14 ASSESSMENT — ENCOUNTER SYMPTOMS
NAUSEA: 0
BACK PAIN: 0
PHOTOPHOBIA: 1
COUGH: 0
VISUAL CHANGE: 0
SORE THROAT: 0
BLURRED VISION: 0
VOMITING: 0
SINUS PRESSURE: 0
EYE PAIN: 0

## 2022-03-14 NOTE — PROGRESS NOTES
[address] [facility] [address]  Dept: [phone]  Dept Fax: [phone]: [phone]     Visit Date:  3/14/2022      Patient:  Jo Betancourt  YOB: 1970    HPI:     Chief Complaint   Patient presents with    Headache     Symptoms have gotten worse and needs a note to go back to work since he has been off the last 3 days. Pt presents to the office today for ongoing issues with his headaches. He did miss work and needs a note to go back. The headache is better today and he feels he can work. He does have a follow up appt with endocrinology on 3/21/22 and neurosurgery on 3/28/22. He had a repeat MRI and Cervical CT scan done last week. Headache   This is a recurrent problem. The problem has been resolved. The pain is located in the retro-orbital region. The pain does not radiate. The pain quality is similar to prior headaches. The quality of the pain is described as squeezing and aching. The pain is moderate. Associated symptoms include insomnia, neck pain, phonophobia and photophobia. Pertinent negatives include no abnormal behavior, anorexia, back pain, blurred vision, coughing, drainage, ear pain, eye pain, fever, hearing loss, nausea, numbness, sinus pressure, sore throat, tingling, visual change or vomiting. He has tried NSAIDs for the symptoms. The treatment provided moderate relief. His past medical history is significant for migraine headaches. Impression    Stable hypoenhancing 6 mm nodule in the right aspect of the pituitary gland likely representing a microadenoma. No cavernous sinus involvement is present.                   **This report has been created using voice recognition software. It may contain minor errors which are inherent in voice recognition technology. **           Final report electronically signed by Dr. Hans Archibald MD on 3/2/2022 11:59 AM       Impression and INTO THE LUNGS every 4 hours if needed for wheezing, Disp: 375 mL, Rfl: 3    butalbital-acetaminophen-caffeine (FIORICET, ESGIC) -40 MG per tablet, take 1 tablet by mouth every 4 hours if needed for headache, Disp: 90 tablet, Rfl: 1    lidocaine (LIDODERM) 5 %, Place 1 patch onto the skin every 12 hours 12 hours on, 12 hours off., Disp: 60 patch, Rfl: 2    Respiratory Therapy Supplies (NEBULIZER/TUBING/MOUTHPIECE) KIT, Dispense tubing, mask, and mouthpiece for nebulizer machine. Dx: Asthma, Disp: 1 kit, Rfl: 0    amitriptyline (ELAVIL) 50 MG tablet, take 1 tablet by mouth at bedtime, Disp: 90 tablet, Rfl: 1    dicyclomine (BENTYL) 10 MG capsule, Take 1 capsule by mouth 3 times daily as needed (abd cramping), Disp: 30 capsule, Rfl: 0    loratadine (CLARITIN) 10 MG tablet, take 1 tablet by mouth once daily, Disp: 90 tablet, Rfl: 3    montelukast (SINGULAIR) 10 MG tablet, Take 1 tablet by mouth nightly, Disp: 90 tablet, Rfl: 3    fluticasone (FLONASE) 50 MCG/ACT nasal spray, 2 sprays by Each Nostril route daily, Disp: 3 each, Rfl: 3    diphenhydrAMINE (BENADRYL) 25 MG capsule, Take 1 capsule by mouth every 6 hours as needed for Itching, Disp: 60 capsule, Rfl: 5    betamethasone valerate (VALISONE) 0.1 % cream, apply to affected area twice a day, Disp: 45 g, Rfl: 3    albuterol sulfate  (90 Base) MCG/ACT inhaler, Inhale 2 puffs into the lungs 4 times daily as needed for Wheezing, Disp: 3 Inhaler, Rfl: 3    azelastine (OPTIVAR) 0.05 % ophthalmic solution, Place 1 drop into both eyes 2 times daily, Disp: 1 Bottle, Rfl: 11    FEROSUL 325 (65 Fe) MG tablet, take 1 tablet by mouth once daily, Disp: 90 tablet, Rfl: 3    hydrocortisone valerate (WESTCORT) 0.2 % ointment, Apply topically daily. , Disp: 1 Tube, Rfl: 11    EPINEPHrine (EPIPEN 2-RAGHAV) 0.3 MG/0.3ML SOAJ injection, Inject one pen as directed STAT for allergic reaction, may disp generic NDC [id number], Disp: 1 each, Rfl: 0   tiotropium (SPIRIVA RESPIMAT) 1.25 MCG/ACT AERS inhaler, Inhale 2 puffs into the lungs daily, Disp: 1 Inhaler, Rfl: 1    cetirizine (ZYRTEC) 10 MG tablet, Take 1 tablet by mouth daily, Disp: 30 tablet, Rfl: 11    bumetanide (BUMEX) 1 MG tablet, Take 0.5 tablets by mouth daily, Disp: 30 tablet, Rfl: 0    NARCAN 4 MG/0.1ML LIQD nasal spray, , Disp: , Rfl:     Multiple Vitamins-Minerals (MULTIVITAMIN ADULTS) TABS, Take 1 tablet by mouth daily, Disp: 90 tablet, Rfl: 3    ergocalciferol (ERGOCALCIFEROL) 34816 units capsule, Take 50,000 Units by mouth as needed , Disp: , Rfl:     The patient is allergic to dilantin [phenytoin], dupixent [dupilumab], oxycodone, and valium [diazepam]. Past Medical History  Annie Bright  has a past medical history of Aneurysm ([address] Asthma, Cardiomegaly, COVID-19, Fibromyalgia, and CLARIBEL on CPAP. Subjective:      Review of Systems   Constitutional: Negative for fever. HENT: Negative for ear pain, hearing loss, sinus pressure and sore throat. Eyes: Positive for photophobia. Negative for blurred vision and pain. Respiratory: Negative for cough. Gastrointestinal: Negative for anorexia, nausea and vomiting. Musculoskeletal: Positive for neck pain. Negative for back pain. Neurological: Positive for headaches. Negative for tingling and numbness. Psychiatric/Behavioral: The patient has insomnia. Objective:     /88   Pulse 68   Temp 98.3 °F (36.8 °C) (Oral)   Resp 16   Wt (!) 410 lb (186 kg)   BMI 54.09 kg/m²     Physical Exam  Vitals reviewed. Constitutional:       General: He is not in acute distress. Appearance: He is well-developed. HENT:      Head: Normocephalic and atraumatic. Eyes:      Extraocular Movements: Extraocular movements intact. Conjunctiva/sclera: Conjunctivae normal.      Pupils: Pupils are equal, round, and reactive to light. Cardiovascular:      Rate and Rhythm: Normal rate and regular rhythm.       Heart sounds: Normal heart sounds. Pulmonary:      Effort: Pulmonary effort is normal. No respiratory distress. Breath sounds: Normal breath sounds. Abdominal:      General: Bowel sounds are normal.      Palpations: Abdomen is soft. Tenderness: There is no abdominal tenderness. Skin:     General: Skin is warm and dry. Neurological:      General: No focal deficit present. Mental Status: He is alert and oriented to person, place, and time. Cranial Nerves: No cranial nerve deficit. Coordination: Coordination normal.   Psychiatric:         Mood and Affect: Mood normal.         Behavior: Behavior normal.         Thought Content: Thought content normal.         Judgment: Judgment normal.         Assessment/Plan:      Alyssa Mahan was seen today for headache. Diagnoses and all orders for this visit:    Recurrent headache    Severe episode of recurrent major depressive disorder, without psychotic features ([facility] [address]    - Follow up with specialities as planned. - Discussed anxiety and pt thinks he will feel better after these follow up appts. No medications needed right now. - OK for note for work to go back  - Call office with any questions or concerns, or if symptoms are getting worse or changing  - Follow up with pain management as planned. - Greater than 50% of this 20 min visit was spent on counseling and coordination of care. Return in about 6 months (around 9/14/2022), or if symptoms worsen or fail to improve, for Routine follow up, Medication check. Patient given educational materials - see patient instructions. Discussed use, benefit, and side effects of prescribed medications. All patient questions answered. Pt voiced understanding.         Electronically signed by MIRNA Lang CNP on 3/14/2022 at 2:53 PM

## 2022-03-14 NOTE — LETTER
[address] [facility] [place] [id number]  Phone: [phone]  Fax: MIRNA Reece CNP        March 14, 2022     Patient: Liv Bass   YOB: 1970   Date of Visit: 3/14/2022       To Whom It May Concern: It is my medical opinion that Dejah Weiss may return to work on 3/14/22 with no restrictions. If you have any questions or concerns, please don't hesitate to call.     Sincerely,        MIRNA Candelario CNP

## 2022-03-16 NOTE — TELEPHONE ENCOUNTER
Rajan  verified that he will come to the office tomorrow to sign the ABN and schedule allergy injections. Rajan  states he will bring his new insurance card with him. complains of pain/discomfort

## 2022-03-20 ENCOUNTER — HOSPITAL ENCOUNTER (EMERGENCY)
Age: 52
Discharge: HOME | End: 2022-03-20
Attending: EMERGENCY MEDICINE
Payer: COMMERCIAL

## 2022-03-20 VITALS
HEIGHT: 73 IN | BODY MASS INDEX: 41.75 KG/M2 | HEART RATE: 51 BPM | DIASTOLIC BLOOD PRESSURE: 86 MMHG | WEIGHT: 315 LBS | SYSTOLIC BLOOD PRESSURE: 155 MMHG | OXYGEN SATURATION: 97 % | RESPIRATION RATE: 14 BRPM | TEMPERATURE: 97.7 F

## 2022-03-20 DIAGNOSIS — E66.01 MORBID OBESITY WITH BMI OF 50.0-59.9, ADULT (HCC): ICD-10-CM

## 2022-03-20 DIAGNOSIS — I10 ESSENTIAL HYPERTENSION: Primary | ICD-10-CM

## 2022-03-20 DIAGNOSIS — E55.9 VITAMIN D DEFICIENCY: ICD-10-CM

## 2022-03-20 LAB
ALBUMIN SERPL-MCNC: 4 G/DL (ref 3.5–5.1)
ALP BLD-CCNC: 151 U/L (ref 38–126)
ALT SERPL-CCNC: 23 U/L (ref 11–66)
ANION GAP SERPL CALCULATED.3IONS-SCNC: 9 MEQ/L (ref 8–16)
AST SERPL-CCNC: 17 U/L (ref 5–40)
BASOPHILS # BLD: 0.7 %
BASOPHILS ABSOLUTE: 0 THOU/MM3 (ref 0–0.1)
BILIRUB SERPL-MCNC: 0.3 MG/DL (ref 0.3–1.2)
BILIRUBIN DIRECT: < 0.2 MG/DL (ref 0–0.3)
BUN BLDV-MCNC: 13 MG/DL (ref 7–22)
CALCIUM SERPL-MCNC: 8.9 MG/DL (ref 8.5–10.5)
CHLORIDE BLD-SCNC: 104 MEQ/L (ref 98–111)
CHOLESTEROL, TOTAL: 154 MG/DL (ref 100–199)
CO2: 25 MEQ/L (ref 23–33)
CREAT SERPL-MCNC: 0.7 MG/DL (ref 0.4–1.2)
D-DIMER QUANTITATIVE: 420 NG/ML FEU (ref 0–500)
EKG ATRIAL RATE: 55 BPM
EKG P AXIS: 76 DEGREES
EKG P-R INTERVAL: 170 MS
EKG Q-T INTERVAL: 470 MS
EKG QRS DURATION: 88 MS
EKG QTC CALCULATION (BAZETT): 449 MS
EKG R AXIS: 2 DEGREES
EKG T AXIS: 21 DEGREES
EKG VENTRICULAR RATE: 55 BPM
EOSINOPHIL # BLD: 2.5 %
EOSINOPHILS ABSOLUTE: 0.1 THOU/MM3 (ref 0–0.4)
ERYTHROCYTE [DISTWIDTH] IN BLOOD BY AUTOMATED COUNT: 11.9 % (ref 11.5–14.5)
ERYTHROCYTE [DISTWIDTH] IN BLOOD BY AUTOMATED COUNT: 41.6 FL (ref 35–45)
GFR SERPL CREATININE-BSD FRML MDRD: > 90 ML/MIN/1.73M2
GLUCOSE BLD-MCNC: 114 MG/DL (ref 70–108)
HCT VFR BLD CALC: 38.7 % (ref 42–52)
HDLC SERPL-MCNC: 44 MG/DL
HEMOGLOBIN: 12.5 GM/DL (ref 14–18)
IMMATURE GRANS (ABS): 0.02 THOU/MM3 (ref 0–0.07)
IMMATURE GRANULOCYTES: 0.4 %
LDL CHOLESTEROL CALCULATED: 98 MG/DL
LYMPHOCYTES # BLD: 27.2 %
LYMPHOCYTES ABSOLUTE: 1.5 THOU/MM3 (ref 1–4.8)
MCH RBC QN AUTO: 30.9 PG (ref 26–33)
MCHC RBC AUTO-ENTMCNC: 32.3 GM/DL (ref 32.2–35.5)
MCV RBC AUTO: 95.6 FL (ref 80–94)
MONOCYTES # BLD: 9.1 %
MONOCYTES ABSOLUTE: 0.5 THOU/MM3 (ref 0.4–1.3)
NUCLEATED RED BLOOD CELLS: 0 /100 WBC
OSMOLALITY CALCULATION: 276.7 MOSMOL/KG (ref 275–300)
PLATELET # BLD: 210 THOU/MM3 (ref 130–400)
PMV BLD AUTO: 10.2 FL (ref 9.4–12.4)
POTASSIUM SERPL-SCNC: 4.1 MEQ/L (ref 3.5–5.2)
PRO-BNP: 18.2 PG/ML (ref 0–450)
PROLACTIN: 15.8 NG/ML
RBC # BLD: 4.05 MILL/MM3 (ref 4.7–6.1)
SEG NEUTROPHILS: 60.1 %
SEGMENTED NEUTROPHILS ABSOLUTE COUNT: 3.3 THOU/MM3 (ref 1.8–7.7)
SODIUM BLD-SCNC: 138 MEQ/L (ref 135–145)
T4 FREE: 1.13 NG/DL (ref 0.93–1.76)
TOTAL PROTEIN: 7 G/DL (ref 6.1–8)
TRIGL SERPL-MCNC: 62 MG/DL (ref 0–199)
TROPONIN T: < 0.01 NG/ML
TSH SERPL DL<=0.05 MIU/L-ACNC: 2.16 UIU/ML (ref 0.4–4.2)
VITAMIN D 25-HYDROXY: 11 NG/ML (ref 30–100)
WBC # BLD: 5.5 THOU/MM3 (ref 4.8–10.8)

## 2022-03-20 PROCEDURE — 80061 LIPID PANEL: CPT

## 2022-03-20 PROCEDURE — 84484 ASSAY OF TROPONIN QUANT: CPT

## 2022-03-20 PROCEDURE — 6370000000 HC RX 637 (ALT 250 FOR IP): Performed by: EMERGENCY MEDICINE

## 2022-03-20 PROCEDURE — 85025 COMPLETE CBC W/AUTO DIFF WBC: CPT

## 2022-03-20 PROCEDURE — 93005 ELECTROCARDIOGRAM TRACING: CPT | Performed by: EMERGENCY MEDICINE

## 2022-03-20 PROCEDURE — 84146 ASSAY OF PROLACTIN: CPT

## 2022-03-20 PROCEDURE — 93010 ELECTROCARDIOGRAM REPORT: CPT | Performed by: INTERNAL MEDICINE

## 2022-03-20 PROCEDURE — 80053 COMPREHEN METABOLIC PANEL: CPT

## 2022-03-20 PROCEDURE — 82248 BILIRUBIN DIRECT: CPT

## 2022-03-20 PROCEDURE — 85379 FIBRIN DEGRADATION QUANT: CPT

## 2022-03-20 PROCEDURE — 84443 ASSAY THYROID STIM HORMONE: CPT

## 2022-03-20 PROCEDURE — 84439 ASSAY OF FREE THYROXINE: CPT

## 2022-03-20 PROCEDURE — 99283 EMERGENCY DEPT VISIT LOW MDM: CPT

## 2022-03-20 PROCEDURE — 82306 VITAMIN D 25 HYDROXY: CPT

## 2022-03-20 PROCEDURE — 83880 ASSAY OF NATRIURETIC PEPTIDE: CPT

## 2022-03-20 RX ORDER — HYDROCHLOROTHIAZIDE 25 MG/1
25 TABLET ORAL ONCE
Status: COMPLETED | OUTPATIENT
Start: 2022-03-20 | End: 2022-03-20

## 2022-03-20 RX ORDER — CHOLECALCIFEROL (VITAMIN D3) 1250 MCG
CAPSULE ORAL
Qty: 24 CAPSULE | Refills: 0 | Status: SHIPPED | OUTPATIENT
Start: 2022-03-20

## 2022-03-20 RX ORDER — HYDROCHLOROTHIAZIDE 25 MG/1
25 TABLET ORAL DAILY
Qty: 30 TABLET | Refills: 0 | Status: SHIPPED | OUTPATIENT
Start: 2022-03-20 | End: 2022-03-23 | Stop reason: ALTCHOICE

## 2022-03-20 RX ADMIN — HYDROCHLOROTHIAZIDE 25 MG: 25 TABLET ORAL at 20:10

## 2022-03-20 ASSESSMENT — ENCOUNTER SYMPTOMS
CHEST TIGHTNESS: 0
ABDOMINAL PAIN: 0
TROUBLE SWALLOWING: 0
SHORTNESS OF BREATH: 1
VOMITING: 0
VOICE CHANGE: 0
DIARRHEA: 0
SINUS PRESSURE: 0
RHINORRHEA: 0
WHEEZING: 0
BACK PAIN: 0
CONSTIPATION: 0
NAUSEA: 0
SORE THROAT: 0
COUGH: 0

## 2022-03-20 ASSESSMENT — PAIN DESCRIPTION - LOCATION: LOCATION: HEAD;NECK

## 2022-03-20 ASSESSMENT — PAIN DESCRIPTION - PAIN TYPE: TYPE: ACUTE PAIN

## 2022-03-20 ASSESSMENT — PAIN DESCRIPTION - FREQUENCY: FREQUENCY: INTERMITTENT

## 2022-03-20 ASSESSMENT — PAIN - FUNCTIONAL ASSESSMENT: PAIN_FUNCTIONAL_ASSESSMENT: 0-10

## 2022-03-20 ASSESSMENT — PAIN SCALES - GENERAL: PAINLEVEL_OUTOF10: 10

## 2022-03-20 NOTE — ED NOTES
Pt remains stable, resting in bed in no distress. Pt deneis any needs.      Stefano Contreras RN  03/20/22 1930

## 2022-03-20 NOTE — ED PROVIDER NOTES
[address] [facility]        Room # 29/5A    CHIEF COMPLAINT    Chief Complaint   Patient presents with    Shortness of Breath    Headache    Hypertension       Nurses Notes reviewed and I agree except as noted in the HPI. HPI    Jo Betancourt is a 54 y.o. male who presents for evaluation of shortness of breath, headache and hypertension. Relates that he has been having headache for a long time and they are monitoring his pituitary gland as they found a growth 2 months ago and that the patient had a repeat MRI last week. Patient admits that his pressure has been high for several year. he has been diagnosed with hypertension in 2010 and since then blood pressure has been up and down. She was given medications at that time however he only took it for 1 month as his pressure went down low. Patient's also admits that she been having shortness of breath headache is mild to moderate, blood pressure yesterday's 193/110 and 3 PM today is 180s over 101. Denies any chest pain however he had been having shortness of breath for 2days. He had a sleep apnea problem. Did not have any abdominal pain nausea vomiting diarrhea. Denies any chest pain no double vision blurred vision no weakness paralysis no tingling sensation    REVIEW OF SYSTEMS    Review of Systems   Constitutional: Negative for appetite change, chills, diaphoresis, fatigue and fever. HENT: Negative for congestion, ear discharge, ear pain, postnasal drip, rhinorrhea, sinus pressure, sore throat, trouble swallowing and voice change. Respiratory: Positive for shortness of breath. Negative for cough, chest tightness and wheezing. Cardiovascular: Negative for chest pain, palpitations and leg swelling. Gastrointestinal: Negative for abdominal pain, constipation, diarrhea, nausea and vomiting.    Musculoskeletal: Negative for arthralgias, back pain, joint swelling, myalgias, neck pain and neck stiffness. Skin: Negative for rash. Neurological: Positive for headaches. Negative for dizziness, syncope, weakness, light-headedness and numbness. PAST MEDICAL HISTORY     has a past medical history of Aneurysm ([address] Asthma, Cardiomegaly, COVID-19, Fibromyalgia, and CLARIBEL on CPAP. SURGICAL HISTORY   has a past surgical history that includes back surgery; knee surgery; Pain management procedure (Bilateral, 2/11/2021); and Pain management procedure (Bilateral, 4/8/2021). CURRENT MEDICATIONS    Previous Medications    ALBUTEROL (PROVENTIL) (2.5 MG/3ML) 0.083% NEBULIZER SOLUTION    inhale contents of 1 vial ( 3 milliliters ) in nebulizer by mouth and INTO THE LUNGS every 4 hours if needed for wheezing    ALBUTEROL SULFATE  (90 BASE) MCG/ACT INHALER    Inhale 2 puffs into the lungs 4 times daily as needed for Wheezing    AMITRIPTYLINE (ELAVIL) 50 MG TABLET    take 1 tablet by mouth at bedtime    AZELASTINE (OPTIVAR) 0.05 % OPHTHALMIC SOLUTION    Place 1 drop into both eyes 2 times daily    BETAMETHASONE VALERATE (VALISONE) 0.1 % CREAM    apply to affected area twice a day    BUDESONIDE-FORMOTEROL (SYMBICORT) 160-4.5 MCG/ACT AERO    2 puffs inhaled twice daily. Rinse mouth well after use.     BUMETANIDE (BUMEX) 1 MG TABLET    Take 0.5 tablets by mouth daily    BUTALBITAL-ACETAMINOPHEN-CAFFEINE (FIORICET, ESGIC) -40 MG PER TABLET    take 1 tablet by mouth every 4 hours if needed for headache    CETIRIZINE (ZYRTEC) 10 MG TABLET    Take 1 tablet by mouth daily    DICYCLOMINE (BENTYL) 10 MG CAPSULE    Take 1 capsule by mouth 3 times daily as needed (abd cramping)    DIPHENHYDRAMINE (BENADRYL) 25 MG CAPSULE    Take 1 capsule by mouth every 6 hours as needed for Itching    EPINEPHRINE (EPIPEN 2-RAGHAV) 0.3 MG/0.3ML SOAJ INJECTION    Inject one pen as directed STAT for allergic reaction, may disp generic NDC 69965-862-81    ERGOCALCIFEROL (ERGOCALCIFEROL) 76271 UNITS CAPSULE Take 50,000 Units by mouth as needed     FEROSUL 325 (65 FE) MG TABLET    take 1 tablet by mouth once daily    FLUTICASONE (FLONASE) 50 MCG/ACT NASAL SPRAY    2 sprays by Each Nostril route daily    HYDROCODONE-ACETAMINOPHEN (NORCO) 5-325 MG PER TABLET    Take 1 tablet by mouth every 8 hours as needed for Pain for up to 30 days. HYDROCORTISONE VALERATE (WESTCORT) 0.2 % OINTMENT    Apply topically daily. LIDOCAINE (LIDODERM) 5 %    Place 1 patch onto the skin every 12 hours 12 hours on, 12 hours off. LIDOCAINE (XYLOCAINE) 5 % OINTMENT    Apply 85 g topically daily as needed for Pain APPLY TOPICALLY TO AFFECTED AREAS OF LOWER BACK AND NECK if needed    LORATADINE (CLARITIN) 10 MG TABLET    take 1 tablet by mouth once daily    MONTELUKAST (SINGULAIR) 10 MG TABLET    Take 1 tablet by mouth nightly    MULTIPLE VITAMINS-MINERALS (MULTIVITAMIN ADULTS) TABS    Take 1 tablet by mouth daily    NARCAN 4 MG/0.1ML LIQD NASAL SPRAY        OMEPRAZOLE (PRILOSEC) 20 MG DELAYED RELEASE CAPSULE    take 1 capsule by mouth once daily    ONDANSETRON (ZOFRAN-ODT) 4 MG DISINTEGRATING TABLET    Take 1 tablet by mouth 3 times daily as needed for Nausea or Vomiting    ORPHENADRINE (NORFLEX) 100 MG EXTENDED RELEASE TABLET    Take 1 tablet by mouth 2 times daily as needed for Muscle spasms    RESPIRATORY THERAPY SUPPLIES (NEBULIZER/TUBING/MOUTHPIECE) KIT    Dispense tubing, mask, and mouthpiece for nebulizer machine. Dx: Asthma    ROSUVASTATIN (CRESTOR) 20 MG TABLET    take 1 tablet by mouth once daily    TIOTROPIUM (SPIRIVA RESPIMAT) 1.25 MCG/ACT AERS INHALER    Inhale 2 puffs into the lungs daily       ALLERGIES    is allergic to dilantin [phenytoin], dupixent [dupilumab], oxycodone, and valium [diazepam]. FAMILY HISTORY    He indicated that his mother is . He indicated that his father is .    family history includes Asthma in his father; Emphysema in his father and mother; High Blood Pressure in his father and mother; Other in his mother. SOCIAL HISTORY     reports that he has never smoked. He has never used smokeless tobacco. He reports current alcohol use. He reports that he does not use drugs. PHYSICAL EXAM      INITIAL VITALS: BP (!) 157/90   Pulse 57   Temp 97.7 °F (36.5 °C) (Oral)   Resp 18   Ht 6' 1\" (1.854 m)   Wt (!) 416 lb (188.7 kg)   SpO2 97%   BMI 54.88 kg/m² Estimated body mass index is 54.88 kg/m² as calculated from the following:    Height as of this encounter: 6' 1\" (1.854 m). Weight as of this encounter: 416 lb (188.7 kg). Physical Exam  Vitals reviewed. Constitutional:       Appearance: He is well-developed. HENT:      Head: Normocephalic and atraumatic. Right Ear: External ear normal.      Left Ear: External ear normal.      Nose: Nose normal.   Eyes:      General: No scleral icterus. Conjunctiva/sclera: Conjunctivae normal.      Pupils: Pupils are equal, round, and reactive to light. Neck:      Thyroid: No thyromegaly. Vascular: No JVD. Cardiovascular:      Rate and Rhythm: Normal rate and regular rhythm. Heart sounds: No murmur heard. No friction rub. Pulmonary:      Effort: Pulmonary effort is normal.      Breath sounds: Normal breath sounds. No wheezing or rales. Chest:      Chest wall: No tenderness. Abdominal:      General: Bowel sounds are normal.      Palpations: Abdomen is soft. There is no mass. Tenderness: There is no abdominal tenderness. Musculoskeletal:      Cervical back: Normal range of motion and neck supple. Lymphadenopathy:      Cervical: No cervical adenopathy. Skin:     Findings: No rash. Neurological:      Mental Status: He is alert and oriented to person, place, and time. Psychiatric:         Behavior: Behavior is cooperative.          MEDICAL DECISION MAKING    DIFFERENTIAL DIAGNOSIS:  Hypertension, shortness of breath ACS, PE pneumonia, history of pituitary growth, morbid obesity, uncontrolled hypertension DIAGNOSTIC RESULTS    EKG   Interpreted by Chente Bryson MD      Rhythm: normal sinus   Rate: normal  Axis: normal  Ectopy: none  Conduction: normal  ST Segments: no acute change  T Waves: no acute change  Q Waves: none    Clinical Impression: Normal sinus rhythm with no acute changes/normal EKG      Chente Bryson MD      LABS:   Labs Reviewed   CBC WITH AUTO DIFFERENTIAL - Abnormal; Notable for the following components:       Result Value    RBC 4.05 (*)     Hemoglobin 12.5 (*)     Hematocrit 38.7 (*)     MCV 95.6 (*)     All other components within normal limits   BASIC METABOLIC PANEL - Abnormal; Notable for the following components:    Glucose 114 (*)     All other components within normal limits   HEPATIC FUNCTION PANEL - Abnormal; Notable for the following components:    Alkaline Phosphatase 151 (*)     All other components within normal limits   VITAMIN D 25 HYDROXY - Abnormal; Notable for the following components:    Vit D, 25-Hydroxy 11 (*)     All other components within normal limits   D-DIMER, QUANTITATIVE   BRAIN NATRIURETIC PEPTIDE   TROPONIN   TSH   T4, FREE   LIPID PANEL   PROLACTIN   ANION GAP   GLOMERULAR FILTRATION RATE, ESTIMATED   OSMOLALITY     All other unresulted laboratory test above are normal:    Vitals:    Vitals:    03/20/22 1830 03/20/22 1845 03/20/22 1900 03/20/22 1915   BP:  (!) 154/93  (!) 157/90   Pulse: (!) 48 (!) 49 56 57   Resp:       Temp:       TempSrc:       SpO2: 96% 93% 97% 97%   Weight:       Height:           EMERGENCY DEPARTMENT COURSE:    Medications   hydroCHLOROthiazide (HYDRODIURIL) tablet 25 mg (has no administration in time range)       The pt was seen and evaluated by me. Within the department, I observed the pt's vitalsigns to be within acceptable range. Laboratory studies were performed, results were reviewed with the patient. Within the department, the pt was treated with hydrochlorothiazide.  I observed the pt's condition to be hemodynamically stable during the duration of their stay. I explained my proposed course of treatment to the pt, and they were amenable to my decision. They were discharged home, and they will return to the ED if their symptoms become more severein nature, or otherwise change. Controlled Substances Monitoring:        CRITICAL CARE:   None. CONSULTS:  None    PROCEDURES:  None. FINAL IMPRESSION       1. Essential hypertension    2. Morbid obesity with BMI of 50.0-59.9, adult ([facility] [address]    3. Vitamin D deficiency          DISPOSITION/PLAN  PATIENT REFERRED TO:  Luis Davis, APRN - CNP  [address] [facility] [address]  [phone]    Schedule an appointment as soon as possible for a visit in 5 days      DISCHARGE MEDICATIONS:  New Prescriptions    CHOLECALCIFEROL (VITAMIN D3) 1.25 MG (14098 UT) CAPS    1 capsule 2 times a week , Monday and Friday    HYDROCHLOROTHIAZIDE (HYDRODIURIL) 25 MG TABLET    Take 1 tablet by mouth daily         (Please note that portions of this note were completed with a voice recognition program and electronically transcribed. Efforts were [facility] edit the dictations but occasionally words are mis-transcribed . The transcription may contain errors not detected in proofreading.   This transcription was electronically signed.)     03/20/22 7:47 PM      Carlos Lockett MD      Emergency room physician           Carlos Lockett MD  03/20/22 3502

## 2022-03-21 ENCOUNTER — APPOINTMENT (OUTPATIENT)
Dept: GENERAL RADIOLOGY | Age: 52
End: 2022-03-21
Payer: COMMERCIAL

## 2022-03-21 ENCOUNTER — NURSE ONLY (OUTPATIENT)
Dept: FAMILY MEDICINE CLINIC | Age: 52
End: 2022-03-21

## 2022-03-21 ENCOUNTER — HOSPITAL ENCOUNTER (EMERGENCY)
Age: 52
Discharge: HOME | End: 2022-03-21
Payer: COMMERCIAL

## 2022-03-21 ENCOUNTER — TELEPHONE (OUTPATIENT)
Dept: FAMILY MEDICINE CLINIC | Age: 52
End: 2022-03-21

## 2022-03-21 ENCOUNTER — NURSE TRIAGE (OUTPATIENT)
Dept: OTHER | Facility: CLINIC | Age: 52
End: 2022-03-21

## 2022-03-21 VITALS
HEIGHT: 73 IN | WEIGHT: 315 LBS | DIASTOLIC BLOOD PRESSURE: 94 MMHG | HEART RATE: 57 BPM | TEMPERATURE: 98 F | SYSTOLIC BLOOD PRESSURE: 164 MMHG | BODY MASS INDEX: 41.75 KG/M2 | RESPIRATION RATE: 17 BRPM | OXYGEN SATURATION: 99 %

## 2022-03-21 VITALS — DIASTOLIC BLOOD PRESSURE: 112 MMHG | SYSTOLIC BLOOD PRESSURE: 220 MMHG

## 2022-03-21 DIAGNOSIS — E87.6 HYPOKALEMIA: ICD-10-CM

## 2022-03-21 DIAGNOSIS — R07.89 CHEST WALL PAIN: ICD-10-CM

## 2022-03-21 DIAGNOSIS — G89.4 PAIN SYNDROME, CHRONIC: ICD-10-CM

## 2022-03-21 DIAGNOSIS — I10 UNCONTROLLED HYPERTENSION: Primary | ICD-10-CM

## 2022-03-21 LAB
ANION GAP SERPL CALCULATED.3IONS-SCNC: 14 MEQ/L (ref 8–16)
BASOPHILS # BLD: 0.8 %
BASOPHILS ABSOLUTE: 0.1 THOU/MM3 (ref 0–0.1)
BUN BLDV-MCNC: 13 MG/DL (ref 7–22)
CALCIUM SERPL-MCNC: 9.6 MG/DL (ref 8.5–10.5)
CHLORIDE BLD-SCNC: 102 MEQ/L (ref 98–111)
CO2: 23 MEQ/L (ref 23–33)
CREAT SERPL-MCNC: 0.8 MG/DL (ref 0.4–1.2)
EKG ATRIAL RATE: 60 BPM
EKG P AXIS: 64 DEGREES
EKG P-R INTERVAL: 176 MS
EKG Q-T INTERVAL: 436 MS
EKG QRS DURATION: 90 MS
EKG QTC CALCULATION (BAZETT): 436 MS
EKG T AXIS: 19 DEGREES
EKG VENTRICULAR RATE: 60 BPM
EOSINOPHIL # BLD: 2.4 %
EOSINOPHILS ABSOLUTE: 0.2 THOU/MM3 (ref 0–0.4)
ERYTHROCYTE [DISTWIDTH] IN BLOOD BY AUTOMATED COUNT: 12 % (ref 11.5–14.5)
ERYTHROCYTE [DISTWIDTH] IN BLOOD BY AUTOMATED COUNT: 41.3 FL (ref 35–45)
GFR SERPL CREATININE-BSD FRML MDRD: > 90 ML/MIN/1.73M2
GLUCOSE BLD-MCNC: 107 MG/DL (ref 70–108)
HCT VFR BLD CALC: 42.7 % (ref 42–52)
HEMOGLOBIN: 13.6 GM/DL (ref 14–18)
IMMATURE GRANS (ABS): 0.05 THOU/MM3 (ref 0–0.07)
IMMATURE GRANULOCYTES: 0.8 %
LYMPHOCYTES # BLD: 27 %
LYMPHOCYTES ABSOLUTE: 1.7 THOU/MM3 (ref 1–4.8)
MAGNESIUM: 1.9 MG/DL (ref 1.6–2.4)
MCH RBC QN AUTO: 30.5 PG (ref 26–33)
MCHC RBC AUTO-ENTMCNC: 31.9 GM/DL (ref 32.2–35.5)
MCV RBC AUTO: 95.7 FL (ref 80–94)
MONOCYTES # BLD: 10.2 %
MONOCYTES ABSOLUTE: 0.7 THOU/MM3 (ref 0.4–1.3)
NUCLEATED RED BLOOD CELLS: 0 /100 WBC
OSMOLALITY CALCULATION: 278.1 MOSMOL/KG (ref 275–300)
PLATELET # BLD: 220 THOU/MM3 (ref 130–400)
PMV BLD AUTO: 10.7 FL (ref 9.4–12.4)
POTASSIUM REFLEX MAGNESIUM: 3.4 MEQ/L (ref 3.5–5.2)
PRO-BNP: 33.7 PG/ML (ref 0–450)
RBC # BLD: 4.46 MILL/MM3 (ref 4.7–6.1)
SEG NEUTROPHILS: 58.8 %
SEGMENTED NEUTROPHILS ABSOLUTE COUNT: 3.8 THOU/MM3 (ref 1.8–7.7)
SODIUM BLD-SCNC: 139 MEQ/L (ref 135–145)
TROPONIN T: < 0.01 NG/ML
WBC # BLD: 6.4 THOU/MM3 (ref 4.8–10.8)

## 2022-03-21 PROCEDURE — 80048 BASIC METABOLIC PNL TOTAL CA: CPT

## 2022-03-21 PROCEDURE — 71046 X-RAY EXAM CHEST 2 VIEWS: CPT

## 2022-03-21 PROCEDURE — 6360000002 HC RX W HCPCS: Performed by: PHYSICIAN ASSISTANT

## 2022-03-21 PROCEDURE — 85025 COMPLETE CBC W/AUTO DIFF WBC: CPT

## 2022-03-21 PROCEDURE — 84484 ASSAY OF TROPONIN QUANT: CPT

## 2022-03-21 PROCEDURE — 83735 ASSAY OF MAGNESIUM: CPT

## 2022-03-21 PROCEDURE — 99282 EMERGENCY DEPT VISIT SF MDM: CPT

## 2022-03-21 PROCEDURE — 93005 ELECTROCARDIOGRAM TRACING: CPT | Performed by: EMERGENCY MEDICINE

## 2022-03-21 PROCEDURE — 83880 ASSAY OF NATRIURETIC PEPTIDE: CPT

## 2022-03-21 PROCEDURE — 96372 THER/PROPH/DIAG INJ SC/IM: CPT

## 2022-03-21 PROCEDURE — 6370000000 HC RX 637 (ALT 250 FOR IP): Performed by: PHYSICIAN ASSISTANT

## 2022-03-21 PROCEDURE — 36415 COLL VENOUS BLD VENIPUNCTURE: CPT

## 2022-03-21 RX ORDER — POTASSIUM CHLORIDE 750 MG/1
40 TABLET, FILM COATED, EXTENDED RELEASE ORAL ONCE
Status: COMPLETED | OUTPATIENT
Start: 2022-03-21 | End: 2022-03-21

## 2022-03-21 RX ORDER — KETOROLAC TROMETHAMINE 30 MG/ML
30 INJECTION, SOLUTION INTRAMUSCULAR; INTRAVENOUS ONCE
Status: COMPLETED | OUTPATIENT
Start: 2022-03-21 | End: 2022-03-21

## 2022-03-21 RX ADMIN — KETOROLAC TROMETHAMINE 30 MG: 30 INJECTION, SOLUTION INTRAMUSCULAR; INTRAVENOUS at 15:39

## 2022-03-21 RX ADMIN — POTASSIUM CHLORIDE 40 MEQ: 750 TABLET, EXTENDED RELEASE ORAL at 15:38

## 2022-03-21 ASSESSMENT — ENCOUNTER SYMPTOMS
NAUSEA: 0
DIARRHEA: 0
COUGH: 0
SORE THROAT: 0
RHINORRHEA: 0
SHORTNESS OF BREATH: 0
VOMITING: 0
ABDOMINAL PAIN: 0
PHOTOPHOBIA: 0

## 2022-03-21 ASSESSMENT — PAIN SCALES - GENERAL: PAINLEVEL_OUTOF10: 6

## 2022-03-21 NOTE — TELEPHONE ENCOUNTER
Pt to the ER for evaluation of BP. Case was also discussed earlier in the day with Dr Vadim Ferrer who had seen pt and noted elevated BP readings as well.   Thanks -LIVIER

## 2022-03-21 NOTE — ED TRIAGE NOTES
Presents to ER with complaints of hypertension. Pt states he was seen by his PCP and his BP noted to be 112E systolic.  States he took his BP medications appropriately/

## 2022-03-21 NOTE — TELEPHONE ENCOUNTER
[facility]) ED Follow up Call    Reason for ED visit:  SOB, HTN     Did you receive discharge instructions? Yes  Do you understand the discharge instructions? Yes  Did the ED give you any new prescriptions? Yes  Were you able to fill your prescriptions? Yes    Do you have one of our red, yellow and green  Zone sheets that help you to determine when you should go to the ED? Not Applicable    Do you need or want to make a follow up appt with your PCP? Yes    Do you have any further needs in the home i.e. Equipment?   No    FU appts/Provider:    Future Appointments   Date Time Provider Jesus Simmons   3/22/2022  2:30 PM MIRNA Ochoa CNP [facility] Medicine [address]   3/25/2022  9:00 AM Joan Rodriguez TREATMENT [address]   4/28/2022  7:45 AM MIRNA Maldonado CNP [facility] [address]   5/4/2022  9:00 AM Tania Chen MD [facility]   8/17/2022 11:00 AM MIRNA Ochoa CNP [address]

## 2022-03-21 NOTE — TELEPHONE ENCOUNTER
Received call from [facility] with Red Flag Complaint. Subjective: Caller states \"high blood pressure 197/102\"     Current Symptoms: high blood pressure 197/102, headache, went to the ER last night, spoke with office, they called pt to triage, got pt back to the office staff for triage. Care advice provided, patient verbalizes understanding; denies any other questions or concerns; instructed to call back for any new or worsening symptoms. Attention Provider: Thank you for allowing me to participate in the care of your patient. The patient was connected to triage in response to information provided to the ECC/PSC. Please do not respond through this encounter as the response is not directed to a shared pool.           Reason for Disposition   [1] Caller requests to speak ONLY to PCP AND [2] URGENT question    Protocols used: PCP CALL - NO TRIAGE-ADULT-

## 2022-03-21 NOTE — ED PROVIDER NOTES
[facility] EMERGENCY DEPT      CHIEF COMPLAINT       Chief Complaint   Patient presents with    Hypertension       Nurses Notes reviewed and I agree except as noted in the HPI. HISTORY OF PRESENT ILLNESS    Mark Leon is a 50 y.o. male who presents for evaluation for hypertension. Patient has had hypertension for over 10 years. He reports 10 years ago he was put on 3 medications which actually worsened his blood pressure so he was taken off them. Patient presented to the ER last evening for elevated blood pressure and dyspnea. After normal work-up he was discharged on hydrochlorothiazide. He was given 1 dose in the ER and had not yet filled the prescription. Patient had a diagnosis a few months back of pituitary adenoma and had a visit this morning to establish care with Dr. Paulino Prince of endocrinology. His blood pressure was noted 172/108 in the office. He was then sent to his PCPs office where blood pressure of 220/112 was noted and he was advised to come to the ER for evaluation again. Patient drove himself to the ER. Patient has had chronic symptoms with headaches and blurred vision as well as pain due to fibromyalgia. The symptoms are no different than previously and he reports sleep disruption due to his fibromyalgia. Patient had a check with ophthalmology 3 weeks ago and was told his vision had not changed. The intermittent blurred vision is despite corrective lenses. Headaches are occipital.  Patient chronically takes Norflex and Norco for his fibromyalgia. Patient endorses mild left-sided rib/chest pain which he believes is due to his fibromyalgia. Patient denies UTI symptoms, decrease in urination, chest pain, dyspnea, diaphoresis, lightheadedness, dizziness, or other complaints. Patient affirms drinking caffeine but not in excess. Patient denies smoking, is an occasional alcohol drinker, and denies illicit drug use.     REVIEW OF SYSTEMS     Review of Systems Constitutional: Negative for chills, diaphoresis and fever. HENT: Negative for congestion, rhinorrhea and sore throat. Eyes: Positive for visual disturbance. Negative for photophobia. Respiratory: Negative for cough and shortness of breath. Cardiovascular: Positive for chest pain. Negative for palpitations. Gastrointestinal: Negative for abdominal pain, diarrhea, nausea and vomiting. Genitourinary: Negative for difficulty urinating. Musculoskeletal: Positive for myalgias. Negative for gait problem. Skin: Negative for rash. Neurological: Positive for headaches. Negative for dizziness, weakness, light-headedness and numbness. Psychiatric/Behavioral: Negative for confusion. The patient is not nervous/anxious. PAST MEDICAL HISTORY    has a past medical history of Aneurysm ([facility] [address] Asthma, Cardiomegaly, COVID-19, Fibromyalgia, and CLARIBEL on CPAP. SURGICAL HISTORY      has a past surgical history that includes back surgery; knee surgery; Pain management procedure (Bilateral, 2/11/2021); and Pain management procedure (Bilateral, 4/8/2021).     CURRENT MEDICATIONS       Discharge Medication List as of 3/21/2022  3:35 PM      CONTINUE these medications which have NOT CHANGED    Details   hydroCHLOROthiazide (HYDRODIURIL) 25 MG tablet Take 1 tablet by mouth daily, Disp-30 tablet, R-0Normal      Cholecalciferol (VITAMIN D3) 1.25 MG (56399 UT) CAPS 1 capsule 2 times a week , Monday and Friday, Disp-24 capsule, R-0Normal      rosuvastatin (CRESTOR) 20 MG tablet take 1 tablet by mouth once daily, Disp-90 tablet, R-0Normal      omeprazole (PRILOSEC) 20 MG delayed release capsule take 1 capsule by mouth once daily, Disp-90 capsule, R-0Normal      orphenadrine (NORFLEX) 100 MG extended release tablet Take 1 tablet by mouth 2 times daily as needed for Muscle spasms, Disp-60 tablet, R-0Normal      lidocaine (XYLOCAINE) 5 % ointment Apply 85 g topically daily as needed for Pain APPLY TOPICALLY TO AFFECTED AREAS OF LOWER BACK AND NECK if needed, Topical, DAILY PRN Starting Thu 2/24/2022, Until Sat 3/26/2022 at 2359, For 30 days, Disp-85 g, R-2, Normal      HYDROcodone-acetaminophen (NORCO) 5-325 MG per tablet Take 1 tablet by mouth every 8 hours as needed for Pain for up to 30 days. , Disp-90 tablet, R-0Normal      budesonide-formoterol (SYMBICORT) 160-4.5 MCG/ACT AERO 2 puffs inhaled twice daily. Rinse mouth well after use., Disp-3 each, R-3Normal      ondansetron (ZOFRAN-ODT) 4 MG disintegrating tablet Take 1 tablet by mouth 3 times daily as needed for Nausea or Vomiting, Disp-21 tablet, R-0Normal      albuterol (PROVENTIL) (2.5 MG/3ML) 0.083% nebulizer solution inhale contents of 1 vial ( 3 milliliters ) in nebulizer by mouth and INTO THE LUNGS every 4 hours if needed for wheezing, Disp-375 mL, R-3Normal      butalbital-acetaminophen-caffeine (FIORICET, ESGIC) -40 MG per tablet take 1 tablet by mouth every 4 hours if needed for headache, Disp-90 tablet, R-1Normal      lidocaine (LIDODERM) 5 % Place 1 patch onto the skin every 12 hours 12 hours on, 12 hours off., Disp-60 patch, R-2Normal      Respiratory Therapy Supplies (NEBULIZER/TUBING/MOUTHPIECE) KIT Disp-1 kit, R-0, PrintDispense tubing, mask, and mouthpiece for nebulizer machine.  Dx: Asthma      amitriptyline (ELAVIL) 50 MG tablet take 1 tablet by mouth at bedtime, Disp-90 tablet, R-1Normal      dicyclomine (BENTYL) 10 MG capsule Take 1 capsule by mouth 3 times daily as needed (abd cramping), Disp-30 capsule, R-0Normal      loratadine (CLARITIN) 10 MG tablet take 1 tablet by mouth once daily, Disp-90 tablet, R-3Normal      montelukast (SINGULAIR) 10 MG tablet Take 1 tablet by mouth nightly, Disp-90 tablet, R-3Normal      fluticasone (FLONASE) 50 MCG/ACT nasal spray 2 sprays by Each Nostril route daily, Disp-3 each, R-3Normal      diphenhydrAMINE (BENADRYL) 25 MG capsule Take 1 capsule by mouth every 6 hours as needed for Itching, Disp-60 capsule, R-5Normal      betamethasone valerate (VALISONE) 0.1 % cream apply to affected area twice a day, Disp-45 g, R-3, Normal      albuterol sulfate  (90 Base) MCG/ACT inhaler Inhale 2 puffs into the lungs 4 times daily as needed for Wheezing, Disp-3 Inhaler, R-3Normal      azelastine (OPTIVAR) 0.05 % ophthalmic solution Place 1 drop into both eyes 2 times daily, Disp-1 Bottle, R-11Normal      FEROSUL 325 (65 Fe) MG tablet take 1 tablet by mouth once daily, Disp-90 tablet, R-3Normal      hydrocortisone valerate (WESTCORT) 0.2 % ointment Apply topically daily. , Disp-1 Tube, R-11, Normal      EPINEPHrine (EPIPEN 2-RAGHAV) 0.3 MG/0.3ML SOAJ injection Inject one pen as directed STAT for allergic reaction, may disp generic NDC 09155-560-43, Disp-1 each, R-0Normal      tiotropium (SPIRIVA RESPIMAT) 1.25 MCG/ACT AERS inhaler Inhale 2 puffs into the lungs daily, Disp-1 Inhaler,R-1Normal      cetirizine (ZYRTEC) 10 MG tablet Take 1 tablet by mouth daily, Disp-30 tablet,R-11Normal      bumetanide (BUMEX) 1 MG tablet Take 0.5 tablets by mouth daily, Disp-30 tablet, R-0Print      NARCAN 4 MG/0.1ML LIQD nasal spray DAWHistorical Med      Multiple Vitamins-Minerals (MULTIVITAMIN ADULTS) TABS Take 1 tablet by mouth daily, Disp-90 tablet, R-3Normal      ergocalciferol (ERGOCALCIFEROL) 69225 units capsule Take 50,000 Units by mouth as needed Historical Med             ALLERGIES     is allergic to dilantin [phenytoin], dupixent [dupilumab], oxycodone, and valium [diazepam]. FAMILY HISTORY     He indicated that his mother is . He indicated that his father is . family history includes Asthma in his father; Emphysema in his father and mother; High Blood Pressure in his father and mother; Other in his mother. SOCIAL HISTORY    reports that he has never smoked. He has never used smokeless tobacco. He reports current alcohol use. He reports that he does not use drugs.     PHYSICAL EXAM     INITIAL VITALS:  height is 6' 1\" (1.854 m) and weight is 414 lb (187.8 kg) (abnormal). His oral temperature is 98 °F (36.7 °C). His blood pressure is 164/94 (abnormal) and his pulse is 57. His respiration is 17 and oxygen saturation is 99%. Physical Exam  Vitals and nursing note reviewed. Constitutional:       General: He is not in acute distress. Appearance: He is well-developed. He is morbidly obese. He is not toxic-appearing or diaphoretic. HENT:      Head: Normocephalic and atraumatic. Right Ear: Hearing normal.      Left Ear: Hearing normal.      Nose: Nose normal. No rhinorrhea. Mouth/Throat:      Pharynx: Uvula midline. No oropharyngeal exudate. Eyes:      General: Lids are normal. No scleral icterus. Conjunctiva/sclera: Conjunctivae normal.      Pupils: Pupils are equal, round, and reactive to light. Neck:      Trachea: No tracheal deviation. Cardiovascular:      Rate and Rhythm: Normal rate and regular rhythm. Heart sounds: Normal heart sounds. No murmur heard. Pulmonary:      Effort: Pulmonary effort is normal. No respiratory distress. Breath sounds: Normal breath sounds. No stridor. No decreased breath sounds or wheezing. Chest:      Chest wall: Tenderness present. Abdominal:      General: There is no distension. Palpations: Abdomen is soft. Abdomen is not rigid. Tenderness: There is no abdominal tenderness. There is no guarding. Musculoskeletal:         General: Normal range of motion. Cervical back: Normal range of motion and neck supple. No rigidity. Lymphadenopathy:      Cervical: No cervical adenopathy. Skin:     General: Skin is warm and dry. Coloration: Skin is not pale. Findings: No rash. Neurological:      Mental Status: He is alert and oriented to person, place, and time. GCS: GCS eye subscore is 4. GCS verbal subscore is 5. GCS motor subscore is 6.       Gait: Gait normal.      Comments: No gross deficits observed   Psychiatric: Mood and Affect: Mood normal.         Speech: Speech normal.         Behavior: Behavior normal.         Thought Content: Thought content normal.         DIFFERENTIAL DIAGNOSIS:   Including but not limited to: Uncontrolled hypertension, fibromyalgia, metabolic derangement, considered but no evidence for CVA    DIAGNOSTIC RESULTS     EKG: All E[facility]'s are [facility] who either signs or Co-signs this chart in the absence of a cardiologist.  Ventricular Rate BPM 60 P    Atrial Rate BPM 60 P    P-R Interval ms 176 P    QRS Duration ms 90 P    Q-T Interval ms 436 P    QTc Calculation (Bazett) ms 436 P    P Axis degrees 64 P    T Axis degrees 19 P         Narrative & Impression    Normal sinus rhythm  Normal ECG  When compared with ECG of 20-MAR-2022 15:56,  No significant change was found               RADIOLOGY: non-plain film images(s) such as CT,Ultrasound and MRI are read by the radiologist.  Plain radiographic images are visualized and preliminarily interpreted by the emergency physician unless otherwise stated below. XR CHEST (2 VW)   Final Result   There is no acute intrathoracic process. **This report has been created using voice recognition software. It may contain minor errors which are inherent in voice recognition technology. **      Final report electronically signed by Dr Carmelita Monroy on 3/21/2022 3:00 PM          LABS:   Labs Reviewed   BASIC METABOLIC PANEL W/ REFLEX TO MG FOR LOW K - Abnormal; Notable for the following components:       Result Value    Potassium reflex Magnesium 3.4 (*)     All other components within normal limits   CBC WITH AUTO DIFFERENTIAL - Abnormal; Notable for the following components:    RBC 4.46 (*)     Hemoglobin 13.6 (*)     MCV 95.7 (*)     MCHC 31.9 (*)     All other components within normal limits   BRAIN NATRIURETIC PEPTIDE   TROPONIN   ANION GAP   GLOMERULAR FILTRATION RATE, ESTIMATED   MAGNESIUM   OSMOLALITY EMERGENCY DEPARTMENT COURSE:   Vitals:    Vitals:    03/21/22 1331 03/21/22 1449   BP: (!) 176/99 (!) 164/94   Pulse: 57    Resp: 17    Temp: 98 °F (36.7 °C)    TempSrc: Oral    SpO2: 99%    Weight: (!) 414 lb (187.8 kg)    Height: 6' 1\" (1.854 m)        Patient was seen in the emergency department during the global pandemic, when there was surge capacity and regional healthcare crisis. MDM:  The patient was seen and evaluated within the ED today for elevated blood pressure in the setting of chronic untreated hypertension. On exam, I appreciated no acute findings. There was reproducible left lateral chest wall tenderness. Old records were reviewed. Within the department, I observed the patient's vital signs to be within acceptable range. Labs were ordered prior to me seeing the patient. Radiological studies within the department revealed no acute intracardiopulmonary process. Laboratory work was reassuring, although potassium was noted mildly low at 3.4. Within the department the patient was treated with potassium and Toradol. I observed the patient's condition to modestly improve during the duration of their stay. On re-exam patient felt improved. Vital signs remained stable. The patient remained non-toxic appearing with no distress evident in the ER. The patient was comfortable with the plan of discharge home and to follow up with his PCP and endocrinologist. Anticipatory guidance was given. The patient was instructed to return to the emergency department for any worsening of their symptoms. Patient was discharged from the emergency department in good condition with all questions answered. See disposition below. I have given the patient strict written and verbal instructions about care at home, follow-up, and signs and symptoms of worsening of condition and they did verbalize understanding. CRITICAL CARE:   None    CONSULTS:  None    PROCEDURES:  None    FINAL IMPRESSION      1.  Uncontrolled hypertension    2. Chest wall pain    3. Pain syndrome, chronic    4. Hypokalemia          DISPOSITION/PLAN     1. Uncontrolled hypertension    2. Chest wall pain    3. Pain syndrome, chronic    4. Hypokalemia        PATIENT REFERRED TO:  MIRNA Fox - CNP  [address] [place], [place] [id number] [phone]    Schedule an appointment as soon as possible for a visit         DISCHARGE MEDICATIONS:  Discharge Medication List as of 3/21/2022  3:35 PM          (Please note that portions of this note were completed with a voice recognition program.  Efforts were made to edit the dictations but occasionally words are mis-transcribed.)    Norberto Judd PA-C 03/22/22 9:13 AM    IVAN Ventura PA-C  03/22/22 0114

## 2022-03-21 NOTE — TELEPHONE ENCOUNTER
Patient stopped in office today for a nurse visit/blood pressure check following an office visit to endocrinology. His b/p was elevated at their office (172/108, 164/102). Patient wanted our office to check b/p again before he went home. B/p in our office was 220/112. Patient did c/o headache and left posterior chest pressure. He was seen in [facility] ED last evening and given rx for hctz 25 mg. One dose provided in the ED but pt has not filled the rx for today's dose. Patient instructed to leave here and go directly back to the ED. Report called to Frederick Carranza

## 2022-03-22 ENCOUNTER — OFFICE VISIT (OUTPATIENT)
Dept: FAMILY MEDICINE CLINIC | Age: 52
End: 2022-03-22
Payer: COMMERCIAL

## 2022-03-22 ENCOUNTER — CARE COORDINATION (OUTPATIENT)
Dept: CARE COORDINATION | Age: 52
End: 2022-03-22

## 2022-03-22 VITALS
BODY MASS INDEX: 54.22 KG/M2 | SYSTOLIC BLOOD PRESSURE: 146 MMHG | WEIGHT: 315 LBS | HEART RATE: 92 BPM | RESPIRATION RATE: 18 BRPM | DIASTOLIC BLOOD PRESSURE: 88 MMHG

## 2022-03-22 DIAGNOSIS — E87.6 HYPOKALEMIA: ICD-10-CM

## 2022-03-22 DIAGNOSIS — I10 UNCONTROLLED HYPERTENSION: ICD-10-CM

## 2022-03-22 DIAGNOSIS — I51.7 ENLARGED HEART: ICD-10-CM

## 2022-03-22 DIAGNOSIS — E66.01 MORBID OBESITY WITH BMI OF 50.0-59.9, ADULT (HCC): ICD-10-CM

## 2022-03-22 DIAGNOSIS — I10 HYPERTENSION, UNSPECIFIED TYPE: Primary | ICD-10-CM

## 2022-03-22 PROCEDURE — 1036F TOBACCO NON-USER: CPT | Performed by: NURSE PRACTITIONER

## 2022-03-22 PROCEDURE — 3017F COLORECTAL CA SCREEN DOC REV: CPT | Performed by: NURSE PRACTITIONER

## 2022-03-22 PROCEDURE — G8484 FLU IMMUNIZE NO ADMIN: HCPCS | Performed by: NURSE PRACTITIONER

## 2022-03-22 PROCEDURE — 99213 OFFICE O/P EST LOW 20 MIN: CPT | Performed by: NURSE PRACTITIONER

## 2022-03-22 PROCEDURE — G8427 DOCREV CUR MEDS BY ELIG CLIN: HCPCS | Performed by: NURSE PRACTITIONER

## 2022-03-22 PROCEDURE — G8417 CALC BMI ABV UP PARAM F/U: HCPCS | Performed by: NURSE PRACTITIONER

## 2022-03-22 RX ORDER — MEDICAL SUPPLY, MISCELLANEOUS
1 EACH MISCELLANEOUS DAILY
Qty: 1 EACH | Refills: 0 | Status: SHIPPED | OUTPATIENT
Start: 2022-03-22

## 2022-03-22 ASSESSMENT — ENCOUNTER SYMPTOMS
SHORTNESS OF BREATH: 0
COLOR CHANGE: 0
COUGH: 0
WHEEZING: 0

## 2022-03-22 NOTE — LETTER
[id number] [place] [address]  Phone: [phone]  Fax: MIRNA Reece CNP        March 22, 2022     Patient: April Gamez   YOB: 1970   Date of Visit: 3/22/2022       To Whom It May Concern: It is my medical opinion that Karrie York may return to work on 3/26/22 with no restrictions. If you have any questions or concerns, please don't hesitate to call.     Sincerely,        MIRNA Good CNP

## 2022-03-22 NOTE — CARE COORDINATION
Ambulatory Care Coordination Note  3/22/2022  CM Risk Score: 5  Charlson 10 Year Mortality Risk Score: 10%     ACC: Mary Fournier RN    Summary Note: Spoke with Lety Doshi. Introduced self/role. Lety Doshi is [facility] enrollment/follow up due to HTN and ED utilization. Discussed history of HTN which has been ongoing to several years. Leads to chronic headaches/migraines. Affecting his ability to work. FMLA paperwork in progress  Had follow up with neurology and endocrine  Has pituitary adenoma-has been seen by ophthalmology and neurosurgeon  Has appt with PCP today to discuss HTN  Follows up with pain management for chronic pain  Was agreeable to [facility] follow up to provide support and education  Discussed salt intake. States he rarely eats salt and doesn't care for salty foods  Has blood pressure cuff at home and has brought it to office to make sure it's accurate    Plan  -provide education and support to help manage chronic conditions  -encourage blood pressure tracking and reporting  -collaborate with specialty offices as needed  -offer RD support as he allows to ensure diet support lower blood pressures and other chronic conditions  General Assessment    Do you have any symptoms that are causing concern?: Yes  Progression since Onset: Unchanged  Reported Symptoms: Other (Comment: headaches/HTN)               Ambulatory Care Coordination Assessment    Care Coordination Protocol  Program Enrollment: Complex Care  Referral from Primary Care Provider: No  Week 1 - Initial Assessment     Do you have all of your prescriptions and are they filled?: Yes  Barriers to medication adherence: None  Are you able to afford your medications?: Yes  How often do you have trouble taking your medications the way you have been told to take them?: I always take them as prescribed.      Do you have Home O2 Therapy?: No      Ability to seek help/take action for Emergent Urgent situations i.e. fire, crime, inclement weather or health crisis. : Independent  Ability to ambulate to restroom: Independent  Ability handle personal hygeine needs (bathing/dressing/grooming): Independent  Ability to manage Medications: Independent  Ability to prepare Food Preparation: Independent  Ability to maintain home (clean home, laundry): Independent  Ability to drive and/or has transportation: Independent  Ability to do shopping: Independent  Ability to manage finances: Independent  Is patient able to live independently?: Yes     Current Housing: Private Residence        Per the Fall Risk Screening, did the patient have 2 or more falls or 1 fall with injury in the past year?: No        Are you experiencing loss of meaning?: No  Are you experiencing loss of hope and peace?: No     Thinking about your patient's physical health needs, are there any symptoms or problems (risk indicators) you are unsure about that require further investigation?: No identified areas of uncertainly or problems already being investigated   Are the patients physical health problems impacting on their mental well-being?: No identified areas of concern   Are there any problems with your patients lifestyle behaviors (alcohol, drugs, diet, exercise) that are impacting on physical or mental well-being?: No identified areas of concern   Do you have any other concerns about your patients mental well-being?  How would you rate their severity and impact on the patient?: No identified areas of concern   How would you rate their home environment in terms of safety and stability (including domestic violence, insecure housing, neighbor harassment)?: Consistently safe, supportive, stable, no identified problems   How do daily activities impact on the patient's well-being? (include current or anticipated unemployment, work, caregiving, access to transportation or other): No identified problems or perceived positive benefits   How would you rate their social network (family, work, friends)?: Good participation with social networks   How would you rate their financial resources (including ability to afford all required medical care)?: Financially secure, resources adequate, no identified problems   How wells does the patient now understand their health and well-being (symptoms, signs or risk factors) and what they need to do to manage their health?: Reasonable to good understanding and already engages in managing health or is willing to undertake better management   How well do you think your patient can engage in healthcare discussions? (Barriers include language, deafness, aphasia, alcohol or drug problems, learning difficulties, concentration): Clear and open communication, no identified barriers   Do other services need to be involved to help this patient?: Other care/services not required at this time   Suggested Interventions and [place]: Not Started   Medication Assistance Program: Not Started   Occupational Therapy: Not Started   Physical Therapy: Not Started   Social Work: Not Started   Zone Management Tools: Not Started                  Prior to Admission medications    Medication Sig Start Date End Date Taking?  Authorizing Provider   hydroCHLOROthiazide (HYDRODIURIL) 25 MG tablet Take 1 tablet by mouth daily 3/20/22  Yes Valentin Galdamez MD   Cholecalciferol (VITAMIN D3) 1.25 MG (70110 UT) CAPS 1 capsule 2 times a week , Monday and Friday 3/20/22  Yes Valentin Galdamez MD   rosuvastatin (CRESTOR) 20 MG tablet take 1 tablet by mouth once daily 2/28/22  Yes MIRNA Tavarez CNP   omeprazole (PRILOSEC) 20 MG delayed release capsule take 1 capsule by mouth once daily 2/28/22  Yes MIRNA Tavarez CNP   orphenadrine (NORFLEX) 100 MG extended release tablet Take 1 tablet by mouth 2 times daily as needed for Muscle spasms 3/5/22 4/4/22 Yes MIRNA French CNP   lidocaine (XYLOCAINE) 5 % ointment Apply 85 g topically daily as needed for Pain APPLY TOPICALLY TO AFFECTED AREAS OF LOWER BACK AND NECK if needed 2/24/22 3/26/22 Yes MIRNA Colvin - VITA   HYDROcodone-acetaminophen (NORCO) 5-325 MG per tablet Take 1 tablet by mouth every 8 hours as needed for Pain for up to 30 days. 2/22/22 3/24/22 Yes MIRNA East - CNP   budesonide-formoterol (SYMBICORT) 160-4.5 MCG/ACT AERO 2 puffs inhaled twice daily. Rinse mouth well after use. 2/17/22  Yes George Garcia APRN - CNP   ondansetron (ZOFRAN-ODT) 4 MG disintegrating tablet Take 1 tablet by mouth 3 times daily as needed for Nausea or Vomiting 2/1/22  Yes George Garcia APRN - CNP   albuterol (PROVENTIL) (2.5 MG/3ML) 0.083% nebulizer solution inhale contents of 1 vial ( 3 milliliters ) in nebulizer by mouth and INTO THE LUNGS every 4 hours if needed for wheezing 1/31/22  Yes George Garcia APRN - CNP   butalbital-acetaminophen-caffeine (FIORICET, ESGIC) -58 MG per tablet take 1 tablet by mouth every 4 hours if needed for headache 1/10/22  Yes George Garcia APRN - CNP   lidocaine (LIDODERM) 5 % Place 1 patch onto the skin every 12 hours 12 hours on, 12 hours off. 1/6/22 4/6/22 Yes MIRNA Colvin CNP   Respiratory Therapy Supplies (NEBULIZER/TUBING/MOUTHPIECE) KIT Dispense tubing, mask, and mouthpiece for nebulizer machine.  Dx: Asthma 1/3/22  Yes George Garcia APRN - CNP   amitriptyline (ELAVIL) 50 MG tablet take 1 tablet by mouth at bedtime 12/13/21  Yes George Garcia APRN - CNP   dicyclomine (BENTYL) 10 MG capsule Take 1 capsule by mouth 3 times daily as needed (abd cramping) 12/9/21  Yes Katerine Noe APRN - CNP   loratadine (CLARITIN) 10 MG tablet take 1 tablet by mouth once daily 11/29/21  Yes George Garcia APRN - CNP   montelukast (SINGULAIR) 10 MG tablet Take 1 tablet by mouth nightly 11/17/21  Yes George Asp, APRN - CNP   fluticasone [facility]) 50 MCG/ACT nasal spray 2 sprays by Each Nostril route daily 11/17/21  Yes George Asp, APRN - CNP   diphenhydrAMINE (BENADRYL) 25 MG capsule Take 1 capsule by mouth every 6 hours as needed for Itching 11/2/21  Yes MIRNA Ochoa CNP   betamethasone valerate (VALISONE) 0.1 % cream apply to affected area twice a day 9/7/21  Yes MIRNA Ochoa CNP   albuterol sulfate  (90 Base) MCG/ACT inhaler Inhale 2 puffs into the lungs 4 times daily as needed for Wheezing 8/19/21  Yes MIRNA Ochoa CNP   azelastine (OPTIVAR) 0.05 % ophthalmic solution Place 1 drop into both eyes 2 times daily 8/19/21  Yes MIRNA Ochoa CNP   FEROSUL 325 (65 Fe) MG tablet take 1 tablet by mouth once daily 6/28/21  Yes Theresa Goldberg, APRN - CNP   hydrocortisone valerate (WESTCORT) 0.2 % ointment Apply topically daily.  5/20/21  Yes MIRNA Ochoa CNP   EPINEPHrine (EPIPEN 2-RAGHAV) 0.3 MG/0.3ML SOAJ injection Inject one pen as directed STAT for allergic reaction, may disp generic [address] 21546-360-45 2/25/21  Yes MIRNA Lai CNP   tiotropium (SPIRIVA RESPIMAT) 1.25 MCG/ACT AERS inhaler Inhale 2 puffs into the lungs daily 11/18/20  Yes MIRNA Lai CNP   cetirizine (ZYRTEC) 10 MG tablet Take 1 tablet by mouth daily 8/31/20  Yes MIRNA Lai CNP   bumetanide (BUMEX) 1 MG tablet Take 0.5 tablets by mouth daily 5/28/20  Yes MIRNA Rodriguez CNP   NARCAN 4 MG/0.1ML LIQD nasal spray  1/10/20  Yes Historical Provider, MD   Multiple Vitamins-Minerals (MULTIVITAMIN ADULTS) TABS Take 1 tablet by mouth daily 1/9/20  Yes MIRNA Ochoa CNP   ergocalciferol (ERGOCALCIFEROL) 10376 units capsule Take 50,000 Units by mouth as needed    Yes Historical Provider, MD       Future Appointments   Date Time Provider Jesus Simmons   3/22/2022  2:30 PM MIRNA Ochoa [address]   3/25/2022  9:00 AM Joan Rodriguez [address]   4/28/2022  7:45 AM Steffanie Patel, APRN - CNP [facility] [place]   5/4/2022  9:00 AM Tania Chen MD [facility]   8/17/2022 11:00 AM Kerstin Meigs, [facility]N - [facility]NP [facility] [address]

## 2022-03-22 NOTE — PROGRESS NOTES
[facility]  [address] [address] [address]  Dept: [phone]  Dept Fax: [phone]  Loc: [phone]     3/22/2022     Radha Beckett (:  1970) is a 50 y.o. male, here for evaluation of the following medical concerns:    Chief Complaint   Patient presents with   [place] ED Follow-up     Wants to discuss BP. Pt is feeling better. Has headaches, body aches that seems to be getting worse. Pt was told that the ED was sending in medications and they never did.  Letter for School/Work     Pt would like a letter for work to return on saturday with no restrictions. HPI    Pt presents to the office today for follow up from the past 2 ER visits for elevated BP, yesterday BP was up to 201/115 with headaches and dizziness. ER visit went OK and pt was sent home on HCTZ 25 mg. He did start that as directed. Today his headache is better and BP has improved also. Pt does have a HX of enlarged heart. Last ECHO was about 2 years ago and was OK at that time. EKG in the ER showed NSR. Pt did have hypokalemia in the ER. Pt was given potassium orally and repeat BMP ordered for Friday, 3/25/22. Treatment Adherence:   Medication compliance:  compliant all of the time  Diet compliance:  compliant most of the time  Weight trend: stable    Hypertension:  Home blood pressure monitoring: Yes - 160's/90's at home. Pt was started on HCTZ 2 days ago and has been taking it as directed, however, when he was put on BP medication in the past they had the opposite effect on him. Patient complains of shortness of breath and headache. Antihypertensive medication side effects: no medication side effects noted. Use of agents associated with hypertension: none. Pt has also been having some mobility issues and trouble walking long distance because of back pain and asthma. Would like a walker to help with ADL's.      BP Readings from Last 3 Encounters: 03/22/22 (!) 146/88   03/21/22 (!) 164/94   03/21/22 (!) 220/112                                         Sodium (meq/L)   Date Value   03/21/2022 139    BUN (mg/dL)   Date Value   03/21/2022 13    Glucose (mg/dL)   Date Value   03/21/2022 107      Potassium reflex Magnesium (meq/L)   Date Value   03/21/2022 3.4 (L)    CREATININE (mg/dL)   Date Value   03/21/2022 0.8           Lab Results   Component Value Date    CHOL 154 03/20/2022    TRIG 62 03/20/2022    HDL 44 03/20/2022    LDLCALC 98 03/20/2022     Lab Results   Component Value Date    ALT 23 03/20/2022    AST 17 03/20/2022          Review of Systems   Constitutional: Negative for chills, fatigue and fever. Respiratory: Negative for cough, shortness of breath and wheezing. Cardiovascular: Negative for chest pain, palpitations and leg swelling. Skin: Negative for color change and rash. Neurological: Positive for dizziness and headaches. Prior to Visit Medications    Medication Sig Taking?  Authorizing Provider   Blood Pressure Monitoring (B-D ASSURE BPM/AUTO ARM CUFF) MISC 1 Units by Does not apply route daily Yes MIRNA Smith CNP   hydroCHLOROthiazide (HYDRODIURIL) 25 MG tablet Take 1 tablet by mouth daily Yes Corey Lebron MD   Cholecalciferol (VITAMIN D3) 1.25 MG (79028 UT) CAPS 1 capsule 2 times a week , Monday and Friday Yes Corey Lebron MD   rosuvastatin (CRESTOR) 20 MG tablet take 1 tablet by mouth once daily Yes MIRNA Smith CNP   omeprazole (PRILOSEC) 20 MG delayed release capsule take 1 capsule by mouth once daily Yes MIRNA Smith CNP   orphenadrine (NORFLEX) 100 MG extended release tablet Take 1 tablet by mouth 2 times daily as needed for Muscle spasms Yes Janus Mcburney, APRN - CNP   lidocaine (XYLOCAINE) 5 % ointment Apply 85 g topically daily as needed for Pain APPLY TOPICALLY TO AFFECTED AREAS OF LOWER BACK AND NECK if needed Yes Janus Mcburney, APRN - CNP   HYDROcodone-acetaminophen (NORCO) 5-325 MG per tablet Take 1 tablet by mouth every 8 hours as needed for Pain for up to 30 days. Yes MIRNA Mccollum CNP   budesonide-formoterol (SYMBICORT) 160-4.5 MCG/ACT AERO 2 puffs inhaled twice daily. Rinse mouth well after use. Yes MIRNA Baer CNP   ondansetron (ZOFRAN-ODT) 4 MG disintegrating tablet Take 1 tablet by mouth 3 times daily as needed for Nausea or Vomiting Yes MIRNA Baer CNP   albuterol (PROVENTIL) (2.5 MG/3ML) 0.083% nebulizer solution inhale contents of 1 vial ( 3 milliliters ) in nebulizer by mouth and INTO THE LUNGS every 4 hours if needed for wheezing Yes MIRNA Baer CNP   butalbital-acetaminophen-caffeine (FIORICET, ESGIC) -40 MG per tablet take 1 tablet by mouth every 4 hours if needed for headache Yes MIRNA Baer CNP   lidocaine (LIDODERM) 5 % Place 1 patch onto the skin every 12 hours 12 hours on, 12 hours off. Yes MIRNA Elizabeth CNP   Respiratory Therapy Supplies (NEBULIZER/TUBING/MOUTHPIECE) KIT Dispense tubing, mask, and mouthpiece for nebulizer machine.  Dx: Asthma Yes MIRNA Baer CNP   amitriptyline (ELAVIL) 50 MG tablet take 1 tablet by mouth at bedtime Yes MIRNA Baer CNP   dicyclomine (BENTYL) 10 MG capsule Take 1 capsule by mouth 3 times daily as needed (abd cramping) Yes MIRNA Quezada CNP   loratadine (CLARITIN) 10 MG tablet take 1 tablet by mouth once daily Yes MIRNA Baer CNP   montelukast (SINGULAIR) 10 MG tablet Take 1 tablet by mouth nightly Yes MIRNA Baer CNP   fluticasone (FLONASE) 50 MCG/ACT nasal spray 2 sprays by Each Nostril route daily Yes MIRNA Baer CNP   diphenhydrAMINE (BENADRYL) 25 MG capsule Take 1 capsule by mouth every 6 hours as needed for Itching Yes MIRNA Baer CNP   betamethasone valerate (VALISONE) 0.1 % cream apply to affected area twice a day Yes MIRNA Baer CNP   albuterol sulfate  (90 Base) MCG/ACT inhaler Inhale 2 puffs into the lungs 4 times daily as needed for Wheezing Yes MIRNA Baer CNP   azelastine (OPTIVAR) 0.05 % ophthalmic solution Place 1 drop into both eyes 2 times daily Yes MIRNA Baer CNP   FEROSUL 325 (65 Fe) MG tablet take 1 tablet by mouth once daily Yes MIRNA Morales CNP   hydrocortisone valerate (WESTCORT) 0.2 % ointment Apply topically daily. Yes MIRNA Baer CNP   EPINEPHrine (EPIPEN 2-RAGHAV) 0.3 MG/0.3ML SOAJ injection Inject one pen as directed STAT for allergic reaction, may disp generic NDC [id number] Yes MIRNA Mcpherson CNP   tiotropium (SPIRIVA RESPIMAT) 1.25 MCG/ACT AERS inhaler Inhale 2 puffs into the lungs daily Yes MIRNA Mcpherson CNP   cetirizine (ZYRTEC) 10 MG tablet Take 1 tablet by mouth daily Yes MIRNA Mcpherson CNP   bumetanide (BUMEX) 1 MG tablet Take 0.5 tablets by mouth daily Yes MIRNA Abel CNP   NARCAN 4 MG/0.1ML LIQD nasal spray  Yes Historical Provider, MD   Multiple Vitamins-Minerals (MULTIVITAMIN ADULTS) TABS Take 1 tablet by mouth daily Yes MIRNA Baer CNP   ergocalciferol (ERGOCALCIFEROL) 84570 units capsule Take 50,000 Units by mouth as needed  Yes Historical Provider, MD        Social History     Tobacco Use    Smoking status: Never Smoker    Smokeless tobacco: Never Used   Substance Use Topics    Alcohol use: Yes     Comment: occasionally        Vitals:    03/22/22 1432   BP: (!) 146/88   Pulse: 92   Resp: 18   Weight: (!) 411 lb (186.4 kg)     Estimated body mass index is 54.22 kg/m² as calculated from the following:    Height as of 3/21/22: 6' 1\" (1.854 m). Weight as of this encounter: 411 lb (186.4 kg). Physical Exam  Vitals reviewed. Constitutional:       General: He is not in acute distress. Appearance: He is well-developed. HENT:      Head: Normocephalic and atraumatic. Eyes:      General:         Right eye: No discharge. Left eye: No discharge. Conjunctiva/sclera: Conjunctivae normal.   Cardiovascular:      Rate and Rhythm: Normal rate and regular rhythm. Heart sounds: Normal heart sounds. Pulmonary:      Effort: Pulmonary effort is normal. No respiratory distress. Breath sounds: Normal breath sounds. Abdominal:      General: Bowel sounds are normal.      Palpations: Abdomen is soft. Tenderness: There is no abdominal tenderness. Skin:     General: Skin is warm and dry. Neurological:      General: No focal deficit present. Mental Status: He is alert and oriented to person, place, and time. Coordination: Coordination normal.   Psychiatric:         Mood and Affect: Mood normal.         Behavior: Behavior normal.         Thought Content: Thought content normal.         Judgment: Judgment normal.         ASSESSMENT/PLAN:  1. Hypertension, unspecified type  - Olga Guerrero MD, Cardiology, [facility] [place]  - Blood Pressure Monitoring (B-D ASSURE BPM/AUTO ARM CUFF) MISC; 1 Units by Does not apply route daily  Dispense: 1 each; Refill: 0    2. Morbid obesity with BMI of 50.0-59.9, adult [facility]  - Olga Guerrero MD, Cardiology, [facility] [place] [place]    3. Enlarged heart  - Olga Guerrero MD, Cardiology, [facility] [place]    4. Uncontrolled hypertension  - Blood Pressure Monitoring (B-D ASSURE BPM/AUTO ARM CUFF) MISC; 1 Units by Does not apply route daily  Dispense: 1 each; Refill: 0    5. Hypokalemia      - Repeat BMP in Friday. Potassium was low in ED. - With previous medical HX and abnormal reaction to BP medications in the past, we will refer him to cardiology for further evaluation. Pt agreeable to plan. - Pt to monitor BP daily and when symptomatic. Goal <140/90. Tracking sheet provided. - Call office with any questions or concerns, or if symptoms are getting worse or changing  - ER for any acute changes.      Return in about 2 weeks (around 4/5/2022), or if symptoms worsen or fail to improve, for Result discussion, Routine follow up. Patient given educational materials - see patient instructions. Discussed use, benefit, and side effects of prescribed medications. All patient questions answered. Pt voiced understanding. Reviewed health maintenance. An electronic signature was used to authenticate this note.     --MIRNA Fine - CNP on 3/22/2022 at 3:37 PM

## 2022-03-22 NOTE — CARE COORDINATION
Damaris Mata, I have enrolled Carmen Nair into Care Coordination program to provide support and education to help manage chronic conditions and reduce ED utilization by managing his overall health. Please approve Plan of Care using smart phrase . Ccplanofcare. Thank you!

## 2022-03-23 ENCOUNTER — TELEPHONE (OUTPATIENT)
Dept: FAMILY MEDICINE CLINIC | Age: 52
End: 2022-03-23

## 2022-03-23 ENCOUNTER — OFFICE VISIT (OUTPATIENT)
Dept: CARDIOLOGY CLINIC | Age: 52
End: 2022-03-23
Payer: COMMERCIAL

## 2022-03-23 VITALS
BODY MASS INDEX: 41.75 KG/M2 | HEART RATE: 65 BPM | WEIGHT: 315 LBS | HEIGHT: 73 IN | SYSTOLIC BLOOD PRESSURE: 135 MMHG | DIASTOLIC BLOOD PRESSURE: 73 MMHG

## 2022-03-23 DIAGNOSIS — I20.8 OTHER FORMS OF ANGINA PECTORIS (HCC): ICD-10-CM

## 2022-03-23 DIAGNOSIS — R06.09 DOE (DYSPNEA ON EXERTION): ICD-10-CM

## 2022-03-23 DIAGNOSIS — G47.33 OSA (OBSTRUCTIVE SLEEP APNEA): ICD-10-CM

## 2022-03-23 DIAGNOSIS — I50.32 CHRONIC HEART FAILURE WITH PRESERVED EJECTION FRACTION (HFPEF) (HCC): Primary | ICD-10-CM

## 2022-03-23 DIAGNOSIS — F33.2 SEVERE EPISODE OF RECURRENT MAJOR DEPRESSIVE DISORDER, WITHOUT PSYCHOTIC FEATURES (HCC): Primary | ICD-10-CM

## 2022-03-23 PROCEDURE — G8428 CUR MEDS NOT DOCUMENT: HCPCS | Performed by: INTERNAL MEDICINE

## 2022-03-23 PROCEDURE — 99204 OFFICE O/P NEW MOD 45 MIN: CPT | Performed by: INTERNAL MEDICINE

## 2022-03-23 PROCEDURE — G8417 CALC BMI ABV UP PARAM F/U: HCPCS | Performed by: INTERNAL MEDICINE

## 2022-03-23 PROCEDURE — G8484 FLU IMMUNIZE NO ADMIN: HCPCS | Performed by: INTERNAL MEDICINE

## 2022-03-23 RX ORDER — POTASSIUM CHLORIDE 750 MG/1
10 TABLET, EXTENDED RELEASE ORAL DAILY
Qty: 30 TABLET | Refills: 3 | Status: SHIPPED | OUTPATIENT
Start: 2022-03-23 | End: 2022-07-14

## 2022-03-23 NOTE — PROGRESS NOTES
New patient here for check up  Ref by PCP high b/p , dizziness,     Pt c/o muscle spasms all over, neck pain, lower back pain , headaches  With high b/p , not sleeping well due to muscle spasms and pain ,     Pt denies dizziness, sob, heart palpitations,     Pt states med list is correct from PCP appt yesterday

## 2022-03-23 NOTE — TELEPHONE ENCOUNTER
I would recommend [facility] for further treatment, their phone number is [phone]. We can send a referral for pt, but pt can call for an appt.   -WS

## 2022-03-23 NOTE — PROGRESS NOTES
[address] [facility] [address]  Dept: [phone]  Dept Fax: [phone]  Loc: [phone]    Visit Date: 3/23/2022    Mr. Agueda Velazquez is a 50 y.o. male  who presented for:  Chief Complaint   Patient presents with    New Patient    Hypertension   CHF  Establish cardiac care  Referred by PCP    HPI:   YURIY Elizabeth is a pleasant 50year old male patient who  has a past medical history of Aneurysm ([facility] [address] Asthma, Cardiomegaly, COVID-19, Fibromyalgia, and CLARIBEL on CPAP. 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 He states he has not seen a cardiologist since he saw one in [address] [place] [address] ~5 years ago. Patient is now on CPAP. He was recently started on Bumex by his PCP for leg swelling, improving per patient. Patient has been experiencing shortness of breath and dyspnea on exertion. He has been treated with prednisone for asthma. Patient also reports atypical chest pains. He has h/o fibromyalgia. He had spinal fusion surgery 6 years ago. Has chronic back pain.        Current Outpatient Medications:     Blood Pressure Monitoring (B-D ASSURE BPM/AUTO ARM CUFF) MISC, 1 Units by Does not apply route daily, Disp: 1 each, Rfl: 0    hydroCHLOROthiazide (HYDRODIURIL) 25 MG tablet, Take 1 tablet by mouth daily, Disp: 30 tablet, Rfl: 0    Cholecalciferol (VITAMIN D3) 1.25 MG (45623 UT) CAPS, 1 capsule 2 times a week , Monday and Friday, Disp: 24 capsule, Rfl: 0    rosuvastatin (CRESTOR) 20 MG tablet, take 1 tablet by mouth once daily, Disp: 90 tablet, Rfl: 0    omeprazole (PRILOSEC) 20 MG delayed release capsule, take 1 capsule by mouth once daily, Disp: 90 capsule, Rfl: 0    orphenadrine (NORFLEX) 100 MG extended release tablet, Take 1 tablet by mouth 2 times daily as needed for Muscle spasms, Disp: 60 tablet, Rfl: 0    lidocaine (XYLOCAINE) 5 % ointment, Apply 85 g topically daily as needed for Pain APPLY TOPICALLY TO AFFECTED AREAS OF LOWER BACK AND NECK if needed, Disp: 85 g, Rfl: 2    HYDROcodone-acetaminophen (NORCO) 5-325 MG per tablet, Take 1 tablet by mouth every 8 hours as needed for Pain for up to 30 days. , Disp: 90 tablet, Rfl: 0    budesonide-formoterol (SYMBICORT) 160-4.5 MCG/ACT AERO, 2 puffs inhaled twice daily. Rinse mouth well after use., Disp: 3 each, Rfl: 3    ondansetron (ZOFRAN-ODT) 4 MG disintegrating tablet, Take 1 tablet by mouth 3 times daily as needed for Nausea or Vomiting, Disp: 21 tablet, Rfl: 0    albuterol (PROVENTIL) (2.5 MG/3ML) 0.083% nebulizer solution, inhale contents of 1 vial ( 3 milliliters ) in nebulizer by mouth and INTO THE LUNGS every 4 hours if needed for wheezing, Disp: 375 mL, Rfl: 3    butalbital-acetaminophen-caffeine (FIORICET, ESGIC) -40 MG per tablet, take 1 tablet by mouth every 4 hours if needed for headache, Disp: 90 tablet, Rfl: 1    lidocaine (LIDODERM) 5 %, Place 1 patch onto the skin every 12 hours 12 hours on, 12 hours off., Disp: 60 patch, Rfl: 2    Respiratory Therapy Supplies (NEBULIZER/TUBING/MOUTHPIECE) KIT, Dispense tubing, mask, and mouthpiece for nebulizer machine.  Dx: Asthma, Disp: 1 kit, Rfl: 0    amitriptyline (ELAVIL) 50 MG tablet, take 1 tablet by mouth at bedtime, Disp: 90 tablet, Rfl: 1    dicyclomine (BENTYL) 10 MG capsule, Take 1 capsule by mouth 3 times daily as needed (abd cramping), Disp: 30 capsule, Rfl: 0    loratadine (CLARITIN) 10 MG tablet, take 1 tablet by mouth once daily, Disp: 90 tablet, Rfl: 3    montelukast (SINGULAIR) 10 MG tablet, Take 1 tablet by mouth nightly, Disp: 90 tablet, Rfl: 3    fluticasone (FLONASE) 50 MCG/ACT nasal spray, 2 sprays by Each Nostril route daily, Disp: 3 each, Rfl: 3    diphenhydrAMINE (BENADRYL) 25 MG capsule, Take 1 capsule by mouth every 6 hours as needed for Itching, Disp: 60 capsule, Rfl: 5    betamethasone valerate (VALISONE) 0.1 % cream, apply to affected area twice a day, Disp: 45 g, Rfl: 3    albuterol sulfate  (90 Base) MCG/ACT inhaler, Inhale 2 puffs into the lungs 4 times daily as needed for Wheezing, Disp: 3 Inhaler, Rfl: 3    azelastine (OPTIVAR) 0.05 % ophthalmic solution, Place 1 drop into both eyes 2 times daily, Disp: 1 Bottle, Rfl: 11    FEROSUL 325 (65 Fe) MG tablet, take 1 tablet by mouth once daily, Disp: 90 tablet, Rfl: 3    hydrocortisone valerate (WESTCORT) 0.2 % ointment, Apply topically daily. , Disp: 1 Tube, Rfl: 11    EPINEPHrine (EPIPEN 2-RAGHAV) 0.3 MG/0.3ML SOAJ injection, Inject one pen as directed STAT for allergic reaction, may disp generic NDC 68234-577-95, Disp: 1 each, Rfl: 0    tiotropium (SPIRIVA RESPIMAT) 1.25 MCG/ACT AERS inhaler, Inhale 2 puffs into the lungs daily, Disp: 1 Inhaler, Rfl: 1    cetirizine (ZYRTEC) 10 MG tablet, Take 1 tablet by mouth daily, Disp: 30 tablet, Rfl: 11    bumetanide (BUMEX) 1 MG tablet, Take 0.5 tablets by mouth daily, Disp: 30 tablet, Rfl: 0    NARCAN 4 MG/0.1ML LIQD nasal spray, , Disp: , Rfl:     Multiple Vitamins-Minerals (MULTIVITAMIN ADULTS) TABS, Take 1 tablet by mouth daily, Disp: 90 tablet, Rfl: 3    ergocalciferol (ERGOCALCIFEROL) 92309 units capsule, Take 50,000 Units by mouth as needed , Disp: , Rfl:     Past Medical History  Felipa Henry  has a past medical history of Aneurysm ([facility] [address] Asthma, Cardiomegaly, COVID-19, Fibromyalgia, and CLARIBEL on CPAP. Social History  Felipa Henry  reports that he has never smoked. He has never used smokeless tobacco. He reports current alcohol use. He reports that he does not use drugs. Family History  Felipa Henry family history includes Asthma in his father; Emphysema in his father and mother; High Blood Pressure in his father and mother; Other in his mother. There is no family history of bicuspid aortic valve, aneurysms, heart transplant, pacemakers, defibrillators, or sudden cardiac death.       Past Surgical History   Past Surgical History:   Procedure Laterality Date    BACK SURGERY      KNEE SURGERY      lt    PAIN MANAGEMENT PROCEDURE Bilateral 2/11/2021    Bilateral L-facet MBB # 1 @ L3-4, L4-5, and L5-S1 performed by Pascual Hernandez MD at [address] Bilateral 4/8/2021    Bilateral L-facet MBB # 2 @ L3-4, L4-5, and L5-S1 performed by Pascual Hernandez MD at [address] [facility]       Review of Systems   Constitutional: Negative for chills and fever  HENT: Negative for congestion, sinus pressure, sneezing and sore throat. Eyes: Negative for pain, discharge, redness and itching. Respiratory: Negative for apnea, cough  Gastrointestinal: Negative for blood in stool, constipation, diarrhea   Endocrine: Negative for cold intolerance, heat intolerance, polydipsia. Genitourinary: Negative for dysuria, enuresis, flank pain and hematuria. Musculoskeletal: Negative for arthralgias, joint swelling and neck pain. Neurological: Negative for numbness and headaches. Psychiatric/Behavioral: Negative for agitation, confusion, decreased concentration and dysphoric mood. Objective:     BP (!) 150/91   Pulse 67   Ht 6' 1\" (1.854 m)   Wt (!) 410 lb 6 oz (186.1 kg)   BMI 54.14 kg/m²     Wt Readings from Last 3 Encounters:   03/23/22 (!) 410 lb 6 oz (186.1 kg)   03/22/22 (!) 411 lb (186.4 kg)   03/21/22 (!) 414 lb (187.8 kg)     BP Readings from Last 3 Encounters:   03/23/22 (!) 150/91   03/22/22 (!) 146/88   03/21/22 (!) 164/94       Nursing note and vitals reviewed. Physical Exam   Constitutional: Oriented to person, place, and time. Appears well-developed and well-nourished. ENT: Moist mucous membranes. No bleeding. Tongue is midline. Head: Normocephalic and atraumatic. Eyes: EOM are normal. Pupils are equal, round, and reactive to light. Neck: Normal range of motion. Neck supple. No JVD present. Cardiovascular: Normal rate, regular rhythm, no murmur, no rubs, and intact distal pulses.     Pulmonary/Chest: Effort normal and breath sounds normal. No respiratory distress. No wheezes. No rales. Abdominal: Soft. Bowel sounds are normal. No distension. There is no tenderness. Musculoskeletal: Normal range of motion. +1 edema. Neurological: Alert and oriented to person, place, and time. No cranial nerve deficit. Coordination normal.   Skin: Skin is warm and dry. Psychiatric: Normal mood and affect.        Lab Results   Component Value Date    CKTOTAL 336 06/03/2020       Lab Results   Component Value Date    WBC 6.4 03/21/2022    RBC 4.46 03/21/2022    HGB 13.6 03/21/2022    HCT 42.7 03/21/2022    MCV 95.7 03/21/2022    MCH 30.5 03/21/2022    MCHC 31.9 03/21/2022     03/21/2022    MPV 10.7 03/21/2022       Lab Results   Component Value Date     03/21/2022    K 3.4 03/21/2022     03/21/2022    CO2 23 03/21/2022    BUN 13 03/21/2022    LABALBU 4.0 03/20/2022    CREATININE 0.8 03/21/2022    CALCIUM 9.6 03/21/2022    LABGLOM >90 03/21/2022    GLUCOSE 107 03/21/2022       Lab Results   Component Value Date    ALKPHOS 151 03/20/2022    ALT 23 03/20/2022    AST 17 03/20/2022    PROT 7.0 03/20/2022    BILITOT 0.3 03/20/2022    BILIDIR <0.2 03/20/2022    LABALBU 4.0 03/20/2022       Lab Results   Component Value Date    MG 1.9 03/21/2022       Lab Results   Component Value Date    INR 1.03 02/11/2019         No results found for: LABA1C    Lab Results   Component Value Date    TRIG 62 03/20/2022    HDL 44 03/20/2022    LDLCALC 98 03/20/2022       Lab Results   Component Value Date    TSH 2.160 03/20/2022         Testing Reviewed:      I have individually reviewed the cardiac test below:    ECHO: Results for orders placed during the hospital encounter of 03/12/20    ECHO Complete 2D W Doppler W Color    Narrative  Transthoracic Echocardiography Report (TTE)    Demographics    Patient Name   Zuleyka Kroner       Gender              Male  Shellia Courser    MR #           [id number]        Race Black    Ethnicity    Account #      [de-identified]        Room Number    Accession      [id number]        Date of Study       03/12/2020  Number    Date of Birth  1970       Referring Physician Aniya Guthrie CNP    Age            54 year(s)       Sonographer         Ludmila Gonzalez RDCS,  NESHAT    Interpreting        Echo reader of the  Physician           week  Olegario Guidry MD    Procedure    Type of Study    TTE procedure:ECHOCARDIOGRAM COMPLETE 2D W DOPPLER W COLOR. Procedure Date  Date: 03/12/2020 Start: 04:15 PM    Study Location: Echo Lab  Technical Quality: Poor visualization due to body habitus. Indications:Cardiomegaly. Additional Medical History:hypertension, morbid obesity, asthma    Patient Status: Routine    Height: 73 inches Weight: 413.01 pounds BSA: 2.93 m^2 BMI: 54.49 kg/m^2    BP: 152/95 mmHg    Conclusions    Summary  Technically difficult examination. Ejection fraction is visually estimated at 55%. Overall left ventricular function is normal.  The aortic valve leaflets were not well visualized. Aortic valve appears tricuspid. Aortic valve leaflets are somewhat thickened. Aortic valve leaflets are Mildly calcified. Signature    ----------------------------------------------------------------  Electronically signed by Olegario Guidry MD (Interpreting  physician) on 03/12/2020 at 04:55 PM  ----------------------------------------------------------------    Findings    Mitral Valve  The mitral valve was not well visualized . Trace mitral regurgitation is present. Aortic Valve  The aortic valve leaflets were not well visualized. Aortic valve appears tricuspid. Aortic valve leaflets are somewhat thickened. Aortic valve leaflets are Mildly calcified. Tricuspid Valve  Tricuspid valve was not well visualized. Mild tricuspid regurgitation. Pulmonic Valve  The pulmonic valve was not well visualized .   Trivial pulmonic regurgitation visualized. Left Atrium  Left atrial size was normal.    Left Ventricle  Ejection fraction is visually estimated at 55%. Overall left ventricular function is normal.    Right Atrium  Right atrial size was normal.    Right Ventricle  The right ventricular size was normal with normal systolic function and  wall thickness. Pericardial Effusion  The pericardium was normal in appearance with no evidence of a pericardial  effusion. Pleural Effusion  No evidence of pleural effusion. Aorta / Great Vessels  -Aortic root dimension within normal limits.  -The Pulmonary artery is within normal limits. -IVC size is within normal limits with normal respiratory phasic changes.     M-Mode/2D Measurements & Calculations    LV Diastolic    LV Systolic Dimension: 4  AV Cusp Separation: 2.1 cmLA  Dimension: 5.9  cm                        Dimension: 4.6 cmAO Root  cm              LV Volume Diastolic: 137  Dimension: 4 cmLA Area: 18.1  LV FS:32.2 %    ml                        cm^2  LV PW           LV Volume Systolic: 64 ml  Diastolic: 1.4  LV EDV/LV EDV Index: 205  cm              ml/70 m^2LV ESV/LV ESV  Septum          Index: 64 ml/22 m^2       RV Diastolic Dimension: 2.4 cm  Diastolic: 1.1  EF Calculated: 68.8 %  cm                                        LA/Aorta: 1.15    LA volume/Index: 55.1 ml /19m^2    Doppler Measurements & Calculations    MV Peak E-Wave: 73.3 cm/s AV Peak Velocity: 124   LVOT Peak Velocity: 100  MV Peak A-Wave: 50.3 cm/s cm/s                    cm/s  MV E/A Ratio: 1.46        AV Peak Gradient: 6.15  LVOT Peak Gradient: 4  MV Peak Gradient: 2.15    mmHg                    mmHg  mmHg    MV Deceleration Time: 211  msec  MV P1/2t: 62 msec  MVA by PHT:3.55 cm^2                              PV Peak Velocity: 99  cm/s  AV DVI (Vmax):0.81      PV Peak Gradient: 3.92  mmHg    GA ED Velocity: 125 cm/s    [id number]       AssessmentPlan:   Mikey Hansen is a pleasant 50year old male patient who  has a past medical history of Aneurysm ([address] Asthma, Cardiomegaly, COVID-19, Fibromyalgia, and CLARIBEL on CPAP. 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 He states he has not seen a cardiologist since he saw one in [place] ~5 years ago. Patient is now on CPAP. He was recently started on Bumex by his PCP for leg swelling, improving per patient. Patient has been experiencing shortness of breath and dyspnea on exertion. He has been treated with prednisone for asthma. Patient also reports atypical chest pains. He has h/o fibromyalgia. He had spinal fusion surgery 6 years ago. Has chronic back pain. 1. CHF  2. LEG EDEMA  3. CLARIBEL  4. Cardiomegaly   5. Chest pain  6. Dyspnea on exertion  7. SOB   8. Fibromyalgia   9. Asthma      Cont Bumex 0.5 mg po daily   Limit Na intake   limit fluid intake   Daily weight    Stop HCTZ   Start KCL 10 meq po daily    Will need to investigate for underlying structural heart disease, will proceed with a transthoracic echocardiogram    Based on patient's risk factors and clinical presentation, stress test is indicated to investigate for possible underlying ischemic heart disease. Patient  agrees with that work up and its risks and benefits and potential need for additional testing if stress test is abnormal such as cardiac catheterization   Hyperlipidemia: on statins, followed periodically. Patient need periodic lipid and liver profile    Above findings and plan of care were discussed with patient in details, patient's questions were answered.      Disposition:  RTC in 6 months            Electronically signed by Minerva Bustos MD, [facility]    3/23/2022 at 2:23 PM EDT

## 2022-03-23 NOTE — TELEPHONE ENCOUNTER
Pt called the office to schedule an appt for counseling with Loretta Silva and I notified him that she is no longer in this office. Pt would like information on a different counselor WS recommends. Please advise.

## 2022-03-24 ENCOUNTER — HOSPITAL ENCOUNTER (OUTPATIENT)
Age: 52
Discharge: HOME | End: 2022-03-24
Payer: COMMERCIAL

## 2022-03-24 DIAGNOSIS — E78.5 HYPERLIPIDEMIA, UNSPECIFIED HYPERLIPIDEMIA TYPE: ICD-10-CM

## 2022-03-24 DIAGNOSIS — D35.2 PITUITARY MICROADENOMA (HCC): ICD-10-CM

## 2022-03-24 DIAGNOSIS — R74.8 ELEVATED ALKALINE PHOSPHATASE LEVEL: ICD-10-CM

## 2022-03-24 DIAGNOSIS — R51.9 RECURRENT HEADACHE: ICD-10-CM

## 2022-03-24 DIAGNOSIS — G89.4 CHRONIC PAIN SYNDROME: ICD-10-CM

## 2022-03-24 LAB
ALBUMIN SERPL-MCNC: 4.2 G/DL (ref 3.5–5.1)
ALP BLD-CCNC: 173 U/L (ref 38–126)
ALT SERPL-CCNC: 26 U/L (ref 11–66)
ANION GAP SERPL CALCULATED.3IONS-SCNC: 12 MEQ/L (ref 8–16)
AST SERPL-CCNC: 18 U/L (ref 5–40)
BILIRUB SERPL-MCNC: 0.2 MG/DL (ref 0.3–1.2)
BUN BLDV-MCNC: 12 MG/DL (ref 7–22)
CALCIUM SERPL-MCNC: 9.4 MG/DL (ref 8.5–10.5)
CHLORIDE BLD-SCNC: 102 MEQ/L (ref 98–111)
CHOLESTEROL, TOTAL: 153 MG/DL (ref 100–199)
CO2: 25 MEQ/L (ref 23–33)
CREAT SERPL-MCNC: 0.9 MG/DL (ref 0.4–1.2)
GLUCOSE BLD-MCNC: 101 MG/DL (ref 70–108)
HDLC SERPL-MCNC: 43 MG/DL
LDL CHOLESTEROL CALCULATED: 83 MG/DL
POTASSIUM SERPL-SCNC: 3.9 MEQ/L (ref 3.5–5.2)
SODIUM BLD-SCNC: 139 MEQ/L (ref 135–145)
T4 FREE: 1.16 NG/DL (ref 0.93–1.76)
TOTAL PROTEIN: 7.3 G/DL (ref 6.1–8)
TRIGL SERPL-MCNC: 134 MG/DL (ref 0–199)
TSH SERPL DL<=0.05 MIU/L-ACNC: 2.66 UIU/ML (ref 0.4–4.2)
VITAMIN D 25-HYDROXY: 16 NG/ML (ref 30–100)

## 2022-03-24 PROCEDURE — 84439 ASSAY OF FREE THYROXINE: CPT

## 2022-03-24 PROCEDURE — 80061 LIPID PANEL: CPT

## 2022-03-24 PROCEDURE — 84305 ASSAY OF SOMATOMEDIN: CPT

## 2022-03-24 PROCEDURE — 82306 VITAMIN D 25 HYDROXY: CPT

## 2022-03-24 PROCEDURE — 36415 COLL VENOUS BLD VENIPUNCTURE: CPT

## 2022-03-24 PROCEDURE — 80053 COMPREHEN METABOLIC PANEL: CPT

## 2022-03-24 PROCEDURE — 84443 ASSAY THYROID STIM HORMONE: CPT

## 2022-03-24 RX ORDER — HYDROCODONE BITARTRATE AND ACETAMINOPHEN 5; 325 MG/1; MG/1
1 TABLET ORAL EVERY 8 HOURS PRN
Qty: 90 TABLET | Refills: 0 | Status: SHIPPED | OUTPATIENT
Start: 2022-03-24 | End: 2022-04-18 | Stop reason: SDUPTHER

## 2022-03-24 NOTE — TELEPHONE ENCOUNTER
This medication refill is regarding a electronic request.  Refill requested by [employer]. Requested Prescriptions     Pending Prescriptions Disp Refills    betamethasone valerate (VALISONE) 0.1 % cream [Pharmacy Med Name: BETAMETHASONE VA 0.1% CREAM] 45 g 3     Sig: apply to affected area twice a day     Date of last visit: 3/22/2022   Date of next visit: 4/4/2022  Date of last refill: 9/7/21 #45g/3    Rx verified, ordered and set to EP.

## 2022-03-24 NOTE — TELEPHONE ENCOUNTER
Andrea Nicole called requesting a refill on the following medications:  Requested Prescriptions     Pending Prescriptions Disp Refills    HYDROcodone-acetaminophen (NORCO) 5-325 MG per tablet 90 tablet 0     Sig: Take 1 tablet by mouth every 8 hours as needed for Pain for up to 30 days. Pharmacy verified: [facility] [address]   ++++ pt states that Zacarias Saldana mentioned increasing this medication.   Also, let Zacarias Saldana know that [facility] has an appt with Paresh Garrett 3/25/22      Date of last visit: 2/24/22  Date of next visit (if applicable): 0/25/7166

## 2022-03-24 NOTE — TELEPHONE ENCOUNTER
Pt notified of WS response and he is agreeable. Referral faxed to [facility] at [phone] and they will contact the pt to schedule.

## 2022-03-25 ENCOUNTER — OFFICE VISIT (OUTPATIENT)
Dept: NEUROSURGERY | Age: 52
End: 2022-03-25
Payer: COMMERCIAL

## 2022-03-25 ENCOUNTER — TELEPHONE (OUTPATIENT)
Dept: FAMILY MEDICINE CLINIC | Age: 52
End: 2022-03-25

## 2022-03-25 VITALS
WEIGHT: 315 LBS | BODY MASS INDEX: 41.75 KG/M2 | HEIGHT: 73 IN | DIASTOLIC BLOOD PRESSURE: 80 MMHG | SYSTOLIC BLOOD PRESSURE: 138 MMHG

## 2022-03-25 DIAGNOSIS — Z98.1 S/P CERVICAL SPINAL FUSION: ICD-10-CM

## 2022-03-25 DIAGNOSIS — J45.40 MODERATE PERSISTENT ASTHMA WITHOUT COMPLICATION: ICD-10-CM

## 2022-03-25 DIAGNOSIS — M48.02 CERVICAL STENOSIS OF SPINAL CANAL: Primary | ICD-10-CM

## 2022-03-25 DIAGNOSIS — E66.01 MORBID OBESITY WITH BMI OF 50.0-59.9, ADULT (HCC): ICD-10-CM

## 2022-03-25 DIAGNOSIS — D35.2 PITUITARY ADENOMA (HCC): ICD-10-CM

## 2022-03-25 DIAGNOSIS — E66.01 CLASS 3 SEVERE OBESITY DUE TO EXCESS CALORIES WITH BODY MASS INDEX (BMI) OF 50.0 TO 59.9 IN ADULT, UNSPECIFIED WHETHER SERIOUS COMORBIDITY PRESENT (HCC): Primary | ICD-10-CM

## 2022-03-25 DIAGNOSIS — I51.7 ENLARGED HEART: ICD-10-CM

## 2022-03-25 PROCEDURE — 1036F TOBACCO NON-USER: CPT

## 2022-03-25 PROCEDURE — G8417 CALC BMI ABV UP PARAM F/U: HCPCS

## 2022-03-25 PROCEDURE — G8427 DOCREV CUR MEDS BY ELIG CLIN: HCPCS

## 2022-03-25 PROCEDURE — G8484 FLU IMMUNIZE NO ADMIN: HCPCS

## 2022-03-25 PROCEDURE — 3017F COLORECTAL CA SCREEN DOC REV: CPT

## 2022-03-25 PROCEDURE — 99213 OFFICE O/P EST LOW 20 MIN: CPT

## 2022-03-25 ASSESSMENT — ENCOUNTER SYMPTOMS
COLOR CHANGE: 0
SHORTNESS OF BREATH: 0
BACK PAIN: 0
APNEA: 1
COUGH: 0
NAUSEA: 0
CONSTIPATION: 0
TROUBLE SWALLOWING: 0

## 2022-03-25 NOTE — TELEPHONE ENCOUNTER
Requesting an order for a wheeled walker with seat to be faxed to [facility]. He feels he need to use a walker due to his leg and back issues. Please advise.

## 2022-03-25 NOTE — TELEPHONE ENCOUNTER
Order faxed to [address] [place] [address]. No DME documentation necessary as pt has commercial insurance. Pt notified.

## 2022-03-25 NOTE — TELEPHONE ENCOUNTER
Noted.  Order signed. Will update last office note to show order and DME statement.  -WS    Orders Placed This Encounter   Procedures    Adalberto randall

## 2022-03-25 NOTE — PROGRESS NOTES
Neurosurgery Follow up Note    Date:3/25/2022         Patient Name:Osmin Gupta     YOB: 1970     Age:52 y.o. Reason for Follow up: Follow up for with MRI, CT, Endo and opthamology      Chief Complaint:   Chief Complaint   Patient presents with    Follow-up     MRI,CT, Endo, Optha        Subjective     Dion Martinez. Is a 82-year-old male who presents to the office today alone ambulating without assistive device. Patient was last seen in the office on 12/20/2022 as a new patient pituitary adenoma. Patient follows up with Dr. Mariam Shannon endocrinology. He stated there is no evidence of compression of optic chiasma. Awaiting ophthalmology to send detailed note. Office staff alerted to call ophthalmology and obtain office note. Whitney Dennis will perform additional labs to evaluate pituitary integrity. In particular, he will check IGF-I. He will also check 24-hour urine free cortisol. Patient is euthyroid. Prolactin levels normal.  Patients primary hypertension has been uncontrolled. He has been to the emergency room and has also followed back up with his primary care provider Gilmer Kang. Patient denies  changes in headaches, chest pain, or shortness of breath. Patient stated that the Fioricet that he has been taking for 10+ years is not helping his headaches. He stated his headaches are a 10 on at its worse than 10 on average. He denies numbness and tingling. He stated that he is having muscle spasms that have increased in frequency. Patient stated that he has been drinking monster energy drinks and pop on a daily basis. Patient denies blurred vision, double vision, dizziness, and lightheadedness. 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 Mild degenerative changes at the nonfused level without significant spinal canal stenosis or neural foraminal stenosis.   MRI of the brain showed stable hypoenhancing 6 mm nodule in the right aspect of the pituitary gland likely representing a microadenoma. No cavernous sinus involvement is present. Patient denies recent fall or trauma. Patient stated his blood pressure today is better than what it has been. Patient stated that he has been trying to eat better make better food choices but he had multiple questions about food choices. Discussed with patient food choices that are lower in sodium and to decrease the amount of processed foods he is eating and to decrease the amount of fast food consumed. Patient stated that he is open to seeing a dietitian. Review of Systems   Review of Systems   Constitutional: Negative for activity change, appetite change and fatigue. HENT: Negative for trouble swallowing. Eyes: Negative for visual disturbance. Respiratory: Positive for apnea (sleep apnea requiring CPAP ). Negative for cough and shortness of breath. Cardiovascular: Negative for chest pain. Gastrointestinal: Negative for constipation and nausea. Musculoskeletal: Positive for neck pain. Negative for back pain and gait problem. Skin: Negative for color change, rash and wound. Neurological: Positive for headaches. Negative for dizziness and weakness. History of chronic headaches requiring Fiorcet   Psychiatric/Behavioral: Negative for confusion. The patient is not nervous/anxious. Medications     Current Outpatient Medications on File Prior to Visit   Medication Sig Dispense Refill    betamethasone valerate (VALISONE) 0.1 % cream apply to affected area twice a day 45 g 3    HYDROcodone-acetaminophen (NORCO) 5-325 MG per tablet Take 1 tablet by mouth every 8 hours as needed for Pain for up to 30 days.  90 tablet 0    potassium chloride (KLOR-CON M) 10 MEQ extended release tablet Take 1 tablet by mouth daily 30 tablet 3    Blood Pressure Monitoring (B-D ASSURE BPM/AUTO ARM CUFF) MISC 1 Units by Does not apply route daily 1 each 0    Cholecalciferol (VITAMIN D3) 1.25 MG (64786 UT) CAPS 1 capsule 2 times a week , Monday and Friday 24 capsule 0    rosuvastatin (CRESTOR) 20 MG tablet take 1 tablet by mouth once daily 90 tablet 0    omeprazole (PRILOSEC) 20 MG delayed release capsule take 1 capsule by mouth once daily 90 capsule 0    orphenadrine (NORFLEX) 100 MG extended release tablet Take 1 tablet by mouth 2 times daily as needed for Muscle spasms 60 tablet 0    lidocaine (XYLOCAINE) 5 % ointment Apply 85 g topically daily as needed for Pain APPLY TOPICALLY TO AFFECTED AREAS OF LOWER BACK AND NECK if needed 85 g 2    budesonide-formoterol (SYMBICORT) 160-4.5 MCG/ACT AERO 2 puffs inhaled twice daily. Rinse mouth well after use. 3 each 3    ondansetron (ZOFRAN-ODT) 4 MG disintegrating tablet Take 1 tablet by mouth 3 times daily as needed for Nausea or Vomiting 21 tablet 0    albuterol (PROVENTIL) (2.5 MG/3ML) 0.083% nebulizer solution inhale contents of 1 vial ( 3 milliliters ) in nebulizer by mouth and INTO THE LUNGS every 4 hours if needed for wheezing 375 mL 3    butalbital-acetaminophen-caffeine (FIORICET, ESGIC) -40 MG per tablet take 1 tablet by mouth every 4 hours if needed for headache 90 tablet 1    lidocaine (LIDODERM) 5 % Place 1 patch onto the skin every 12 hours 12 hours on, 12 hours off. 60 patch 2    Respiratory Therapy Supplies (NEBULIZER/TUBING/MOUTHPIECE) KIT Dispense tubing, mask, and mouthpiece for nebulizer machine.  Dx: Asthma 1 kit 0    amitriptyline (ELAVIL) 50 MG tablet take 1 tablet by mouth at bedtime 90 tablet 1    dicyclomine (BENTYL) 10 MG capsule Take 1 capsule by mouth 3 times daily as needed (abd cramping) 30 capsule 0    loratadine (CLARITIN) 10 MG tablet take 1 tablet by mouth once daily 90 tablet 3    montelukast (SINGULAIR) 10 MG tablet Take 1 tablet by mouth nightly 90 tablet 3    fluticasone (FLONASE) 50 MCG/ACT nasal spray 2 sprays by Each Nostril route daily 3 each 3    diphenhydrAMINE (BENADRYL) 25 MG capsule Take 1 capsule by mouth every 6 hours as needed for Itching 60 capsule 5    albuterol sulfate  (90 Base) MCG/ACT inhaler Inhale 2 puffs into the lungs 4 times daily as needed for Wheezing 3 Inhaler 3    azelastine (OPTIVAR) 0.05 % ophthalmic solution Place 1 drop into both eyes 2 times daily 1 Bottle 11    FEROSUL 325 (65 Fe) MG tablet take 1 tablet by mouth once daily 90 tablet 3    hydrocortisone valerate (WESTCORT) 0.2 % ointment Apply topically daily. 1 Tube 11    EPINEPHrine (EPIPEN 2-RAGHAV) 0.3 MG/0.3ML SOAJ injection Inject one pen as directed STAT for allergic reaction, may disp generic NDC 68635-104-03 1 each 0    tiotropium (SPIRIVA RESPIMAT) 1.25 MCG/ACT AERS inhaler Inhale 2 puffs into the lungs daily 1 Inhaler 1    cetirizine (ZYRTEC) 10 MG tablet Take 1 tablet by mouth daily 30 tablet 11    bumetanide (BUMEX) 1 MG tablet Take 0.5 tablets by mouth daily 30 tablet 0    NARCAN 4 MG/0.1ML LIQD nasal spray       Multiple Vitamins-Minerals (MULTIVITAMIN ADULTS) TABS Take 1 tablet by mouth daily 90 tablet 3    ergocalciferol (ERGOCALCIFEROL) 07024 units capsule Take 50,000 Units by mouth as needed        No current facility-administered medications on file prior to visit. Past History    Past Medical History:   has a past medical history of Aneurysm ([address] Asthma, Cardiomegaly, COVID-19, Fibromyalgia, and CLARIBEL on CPAP. Social History:   reports that he has never smoked. He has never used smokeless tobacco. He reports current alcohol use. He reports that he does not use drugs.      Family History:   Family History   Problem Relation Age of Onset    High Blood Pressure Mother     Emphysema Mother     Other Mother         she was hit and killed by car    High Blood Pressure Father     Emphysema Father     Asthma Father        Physical Examination      Vitals:  /80 (Site: Right Upper Arm, Position: Sitting, Cuff Size: Large Adult)   Ht 6' 1\" (1.854 m) Wt (!) 410 lb (186 kg)   BMI 54.09 kg/m²       Physical Exam  Constitutional:       Appearance: Normal appearance. He is not ill-appearing. HENT:      Nose: Nose normal.      Mouth/Throat:      Mouth: Mucous membranes are moist.   Eyes:      Pupils: Pupils are equal, round, and reactive to light. Cardiovascular:      Rate and Rhythm: Normal rate. Pulses: Normal pulses. Pulmonary:      Effort: Pulmonary effort is normal.   Abdominal:      General: Bowel sounds are normal.   Musculoskeletal:         General: Tenderness present. Cervical back: Normal range of motion. Spasms and tenderness present. No pain with movement. Back:    Skin:     General: Skin is warm and dry. Findings: No erythema. Neurological:      Mental Status: He is alert and oriented to person, place, and time. Sensory: No sensory deficit. Motor: No weakness. Psychiatric:         Mood and Affect: Mood normal.         Behavior: Behavior normal.         Thought Content: Thought content normal.         Judgment: Judgment normal.       Neurologic Exam     Mental Status   Oriented to person, place, and time. Cranial Nerves     CN III, IV, VI   Pupils are equal, round, and reactive to light. Imaging   Imaging last 30 days:  XR CHEST (2 VW)    Result Date: 3/21/2022  There is no acute intrathoracic process. **This report has been created using voice recognition software. It may contain minor errors which are inherent in voice recognition technology. ** Final report electronically signed by Dr Yohana Avila on 3/21/2022 3:00 PM    CT CERVICAL SPINE WO CONTRAST    Result Date: 3/2/2022   1. Redemonstration of ACDF bridging C5-C6 without evidence of acute osseous abnormality. 2. Mild degenerative changes at the nonfused levels without significant spinal canal or neuroforaminal stenosis. **This report has been created using voice recognition software.  It may contain minor errors which are inherent in voice recognition technology. ** Final report electronically signed by Dr. Cesar White MD on 3/2/2022 1:22 PM    MRI BRAIN W WO CONTRAST    Result Date: 3/2/2022   Stable hypoenhancing 6 mm nodule in the right aspect of the pituitary gland likely representing a microadenoma. No cavernous sinus involvement is present. **This report has been created using voice recognition software. It may contain minor errors which are inherent in voice recognition technology. ** Final report electronically signed by Dr. Cesar White MD on 3/2/2022 11:59 AM         Assessment and Plan:          1. 2 month follow up with MRI  2. MRI andCT discussed with patient   3. Continue to follow with Endocrinology  4. Obtain opthalmology office note  5. Discussed food choices that at lower in sodium and to decrease the intake of processed foods  6. Dietician referral  7. Discussed with patient gentle stretching exercises  8. Apply ice to back of neck 20 minutes at a time every 2 hours for pain  9. Continue to follow with endocrinology  10. Follow up in 8 weeks   11. All patient questions answered. Pt voiced understanding.  Patient instructed to call the office with any questions or concerns    Electronically signed by MIRNA Johns CNP on 3/25/22 at 9:04 AM EDT

## 2022-03-28 ENCOUNTER — OFFICE VISIT (OUTPATIENT)
Dept: PHYSICAL MEDICINE AND REHAB | Age: 52
End: 2022-03-28
Payer: COMMERCIAL

## 2022-03-28 VITALS
WEIGHT: 315 LBS | BODY MASS INDEX: 41.75 KG/M2 | DIASTOLIC BLOOD PRESSURE: 74 MMHG | HEIGHT: 73 IN | SYSTOLIC BLOOD PRESSURE: 126 MMHG

## 2022-03-28 DIAGNOSIS — M54.12 CERVICAL RADICULITIS: ICD-10-CM

## 2022-03-28 DIAGNOSIS — F11.90 CHRONIC, CONTINUOUS USE OF OPIOIDS: ICD-10-CM

## 2022-03-28 DIAGNOSIS — M62.838 SPASM OF MUSCLE: ICD-10-CM

## 2022-03-28 DIAGNOSIS — M54.17 LUMBOSACRAL RADICULITIS: ICD-10-CM

## 2022-03-28 DIAGNOSIS — M50.30 DDD (DEGENERATIVE DISC DISEASE), CERVICAL: ICD-10-CM

## 2022-03-28 DIAGNOSIS — G89.4 CHRONIC PAIN SYNDROME: ICD-10-CM

## 2022-03-28 DIAGNOSIS — M54.10 RADICULOPATHY AFFECTING UPPER EXTREMITY: ICD-10-CM

## 2022-03-28 DIAGNOSIS — M54.12 CERVICAL RADICULOPATHY: ICD-10-CM

## 2022-03-28 DIAGNOSIS — M47.816 SPONDYLOSIS OF LUMBAR REGION WITHOUT MYELOPATHY OR RADICULOPATHY: Primary | ICD-10-CM

## 2022-03-28 DIAGNOSIS — Z98.1 HX OF FUSION OF CERVICAL SPINE: ICD-10-CM

## 2022-03-28 DIAGNOSIS — M51.36 LUMBAR DEGENERATIVE DISC DISEASE: ICD-10-CM

## 2022-03-28 DIAGNOSIS — D35.2 PITUITARY ADENOMA (HCC): ICD-10-CM

## 2022-03-28 DIAGNOSIS — M47.816 LUMBAR SPONDYLOSIS: ICD-10-CM

## 2022-03-28 DIAGNOSIS — M54.81 BILATERAL OCCIPITAL NEURALGIA: ICD-10-CM

## 2022-03-28 PROCEDURE — G8484 FLU IMMUNIZE NO ADMIN: HCPCS | Performed by: NURSE PRACTITIONER

## 2022-03-28 PROCEDURE — G8417 CALC BMI ABV UP PARAM F/U: HCPCS | Performed by: NURSE PRACTITIONER

## 2022-03-28 PROCEDURE — 99214 OFFICE O/P EST MOD 30 MIN: CPT | Performed by: NURSE PRACTITIONER

## 2022-03-28 PROCEDURE — 3017F COLORECTAL CA SCREEN DOC REV: CPT | Performed by: NURSE PRACTITIONER

## 2022-03-28 PROCEDURE — 1036F TOBACCO NON-USER: CPT | Performed by: NURSE PRACTITIONER

## 2022-03-28 PROCEDURE — G8427 DOCREV CUR MEDS BY ELIG CLIN: HCPCS | Performed by: NURSE PRACTITIONER

## 2022-03-28 RX ORDER — PREGABALIN 25 MG/1
25 CAPSULE ORAL 2 TIMES DAILY
Qty: 28 CAPSULE | Refills: 0 | Status: SHIPPED | OUTPATIENT
Start: 2022-03-28 | End: 2022-04-04

## 2022-03-28 RX ORDER — ORPHENADRINE CITRATE 100 MG/1
100 TABLET, EXTENDED RELEASE ORAL 2 TIMES DAILY PRN
Qty: 60 TABLET | Refills: 0 | Status: SHIPPED | OUTPATIENT
Start: 2022-04-04 | End: 2022-05-02 | Stop reason: SDUPTHER

## 2022-03-28 ASSESSMENT — ENCOUNTER SYMPTOMS
PHOTOPHOBIA: 1
GASTROINTESTINAL NEGATIVE: 1
BACK PAIN: 1
APNEA: 1

## 2022-03-28 NOTE — PROGRESS NOTES
[facility] [address]  Dept: [phone]  Dept Fax: [phone]: [phone]    Visit Date: 3/28/2022    Functionality Assessment/Goals Worksheet     On a scale of 0 (Does not Interfere) to 10 (Completely Interferes)     1. Which number describes how during the past week pain has interfered with       the following:  A. General Activity:  10  B. Mood: 10  C. Walking Ability:  10  D. Normal Work (Includes both work outside the home and housework):  10  E. Relations with Other People:   10  F. Sleep:   10  G. Enjoyment of Life:   10    2. Patient Prefers to Take their Pain Medications:     [x]  On a regular basis   [x]  Only when necessary    []  Does not take pain medications    3. What are the Patient's Goals/Expectations for Visiting Pain Management? []  Learn about my pain    [x]  Receive Medication   []  Physical Therapy     []  Treat Depression   [x]  Receive Injections    [x]  Treat Sleep   []  Deal with Anxiety and Stress   []  Treat Opoid Dependence/Addiction   []  Other:      HPI:   Melo Padilla is a 50 y.o. male is here today for    Chief Complaint: Low back pain, leg pain, headaches, joint pain        HPI   1 month FU. Continues to have multiple pain complaints. \" I hurt all over\" \". I have been having a lot of muscle spasms the past week- in stomach, throughout legs and feet\". Continues to have low back pain and neck pain. Neck pain radiates up into back of head and to to top of head with more frequent headaches throbbing and stabbing,     Continues Norco TID prn, Fioricet 2-3 times per day for headaches, Norflex  Discussed that Norco can cause rebound headaches.  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 There is no evidence of hardware failure or loosening. The interbody fusion device appears to have incorporated into the adjacent vertebral bodies. There is straightening of the cervical lordosis which is nonspecific but may be partially on the basis of ACDF and positioning. There is otherwise anatomic vertebral body height and alignment. No fracture of the cervical vertebral column is identified. The craniocervical junction appears intact. No paraspinal or epidural fluid collection is identified. Paraspinal soft tissues are grossly unremarkable. At C2-3 there is no significant spinal canal or neuroforaminal stenosis. At C3-4 there is no significant spinal canal or neuroforaminal stenosis. At C4-5 there is no significant spinal canal stenosis. There is mild right neural foraminal stenosis in association with mild to joint degenerative change. At C5-6 there is mild spinal canal stenosis and mild to moderate right neural foraminal stenosis in association with residual osteophytes. At C6-7 there is no significant spinal canal stenosis. There is mild bilateral neural foraminal stenosis from mild uncovertebral joint degenerative change. At C7-T1 there is no significant spinal canal or neuroforaminal stenosis.         Impression       1. Redemonstration of ACDF bridging C5-C6 without evidence of acute osseous abnormality. 2. Mild degenerative changes at the nonfused levels without significant spinal canal or neuroforaminal stenosis.           MRI of brain:   FINDINGS:   The ventricles, cisterns and sulci are symmetric and normal in size and configuration.  No focal areas of abnormal T2/flair prolongation are identified within the parenchyma. No mass effect is identified. No focal areas restricted diffusion are present. No focal areas of abnormal parenchymal or meningeal enhancement are identified.       There is again noted to be a T2 hypointense circumscribed lesion in the right aspect of the pituitary gland measuring 6 x 4 mm in greatest coronal dimensions, stable compared to prior exam. There is a stable intrinsic hyperintense T1 signal component. There is hypoenhancement of the lesion following contrast administration. The cavernous sinuses are normal in appearance. The infundibulum is slightly deviated to the right, stable compared to prior exam. The optic chiasm is normal in appearance.       The major vascular flow voids appear patent. There is stable bilateral proptosis. Orbits are otherwise unremarkable. There is mild mucosal thickening in the left maxillary sinus. Visualized paranasal sinuses are otherwise clear. Mastoid air cells are    clear.               Impression    Stable hypoenhancing 6 mm nodule in the right aspect of the pituitary gland likely representing a microadenoma. No cavernous sinus involvement is present.               Medications reviewed. Patient denies side effects with medications. Patient states he is taking medications as prescribed. Hedenies receiving pain medications from other sources. He had 2 ER visits for hypertension     Pain scale with out pain medications or at its worst is 10/10. Pain scale with pain medications or at its best is 6-7/10. Last dose of Norco and Fioricet was today   Drug screen reviewed from 1/19/2022 and was appropriate  Pill count completed  today and WNL: Yes      The patientis allergic to dilantin [phenytoin], dupixent [dupilumab], oxycodone, and valium [diazepam]. Subjective:      Review of Systems   Constitutional: Positive for chills and fatigue. Negative for activity change and unexpected weight change. abdominal tenderness. There is no guarding or rebound. Musculoskeletal:         General: Tenderness present. Cervical back: Normal range of motion. Rigidity, spasms, tenderness and bony tenderness present. No edema or erythema. Pain with movement present. No muscular tenderness. Normal range of motion. Thoracic back: Spasms, tenderness and bony tenderness present. Lumbar back: Tenderness and bony tenderness present. Decreased range of motion. Positive right straight leg raise test and positive left straight leg raise test.        Back:       Right knee: Bony tenderness present. Tenderness present. Left knee: Bony tenderness present. Tenderness present. Right lower leg: Swelling present. Edema present. Left lower leg: Swelling present. Edema present. Right ankle: Swelling present. Left ankle: Swelling present. Skin:     General: Skin is warm. Coloration: Skin is not pale. Findings: No erythema or rash. Neurological:      General: No focal deficit present. Mental Status: He is alert and oriented to person, place, and time. He is not disoriented. Cranial Nerves: No cranial nerve deficit. Sensory: Sensory deficit present. Motor: Weakness present. No atrophy or abnormal muscle tone. Coordination: Coordination normal.      Gait: Gait abnormal.      Comments: Motor 4/5 lower extremities, 5/5 upper    Psychiatric:         Attention and Perception: Attention normal. He is attentive. Mood and Affect: Mood normal. Mood is not anxious or depressed. Affect is not labile, blunt, angry or inappropriate. Speech: Speech normal. He is communicative. Speech is not rapid and pressured, delayed, slurred or tangential.         Behavior: Behavior normal. Behavior is not agitated, slowed, aggressive, withdrawn, hyperactive or combative. Behavior is cooperative. Thought Content:  Thought content normal. Thought content is not paranoid or delusional. Thought content does not include homicidal or suicidal ideation. Thought content does not include homicidal or suicidal plan. Cognition and Memory: Cognition normal. Memory is not impaired. He does not exhibit impaired recent memory or impaired remote memory. Judgment: Judgment normal. Judgment is not impulsive or inappropriate. VAL  Patricks test  positive  Yeoman's  positive  Gaenslen's  positive       Assessment:     1. Spondylosis of lumbar region without myelopathy or radiculopathy    2. Lumbar degenerative disc disease    3. Lumbosacral radiculitis    4. Cervical radiculopathy    5. DDD (degenerative disc disease), cervical    6. Cervical radiculitis    7. Hx of fusion of cervical spine    8. Spasm of muscle    9. Pituitary adenoma ([address]    10. Chronic pain syndrome    11. Chronic, continuous use of opioids    12. Bilateral occipital neuralgia    13. Radiculopathy affecting upper extremity    14. Lumbar spondylosis            Plan:      · OARRS reviewed. Current MED: 15.00  · Patient was offered naloxone for home. · Discussed long term side effects of medications, tolerance, dependency and addiction. · Previous UDS reviewed  · UDS preformed today for compliance. · Patient told can not receive any pain medications from any other source. · No evidence of abuse, diversion or aberrant behavior.  Medications and/or procedures to improve function and quality of life- patient understanding with this and that may not be pain free   Discussed with patient about safe storage of medications at home   Discussed possible weaning of medication dosing dependent on treatment/procedure results.  Discussed with patient about risks with procedure including infection, reaction to medication, increased pain, or bleeding.   · Procedure notes reveiwed in detail  · Received 80% relief of low back pain and also relief of leg pain from bilateral L-facet MBB # 2 for 3 weeks with improvement of mobility and activity.   · Discussed possible L-facet RFA, LESI in future, trigger point injections. Will continue to hold off on procedures at this time as still getting worked up for pituitory microadenoma. Reviewed MRI of brain and CT of cervical spine. · Continue current pain medications at this time while awaiting procedures- Zavalla 5/325 TID prn- filled 3/24/2022. · Continue Norflex- ordered refill, Continue Fioricet prn headaches from pcp- has plenty  · Trial Lyrica 25 mg BID for nerve pain- 14 day trial   ·   Meds. Prescribed:   Orders Placed This Encounter   Medications    orphenadrine (NORFLEX) 100 MG extended release tablet     Sig: Take 1 tablet by mouth 2 times daily as needed for Muscle spasms     Dispense:  60 tablet     Refill:  0    pregabalin (LYRICA) 25 MG capsule     Sig: Take 1 capsule by mouth 2 times daily for 14 days. Dispense:  28 capsule     Refill:  0       Return in about 2 months (around 5/28/2022), or if symptoms worsen or fail to improve, for follow up  for medications.                Electronically signed by MIRNA Thomas CNP on3/28/2022 at 7:50 AM

## 2022-03-29 ENCOUNTER — CARE COORDINATION (OUTPATIENT)
Dept: CARE COORDINATION | Age: 52
End: 2022-03-29

## 2022-03-29 NOTE — CARE COORDINATION
Ambulatory Care Coordination Note  3/29/2022  CM Risk Score: 5  Charlson 10 Year Mortality Risk Score: 10%     ACC: Agnes Fermin RN    Summary Note: Spoke with [facility] for continued Care Coordination follow up. Has had follow up appts with cardiology, neurosurgeon, pain management  Has been started on Lyrica for pain. Picking that up today. Was able to get wheeled walker  Referral placed with [facility], PCP office placed referral.  He has not heard from them yet. I gave him their contact information and encouraged him to call them to set up appt. Scheduled for Echo and chemical stress test April 7th. Discussed FMLA and short term disability as his health is affecting his job. Encouraged him to talk to his HR department and let me know if he needs anything from PCP office to support. Has not been checking blood pressures at home but states he will start again    Plan  -reinforce education completed  -assist with FMLA or short term disability support if needed  --collaborate with speciality offices as needed  -encourage blood pressure tracking and reporting  -ensure appt made with behavioral health resources given  -reinforce importance of early symptom recognition and reporting to prevent exacerbation and unnecessary hospitalization  General Assessment    Do you have any symptoms that are causing concern?: Yes  Progression since Onset: Unchanged  Reported Symptoms: Pain               Care Coordination Interventions    Program Enrollment: Complex Care  Referral from Primary Care Provider: No  Suggested Interventions and [facility]:  In Process (Comment: referral placed 3-23-22)  Home Health Services: Not Started  Medication Assistance Program: Not Started  Occupational Therapy: Not Started  Physical Therapy: Not Started  Registered Dietician:  (Comment: states doesn't eat salt/doesn't like salty foods)  Social Work: Not Started  Zone Management Tools: Not Started Goals Addressed                    This Visit's Progress      Conditions and Symptoms (pt-stated)   On track      I will schedule office visits, as directed by my provider. I will keep my appointment or reschedule if I have to cancel. I will notify my provider of any barriers to my plan of care. I will notify my provider of any symptoms that indicate a worsening of my condition. Barriers: need for additional support and education  Plan for overcoming my barriers: family and ACM support  Confidence: 8/10  Anticipated Goal Completion Date: 6/22/22              Prior to Admission medications    Medication Sig Start Date End Date Taking? Authorizing Provider   orphenadrine (NORFLEX) 100 MG extended release tablet Take 1 tablet by mouth 2 times daily as needed for Muscle spasms 4/4/22 5/4/22 Yes MIRNA Lee CNP   pregabalin (LYRICA) 25 MG capsule Take 1 capsule by mouth 2 times daily for 14 days. 3/28/22 4/11/22 Yes MIRNA Lee CNP   betamethasone valerate (VALISONE) 0.1 % cream apply to affected area twice a day 3/24/22  Yes Kerstin Meigs, APRN - CNP   HYDROcodone-acetaminophen (NORCO) 5-325 MG per tablet Take 1 tablet by mouth every 8 hours as needed for Pain for up to 30 days.  3/24/22 4/23/22 Yes MIRNA Lee CNP   potassium chloride (KLOR-CON M) 10 MEQ extended release tablet Take 1 tablet by mouth daily 3/23/22  Yes Odin Calderon MD   Blood Pressure Monitoring (B-D ASSURE BPM/AUTO ARM CUFF) MISC 1 Units by Does not apply route daily 3/22/22  Yes Kerstin Meigs, APRN - CNP   Cholecalciferol (VITAMIN D3) 1.25 MG (32587 UT) CAPS 1 capsule 2 times a week , Monday and Friday 3/20/22  Yes Fadi Hernandez MD   rosuvastatin (CRESTOR) 20 MG tablet take 1 tablet by mouth once daily 2/28/22  Yes Kerstin Meigs, APRN - CNP   omeprazole (PRILOSEC) 20 MG delayed release capsule take 1 capsule by mouth once daily 2/28/22  Yes Kerstin Meigs, APRN - CNP   budesonide-formoterol (SYMBICORT) 160-4.5 MCG/ACT AERO 2 puffs inhaled twice daily. Rinse mouth well after use. 2/17/22  Yes MIRNA Guevara CNP   ondansetron (ZOFRAN-ODT) 4 MG disintegrating tablet Take 1 tablet by mouth 3 times daily as needed for Nausea or Vomiting 2/1/22  Yes MIRNA Guevara CNP   albuterol (PROVENTIL) (2.5 MG/3ML) 0.083% nebulizer solution inhale contents of 1 vial ( 3 milliliters ) in nebulizer by mouth and INTO THE LUNGS every 4 hours if needed for wheezing 1/31/22  Yes MIRNA Guevara CNP   butalbital-acetaminophen-caffeine (FIORICET, ESGIC) -46 MG per tablet take 1 tablet by mouth every 4 hours if needed for headache 1/10/22  Yes MIRNA Guevara CNP   lidocaine (LIDODERM) 5 % Place 1 patch onto the skin every 12 hours 12 hours on, 12 hours off. 1/6/22 4/6/22 Yes MIRNA Navarro CNP   Respiratory Therapy Supplies (NEBULIZER/TUBING/MOUTHPIECE) KIT Dispense tubing, mask, and mouthpiece for nebulizer machine.  Dx: Asthma 1/3/22  Yes MIRNA Guevara CNP   amitriptyline (ELAVIL) 50 MG tablet take 1 tablet by mouth at bedtime 12/13/21  Yes MIRNA Guevara CNP   dicyclomine (BENTYL) 10 MG capsule Take 1 capsule by mouth 3 times daily as needed (abd cramping) 12/9/21  Yes MIRNA Jenkins CNP   loratadine (CLARITIN) 10 MG tablet take 1 tablet by mouth once daily 11/29/21  Yes MIRNA Guevara CNP   montelukast (SINGULAIR) 10 MG tablet Take 1 tablet by mouth nightly 11/17/21  Yes MIRNA Guevara CNP   fluticasone (FLONASE) 50 MCG/ACT nasal spray 2 sprays by Each Nostril route daily 11/17/21  Yes MIRNA Guevara CNP   diphenhydrAMINE (BENADRYL) 25 MG capsule Take 1 capsule by mouth every 6 hours as needed for Itching 11/2/21  Yes MIRNA Guevara CNP   albuterol sulfate  (90 Base) MCG/ACT inhaler Inhale 2 puffs into the lungs 4 times daily as needed for Wheezing 8/19/21  Yes MIRNA Guevara CNP   azelastine (OPTIVAR) 0.05 % ophthalmic solution Place 1 drop into both eyes 2 times daily 8/19/21  Yes MIRNA Mendoza CNP   FEROSUL 325 (65 Fe) MG tablet take 1 tablet by mouth once daily 6/28/21  Yes MIRNA Garcia CNP   hydrocortisone valerate (WESTCORT) 0.2 % ointment Apply topically daily.  5/20/21  Yes MIRNA Mendoza CNP   EPINEPHrine (EPIPEN 2-RAGHAV) 0.3 MG/0.3ML SOAJ injection Inject one pen as directed STAT for allergic reaction, may disp generic [address] [id number] 2/25/21  Yes MIRNA Jaimes CNP   tiotropium (SPIRIVA RESPIMAT) 1.25 MCG/ACT AERS inhaler Inhale 2 puffs into the lungs daily 11/18/20  Yes MIRNA Jaimes CNP   cetirizine (ZYRTEC) 10 MG tablet Take 1 tablet by mouth daily 8/31/20  Yes MIRNA Jaimes CNP   bumetanide (BUMEX) 1 MG tablet Take 0.5 tablets by mouth daily 5/28/20  Yes MIRNA Frazier CNP   NARCAN 4 MG/0.1ML LIQD nasal spray  1/10/20  Yes Historical Provider, MD   Multiple Vitamins-Minerals (MULTIVITAMIN ADULTS) TABS Take 1 tablet by mouth daily 1/9/20  Yes MIRNA Mendoza CNP   ergocalciferol (ERGOCALCIFEROL) 47607 units capsule Take 50,000 Units by mouth as needed    Yes Historical Provider, MD       Future Appointments   Date Time Provider Jesus Simmons   4/4/2022  9:30 AM MIRNA Mendoza CNP [facility] Medicine P - [place] [facility] AM [place]URICIO   4/7/2022  8:00 AM STR ECHO RM1 STRZ ECHO [place] HOD   4/7/2022  9:00 AM STR NUC MED HC  INJECT  [facility]   4/7/2022  9:30 AM STR NUCLEAR MEDICINE [facility] ROOM [address]   4/7/2022 10:00 AM STR [address] [facility]   4/7/2022 10:30 AM STR NUCLEAR MEDICINE HEART CENTER ROOM [place] [address]   5/4/2022  9:00 AM Von Chirinos MD AFL APEX AFL APEX END   5/25/2022  9:00 AM Eleni Willoughby APRN - [facility] [place][note 2]   6/6/2022  7:30 AM MIRNA Marquez - CNP [facility]  [place]   8/17/2022 11:00 AM MIRNA Mendoza - CNP [facility]   3/22/2023  1:45 PM Jordy Lainez Dionisio Raman

## 2022-03-30 LAB
SOMATOMEDIN IGF 1 Z-SCORE: 0.6
SOMATOMEDIN: 158 NG/ML (ref 67–225)

## 2022-04-02 ENCOUNTER — HOSPITAL ENCOUNTER (EMERGENCY)
Age: 52
Discharge: HOME | End: 2022-04-02
Payer: COMMERCIAL

## 2022-04-02 VITALS
OXYGEN SATURATION: 97 % | HEART RATE: 70 BPM | SYSTOLIC BLOOD PRESSURE: 141 MMHG | DIASTOLIC BLOOD PRESSURE: 99 MMHG | RESPIRATION RATE: 17 BRPM | TEMPERATURE: 98.5 F

## 2022-04-02 DIAGNOSIS — K62.5 RECTAL BLEEDING: Primary | ICD-10-CM

## 2022-04-02 LAB
ANION GAP SERPL CALCULATED.3IONS-SCNC: 14 MEQ/L (ref 8–16)
BASOPHILS # BLD: 0.9 %
BASOPHILS ABSOLUTE: 0 THOU/MM3 (ref 0–0.1)
BUN BLDV-MCNC: 15 MG/DL (ref 7–22)
CALCIUM SERPL-MCNC: 9.6 MG/DL (ref 8.5–10.5)
CHLORIDE BLD-SCNC: 100 MEQ/L (ref 98–111)
CO2: 24 MEQ/L (ref 23–33)
CREAT SERPL-MCNC: 1 MG/DL (ref 0.4–1.2)
EOSINOPHIL # BLD: 2.2 %
EOSINOPHILS ABSOLUTE: 0.1 THOU/MM3 (ref 0–0.4)
ERYTHROCYTE [DISTWIDTH] IN BLOOD BY AUTOMATED COUNT: 11.9 % (ref 11.5–14.5)
ERYTHROCYTE [DISTWIDTH] IN BLOOD BY AUTOMATED COUNT: 42.9 FL (ref 35–45)
GLUCOSE BLD-MCNC: 90 MG/DL (ref 70–108)
HCT VFR BLD CALC: 45.6 % (ref 42–52)
HEMOCCULT STL QL: POSITIVE
HEMOGLOBIN: 14.4 GM/DL (ref 14–18)
IMMATURE GRANS (ABS): 0.03 THOU/MM3 (ref 0–0.07)
IMMATURE GRANULOCYTES: 0.5 %
LYMPHOCYTES # BLD: 25.3 %
LYMPHOCYTES ABSOLUTE: 1.4 THOU/MM3 (ref 1–4.8)
MCH RBC QN AUTO: 30.6 PG (ref 26–33)
MCHC RBC AUTO-ENTMCNC: 31.6 GM/DL (ref 32.2–35.5)
MCV RBC AUTO: 97 FL (ref 80–94)
MONOCYTES # BLD: 9.6 %
MONOCYTES ABSOLUTE: 0.5 THOU/MM3 (ref 0.4–1.3)
NUCLEATED RED BLOOD CELLS: 0 /100 WBC
OSMOLALITY CALCULATION: 276 MOSMOL/KG (ref 275–300)
PLATELET # BLD: 252 THOU/MM3 (ref 130–400)
PMV BLD AUTO: 11.2 FL (ref 9.4–12.4)
POTASSIUM SERPL-SCNC: 3.8 MEQ/L (ref 3.5–5.2)
RBC # BLD: 4.7 MILL/MM3 (ref 4.7–6.1)
SEG NEUTROPHILS: 61.5 %
SEGMENTED NEUTROPHILS ABSOLUTE COUNT: 3.4 THOU/MM3 (ref 1.8–7.7)
SODIUM BLD-SCNC: 138 MEQ/L (ref 135–145)
WBC # BLD: 5.5 THOU/MM3 (ref 4.8–10.8)

## 2022-04-02 PROCEDURE — 85025 COMPLETE CBC W/AUTO DIFF WBC: CPT

## 2022-04-02 PROCEDURE — 99284 EMERGENCY DEPT VISIT MOD MDM: CPT

## 2022-04-02 PROCEDURE — 82272 OCCULT BLD FECES 1-3 TESTS: CPT

## 2022-04-02 PROCEDURE — 36415 COLL VENOUS BLD VENIPUNCTURE: CPT

## 2022-04-02 PROCEDURE — 80048 BASIC METABOLIC PNL TOTAL CA: CPT

## 2022-04-02 RX ORDER — PANTOPRAZOLE SODIUM 40 MG/1
40 TABLET, DELAYED RELEASE ORAL
Qty: 30 TABLET | Refills: 0 | Status: SHIPPED | OUTPATIENT
Start: 2022-04-02 | End: 2022-10-04 | Stop reason: SINTOL

## 2022-04-02 NOTE — ED NOTES
Pt resting on cot with unlabored respirations. Pt denies any needs. Updated on POC.       Mendel Pro EQIEOI, RN  04/02/22 [id number]

## 2022-04-02 NOTE — ED NOTES
Pt to ED c/o bright red blood in stool today. Pt reports one episode. Pt states that he had a bowel movement before he was leaving for work and that he hadnt noticed that before. Pt denies any pain. Pt states \"I am on a lot of pain medications\" pt denies any difficulty having bowel movement. Pt respirations unlabored. Denies any other complaints.       Peggy WHITTINGTON RN  04/02/22 1793

## 2022-04-02 NOTE — ED NOTES
Pt resting on cot with unlabored respirations. Pt denies any needs.       Zuleyma KNAPP RN  04/02/22 0096

## 2022-04-04 ENCOUNTER — HOSPITAL ENCOUNTER (OUTPATIENT)
Age: 52
Discharge: HOME | End: 2022-04-04
Payer: COMMERCIAL

## 2022-04-04 ENCOUNTER — OFFICE VISIT (OUTPATIENT)
Dept: FAMILY MEDICINE CLINIC | Age: 52
End: 2022-04-04
Payer: COMMERCIAL

## 2022-04-04 VITALS
DIASTOLIC BLOOD PRESSURE: 74 MMHG | WEIGHT: 315 LBS | BODY MASS INDEX: 54.2 KG/M2 | RESPIRATION RATE: 16 BRPM | SYSTOLIC BLOOD PRESSURE: 136 MMHG | TEMPERATURE: 98.4 F | HEART RATE: 72 BPM

## 2022-04-04 DIAGNOSIS — E66.01 MORBID OBESITY WITH BMI OF 50.0-59.9, ADULT (HCC): ICD-10-CM

## 2022-04-04 DIAGNOSIS — I51.7 ENLARGED HEART: ICD-10-CM

## 2022-04-04 DIAGNOSIS — K62.5 RECTAL BLEEDING: Primary | ICD-10-CM

## 2022-04-04 DIAGNOSIS — E78.5 HYPERLIPIDEMIA, UNSPECIFIED HYPERLIPIDEMIA TYPE: ICD-10-CM

## 2022-04-04 DIAGNOSIS — Z86.79 H/O: HTN (HYPERTENSION): ICD-10-CM

## 2022-04-04 PROCEDURE — 3017F COLORECTAL CA SCREEN DOC REV: CPT | Performed by: NURSE PRACTITIONER

## 2022-04-04 PROCEDURE — 82530 CORTISOL FREE: CPT

## 2022-04-04 PROCEDURE — 1036F TOBACCO NON-USER: CPT | Performed by: NURSE PRACTITIONER

## 2022-04-04 PROCEDURE — G8427 DOCREV CUR MEDS BY ELIG CLIN: HCPCS | Performed by: NURSE PRACTITIONER

## 2022-04-04 PROCEDURE — 99214 OFFICE O/P EST MOD 30 MIN: CPT | Performed by: NURSE PRACTITIONER

## 2022-04-04 PROCEDURE — 82570 ASSAY OF URINE CREATININE: CPT

## 2022-04-04 PROCEDURE — G8417 CALC BMI ABV UP PARAM F/U: HCPCS | Performed by: NURSE PRACTITIONER

## 2022-04-04 RX ORDER — HYDROCORTISONE VALERATE 2 MG/G
OINTMENT TOPICAL
Qty: 1 EACH | Refills: 3 | Status: SHIPPED | OUTPATIENT
Start: 2022-04-04 | End: 2022-04-11

## 2022-04-04 ASSESSMENT — ENCOUNTER SYMPTOMS
NAUSEA: 1
RECTAL PAIN: 0
COUGH: 0
HEMATOCHEZIA: 1
DIFFICULTY BREATHING: 0
DIARRHEA: 0
VOMITING: 0
HEMATEMESIS: 0
ABDOMINAL PAIN: 0

## 2022-04-04 NOTE — LETTER
[id number] [place] [address]  Phone: [phone]  Fax: [phone]    MIRNA Gupta CNP        April 4, 2022     Patient: Yung Madison   YOB: 1970   Date of Visit: 4/4/2022       To Whom It May Concern: It is my medical opinion that Selam Joy should remain out of work until 5/9/2022. Off work starting 4/4/22. If you have any questions or concerns, please don't hesitate to call.     Sincerely,        MIRNA Gupta CNP

## 2022-04-04 NOTE — PATIENT INSTRUCTIONS
Patient Education        Rectal Bleeding: Care Instructions  Your Care Instructions     Rectal bleeding in small amounts is common. You may see red spotting on toilet paper or drops of blood in the toilet. Rectal bleeding has many possible causes, from something as minor as hemorrhoids to something as serious as coloncancer. You may need more tests to find the cause of your bleeding. Follow-up care is a key part of your treatment and safety. Be sure to make and go to all appointments, and call your doctor if you are having problems. It's also a good idea to know your test results and keep alist of the medicines you take. How can you care for yourself at home?  Avoid aspirin and other nonsteroidal anti-inflammatory drugs (NSAIDs), such as ibuprofen (Advil, Motrin) and naproxen (Aleve). They can cause you to bleed more. Ask your doctor if you can take acetaminophen (Tylenol). Read and follow all instructions on the label.  Use a stool softener that contains bran or psyllium. You can save money by buying bran or psyllium (available in bulk at most health food stores) and sprinkling it on foods or stirring it into fruit juice. You can also use a product such as Metamucil or Citrucel.  Take your medicines exactly as directed. Call your doctor if you think you are having a problem with your medicine. When should you call for help? Call 911 anytime you think you may need emergency care. For example, call if:     You passed out (lost consciousness). Call your doctor now or seek immediate medical care if:     You have new or worse pain.      You have new or worse bleeding from the rectum.      You are dizzy or light-headed, or you feel like you may faint. Watch closely for changes in your health, and be sure to contact your doctor if:     You cannot pass stools or gas.      You do not get better as expected. Where can you learn more? Go to [email or web address] and sign in to your [email or web address] account. Enter Y614 in the [facility] box to learn more about \"Rectal Bleeding: Care Instructions. \"     If you do not have an account, please click on the \"Sign Up Now\" link. Current as of: September 8, 2021               Content Version: 13.2  © 6850-7272 Healthwise, Incorporated. Care instructions adapted under license by [facility] ([facility]). If you have questions about a medical condition or this instruction, always ask your healthcare professional. [address] any warranty or liability for your use of this information.

## 2022-04-04 NOTE — PROGRESS NOTES
[facility]  [address] [place] [address]  Dept: [phone]  Dept Fax: [phone]: [phone]     2022     Taryn Domingo (:  1970) is a 50 y.o. male, here for evaluation of the following medical concerns:    Chief Complaint   Patient presents with    2 Week Follow-Up     Was in the hospital recently and would like to discuss a letter for a leave from work to be able to figure everything out. Pt presents to the office today for follow up on HTN and also from ER visit on 22 for rectal bleeding. Pt reports that he Restarted lyrica a few days ago and then remembers that he had rectal bleeding with it. He has since stopped it, but is still having some red rectal bleeding. Pt reports that he had a colonoscopy about 5-6 years ago that was OK, but none recently. With all of his recent medical issues, pt would like to be taken off work for a few weeks to get things in order. Rectal Bleeding   The current episode started 2 days ago. The onset was sudden. The problem has been gradually improving. The patient is experiencing no pain. The stool is described as soft and streaked with blood. Prior successful therapies include diet changes. There was no prior unsuccessful therapy. Associated symptoms include nausea. Pertinent negatives include no anorexia, no fever, no abdominal pain, no diarrhea, no hematemesis, no hemorrhoids, no rectal pain, no vomiting, no hematuria, no headaches, no coughing and no difficulty breathing. He has been eating and drinking normally. Urine output has been normal. There were no sick contacts.      Treatment Adherence:   Medication compliance:  compliant all of the time  Diet compliance:  compliant most of the time  Weight trend: stable    Wt Readings from Last 3 Encounters:   22 (!) 410 lb 12.8 oz (186.3 kg)   22 (!) 413 lb 9.6 oz (187.6 kg)   22 (!) 410 lb (186 kg) BP Readings from Last 3 Encounters:   04/04/22 136/74   04/02/22 (!) 141/99   03/28/22 126/74       Hypertension:  Home blood pressure monitoring: No. Patient denies chest pain, shortness of breath and headache. Antihypertensive medication side effects: no medication side effects noted. Use of agents associated with hypertension: none. Pt has a stress test, ECHO and cardiac follow up this week. Sodium (meq/L)   Date Value   04/02/2022 138    BUN (mg/dL)   Date Value   04/02/2022 15    Glucose (mg/dL)   Date Value   04/02/2022 90      Potassium (meq/L)   Date Value   04/02/2022 3.8     Potassium reflex Magnesium (meq/L)   Date Value   03/21/2022 3.4 (L)    CREATININE (mg/dL)   Date Value   04/02/2022 1.0         Hyperlipidemia:  No new myalgias or GI upset on rosuvastatin (Crestor). Lab Results   Component Value Date    CHOL 153 03/24/2022    TRIG 134 03/24/2022    HDL 43 03/24/2022    LDLCALC 83 03/24/2022     Lab Results   Component Value Date    ALT 26 03/24/2022    AST 18 03/24/2022          Review of Systems   Constitutional: Negative for fever. Respiratory: Negative for cough. Gastrointestinal: Positive for hematochezia and nausea. Negative for abdominal pain, anorexia, diarrhea, hematemesis, hemorrhoids, rectal pain and vomiting. Genitourinary: Negative for hematuria. Neurological: Negative for headaches. Prior to Visit Medications    Medication Sig Taking? Authorizing Provider   hydrocortisone valerate (WESTCORT) 0.2 % ointment Apply topically daily.  Yes MIRNA Jerez CNP   pantoprazole (PROTONIX) 40 MG tablet Take 1 tablet by mouth every morning (before breakfast) Yes MIRNA Palmer CNP   orphenadrine (NORFLEX) 100 MG extended release tablet Take 1 tablet by mouth 2 times daily as needed for Muscle spasms Yes MIRNA Olivares CNP   betamethasone valerate (VALISONE) 0.1 % cream apply to affected area twice a day Yes Graciela Hosteller, APRN - CNP   HYDROcodone-acetaminophen (NORCO) 5-325 MG per tablet Take 1 tablet by mouth every 8 hours as needed for Pain for up to 30 days. Yes MIRNA Henry CNP   potassium chloride (KLOR-CON M) 10 MEQ extended release tablet Take 1 tablet by mouth daily Yes Imer Carlin MD   Blood Pressure Monitoring (B-D ASSURE BPM/AUTO ARM CUFF) MISC 1 Units by Does not apply route daily Yes MIRNA Love CNP   Cholecalciferol (VITAMIN D3) 1.25 MG (07356 UT) CAPS 1 capsule 2 times a week , Monday and Friday Yes Rommel Felton MD   rosuvastatin (CRESTOR) 20 MG tablet take 1 tablet by mouth once daily Yes MIRNA Love CNP   budesonide-formoterol (SYMBICORT) 160-4.5 MCG/ACT AERO 2 puffs inhaled twice daily. Rinse mouth well after use. Yes MIRNA Love CNP   ondansetron (ZOFRAN-ODT) 4 MG disintegrating tablet Take 1 tablet by mouth 3 times daily as needed for Nausea or Vomiting Yes MIRNA Love CNP   albuterol (PROVENTIL) (2.5 MG/3ML) 0.083% nebulizer solution inhale contents of 1 vial ( 3 milliliters ) in nebulizer by mouth and INTO THE LUNGS every 4 hours if needed for wheezing Yes MIRNA Love CNP   butalbital-acetaminophen-caffeine (FIORICET, ESGIC) -40 MG per tablet take 1 tablet by mouth every 4 hours if needed for headache Yes MIRNA Love CNP   lidocaine (LIDODERM) 5 % Place 1 patch onto the skin every 12 hours 12 hours on, 12 hours off. Yes MIRNA Henry CNP   Respiratory Therapy Supplies (NEBULIZER/TUBING/MOUTHPIECE) KIT Dispense tubing, mask, and mouthpiece for nebulizer machine.  Dx: Asthma Yes MIRNA Love CNP   amitriptyline (ELAVIL) 50 MG tablet take 1 tablet by mouth at bedtime Yes MIRNA Love CNP   dicyclomine (BENTYL) 10 MG capsule Take 1 capsule by mouth 3 times daily as needed (abd cramping) Yes MIRNA Andrade - CNP   loratadine (CLARITIN) 10 MG tablet take 1 tablet by mouth once daily as of 3/28/22: 6' 1\" (1.854 m). Weight as of this encounter: 410 lb 12.8 oz (186.3 kg). Physical Exam  Vitals reviewed. Constitutional:       General: He is not in acute distress. Appearance: He is well-developed. HENT:      Head: Normocephalic and atraumatic. Eyes:      General:         Right eye: No discharge. Left eye: No discharge. Conjunctiva/sclera: Conjunctivae normal.   Cardiovascular:      Rate and Rhythm: Normal rate and regular rhythm. Heart sounds: Normal heart sounds. Pulmonary:      Effort: Pulmonary effort is normal. No respiratory distress. Breath sounds: Normal breath sounds. Abdominal:      General: Bowel sounds are normal.      Palpations: Abdomen is soft. Tenderness: There is no abdominal tenderness. There is no right CVA tenderness or left CVA tenderness. Skin:     General: Skin is warm and dry. Neurological:      General: No focal deficit present. Mental Status: He is alert and oriented to person, place, and time. Coordination: Coordination normal.   Psychiatric:         Mood and Affect: Mood normal.         Behavior: Behavior normal.         Thought Content: Thought content normal.         Judgment: Judgment normal.         ASSESSMENT/PLAN:  1. Rectal bleeding  - AFL - Dayo Hernandez MD, Gastroenterology, [facility] DARRICK HANG    2. Hyperlipidemia, unspecified hyperlipidemia type    3. H/O: HTN (hypertension)    4. Morbid obesity with BMI of 50.0-59.9, adult ([facility]    5. Enlarged heart    - Discussed time off work. OK for note off for 1 month. Pt will crow FMLA. Return to work note for 5/9/22.    - Will refer to GI for further evaluation of GI bleeding. Pt has stopped lyrica and bleeding has slowed. - Continue to monitor BP at home,  Goal <140/90.  - Call office with any questions or concerns, or if symptoms are getting worse or changing  - Greater than 50% of this 30 min visit was spent on counseling and coordination of care.        Return if symptoms worsen or fail to improve. Patient given educational materials - see patient instructions. Discussed use, benefit, and side effects of prescribed medications. All patient questions answered. Pt voiced understanding. Reviewed health maintenance. An electronic signature was used to authenticate this note.     --MIRNA Aguilar - CNP on 4/4/2022 at 12:35 PM

## 2022-04-05 LAB
CREATININE URINE: 180.9 MG/DL
CREATININE URINE: 2.5 GM/24HR
GFR SERPL CREATININE-BSD FRML MDRD: > 90 ML/MIN/1.73M2
HOURS COLLECTED: 21 HRS
URINE VOLUME, 24 HOUR: 1400 ML

## 2022-04-06 DIAGNOSIS — Z98.1 S/P CERVICAL SPINAL FUSION: ICD-10-CM

## 2022-04-06 RX ORDER — BUTALBITAL, ACETAMINOPHEN AND CAFFEINE 50; 325; 40 MG/1; MG/1; MG/1
TABLET ORAL
Qty: 90 TABLET | Refills: 1 | Status: SHIPPED | OUTPATIENT
Start: 2022-04-06 | End: 2022-06-07

## 2022-04-07 ENCOUNTER — HOSPITAL ENCOUNTER (OUTPATIENT)
Dept: NON INVASIVE DIAGNOSTICS | Age: 52
Discharge: HOME | End: 2022-04-07
Payer: COMMERCIAL

## 2022-04-07 VITALS — BODY MASS INDEX: 41.75 KG/M2 | WEIGHT: 315 LBS | HEIGHT: 73 IN

## 2022-04-07 DIAGNOSIS — R06.09 DOE (DYSPNEA ON EXERTION): ICD-10-CM

## 2022-04-07 DIAGNOSIS — I50.32 CHRONIC HEART FAILURE WITH PRESERVED EJECTION FRACTION (HFPEF) (HCC): ICD-10-CM

## 2022-04-07 DIAGNOSIS — G47.33 OSA (OBSTRUCTIVE SLEEP APNEA): ICD-10-CM

## 2022-04-07 DIAGNOSIS — I20.8 OTHER FORMS OF ANGINA PECTORIS (HCC): ICD-10-CM

## 2022-04-07 LAB
LV EF: 55 %
LVEF MODALITY: NORMAL

## 2022-04-07 PROCEDURE — 6360000002 HC RX W HCPCS

## 2022-04-07 PROCEDURE — 3430000000 HC RX DIAGNOSTIC RADIOPHARMACEUTICAL: Performed by: INTERNAL MEDICINE

## 2022-04-07 PROCEDURE — 93306 TTE W/DOPPLER COMPLETE: CPT

## 2022-04-07 PROCEDURE — A9500 TC99M SESTAMIBI: HCPCS | Performed by: INTERNAL MEDICINE

## 2022-04-07 PROCEDURE — 93017 CV STRESS TEST TRACING ONLY: CPT | Performed by: INTERNAL MEDICINE

## 2022-04-07 PROCEDURE — 78452 HT MUSCLE IMAGE SPECT MULT: CPT

## 2022-04-07 RX ADMIN — Medication 9.1 MILLICURIE: at 09:07

## 2022-04-07 RX ADMIN — Medication 32.6 MILLICURIE: at 10:05

## 2022-04-07 ASSESSMENT — ENCOUNTER SYMPTOMS
BACK PAIN: 0
ABDOMINAL PAIN: 0
NAUSEA: 0
RHINORRHEA: 0
CHEST TIGHTNESS: 0
EYE REDNESS: 0
COUGH: 0
BLOOD IN STOOL: 1
VOMITING: 0

## 2022-04-07 NOTE — ED PROVIDER NOTES
DX:  PAF  Goal: 2.0 -3.0  Patient comes to clinic for follow up anticoagulation visit.  Last INR on 9/8/2021 was 2.1.  Dose maintained.   Today's INR is 2.2 and is within goal range.    Current warfarin total weekly dose of 36 mg verified.  Informed the INR result is within therapeutic range and instructed to maintain current dose per protocol. Discussed dose and return date of 11/3/2021 for next INR. See Anticoagulation flowsheet.    Dr. Agarwal is in the office today supervising the treatment. Note forwarded for review.    Instructed to contact the clinic with any unusual bleeding or bruising, any changes in medications, diet, health status, lifestyle, or any other changes, questions or concerns. Verbalized understanding of all discussed.      [facility] Emergency Department    CHIEF COMPLAINT       Chief Complaint   Patient presents with    Rectal Bleeding       Nurses Notes reviewed and I agree except as noted in the HPI. HISTORY OF PRESENT ILLNESS    Bebe Cristobal norbert 50 y.o. male who presents to the ED for evaluation of BRBPR. Patient states he had a Bm tonight and looked down and the stool had blood in it. It was a lot of blood and bright red. C/O some nausea but no pain. No lightheadedness or dizziness. HPI was provided by the patient    REVIEW OF SYSTEMS     Review of Systems   Constitutional: Negative for chills, fatigue and fever. HENT: Negative for congestion, ear discharge, ear pain, postnasal drip and rhinorrhea. Eyes: Negative for redness. Respiratory: Negative for cough and chest tightness. Cardiovascular: Negative for chest pain and leg swelling. Gastrointestinal: Positive for blood in stool. Negative for abdominal pain, nausea and vomiting. Genitourinary: Negative for difficulty urinating, dysuria, enuresis, flank pain and hematuria. Musculoskeletal: Negative for back pain and joint swelling. Skin: Negative for rash. Neurological: Negative for dizziness, light-headedness, numbness and headaches. Psychiatric/Behavioral: Negative for agitation, behavioral problems and confusion. All other systems negative except as noted. PAST MEDICAL HISTORY     Past Medical History:   Diagnosis Date    Aneurysm [facility])     pituitary gland    Asthma     Cardiomegaly     COVID-19 11/2021    stormy lance    Fibromyalgia     CLARIBEL on CPAP        SURGICALHISTORY      has a past surgical history that includes back surgery; knee surgery; Pain management procedure (Bilateral, 2/11/2021); and Pain management procedure (Bilateral, 4/8/2021).     CURRENT MEDICATIONS       Discharge Medication List as of 4/2/2022  4:59 PM      CONTINUE these medications which have NOT CHANGED    Details orphenadrine (NORFLEX) 100 MG extended release tablet Take 1 tablet by mouth 2 times daily as needed for Muscle spasms, Disp-60 tablet, R-0Normal      pregabalin (LYRICA) 25 MG capsule Take 1 capsule by mouth 2 times daily for 14 days. , Disp-28 capsule, R-0Normal      betamethasone valerate (VALISONE) 0.1 % cream apply to affected area twice a day, Disp-45 g, R-3, Normal      HYDROcodone-acetaminophen (NORCO) 5-325 MG per tablet Take 1 tablet by mouth every 8 hours as needed for Pain for up to 30 days. , Disp-90 tablet, R-0Normal      potassium chloride (KLOR-CON M) 10 MEQ extended release tablet Take 1 tablet by mouth daily, Disp-30 tablet, R-3Normal      Blood Pressure Monitoring (B-D ASSURE BPM/AUTO ARM CUFF) MISC DAILY Starting Tue 3/22/2022, Disp-1 each, R-0, Print      Cholecalciferol (VITAMIN D3) 1.25 MG (15142 UT) CAPS 1 capsule 2 times a week , Monday and Friday, Disp-24 capsule, R-0Normal      rosuvastatin (CRESTOR) 20 MG tablet take 1 tablet by mouth once daily, Disp-90 tablet, R-0Normal      budesonide-formoterol (SYMBICORT) 160-4.5 MCG/ACT AERO 2 puffs inhaled twice daily.   Rinse mouth well after use., Disp-3 each, R-3Normal      ondansetron (ZOFRAN-ODT) 4 MG disintegrating tablet Take 1 tablet by mouth 3 times daily as needed for Nausea or Vomiting, Disp-21 tablet, R-0Normal      albuterol (PROVENTIL) (2.5 MG/3ML) 0.083% nebulizer solution inhale contents of 1 vial ( 3 milliliters ) in nebulizer by mouth and INTO THE LUNGS every 4 hours if needed for wheezing, Disp-375 mL, R-3Normal      butalbital-acetaminophen-caffeine (FIORICET, ESGIC) -40 MG per tablet take 1 tablet by mouth every 4 hours if needed for headache, Disp-90 tablet, R-1Normal      lidocaine (LIDODERM) 5 % Place 1 patch onto the skin every 12 hours 12 hours on, 12 hours off., Disp-60 patch, R-2Normal      Respiratory Therapy Supplies (NEBULIZER/TUBING/MOUTHPIECE) KIT Disp-1 kit, R-0, PrintDispense tubing, mask, and mouthpiece for nebulizer machine. Dx: Asthma      amitriptyline (ELAVIL) 50 MG tablet take 1 tablet by mouth at bedtime, Disp-90 tablet, R-1Normal      dicyclomine (BENTYL) 10 MG capsule Take 1 capsule by mouth 3 times daily as needed (abd cramping), Disp-30 capsule, R-0Normal      loratadine (CLARITIN) 10 MG tablet take 1 tablet by mouth once daily, Disp-90 tablet, R-3Normal      montelukast (SINGULAIR) 10 MG tablet Take 1 tablet by mouth nightly, Disp-90 tablet, R-3Normal      fluticasone (FLONASE) 50 MCG/ACT nasal spray 2 sprays by Each Nostril route daily, Disp-3 each, R-3Normal      diphenhydrAMINE (BENADRYL) 25 MG capsule Take 1 capsule by mouth every 6 hours as needed for Itching, Disp-60 capsule, R-5Normal      albuterol sulfate  (90 Base) MCG/ACT inhaler Inhale 2 puffs into the lungs 4 times daily as needed for Wheezing, Disp-3 Inhaler, R-3Normal      azelastine (OPTIVAR) 0.05 % ophthalmic solution Place 1 drop into both eyes 2 times daily, Disp-1 Bottle, R-11Normal      FEROSUL 325 (65 Fe) MG tablet take 1 tablet by mouth once daily, Disp-90 tablet, R-3Normal      hydrocortisone valerate (WESTCORT) 0.2 % ointment Apply topically daily. , Disp-1 Tube, R-11, Normal      EPINEPHrine (EPIPEN 2-RAGHAV) 0.3 MG/0.3ML SOAJ injection Inject one pen as directed STAT for allergic reaction, may disp generic NDC 35857-690-42, Disp-1 each, R-0Normal      tiotropium (SPIRIVA RESPIMAT) 1.25 MCG/ACT AERS inhaler Inhale 2 puffs into the lungs daily, Disp-1 Inhaler,R-1Normal      cetirizine (ZYRTEC) 10 MG tablet Take 1 tablet by mouth daily, Disp-30 tablet,R-11Normal      bumetanide (BUMEX) 1 MG tablet Take 0.5 tablets by mouth daily, Disp-30 tablet, R-0Print      NARCAN 4 MG/0.1ML LIQD nasal spray DAWHistorical Med      Multiple Vitamins-Minerals (MULTIVITAMIN ADULTS) TABS Take 1 tablet by mouth daily, Disp-90 tablet, R-3Normal      ergocalciferol (ERGOCALCIFEROL) 73405 units capsule Take 50,000 Units by mouth as needed Historical Med ALLERGIES     is allergic to dilantin [phenytoin], lyrica [pregabalin], dupixent [dupilumab], oxycodone, and valium [diazepam]. FAMILY HISTORY     He indicated that his mother is . He indicated that his father is . family history includes Asthma in his father; Emphysema in his father and mother; High Blood Pressure in his father and mother; Other in his mother. SOCIAL HISTORY       Social History     Socioeconomic History    Marital status:      Spouse name: Not on file    Number of children: Not on file    Years of education: Not on file    Highest education level: Not on file   Occupational History    Not on file   Tobacco Use    Smoking status: Never Smoker    Smokeless tobacco: Never Used   Vaping Use    Vaping Use: Never used   Substance and Sexual Activity    Alcohol use: Yes     Comment: occasionally    Drug use: No    Sexual activity: Not on file   Other Topics Concern    Not on file   Social History Narrative    No barriers with transportation    No need for [facility] support     Social Determinants of Health     Financial Resource Strain: Low Risk     Difficulty of Paying Living Expenses: Not hard at all   Food Insecurity: No Food Insecurity    Worried About [address] in the Last Year: Never true    [address] in the Last Year: Never true   Transportation Needs:     Lack of Transportation (Medical): Not on file    Lack of Transportation (Non-Medical):  Not on file   Physical Activity:     Days of Exercise per Week: Not on file    Minutes of Exercise per Session: Not on file   Stress:     Feeling of Stress : Not on file   Social Connections:     Frequency of Communication with Friends and Family: Not on file    Frequency of Social Gatherings with Friends and Family: Not on file    Attends Yazidism Services: Not on file    Active Member of Clubs or Organizations: Not on file    Attends Club or Organization Meetings: Not on file   Judy Eldridge Marital Status: Not on file   Intimate Partner Violence:     Fear of Current or Ex-Partner: Not on file    Emotionally Abused: Not on file    Physically Abused: Not on file    Sexually Abused: Not on file   Housing Stability:     Unable to Pay for Housing in the Last Year: Not on file    Number of [place] in the Last Year: Not on file    Unstable Housing in the Last Year: Not on file       PHYSICAL EXAM     INITIAL VITALS:  oral temperature is 98.5 °F (36.9 °C). His blood pressure is 141/99 (abnormal) and his pulse is 70. His respiration is 17 and oxygen saturation is 97%. Physical Exam  Vitals and nursing note reviewed. Constitutional:       General: He is not in acute distress. Appearance: Normal appearance. He is well-developed. He is not ill-appearing or diaphoretic. HENT:      Head: Normocephalic and atraumatic. Nose: Nose normal.      Mouth/Throat:      Mouth: Mucous membranes are moist.      Pharynx: Oropharynx is clear. Eyes:      General:         Right eye: No discharge. Left eye: No discharge. Conjunctiva/sclera: Conjunctivae normal.   Neck:      Trachea: No tracheal deviation. Cardiovascular:      Rate and Rhythm: Normal rate and regular rhythm. Pulses: Normal pulses. Heart sounds: Normal heart sounds. No murmur heard. No gallop. Comments: Normal capillary refill  Pulmonary:      Effort: Pulmonary effort is normal. No respiratory distress. Breath sounds: Normal breath sounds. No stridor. Abdominal:      General: Bowel sounds are normal.      Palpations: Abdomen is soft. Genitourinary:     Rectum: Guaiac result positive. Musculoskeletal:         General: No tenderness or deformity. Normal range of motion. Cervical back: Normal range of motion. Skin:     General: Skin is warm and dry. Capillary Refill: Capillary refill takes less than 2 seconds. Coloration: Skin is not pale. Findings: No erythema or rash. Neurological:      General: No focal deficit present. Mental Status: He is alert and oriented to person, place, and time. Cranial Nerves: No cranial nerve deficit. Psychiatric:         Behavior: Behavior normal.         DIFFERENTIAL DIAGNOSIS:   GI bleed, diverticulitis, hemorrhoid    DIAGNOSTIC RESULTS     EKG: All EKG's are interpreted by the Emergency Department Physician who eithersigns or Co-signs this chart in the absence of a cardiologist.        RADIOLOGY: non-plainfilm images(s) such as CT, Ultrasound and MRI are read by the radiologist.  Plain radiographic images are visualized and preliminarily interpreted by the emergency physician unless otherwise stated below. No orders to display         LABS:   Labs Reviewed   CBC WITH AUTO DIFFERENTIAL - Abnormal; Notable for the following components:       Result Value    MCV 97.0 (*)     MCHC 31.6 (*)     All other components within normal limits   BASIC METABOLIC PANEL   BLOOD OCCULT STOOL SCREEN #1   ANION GAP   GLOMERULAR FILTRATION RATE, ESTIMATED   OSMOLALITY       EMERGENCY DEPARTMENT COURSE:   Vitals:    Vitals:    04/02/22 1339 04/02/22 1500 04/02/22 1636   BP: (!) 150/96 (!) 164/95 (!) 141/99   Pulse: 88 81 70   Resp: 17 17 17   Temp: 98.5 °F (36.9 °C)     TempSrc: Oral     SpO2: 98% 96% 97%                                 Internal Administration   First Dose COVID-19, Pfizer Purple top, DILUTE for use, 12+ yrs, 30mcg/0.3mL dose  04/14/2021   Second Dose COVID-19, Pfizer Purple top, DILUTE for use, 12+ yrs, 30mcg/0.3mL dose   05/04/2021       Last COVID Lab SARS-CoV-2 (no units)   Date Value   01/07/2021 Not Detected     SARS-CoV-2, NAAT (no units)   Date Value   12/03/2021 NOT  DETECTED     SARS-COV-2, RdRp gene (no units)   Date Value   02/01/2022 Negative            MDM    Patient was seen in the ER for rectal bleeding. Appropriate labs and imaging are ordered and reviewed. Guaiac is positive. Hgb is stable. I discussed with  Timmy Keller. He will see him in the office. Consult is placed. I reviewed findings with the patient and he is comfortable with the POC. Will start the patient on protonix outpatient. Patient is advised to follow a bland diet. Avoid acidic foods especially. VS are stable and patient is appropriate for discharge. Case is discussed with attending. Medications - No data to display    Please note that the patient was evaluated during a pandemic. All efforts were made for HIPPA compliance as well as provision of appropriate care. Patient was seen independently by myself. The patient's final impression and disposition and plan was determined by myself. Strict return precautions and follow up instructions were discussed with the patient prior to discharge, with which the patient agrees. Physical assessment findings, diagnostic testing(s) if applicable, and vital signs reviewed with patient/patient representative. Questions answered. Medications asdirected, including OTC medications for supportive care. Education provided on medications. Differential diagnosis(s) discussed with patient/patient representative. Home care/self care instructions reviewed withpatient/patient representative. Patient is to follow-up with family care provider in 2-3 days if no improvement. Patient is to go to the emergency department if symptoms worsen. Patient/patient representative isaware of care plan, questions answered, verbalizes understanding and is in agreement. CRITICAL CARE:   None    CONSULTS:  Dr. Nimco Cesar:  None    FINAL IMPRESSION     1. Rectal bleeding          DISPOSITION/PLAN   DISPOSITION Decision To Discharge 04/02/2022 04:54:37 PM      PATIENT REFERREDTO:  MD Jorge Luis Ramon [address]  [place] [phone]    Call in 1 day  For follow up    MIRNA Marshall - CNP  [address]  [place] [id number] [phone]    Call in 1 day  For follow up      DISCHARGE MEDICATIONS:  Discharge Medication List as of 4/2/2022  4:59 PM      START taking these medications    Details   pantoprazole (PROTONIX) 40 MG tablet Take 1 tablet by mouth every morning (before breakfast), Disp-30 tablet, R-0Normal             (Please note that portions of this note were completed with a voice recognition program.  Efforts were made to edit the dictations but occasionally words are mis-transcribed.)         MIRNA Lara CNP, APRN - CNP  04/07/22 0140

## 2022-04-08 ENCOUNTER — TELEPHONE (OUTPATIENT)
Dept: CARDIOLOGY CLINIC | Age: 52
End: 2022-04-08

## 2022-04-08 ENCOUNTER — CARE COORDINATION (OUTPATIENT)
Dept: CARE COORDINATION | Age: 52
End: 2022-04-08

## 2022-04-08 ENCOUNTER — TELEPHONE (OUTPATIENT)
Dept: FAMILY MEDICINE CLINIC | Age: 52
End: 2022-04-08

## 2022-04-08 DIAGNOSIS — R06.09 DOE (DYSPNEA ON EXERTION): Primary | ICD-10-CM

## 2022-04-08 DIAGNOSIS — M54.12 CERVICAL RADICULOPATHY: ICD-10-CM

## 2022-04-08 DIAGNOSIS — M54.12 CERVICAL RADICULITIS: ICD-10-CM

## 2022-04-08 DIAGNOSIS — M54.17 LUMBOSACRAL RADICULITIS: ICD-10-CM

## 2022-04-08 DIAGNOSIS — I20.8 OTHER FORMS OF ANGINA PECTORIS (HCC): ICD-10-CM

## 2022-04-08 RX ORDER — PREGABALIN 25 MG/1
25 CAPSULE ORAL 2 TIMES DAILY
Qty: 60 CAPSULE | Refills: 0 | Status: SHIPPED | OUTPATIENT
Start: 2022-04-11 | End: 2022-09-07

## 2022-04-08 NOTE — TELEPHONE ENCOUNTER
PROCEDURE: C    DATE OF SERVICE: 04/18/2022    SERVICE LOCATION: [facility]    CPT CODE: 35566    PHYSICIAN: DAYSI    DATE PRIOR AUTH SUBMITTED: 04/08/2022    STATUS: APPROVED.     AUTH NUMBER: [id number]     VALID: 04/08/2022-05/07/2022

## 2022-04-08 NOTE — TELEPHONE ENCOUNTER
OARRS reviewed. UDS: + for  Amitriptyline, Hydrocodone -consistent. + for Butalbital -inconsistent   Last seen: 3/28/2022.  Follow-up: 6/6/2022

## 2022-04-08 NOTE — TELEPHONE ENCOUNTER
A PA was performed on CoverMyMeds for Hydrocortisone Valerate 0.2% ointment and it was denied. The pt has to have tried and failed TWO preferred drugs for his condition; it shows that the pt has tried and failed one of the preferred drugs, Betamethasone. Please consider trial with one additional preferred drug. Copy of PA scanned into the chart with covered alternatives listed.

## 2022-04-08 NOTE — TELEPHONE ENCOUNTER
From Dr Lavonne Quevedo result note: Schedule [facility] on 4/18/2022 at 7 am    LM for pt to return call.

## 2022-04-08 NOTE — TELEPHONE ENCOUNTER
Please review stress test.  Next appt 3/22/23    Conclusions      Summary   There was a small sized, mild in intensity, fixed myocardial perfusion   defect of the apical wall. There was a moderate sized, moderate in intensity, partially reversible   myocardial perfusion defect of the inferior wall. Recommendation   Clinical correlation is recommended. Invasive ischemia workup is recommended if clinically indicated.       Signatures      ----------------------------------------------------------------   Electronically signed by Anabel Jones MD (Interpreting   Cardiologist) on 04/07/2022 at 14:18   ----------------------------------------------------------------

## 2022-04-11 RX ORDER — TRIAMCINOLONE ACETONIDE 1 MG/G
CREAM TOPICAL
Qty: 1 EACH | Refills: 2 | Status: SHIPPED | OUTPATIENT
Start: 2022-04-11 | End: 2022-08-17

## 2022-04-11 NOTE — TELEPHONE ENCOUNTER
Noted.  New prescription sent. Please let pt know about the PA denial and the new cream that was sent. Thanks. -WS    Orders Placed This Encounter   Medications    triamcinolone (KENALOG) 0.1 % cream     Sig: Apply topically 2 times daily.      Dispense:  1 each     Refill:  2

## 2022-04-11 NOTE — TELEPHONE ENCOUNTER
Spoke with patient on 4/08/22, left heart cath scheduled for 4/18/22 at 7:00 am, with arrival time of 5:00 am. All instructions reviewed with patient via phone and also mailed to patient this date. Case scheduled with Anthony Lemos in cath lab.

## 2022-04-12 ENCOUNTER — CARE COORDINATION (OUTPATIENT)
Dept: CARE COORDINATION | Age: 52
End: 2022-04-12

## 2022-04-12 NOTE — CARE COORDINATION
Received a missed call from [employer]. Attempted to call him back and had to leave a voicemail to return phone call.

## 2022-04-13 LAB — CORTISOL (UR), FREE: NORMAL

## 2022-04-15 ENCOUNTER — PREP FOR PROCEDURE (OUTPATIENT)
Dept: CARDIOLOGY | Age: 52
End: 2022-04-15

## 2022-04-15 DIAGNOSIS — G89.4 CHRONIC PAIN SYNDROME: ICD-10-CM

## 2022-04-15 RX ORDER — ASPIRIN 325 MG
325 TABLET ORAL ONCE
Status: CANCELLED | OUTPATIENT
Start: 2022-04-15 | End: 2022-04-15

## 2022-04-15 RX ORDER — NITROGLYCERIN 0.4 MG/1
0.4 TABLET SUBLINGUAL EVERY 5 MIN PRN
Status: CANCELLED | OUTPATIENT
Start: 2022-04-15

## 2022-04-15 RX ORDER — SODIUM CHLORIDE 0.9 % (FLUSH) 0.9 %
5-40 SYRINGE (ML) INJECTION PRN
Status: CANCELLED | OUTPATIENT
Start: 2022-04-15

## 2022-04-15 RX ORDER — SODIUM CHLORIDE 0.9 % (FLUSH) 0.9 %
5-40 SYRINGE (ML) INJECTION EVERY 12 HOURS SCHEDULED
Status: CANCELLED | OUTPATIENT
Start: 2022-04-15

## 2022-04-15 RX ORDER — SODIUM CHLORIDE 9 MG/ML
INJECTION, SOLUTION INTRAVENOUS CONTINUOUS
Status: CANCELLED | OUTPATIENT
Start: 2022-04-15

## 2022-04-15 RX ORDER — DIPHENHYDRAMINE HYDROCHLORIDE 50 MG/ML
50 INJECTION INTRAMUSCULAR; INTRAVENOUS ONCE
Status: CANCELLED | OUTPATIENT
Start: 2022-04-15 | End: 2022-04-15

## 2022-04-15 RX ORDER — SODIUM CHLORIDE 9 MG/ML
25 INJECTION, SOLUTION INTRAVENOUS PRN
Status: CANCELLED | OUTPATIENT
Start: 2022-04-15

## 2022-04-15 NOTE — TELEPHONE ENCOUNTER
Rush Valencia called requesting a refill on the following medications:  Requested Prescriptions     Pending Prescriptions Disp Refills    HYDROcodone-acetaminophen (NORCO) 5-325 MG per tablet 90 tablet 0     Sig: Take 1 tablet by mouth every 8 hours as needed for Pain for up to 30 days. [facility]   . pv      Date of last visit:   Date of next visit (if applicable): 2/1/7737

## 2022-04-18 ENCOUNTER — HOSPITAL ENCOUNTER (OUTPATIENT)
Dept: INPATIENT UNIT | Age: 52
Discharge: HOME | End: 2022-04-18

## 2022-04-18 RX ORDER — HYDROCODONE BITARTRATE AND ACETAMINOPHEN 5; 325 MG/1; MG/1
1 TABLET ORAL EVERY 8 HOURS PRN
Qty: 90 TABLET | Refills: 0 | Status: SHIPPED | OUTPATIENT
Start: 2022-04-23 | End: 2022-05-19 | Stop reason: SDUPTHER

## 2022-04-18 NOTE — PROGRESS NOTES
Called patient to notify him of scheduled heart cath today. Patient states he would like to cancel at this time and he will reschedule at another time, when he is ready.

## 2022-04-18 NOTE — TELEPHONE ENCOUNTER
OARRS reviewed. UDS: + for  Butalbital amitriptyline hydrocodone consistent. Last seen: 3/28/2022.  Follow-up:   Future Appointments   Date Time Provider Jesus Simmons   5/4/2022  9:00 AM Marcelina Britt MD [facility]   5/4/2022  9:30 AM Mark Guerrero APRN - CNP [facility] Medicine [facility] - [place]   5/11/2022  8:00 AM Emili Mcnair RD, LD [facility] Physic P - [place]   5/25/2022  9:00 AM Sean Good APRN - [address] [facility]   6/6/2022  7:30 AM MIRNA Styles - CNP N [facility] Pain P - [place]   8/17/2022 11:00 AM MIRNA Correa - CNP [facility] Medicine P - [place]   3/22/2023  1:45 PM Josseline Hawkins MD N [address] [facility] - [place]

## 2022-04-19 ENCOUNTER — CARE COORDINATION (OUTPATIENT)
Dept: CARE COORDINATION | Age: 52
End: 2022-04-19

## 2022-04-19 RX ORDER — HYDROCHLOROTHIAZIDE 25 MG/1
25 TABLET ORAL DAILY
Qty: 90 TABLET | Refills: 3 | OUTPATIENT
Start: 2022-04-19

## 2022-04-19 NOTE — CARE COORDINATION
Ambulatory Care Coordination Note  4/19/2022  CM Risk Score: 6  Charlson 10 Year Mortality Risk Score: 10%     ACC: Riya Gallo, RN    Summary Note: Spoke with Zander Reyna for continued Care Coordination followup. States he is feeling better for this review. States blood pressures are running WNL now  Working on short term disability, paperwork has been turned in by PCP office per media tab although Zander Reyna states additional paperwork is needed that will take 30 days to get and send over  States pain is more controlled  Was scheduled to have a cardiac cath but he cancelled it  Overall, feeling better. Plan  -assist with getting paperwork as needed to short term disability  -reinforce importance of monitoring and reporting blood pressure changes  -reinforce importance of early symptom recognition and reporting to prevent unnecessary hospitalizations  -encourage diet as ordered  Congestive Heart Failure Assessment    Are you currently restricting fluids?: No Restriction  Do you understand a low sodium diet?: No  Do you understand how to read food labels?: No  How many restaurant meals do you eat per week?: 1-2  Do you salt your food before tasting it?: No         Symptoms:  CHF associated angina: Neg, CHF associated dyspnea on exertion: Pos, CHF associated fatigue: Neg, CHF associated orthostatic hypotension: Neg, CHF associated PND: Neg, CHF associated shortness of breath: Neg, CHF associated weakness: Neg      Symptom course: improving  Salt intake watch compared to last visit: stable               Care Coordination Interventions    Program Enrollment: Complex Care  Referral from Primary Care Provider: No  Suggested Interventions and [address] [place] [address]:  In Process (Comment: referral placed 3-23-22)  Home Health Services: Not Started  Medication Assistance Program: Not Started  Occupational Therapy: Not Started  Physical Therapy: Not Started  Registered Dietician:  (Comment: states doesn't eat salt/doesn't like salty foods)  Social Work: Not Started  Zone Management Tools: Not Started         Goals Addressed                    This Visit's Progress      Conditions and Symptoms (pt-stated)   On track      I will schedule office visits, as directed by my provider. I will keep my appointment or reschedule if I have to cancel. I will notify my provider of any barriers to my plan of care. I will notify my provider of any symptoms that indicate a worsening of my condition. Barriers: need for additional support and education  Plan for overcoming my barriers: family and ACM support  Confidence: 8/10  Anticipated Goal Completion Date: 6/22/22              Prior to Admission medications    Medication Sig Start Date End Date Taking? Authorizing Provider   HYDROcodone-acetaminophen (NORCO) 5-325 MG per tablet Take 1 tablet by mouth every 8 hours as needed for Pain for up to 30 days. 4/23/22 5/23/22 Yes Will Douglas MD   triamcinolone (KENALOG) 0.1 % cream Apply topically 2 times daily. 4/11/22  Yes MIRNA Correa CNP   pregabalin (LYRICA) 25 MG capsule Take 1 capsule by mouth 2 times daily for 30 days.  4/11/22 5/11/22 Yes Analisa Espinoza MD   butalbital-acetaminophen-caffeine (FIORICET, ESGIC) -08 MG per tablet take 1 tablet by mouth every 4 hours if needed for headache 4/6/22  Yes MIRNA Correa CNP   pantoprazole (PROTONIX) 40 MG tablet Take 1 tablet by mouth every morning (before breakfast) 4/2/22  Yes MIRNA Flowers CNP   orphenadrine (NORFLEX) 100 MG extended release tablet Take 1 tablet by mouth 2 times daily as needed for Muscle spasms 4/4/22 5/4/22 Yes MIRNA Styles CNP   betamethasone valerate (VALISONE) 0.1 % cream apply to affected area twice a day 3/24/22  Yes MIRNA Correa CNP   potassium chloride (KLOR-CON M) 10 MEQ extended release tablet Take 1 tablet by mouth daily 3/23/22  Yes Josseline Hawkins MD   Blood Pressure Monitoring (B-D ASSURE BPM/AUTO ARM CUFF) MISC 1 Units by Does not apply route daily 3/22/22  Yes MIRNA العلي CNP   Cholecalciferol (VITAMIN D3) 1.25 MG (54016 UT) CAPS 1 capsule 2 times a week , Monday and Friday 3/20/22  Yes Alyssa Sunshine MD   rosuvastatin (CRESTOR) 20 MG tablet take 1 tablet by mouth once daily 2/28/22  Yes MIRNA العلي CNP   budesonide-formoterol (SYMBICORT) 160-4.5 MCG/ACT AERO 2 puffs inhaled twice daily. Rinse mouth well after use. 2/17/22  Yes MIRNA العلي CNP   ondansetron (ZOFRAN-ODT) 4 MG disintegrating tablet Take 1 tablet by mouth 3 times daily as needed for Nausea or Vomiting 2/1/22  Yes MIRNA العلي CNP   albuterol (PROVENTIL) (2.5 MG/3ML) 0.083% nebulizer solution inhale contents of 1 vial ( 3 milliliters ) in nebulizer by mouth and INTO THE LUNGS every 4 hours if needed for wheezing 1/31/22  Yes MIRNA العلي CNP   Respiratory Therapy Supplies (NEBULIZER/TUBING/MOUTHPIECE) KIT Dispense tubing, mask, and mouthpiece for nebulizer machine.  Dx: Asthma 1/3/22  Yes MIRNA العلي CNP   amitriptyline (ELAVIL) 50 MG tablet take 1 tablet by mouth at bedtime 12/13/21  Yes MIRNA العلي CNP   dicyclomine (BENTYL) 10 MG capsule Take 1 capsule by mouth 3 times daily as needed (abd cramping) 12/9/21  Yes MIRNA Vance CNP   loratadine (CLARITIN) 10 MG tablet take 1 tablet by mouth once daily 11/29/21  Yes MIRNA العلي CNP   montelukast (SINGULAIR) 10 MG tablet Take 1 tablet by mouth nightly 11/17/21  Yes MIRNA العلي CNP   fluticasone (FLONASE) 50 MCG/ACT nasal spray 2 sprays by Each Nostril route daily 11/17/21  Yes MIRNA العلي CNP   diphenhydrAMINE (BENADRYL) 25 MG capsule Take 1 capsule by mouth every 6 hours as needed for Itching 11/2/21  Yes MIRNA العلي - CNP   albuterol sulfate  (90 Base) MCG/ACT inhaler Inhale 2 puffs into the lungs 4 times daily as needed for Wheezing 8/19/21  [facility] C[facility]   azelastine (OPTIVAR) 0.05 % ophthalmic solution Place 1 drop into both eyes 2 times daily 8/19/21  Yes MIRNA العلي CNP   FEROSUL 325 (65 Fe) MG tablet take 1 tablet by mouth once daily 6/28/21  Yes MIRNA Rivera CNP   EPINEPHrine (EPIPEN 2-RAGHAV) 0.3 MG/0.3ML SOAJ injection Inject one pen as directed STAT for allergic reaction, may disp [facility] [id number] 2/25/21  Yes MIRNA Topete CNP   tiotropium (SPIRIVA RESPIMAT) 1.25 MCG/ACT AERS inhaler Inhale 2 puffs into the lungs daily 11/18/20  Yes Kriss LipMIRNA velarde CNP   cetirizine (ZYRTEC) 10 MG tablet Take 1 tablet by mouth daily 8/31/20  Yes Kriss LipomaMIRNA CNP   bumetanide (BUMEX) 1 MG tablet Take 0.5 tablets by mouth daily 5/28/20  Yes MIRNA Oconnor CNP   NARCAN 4 MG/0.1ML LIQD nasal spray  1/10/20  Yes Historical Provider, MD   Multiple Vitamins-Minerals (MULTIVITAMIN ADULTS) TABS Take 1 tablet by mouth daily 1/9/20  Yes MIRNA العلي CNP   ergocalciferol (ERGOCALCIFEROL) 32356 units capsule Take 50,000 Units by mouth as needed    Yes Historical Provider, MD       Future Appointments   Date Time Provider Jesus Lingisti   5/4/2022  9:00 AM Bonnie Tan MD AFL A[facility] END   5/4/2022  9:30 AM MIRNA العلي - [address]   5/11/2022  8:00 AM Zulay Anderson RD, LD SRPX Physic [facility] [place]   5/25/2022  9:00 AM MIRNA Armstrong - [address] [facility]   6/6/2022  7:30 AM MIRNA Kingsley CNP N SRPX Pain [place]   8/17/2022 11:00 AM MIRNA العلي CNP Fam Medicine [facility] [place]   3/22/2023  1:45 PM Zander Morgan MD N SRPX Heart [facility] [place]

## 2022-04-19 NOTE — CARE COORDINATION
Received a missed call from Maria Teresa. Attempted to call him back and had to leave another voicemail.

## 2022-04-19 NOTE — TELEPHONE ENCOUNTER
Discussed with patient. He states that cardiology never instructed him to stop the hctz. He asked to schedule appt with our office to further discuss his medications. Appointment scheduled for this Friday.

## 2022-04-19 NOTE — TELEPHONE ENCOUNTER
Please let pt know that I reviewed Dr Tatiana Alvarez note from 3/23/22 and he wanted him to stop the HCTZ. So refill not done today. He may contact the cardiology office if he has any issues.   Thanks -WS

## 2022-04-19 NOTE — TELEPHONE ENCOUNTER
Patient requesting refill on hctz 25 mg. States that ED placed him on this but did not provide refills. Last seen 4/4/22, next appt 5/4/22. Med verified. Order pended.    WCP

## 2022-04-25 ENCOUNTER — TELEPHONE (OUTPATIENT)
Dept: CARDIOLOGY CLINIC | Age: 52
End: 2022-04-25

## 2022-04-25 NOTE — TELEPHONE ENCOUNTER
Pt had cath scheduled on 4/18/22 that wasn't done because pt No showed or it was cancelled. I called and LM for patient to give us a call to get rescheduled when he is ready.

## 2022-04-26 ENCOUNTER — OFFICE VISIT (OUTPATIENT)
Dept: FAMILY MEDICINE CLINIC | Age: 52
End: 2022-04-26
Payer: COMMERCIAL

## 2022-04-26 VITALS
RESPIRATION RATE: 18 BRPM | SYSTOLIC BLOOD PRESSURE: 138 MMHG | BODY MASS INDEX: 55.94 KG/M2 | WEIGHT: 315 LBS | DIASTOLIC BLOOD PRESSURE: 86 MMHG | HEART RATE: 88 BPM

## 2022-04-26 DIAGNOSIS — I51.7 ENLARGED HEART: ICD-10-CM

## 2022-04-26 DIAGNOSIS — I10 UNCONTROLLED HYPERTENSION: Primary | ICD-10-CM

## 2022-04-26 DIAGNOSIS — J45.50 SEVERE PERSISTENT ASTHMA WITHOUT COMPLICATION: ICD-10-CM

## 2022-04-26 DIAGNOSIS — E66.01 MORBID OBESITY WITH BMI OF 50.0-59.9, ADULT (HCC): ICD-10-CM

## 2022-04-26 DIAGNOSIS — E78.5 HYPERLIPIDEMIA, UNSPECIFIED HYPERLIPIDEMIA TYPE: ICD-10-CM

## 2022-04-26 LAB — GFR SERPL CREATININE-BSD FRML MDRD: 79 ML/MIN/1.73M2

## 2022-04-26 PROCEDURE — G8417 CALC BMI ABV UP PARAM F/U: HCPCS | Performed by: NURSE PRACTITIONER

## 2022-04-26 PROCEDURE — G8427 DOCREV CUR MEDS BY ELIG CLIN: HCPCS | Performed by: NURSE PRACTITIONER

## 2022-04-26 PROCEDURE — 3017F COLORECTAL CA SCREEN DOC REV: CPT | Performed by: NURSE PRACTITIONER

## 2022-04-26 PROCEDURE — 99214 OFFICE O/P EST MOD 30 MIN: CPT | Performed by: NURSE PRACTITIONER

## 2022-04-26 PROCEDURE — 1036F TOBACCO NON-USER: CPT | Performed by: NURSE PRACTITIONER

## 2022-04-26 RX ORDER — HYDROCHLOROTHIAZIDE 25 MG/1
25 TABLET ORAL EVERY MORNING
Qty: 90 TABLET | Refills: 1 | Status: SHIPPED | OUTPATIENT
Start: 2022-04-26 | End: 2022-10-10

## 2022-04-26 RX ORDER — ALBUTEROL SULFATE 90 UG/1
2 AEROSOL, METERED RESPIRATORY (INHALATION) 4 TIMES DAILY PRN
Qty: 3 EACH | Refills: 3 | Status: SHIPPED | OUTPATIENT
Start: 2022-04-26

## 2022-04-26 RX ORDER — LIDOCAINE 50 MG/G
OINTMENT TOPICAL
COMMUNITY
Start: 2022-04-18 | End: 2022-06-22 | Stop reason: SDUPTHER

## 2022-04-26 RX ORDER — LIDOCAINE 50 MG/G
PATCH TOPICAL
COMMUNITY
Start: 2022-04-12 | End: 2022-05-05

## 2022-04-26 ASSESSMENT — ENCOUNTER SYMPTOMS
COUGH: 0
COLOR CHANGE: 0
ABDOMINAL PAIN: 0
DIARRHEA: 0
NAUSEA: 1
SINUS PAIN: 0
SORE THROAT: 0
TROUBLE SWALLOWING: 0
WHEEZING: 0
FACIAL SWELLING: 0
VOMITING: 0
SHORTNESS OF BREATH: 0
BACK PAIN: 1

## 2022-04-26 NOTE — PROGRESS NOTES
[facility]  [address] [facility] [address]  Dept: [phone]  Dept Fax: [phone]  Loc: [phone]     2022     Luz Maria Phillips (:  1970) is a 50 y.o. male, here for evaluation of the following medical concerns:    Chief Complaint   Patient presents with    Follow-up     HTN follow up. Wants to discuss medications and possibly restarting HCTZ. HPI    Pt presents to the office today for HTN follow up. Pt did see Dr Darline Barajas on 3/23/22 and pt was ordered to continue the bumex and stop the HCTZ. Pt did not realize that he was supposed to stop the HCTZ and continued it. Last week he ran out of HCTZ and wanted a refill, after review of his chart and Dr Laruth Castleman note, Roxanne Hdez was refused because he was supposed to be on Bumex daily in place of the HCTZ. He would like to be back on HCTZ if possible. Pt is not currently on the Bumex, was only ever taking that PRN. Pt was also scheduled for a cardiac cath, but did cancel this because he was concerned about why he needed. Deb Bennett He reports that since he has been off the HCTZ he has been increased pain, less sleep, elevated BP and a rash. From Dr Laruth Castleman note on 3/23/22:  · Cont Bumex 0.5 mg po daily  · Limit Na intake  · limit fluid intake  · Daily weight   · Stop HCTZ  · Start KCL 10 meq po daily   · Will need to investigate for underlying structural heart disease, will proceed with a transthoracic echocardiogram   · Based on patient's risk factors and clinical presentation, stress test is indicated to investigate for possible underlying ischemic heart disease. Patient  agrees with that work up and its risks and benefits and potential need for additional testing if stress test is abnormal such as cardiac catheterization  · Hyperlipidemia: on statins, followed periodically.  Patient need periodic lipid and liver profile     Above findings and plan of care were discussed with patient in details, patient's questions were answered.      Disposition:  RTC in 6 months            Electronically signed by Linnette Pollack MD, [facility] [place], [place]    3/23/2022 at 2:23 PM EDT    Wt Readings from Last 3 Encounters:   04/26/22 (!) 424 lb (192.3 kg)   04/07/22 (!) 418 lb (189.6 kg)   04/04/22 (!) 410 lb 12.8 oz (186.3 kg)       Treatment Adherence:   Medication compliance:  compliant all of the time  Diet compliance:  compliant most of the time  Weight trend: increasing  Current exercise: no regular exercise    Hypertension:  Home blood pressure monitoring: Yes - up and down. Patient complains of headache and fatigue. Antihypertensive medication side effects: no medication side effects noted. Use of agents associated with hypertension: none. Sodium (meq/L)   Date Value   04/02/2022 138    BUN (mg/dL)   Date Value   04/02/2022 15    Glucose (mg/dL)   Date Value   04/02/2022 90      Potassium (meq/L)   Date Value   04/02/2022 3.8     Potassium reflex Magnesium (meq/L)   Date Value   03/21/2022 3.4 (L)    CREATININE (mg/dL)   Date Value   04/02/2022 1.0         Hyperlipidemia:  No new myalgias or GI upset on rosuvastatin (Crestor). Lab Results   Component Value Date    CHOL 153 03/24/2022    TRIG 134 03/24/2022    HDL 43 03/24/2022    LDLCALC 83 03/24/2022     Lab Results   Component Value Date    ALT 26 03/24/2022    AST 18 03/24/2022      The 10-year ASCVD risk score (Radha Borjas, et al., 2013) is: 9.7%    Values used to calculate the score:      Age: 50 years      Sex: Male      Is Non- : Yes      Diabetic: No      Tobacco smoker: No      Systolic Blood Pressure: 134 mmHg      Is BP treated: Yes      HDL Cholesterol: 43 mg/dL      Total Cholesterol: 153 mg/dL      Review of Systems   Constitutional: Positive for fatigue. Negative for chills and fever. HENT: Negative for congestion, facial swelling, sinus pain, sore throat and trouble swallowing. Respiratory: Negative for cough, shortness of breath and wheezing. Cardiovascular: Negative for chest pain, palpitations and leg swelling. Gastrointestinal: Positive for nausea. Negative for abdominal pain, diarrhea and vomiting. Musculoskeletal: Positive for arthralgias and back pain. Negative for gait problem and neck pain. Skin: Negative for color change and rash. Neurological: Positive for headaches. Negative for dizziness and weakness. Psychiatric/Behavioral: Positive for sleep disturbance. Negative for agitation. The patient is not nervous/anxious. Prior to Visit Medications    Medication Sig Taking? Authorizing Provider   lidocaine (XYLOCAINE) 5 % ointment APPLY TOPICALLY TO AFFECTED AREAS OF LOWER BACK AND NECK once daily if needed for pain Yes Historical Provider, MD   lidocaine (LIDODERM) 5 % apply 1 patch TO THE AFFECTED AREA DAILY. LEAVE ON FOR 12 HOURS AND THEN OFF FOR 12 HOURS. Yes Historical Provider, MD   albuterol sulfate  (90 Base) MCG/ACT inhaler Inhale 2 puffs into the lungs 4 times daily as needed for Wheezing Yes Domingo Snellen, APRN - CNP   hydroCHLOROthiazide (HYDRODIURIL) 25 MG tablet Take 1 tablet by mouth every morning Yes Domingo Snellen, APRN - CNP   HYDROcodone-acetaminophen (NORCO) 5-325 MG per tablet Take 1 tablet by mouth every 8 hours as needed for Pain for up to 30 days. Yes Jose Koo MD   triamcinolone (KENALOG) 0.1 % cream Apply topically 2 times daily. Yes Domingo Snellen, APRN - CNP   pregabalin (LYRICA) 25 MG capsule Take 1 capsule by mouth 2 times daily for 30 days.  Yes Jose Koo MD   butalbital-acetaminophen-caffeine (FIORICET, ESGIC) -09 MG per tablet take 1 tablet by mouth every 4 hours if needed for headache Yes Domingo Snellen, APRN - CNP   pantoprazole (PROTONIX) 40 MG tablet Take 1 tablet by mouth every morning (before breakfast) Yes MIRNA Rivera CNP   orphenadrine (NORFLEX) 100 MG extended release tablet Take 1 tablet by mouth 2 times daily as needed for Muscle spasms Yes MIRNA Aden CNP   betamethasone valerate (VALISONE) 0.1 % cream apply to affected area twice a day Yes MIRNA Sawant CNP   potassium chloride (KLOR-CON M) 10 MEQ extended release tablet Take 1 tablet by mouth daily Yes Faisal Moody MD   Blood Pressure Monitoring (B-D ASSURE BPM/AUTO ARM CUFF) MISC 1 Units by Does not apply route daily Yes MIRNA Sawant CNP   Cholecalciferol (VITAMIN D3) 1.25 MG (68137 UT) CAPS 1 capsule 2 times a week , Monday and Friday Yes Irlanda Cutler MD   rosuvastatin (CRESTOR) 20 MG tablet take 1 tablet by mouth once daily Yes MIRNA Sawnat CNP   budesonide-formoterol (SYMBICORT) 160-4.5 MCG/ACT AERO 2 puffs inhaled twice daily. Rinse mouth well after use. Yes MIRNA Sawant CNP   ondansetron (ZOFRAN-ODT) 4 MG disintegrating tablet Take 1 tablet by mouth 3 times daily as needed for Nausea or Vomiting Yes MIRNA Sawnat CNP   albuterol (PROVENTIL) (2.5 MG/3ML) 0.083% nebulizer solution inhale contents of 1 vial ( 3 milliliters ) in nebulizer by mouth and INTO THE LUNGS every 4 hours if needed for wheezing Yes MIRNA Sawant CNP   Respiratory Therapy Supplies (NEBULIZER/TUBING/MOUTHPIECE) KIT Dispense tubing, mask, and mouthpiece for nebulizer machine.  Dx: Asthma Yes MIRNA Sawant CNP   amitriptyline (ELAVIL) 50 MG tablet take 1 tablet by mouth at bedtime Yes MIRNA Sawant CNP   dicyclomine (BENTYL) 10 MG capsule Take 1 capsule by mouth 3 times daily as needed (abd cramping) Yes MIRNA North CNP   loratadine (CLARITIN) 10 MG tablet take 1 tablet by mouth once daily Yes MIRNA Sawant CNP   montelukast (SINGULAIR) 10 MG tablet Take 1 tablet by mouth nightly Yes MIRNA Sawant CNP   fluticasone (FLONASE) 50 MCG/ACT nasal spray 2 sprays by Each Nostril route daily Yes MIRNA Sawant CNP   diphenhydrAMINE (BENADRYL) 25 MG capsule Take 1 capsule by mouth every 6 hours as needed for Itching Yes MIRNA Germain CNP   azelastine (OPTIVAR) 0.05 % ophthalmic solution Place 1 drop into both eyes 2 times daily Yes MIRNA Germain CNP   FEROSUL 325 (65 Fe) MG tablet take 1 tablet by mouth once daily Yes MIRNA Pena CNP   EPINEPHrine (EPIPEN 2-RAGHAV) 0.3 MG/0.3ML SOAJ injection Inject one pen as directed STAT for allergic reaction, may disp generic NDC [id number] Yes MIRNA Lugo CNP   tiotropium (SPIRIVA RESPIMAT) 1.25 MCG/ACT AERS inhaler Inhale 2 puffs into the lungs daily Yes MIRNA Lugo CNP   cetirizine (ZYRTEC) 10 MG tablet Take 1 tablet by mouth daily Yes MIRNA Lugo CNP   bumetanide (BUMEX) 1 MG tablet Take 0.5 tablets by mouth daily Yes MIRNA Overton CNP   NARCAN 4 MG/0.1ML LIQD nasal spray  Yes Historical Provider, MD   Multiple Vitamins-Minerals (MULTIVITAMIN ADULTS) TABS Take 1 tablet by mouth daily Yes MIRNA Germain CNP   ergocalciferol (ERGOCALCIFEROL) 88258 units capsule Take 50,000 Units by mouth as needed  Yes Historical Provider, MD        Social History     Tobacco Use    Smoking status: Never Smoker    Smokeless tobacco: Never Used   Substance Use Topics    Alcohol use: Yes     Comment: occasionally        Vitals:    04/26/22 1432 04/26/22 1519   BP: (!) 142/112 138/86   Pulse: 88    Resp: 18    Weight: (!) 424 lb (192.3 kg)      Estimated body mass index is 55.94 kg/m² as calculated from the following:    Height as of 4/7/22: 6' 1\" (1.854 m). Weight as of this encounter: 424 lb (192.3 kg). Physical Exam  Vitals reviewed. Constitutional:       General: He is not in acute distress. Appearance: He is well-developed. HENT:      Head: Normocephalic and atraumatic. Pulmonary:      Effort: Pulmonary effort is normal. No respiratory distress. Skin:     General: Skin is warm and dry. Neurological:      Mental Status: He is alert. Coordination: Coordination normal.   Psychiatric:         Behavior: Behavior normal.         Thought Content: Thought content normal.         Judgment: Judgment normal.         ASSESSMENT/PLAN:  1. Uncontrolled hypertension  - hydroCHLOROthiazide (HYDRODIURIL) 25 MG tablet; Take 1 tablet by mouth every morning  Dispense: 90 tablet; Refill: 1    2. Severe persistent asthma without complication  - albuterol sulfate  (90 Base) MCG/ACT inhaler; Inhale 2 puffs into the lungs 4 times daily as needed for Wheezing  Dispense: 3 each; Refill: 3    3. Enlarged heart    4. Morbid obesity with BMI of 50.0-59.9, adult ([facility] [address]    5. Hyperlipidemia, unspecified hyperlipidemia type    - BP elevated today and pt has gained #5 since being off the HCTZ. Will add this back on and set pt up with Follow up with cardiology. Pt encouraged to ask questions and sort out which medication would be best for him, and have cardiac cath completed with Dr Librado Mcneill. Pt agreeable. - OK to go back to work on 5/7/22 if BP is under control.   - Follow up with all specialities as planned. - Call office with any questions or concerns, or if symptoms are getting worse or changing  - ER for any chest pain, SOB or acute changes. Return in about 3 months (around 7/26/2022), or if symptoms worsen or fail to improve, for Routine follow up, Medication check. Patient given educational materials - see patient instructions. Discussed use, benefit, and side effects of prescribed medications. All patient questions answered. Pt voiced understanding. Reviewed health maintenance. An electronic signature was used to authenticate this note.     --David Adams, APRN - CNP on 4/26/2022 at 3:57 PM

## 2022-04-26 NOTE — PATIENT INSTRUCTIONS
Patient Education        Acute High Blood Pressure: Care Instructions  Your Care Instructions     Acute high blood pressure is very high blood pressure. It's a serious problem. Very high blood pressure can damage your brain, heart, eyes, and kidneys. You may have been given medicines to lower your blood pressure. You may have gotten them as pills or through a needle in one of your veins. This is called an IV. And maybe you were given other medicines too. These can be needed whenhigh blood pressure causes other problems. To keep your blood pressure at a lower level, you may need to make healthylifestyle changes. And you will probably need to take medicines. Be sure to follow up with your doctor about your blood pressure and what youcan do about it. Follow-up care is a key part of your treatment and safety. Be sure to make and go to all appointments, and call your doctor if you are having problems. It's also a good idea to know your test results and keep alist of the medicines you take. How can you care for yourself at home?  See your doctor as often as he or she recommends. This is to make sure your blood pressure is under control.  Take your blood pressure medicine exactly as prescribed. You may take one or more types. They include diuretics, beta-blockers, ACE inhibitors, calcium channel blockers, and angiotensin II receptor blockers. Call your doctor if you think you are having a problem with your medicine.  If you take blood pressure medicine, talk to your doctor before you take decongestants or anti-inflammatory medicine, such as ibuprofen. These can raise blood pressure.  Learn how to check your blood pressure at home. Check it often.  Ask your doctor if it's okay to drink alcohol.  Talk to your doctor about lifestyle changes that can help blood pressure. These include being active and managing your weight.  Don't smoke. Smoking increases your risk for heart attack and stroke.   When should you call for help? Call 911  anytime you think you may need emergency care. This may mean having symptoms that suggest that your blood pressure is causing a serious heart or blood vessel problem. Your blood pressure may be over 180/120. For example, call 911 if:     You have symptoms of a heart attack. These may include:  ? Chest pain or pressure, or a strange feeling in the chest.  ? Sweating. ? Shortness of breath. ? Nausea or vomiting. ? Pain, pressure, or a strange feeling in the back, neck, jaw, or upper belly or in one or both shoulders or arms. ? Lightheadedness or sudden weakness. ? A fast or irregular heartbeat.      You have symptoms of a stroke. These may include:  ? Sudden numbness, tingling, weakness, or loss of movement in your face, arm, or leg, especially on only one side of your body. ? Sudden vision changes. ? Sudden trouble speaking. ? Sudden confusion or trouble understanding simple statements. ? Sudden problems with walking or balance. ? A sudden, severe headache that is different from past headaches.      You have severe back or belly pain. Do not wait until your blood pressure comes down on its own. Get help right away. Call your doctor now or seek immediate care if:     Your blood pressure is much higher than normal (such as 180/120 or higher), but you don't have symptoms.      You think high blood pressure is causing symptoms, such as:  ? Severe headache.  ? Blurry vision. Watch closely for changes in your health, and be sure to contact your doctor if:     Your blood pressure measures higher than your doctor recommends at least 2 times. That means the top number is higher or the bottom number is higher, or both.      You think you may be having side effects from your blood pressure medicine. Where can you learn more? Go to [email or web address] and sign in to your [employer] account.  Enter T040 in the [employer] box to learn more about \"Acute High Blood Pressure: Care Instructions. \"     If you do not have an account, please click on the \"Sign Up Now\" link. Current as of: January 10, 2022               Content Version: 13.2  © 2006-2022 Healthwise, Incorporated. Care instructions adapted under license by [facility] ([facility] and the [facility]). If you have questions about a medical condition or this instruction, always ask your healthcare professional. [address] any warranty or liability for your use of this information.

## 2022-04-26 NOTE — LETTER
[facility] [address] [address]  Phone: [phone]  Fax: MIRNA Reece CNP        April 26, 2022     Patient: Paul Hussein   YOB: 1970   Date of Visit: 4/26/2022       To Whom It May Concern: It is my medical opinion that Tal White may return to work on 5/7/2022. If you have any questions or concerns, please don't hesitate to call.     Sincerely,        MIRNA De Luna CNP

## 2022-04-27 ENCOUNTER — TELEPHONE (OUTPATIENT)
Dept: FAMILY MEDICINE CLINIC | Age: 52
End: 2022-04-27

## 2022-04-27 NOTE — TELEPHONE ENCOUNTER
----- Message from Vu Saadiajose cruz sent at 4/27/2022  1:01 PM EDT -----  Subject: Message to Provider    QUESTIONS  Information for Provider? Patient asking for a call back for helping   setting up a visit to the cardiologist specialist as he missed his   appointment today   ---------------------------------------------------------------------------  --------------  [address]  What is the best way for the office to contact you? OK to leave message on   voicemail  Preferred Call Back Phone Number? [phone]  ---------------------------------------------------------------------------  --------------  SCRIPT ANSWERS  Relationship to Patient?  Self

## 2022-04-28 ENCOUNTER — CARE COORDINATION (OUTPATIENT)
Dept: CARE COORDINATION | Age: 52
End: 2022-04-28

## 2022-04-28 ENCOUNTER — TELEPHONE (OUTPATIENT)
Dept: ALLERGY | Age: 52
End: 2022-04-28

## 2022-04-28 NOTE — TELEPHONE ENCOUNTER
Patient has not been into the office for over 1 year. Noncompliance is noted. Pt also has noted allergy to dupixent. Dupixent injection chart disassembled and scanned into media.

## 2022-04-28 NOTE — CARE COORDINATION
Ambulatory Care Coordination Note  4/28/2022  CM Risk Score: 2  Charlson 10 Year Mortality Risk Score: 10%     ACC: Helayne Bumpers, RN    Summary Note: Spoke with Arielle Caballero for continued Care Coordination follow up call. Had PCP appt this week  RD referral made and scheduled to see next month  Cardiac cath appt scheduled  Following up with cardiology  Monitoring blood pressures at home and states they are improved since starting back on medication  Has appt with Dr. Angela Mohan May 4th  Going back to work first week of May pending blood pressures are improved    Plan  -reinforce education completed  -encourage taking medication as ordered  -encourage monitoring weight and reporting changes to PCP office of 3#/day or 5#/week gain  -collaborate with RD as needed  -ensure cardiac cath completed and follow up with cardiology  -encourage daily weight and blood pressure tracking and reporting  Congestive Heart Failure Assessment    Are you currently restricting fluids?: No Restriction  Do you understand a low sodium diet?: No  Do you understand how to read food labels?: No  How many restaurant meals do you eat per week?: 1-2  Do you salt your food before tasting it?: No         Symptoms:  CHF associated angina: Neg, CHF associated dyspnea on exertion: Pos, CHF associated fatigue: Neg, CHF associated orthostatic hypotension: Neg, CHF associated PND: Neg, CHF associated shortness of breath: Neg, CHF associated weakness: Neg      Symptom course: improving  Patient-reported weight (lb): 424  Weight trend: stable  Salt intake watch compared to last visit: stable                 Care Coordination Interventions    Program Enrollment: Complex Care  Referral from Primary Care Provider: No  Suggested Interventions and [facility] [address]:  In Process (Comment: referral placed 3-23-22)  Home Health Services: Not Started  Medication Assistance Program: Not Started  Occupational Therapy: Not Started  Physical Therapy: Not Started  Registered Dietician: Completed (Comment: appt with RD scheduled)  Social Work: Not Started  [employer] Management Tools: Not Started         Goals Addressed                    This Visit's Progress      Conditions and Symptoms (pt-stated)   On track      I will schedule office visits, as directed by my provider. I will keep my appointment or reschedule if I have to cancel. I will notify my provider of any barriers to my plan of care. I will notify my provider of any symptoms that indicate a worsening of my condition. Barriers: need for additional support and education  Plan for overcoming my barriers: family and ACM support  Confidence: 8/10  Anticipated Goal Completion Date: 6/22/22              Prior to Admission medications    Medication Sig Start Date End Date Taking? Authorizing Provider   lidocaine (XYLOCAINE) 5 % ointment APPLY TOPICALLY TO AFFECTED AREAS OF LOWER BACK AND NECK once daily if needed for pain 4/18/22   Historical Provider, MD   lidocaine (LIDODERM) 5 % apply 1 patch TO THE AFFECTED AREA DAILY. LEAVE ON FOR 12 HOURS AND THEN OFF FOR 12 HOURS. 4/12/22   Historical Provider, MD   albuterol sulfate  (90 Base) MCG/ACT inhaler Inhale 2 puffs into the lungs 4 times daily as needed for Wheezing 4/26/22   MIRNA Zarate CNP   hydroCHLOROthiazide (HYDRODIURIL) 25 MG tablet Take 1 tablet by mouth every morning 4/26/22   MIRNA Zarate CNP   HYDROcodone-acetaminophen (NORCO) 5-325 MG per tablet Take 1 tablet by mouth every 8 hours as needed for Pain for up to 30 days. 4/23/22 5/23/22  Mimi Douglas MD   triamcinolone (KENALOG) 0.1 % cream Apply topically 2 times daily. 4/11/22   MIRNA Zarate CNP   pregabalin (LYRICA) 25 MG capsule Take 1 capsule by mouth 2 times daily for 30 days.  4/11/22 5/11/22  Madeleine Hamm MD   butalbital-acetaminophen-caffeine (FIORICET, ESGIC) -90 MG per tablet take 1 tablet by mouth every 4 hours if needed for headache 4/6/22   MIRNA Gardner CNP   pantoprazole (PROTONIX) 40 MG tablet Take 1 tablet by mouth every morning (before breakfast) 4/2/22   MIRNA Rubio CNP   orphenadrine (NORFLEX) 100 MG extended release tablet Take 1 tablet by mouth 2 times daily as needed for Muscle spasms 4/4/22 5/4/22  MIRNA White CNP   betamethasone valerate (VALISONE) 0.1 % cream apply to affected area twice a day 3/24/22   MIRNA Gardner CNP   potassium chloride (KLOR-CON M) 10 MEQ extended release tablet Take 1 tablet by mouth daily 3/23/22   Alyson Aleman MD   Blood Pressure Monitoring (B-D ASSURE BPM/AUTO ARM CUFF) MISC 1 Units by Does not apply route daily 3/22/22   MIRNA Gardner CNP   Cholecalciferol (VITAMIN D3) 1.25 MG (31768 UT) CAPS 1 capsule 2 times a week , Monday and Friday 3/20/22   Julio Royal MD   rosuvastatin (CRESTOR) 20 MG tablet take 1 tablet by mouth once daily 2/28/22   MIRNA Gardner CNP   budesonide-formoterol (SYMBICORT) 160-4.5 MCG/ACT AERO 2 puffs inhaled twice daily. Rinse mouth well after use. 2/17/22   MIRNA Gardner CNP   ondansetron (ZOFRAN-ODT) 4 MG disintegrating tablet Take 1 tablet by mouth 3 times daily as needed for Nausea or Vomiting 2/1/22   MIRNA Gardner CNP   albuterol (PROVENTIL) (2.5 MG/3ML) 0.083% nebulizer solution inhale contents of 1 vial ( 3 milliliters ) in nebulizer by mouth and INTO THE LUNGS every 4 hours if needed for wheezing 1/31/22   MIRNA Gardner CNP   Respiratory Therapy Supplies (NEBULIZER/TUBING/MOUTHPIECE) KIT Dispense tubing, mask, and mouthpiece for nebulizer machine.  Dx: Asthma 1/3/22   MIRNA Gardner CNP   amitriptyline (ELAVIL) 50 MG tablet take 1 tablet by mouth at bedtime 12/13/21   MIRNA Gardner CNP   dicyclomine (BENTYL) 10 MG capsule Take 1 capsule by mouth 3 times daily as needed (abd cramping) 12/9/21   MIRNA Allen CNP   loratadine (CLARITIN) 10 MG tablet take 1 tablet by mouth once daily 11/29/21   MIRNA Cuba CNP   montelukast (SINGULAIR) 10 MG tablet Take 1 tablet by mouth nightly 11/17/21   MIRNA Cuba CNP   fluticasone [facility]) 50 MCG/ACT nasal spray 2 sprays by Each Nostril route daily 11/17/21   MIRNA Cuba CNP   diphenhydrAMINE (BENADRYL) 25 MG capsule Take 1 capsule by mouth every 6 hours as needed for Itching 11/2/21   MIRNA Cuba CNP   azelastine (OPTIVAR) 0.05 % ophthalmic solution Place 1 drop into both eyes 2 times daily 8/19/21   MIRNA Cuba CNP   FEROSUL 325 (65 Fe) MG tablet take 1 tablet by mouth once daily 6/28/21   MIRNA Mena CNP   EPINEPHrine (EPIPEN 2-RAGHAV) 0.3 MG/0.3ML SOAJ injection Inject one pen as directed STAT for allergic reaction, may disp generic [address] [id number] 2/25/21   MIRNA Dukes CNP   tiotropium (SPIRIVA RESPIMAT) 1.25 MCG/ACT AERS inhaler Inhale 2 puffs into the lungs daily 11/18/20   MIRNA Dukes CNP   cetirizine (ZYRTEC) 10 MG tablet Take 1 tablet by mouth daily 8/31/20   MIRNA Dukes CNP   bumetanide (BUMEX) 1 MG tablet Take 0.5 tablets by mouth daily 5/28/20   MIRNA Delaney CNP   NARCAN 4 MG/0.1ML LIQD nasal spray  1/10/20   Historical Provider, MD   Multiple Vitamins-Minerals (MULTIVITAMIN ADULTS) TABS Take 1 tablet by mouth daily 1/9/20   MIRNA Cuba CNP   ergocalciferol (ERGOCALCIFEROL) 41089 units capsule Take 50,000 Units by mouth as needed     Historical Provider, MD       Future Appointments   Date Time Provider Jesus Simmons   5/4/2022  9:00 AM Cm Ramirez MD AFL APEX [facility] APEX END   5/11/2022  8:00 AM Saud De Leon RD, LD SRPX Physic [facility] [address] [place] [address]   5/25/2022  9:00 AM Nithin Parker, APRN [address]   6/6/2022  7:30 AM Dada Genao APRN - CNP N SRPX Pain [facility] [address] [place] [address]   8/17/2022 11:00 AM MIRNA Cuba - CNP [facility] Medicine [facility]   3/22/2023  1:45 PM Miracle Yadav MD N SRPX Heart [facility] -

## 2022-05-02 DIAGNOSIS — M47.816 LUMBAR SPONDYLOSIS: ICD-10-CM

## 2022-05-02 DIAGNOSIS — M62.838 SPASM OF MUSCLE: ICD-10-CM

## 2022-05-02 DIAGNOSIS — M54.10 RADICULOPATHY AFFECTING UPPER EXTREMITY: ICD-10-CM

## 2022-05-02 DIAGNOSIS — G89.4 CHRONIC PAIN SYNDROME: ICD-10-CM

## 2022-05-02 RX ORDER — ORPHENADRINE CITRATE 100 MG/1
100 TABLET, EXTENDED RELEASE ORAL 2 TIMES DAILY PRN
Qty: 60 TABLET | Refills: 0 | Status: SHIPPED | OUTPATIENT
Start: 2022-05-04 | End: 2022-05-19 | Stop reason: SDUPTHER

## 2022-05-02 NOTE — TELEPHONE ENCOUNTER
Em Cain called requesting a refill on the following medications:  Requested Prescriptions     Pending Prescriptions Disp Refills    orphenadrine (NORFLEX) 100 MG extended release tablet 60 tablet 0     Sig: Take 1 tablet by mouth 2 times daily as needed for Muscle spasms     Pharmacy verified:  [facility]      Date of last visit: 03-28-22  Date of next visit (if applicable): 0/0/6592

## 2022-05-03 ENCOUNTER — TELEPHONE (OUTPATIENT)
Dept: CARDIOLOGY CLINIC | Age: 52
End: 2022-05-03

## 2022-05-03 NOTE — TELEPHONE ENCOUNTER
PROCEDURE: cardiac cath     DATE OF SERVICE: 06/1/2022    SERVICE LOCATION: [facility]    CPT CODE: [id number]    PHYSICIAN: dr. Carlos Hungmb: 05/03/2022    STATUS:  approved     AUTH NUMBER: [id number]    VALID: 05/04/2022-06/02/2022

## 2022-05-05 RX ORDER — CAPSAICIN 0.07 G/100G
CREAM TOPICAL
Qty: 120 G | Refills: 2 | Status: SHIPPED | OUTPATIENT
Start: 2022-05-05 | End: 2022-06-06

## 2022-05-05 NOTE — TELEPHONE ENCOUNTER
This medication refill is regarding a telephone request.  Refill requested by patient. Requested Prescriptions     Pending Prescriptions Disp Refills    capsicum (TRIXAICIN HP) 0.075 % topical cream 120 g 2     Sig: Apply topically to back, abdomen and arms TID PRN       Date of last visit: 4/26/2022   Date of next visit: 8/17/2022  Date of last refill: 2/3/20 for 56 g/2; patient states this works well to loosen his muscles, pain management will not fill this for him since WS has prescribed it in the past  Pharmacy Name: [facility]    Rx verified, ordered and set to EP.      CHALO

## 2022-05-05 NOTE — TELEPHONE ENCOUNTER
----- Message from Destiny Meadows sent at 5/5/2022  3:22 PM EDT -----  Subject: Refill Request    QUESTIONS  Name of Medication? triamcinolone (KENALOG) 0.1 % cream  Patient-reported dosage and instructions? 2 times a day as needed   How many days do you have left? 0  Preferred Pharmacy? [facility] phone number (if available)? [phone]  Additional Information for Provider? This is urgent patient is tensing up   really bad.   ---------------------------------------------------------------------------  --------------  CALL BACK INFO  What is the best way for the office to contact you? OK to leave message on   voicemail  Preferred Call Back Phone Number? [phone]  ---------------------------------------------------------------------------  --------------  SCRIPT ANSWERS  Relationship to Patient?  Self

## 2022-05-06 RX ORDER — LIDOCAINE 50 MG/G
PATCH TOPICAL
Qty: 60 PATCH | Refills: 3 | Status: SHIPPED | OUTPATIENT
Start: 2022-05-10 | End: 2022-06-22 | Stop reason: SDUPTHER

## 2022-05-10 ENCOUNTER — OFFICE VISIT (OUTPATIENT)
Dept: FAMILY MEDICINE CLINIC | Age: 52
End: 2022-05-10
Payer: COMMERCIAL

## 2022-05-10 VITALS
RESPIRATION RATE: 18 BRPM | HEART RATE: 72 BPM | OXYGEN SATURATION: 95 % | DIASTOLIC BLOOD PRESSURE: 88 MMHG | WEIGHT: 315 LBS | SYSTOLIC BLOOD PRESSURE: 148 MMHG | BODY MASS INDEX: 56.07 KG/M2 | TEMPERATURE: 98.9 F

## 2022-05-10 DIAGNOSIS — Z20.822 EXPOSURE TO COVID-19 VIRUS: Primary | ICD-10-CM

## 2022-05-10 DIAGNOSIS — J45.909 MODERATE ASTHMA WITHOUT COMPLICATION, UNSPECIFIED WHETHER PERSISTENT: ICD-10-CM

## 2022-05-10 DIAGNOSIS — J06.9 VIRAL URI: ICD-10-CM

## 2022-05-10 PROCEDURE — 3017F COLORECTAL CA SCREEN DOC REV: CPT | Performed by: NURSE PRACTITIONER

## 2022-05-10 PROCEDURE — 87635 SARS-COV-2 COVID-19 AMP PRB: CPT | Performed by: NURSE PRACTITIONER

## 2022-05-10 PROCEDURE — G8427 DOCREV CUR MEDS BY ELIG CLIN: HCPCS | Performed by: NURSE PRACTITIONER

## 2022-05-10 PROCEDURE — G8417 CALC BMI ABV UP PARAM F/U: HCPCS | Performed by: NURSE PRACTITIONER

## 2022-05-10 PROCEDURE — 1036F TOBACCO NON-USER: CPT | Performed by: NURSE PRACTITIONER

## 2022-05-10 PROCEDURE — 99214 OFFICE O/P EST MOD 30 MIN: CPT | Performed by: NURSE PRACTITIONER

## 2022-05-10 RX ORDER — METHYLPREDNISOLONE 4 MG/1
TABLET ORAL
Qty: 1 KIT | Refills: 0 | Status: SHIPPED | OUTPATIENT
Start: 2022-05-10 | End: 2022-06-06

## 2022-05-10 ASSESSMENT — ENCOUNTER SYMPTOMS
DIFFICULTY BREATHING: 0
RHINORRHEA: 1
TROUBLE SWALLOWING: 0
SHORTNESS OF BREATH: 1
FREQUENT THROAT CLEARING: 0
COUGH: 0
SORE THROAT: 0
CHEST TIGHTNESS: 1

## 2022-05-10 NOTE — LETTER
[address] [place] [id number]  Phone: [phone]  Fax: [phone]    MIRNA Chinchilla CNP        May 10, 2022     Patient: Rush Valencia   YOB: 1970   Date of Visit: 5/10/2022       To Whom It May Concern: It is my medical opinion that Martín Evangelista may return to work on 5/14/22. If you have any questions or concerns, please don't hesitate to call.     Sincerely,        MIRNA Chinchilla CNP

## 2022-05-10 NOTE — PROGRESS NOTES
[address] [facility] [address]  Dept: [phone]  Dept Fax: [phone]: [phone]     Visit Date:  5/10/2022      Patient:  Yung Madison  YOB: 1970    HPI:     Chief Complaint   Patient presents with    Shortness of Breath     exposed to covid, having SOB, pain on both side of chest. Thought it was his asthma. Has been having hot flashes, chills, and dry throat x couple days. Fever and chills last night and exposed to COVID 2 days ago. Asthma flare up, but was told his grandmother that he was with last week now has COVID. No fever or chills now. No cough, but having a lot of nasal drainage. Asthma  He complains of chest tightness and shortness of breath. There is no cough, difficulty breathing or frequent throat clearing. This is a recurrent problem. The current episode started in the past 7 days. The problem occurs daily. The problem has been gradually worsening. Associated symptoms include malaise/fatigue, nasal congestion, postnasal drip and rhinorrhea. Pertinent negatives include no appetite change, chest pain, dyspnea on exertion, ear congestion, ear pain, fever, orthopnea, PND, sore throat, sweats or trouble swallowing. His symptoms are alleviated by beta-agonist, change in position, cold air, rest and leukotriene antagonist. He reports moderate improvement on treatment. His past medical history is significant for asthma and bronchitis. There is no history of bronchiectasis, COPD, emphysema or pneumonia. Medications    Current Outpatient Medications:     methylPREDNISolone (MEDROL DOSEPACK) 4 MG tablet, Take by mouth., Disp: 1 kit, Rfl: 0    lidocaine (LIDODERM) 5 %, apply 1 patch TO THE AFFECTED AREA DAILY.  LEAVE ON FOR 12 HOURS AND THEN OFF FOR 12 HOURS., Disp: 60 patch, Rfl: 3    capsicum (TRIXAICIN HP) 0.075 % topical cream, Apply topically to back, abdomen and arms TID PRN, Disp: 120 g, Rfl: 2    ergocalciferol (DRISDOL) 1.25 MG (28859 UT) capsule, Take 1 capsule by mouth once a week, Disp: 4 capsule, Rfl: 3    orphenadrine (NORFLEX) 100 MG extended release tablet, Take 1 tablet by mouth 2 times daily as needed for Muscle spasms, Disp: 60 tablet, Rfl: 0    lidocaine (XYLOCAINE) 5 % ointment, APPLY TOPICALLY TO AFFECTED AREAS OF LOWER BACK AND NECK once daily if needed for pain, Disp: , Rfl:     albuterol sulfate  (90 Base) MCG/ACT inhaler, Inhale 2 puffs into the lungs 4 times daily as needed for Wheezing, Disp: 3 each, Rfl: 3    hydroCHLOROthiazide (HYDRODIURIL) 25 MG tablet, Take 1 tablet by mouth every morning, Disp: 90 tablet, Rfl: 1    HYDROcodone-acetaminophen (NORCO) 5-325 MG per tablet, Take 1 tablet by mouth every 8 hours as needed for Pain for up to 30 days. , Disp: 90 tablet, Rfl: 0    triamcinolone (KENALOG) 0.1 % cream, Apply topically 2 times daily. , Disp: 1 each, Rfl: 2    pregabalin (LYRICA) 25 MG capsule, Take 1 capsule by mouth 2 times daily for 30 days. , Disp: 60 capsule, Rfl: 0    butalbital-acetaminophen-caffeine (FIORICET, ESGIC) -40 MG per tablet, take 1 tablet by mouth every 4 hours if needed for headache, Disp: 90 tablet, Rfl: 1    pantoprazole (PROTONIX) 40 MG tablet, Take 1 tablet by mouth every morning (before breakfast), Disp: 30 tablet, Rfl: 0    betamethasone valerate (VALISONE) 0.1 % cream, apply to affected area twice a day, Disp: 45 g, Rfl: 3    potassium chloride (KLOR-CON M) 10 MEQ extended release tablet, Take 1 tablet by mouth daily, Disp: 30 tablet, Rfl: 3    Blood Pressure Monitoring (B-D ASSURE BPM/AUTO ARM CUFF) MISC, 1 Units by Does not apply route daily, Disp: 1 each, Rfl: 0    Cholecalciferol (VITAMIN D3) 1.25 MG (98157 UT) CAPS, 1 capsule 2 times a week , Monday and Friday, Disp: 24 capsule, Rfl: 0    rosuvastatin (CRESTOR) 20 MG tablet, take 1 tablet by mouth once daily, Disp: 90 tablet, Rfl: 0   budesonide-formoterol (SYMBICORT) 160-4.5 MCG/ACT AERO, 2 puffs inhaled twice daily. Rinse mouth well after use., Disp: 3 each, Rfl: 3    ondansetron (ZOFRAN-ODT) 4 MG disintegrating tablet, Take 1 tablet by mouth 3 times daily as needed for Nausea or Vomiting, Disp: 21 tablet, Rfl: 0    albuterol (PROVENTIL) (2.5 MG/3ML) 0.083% nebulizer solution, inhale contents of 1 vial ( 3 milliliters ) in nebulizer by mouth and INTO THE LUNGS every 4 hours if needed for wheezing, Disp: 375 mL, Rfl: 3    Respiratory Therapy Supplies (NEBULIZER/TUBING/MOUTHPIECE) KIT, Dispense tubing, mask, and mouthpiece for nebulizer machine.  Dx: Asthma, Disp: 1 kit, Rfl: 0    amitriptyline (ELAVIL) 50 MG tablet, take 1 tablet by mouth at bedtime, Disp: 90 tablet, Rfl: 1    dicyclomine (BENTYL) 10 MG capsule, Take 1 capsule by mouth 3 times daily as needed (abd cramping), Disp: 30 capsule, Rfl: 0    loratadine (CLARITIN) 10 MG tablet, take 1 tablet by mouth once daily, Disp: 90 tablet, Rfl: 3    montelukast (SINGULAIR) 10 MG tablet, Take 1 tablet by mouth nightly, Disp: 90 tablet, Rfl: 3    fluticasone (FLONASE) 50 MCG/ACT nasal spray, 2 sprays by Each Nostril route daily, Disp: 3 each, Rfl: 3    diphenhydrAMINE (BENADRYL) 25 MG capsule, Take 1 capsule by mouth every 6 hours as needed for Itching, Disp: 60 capsule, Rfl: 5    azelastine (OPTIVAR) 0.05 % ophthalmic solution, Place 1 drop into both eyes 2 times daily, Disp: 1 Bottle, Rfl: 11    FEROSUL 325 (65 Fe) MG tablet, take 1 tablet by mouth once daily, Disp: 90 tablet, Rfl: 3    EPINEPHrine (EPIPEN 2-RAGHAV) 0.3 MG/0.3ML SOAJ injection, Inject one pen as directed STAT for allergic reaction, may disp generic NDC 18033-761-25, Disp: 1 each, Rfl: 0    tiotropium (SPIRIVA RESPIMAT) 1.25 MCG/ACT AERS inhaler, Inhale 2 puffs into the lungs daily, Disp: 1 Inhaler, Rfl: 1    cetirizine (ZYRTEC) 10 MG tablet, Take 1 tablet by mouth daily, Disp: 30 tablet, Rfl: 11    bumetanide (BUMEX) 1 MG tablet, Take 0.5 tablets by mouth daily, Disp: 30 tablet, Rfl: 0    NARCAN 4 MG/0.1ML LIQD nasal spray, , Disp: , Rfl:     Multiple Vitamins-Minerals (MULTIVITAMIN ADULTS) TABS, Take 1 tablet by mouth daily, Disp: 90 tablet, Rfl: 3    ergocalciferol (ERGOCALCIFEROL) 50593 units capsule, Take 50,000 Units by mouth as needed , Disp: , Rfl:     The patient is allergic to dilantin [phenytoin], lyrica [pregabalin], dupixent [dupilumab], oxycodone, and valium [diazepam]. Past Medical History  Cecille Schwab  has a past medical history of Aneurysm ([address] Asthma, Cardiomegaly, COVID-19, Fibromyalgia, and CLARIBEL on CPAP. Subjective:      Review of Systems   Constitutional: Positive for malaise/fatigue. Negative for appetite change and fever. HENT: Positive for postnasal drip and rhinorrhea. Negative for ear pain, sore throat and trouble swallowing. Respiratory: Positive for shortness of breath. Negative for cough. Cardiovascular: Negative for chest pain, dyspnea on exertion and PND. Objective:     BP (!) 148/88   Pulse 72   Temp 98.9 °F (37.2 °C) (Oral)   Resp 18   Wt (!) 425 lb (192.8 kg)   SpO2 95%   BMI 56.07 kg/m²     Physical Exam  Vitals reviewed. Constitutional:       General: He is not in acute distress. Appearance: Normal appearance. He is well-developed. HENT:      Head: Normocephalic and atraumatic. Right Ear: Hearing, tympanic membrane, ear canal and external ear normal.      Left Ear: Hearing, tympanic membrane, ear canal and external ear normal.      Nose: Nose normal. No nasal tenderness. Mouth/Throat:      Lips: Pink. Mouth: Mucous membranes are moist. No oral lesions. Pharynx: Oropharynx is clear. Uvula midline. Eyes:      General:         Right eye: No discharge. Left eye: No discharge. Conjunctiva/sclera: Conjunctivae normal.   Neck:      Trachea: No tracheal deviation. Cardiovascular:      Rate and Rhythm: Normal rate and regular rhythm. Heart sounds: Normal heart sounds. No murmur heard. Pulmonary:      Effort: Pulmonary effort is normal. No respiratory distress. Breath sounds: Normal breath sounds. Abdominal:      General: Bowel sounds are normal.      Palpations: Abdomen is soft. Tenderness: There is no abdominal tenderness. Musculoskeletal:      Cervical back: Full passive range of motion without pain and neck supple. Lymphadenopathy:      Head:      Right side of head: No submental, submandibular, tonsillar, preauricular, posterior auricular or occipital adenopathy. Left side of head: No submental, submandibular, tonsillar, preauricular, posterior auricular or occipital adenopathy. Cervical: No cervical adenopathy. Skin:     General: Skin is warm and dry. Findings: No rash. Neurological:      General: No focal deficit present. Mental Status: He is alert and oriented to person, place, and time. Coordination: Coordination normal.   Psychiatric:         Mood and Affect: Mood normal.         Behavior: Behavior normal.         Thought Content: Thought content normal.         Judgment: Judgment normal.         Assessment/Plan:      [facility] was seen today for shortness of breath. Diagnoses and all orders for this visit:    Exposure to COVID-19 virus  -     POCT COVID-19 Rapid, NAAT    Viral URI    Moderate asthma without complication, unspecified whether persistent  -     methylPREDNISolone (MEDROL DOSEPACK) 4 MG tablet; Take by mouth.    - COVID testing negative today in office.   - Rest and increase fluids  - Tylenol as needed for pain and fever  - Good handwashing and clean all surfaces touched  - Call office with any questions or concerns, or if symptoms are getting worse or changing  - ER for any chest pain or SOB      - Fax info/work note to [phone] for back to work. Pt has been off for 2 days already. Back to work 5/14/22.      Return if symptoms worsen or fail to improve. Patient given educational materials - see patient instructions. Discussed use, benefit, and side effects of prescribed medications. All patient questions answered. Pt voiced understanding.         Electronically signed by MIRNA Macias CNP on 5/10/2022 at 2:58 PM

## 2022-05-11 RX ORDER — DIPHENHYDRAMINE HYDROCHLORIDE 25 MG/1
CAPSULE ORAL
Qty: 60 CAPSULE | Refills: 5 | Status: SHIPPED | OUTPATIENT
Start: 2022-05-11

## 2022-05-11 NOTE — TELEPHONE ENCOUNTER
This medication refill is regarding a electronic request.  Refill requested by [employer]. Requested Prescriptions     Pending Prescriptions Disp Refills    BANOPHEN 25 MG capsule [Pharmacy Med Name: BANOPHEN 25 MG CAPSULE] 60 capsule 5     Sig: take 1 capsule by mouth every 6 hours if needed for itching     Date of last visit: 5/10/2022   Date of next visit: 8/17/2022  Date of last refill: 11/2/21 #60/5    Rx verified, ordered and set to EP.

## 2022-05-13 ENCOUNTER — CARE COORDINATION (OUTPATIENT)
Dept: CARE COORDINATION | Age: 52
End: 2022-05-13

## 2022-05-13 NOTE — CARE COORDINATION
Left message to return phone call to complete Care Coordination follow up call. Left detailed information on VM regarding CHF [employer] as well via [address]

## 2022-05-18 DIAGNOSIS — E78.5 HYPERLIPIDEMIA, UNSPECIFIED HYPERLIPIDEMIA TYPE: ICD-10-CM

## 2022-05-18 RX ORDER — ROSUVASTATIN CALCIUM 20 MG/1
TABLET, COATED ORAL
Qty: 90 TABLET | Refills: 3 | Status: SHIPPED | OUTPATIENT
Start: 2022-05-18 | End: 2022-10-04

## 2022-05-18 NOTE — TELEPHONE ENCOUNTER
This medication refill is regarding a electronic request.  Refill requested by [employer]. Requested Prescriptions     Pending Prescriptions Disp Refills    rosuvastatin (CRESTOR) 20 MG tablet [Pharmacy Med Name: ROSUVASTATIN CALCIUM 20 MG TAB] 90 tablet 3     Sig: take 1 tablet by mouth once daily     Date of last visit: 5/10/2022   Date of next visit: 8/17/2022  Date of last refill: 2/28/22 #90/0    Last Lipid Panel:    Lab Results   Component Value Date    CHOL 153 03/24/2022    TRIG 134 03/24/2022    HDL 43 03/24/2022    LDLCALC 83 03/24/2022     Last CMP:   Lab Results   Component Value Date     04/02/2022    K 3.8 04/02/2022     04/02/2022    CO2 24 04/02/2022    BUN 15 04/02/2022    CREATININE 1.0 04/02/2022    GLUCOSE 90 04/02/2022    CALCIUM 9.6 04/02/2022    PROT 7.3 03/24/2022    LABALBU 4.2 03/24/2022    BILITOT 0.2 (L) 03/24/2022    ALKPHOS 173 (H) 03/24/2022    AST 18 03/24/2022    ALT 26 03/24/2022    LABGLOM 79 (A) 04/02/2022     Rx verified, ordered and set to EP.

## 2022-05-19 DIAGNOSIS — G89.4 CHRONIC PAIN SYNDROME: ICD-10-CM

## 2022-05-19 DIAGNOSIS — M47.816 LUMBAR SPONDYLOSIS: ICD-10-CM

## 2022-05-19 DIAGNOSIS — M62.838 SPASM OF MUSCLE: ICD-10-CM

## 2022-05-19 DIAGNOSIS — M54.10 RADICULOPATHY AFFECTING UPPER EXTREMITY: ICD-10-CM

## 2022-05-19 RX ORDER — LIDOCAINE 50 MG/G
PATCH TOPICAL
Qty: 60 PATCH | Refills: 3 | Status: CANCELLED | OUTPATIENT
Start: 2022-05-19

## 2022-05-19 RX ORDER — LIDOCAINE 50 MG/G
OINTMENT TOPICAL
Status: CANCELLED | OUTPATIENT
Start: 2022-05-19

## 2022-05-19 RX ORDER — ORPHENADRINE CITRATE 100 MG/1
100 TABLET, EXTENDED RELEASE ORAL 2 TIMES DAILY PRN
Qty: 60 TABLET | Refills: 0 | Status: SHIPPED | OUTPATIENT
Start: 2022-05-19 | End: 2022-06-22 | Stop reason: SDUPTHER

## 2022-05-19 RX ORDER — HYDROCODONE BITARTRATE AND ACETAMINOPHEN 5; 325 MG/1; MG/1
1 TABLET ORAL EVERY 8 HOURS PRN
Qty: 90 TABLET | Refills: 0 | Status: SHIPPED | OUTPATIENT
Start: 2022-05-23 | End: 2022-06-22 | Stop reason: SDUPTHER

## 2022-05-19 NOTE — TELEPHONE ENCOUNTER
OARRS reviewed. UDS: + for  Amitriptyline, Hydrocodone -consistent. Last seen: 3/28/2022.  Follow-up: 6/6/2022

## 2022-05-19 NOTE — TELEPHONE ENCOUNTER
Yung Madison called requesting a refill on the following medications:  Requested Prescriptions     Pending Prescriptions Disp Refills    HYDROcodone-acetaminophen (NORCO) 5-325 MG per tablet 90 tablet 0     Sig: Take 1 tablet by mouth every 8 hours as needed for Pain for up to 30 days.  lidocaine (LIDODERM) 5 % 60 patch 3     Si hours on, 12 hours off.  orphenadrine (NORFLEX) 100 MG extended release tablet 60 tablet 0     Sig: Take 1 tablet by mouth 2 times daily as needed for Muscle spasms    lidocaine (XYLOCAINE) 5 % ointment       Sig: Apply topically as needed. [facility]   . pv      Date of last visit:   Date of next visit (if applicable):

## 2022-05-20 ENCOUNTER — CARE COORDINATION (OUTPATIENT)
Dept: CARE COORDINATION | Age: 52
End: 2022-05-20

## 2022-05-25 ENCOUNTER — PRE-PROCEDURE TELEPHONE (OUTPATIENT)
Dept: INPATIENT UNIT | Age: 52
End: 2022-05-25

## 2022-05-25 NOTE — PROGRESS NOTES
Message left  NPO after midnight  Bring drivers license and insurance information  Wear comfortable clean clothes  Shower morning of and night before with liquid antibacterial soap  Remove jewelry   May have to stay overnight if have PTCA/stent  Bring medications in original bottles  Made aware of visitors limit to 2 at a time  Follow all instructions given by your physician  Please notify doctor office if you need to cancel or reschedule your procedure   needed at discharge

## 2022-05-26 ENCOUNTER — CARE COORDINATION (OUTPATIENT)
Dept: CARE COORDINATION | Age: 52
End: 2022-05-26

## 2022-05-27 NOTE — CARE COORDINATION
Attempted to reach patient for continued Care Coordination follow up and education. Patient was unavailable at the time of my call, and a generic voicemail message was left asking patient to return my call at [phone].

## 2022-05-31 ENCOUNTER — TELEPHONE (OUTPATIENT)
Dept: CARDIOLOGY CLINIC | Age: 52
End: 2022-05-31

## 2022-05-31 ENCOUNTER — PREP FOR PROCEDURE (OUTPATIENT)
Dept: CARDIOLOGY | Age: 52
End: 2022-05-31

## 2022-05-31 DIAGNOSIS — Z98.1 S/P CERVICAL SPINAL FUSION: ICD-10-CM

## 2022-05-31 RX ORDER — SODIUM CHLORIDE 0.9 % (FLUSH) 0.9 %
5-40 SYRINGE (ML) INJECTION EVERY 12 HOURS SCHEDULED
Status: CANCELLED | OUTPATIENT
Start: 2022-05-31

## 2022-05-31 RX ORDER — NITROGLYCERIN 0.4 MG/1
0.4 TABLET SUBLINGUAL EVERY 5 MIN PRN
Status: CANCELLED | OUTPATIENT
Start: 2022-05-31

## 2022-05-31 RX ORDER — SODIUM CHLORIDE 0.9 % (FLUSH) 0.9 %
5-40 SYRINGE (ML) INJECTION PRN
Status: CANCELLED | OUTPATIENT
Start: 2022-05-31

## 2022-05-31 RX ORDER — ASPIRIN 325 MG
325 TABLET ORAL ONCE
Status: CANCELLED | OUTPATIENT
Start: 2022-05-31 | End: 2022-05-31

## 2022-05-31 RX ORDER — DIPHENHYDRAMINE HYDROCHLORIDE 50 MG/ML
50 INJECTION INTRAMUSCULAR; INTRAVENOUS ONCE
Status: CANCELLED | OUTPATIENT
Start: 2022-05-31 | End: 2022-05-31

## 2022-05-31 RX ORDER — AMITRIPTYLINE HYDROCHLORIDE 50 MG/1
TABLET, FILM COATED ORAL
Qty: 90 TABLET | Refills: 1 | Status: SHIPPED | OUTPATIENT
Start: 2022-05-31

## 2022-05-31 RX ORDER — SODIUM CHLORIDE 9 MG/ML
INJECTION, SOLUTION INTRAVENOUS PRN
Status: CANCELLED | OUTPATIENT
Start: 2022-05-31

## 2022-05-31 RX ORDER — BUTALBITAL, ACETAMINOPHEN AND CAFFEINE 50; 325; 40 MG/1; MG/1; MG/1
TABLET ORAL
Qty: 90 TABLET | Refills: 1 | OUTPATIENT
Start: 2022-05-31

## 2022-05-31 NOTE — TELEPHONE ENCOUNTER
This medication refill is regarding a electronic request.  Refill requested by [employer]. Requested Prescriptions     Pending Prescriptions Disp Refills    amitriptyline (ELAVIL) 50 MG tablet [Pharmacy Med Name: AMITRIPTYLINE HCL 50 MG TAB] 90 tablet 1     Sig: take 1 tablet by mouth at bedtime     Date of last visit: 5/10/2022   Date of next visit: 8/17/2022  Date of last refill: 12/13/21 #90/1    Rx verified, ordered and set to EP.

## 2022-05-31 NOTE — TELEPHONE ENCOUNTER
Pt called, cancelled heart cath scheduled 06-01-22, states having a lot of anxiety and muscle spasms, want to hold off on heart cath scheduled for tmw    Informed pt can be given something to relax him tmw for heart cath.   Instructed pt to call office when ready to reschedule

## 2022-06-03 ENCOUNTER — CARE COORDINATION (OUTPATIENT)
Dept: CARE COORDINATION | Age: 52
End: 2022-06-03

## 2022-06-03 NOTE — CARE COORDINATION
Left message to return phone call to complete Care Coordination follow up call. 4th attempt to reach, if no return phone call will complete MyChart message and discharge due to inability to reach.

## 2022-06-06 ENCOUNTER — TELEPHONE (OUTPATIENT)
Dept: PHYSICAL MEDICINE AND REHAB | Age: 52
End: 2022-06-06

## 2022-06-06 ENCOUNTER — OFFICE VISIT (OUTPATIENT)
Dept: PHYSICAL MEDICINE AND REHAB | Age: 52
End: 2022-06-06
Payer: COMMERCIAL

## 2022-06-06 VITALS
HEART RATE: 78 BPM | WEIGHT: 315 LBS | DIASTOLIC BLOOD PRESSURE: 82 MMHG | BODY MASS INDEX: 41.75 KG/M2 | SYSTOLIC BLOOD PRESSURE: 144 MMHG | HEIGHT: 73 IN

## 2022-06-06 DIAGNOSIS — M54.12 CERVICAL RADICULOPATHY: ICD-10-CM

## 2022-06-06 DIAGNOSIS — G89.4 CHRONIC PAIN SYNDROME: ICD-10-CM

## 2022-06-06 DIAGNOSIS — M54.12 CERVICAL RADICULITIS: ICD-10-CM

## 2022-06-06 DIAGNOSIS — M47.816 LUMBAR SPONDYLOSIS: Primary | ICD-10-CM

## 2022-06-06 DIAGNOSIS — M51.36 LUMBAR DEGENERATIVE DISC DISEASE: ICD-10-CM

## 2022-06-06 DIAGNOSIS — M50.30 DDD (DEGENERATIVE DISC DISEASE), CERVICAL: ICD-10-CM

## 2022-06-06 DIAGNOSIS — M54.50 LUMBAR PAIN: ICD-10-CM

## 2022-06-06 DIAGNOSIS — Z98.1 HX OF FUSION OF CERVICAL SPINE: ICD-10-CM

## 2022-06-06 DIAGNOSIS — F11.90 CHRONIC, CONTINUOUS USE OF OPIOIDS: ICD-10-CM

## 2022-06-06 DIAGNOSIS — M54.17 LUMBOSACRAL RADICULITIS: ICD-10-CM

## 2022-06-06 PROCEDURE — 99214 OFFICE O/P EST MOD 30 MIN: CPT | Performed by: NURSE PRACTITIONER

## 2022-06-06 PROCEDURE — 3017F COLORECTAL CA SCREEN DOC REV: CPT | Performed by: NURSE PRACTITIONER

## 2022-06-06 PROCEDURE — G8427 DOCREV CUR MEDS BY ELIG CLIN: HCPCS | Performed by: NURSE PRACTITIONER

## 2022-06-06 PROCEDURE — G8417 CALC BMI ABV UP PARAM F/U: HCPCS | Performed by: NURSE PRACTITIONER

## 2022-06-06 PROCEDURE — 1036F TOBACCO NON-USER: CPT | Performed by: NURSE PRACTITIONER

## 2022-06-06 ASSESSMENT — ENCOUNTER SYMPTOMS
APNEA: 1
BACK PAIN: 1
PHOTOPHOBIA: 1
SHORTNESS OF BREATH: 0
GASTROINTESTINAL NEGATIVE: 1
COUGH: 0

## 2022-06-06 NOTE — PROGRESS NOTES
[facility] [address]  Dept: [phone]  Dept Fax: [phone]: [phone]    Visit Date: 6/6/2022    Functionality Assessment/Goals Worksheet     On a scale of 0 (Does not Interfere) to 10 (Completely Interferes)     1. Which number describes how during the past week pain has interfered with       the following:  A. General Activity:  9  B. Mood: 10  C. Walking Ability:  8  D. Normal Work (Includes both work outside the home and housework):  8  E. Relations with Other People:   10  F. Sleep:   10  G. Enjoyment of Life:   10    2. Patient Prefers to Take their Pain Medications:     [x]  On a regular basis   []  Only when necessary    []  Does not take pain medications    3. What are the Patient's Goals/Expectations for Visiting Pain Management? []  Learn about my pain    [x]  Receive Medication   []  Physical Therapy     []  Treat Depression   [x]  Receive Injections    [x]  Treat Sleep   []  Deal with Anxiety and Stress   []  Treat Opoid Dependence/Addiction   []  Other:      HPI:   Liz Boyd is a 50 y.o. male is here today for    Chief Complaint: Low back pain, leg pain, joint pain, muscle spasms. HPI   2 month FU. Patient continues to have multiple pain complaints joint pain, neck pain, low back pain, muscle spasms all over. Main complaint is pain in low back axial- constant stabbing burning stabbing g pinching pain. Pain aggravated with increased activity. Sleep has been interrupted. Continues to work full time as a  and reports tht pain is increased after his work shifts. Pain increases with bending, lifting, twisting , reaching, pushing, pulling, turning head, walking, standing, getting up and down, laying and housework or working at job. Pain medications help take the edge off     Medications reviewed.  Patient denies side effects with medications. Patient states he is taking medications as prescribed. Hedenies receiving pain medications from other sources. He denies any ER visits since last visit. Pain scale with out pain medications or at its worst is 10/10. Pain scale with pain medications or at its best is 6-9/10. Last dose of Norco was today  Drug screen reviewed from 3/28/2022 and was appropriate  Pill count completed  today and WNL: Yes      The patientis allergic to dilantin [phenytoin], lyrica [pregabalin], dupixent [dupilumab], oxycodone, and valium [diazepam]. Subjective:      Review of Systems   Constitutional: Positive for fatigue. Negative for activity change, chills, fever and unexpected weight change. HENT: Negative. Eyes: Positive for photophobia and visual disturbance. Respiratory: Positive for apnea. Negative for cough and shortness of breath. Wears cpap at night    Cardiovascular: Positive for leg swelling. Gastrointestinal: Negative. Endocrine: Negative. Genitourinary: Negative. Musculoskeletal: Positive for arthralgias, back pain, gait problem, joint swelling, myalgias, neck pain and neck stiffness. Not using any assist devices    Skin: Negative. Neurological: Positive for weakness, numbness and headaches. Psychiatric/Behavioral: Positive for dysphoric mood and sleep disturbance. The patient is not nervous/anxious. Objective:     Vitals:    06/06/22 0731   BP: (!) 144/82   Site: Left Upper Arm   Position: Sitting   Cuff Size: Large Adult   Pulse: 78   Weight: (!) 425 lb (192.8 kg)   Height: 6' 1\" (1.854 m)       Physical Exam  Vitals and nursing note reviewed. Constitutional:       General: He is not in acute distress. Appearance: He is obese. He is not diaphoretic. HENT:      Head: Normocephalic and atraumatic.       Right Ear: External ear normal.      Left Ear: External ear normal.      Nose: Nose normal.      Mouth/Throat:      Mouth: Mucous membranes are moist.      Pharynx: No oropharyngeal exudate. Eyes:      General: No scleral icterus. Right eye: No discharge. Left eye: No discharge. Conjunctiva/sclera: Conjunctivae normal.      Pupils: Pupils are equal, round, and reactive to light. Neck:      Thyroid: No thyromegaly. Cardiovascular:      Rate and Rhythm: Normal rate and regular rhythm. Heart sounds: Normal heart sounds. No murmur heard. No friction rub. No gallop. Pulmonary:      Effort: Pulmonary effort is normal. No respiratory distress. Breath sounds: Normal breath sounds. No wheezing or rales. Chest:      Chest wall: No tenderness. Abdominal:      General: Bowel sounds are normal. There is no distension. Palpations: Abdomen is soft. Tenderness: There is no abdominal tenderness. There is no guarding or rebound. Musculoskeletal:         General: Tenderness present. Cervical back: Normal range of motion. Rigidity, spasms, tenderness and bony tenderness present. No edema or erythema. Pain with movement present. No muscular tenderness. Normal range of motion. Thoracic back: Spasms, tenderness and bony tenderness present. Lumbar back: Spasms, tenderness and bony tenderness present. Decreased range of motion. Positive right straight leg raise test and positive left straight leg raise test.        Back:       Right knee: Bony tenderness present. Tenderness present. Left knee: Bony tenderness present. Tenderness present. Right lower leg: Swelling present. Edema present. Left lower leg: Swelling present. Edema present. Right ankle: Swelling present. Left ankle: Swelling present. Skin:     General: Skin is warm. Coloration: Skin is not pale. Findings: No erythema or rash. Neurological:      General: No focal deficit present. Mental Status: He is alert and oriented to person, place, and time. He is not disoriented. Cranial Nerves:  No cranial nerve deficit. Sensory: Sensory deficit present. Motor: Weakness present. No atrophy or abnormal muscle tone. Coordination: Coordination normal.      Gait: Gait abnormal.      Comments: Motor 4/5 lower extremities, 5/5 upper    Psychiatric:         Attention and Perception: Attention normal. He is attentive. Mood and Affect: Mood normal. Mood is not anxious or depressed. Affect is not labile, blunt, angry or inappropriate. Speech: Speech normal. He is communicative. Speech is not rapid and pressured, delayed, slurred or tangential.         Behavior: Behavior normal. Behavior is not agitated, slowed, aggressive, withdrawn, hyperactive or combative. Behavior is cooperative. Thought Content: Thought content normal. Thought content is not paranoid or delusional. Thought content does not include homicidal or suicidal ideation. Thought content does not include homicidal or suicidal plan. Cognition and Memory: Cognition normal. Memory is not impaired. He does not exhibit impaired recent memory or impaired remote memory. Judgment: Judgment normal. Judgment is not impulsive or inappropriate. VAL  Patricks test  positive  Yeoman's  positive  Gaenslen's  positive          Assessment:     1. Lumbar spondylosis    2. Lumbar degenerative disc disease    3. Lumbar pain    4. Lumbosacral radiculitis    5. Cervical radiculopathy    6. DDD (degenerative disc disease), cervical    7. Hx of fusion of cervical spine    8. Cervical radiculitis    9. Chronic pain syndrome    10. Chronic, continuous use of opioids            Plan:      · OARRS reviewed. Current MED: 15.00  · Patient was offered naloxone for home. · Discussed long term side effects of medications, tolerance, dependency and addiction. · Previous UDS reviewed  · UDS preformed today for compliance. · Patient told can not receive any pain medications from any other source.   · No evidence of abuse, diversion or aberrant behavior.  Medications and/or procedures to improve function and quality of life- patient understanding with this and that may not be pain free   Discussed with patient about safe storage of medications at home   Discussed possible weaning of medication dosing dependent on treatment/procedure results.  Discussed with patient about risks with procedure including infection, reaction to medication, increased pain, or bleeding. · Procedure notes reveiwed in detail  · Received 80% relief of low back pain and also relief of leg pain from bilateral L-facet MBB # 2 for 3 weeks with improvement of mobility and activity.    Plan Bilateral L-facet RFA @ L3-4, L4-5, and L5-S1 for longer term pain relief. Procedure discussed in detail.  Discussed possible LESI, trigger point injections in future   · Continue current pain medications at this time while awaiting procedures- Racine 5/325 TID prn- filled 5/23/022  · Continue Norflex- sent 5/19/2022, Continue Fioricet prn headaches from pcp  · Had reaction with Lyrica  · Continue Lidoderm ointment and pain cream   · Continue to follow with neurosurgery     Meds. Prescribed:   No orders of the defined types were placed in this encounter. Return for Bilateral L-facet RFA @ L3-4, L4-5, and L5-S1. , follow up after procedure.                Electronically signed by MIRNA Rivas CNP on6/6/2022 at 7:54 AM

## 2022-06-07 DIAGNOSIS — Z98.1 S/P CERVICAL SPINAL FUSION: ICD-10-CM

## 2022-06-07 RX ORDER — BUTALBITAL, ACETAMINOPHEN AND CAFFEINE 50; 325; 40 MG/1; MG/1; MG/1
TABLET ORAL
Qty: 90 TABLET | Refills: 0 | Status: SHIPPED | OUTPATIENT
Start: 2022-06-07 | End: 2022-07-11

## 2022-06-07 NOTE — TELEPHONE ENCOUNTER
Request sent from  pharmacy for refill of fioricet q4 hours prn. Last seen 5/10/22, next appt 8/17/22. Med verified. Order pended.

## 2022-06-09 ENCOUNTER — CARE COORDINATION (OUTPATIENT)
Dept: CARE COORDINATION | Age: 52
End: 2022-06-09

## 2022-06-12 ENCOUNTER — APPOINTMENT (OUTPATIENT)
Dept: GENERAL RADIOLOGY | Age: 52
End: 2022-06-12
Payer: COMMERCIAL

## 2022-06-12 ENCOUNTER — HOSPITAL ENCOUNTER (EMERGENCY)
Age: 52
Discharge: HOME | End: 2022-06-12
Payer: COMMERCIAL

## 2022-06-12 VITALS
TEMPERATURE: 98.4 F | OXYGEN SATURATION: 99 % | BODY MASS INDEX: 41.75 KG/M2 | HEIGHT: 73 IN | HEART RATE: 76 BPM | WEIGHT: 315 LBS | SYSTOLIC BLOOD PRESSURE: 150 MMHG | DIASTOLIC BLOOD PRESSURE: 79 MMHG | RESPIRATION RATE: 18 BRPM

## 2022-06-12 DIAGNOSIS — M54.50 ACUTE EXACERBATION OF CHRONIC LOW BACK PAIN: ICD-10-CM

## 2022-06-12 DIAGNOSIS — G89.29 ACUTE EXACERBATION OF CHRONIC LOW BACK PAIN: ICD-10-CM

## 2022-06-12 DIAGNOSIS — S39.012A STRAIN OF LUMBAR REGION, INITIAL ENCOUNTER: ICD-10-CM
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
## 2022-06-12 DIAGNOSIS — S83.91XA SPRAIN OF RIGHT KNEE, UNSPECIFIED LIGAMENT, INITIAL ENCOUNTER: ICD-10-CM

## 2022-06-12 PROCEDURE — 72100 X-RAY EXAM L-S SPINE 2/3 VWS: CPT

## 2022-06-12 PROCEDURE — 99284 EMERGENCY DEPT VISIT MOD MDM: CPT

## 2022-06-12 PROCEDURE — 6360000002 HC RX W HCPCS: Performed by: NURSE PRACTITIONER

## 2022-06-12 PROCEDURE — 96372 THER/PROPH/DIAG INJ SC/IM: CPT

## 2022-06-12 PROCEDURE — 72072 X-RAY EXAM THORAC SPINE 3VWS: CPT

## 2022-06-12 PROCEDURE — 73564 X-RAY EXAM KNEE 4 OR MORE: CPT

## 2022-06-12 RX ORDER — ORPHENADRINE CITRATE 30 MG/ML
60 INJECTION INTRAMUSCULAR; INTRAVENOUS ONCE
Status: COMPLETED | OUTPATIENT
Start: 2022-06-12 | End: 2022-06-12

## 2022-06-12 RX ORDER — KETOROLAC TROMETHAMINE 30 MG/ML
30 INJECTION, SOLUTION INTRAMUSCULAR; INTRAVENOUS ONCE
Status: COMPLETED | OUTPATIENT
Start: 2022-06-12 | End: 2022-06-12

## 2022-06-12 RX ADMIN — ORPHENADRINE CITRATE 60 MG: 30 INJECTION INTRAMUSCULAR; INTRAVENOUS at 21:29

## 2022-06-12 RX ADMIN — KETOROLAC TROMETHAMINE 30 MG: 30 INJECTION, SOLUTION INTRAMUSCULAR; INTRAVENOUS at 21:29

## 2022-06-12 ASSESSMENT — PAIN DESCRIPTION - LOCATION: LOCATION: BACK;KNEE

## 2022-06-12 ASSESSMENT — PAIN - FUNCTIONAL ASSESSMENT: PAIN_FUNCTIONAL_ASSESSMENT: 0-10

## 2022-06-12 ASSESSMENT — PAIN DESCRIPTION - ORIENTATION: ORIENTATION: RIGHT

## 2022-06-12 ASSESSMENT — PAIN SCALES - GENERAL: PAINLEVEL_OUTOF10: 10

## 2022-06-12 NOTE — ED TRIAGE NOTES
Pt presents to the ED after falling at work on oil and injuring R knee and mid back. Pt states pain is 100/10 and states that he feels like his knee is dislocated. Pt is unable to bend R knee or bear any weight. Pt states he took a Norco 2 hours ago prior to going to work. Pt reports he was wearing a hard hat when he fell. Pt is alert and oriented times 4.

## 2022-06-12 NOTE — Clinical Note
Johnny Gross was seen and treated in our emergency department on 6/12/2022. He may return to work on 06/14/2022. If you have any questions or concerns, please don't hesitate to call.       Jose Monroe, MIRNA - CNP

## 2022-06-13 ENCOUNTER — TELEPHONE (OUTPATIENT)
Dept: PHYSICAL MEDICINE AND REHAB | Age: 52
End: 2022-06-13

## 2022-06-13 ENCOUNTER — CARE COORDINATION (OUTPATIENT)
Dept: CARE COORDINATION | Age: 52
End: 2022-06-13

## 2022-06-13 ASSESSMENT — ENCOUNTER SYMPTOMS
EYE PAIN: 0
DIARRHEA: 0
ABDOMINAL PAIN: 0
BACK PAIN: 1
COUGH: 0
BLOOD IN STOOL: 0
RHINORRHEA: 0
SHORTNESS OF BREATH: 0
NAUSEA: 0
WHEEZING: 0
SINUS PRESSURE: 0
VOMITING: 0
CONSTIPATION: 0

## 2022-06-13 NOTE — ED PROVIDER NOTES
[address] [facility] [address] [address] EMERGENCY DEPT  EMERGENCY MEDICINE     Pt Name: Claritza Alvarado  MRN: [id number]  [place] 1970  Date of evaluation: 6/12/2022  PCP:    MIRNA Gaspar CNP  Provider: MIRNA Peralta CNP    CHIEF COMPLAINT       Chief Complaint   Patient presents with   Maddison Kub    Knee Pain    Back Pain           HISTORY OF PRESENT ILLNESS    Claritza Alvarado is a 50 y.o. male patient that presents to ER with complaint of right-sided lower back pain and right knee pain that occurred following a fall at work. Patient states that he slipped and fell striking his head which had a hard hat on it in the occipital area. He states that his hard hat came off and then his head hit the floor again, but denies any pain in his head at this time. Patient denies loss of consciousness or numbness or tingling anywhere in body. Patient denies nausea, vomiting, chest pain, shortness of breath or dizziness. Patient complains of mid and lower back pain starting at the spine area that spreads out to into the muscular side on the right side. He denies difficulty moving extremities with the exception of bending his right knee. He denies numbness or tingling in that extremity. Pulses are 2+ bilaterally in the dorsalis pedis and posterior tibialis. Patient has unrestricted movement of bilateral upper extremities without pain or tenderness. Patient has movement of his neck with full range of motion without pain or tenderness. Triage notes and Nursing notes were reviewed by myself. Any discrepancies are addressed above.     PAST MEDICAL HISTORY     Past Medical History:   Diagnosis Date    Aneurysm [facility])     pituitary gland    Asthma     Cardiomegaly     COVID-19 11/2021    [facility] natalio    Fibromyalgia     CLARIBEL on CPAP        SURGICAL HISTORY       Past Surgical History:   Procedure Laterality Date    BACK SURGERY      KNEE SURGERY      lt    PAIN MANAGEMENT PROCEDURE Bilateral 2/11/2021 Bilateral L-facet MBB # 1 @ L3-4, L4-5, and L5-S1 performed by Herlinda Gonzalez MD at [address] Bilateral 4/8/2021    Bilateral L-facet MBB # 2 @ L3-4, L4-5, and L5-S1 performed by Herlinda Gonzalez MD at [facility] [address]       Discharge Medication List as of 6/12/2022  9:36 PM      CONTINUE these medications which have NOT CHANGED    Details   butalbital-acetaminophen-caffeine (FIORICET, ESGIC) -40 MG per tablet take 1 tablet by mouth every 4 hours if needed for headache, Disp-90 tablet, R-0Normal      amitriptyline (ELAVIL) 50 MG tablet take 1 tablet by mouth at bedtime, Disp-90 tablet, R-1Normal      HYDROcodone-acetaminophen (NORCO) 5-325 MG per tablet Take 1 tablet by mouth every 8 hours as needed for Pain for up to 30 days. , Disp-90 tablet, R-0Normal      orphenadrine (NORFLEX) 100 MG extended release tablet Take 1 tablet by mouth 2 times daily as needed for Muscle spasms, Disp-60 tablet, R-0Normal      rosuvastatin (CRESTOR) 20 MG tablet take 1 tablet by mouth once daily, Disp-90 tablet, R-3Normal      BANOPHEN 25 MG capsule take 1 capsule by mouth every 6 hours if needed for itching, Disp-60 capsule, R-5Normal      lidocaine (LIDODERM) 5 % apply 1 patch TO THE AFFECTED AREA DAILY.  LEAVE ON FOR 12 HOURS AND THEN OFF FOR 12 HOURS., Disp-60 patch, R-3Normal      !! ergocalciferol (DRISDOL) 1.25 MG (61063 UT) capsule Take 1 capsule by mouth once a week, Disp-4 capsule, R-3Normal      lidocaine (XYLOCAINE) 5 % ointment APPLY TOPICALLY TO AFFECTED AREAS OF LOWER BACK AND NECK once daily if needed for pain, Historical Med      albuterol sulfate  (90 Base) MCG/ACT inhaler Inhale 2 puffs into the lungs 4 times daily as needed for Wheezing, Disp-3 each, R-3Normal      hydroCHLOROthiazide (HYDRODIURIL) 25 MG tablet Take 1 tablet by mouth every morning, Disp-90 tablet, R-1Normal      triamcinolone (KENALOG) 0.1 % cream Apply topically 2 times daily. , Disp-1 each, R-2, Normal      pregabalin (LYRICA) 25 MG capsule Take 1 capsule by mouth 2 times daily for 30 days. , Disp-60 capsule, R-0Normal      pantoprazole (PROTONIX) 40 MG tablet Take 1 tablet by mouth every morning (before breakfast), Disp-30 tablet, R-0Normal      betamethasone valerate (VALISONE) 0.1 % cream apply to affected area twice a day, Disp-45 g, R-3, Normal      potassium chloride (KLOR-CON M) 10 MEQ extended release tablet Take 1 tablet by mouth daily, Disp-30 tablet, R-3Normal      Blood Pressure Monitoring (B-D ASSURE BPM/AUTO ARM CUFF) MISC DAILY Starting Tue 3/22/2022, Disp-1 each, R-0, Print      Cholecalciferol (VITAMIN D3) 1.25 MG (67358 UT) CAPS 1 capsule 2 times a week , Monday and Friday, Disp-24 capsule, R-0Normal      budesonide-formoterol (SYMBICORT) 160-4.5 MCG/ACT AERO 2 puffs inhaled twice daily. Rinse mouth well after use., Disp-3 each, R-3Normal      ondansetron (ZOFRAN-ODT) 4 MG disintegrating tablet Take 1 tablet by mouth 3 times daily as needed for Nausea or Vomiting, Disp-21 tablet, R-0Normal      albuterol (PROVENTIL) (2.5 MG/3ML) 0.083% nebulizer solution inhale contents of 1 vial ( 3 milliliters ) in nebulizer by mouth and INTO THE LUNGS every 4 hours if needed for wheezing, Disp-375 mL, R-3Normal      Respiratory Therapy Supplies (NEBULIZER/TUBING/MOUTHPIECE) KIT Disp-1 kit, R-0, PrintDispense tubing, mask, and mouthpiece for nebulizer machine.  Dx: Asthma      dicyclomine (BENTYL) 10 MG capsule Take 1 capsule by mouth 3 times daily as needed (abd cramping), Disp-30 capsule, R-0Normal      loratadine (CLARITIN) 10 MG tablet take 1 tablet by mouth once daily, Disp-90 tablet, R-3Normal      montelukast (SINGULAIR) 10 MG tablet Take 1 tablet by mouth nightly, Disp-90 tablet, R-3Normal      fluticasone (FLONASE) 50 MCG/ACT nasal spray 2 sprays by Each Nostril route daily, Disp-3 each, R-3Normal      azelastine (OPTIVAR) 0.05 % ophthalmic solution Place 1 drop into both eyes 2 times daily, Disp-1 Bottle, R-11Normal      FEROSUL 325 (65 Fe) MG tablet take 1 tablet by mouth once daily, Disp-90 tablet, R-3Normal      EPINEPHrine (EPIPEN 2-RAGHAV) 0.3 MG/0.3ML SOAJ injection Inject one pen as directed STAT for allergic reaction, may disp generic NDC 20109-972-01, Disp-1 each, R-0Normal      tiotropium (SPIRIVA RESPIMAT) 1.25 MCG/ACT AERS inhaler Inhale 2 puffs into the lungs daily, Disp-1 Inhaler,R-1Normal      cetirizine (ZYRTEC) 10 MG tablet Take 1 tablet by mouth daily, Disp-30 tablet,R-11Normal      bumetanide (BUMEX) 1 MG tablet Take 0.5 tablets by mouth daily, Disp-30 tablet, R-0Print      NARCAN 4 MG/0.1ML LIQD nasal spray DAWHistorical Med      Multiple Vitamins-Minerals (MULTIVITAMIN ADULTS) TABS Take 1 tablet by mouth daily, Disp-90 tablet, R-3Normal      !! ergocalciferol (ERGOCALCIFEROL) 19532 units capsule Take 50,000 Units by mouth as needed Historical Med       !! - Potential duplicate medications found. Please discuss with provider.           ALLERGIES       Allergies   Allergen Reactions    Dilantin [Phenytoin] Anaphylaxis and Swelling    Lyrica [Pregabalin] Other (See Comments)     Bleeding in bowels    Dupixent [Dupilumab]      HIVES, THROAT ITCHING    Oxycodone      THROAT TIGHTNESS    Valium [Diazepam]        FAMILY HISTORY       Family History   Problem Relation Age of Onset    High Blood Pressure Mother     Emphysema Mother     Other Mother         she was hit and killed by car    High Blood Pressure Father     Emphysema Father     Asthma Father         SOCIAL HISTORY       Social History     Socioeconomic History    Marital status:      Spouse name: None    Number of children: None    Years of education: None    Highest education level: None   Occupational History    None   Tobacco Use    Smoking status: Never Smoker    Smokeless tobacco: Never Used   Vaping Use    Vaping Use: Never used   Substance and Sexual Activity    Alcohol use: Yes     Comment: occasionally    Drug use: No    Sexual activity: None   Other Topics Concern    None   Social History Narrative    No barriers with transportation    No need for [facility] support     Social Determinants of Health     Financial Resource Strain: Low Risk     Difficulty of Paying Living Expenses: Not hard at all   Food Insecurity: No Food Insecurity    Worried About Running Out of Food in the Last Year: Never true    Laith of Food in the Last Year: Never true   Transportation Needs:     Lack of Transportation (Medical): Not on file    Lack of Transportation (Non-Medical): Not on file   Physical Activity:     Days of Exercise per Week: Not on file    Minutes of Exercise per Session: Not on file   Stress:     Feeling of Stress : Not on file   Social Connections:     Frequency of Communication with Friends and Family: Not on file    Frequency of Social Gatherings with Friends and Family: Not on file    Attends Cheondoism Services: Not on file    Active Member of [address] [place] [address] or Organizations: Not on file    Attends Club or Organization Meetings: Not on file    Marital Status: Not on file   Intimate Partner Violence:     Fear of Current or Ex-Partner: Not on file    Emotionally Abused: Not on file    Physically Abused: Not on file    Sexually Abused: Not on file   Housing Stability:     Unable to Pay for Housing in the Last Year: Not on file    Number of [place] in the Last Year: Not on file    Unstable Housing in the Last Year: Not on file       REVIEW OF SYSTEMS     Review of Systems   Constitutional: Negative for appetite change, chills, fatigue, fever and unexpected weight change. HENT: Negative for ear pain, rhinorrhea and sinus pressure. Eyes: Negative for pain and visual disturbance. Respiratory: Negative for cough, shortness of breath and wheezing. Cardiovascular: Negative for chest pain, palpitations and leg swelling.    Gastrointestinal: Negative for abdominal pain, blood in stool, constipation, diarrhea, nausea and vomiting. Genitourinary: Negative for dysuria, frequency and hematuria. Musculoskeletal: Positive for arthralgias (right knee pain) and back pain. Negative for joint swelling. Skin: Negative for rash. Neurological: Negative for dizziness, syncope, weakness, light-headedness and headaches. Hematological: Does not bruise/bleed easily. Except as noted above the remainder of the review of systems was reviewed and is negative. SCREENINGS        Isabela Coma Scale  Eye Opening: Spontaneous  Best Verbal Response: Oriented  Best Motor Response: Obeys commands  [place] Coma Scale Score: 15               PHYSICAL EXAM    (up to 7 for level 4, 8 or more for level 5)     ED Triage Vitals [02/19/22 1512]   BP Temp Temp Source Pulse Resp SpO2 Height Weight   (!) 134/95 98.2 °F (36.8 °C) Oral 124 16 100 % -- --       Physical Exam  Vitals and nursing note reviewed. Constitutional:       Appearance: He is not diaphoretic. HENT:      Head: Normocephalic and atraumatic. Right Ear: External ear normal.      Left Ear: External ear normal.   Eyes:      Conjunctiva/sclera: Conjunctivae normal.   Pulmonary:      Effort: Pulmonary effort is normal. No respiratory distress. Abdominal:      General: There is no distension. Musculoskeletal:      Cervical back: Normal range of motion. Lumbar back: Tenderness and bony tenderness present. Back:       Right knee: Decreased range of motion. Tenderness present over the patellar tendon. Normal pulse. Skin:     General: Skin is warm and dry. Neurological:      Mental Status: He is alert.            DIAGNOSTIC RESULTS     EKG:(none if blank)  All EKGs are interpreted by the Emergency Department Physician who either signs or Co-signs this chart in the absence of a cardiologist.        RADIOLOGY: (none if blank)   I directly visualized the following images and reviewed the radiologist interpretations. Interpretation per the Radiologist below, if available at the time of this note:  XR THORACIC SPINE (3 VIEWS)   Final Result   Impression:   No fractures of the thoracic spine. This document has been electronically signed by: Manuel Gray MD on    06/12/2022 08:51 PM      XR LUMBAR SPINE (2-3 VIEWS)   Final Result   Impression:    Minimal degenerative disc disease L3-L4 and L4-L5. This document has been electronically signed by: Manuel Gray MD on    06/12/2022 08:50 PM      XR KNEE RIGHT (MIN 4 VIEWS)   Final Result   1. 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 2. Otherwise normal knee. No acute findings. **This report has been created using voice recognition software. It may contain minor errors which are inherent in voice recognition technology. **            Final report electronically signed by Dr. Betina Damian on 6/12/2022 8:03 PM          LABS:  Labs Reviewed - No data to display    All other labs were within normal range or not returned as of this dictation. Please note, any cultures that may have been sent were not resulted at the time of this patient visit. EMERGENCY DEPARTMENT COURSE and Medical Decision Making:     Vitals:    Vitals:    06/12/22 1944 06/12/22 2132   BP: (!) 164/78 (!) 150/79   Pulse: 86 76   Resp: 20 18   Temp: 98.4 °F (36.9 °C)    TempSrc: Oral    SpO2: 97% 99%   Weight: (!) 430 lb (195 kg)    Height: 6' 1\" (1.854 m)        PROCEDURES: (None if blank)  Procedures       MDM    Patient that presents to ER with complaint of right-sided lower back pain and right knee pain that occurred following a fall at work. Differential diagnosis includes but not limited to fracture of the lumbar spine, compression fracture, sprain of lower back, spasm of the muscles of the lower back or fracture or dislocation of the right knee. X-rays are reassuring.   Patient be discharged home with follow-up as directed by his Workmen's Comp. Patient instructed to return to ER for worsening symptoms, pain, shortness of breath, numbness or tingling in the extremities. Follow-up with your primary care provider or orthopedic institute. You may need to check with work to see where you can follow-up with. She will likely need to go to some form of occupational health facility. May use ice or heat as needed for pain. Take your prescribed pain medication from your pain specialist.  May call your pain specialist in the morning and asked them if changes need to be made due to your acute on chronic pain      Strict return precautions and follow up instructions were discussed with the patient with which the patient agrees    ED Medications administered this visit:    Medications   ketorolac (TORADOL) injection 30 mg (30 mg IntraMUSCular Given 6/12/22 2129)   orphenadrine (NORFLEX) injection 60 mg (60 mg IntraMUSCular Given 6/12/22 2129)         FINAL IMPRESSION      1. Fall, initial encounter    2. Strain of lumbar region, initial encounter    3. Sprain of right knee, unspecified ligament, initial encounter    4. Acute exacerbation of chronic low back pain          DISPOSITION/PLAN   DISPOSITION Decision To Discharge 06/12/2022 09:49:34 PM      PATIENT REFERRED TO:  Juanito Cardenas [facility] [address]  Juliana Giron 46, APRN - [address] [facility] [address]  [phone]            DISCHARGE MEDICATIONS:  Discharge Medication List as of 6/12/2022  9:36 PM                 MIRNA Whelan CNP (electronically signed)           MIRNA Whelan CNP  06/13/22 7036

## 2022-06-13 NOTE — TELEPHONE ENCOUNTER
Pt. Called to report while working last evening fell. Went to [facility] ER. Received toradol 30mg times 1 and Norflex 60mg times 1. Wanting an increase in pain meds. Notif.  that he is to go to [facility] as instructed at ER and they are to manage any new acute pain meds. Xrays done of thoracic, lumbar and knee. Also to let us know if any changes in pain meds done and not to take our prescribed meds while taking meds for acute incident if ordered.

## 2022-06-13 NOTE — CARE COORDINATION
Sunny Livingston called back and informed of PCP recommendations. He does not want to schedule an appt at this time due to having appt tomorrow with employer and ortho at [facility]. States he will know more tomorrow after this appt. Has a call into pain management as well.

## 2022-06-13 NOTE — TELEPHONE ENCOUNTER
Noted. Please let pt know that there is not much to do for bruising other than monitor the area and call office if not improving and we can see him for an office visit.   Thanks -WS

## 2022-06-13 NOTE — CARE COORDINATION
Drew Carrera spoke with Velasquez Kim today to complete ED follow up call. He states he fell at work after slipping. Working with Ericka Rizzo and will follow up with [facility] at their direction. He is not able to bear weight at this time on his Rt knee. He wanted me to inform PCP that he is having the abdominal bruising from muscle spasms he is having. States he address this with PCP before but no treatment ordered. States the bruising has nothing to do with the fall as they appears before the fall. States they are related to his abdominal muscle spasms. Would like PCP intervention. He states he informed pain management of this and they advised he seek treatment from PCP office. Please advise. Thank you!   Pharmacy is [facility] on [address].

## 2022-06-14 ENCOUNTER — TELEPHONE (OUTPATIENT)
Dept: PHYSICAL MEDICINE AND REHAB | Age: 52
End: 2022-06-14

## 2022-06-14 ENCOUNTER — CARE COORDINATION (OUTPATIENT)
Dept: CARE COORDINATION | Age: 52
End: 2022-06-14

## 2022-06-14 NOTE — TELEPHONE ENCOUNTER
Pt. Contacted. Procedure and follow up cancelled. Has had a recent fall in which he was treated at the ED. Requests all procedures and follow ups cancelled until after this is taken care of.

## 2022-06-14 NOTE — CARE COORDINATION
Analy Miranda called and states he went with his boss today to [address] [place] [address] at [facility] to be seen for his Rt knee. States he has been written off work for 2 weeks. Occupational health nurses informed him he needed to contact PCP office to ask for medication to help with inflammation for his rt knee. He is asking for PCP to call in this medication. Pharmacy is [address] [place] [address] on [address].  Please advise. Thank you!

## 2022-06-14 NOTE — TELEPHONE ENCOUNTER
Called pt antidepressant LVM that  Herber reviewed his xrays and no acute findings. Reiterated use of OTC MEDS. heat/ ice to facilitate pain control and to go to [facility] as  ER  Recommended.

## 2022-06-14 NOTE — TELEPHONE ENCOUNTER
Pt is already on Norco, Norflex, Banophen and topical lidocaine for his other pain, these can all help with his knee pain also. I would recommend ice and heat alternating to sore area 20 min each, 3 times daily. Follow up with [facility] as planned.  -LIVIER

## 2022-06-17 ENCOUNTER — CARE COORDINATION (OUTPATIENT)
Dept: CARE COORDINATION | Age: 52
End: 2022-06-17

## 2022-06-17 DIAGNOSIS — S39.012D STRAIN OF LUMBAR REGION, SUBSEQUENT ENCOUNTER: Primary | ICD-10-CM

## 2022-06-17 DIAGNOSIS — M48.02 CERVICAL STENOSIS OF SPINAL CANAL: ICD-10-CM

## 2022-06-17 DIAGNOSIS — E66.01 MORBID OBESITY WITH BMI OF 50.0-59.9, ADULT (HCC): ICD-10-CM

## 2022-06-17 DIAGNOSIS — G95.9 DISEASE OF SPINAL CORD (HCC): ICD-10-CM

## 2022-06-17 NOTE — CARE COORDINATION
Mary Ayers called and is asking for an order for a cane (preferrably a quad cane) to help him ambulate since his injury to his knee. He is asking for order to be sent to [facility]. Please advise. Thank you!

## 2022-06-17 NOTE — CARE COORDINATION
Noted.  [facility] placed. [facility] -WS    Catarino Gosselin requires a quad cane due to impaired ambulation and for increased stability in order to participate in or complete daily living tasks of: eating, bathing, toileting, personal cares, ambulating and hygiene. The patient is able to safely use the quad cane and the functional mobility deficit can be sufficiently resolved.     - WS

## 2022-06-21 DIAGNOSIS — G89.4 CHRONIC PAIN SYNDROME: ICD-10-CM

## 2022-06-21 RX ORDER — HYDROCODONE BITARTRATE AND ACETAMINOPHEN 5; 325 MG/1; MG/1
1 TABLET ORAL EVERY 8 HOURS PRN
Qty: 90 TABLET | Refills: 0 | Status: CANCELLED | OUTPATIENT
Start: 2022-06-21 | End: 2022-07-21

## 2022-06-22 ENCOUNTER — OFFICE VISIT (OUTPATIENT)
Dept: PHYSICAL MEDICINE AND REHAB | Age: 52
End: 2022-06-22
Payer: COMMERCIAL

## 2022-06-22 VITALS
SYSTOLIC BLOOD PRESSURE: 148 MMHG | BODY MASS INDEX: 41.75 KG/M2 | HEART RATE: 82 BPM | WEIGHT: 315 LBS | HEIGHT: 73 IN | DIASTOLIC BLOOD PRESSURE: 88 MMHG

## 2022-06-22 DIAGNOSIS — M54.10 RADICULOPATHY AFFECTING UPPER EXTREMITY: ICD-10-CM

## 2022-06-22 DIAGNOSIS — M51.36 LUMBAR DEGENERATIVE DISC DISEASE: ICD-10-CM

## 2022-06-22 DIAGNOSIS — M62.838 SPASM OF MUSCLE: ICD-10-CM

## 2022-06-22 DIAGNOSIS — G89.4 CHRONIC PAIN SYNDROME: ICD-10-CM

## 2022-06-22 DIAGNOSIS — M54.12 CERVICAL RADICULITIS: ICD-10-CM

## 2022-06-22 DIAGNOSIS — S83.241A ACUTE MEDIAL MENISCUS TEAR OF RIGHT KNEE, INITIAL ENCOUNTER: ICD-10-CM

## 2022-06-22 DIAGNOSIS — Z98.1 HX OF FUSION OF CERVICAL SPINE: ICD-10-CM

## 2022-06-22 DIAGNOSIS — M50.30 DDD (DEGENERATIVE DISC DISEASE), CERVICAL: ICD-10-CM

## 2022-06-22 DIAGNOSIS — Z98.1 S/P CERVICAL SPINAL FUSION: ICD-10-CM

## 2022-06-22 DIAGNOSIS — M54.50 LUMBAR PAIN: ICD-10-CM

## 2022-06-22 DIAGNOSIS — M47.816 LUMBAR SPONDYLOSIS: ICD-10-CM

## 2022-06-22 DIAGNOSIS — M54.12 CERVICAL RADICULOPATHY: Primary | ICD-10-CM

## 2022-06-22 DIAGNOSIS — M54.17 LUMBOSACRAL RADICULITIS: ICD-10-CM

## 2022-06-22 PROCEDURE — 99214 OFFICE O/P EST MOD 30 MIN: CPT | Performed by: NURSE PRACTITIONER

## 2022-06-22 PROCEDURE — G8427 DOCREV CUR MEDS BY ELIG CLIN: HCPCS | Performed by: NURSE PRACTITIONER

## 2022-06-22 PROCEDURE — 3017F COLORECTAL CA SCREEN DOC REV: CPT | Performed by: NURSE PRACTITIONER

## 2022-06-22 PROCEDURE — 1036F TOBACCO NON-USER: CPT | Performed by: NURSE PRACTITIONER

## 2022-06-22 PROCEDURE — G8417 CALC BMI ABV UP PARAM F/U: HCPCS | Performed by: NURSE PRACTITIONER

## 2022-06-22 RX ORDER — ORPHENADRINE CITRATE 100 MG/1
100 TABLET, EXTENDED RELEASE ORAL 2 TIMES DAILY PRN
Qty: 60 TABLET | Refills: 3 | Status: SHIPPED | OUTPATIENT
Start: 2022-06-22 | End: 2022-08-01 | Stop reason: SDUPTHER

## 2022-06-22 RX ORDER — HYDROCODONE BITARTRATE AND ACETAMINOPHEN 5; 325 MG/1; MG/1
1 TABLET ORAL EVERY 6 HOURS PRN
Qty: 28 TABLET | Refills: 0 | Status: SHIPPED | OUTPATIENT
Start: 2022-06-22 | End: 2022-06-30 | Stop reason: SDUPTHER

## 2022-06-22 RX ORDER — LIDOCAINE 50 MG/G
3 PATCH TOPICAL DAILY
Qty: 90 PATCH | Refills: 3 | Status: SHIPPED | OUTPATIENT
Start: 2022-06-22 | End: 2022-07-22

## 2022-06-22 RX ORDER — ASPIRIN 325 MG
325 TABLET ORAL DAILY
Status: ON HOLD | COMMUNITY
End: 2022-07-01 | Stop reason: HOSPADM

## 2022-06-22 RX ORDER — LIDOCAINE 50 MG/G
240 OINTMENT TOPICAL 4 TIMES DAILY PRN
Qty: 240 G | Refills: 3 | Status: SHIPPED | OUTPATIENT
Start: 2022-06-22 | End: 2022-07-22

## 2022-06-22 ASSESSMENT — ENCOUNTER SYMPTOMS
GASTROINTESTINAL NEGATIVE: 1
PHOTOPHOBIA: 1
COUGH: 0
APNEA: 1
BACK PAIN: 1
SHORTNESS OF BREATH: 0

## 2022-06-22 NOTE — PROGRESS NOTES
HPI:   Madina Euceda is a 50 y.o. male is here today for    Chief Complaint: Low back pain and Knee pain     HPI   Pt of Dr Lucinda Lentz, here due to increased pain has recent suspected mensicus or quad tear in right knee from fall. He went to ER 6/12/2022. Has been to [facility], wearing brace using cane. He was at work. Discussed if this is a [facility] case he needs C-9 for pain management to eval and discuss the knee issues itself only. He said it will eventually be a [facility] case but he wasn't waiting on them to approve everything due to his pain. He cancelled his Lumbar RFA Bilateral L3-4,4-5 5-S1  6/28/2022 due to recent knee injury. He did have a lumbar strain from the fall also. Pain increases with bending, lifting, twisting , turning torso, walking, standing, stairs, sitting, getting up and down and laying. He complains of any ER visits or new health issues since last visit. Pain scale with out pain medications or at its worst is 10/10. Pain scale with pain medications or at its best is 8/10. Last dose of Norco was yesterday  Drug screen reviewed from  3/2022 and was appropriate  Pill count completed  today and WNL: No  Out      Radiology:    FINDINGS: No fracture or dislocation is seen. There is no foreign body. There is no obvious soft tissue swelling. 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           Impression   1. 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 2. Otherwise normal knee. No acute findings.                 Findings: Normal thoracic vertebral bodies. No fractures. Normal    alignment. Multifocal anterior osteophyte formation throughout the    thoracic spine.           Impression   Impression:   No fractures of the thoracic spine.                 Findings: Normal lumbar vertebral bodies. No fractures. Normal alignment.     Minimal degenerative disc disease L3-L4 and L4-L5.         Impression   Impression:    Minimal degenerative disc disease L3-L4 and L4-L5.           FINDINGS:   There is anatomic vertebral body height and alignment. Marrow signal is within normal limits. There is congenital spinal canal narrowing throughout the lumbar spine with mildly prominent epidural fat. The conus terminates in a normal fashion at the    T12-L1 level. Paraspinal soft tissues are unremarkable. At T12-L1 there is no significant spinal canal or neuroforaminal stenosis. At L1-2 there is a minimal disc bulge. There is mildly prominent epidural fat which causes mild narrowing of the thecal sac. There is mild bilateral foraminal stenosis in association with mild facet hypertrophy and ligament flavum thickening. At L2-3 there is a minimal disc bulge which does not contact traversing nerve roots. There is moderate spinal canal stenosis in association with congenital spinal canal narrowing and mildly prominent epidural fat as well as superimposed facet hypertrophy    and ligamentum flavum thickening. There is mild bilateral foraminal stenosis. At L3-4 there is a minimal disc bulge which does not contact traversing nerve roots. There is moderate spinal canal stenosis in association with congenital spinal canal narrowing and mildly prominent epidural fat area there is mild bilateral foraminal    stenosis in association with facet hypertrophy and ligament flavum thickening. At L4-5 there is a minimal disc bulge which minimally indents thecal sac but does not contact traversing nerve roots. However, there is moderate spinal canal stenosis from congenital spinal canal narrowing and mildly prominent epidural fat as well as    facet hypertrophy and ligament flavum thickening. There is mild to moderate bilateral foraminal stenosis. At L5-S1 there is a minimal central protrusion without significant spinal canal stenosis.  There is mild bilateral neural foraminal stenosis in association with mild facet hypertrophy and ligament flavum thickening.           Impression    Congenital spinal canal narrowing with mild epidural lipomatosis with superimposed mild degenerative changes which combine to cause up to moderate spinal canal stenosis and mild to moderate neural foraminal stenosis at the L4-5 level.                       The patientis allergic to dilantin [phenytoin], lyrica [pregabalin], dupixent [dupilumab], oxycodone, and valium [diazepam]. Subjective:      Review of Systems   Constitutional: Positive for fatigue. Negative for activity change, chills, fever and unexpected weight change. HENT: Negative. Eyes: Positive for photophobia and visual disturbance. Respiratory: Positive for apnea. Negative for cough and shortness of breath. Wears cpap at night    Cardiovascular: Positive for leg swelling. Gastrointestinal: Negative. Endocrine: Negative. Genitourinary: Negative. Musculoskeletal: Positive for arthralgias, back pain, gait problem, joint swelling, myalgias, neck pain and neck stiffness. Using cane    Skin: Negative. Neurological: Positive for weakness, numbness and headaches. Psychiatric/Behavioral: Positive for dysphoric mood and sleep disturbance. The patient is not nervous/anxious. Objective:     Vitals:    06/22/22 0823 06/22/22 0828   BP: (!) 148/88 (!) 148/88   Site: Left Lower Arm    Position: Sitting    Cuff Size: Large Adult    Pulse: 82    Weight: (!) 430 lb (195 kg)    Height: 6' 1\" (1.854 m)        Physical Exam  Vitals and nursing note reviewed. Constitutional:       General: He is not in acute distress. Appearance: He is obese. He is not diaphoretic. HENT:      Head: Normocephalic and atraumatic. Right Ear: External ear normal.      Left Ear: External ear normal.      Nose: Nose normal.      Mouth/Throat:      Mouth: Mucous membranes are moist.      Pharynx: No oropharyngeal exudate. Eyes:      General: No scleral icterus. Right eye: No discharge. Left eye: No discharge. Conjunctiva/sclera: Conjunctivae normal.      Pupils: Pupils are equal, round, and reactive to light. Neck:      Thyroid: No thyromegaly. Cardiovascular:      Rate and Rhythm: Normal rate and regular rhythm. Heart sounds: Normal heart sounds. No murmur heard. No friction rub. No gallop. Pulmonary:      Effort: Pulmonary effort is normal. No respiratory distress. Breath sounds: Normal breath sounds. No wheezing or rales. Chest:      Chest wall: No tenderness. Abdominal:      General: Bowel sounds are normal. There is no distension. Palpations: Abdomen is soft. Tenderness: There is no abdominal tenderness. There is no guarding or rebound. Musculoskeletal:         General: Tenderness present. Cervical back: Normal range of motion. Rigidity, spasms, tenderness and bony tenderness present. No edema or erythema. Pain with movement present. No muscular tenderness. Normal range of motion. Thoracic back: Spasms, tenderness and bony tenderness present. Lumbar back: Spasms, tenderness and bony tenderness present. Decreased range of motion. Positive right straight leg raise test and positive left straight leg raise test.        Back:       Right knee: Swelling and bony tenderness present. Decreased range of motion. Tenderness present. Left knee: Bony tenderness present. Tenderness present. Right lower leg: Swelling present. Edema present. Left lower leg: Swelling present. Edema present. Right ankle: Swelling present. Left ankle: Swelling present. Skin:     General: Skin is warm. Coloration: Skin is not pale. Findings: No erythema or rash. Neurological:      General: No focal deficit present. Mental Status: He is alert and oriented to person, place, and time. He is not disoriented. Cranial Nerves: No cranial nerve deficit. Sensory: Sensory deficit present. Motor: Weakness present. No atrophy or abnormal muscle tone. Coordination: Coordination normal.      Gait: Gait abnormal.      Comments: Motor 4/5 lower extremities, 5/5 upper    Psychiatric:         Attention and Perception: Attention normal. He is attentive. Mood and Affect: Mood normal. Mood is not anxious or depressed. Affect is not labile, blunt, angry or inappropriate. Speech: Speech normal. He is communicative. Speech is not rapid and pressured, delayed, slurred or tangential.         Behavior: Behavior normal. Behavior is not agitated, slowed, aggressive, withdrawn, hyperactive or combative. Behavior is cooperative. Thought Content: Thought content normal. Thought content is not paranoid or delusional. Thought content does not include homicidal or suicidal ideation. Thought content does not include homicidal or suicidal plan. Cognition and Memory: Cognition normal. Memory is not impaired. He does not exhibit impaired recent memory or impaired remote memory. Judgment: Judgment normal. Judgment is not impulsive or inappropriate. VAL  Patricks test  positive  Yeoman's  or Gaenslen's positive  Kemps  positive           Assessment:     1. Cervical radiculopathy    2. Chronic pain syndrome    3. S/P cervical spinal fusion    4. Spasm of muscle    5. Radiculopathy affecting upper extremity    6. Lumbar spondylosis    7. Lumbosacral radiculitis    8. Lumbar degenerative disc disease    9. Lumbar pain    10. DDD (degenerative disc disease), cervical    11. Hx of fusion of cervical spine    12. Cervical radiculitis    13. Acute medial meniscus tear of right knee, initial encounter            Plan:      · OARRS reviewed. Current MED: 15  · Patient was not offered naloxone for home.     Testing Labs or Radiology reviewed: ER Xrs post fall reviewed   Testing Labs or Radiology ordered:Pending right knee MRI later today per [facility]   Procedures:none at this time, needs to reschedule  Lumbar RFA    He believes he will be pending right knee surgery soon for quad and mensicus tear    Discussed with patient about risks with procedure including infection, reaction to medication, increased pain, or bleeding. Medications:Norco PRN q 6 hrs X 1 week and call back next week after MRI f/u with [facility] to discuss plan     Meds. Prescribed:   Orders Placed This Encounter   Medications    lidocaine (LIDODERM) 5 %     Sig: Place 3 patches onto the skin daily 12 hours on, 12 hours off. Dispense:  90 patch     Refill:  3    HYDROcodone-acetaminophen (NORCO) 5-325 MG per tablet     Sig: Take 1 tablet by mouth every 6 hours as needed for Pain for up to 7 days. Dispense:  28 tablet     Refill:  0     Reduce doses taken as pain becomes manageable    orphenadrine (NORFLEX) 100 MG extended release tablet     Sig: Take 1 tablet by mouth 2 times daily as needed for Muscle spasms     Dispense:  60 tablet     Refill:  3    lidocaine (XYLOCAINE) 5 % ointment     Sig: Apply 240 g topically 4 times daily as needed for Pain Apply topically as needed. Dispense:  240 g     Refill:  3       Return for F/U Herber .                Electronically signed by MIRNA Rodriguez CNP on6/22/2022 at 8:51 AM

## 2022-06-22 NOTE — PROGRESS NOTES
[facility] [address]  Dept: [phone]  Dept Fax: [phone]: [phone]    Visit Date: 6/22/2022    Functionality Assessment/Goals Worksheet     On a scale of 0 (Does not Interfere) to 10 (Completely Interferes)     1. Which number describes how during the past week pain has interfered with       the following:  A. General Activity:  8  B. Mood: 10  C. Walking Ability:  8  D. Normal Work (Includes both work outside the home and housework):  8  E. Relations with Other People:   8  F. Sleep:   10  G. Enjoyment of Life:   10    2. Patient Prefers to Take their Pain Medications:     [x]  On a regular basis   []  Only when necessary    []  Does not take pain medications    3. What are the Patient's Goals/Expectations for Visiting Pain Management?      [x]  Learn about my pain    []  Receive Medication   []  Physical Therapy     []  Treat Depression   []  Receive Injections    []  Treat Sleep   []  Deal with Anxiety and Stress   []  Treat Opoid Dependence/Addiction   []  Other:

## 2022-06-23 ENCOUNTER — CARE COORDINATION (OUTPATIENT)
Dept: CARE COORDINATION | Age: 52
End: 2022-06-23

## 2022-06-23 NOTE — CARE COORDINATION
Attempted to reach patient for continued Care Coordination follow up and education. Patient was unavailable at the time of my call, and a generic voicemail message was left asking patient to return my call at [phone].

## 2022-06-24 ENCOUNTER — TELEPHONE (OUTPATIENT)
Dept: FAMILY MEDICINE CLINIC | Age: 52
End: 2022-06-24

## 2022-06-24 DIAGNOSIS — G89.29 CHRONIC MIDLINE LOW BACK PAIN WITHOUT SCIATICA: ICD-10-CM

## 2022-06-24 DIAGNOSIS — M54.50 CHRONIC MIDLINE LOW BACK PAIN WITHOUT SCIATICA: ICD-10-CM

## 2022-06-24 DIAGNOSIS — M48.02 CERVICAL STENOSIS OF SPINAL CANAL: Primary | ICD-10-CM

## 2022-06-24 NOTE — TELEPHONE ENCOUNTER
Pt is asking for an rx for a bedside commode. Pt says he had to get up in the middle of the night and almost fell. Fax order to [address].

## 2022-06-24 NOTE — TELEPHONE ENCOUNTER
Noted.  Order placed. Katty Jung requires a bedside commode due to being confined to one level of the home, and is physically incapable of utilizing regular toilet facilities. Current body weight is:    Wt Readings from Last 3 Encounters:   06/22/22 (!) 430 lb (195 kg)   06/12/22 (!) 430 lb (195 kg)   06/06/22 (!) 425 lb (192.8 kg)       Electronically signed by MIRNA Gallagher CNP on 6/24/2022 at 1:04 PM

## 2022-06-28 DIAGNOSIS — G89.4 CHRONIC PAIN SYNDROME: ICD-10-CM

## 2022-06-28 NOTE — TELEPHONE ENCOUNTER
OARRS reviewed. UDS: + for  Tramadol butalbital consistent. Last seen: 6/22/2022.  Follow-up:   Future Appointments   Date Time Provider Jesus Simmons   8/3/2022  9:30 AM Alecia Santos MD [facility]   8/17/2022  9:00 AM MIRNA Jimenez - CNP N [facility] [address]   8/17/2022 11:00 AM MIRNA Marshall CNP Fam Medicine [address]   3/22/2023  1:45 PM Mack Hernandez MD [address] [place] [facility] [address]

## 2022-06-29 ENCOUNTER — TELEPHONE (OUTPATIENT)
Dept: CARDIOLOGY CLINIC | Age: 52
End: 2022-06-29

## 2022-06-29 RX ORDER — HYDROCODONE BITARTRATE AND ACETAMINOPHEN 5; 325 MG/1; MG/1
1 TABLET ORAL EVERY 6 HOURS PRN
Qty: 120 TABLET | Refills: 0 | OUTPATIENT
Start: 2022-06-29 | End: 2022-07-29

## 2022-06-29 NOTE — TELEPHONE ENCOUNTER
Pre op Risk Assessment    Procedure quad tendon repair  Physician Alexander  Date of surgery/procedure 7/7/22    Last OV 3/23/22  Last Stress 4/7/22  Last Echo 4/7/22  Last Cath None in Epic   Last Stent None in Epic  Is patient on blood thinners ASA   Hold Meds/how many days ?

## 2022-06-29 NOTE — TELEPHONE ENCOUNTER
Request for pre op clearance: Dr Bin Lehman  Requested by: [place] Bashir  Date of surgery 7/7/2022 at [facility]  Procedure quad tendon repair  Office phone # [phone]  Fax #  [phone]    Is the patient on anti-coag medication?    If yes, how many days does the surgeon want anti-coag medication held:     Date of last visit with cardiologist: 3/23/2022    [place] Bashir is requesting his testing from April 2022 to be faxed to them also regarding clearance

## 2022-06-29 NOTE — TELEPHONE ENCOUNTER
Ultram is not appropriate in UDS as patient is on Norco. Please ask him about this. Prescription refused at this time.

## 2022-06-30 ENCOUNTER — TELEPHONE (OUTPATIENT)
Dept: FAMILY MEDICINE CLINIC | Age: 52
End: 2022-06-30

## 2022-06-30 ENCOUNTER — PREP FOR PROCEDURE (OUTPATIENT)
Dept: CARDIOLOGY | Age: 52
End: 2022-06-30

## 2022-06-30 ENCOUNTER — TELEPHONE (OUTPATIENT)
Dept: CARDIOLOGY CLINIC | Age: 52
End: 2022-06-30

## 2022-06-30 RX ORDER — NITROGLYCERIN 0.4 MG/1
0.4 TABLET SUBLINGUAL EVERY 5 MIN PRN
Status: CANCELLED | OUTPATIENT
Start: 2022-06-30

## 2022-06-30 RX ORDER — SODIUM CHLORIDE 0.9 % (FLUSH) 0.9 %
5-40 SYRINGE (ML) INJECTION PRN
Status: CANCELLED | OUTPATIENT
Start: 2022-06-30

## 2022-06-30 RX ORDER — SODIUM CHLORIDE 0.9 % (FLUSH) 0.9 %
5-40 SYRINGE (ML) INJECTION EVERY 12 HOURS SCHEDULED
Status: CANCELLED | OUTPATIENT
Start: 2022-06-30

## 2022-06-30 RX ORDER — ASPIRIN 325 MG
325 TABLET ORAL ONCE
Status: CANCELLED | OUTPATIENT
Start: 2022-06-30 | End: 2022-06-30

## 2022-06-30 RX ORDER — SODIUM CHLORIDE 9 MG/ML
INJECTION, SOLUTION INTRAVENOUS PRN
Status: CANCELLED | OUTPATIENT
Start: 2022-06-30

## 2022-06-30 RX ORDER — HYDROCODONE BITARTRATE AND ACETAMINOPHEN 5; 325 MG/1; MG/1
1 TABLET ORAL EVERY 6 HOURS PRN
Qty: 120 TABLET | Refills: 0 | Status: SHIPPED | OUTPATIENT
Start: 2022-06-30 | End: 2022-08-02 | Stop reason: SDUPTHER

## 2022-06-30 NOTE — TELEPHONE ENCOUNTER
Well this patient did not see me last he saw Jose Mays last and her note doesn't list any ER visits. His last 2 urine drug screens were + for Ultram with metabolites. Prior to that it was not present. I filled prescription in [place] but please tell him he needs to take what is prescribed and nothing else. If he is upset can get hospital notes sent here to verify but again last visit was not with me it was with Jose Mays.

## 2022-06-30 NOTE — TELEPHONE ENCOUNTER
Heart cath scheduled 07-01-22 @ 1:00pm    Call to pt, left msg for pt to call office for date, time and instructions

## 2022-06-30 NOTE — TELEPHONE ENCOUNTER
Called Dr. Elbert Galvan office and left a detailed message on their VM to call the office back to let us know if the pt needs to also have medical clearance from [facility].

## 2022-06-30 NOTE — TELEPHONE ENCOUNTER
PROCEDURE: cardiac cath     DATE OF SERVICE: 07/01/2022    SERVICE LOCATION: [facility]    CPT CODE: [id number]    PHYSICIAN: Danisha    DATE PRIOR AUTH SUBMITTED: 06/30/2022    STATUS: APPROVED.      AUTH NUMBER: [id number]    VALID: 06/30/2022-07/29/2022

## 2022-06-30 NOTE — TELEPHONE ENCOUNTER
Left message for patient to return call. Left detailed message for Ania Reyna at Dr Philly Collazo office as well.

## 2022-06-30 NOTE — TELEPHONE ENCOUNTER
Received a call from [facility] cath lab stating that the pt was just scheduled for a cardiac cath tomorrow at 1:00 PM, the same time as his pre-op clearance appointment with [facility]. Dr. Sonia Blackmon ordered the test. Appt canceled with [facility] canceled. I called Dr. Emely Lutz office and left a detailed message on their nurse VM to call the office back to let us know if they are doing the surgical/cardiac clearance or if we will need to reschedule him with [facility] for next week. Will await call back.

## 2022-06-30 NOTE — TELEPHONE ENCOUNTER
Called pt. And he reports he was in the hospital up to about 3 days before coming in for the drug screen.  At hospital given pain meds and thinks it ws the Ultram. Voiced frustration that he told you this - was in the hospital. Wanting to know if will fill script for norco .  Resetup in   Case ok with

## 2022-07-01 ENCOUNTER — CARE COORDINATION (OUTPATIENT)
Dept: CARE COORDINATION | Age: 52
End: 2022-07-01

## 2022-07-01 ENCOUNTER — HOSPITAL ENCOUNTER (OUTPATIENT)
Dept: INPATIENT UNIT | Age: 52
Discharge: HOME | End: 2022-07-01
Attending: INTERNAL MEDICINE | Admitting: INTERNAL MEDICINE
Payer: COMMERCIAL

## 2022-07-01 VITALS
DIASTOLIC BLOOD PRESSURE: 71 MMHG | BODY MASS INDEX: 41.75 KG/M2 | RESPIRATION RATE: 16 BRPM | OXYGEN SATURATION: 98 % | HEART RATE: 66 BPM | WEIGHT: 315 LBS | SYSTOLIC BLOOD PRESSURE: 152 MMHG | HEIGHT: 73 IN

## 2022-07-01 LAB
ABO: NORMAL
ANION GAP SERPL CALCULATED.3IONS-SCNC: 12 MEQ/L (ref 8–16)
ANTIBODY SCREEN: NORMAL
AVERAGE GLUCOSE: 87 MG/DL (ref 70–126)
BUN BLDV-MCNC: 13 MG/DL (ref 7–22)
CALCIUM SERPL-MCNC: 9.3 MG/DL (ref 8.5–10.5)
CHLORIDE BLD-SCNC: 98 MEQ/L (ref 98–111)
CHOLESTEROL, TOTAL: 207 MG/DL (ref 100–199)
CO2: 27 MEQ/L (ref 23–33)
CREAT SERPL-MCNC: 0.8 MG/DL (ref 0.4–1.2)
EKG ATRIAL RATE: 68 BPM
EKG P AXIS: 70 DEGREES
EKG P-R INTERVAL: 168 MS
EKG Q-T INTERVAL: 430 MS
EKG QRS DURATION: 90 MS
EKG QTC CALCULATION (BAZETT): 457 MS
EKG R AXIS: 9 DEGREES
EKG T AXIS: 25 DEGREES
EKG VENTRICULAR RATE: 68 BPM
ERYTHROCYTE [DISTWIDTH] IN BLOOD BY AUTOMATED COUNT: 11.9 % (ref 11.5–14.5)
ERYTHROCYTE [DISTWIDTH] IN BLOOD BY AUTOMATED COUNT: 41.7 FL (ref 35–45)
GFR SERPL CREATININE-BSD FRML MDRD: > 90 ML/MIN/1.73M2
GLUCOSE BLD-MCNC: 108 MG/DL (ref 70–108)
HBA1C MFR BLD: 4.9 % (ref 4.4–6.4)
HCT VFR BLD CALC: 42.3 % (ref 42–52)
HDLC SERPL-MCNC: 41 MG/DL
HEMOGLOBIN: 13.4 GM/DL (ref 14–18)
INR BLD: 1.02 (ref 0.85–1.13)
LDL CHOLESTEROL CALCULATED: 148 MG/DL
MCH RBC QN AUTO: 30.5 PG (ref 26–33)
MCHC RBC AUTO-ENTMCNC: 31.7 GM/DL (ref 32.2–35.5)
MCV RBC AUTO: 96.1 FL (ref 80–94)
PLATELET # BLD: 245 THOU/MM3 (ref 130–400)
PMV BLD AUTO: 10.4 FL (ref 9.4–12.4)
POTASSIUM SERPL-SCNC: 3.8 MEQ/L (ref 3.5–5.2)
RBC # BLD: 4.4 MILL/MM3 (ref 4.7–6.1)
RH FACTOR: NORMAL
SODIUM BLD-SCNC: 137 MEQ/L (ref 135–145)
TRIGL SERPL-MCNC: 90 MG/DL (ref 0–199)
WBC # BLD: 7.3 THOU/MM3 (ref 4.8–10.8)

## 2022-07-01 PROCEDURE — 80048 BASIC METABOLIC PNL TOTAL CA: CPT

## 2022-07-01 PROCEDURE — 86900 BLOOD TYPING SEROLOGIC ABO: CPT

## 2022-07-01 PROCEDURE — 6360000004 HC RX CONTRAST MEDICATION: Performed by: INTERNAL MEDICINE

## 2022-07-01 PROCEDURE — C1894 INTRO/SHEATH, NON-LASER: HCPCS

## 2022-07-01 PROCEDURE — 93005 ELECTROCARDIOGRAM TRACING: CPT | Performed by: NURSE PRACTITIONER

## 2022-07-01 PROCEDURE — 85027 COMPLETE CBC AUTOMATED: CPT

## 2022-07-01 PROCEDURE — 2580000003 HC RX 258: Performed by: NURSE PRACTITIONER

## 2022-07-01 PROCEDURE — 36415 COLL VENOUS BLD VENIPUNCTURE: CPT

## 2022-07-01 PROCEDURE — 93458 L HRT ARTERY/VENTRICLE ANGIO: CPT | Performed by: INTERNAL MEDICINE

## 2022-07-01 PROCEDURE — 93458 L HRT ARTERY/VENTRICLE ANGIO: CPT

## 2022-07-01 PROCEDURE — 6360000002 HC RX W HCPCS

## 2022-07-01 PROCEDURE — 80061 LIPID PANEL: CPT

## 2022-07-01 PROCEDURE — C1887 CATHETER, GUIDING: HCPCS

## 2022-07-01 PROCEDURE — 86901 BLOOD TYPING SEROLOGIC RH(D): CPT

## 2022-07-01 PROCEDURE — 86850 RBC ANTIBODY SCREEN: CPT

## 2022-07-01 PROCEDURE — 83036 HEMOGLOBIN GLYCOSYLATED A1C: CPT

## 2022-07-01 PROCEDURE — 2500000003 HC RX 250 WO HCPCS

## 2022-07-01 PROCEDURE — 6370000000 HC RX 637 (ALT 250 FOR IP): Performed by: NURSE PRACTITIONER

## 2022-07-01 PROCEDURE — C1769 GUIDE WIRE: HCPCS

## 2022-07-01 PROCEDURE — 85610 PROTHROMBIN TIME: CPT

## 2022-07-01 PROCEDURE — 93010 ELECTROCARDIOGRAM REPORT: CPT | Performed by: INTERNAL MEDICINE

## 2022-07-01 RX ORDER — SODIUM CHLORIDE 0.9 % (FLUSH) 0.9 %
5-40 SYRINGE (ML) INJECTION EVERY 12 HOURS SCHEDULED
Status: DISCONTINUED | OUTPATIENT
Start: 2022-07-01 | End: 2022-07-01 | Stop reason: SDUPTHER

## 2022-07-01 RX ORDER — SODIUM CHLORIDE 0.9 % (FLUSH) 0.9 %
5-40 SYRINGE (ML) INJECTION EVERY 12 HOURS SCHEDULED
Status: DISCONTINUED | OUTPATIENT
Start: 2022-07-01 | End: 2022-07-01 | Stop reason: HOSPADM

## 2022-07-01 RX ORDER — NITROGLYCERIN 0.4 MG/1
0.4 TABLET SUBLINGUAL EVERY 5 MIN PRN
Status: DISCONTINUED | OUTPATIENT
Start: 2022-07-01 | End: 2022-07-01 | Stop reason: HOSPADM

## 2022-07-01 RX ORDER — SODIUM CHLORIDE 0.9 % (FLUSH) 0.9 %
5-40 SYRINGE (ML) INJECTION PRN
Status: DISCONTINUED | OUTPATIENT
Start: 2022-07-01 | End: 2022-07-01 | Stop reason: HOSPADM

## 2022-07-01 RX ORDER — SODIUM CHLORIDE 9 MG/ML
INJECTION, SOLUTION INTRAVENOUS PRN
Status: DISCONTINUED | OUTPATIENT
Start: 2022-07-01 | End: 2022-07-01 | Stop reason: HOSPADM

## 2022-07-01 RX ORDER — SODIUM CHLORIDE 0.9 % (FLUSH) 0.9 %
5-40 SYRINGE (ML) INJECTION PRN
Status: DISCONTINUED | OUTPATIENT
Start: 2022-07-01 | End: 2022-07-01 | Stop reason: SDUPTHER

## 2022-07-01 RX ORDER — ASPIRIN 325 MG
325 TABLET ORAL ONCE
Status: COMPLETED | OUTPATIENT
Start: 2022-07-01 | End: 2022-07-01

## 2022-07-01 RX ORDER — ASPIRIN 81 MG/1
81 TABLET ORAL DAILY
Qty: 90 TABLET | Refills: 1 | Status: SHIPPED | OUTPATIENT
Start: 2022-07-01

## 2022-07-01 RX ORDER — SODIUM CHLORIDE 9 MG/ML
INJECTION, SOLUTION INTRAVENOUS PRN
Status: DISCONTINUED | OUTPATIENT
Start: 2022-07-01 | End: 2022-07-01 | Stop reason: SDUPTHER

## 2022-07-01 RX ORDER — ACETAMINOPHEN 325 MG/1
650 TABLET ORAL EVERY 4 HOURS PRN
Status: DISCONTINUED | OUTPATIENT
Start: 2022-07-01 | End: 2022-07-01 | Stop reason: HOSPADM

## 2022-07-01 RX ADMIN — ASPIRIN 325 MG: 325 TABLET ORAL at 12:01

## 2022-07-01 RX ADMIN — IOPAMIDOL 80 ML: 755 INJECTION, SOLUTION INTRAVENOUS at 13:39

## 2022-07-01 RX ADMIN — SODIUM CHLORIDE: 9 INJECTION, SOLUTION INTRAVENOUS at 11:56

## 2022-07-01 ASSESSMENT — PAIN DESCRIPTION - LOCATION: LOCATION: KNEE

## 2022-07-01 ASSESSMENT — PAIN SCALES - GENERAL: PAINLEVEL_OUTOF10: 6

## 2022-07-01 ASSESSMENT — PAIN DESCRIPTION - ORIENTATION: ORIENTATION: RIGHT

## 2022-07-01 NOTE — PLAN OF CARE
Problem: Discharge Planning  Goal: Discharge to home or other facility with appropriate resources  Outcome: Progressing  Flowsheets (Taken 7/1/2022 1100)  Discharge to home or other facility with appropriate resources:   Identify barriers to discharge with patient and caregiver   Arrange for needed discharge resources and transportation as appropriate     Problem: Pain  Goal: Verbalizes/displays adequate comfort level or baseline comfort level  Outcome: Progressing     Problem: Neurosensory - Adult  Goal: Achieves maximal functionality and self care  Outcome: Progressing     Problem: Respiratory - Adult  Goal: Achieves optimal ventilation and oxygenation  Outcome: Progressing     Problem: Cardiovascular - Adult  Goal: Maintains optimal cardiac output and hemodynamic stability  Outcome: Progressing     Problem: Skin/Tissue Integrity - Adult  Goal: Incisions, wounds, or drain sites healing without S/S of infection  Outcome: Progressing     Problem: Musculoskeletal - Adult  Goal: Return ADL status to a safe level of function  Outcome: Progressing     Problem: Gastrointestinal - Adult  Goal: Maintains adequate nutritional intake  Outcome: Progressing     Problem: Genitourinary - Adult  Goal: Absence of urinary retention  Outcome: Progressing     Problem: Infection - Adult  Goal: Absence of infection at discharge  Outcome: Progressing   . Jeff Hollins Care plan reviewed with patient. Patient verbalize understanding of the plan of care and contribute to goal setting.

## 2022-07-01 NOTE — CARE COORDINATION
Suni Carlos currently at [facility] for procedure. [facility] was reaching out to provide CHF [employer]. Will follow up at another time.

## 2022-07-01 NOTE — H&P
[address] [address]  Sedation/Analgesia History & Physical    Pt Name: Veda Patiño  Account number: [de-identified]  MRN: [id number]  YOB: 1970  Provider Performing Procedure: Jose Gordillo MD MD  Referring Provider: Jose Gordillo MD   Primary Care Physician: Wong Kramer, MIRNA - VITA  Date: 7/1/2022    PRE-PROCEDURE    Code Status: FULL CODE  Brief History/Pre-Procedure Diagnosis:     Abnormal stress test    CHF  Preoperative risk assessment  Angina     Consent: : I have discussed with the patient risks, benefits, and alternatives (and relevant risks, benefits, and side effects related to alternatives or not receiving care), and likelihood of the success. The patient and/or representative appear to understand and agree to proceed. The discussion encompasses risks, benefits, and side effects related to the alternatives and the risks related to not receiving the proposed care, treatment, and services. The indication, risks and benefits of the procedure and possible therapeutic consequences and alternatives were discussed with the patient. The patient was given the opportunity to ask questions and to have them answered to his/her satisfaction. Risks of the procedure include but are not limited to the following: Bleeding, hematoma including retroperitoneal hemmorhage, infection, pain and discomfort, injury to the aorta and other blood vessels, rhythm disturbance, low blood pressure, myocardial infarction, stroke, kidney damage/failure, myocardial perforation, allergic reactions to sedatives/contrast material, loss of pulse/vascular compromise requiring surgery, aneurysm/pseudoaneurysm formation, possible loss of a limb/hand/leg, needing blood transfusion, requiring emergent open heart surgery or emergent coronary intervention, the need for intubation/respiratory support, the requirement for defibrillation/cardioversion, and death.  Alternatives to and omission of the suggested procedure were discussed. The patient had no further questions and wished to proceed; the consent form was signed. MEDICAL HISTORY  []ASHD/ANGINA/MI/CHF   []Hypertension  []Diabetes  []Hyperlipidemia  []Smoking  []Family Hx of ASHD  []Additional information:       has a past medical history of Aneurysm ([address] Asthma, Cardiomegaly, COVID-19, Fibromyalgia, and CLARIBEL on CPAP. SURGICAL HISTORY   has a past surgical history that includes back surgery; knee surgery; Pain management procedure (Bilateral, 2/11/2021); and Pain management procedure (Bilateral, 4/8/2021). Additional information:       ALLERGIES   Allergies as of 07/01/2022 - Fully Reviewed 07/01/2022   Allergen Reaction Noted    Dilantin [phenytoin] Anaphylaxis and Swelling 02/10/2020    Lyrica [pregabalin] Other (See Comments) 04/04/2022    Dupixent [dupilumab]  05/07/2020    Oxycodone  02/11/2019    Valium [diazepam]  02/11/2019     Additional information:       MEDICATIONS   Aspirin  [] 81 mg  [] 325 mg  [] None  Antiplatelet drug therapy use last 5 days  []No []Yes  Coumadin Use Last 5 Days []No []Yes  Other anticoagulant use last 5 days  []No []Yes    Current Facility-Administered Medications:     0.9 % sodium chloride infusion, , IntraVENous, PRN, MIRNA Das CNP, Last Rate: 75 mL/hr at 07/01/22 1156, New Bag at 07/01/22 1156    nitroGLYCERIN (NITROSTAT) SL tablet 0.4 mg, 0.4 mg, SubLINGual, Q5 Min PRN, MIRNA Das CNP    sodium chloride flush 0.9 % injection 5-40 mL, 5-40 mL, IntraVENous, 2 times per day, MIRNA Das CNP    sodium chloride flush 0.9 % injection 5-40 mL, 5-40 mL, IntraVENous, PRN, MIRNA Das CNP  Prior to Admission medications    Medication Sig Start Date End Date Taking? Authorizing Provider   HYDROcodone-acetaminophen (NORCO) 5-325 MG per tablet Take 1 tablet by mouth every 6 hours as needed for Pain for up to 30 days.  6/30/22 7/30/22  MIRNA Navarrete CNP   aspirin 325 MG tablet Take 325 mg by mouth daily    Historical Provider, MD   lidocaine (LIDODERM) 5 % Place 3 patches onto the skin daily 12 hours on, 12 hours off. 6/22/22 7/22/22  MIRNA Gong CNP   orphenadrine (NORFLEX) 100 MG extended release tablet Take 1 tablet by mouth 2 times daily as needed for Muscle spasms 6/22/22 7/22/22  MIRNA Gong CNP   lidocaine (XYLOCAINE) 5 % ointment Apply 240 g topically 4 times daily as needed for Pain Apply topically as needed. 6/22/22 7/22/22  MIRNA Gong CNP   butalbital-acetaminophen-caffeine (FIORICET, ESGIC) -57 MG per tablet take 1 tablet by mouth every 4 hours if needed for headache 6/7/22   Josemanuel Matias MD   amitriptyline (ELAVIL) 50 MG tablet take 1 tablet by mouth at bedtime 5/31/22   MIRNA Tellez CNP   rosuvastatin (CRESTOR) 20 MG tablet take 1 tablet by mouth once daily 5/18/22   MIRNA Tellez CNP   BANOPHEN 25 MG capsule take 1 capsule by mouth every 6 hours if needed for itching 5/11/22   MIRNA Tellez CNP   ergocalciferol (DRISDOL) 1.25 MG (53606 UT) capsule Take 1 capsule by mouth once a week 5/4/22   Jason Su MD   albuterol sulfate  (90 Base) MCG/ACT inhaler Inhale 2 puffs into the lungs 4 times daily as needed for Wheezing 4/26/22   MIRNA Tellez CNP   hydroCHLOROthiazide (HYDRODIURIL) 25 MG tablet Take 1 tablet by mouth every morning 4/26/22   MIRNA Tellez CNP   triamcinolone (KENALOG) 0.1 % cream Apply topically 2 times daily. 4/11/22   MIRNA Tellez CNP   pregabalin (LYRICA) 25 MG capsule Take 1 capsule by mouth 2 times daily for 30 days.  4/11/22 6/22/22  Jeff Jaquez MD   pantoprazole (PROTONIX) 40 MG tablet Take 1 tablet by mouth every morning (before breakfast) 4/2/22   MIRNA Bledsoe CNP   betamethasone valerate (VALISONE) 0.1 % cream apply to affected area twice a day 3/24/22   MIRNA Tellez CNP chloride (KLOR-CON M) 10 MEQ extended release tablet Take 1 tablet by mouth daily 3/23/22   Karen Ruiz MD   Blood Pressure Monitoring (B-D ASSURE BPM/AUTO ARM CUFF) MISC 1 Units by Does not apply route daily 3/22/22   MIRNA Alcala CNP   Cholecalciferol (VITAMIN D3) 1.25 MG (02406 UT) CAPS 1 capsule 2 times a week , Monday and Friday 3/20/22   Aleksander Banegas MD   budesonide-formoterol [facility]) 160-4.5 MCG/ACT AERO 2 puffs inhaled twice daily. Rinse mouth well after use. 2/17/22   MIRNA Alcala CNP   ondansetron (ZOFRAN-ODT) 4 MG disintegrating tablet Take 1 tablet by mouth 3 times daily as needed for Nausea or Vomiting 2/1/22   MIRNA Alcala CNP   albuterol (PROVENTIL) (2.5 MG/3ML) 0.083% nebulizer solution inhale contents of 1 vial ( 3 milliliters ) in nebulizer by mouth and INTO THE LUNGS every 4 hours if needed for wheezing 1/31/22   MIRNA Alcala CNP   Respiratory Therapy Supplies (NEBULIZER/TUBING/MOUTHPIECE) KIT Dispense tubing, mask, and mouthpiece for nebulizer machine.  Dx: Asthma 1/3/22   MIRNA Alcala CNP   dicyclomine (BENTYL) 10 MG capsule Take 1 capsule by mouth 3 times daily as needed (abd cramping) 12/9/21   Lyndal Babinski, APRN - CNP   loratadine (CLARITIN) 10 MG tablet take 1 tablet by mouth once daily 11/29/21   MIRNA Alcala CNP   montelukast (SINGULAIR) 10 MG tablet Take 1 tablet by mouth nightly 11/17/21   MIRNA Alcala CNP   fluticasone Анна Hilt) 50 MCG/ACT nasal spray 2 sprays by Each Nostril route daily 11/17/21   MIRNA Alcala CNP   azelastine (OPTIVAR) 0.05 % ophthalmic solution Place 1 drop into both eyes 2 times daily 8/19/21   MIRNA Alcala CNP   FEROSUL 325 (65 Fe) MG tablet take 1 tablet by mouth once daily 6/28/21   MIRNA Ireland - CNP   EPINEPHrine (EPIPEN 2-RAGHAV) 0.3 MG/0.3ML SOAJ injection Inject one pen as directed STAT for allergic reaction, may disp generic [facility] [phone] 2/25/21 Verner Solan, APRN - CNP   tiotropium (SPIRIVA RESPIMAT) 1.25 MCG/ACT AERS inhaler Inhale 2 puffs into the lungs daily 11/18/20   Verner Solan, APRN - CNP   cetirizine (ZYRTEC) 10 MG tablet Take 1 tablet by mouth daily 8/31/20   Verner Solan, APRN - CNP   bumetanide (BUMEX) 1 MG tablet Take 0.5 tablets by mouth daily 5/28/20   MIRNA Webb CNP   NARCAN 4 MG/0.1ML LIQD nasal spray  1/10/20   Historical Provider, MD   Multiple Vitamins-Minerals (MULTIVITAMIN ADULTS) TABS Take 1 tablet by mouth daily 1/9/20   MIRNA Vargas CNP   ergocalciferol (ERGOCALCIFEROL) 16611 units capsule Take 50,000 Units by mouth as needed     Historical Provider, MD     Additional information:       VITAL SIGNS   Vitals:    07/01/22 1138   BP: (!) 169/83   Pulse: 67   Resp: 22   SpO2:        PHYSICAL:   General: No acute distress  HEENT:  Unremarkable for age  Neck: without increased JVD, carotid pulses 2+ bilaterally without bruits  Heart: RRR, S1 & S2 WNL. No murmurs   Lungs: Clear to auscultation    Abdomen: BS present, without HSM, masses, or tenderness    Extremities: without C,C,E.  Pulses 2+ bilaterally   Mental Status: Alert & Oriented        PLANNED PROCEDURE   [x]Cath  [x]PCI                []Pacemaker/AICD  []STEPHANE             []Cardioversion []Peripheral angiography/PTA  []Other:      SEDATION  Planned agent:[x]Midazolam []Meperidine []Sublimaze []Morphine  []Diazepam  []Other:     ASA Classification:  []1 [x]2 []3 []4 []5   Class 1: A normal healthy patient  Class 2: Pt with mild to moderate systemic disease  Class 3: Severe systemic disease or disturbance  Class 4: Severe systemic disorders that are already life threatening. Class 5: Moribund pt with little chances of survival, for more than 24 hours. Mallampati I Airway Classification:   []1 []2 []3 [x]4     [x]Pre-procedure diagnostic studies complete and results available.    Comment:    [x]Previous sedation/anesthesia experiences assessed. Comment:    [x]The patient is an appropriate candidate to undergo the planned procedure sedation and anesthesia. (Refer to nursing sedation/analgesia documentation record)  [x]Formulation and discussion of sedation/procedure plan, risks, and expectations with patient and/or responsible adult completed. [x]Patient examined immediately prior to the procedure.  (Refer to nursing sedation/analgesia documentation record)      Aries Alvaraod MD, Barnet Speaker    Electronically signed 7/1/2022 at 1:03 PM

## 2022-07-01 NOTE — PROCEDURES
[address] [place] [address]                            CARDIAC CATHETERIZATION    PATIENT NAME: Tangela Mata                :        1970  MED REC NO:   [id number]                           ROOM:       0008  ACCOUNT NO:   [de-identified]                           ADMIT DATE: 2022  PROVIDER:     Vivek Bowens MD    DATE OF PROCEDURE:  2022    INDICATION:  Preoperative cardiac risk assessment. Abnormal stress  test.  Angina. Congestive heart failure. PROCEDURES PERFORMED:  1. Left cardiac catheterization with selective coronary angiogram.  2.  Left ventriculogram.    DESCRIPTION OF PROCEDURE:  After informed consent, the patient was  brought to the cardiac catheterization room. He was prepped and draped  in a sterile fashion. 2% lidocaine was injected in the skin and  subcutaneous tissue overlying the right radial artery. Under ultrasound  guidance using modified Seldinger technique, access was obtained in the  right radial artery. 5/6 Slender sheath was inserted. Standard  antithrombotic/antispasmodic medications were given. I used 6-Nigerien  JR4 diagnostic catheter to obtain left ventriculogram and engage the  right coronary artery. There was difficulty engaging the left main  coronary artery. I ended up using the 5-N[place]ien EBU 3.5 guide catheter  to engage the left main coronary artery. FINDINGS:  LEFT VENTRICULOGRAM:  No regional wall motion abnormality. Ejection  fraction 50%-55%. HEMODYNAMICS:  Left ventricular end-diastolic pressure 19 mmHg. No  significant pressure gradient across the aortic valve upon pullback with  mean gradient of 11 mmHg. CORONARY ANGIOGRAM:  1. RIGHT CORONARY ARTERY:  Proximal RCA and mid RCA with luminal  irregularities. Distal RCA has mild diffuse disease with severe  tortuosity.   2.  LEFT MAIN CORONARY ARTERY:  Patent without significant stenotic  lesions, gives rise to ramus intermedius, left circumflex, and left  anterior descending arteries. 3.  LEFT CIRCUMFLEX ARTERY:  Mild luminal irregularities. 4.  RAMUS INTERMEDIUS:  Has luminal irregularities. 5.  LEFT ANTERIOR DESCENDING ARTERY:  Proximal LAD is patent. Mid LAD  is patent. Distal LAD has mild luminal irregularities with moderate  tortuosity. MEDICATIONS:  See MAR. COMPLICATIONS:  None. ESTIMATED BLOOD LOSS:  Less than 50 mL. ACCESS:  Right radial artery access. Vasc Band was applied. Hemostasis  was achieved. IMPRESSION:  Nonobstructive coronary artery disease. RECOMMENDATIONS:  Medical therapy.         Almas Green MD    D: 07/01/2022 14:04:22       T: 07/01/2022 14:06:46     [email or web address]  Job#: [id number]     Doc#: [id number]    CC:

## 2022-07-01 NOTE — PLAN OF CARE
Problem: Discharge Planning  Goal: Discharge to home or other facility with appropriate resources  7/1/2022 1619 by Fredy Rodriguez RN  Outcome: Completed  7/1/2022 1300 by Fredy Rodriguez RN  Outcome: Progressing  Flowsheets (Taken 7/1/2022 1100)  Discharge to home or other facility with appropriate resources:   Identify barriers to discharge with patient and caregiver   Arrange for needed discharge resources and transportation as appropriate     Problem: Pain  Goal: Verbalizes/displays adequate comfort level or baseline comfort level  7/1/2022 1619 by Fredy Rodriguez RN  Outcome: Completed  7/1/2022 1300 by Fredy Rodriguez RN  Outcome: Progressing     Problem: Neurosensory - Adult  Goal: Achieves maximal functionality and self care  7/1/2022 1619 by Fredy Rodriguez RN  Outcome: Completed  7/1/2022 1300 by Fredy Rodriguez RN  Outcome: Progressing     Problem: Respiratory - Adult  Goal: Achieves optimal ventilation and oxygenation  7/1/2022 1619 by Fredy Rodriguez RN  Outcome: Completed  7/1/2022 1300 by Fredy Rodriguez RN  Outcome: Progressing     Problem: Cardiovascular - Adult  Goal: Maintains optimal cardiac output and hemodynamic stability  7/1/2022 1619 by Fredy Rodriguez RN  Outcome: Completed  7/1/2022 1300 by Fredy Rodriguez RN  Outcome: Progressing     Problem: Skin/Tissue Integrity - Adult  Goal: Incisions, wounds, or drain sites healing without S/S of infection  7/1/2022 1619 by Fredy Rodriguez RN  Outcome: Completed  7/1/2022 1300 by Fredy Rodriguez RN  Outcome: Progressing     Problem: Musculoskeletal - Adult  Goal: Return ADL status to a safe level of function  7/1/2022 1619 by Fredy Rodriguez RN  Outcome: Completed  7/1/2022 1300 by Fredy Rodriguez RN  Outcome: Progressing     Problem: Gastrointestinal - Adult  Goal: Maintains adequate nutritional intake  7/1/2022 1619 by Fredy Rodriguez RN  Outcome: Completed  7/1/2022 1300 by Fredy Rodriguez RN  Outcome: Progressing     Problem: Genitourinary - Adult  Goal: Absence of urinary retention  7/1/2022 1619 by Breanna Randall RN  Outcome: Completed  7/1/2022 1300 by Breanna Randall RN  Outcome: Progressing     Problem: Infection - Adult  Goal: Absence of infection at discharge  7/1/2022 1619 by Breanna Randall RN  Outcome: Completed  7/1/2022 1300 by Breanna Randall RN  Outcome: Progressing

## 2022-07-01 NOTE — BRIEF OP NOTE
[facility]  Sedation/Analgesia Post Sedation Record    Pt Name: Tandy Habermann  Account number: [de-identified]  MRN: [id number]  YOB: 1970  Procedure Performed By: Luiz Solis MD MD   Primary Care Physician: Toño Betancourt, APRN - CNP  Date: 7/1/2022    POST-PROCEDURE    Physicians/Assistants: Luiz Solis MD MD     Procedure Performed:Cath/ PCI      Sedation/Anesthesia: Versed/ Fentanyl and 2% xylocaine local anesthesia. Estimated Blood Loss: < 50 ml. Specimens Removed: None         Disposition of Specimen: N/A        Complications: No Immediate Complications. Post-procedure Diagnosis/Findings:        Nonobstructive CAD     Recommendations:  Bed rest for the next 2 hours  Access site care  Risk factor modification   Low cardiac risk for planned surgery   Discharge home today   Outpatient follow up in office in the next 4 weeks      Above findings and plan of care were discussed with patient, questions were answered, agreeable with plan.        Luiz Solis MD, Dinesh Vázquez   Electronically signed 7/1/2022 at 1:43 PM  Interventional Cardiology

## 2022-07-01 NOTE — FLOWSHEET NOTE
1100 Patient admitted to [address] for left heart catherization  Patient NPO. Patient accompanied by noone. Galo Daphne will pick him up. Vital signs obtained. Assessment and data collection intiated. Oriented to room. Policies and procedures for [address]E explained. All questions answered with no further questions at this time. Fall precautions reviewed with patient. [address] reviewed with patient. verbalize understanding of the plan of care and contribute to goal setting.       1251 to cath lab per bed, stable condition. 1350 care taken over from cath lab, right radial site with vasc band with 10 ml of air. 1500 started to remove air from vasc band. 1615 all air has been removed from vasc band. 1645 up in room, tolerated activity well. 1730 Discharge instructions given. 1730Patient goals/plan/treatment preferences discussed by this RN. Discharge planning provided by this RN. Patient goals/plan/treatment preferences reviewed with patient/family. Patient/family verbalize understanding of discharge plan and are in agreement with goal/plan/treatment preferences. Understanding was demonstrated using the teach back method. AVS provided by this RN at time of discharge, which includes all necessary medical information pertaining to the patients current course of illness, treatment, post-discharge goals of care, and treatment preferences.

## 2022-07-06 ENCOUNTER — TELEPHONE (OUTPATIENT)
Dept: CARDIOLOGY CLINIC | Age: 52
End: 2022-07-06

## 2022-07-06 ENCOUNTER — CARE COORDINATION (OUTPATIENT)
Dept: CARE COORDINATION | Age: 52
End: 2022-07-06

## 2022-07-06 NOTE — CARE COORDINATION
Ambulatory Care Coordination Note  7/6/2022  CM Risk Score: 6  Charlson 10 Year Mortality Risk Score: 10%     ACC: Jarrell Homans, RN    Summary Note: Spoke with Myke Limon for continued Care Coordination follow up  States doing well for this review  Completed cardiac cath. States no stenting needed  Recovered well with no problems. Has [facility] scheduled tomorrow [facility] out[facility] for tendon repair  No barriers with transportation there  CHF at baseline    Plan  Reinforce education completed  Follow up on needs for after surgery  Encouraged him to call me with any questions or changes in condition  Reinforce education completed to prevent exacerbation and unnecessary hospitalization  Congestive Heart Failure Assessment    Are you currently restricting fluids?: No Restriction  Do you understand a low sodium diet?: No  Do you understand how to read food labels?: No  How many restaurant meals do you eat per week?: 1-2  Do you salt your food before tasting it?: No         Symptoms:                Lab Results     None          Care Coordination Interventions    Program Enrollment: Complex Care  Referral from Primary Care Provider: No  Suggested Interventions and [address] [place] [address]: In Process (Comment: referral placed 3-23-22)  Home Health Services: Not Started  Medication Assistance Program: Not Started  Occupational Therapy: Not Started  Physical Therapy: Not Started  Registered Dietician: Completed (Comment: appt with RD scheduled)  Social Work: Not Started  Zone Management Tools: Not Started  Other Services or Interventions: will be working with [facility] s/p fall at work         Goals Addressed    None         Prior to Admission medications    Medication Sig Start Date End Date Taking?  Authorizing Provider   aspirin EC 81 MG EC tablet Take 1 tablet by mouth daily 7/1/22   Antoinette Lechuga MD   HYDROcodone-acetaminophen (NORCO) 5-325 MG per tablet Take 1 tablet by mouth every 6 hours as needed for Pain for up to 30 days. 6/30/22 7/30/22  MIRNA Allison CNP   lidocaine (LIDODERM) 5 % Place 3 patches onto the skin daily 12 hours on, 12 hours off. 6/22/22 7/22/22  MIRNA Briceno CNP   orphenadrine (NORFLEX) 100 MG extended release tablet Take 1 tablet by mouth 2 times daily as needed for Muscle spasms 6/22/22 7/22/22  MIRNA Briceno CNP   lidocaine (XYLOCAINE) 5 % ointment Apply 240 g topically 4 times daily as needed for Pain Apply topically as needed. 6/22/22 7/22/22  MIRNA Briceno CNP   butalbital-acetaminophen-caffeine (FIORICET, ESGIC) -65 MG per tablet take 1 tablet by mouth every 4 hours if needed for headache 6/7/22   Dorothy Garrido MD   amitriptyline (ELAVIL) 50 MG tablet take 1 tablet by mouth at bedtime 5/31/22   MIRNA Londono CNP   rosuvastatin (CRESTOR) 20 MG tablet take 1 tablet by mouth once daily 5/18/22   MIRNA Londono CNP   BANOPHEN 25 MG capsule take 1 capsule by mouth every 6 hours if needed for itching 5/11/22   MIRNA Londono CNP   ergocalciferol (DRISDOL) 1.25 MG (67652 UT) capsule Take 1 capsule by mouth once a week 5/4/22   Pako Zambrano MD   albuterol sulfate  (90 Base) MCG/ACT inhaler Inhale 2 puffs into the lungs 4 times daily as needed for Wheezing 4/26/22   MIRNA Londono CNP   hydroCHLOROthiazide (HYDRODIURIL) 25 MG tablet Take 1 tablet by mouth every morning 4/26/22   MIRNA Londono CNP   triamcinolone (KENALOG) 0.1 % cream Apply topically 2 times daily. 4/11/22   MIRNA Londono CNP   pregabalin (LYRICA) 25 MG capsule Take 1 capsule by mouth 2 times daily for 30 days.  4/11/22 6/22/22  Dhaval Tidwell MD   pantoprazole (PROTONIX) 40 MG tablet Take 1 tablet by mouth every morning (before breakfast) 4/2/22   MIRNA Cabrera CNP   betamethasone valerate (VALISONE) 0.1 % cream apply to affected area twice a day 3/24/22   MIRNA Londono CNP   potassium chloride (KLOR-CON M) 10 MEQ extended release tablet Take 1 tablet by mouth daily 3/23/22   Karen Fong MD   Blood Pressure Monitoring (B-D ASSURE BPM/AUTO ARM CUFF) MISC 1 Units by Does not apply route daily 3/22/22   MIRNA Apple CNP   Cholecalciferol (VITAMIN D3) 1.25 MG (62528 UT) CAPS 1 capsule 2 times a week , Monday and Friday 3/20/22   Christine Vincent MD   budesonide-formoterol [facility]) 160-4.5 MCG/ACT AERO 2 puffs inhaled twice daily. Rinse mouth well after use. 2/17/22   MIRNA Apple CNP   ondansetron (ZOFRAN-ODT) 4 MG disintegrating tablet Take 1 tablet by mouth 3 times daily as needed for Nausea or Vomiting 2/1/22   MIRNA Apple CNP   albuterol (PROVENTIL) (2.5 MG/3ML) 0.083% nebulizer solution inhale contents of 1 vial ( 3 milliliters ) in nebulizer by mouth and INTO THE LUNGS every 4 hours if needed for wheezing 1/31/22   MIRNA Apple CNP   Respiratory Therapy Supplies (NEBULIZER/TUBING/MOUTHPIECE) KIT Dispense tubing, mask, and mouthpiece for nebulizer machine.  Dx: Asthma 1/3/22   MIRNA Apple CNP   dicyclomine (BENTYL) 10 MG capsule Take 1 capsule by mouth 3 times daily as needed (abd cramping) 12/9/21   MIRNA Glover CNP   loratadine (CLARITIN) 10 MG tablet take 1 tablet by mouth once daily 11/29/21   MIRNA Apple CNP   montelukast (SINGULAIR) 10 MG tablet Take 1 tablet by mouth nightly 11/17/21   MIRNA Apple CNP   fluticasone [facility]) 50 MCG/ACT nasal spray 2 sprays by Each Nostril route daily 11/17/21   MIRNA Apple CNP   azelastine (OPTIVAR) 0.05 % ophthalmic solution Place 1 drop into both eyes 2 times daily 8/19/21   MIRNA Apple CNP   FEROSUL 325 (65 Fe) MG tablet take 1 tablet by mouth once daily 6/28/21   MIRNA Mendieta CNP   EPINEPHrine (EPIPEN 2-RAGHAV) 0.3 MG/0.3ML SOAJ injection Inject one pen as directed STAT for allergic reaction, may disp generic [address] 14226-354-93 2/25/21   Reinaldo Mcneill, APRN - CNP   tiotropium (SPIRIVA RESPIMAT) 1.25 MCG/ACT AERS inhaler Inhale 2 puffs into the lungs daily 11/18/20   MIRNA Garay CNP   cetirizine (ZYRTEC) 10 MG tablet Take 1 tablet by mouth daily 8/31/20   MIRNA Garay CNP   bumetanide (BUMEX) 1 MG tablet Take 0.5 tablets by mouth daily 5/28/20   MIRNA Jerez CNP   NARCAN 4 MG/0.1ML LIQD nasal spray  1/10/20   Historical Provider, MD   Multiple Vitamins-Minerals (MULTIVITAMIN ADULTS) TABS Take 1 tablet by mouth daily 1/9/20   MIRNA Ross CNP   ergocalciferol (ERGOCALCIFEROL) 73831 units capsule Take 50,000 Units by mouth as needed     Historical Provider, MD       Future Appointments   Date Time Provider Jesus Simmons   7/27/2022  3:30 PM Faraz Pressley [facility] Heart [address]   8/3/2022  9:30 AM Malina Euceda MD [facility] AFL [facility] END   8/17/2022  9:00 AM MIRNA Lomax CNP N [facility] Pain [address]   8/17/2022 11:00 AM MIRNA Ross CNP [facility] Medicine [facility]   3/22/2023  1:45 PM Erick Machado MD N [facility] Heart [address]

## 2022-07-06 NOTE — TELEPHONE ENCOUNTER
[facility] called requesting a letter to be faxed to them them stating patient clear. Discussed with Dr. Melisa Lunsford, letter completed and out for signature.

## 2022-07-06 NOTE — LETTER
[facility] Cardiology  [facility]  [address] [place] [address]  Phone: [phone]  Fax: [phone]           David Cortes MD      July 6, 2022     Patient: Dhaval Reed   MR Number: [id number]   YOB: 1970   Date of Visit: 7/6/2022         Dr. Gladys Shetty and team at [facility],     Patient is clear for surgery with Dr. Gladys Shetty on 7/7/2022 with low cardiac risk. Cardiac cath completed on 7/1/2022 with no stenting or intervention required. Ejection fraction on cardiac cath was estimated at 50-55%.              Sincerely,        David Cortes MD

## 2022-07-06 NOTE — TELEPHONE ENCOUNTER
Spoke to Sara at [facility] and she stated that no clearance is needed from the PCP, pt has been cleared by his cardiologist. Pt notified.

## 2022-07-09 DIAGNOSIS — Z98.1 S/P CERVICAL SPINAL FUSION: ICD-10-CM

## 2022-07-11 ENCOUNTER — TELEPHONE (OUTPATIENT)
Dept: PHYSICAL MEDICINE AND REHAB | Age: 52
End: 2022-07-11

## 2022-07-11 RX ORDER — BUTALBITAL, ACETAMINOPHEN AND CAFFEINE 50; 325; 40 MG/1; MG/1; MG/1
TABLET ORAL
Qty: 90 TABLET | Refills: 0 | Status: SHIPPED | OUTPATIENT
Start: 2022-07-11 | End: 2022-09-22

## 2022-07-11 NOTE — TELEPHONE ENCOUNTER
Pharmacy called to clarify ok to fill script from surgeon for [address] [facility] 10/325 for 7 days as of today. Recieves 5/325mg from us. Oked and notif. Pt. To not take our prescribed meds for the 7 days he is taking the 10/325. Pt. verbalized understanding.

## 2022-07-11 NOTE — TELEPHONE ENCOUNTER
This medication refill is regarding a electronic request.  Refill requested by [employer]. Requested Prescriptions     Pending Prescriptions Disp Refills    butalbital-acetaminophen-caffeine (FIORICET, ESGIC) -40 MG per tablet [Pharmacy Med Name: [id number] -40] 90 tablet 0     Sig: take 1 tablet by mouth every 4 hours if needed for headache     Date of last visit: 5/10/2022   Date of next visit: 8/17/22  Date of last refill: 6/7/22 #90/0    Rx verified, ordered and set to EP.

## 2022-07-14 RX ORDER — POTASSIUM CHLORIDE 750 MG/1
10 TABLET, EXTENDED RELEASE ORAL DAILY
Qty: 90 TABLET | Refills: 3 | Status: SHIPPED | OUTPATIENT
Start: 2022-07-14 | End: 2022-10-04

## 2022-07-20 ENCOUNTER — CARE COORDINATION (OUTPATIENT)
Dept: CARE COORDINATION | Age: 52
End: 2022-07-20

## 2022-07-20 NOTE — CARE COORDINATION
Ambulatory Care Coordination Note  7/20/2022    ACC: Esperanza Zuniga, RN    Summary Note: Spoke with Inez Delacruz for continued Care Coordination followup  Was not able to talk long as he was at a follow up appt with [facility] from tendon repair during call   States she is doing well and healing  Reports blood pressure are running good  CHF at baseline  Appts in place for PCP, pain management and cardiology follow up    Plan  Collaborate with [facility] as needed  Reinforce blood pressure tracking and reporting  Encourage daily weight tracking and reporting  Diet as ordered  Reinforce importance of early symptom recognition and reporting to prevent exacerbation and unnecessary hospitalization  Congestive Heart Failure Assessment    Are you currently restricting fluids?: No Restriction  Do you understand a low sodium diet?: No  Do you understand how to read food labels?: No  How many restaurant meals do you eat per week?: 1-2  Do you salt your food before tasting it?: No         Symptoms:  CHF associated angina: Neg, CHF associated dyspnea on exertion: Pos, CHF associated fatigue: Neg, CHF associated orthostatic hypotension: Neg, CHF associated PND: Neg, CHF associated shortness of breath: Neg, CHF associated weakness: Neg      Symptom course: stable  Salt intake watch compared to last visit: stable      and   General Assessment    Do you have any symptoms that are causing concern?: Yes  Progression since Onset: Gradually Improving  Reported Symptoms: Other (Comment: tendon repair)             Lab Results       None            Care Coordination Interventions    Program Enrollment: Complex Care  Referral from Primary Care Provider: No  Suggested Interventions and Community Resources  Behavorial Health:  In Process (Comment: referral placed 3-23-22)  Home Health Services: Not Started  Medication Assistance Program: Not Started  Occupational Therapy: Not Started  Physical Therapy: Not Started  Registered Dietician: Completed (Comment: appt with RD scheduled)  Social Work: Not Started  Zone Management Tools: Not Started  Other Services or Interventions: will be working with [facility] s/p fall at work          Goals Addressed                      This Visit's Progress      Conditions and Symptoms (pt-stated)   On track      I will schedule office visits, as directed by my provider. I will keep my appointment or reschedule if I have to cancel. I will notify my provider of any barriers to my plan of care. I will notify my provider of any symptoms that indicate a worsening of my condition. Barriers: need for additional support and education  Plan for overcoming my barriers: family and ACM support  Confidence: 8/10  Anticipated Goal Completion Date: 6/22/22 Update 8/20/22          Reduce Falls  (pt-stated)   On track      I will reduce my risk of falls by the following:  s/p fall at work slipping in oil    Barriers: need for additional support and education  Plan for overcoming my barriers: family and ACM support  Confidence: 8/10  Anticipated Goal Completion Date: 9-13-22              Prior to Admission medications    Medication Sig Start Date End Date Taking? Authorizing Provider   potassium chloride (KLOR-CON M) 10 MEQ extended release tablet take 1 tablet by mouth daily 7/14/22  Yes Jassi Yao MD   butalbital-acetaminophen-caffeine (FIORICET, ESGIC) -40 MG per tablet take 1 tablet by mouth every 4 hours if needed for headache 7/11/22  Yes MIRNA Sterling CNP   aspirin EC 81 MG EC tablet Take 1 tablet by mouth daily 7/1/22  Yes Jassi Yao MD   HYDROcodone-acetaminophen (NORCO) 5-325 MG per tablet Take 1 tablet by mouth every 6 hours as needed for Pain for up to 30 days.  6/30/22 7/30/22 Yes MIRNA Henry CNP   lidocaine (LIDODERM) 5 % Place 3 patches onto the skin daily 12 hours on, 12 hours off. 6/22/22 7/22/22 Yes MIRNA Dunn CNP   orphenadrine (NORFLEX) 100 MG extended release tablet Take 1 tablet by mouth 2 times daily as needed for Muscle spasms 6/22/22 7/22/22 Yes MIRNA Pollard CNP   lidocaine (XYLOCAINE) 5 % ointment Apply 240 g topically 4 times daily as needed for Pain Apply topically as needed. 6/22/22 7/22/22 Yes MIRNA Pollard CNP   amitriptyline (ELAVIL) 50 MG tablet take 1 tablet by mouth at bedtime 5/31/22  Yes MIRNA aPrham CNP   rosuvastatin (CRESTOR) 20 MG tablet take 1 tablet by mouth once daily 5/18/22  Yes MIRNA Parham CNP   BANOPHEN 25 MG capsule take 1 capsule by mouth every 6 hours if needed for itching 5/11/22  Yes MIRNA Parham CNP   ergocalciferol (DRISDOL) 1.25 MG (13227 UT) capsule Take 1 capsule by mouth once a week 5/4/22  Yes Kodak Diaz MD   albuterol sulfate  (90 Base) MCG/ACT inhaler Inhale 2 puffs into the lungs 4 times daily as needed for Wheezing 4/26/22  Yes MIRNA Parham CNP   hydroCHLOROthiazide (HYDRODIURIL) 25 MG tablet Take 1 tablet by mouth every morning 4/26/22  Yes MIRNA Parham CNP   triamcinolone (KENALOG) 0.1 % cream Apply topically 2 times daily. 4/11/22  Yes MIRNA Parham CNP   pantoprazole (PROTONIX) 40 MG tablet Take 1 tablet by mouth every morning (before breakfast) 4/2/22  Yes MIRNA Leon CNP   betamethasone valerate (VALISONE) 0.1 % cream apply to affected area twice a day 3/24/22  Yes MIRNA Parham CNP   Blood Pressure Monitoring (B-D ASSURE BPM/AUTO ARM CUFF) MISC 1 Units by Does not apply route daily 3/22/22  Yes MIRNA Parham CNP   Cholecalciferol (VITAMIN D3) 1.25 MG (00704 UT) CAPS 1 capsule 2 times a week , Monday and Friday 3/20/22  Yes Chirag Hargrove MD   budesonide-formoterol (SYMBICORT) 160-4.5 MCG/ACT AERO 2 puffs inhaled twice daily. Rinse mouth well after use.  2/17/22  Yes MIRNA Parham - CNP   ondansetron (ZOFRAN-ODT) 4 MG disintegrating tablet Take 1 tablet by mouth 3 times daily as needed for Nausea or Vomiting 2/1/22  Yes Sigrid Nance MIRNA Phillips CNP   albuterol (PROVENTIL) (2.5 MG/3ML) 0.083% nebulizer solution inhale contents of 1 vial ( 3 milliliters ) in nebulizer by mouth and INTO THE LUNGS every 4 hours if needed for wheezing 1/31/22  Yes MIRNA Albert CNP   Respiratory Therapy Supplies (NEBULIZER/TUBING/MOUTHPIECE) KIT Dispense tubing, mask, and mouthpiece for nebulizer machine.  Dx: Asthma 1/3/22  Yes MIRNA Albert CNP   dicyclomine (BENTYL) 10 MG capsule Take 1 capsule by mouth 3 times daily as needed (abd cramping) 12/9/21  Yes MIRNA Allison CNP   loratadine (CLARITIN) 10 MG tablet take 1 tablet by mouth once daily 11/29/21  Yes MIRNA Albert CNP   montelukast (SINGULAIR) 10 MG tablet Take 1 tablet by mouth nightly 11/17/21  Yes MIRNA Albert CNP   fluticasone (FLONASE) 50 MCG/ACT nasal spray 2 sprays by Each Nostril route daily 11/17/21  Yes MIRNA Albert CNP   azelastine (OPTIVAR) 0.05 % ophthalmic solution Place 1 drop into both eyes 2 times daily 8/19/21  Yes MIRNA Albert CNP   FEROSUL 325 (65 Fe) MG tablet take 1 tablet by mouth once daily 6/28/21  Yes MIRNA Goldman CNP   EPINEPHrine (EPIPEN 2-RAGHAV) 0.3 MG/0.3ML SOAJ injection Inject one pen as directed STAT for allergic reaction, may disp [facility] [id number] 2/25/21  Yes MIRNA Nuñez CNP   tiotropium (SPIRIVA RESPIMAT) 1.25 MCG/ACT AERS inhaler Inhale 2 puffs into the lungs daily 11/18/20  Yes MIRNA Nuñez CNP   cetirizine (ZYRTEC) 10 MG tablet Take 1 tablet by mouth daily 8/31/20  Yes MIRNA Nuñez CNP   bumetanide (BUMEX) 1 MG tablet Take 0.5 tablets by mouth daily 5/28/20  Yes MIRNA Nuñez CNP   NARCAN 4 MG/0.1ML LIQD nasal spray  1/10/20  Yes Historical Provider, MD   Multiple Vitamins-Minerals (MULTIVITAMIN ADULTS) TABS Take 1 tablet by mouth daily 1/9/20  Yes MIRNA Albert - CNP   ergocalciferol (ERGOCALCIFEROL) 08333 units capsule Take 50,000 Units by mouth as needed    Yes Historical Provider, MD   pregabalin (LYRICA) 25 MG capsule Take 1 capsule by mouth 2 times daily for 30 days.  4/11/22 6/22/22  Avelino Henry MD       Future Appointments   Date Time Provider Jesus Manjarrezi   7/27/2022  3:30 PM Rubén Abad PA-C N [facility] Heart [facility]   8/3/2022  9:30 AM Cristina Montano MD AFL A[facility] AFL APEX END   8/17/2022  9:00 AM MIRNA Hahn CNP N [facility] Pain [facility]   8/17/2022 11:00 AM MIRNA Momin CNP [facility] Medicine [facility]   3/22/2023  1:45 PM Ruben Cheatham MD N [facility] Heart [facility]

## 2022-07-29 ENCOUNTER — TELEPHONE (OUTPATIENT)
Dept: FAMILY MEDICINE CLINIC | Age: 52
End: 2022-07-29

## 2022-07-29 ENCOUNTER — CARE COORDINATION (OUTPATIENT)
Dept: CARE COORDINATION | Age: 52
End: 2022-07-29

## 2022-07-29 DIAGNOSIS — M54.10 RADICULOPATHY AFFECTING UPPER EXTREMITY: ICD-10-CM

## 2022-07-29 DIAGNOSIS — G89.4 CHRONIC PAIN SYNDROME: ICD-10-CM

## 2022-07-29 DIAGNOSIS — K21.9 GASTROESOPHAGEAL REFLUX DISEASE WITHOUT ESOPHAGITIS: Primary | ICD-10-CM

## 2022-07-29 DIAGNOSIS — M62.838 SPASM OF MUSCLE: ICD-10-CM

## 2022-07-29 DIAGNOSIS — M47.816 LUMBAR SPONDYLOSIS: ICD-10-CM

## 2022-07-29 RX ORDER — OMEPRAZOLE 20 MG/1
20 CAPSULE, DELAYED RELEASE ORAL
Qty: 90 CAPSULE | Refills: 3 | Status: SHIPPED | OUTPATIENT
Start: 2022-07-29 | End: 2022-10-04

## 2022-07-29 RX ORDER — SIMETHICONE 80 MG
80 TABLET,CHEWABLE ORAL 4 TIMES DAILY PRN
Qty: 180 TABLET | Refills: 3 | Status: SHIPPED | OUTPATIENT
Start: 2022-07-29

## 2022-07-29 NOTE — TELEPHONE ENCOUNTER
OK for refil. -WS    Orders Placed This Encounter   Medications    simethicone (MYLICON) 80 MG chewable tablet     Sig: Take 1 tablet by mouth 4 times daily as needed for Flatulence     Dispense:  180 tablet     Refill:  3    omeprazole (PRILOSEC) 20 MG delayed release capsule     Sig: Take 1 capsule by mouth every morning (before breakfast)     Dispense:  90 capsule     Refill:  3

## 2022-07-29 NOTE — CARE COORDINATION
Giselle Coronado called and states he is needing some progress notes and diagnosis sheet printed off to send to child support court to show his disability. He is asking for enough information to be printed off to provide to the court as requested. He is asking if PCP office staff can print off this information and he can come into the office next week to pick it up when ready. Could office staff please assist with printing off this information for him? Thank you!

## 2022-07-29 NOTE — TELEPHONE ENCOUNTER
Patient previously on omeprazole 20 mg qd. Would like to see if he can go back on this and have rx sent in. Also requesting an order for Maalox to the pharmacy. Would like to see if his insurance will cover this.   DANNIELLEP

## 2022-07-29 NOTE — TELEPHONE ENCOUNTER
Parveen Michelle called requesting a refill on the following medications:  Requested Prescriptions     Pending Prescriptions Disp Refills    orphenadrine (NORFLEX) 100 MG extended release tablet 60 tablet 3     Sig: Take 1 tablet by mouth 2 times daily as needed for Muscle spasms     Pharmacy verified:  .pv  [id number] - [place], [address]    Date of last visit: 06/22/2022  Date of next visit (if applicable): 9/34/6962    *patient is concerned about running out of this med over the weekend. He said hes been having a lot of muscle spasms and doesn't want to end up going to the ED.  He wants to know if it would be possible to get this refilled today, 07/29/2022

## 2022-07-29 NOTE — TELEPHONE ENCOUNTER
[facility] reviewed. UDS: + for tramadol buralbital consistent . Last seen: 6/6/2022.  Follow-up:   Future Appointments   Date Time Provider Jesus Manjarrezi   8/11/2022  9:30 AM Jeanne Coreas APRN - [facility]   8/17/2022  9:00 AM Concha Colmenares APRN - CNP [facility] Pain [facility] [place]   8/17/2022 11:00 AM Amadou Lopes APRN - CNP Fam Medicine [facility] [place]   9/7/2022 10:15 AM Imer Rosario PA-C N [facility] Heart [facility] [place]   3/22/2023  1:45 PM Negra Mccallum MD [facility] Heart [facility] [place]

## 2022-07-31 ENCOUNTER — HOSPITAL ENCOUNTER (EMERGENCY)
Age: 52
Discharge: HOME | End: 2022-07-31
Payer: COMMERCIAL

## 2022-07-31 VITALS
OXYGEN SATURATION: 93 % | TEMPERATURE: 98 F | DIASTOLIC BLOOD PRESSURE: 92 MMHG | HEART RATE: 83 BPM | HEIGHT: 73 IN | BODY MASS INDEX: 41.75 KG/M2 | RESPIRATION RATE: 20 BRPM | SYSTOLIC BLOOD PRESSURE: 174 MMHG | WEIGHT: 315 LBS

## 2022-07-31 DIAGNOSIS — M62.838 SPASM OF MUSCLE: Primary | ICD-10-CM

## 2022-07-31 DIAGNOSIS — G89.4 CHRONIC PAIN SYNDROME: ICD-10-CM

## 2022-07-31 PROCEDURE — 99284 EMERGENCY DEPT VISIT MOD MDM: CPT

## 2022-07-31 PROCEDURE — 96372 THER/PROPH/DIAG INJ SC/IM: CPT

## 2022-07-31 PROCEDURE — 6360000002 HC RX W HCPCS: Performed by: PHYSICIAN ASSISTANT

## 2022-07-31 RX ORDER — ORPHENADRINE CITRATE 30 MG/ML
60 INJECTION INTRAMUSCULAR; INTRAVENOUS ONCE
Status: COMPLETED | OUTPATIENT
Start: 2022-07-31 | End: 2022-07-31

## 2022-07-31 RX ORDER — KETOROLAC TROMETHAMINE 30 MG/ML
30 INJECTION, SOLUTION INTRAMUSCULAR; INTRAVENOUS ONCE
Status: COMPLETED | OUTPATIENT
Start: 2022-07-31 | End: 2022-07-31

## 2022-07-31 RX ADMIN — KETOROLAC TROMETHAMINE 30 MG: 30 INJECTION, SOLUTION INTRAMUSCULAR; INTRAVENOUS at 14:44

## 2022-07-31 RX ADMIN — ORPHENADRINE CITRATE 60 MG: 30 INJECTION INTRAMUSCULAR; INTRAVENOUS at 14:44

## 2022-07-31 ASSESSMENT — PAIN DESCRIPTION - DESCRIPTORS: DESCRIPTORS: SPASM

## 2022-07-31 ASSESSMENT — PAIN DESCRIPTION - LOCATION: LOCATION: NECK

## 2022-07-31 ASSESSMENT — PAIN SCALES - GENERAL: PAINLEVEL_OUTOF10: 10

## 2022-07-31 ASSESSMENT — PAIN - FUNCTIONAL ASSESSMENT: PAIN_FUNCTIONAL_ASSESSMENT: 0-10

## 2022-07-31 NOTE — ED TRIAGE NOTES
Patient to ED for complaint of back and neck muscle spasms. Patient reports that he has had these ongoing since he fell on June 12th. Patient reports that he had slipped and fell at that time. He did hit his head on the concrete floor. He was evaluated here after that fall and discharged. 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

## 2022-08-01 RX ORDER — ORPHENADRINE CITRATE 100 MG/1
100 TABLET, EXTENDED RELEASE ORAL 2 TIMES DAILY PRN
Qty: 60 TABLET | Refills: 3 | Status: SHIPPED | OUTPATIENT
Start: 2022-08-01 | End: 2022-08-31

## 2022-08-01 ASSESSMENT — ENCOUNTER SYMPTOMS
COUGH: 0
BACK PAIN: 1
PHOTOPHOBIA: 0
ABDOMINAL PAIN: 0
DIARRHEA: 0
SHORTNESS OF BREATH: 0
VOMITING: 0
TROUBLE SWALLOWING: 0
NAUSEA: 1
RHINORRHEA: 0

## 2022-08-01 NOTE — ED PROVIDER NOTES
[facility] EMERGENCY DEPT      CHIEF COMPLAINT       Chief Complaint   Patient presents with    Back Pain    Neck Pain       Nurses Notes reviewed and I agree except as noted in the HPI. HISTORY OF PRESENT ILLNESS    Abdelrahman Mackey is a 50 y.o. male with history of a pituitary growth, hypertension, fibromyalgia, and chronic pain who presents for muscle spasms. Patient reports having a work injury on 6-12 where he slipped on oil. He fell hyperextending his right knee, falling to the ground, and hitting his head. There was no loss of consciousness. He was wearing a helmet. He presented to the ER, had a work-up, was discharged, and followed up at [address] [place] [address]. It was ultimately found that patient ruptured his quadriceps and he underwent surgery by Dr. Mukund Abarca. Patient further explains that since his fall he has experienced \"bad muscle spasms,\" nausea, subjective fever and chills, sweats, intermittent blurred vision, intermittent headaches, and occasionally \"thinking something but saying something different. \"  Patient's normal Fioricet that he takes for chronic headaches is not helping. Patient has reached out to his neurosurgeon Dr. Mike Michel (who he sees for pituitary growth and chronic headaches) and has appointment this upcoming week. Patient also sees Dr. Brennon Baker through pain management. Patient only has one of his muscle relaxers left. His refill is not due until 8-10. Patient is also taking Norco.  Patient reports his muscle spasms can be anywhere in his body including left chest more than right, left side, neck, back, arms and legs. Patient denies vomiting, change in appetite, incontinence of bowel or bladder, dizziness, inability to ambulate, URI symptoms, diarrhea, dyspnea, weakness, urinary changes, or other complaints. REVIEW OF SYSTEMS     Review of Systems   Constitutional:  Positive for diaphoresis. Negative for activity change, appetite change, chills, fatigue and fever.    HENT:  Negative for congestion, ear pain, rhinorrhea and trouble swallowing. Eyes:  Positive for visual disturbance (Intermittent blurred vision). Negative for photophobia. Respiratory:  Negative for cough and shortness of breath. Cardiovascular:  Negative for chest pain and palpitations. Gastrointestinal:  Positive for nausea. Negative for abdominal pain, diarrhea and vomiting. No incontinence of bowel    Endocrine: Negative for polyuria. Genitourinary:  Negative for decreased urine volume, difficulty urinating, dysuria and frequency. No incontinence of bladder   Musculoskeletal:  Positive for arthralgias, back pain, myalgias and neck pain. Negative for gait problem. Skin:  Positive for wound (Surgical). Negative for rash. Neurological:  Positive for headaches. Negative for dizziness, facial asymmetry, speech difficulty, weakness, light-headedness and numbness. Psychiatric/Behavioral:  Negative for confusion and sleep disturbance. PAST MEDICAL HISTORY    has a past medical history of Aneurysm ([facility] [address] Asthma, Cardiomegaly, COVID-19, Fibromyalgia, and CLARIBEL on CPAP. SURGICAL HISTORY      has a past surgical history that includes back surgery; knee surgery; Pain management procedure (Bilateral, 2/11/2021); and Pain management procedure (Bilateral, 4/8/2021).     CURRENT MEDICATIONS       Discharge Medication List as of 7/31/2022  3:10 PM        CONTINUE these medications which have NOT CHANGED    Details   simethicone (MYLICON) 80 MG chewable tablet Take 1 tablet by mouth 4 times daily as needed for Flatulence, Disp-180 tablet, R-3Normal      omeprazole (PRILOSEC) 20 MG delayed release capsule Take 1 capsule by mouth every morning (before breakfast), Disp-90 capsule, R-3Normal      potassium chloride (KLOR-CON M) 10 MEQ extended release tablet take 1 tablet by mouth daily, Disp-90 tablet, R-3Normal      butalbital-acetaminophen-caffeine (FIORICET, ESGIC) -40 MG per tablet take 1 tablet by mouth every 4 hours if needed for headache, Disp-90 tablet, R-0Normal      aspirin EC 81 MG EC tablet Take 1 tablet by mouth daily, Disp-90 tablet, R-1Normal      amitriptyline (ELAVIL) 50 MG tablet take 1 tablet by mouth at bedtime, Disp-90 tablet, R-1Normal      rosuvastatin (CRESTOR) 20 MG tablet take 1 tablet by mouth once daily, Disp-90 tablet, R-3Normal      BANOPHEN 25 MG capsule take 1 capsule by mouth every 6 hours if needed for itching, Disp-60 capsule, R-5Normal      !! ergocalciferol (DRISDOL) 1.25 MG (17813 UT) capsule Take 1 capsule by mouth once a week, Disp-4 capsule, R-3Normal      albuterol sulfate  (90 Base) MCG/ACT inhaler Inhale 2 puffs into the lungs 4 times daily as needed for Wheezing, Disp-3 each, R-3Normal      hydroCHLOROthiazide (HYDRODIURIL) 25 MG tablet Take 1 tablet by mouth every morning, Disp-90 tablet, R-1Normal      triamcinolone (KENALOG) 0.1 % cream Apply topically 2 times daily. , Disp-1 each, R-2, Normal      pregabalin (LYRICA) 25 MG capsule Take 1 capsule by mouth 2 times daily for 30 days. , Disp-60 capsule, R-0Normal      pantoprazole (PROTONIX) 40 MG tablet Take 1 tablet by mouth every morning (before breakfast), Disp-30 tablet, R-0Normal      betamethasone valerate (VALISONE) 0.1 % cream apply to affected area twice a day, Disp-45 g, R-3, Normal      Blood Pressure Monitoring (B-D ASSURE BPM/AUTO ARM CUFF) MISC DAILY Starting Tue 3/22/2022, Disp-1 each, R-0, Print      Cholecalciferol (VITAMIN D3) 1.25 MG (04562 UT) CAPS 1 capsule 2 times a week , Monday and Friday, Disp-24 capsule, R-0Normal      budesonide-formoterol (SYMBICORT) 160-4.5 MCG/ACT AERO 2 puffs inhaled twice daily.   Rinse mouth well after use., Disp-3 each, R-3Normal      ondansetron (ZOFRAN-ODT) 4 MG disintegrating tablet Take 1 tablet by mouth 3 times daily as needed for Nausea or Vomiting, Disp-21 tablet, R-0Normal      albuterol (PROVENTIL) (2.5 MG/3ML) 0.083% nebulizer solution inhale contents of 1 vial ( 3 milliliters ) in nebulizer by mouth and INTO THE LUNGS every 4 hours if needed for wheezing, Disp-375 mL, R-3Normal      Respiratory Therapy Supplies (NEBULIZER/TUBING/MOUTHPIECE) KIT Disp-1 kit, R-0, PrintDispense tubing, mask, and mouthpiece for nebulizer machine. Dx: Asthma      dicyclomine (BENTYL) 10 MG capsule Take 1 capsule by mouth 3 times daily as needed (abd cramping), Disp-30 capsule, R-0Normal      loratadine (CLARITIN) 10 MG tablet take 1 tablet by mouth once daily, Disp-90 tablet, R-3Normal      montelukast (SINGULAIR) 10 MG tablet Take 1 tablet by mouth nightly, Disp-90 tablet, R-3Normal      fluticasone (FLONASE) 50 MCG/ACT nasal spray 2 sprays by Each Nostril route daily, Disp-3 each, R-3Normal      azelastine (OPTIVAR) 0.05 % ophthalmic solution Place 1 drop into both eyes 2 times daily, Disp-1 Bottle, R-11Normal      FEROSUL 325 (65 Fe) MG tablet take 1 tablet by mouth once daily, Disp-90 tablet, R-3Normal      EPINEPHrine (EPIPEN 2-RAGHAV) 0.3 MG/0.3ML SOAJ injection Inject one pen as directed STAT for allergic reaction, may disp generic NDC [id number], Disp-1 each, R-0Normal      tiotropium (SPIRIVA RESPIMAT) 1.25 MCG/ACT AERS inhaler Inhale 2 puffs into the lungs daily, Disp-1 Inhaler,R-1Normal      cetirizine (ZYRTEC) 10 MG tablet Take 1 tablet by mouth daily, Disp-30 tablet,R-11Normal      bumetanide (BUMEX) 1 MG tablet Take 0.5 tablets by mouth daily, Disp-30 tablet, R-0Print      NARCAN 4 MG/0.1ML LIQD nasal spray DAWHistorical Med      Multiple Vitamins-Minerals (MULTIVITAMIN ADULTS) TABS Take 1 tablet by mouth daily, Disp-90 tablet, R-3Normal      !! ergocalciferol (ERGOCALCIFEROL) 57265 units capsule Take 50,000 Units by mouth as needed Historical Med       !! - Potential duplicate medications found. Please discuss with provider. ALLERGIES     is allergic to dilantin [phenytoin], lyrica [pregabalin], dupixent [dupilumab], oxycodone, and valium [diazepam].     FAMILY HISTORY     He indicated that his mother is . He indicated that his father is . family history includes Asthma in his father; Emphysema in his father and mother; High Blood Pressure in his father and mother; Other in his mother. SOCIAL HISTORY    reports that he has never smoked. He has never used smokeless tobacco. He reports current alcohol use. He reports that he does not use drugs. PHYSICAL EXAM     INITIAL VITALS:  height is 6' 1\" (1.854 m) and weight is 430 lb (195 kg) (abnormal). His temperature is 98 °F (36.7 °C). His blood pressure is 174/92 (abnormal) and his pulse is 83. His respiration is 20 and oxygen saturation is 93%. Physical Exam  Vitals and nursing note reviewed. Constitutional:       General: He is not in acute distress. Appearance: Normal appearance. He is well-developed. He is morbidly obese. He is not toxic-appearing. HENT:      Head: Normocephalic and atraumatic. Right Ear: Hearing, tympanic membrane and ear canal normal. No hemotympanum. Left Ear: Hearing, tympanic membrane and ear canal normal. No hemotympanum. Nose: Nose normal.      Mouth/Throat:      Pharynx: Uvula midline. Eyes:      General: Lids are normal.      Conjunctiva/sclera: Conjunctivae normal.      Pupils: Pupils are equal, round, and reactive to light. Neck:      Vascular: No JVD. Trachea: Trachea normal. No tracheal deviation. Cardiovascular:      Rate and Rhythm: Normal rate and regular rhythm. Heart sounds: Normal heart sounds. No murmur heard. Pulmonary:      Effort: Pulmonary effort is normal.      Breath sounds: Normal breath sounds. Chest:      Chest wall: Tenderness present. Abdominal:      General: There is no distension. Palpations: Abdomen is soft. Abdomen is not rigid. Tenderness: There is no abdominal tenderness. There is no guarding. Musculoskeletal:         General: Normal range of motion.       Cervical back: Normal range of motion and neck supple. No rigidity or bony tenderness. Normal range of motion. Thoracic back: Normal range of motion. Lumbar back: Normal range of motion. Legs:       Comments: Extremities well perfused; good strength appreciated in all muscle groups. No signs of DVT. Well-healing midline anterior surgical wound noted over right knee extending proximally and distally. Knee brace intact. Nonspecific muscular tenderness over paraspinal back   Lymphadenopathy:      Cervical: No cervical adenopathy. Skin:     General: Skin is warm and dry. Findings: No rash. Neurological:      General: No focal deficit present. Mental Status: He is alert and oriented to person, place, and time. GCS: GCS eye subscore is 4. GCS verbal subscore is 5. GCS motor subscore is 6. Cranial Nerves: Cranial nerves are intact. No cranial nerve deficit. Sensory: Sensation is intact. No sensory deficit. Motor: Motor function is intact. Coordination: Coordination is intact. Gait: Gait is intact. Comments: Cranial nerves II through XII are normal as tested. Cerebellar tests are normal as tested. Psychiatric:         Mood and Affect: Mood normal.         Speech: Speech normal.         Behavior: Behavior normal. Behavior is cooperative. Thought Content:  Thought content normal.         Judgment: Judgment normal.       DIFFERENTIAL DIAGNOSIS:   Including but not limited to: Acute on chronic pain, muscle spasms, hyperkalemia, hypokalemia, metabolic derangement, dehydration, postconcussive syndrome, considered but no signs for ICH, CVA    DIAGNOSTIC RESULTS     EKG: All EKG's are interpreted by [facility] Phys[facility] who either signs or Co-signs this chart in the absence of a cardiologist.  None    RADIOLOGY: non-plain film images(s) such as CT,Ultrasound and MRI are read by the radiologist.  Plain radiographic images are visualized and preliminarily interpreted by the emergency physician unless otherwise stated below. No orders to display       LABS:   Labs Reviewed - No data to display    EMERGENCY DEPARTMENT COURSE:   Vitals:    Vitals:    07/31/22 1327   BP: (!) 174/92   Pulse: 83   Resp: 20   Temp: 98 °F (36.7 °C)   SpO2: 93%   Weight: (!) 430 lb (195 kg)   Height: 6' 1\" (1.854 m)           MDM:  The patient was seen by me in the intake area predominantly for muscle spasms. Patient has chronic pain with chronic muscle spasms and headache. Patient only has one of his muscle relaxers left. Physical exam revealed a pleasant and neurologically intact 27-year-old gentleman with no focal deficits. No signs of DVT noted. Peripheral pulses all intact. Well-healing surgical incision noted over his right anterior leg over the knee. Vital signs reviewed and noted to be hypertensive but stable. Potential work-up discussed with the patient including laboratory work and imaging. Patient had no neurological deficits or clinical signs of ICH or stroke and MRI was not deemed warranted. Patient already had follow-up with his neurosurgeon this week and patient deferred imaging at this time. Lab work further discussed and again patient deferred and was comfortable with pain treatment only. Patient was given injections of Toradol and Norflex. Patient was comfortable with plan of care and discharge home. Anticipatory guidance given. I have given the patient strict written and verbal instructions about care at home, follow-up, and signs and symptoms of worsening of condition and they did verbalize understanding. CRITICAL CARE:   None    CONSULTS:  None    PROCEDURES:  None    FINAL IMPRESSION      1. Spasm of muscle    2. Chronic pain syndrome          DISPOSITION/PLAN     1. Spasm of muscle    2. Chronic pain syndrome        PATIENT REFERRED TO:  Leopold Baas, MD  [address] [facility]  [phone]      as scheduled this week      DISCHARGE MEDICATIONS:  Discharge Medication List as of 7/31/2022  3:10 PM          (Please note that portions of this note were completed with a voice recognition program.  Efforts were made to edit the dictations but occasionally words are mis-transcribed.)    Hector Nixon PA-C 08/01/22 10:22 AM    IVAN Stuart PA-C  08/01/22 1038

## 2022-08-01 NOTE — TELEPHONE ENCOUNTER
He can sign a release of records to get the info that he needs. Please have him request the specific information that he needs on the request form.  ANIBAL

## 2022-08-02 ENCOUNTER — CARE COORDINATION (OUTPATIENT)
Dept: CARE COORDINATION | Age: 52
End: 2022-08-02

## 2022-08-02 DIAGNOSIS — G89.4 CHRONIC PAIN SYNDROME: ICD-10-CM

## 2022-08-02 RX ORDER — HYDROCODONE BITARTRATE AND ACETAMINOPHEN 5; 325 MG/1; MG/1
1 TABLET ORAL EVERY 8 HOURS PRN
Qty: 90 TABLET | Refills: 0 | Status: SHIPPED | OUTPATIENT
Start: 2022-08-02 | End: 2022-08-29 | Stop reason: SDUPTHER

## 2022-08-02 NOTE — TELEPHONE ENCOUNTER
O[facility] reviewed. UDS: + for  Butalbital -consistent. + for  Tramadol, Amitriptyline -inconsistent   UDS shows non-compliant for Hydrocodone  Last seen: 6/6/2022.  Follow-up: 8/17/2022    Per OARRS report- On 7/7/2022, this patient filled a 14-day script for gabapentin 300 mg caps and on 7/11/2022, this patient filled a 7-day script for Norco  mg    Please advise

## 2022-08-02 NOTE — TELEPHONE ENCOUNTER
UDS was addressed on previous telephone encounter and was instructed to take medications as prescribed and nothing else. He saw Grecia Matthews last and not me who increased frequency of Norco short term. With this prescription if the surgeon is finished with post op regimen then I decreased frequency of Norco 5/325 back to previous regimen for chronic pain of TID prn - please let him know this. I see that his FU is scheduled 8/17/2022 with me but this appointment will need to be rescheduled as I am off this week at a CME class.

## 2022-08-02 NOTE — CARE COORDINATION
Left message to return phone call to complete ED follow up    Also informed him via voicemail of need to come to PCP office to fill out Release of Records form in order to get the needed paperwork requested for the court. Encouraged him to call me with any questions.

## 2022-08-02 NOTE — TELEPHONE ENCOUNTER
Trevin Graves called requesting a refill on the following medications:  Requested Prescriptions     Pending Prescriptions Disp Refills    HYDROcodone-acetaminophen (NORCO) 5-325 MG per tablet 120 tablet 0     Sig: Take 1 tablet by mouth every 6 hours as needed for Pain for up to 30 days. Pharmacy verified: [facility] [address]  . pv      Date of last visit: 6/22/2020  Date of next visit (if applicable): 7/28/2069

## 2022-08-03 ENCOUNTER — CARE COORDINATION (OUTPATIENT)
Dept: CARE COORDINATION | Age: 52
End: 2022-08-03

## 2022-08-03 NOTE — CARE COORDINATION
Discussed coming into PCP office to sign Release of information form to get paperwork needed for [facility]    Ambulatory Care Coordination  ED Follow up Call    Reason for ED visit:  muscle spasms   Status:     improved    Did you call your PCP prior to going to the ED? No      Did you receive a discharge instructions from the Emergency Room? Yes  Review of Instructions:     Understands what to report/when to return?:  Yes   Understands discharge instructions?:  Yes   Following discharge instructions?:  Yes   If not why? Are there any new complaints of pain? No  New Pain Meds? No    Constipation prophylaxis needed? No    If you have a wound is the dressing clean, dry, and intact? No  Understands wound care regimen? N/A    Are there any other complaints/concerns that you wish to tell your provider? FU appts/Provider:    Future Appointments   Date Time Provider Jesus Simmons   8/11/2022  9:30 AM Dimas Hampton APRN - [address] [facility]   8/17/2022  9:00 AM Anita Olivares APRN - CNP [facility] [place]   8/17/2022 11:00 AM Norah Willis APRN - CNP [facility] [place]   9/7/2022 10:15 AM Geoffrey Clemens PA-C [facility] [place]   3/22/2023  1:45 PM MD MATTHEW Deluca [facility] [place]           New Medications?:   Yes      Medication Reconciliation by phone - Yes  Understands Medications? Yes  Taking Medications? Yes  Can you swallow your pills? Yes    Any further needs in the home i.e. Equipment?   No    Link to services in community?:  No   Which services:

## 2022-08-03 NOTE — TELEPHONE ENCOUNTER
Notif. Pt. Of script written for narcotic but back to tid for chronic pain. Also rescheduled f /u as aptm. In [place] Kand will be out of office.  Scheduled for in Sept.

## 2022-08-04 ENCOUNTER — TELEPHONE (OUTPATIENT)
Dept: PHYSICAL MEDICINE AND REHAB | Age: 52
End: 2022-08-04

## 2022-08-04 NOTE — TELEPHONE ENCOUNTER
PA sent to plan    (Key: [id number])    orphenadrine (NORFLEX) 100 MG extended release tablet     (Key: [id number])    This request has received an approval    Spoke with carlene at [facility] pharmacy who states pt picked up script for norflex yesterday

## 2022-08-11 ENCOUNTER — OFFICE VISIT (OUTPATIENT)
Dept: NEUROSURGERY | Age: 52
End: 2022-08-11
Payer: COMMERCIAL

## 2022-08-11 VITALS
SYSTOLIC BLOOD PRESSURE: 150 MMHG | WEIGHT: 315 LBS | HEIGHT: 73 IN | DIASTOLIC BLOOD PRESSURE: 90 MMHG | BODY MASS INDEX: 41.75 KG/M2

## 2022-08-11 DIAGNOSIS — G44.309 POST-TRAUMATIC HEADACHE, NOT INTRACTABLE, UNSPECIFIED CHRONICITY PATTERN: ICD-10-CM

## 2022-08-11 DIAGNOSIS — R47.89 WORD FINDING DIFFICULTY: ICD-10-CM

## 2022-08-11 DIAGNOSIS — M54.2 CERVICAL PAIN (NECK): Primary | ICD-10-CM

## 2022-08-11 PROCEDURE — 1036F TOBACCO NON-USER: CPT

## 2022-08-11 PROCEDURE — G8427 DOCREV CUR MEDS BY ELIG CLIN: HCPCS

## 2022-08-11 PROCEDURE — G8417 CALC BMI ABV UP PARAM F/U: HCPCS

## 2022-08-11 PROCEDURE — 99213 OFFICE O/P EST LOW 20 MIN: CPT

## 2022-08-11 PROCEDURE — 3017F COLORECTAL CA SCREEN DOC REV: CPT

## 2022-08-11 ASSESSMENT — ENCOUNTER SYMPTOMS
TROUBLE SWALLOWING: 0
CONSTIPATION: 0
BACK PAIN: 0
SHORTNESS OF BREATH: 0
COLOR CHANGE: 0
COUGH: 0
NAUSEA: 0

## 2022-08-11 NOTE — PROGRESS NOTES
Neurosurgery Follow up Note    Date:8/11/2022         Patient Name:Osmin Patel     YOB: 1970     Age:52 y.o. Reason for Follow up: Follow up after fall at work      Chief Complaint:   Chief Complaint   Patient presents with    Follow-up        Subjective   Veena Negro is a 50year old male who presents to the office ambulating with a cane to discuss concerns after a fall at work June 12. He stated that he slipped on oil and fell at work June 12th hitting his head on concrete. He stated that he had a hard hat on when he fell. He stated that he went to the emergency room when he fell but did not have images. He stated that he had quadricept tendon repair in his right knee on 7-7-22. He stated that he has been experiencing headaches. He stated that his family has noticed that during conversation he says the wrong words that don't go with what the conversation that he is  having. He stated that about a month ago he was having dizziness when he was getting up from a seated position. He stated that he noticed when people pointed out he was saying the wrong words that he was argumentative when people were telling him he was saying the wrong words. He stated this has improved. He stated that he is having issues finding the right words to say. He stated he is not sleeping well. He stated that he has cut back on drinking monster and soda. He stated that he feels he has gained some weight as he is not as active due to his surgery. He stated that he has eight shoulder and neck stiffness on the right since the fall. He stated that he has been using a lidoderm patch at night on his right shoulder. He stated that he has been dropping objects with his right arm since the fall. He denies right arm weakness. Hedenies pain with movement of his neck or shoulder. Review of Systems   Review of Systems   Constitutional:  Negative for activity change, appetite change and fatigue.    HENT:  Negative for trouble swallowing. Word finding   Eyes:  Negative for visual disturbance. Respiratory:  Negative for cough and shortness of breath. Cardiovascular:  Negative for chest pain. Gastrointestinal:  Negative for constipation and nausea. Musculoskeletal:  Positive for neck pain and neck stiffness. Negative for back pain and gait problem. Skin:  Negative for color change, rash and wound. Neurological:  Positive for dizziness and headaches. Negative for weakness. Psychiatric/Behavioral:  Positive for sleep disturbance. Negative for confusion. The patient is not nervous/anxious. Medications     Current Outpatient Medications on File Prior to Visit   Medication Sig Dispense Refill    HYDROcodone-acetaminophen (NORCO) 5-325 MG per tablet Take 1 tablet by mouth every 8 hours as needed for Pain for up to 30 days.  90 tablet 0    orphenadrine (NORFLEX) 100 MG extended release tablet Take 1 tablet by mouth 2 times daily as needed for Muscle spasms 60 tablet 3    simethicone (MYLICON) 80 MG chewable tablet Take 1 tablet by mouth 4 times daily as needed for Flatulence 180 tablet 3    omeprazole (PRILOSEC) 20 MG delayed release capsule Take 1 capsule by mouth every morning (before breakfast) 90 capsule 3    potassium chloride (KLOR-CON M) 10 MEQ extended release tablet take 1 tablet by mouth daily 90 tablet 3    butalbital-acetaminophen-caffeine (FIORICET, ESGIC) -40 MG per tablet take 1 tablet by mouth every 4 hours if needed for headache 90 tablet 0    aspirin EC 81 MG EC tablet Take 1 tablet by mouth daily 90 tablet 1    amitriptyline (ELAVIL) 50 MG tablet take 1 tablet by mouth at bedtime 90 tablet 1    rosuvastatin (CRESTOR) 20 MG tablet take 1 tablet by mouth once daily 90 tablet 3    BANOPHEN 25 MG capsule take 1 capsule by mouth every 6 hours if needed for itching 60 capsule 5    ergocalciferol (DRISDOL) 1.25 MG (58066 UT) capsule Take 1 capsule by mouth once a week 4 capsule 3    albuterol sulfate  (90 Base) MCG/ACT inhaler Inhale 2 puffs into the lungs 4 times daily as needed for Wheezing 3 each 3    hydroCHLOROthiazide (HYDRODIURIL) 25 MG tablet Take 1 tablet by mouth every morning 90 tablet 1    triamcinolone (KENALOG) 0.1 % cream Apply topically 2 times daily. 1 each 2    pantoprazole (PROTONIX) 40 MG tablet Take 1 tablet by mouth every morning (before breakfast) 30 tablet 0    betamethasone valerate (VALISONE) 0.1 % cream apply to affected area twice a day 45 g 3    Blood Pressure Monitoring (B-D ASSURE BPM/AUTO ARM CUFF) MISC 1 Units by Does not apply route daily 1 each 0    Cholecalciferol (VITAMIN D3) 1.25 MG (14044 UT) CAPS 1 capsule 2 times a week , Monday and Friday 24 capsule 0    budesonide-formoterol (SYMBICORT) 160-4.5 MCG/ACT AERO 2 puffs inhaled twice daily. Rinse mouth well after use. 3 each 3    ondansetron (ZOFRAN-ODT) 4 MG disintegrating tablet Take 1 tablet by mouth 3 times daily as needed for Nausea or Vomiting 21 tablet 0    albuterol (PROVENTIL) (2.5 MG/3ML) 0.083% nebulizer solution inhale contents of 1 vial ( 3 milliliters ) in nebulizer by mouth and INTO THE LUNGS every 4 hours if needed for wheezing 375 mL 3    Respiratory Therapy Supplies (NEBULIZER/TUBING/MOUTHPIECE) KIT Dispense tubing, mask, and mouthpiece for nebulizer machine.  Dx: Asthma 1 kit 0    dicyclomine (BENTYL) 10 MG capsule Take 1 capsule by mouth 3 times daily as needed (abd cramping) 30 capsule 0    loratadine (CLARITIN) 10 MG tablet take 1 tablet by mouth once daily 90 tablet 3    montelukast (SINGULAIR) 10 MG tablet Take 1 tablet by mouth nightly 90 tablet 3    fluticasone (FLONASE) 50 MCG/ACT nasal spray 2 sprays by Each Nostril route daily 3 each 3    azelastine (OPTIVAR) 0.05 % ophthalmic solution Place 1 drop into both eyes 2 times daily 1 Bottle 11    FEROSUL 325 (65 Fe) MG tablet take 1 tablet by mouth once daily 90 tablet 3    EPINEPHrine (EPIPEN 2-RAGHAV) 0.3 MG/0.3ML SOAJ injection Inject one pen as directed STAT for allergic reaction, may disp generic NDC 31558-527-57 1 each 0    tiotropium (SPIRIVA RESPIMAT) 1.25 MCG/ACT AERS inhaler Inhale 2 puffs into the lungs daily 1 Inhaler 1    cetirizine (ZYRTEC) 10 MG tablet Take 1 tablet by mouth daily 30 tablet 11    bumetanide (BUMEX) 1 MG tablet Take 0.5 tablets by mouth daily 30 tablet 0    NARCAN 4 MG/0.1ML LIQD nasal spray       Multiple Vitamins-Minerals (MULTIVITAMIN ADULTS) TABS Take 1 tablet by mouth daily 90 tablet 3    ergocalciferol (ERGOCALCIFEROL) 45820 units capsule Take 50,000 Units by mouth as needed       pregabalin (LYRICA) 25 MG capsule Take 1 capsule by mouth 2 times daily for 30 days. 60 capsule 0     No current facility-administered medications on file prior to visit. Past History    Past Medical History:   has a past medical history of Aneurysm ([address] Asthma, Cardiomegaly, COVID-19, Fibromyalgia, and CLARIBEL on CPAP. Social History:   reports that he has never smoked. He has never used smokeless tobacco. He reports current alcohol use. He reports that he does not use drugs. Family History:   Family History   Problem Relation Age of Onset    High Blood Pressure Mother     Emphysema Mother     Other Mother         she was hit and killed by car    High Blood Pressure Father     Emphysema Father     Asthma Father        Physical Examination      Vitals:  BP (!) 150/90 (Site: Right Upper Arm, Position: Sitting, Cuff Size: Large Adult)   Ht 6' 1\" (1.854 m)   Wt (!) 430 lb (195 kg)   BMI 56.73 kg/m²       Physical Exam  Constitutional:       Appearance: Normal appearance. He is not ill-appearing. HENT:      Nose: Nose normal.      Mouth/Throat:      Mouth: Mucous membranes are moist.   Eyes:      Pupils: Pupils are equal, round, and reactive to light. Cardiovascular:      Rate and Rhythm: Normal rate. Pulses: Normal pulses.    Pulmonary:      Effort: Pulmonary effort is normal.   Abdominal:      General: Bowel sounds are normal.   Musculoskeletal:         General: Tenderness present. Cervical back: Normal range of motion. Spasms and tenderness present. No pain with movement. Back:    Skin:     General: Skin is warm and dry. Findings: No erythema. Neurological:      Mental Status: He is alert and oriented to person, place, and time. Sensory: No sensory deficit. Motor: No weakness. Comments: RUE 5/5  LUE 5/5   Psychiatric:         Mood and Affect: Mood normal.         Behavior: Behavior normal.         Thought Content: Thought content normal.         Judgment: Judgment normal.     Neurologic Exam     Mental Status   Oriented to person, place, and time. Cranial Nerves     CN III, IV, VI   Pupils are equal, round, and reactive to light. Imaging   Imaging last 30 days:  No results found. Assessment and Plan:          Follow up after a fall at work   CT head without contrast without contrast  CT of the cervical spine without contrast  Fall precautions  Ambulate with your cane   Speech therapy eval for word finding   Follow up in 2 weeks. Call office is symptoms worsen or fail to improve  All patient questions answered. Pt voiced understanding.  Patient instructed to call the office with any questions or concerns      Electronically signed by MIRNA Dao CNP on 8/11/22 at 9:18 AM EDT

## 2022-08-17 RX ORDER — TRIAMCINOLONE ACETONIDE 1 MG/G
CREAM TOPICAL
Qty: 1 EACH | Refills: 2 | Status: SHIPPED | OUTPATIENT
Start: 2022-08-17

## 2022-08-17 NOTE — TELEPHONE ENCOUNTER
This medication refill is regarding a electronic request.  Refill requested by  [employer] [facility] . Requested Prescriptions     Pending Prescriptions Disp Refills    triamcinolone (KENALOG) 0.1 % cream [Pharmacy Med Name: TRIAMCINOLONE 0.1% CREAM] 1 each 2     Sig: APPLY TO AFFECTED AREA TWICE A DAY     Date of last visit: 5/10/2022   Date of next visit: 8/17/2022  Date of last refill: 4/11/22 #1/2    Rx verified, ordered and set to EP.

## 2022-08-18 ENCOUNTER — CARE COORDINATION (OUTPATIENT)
Dept: CARE COORDINATION | Age: 52
End: 2022-08-18

## 2022-08-18 NOTE — CARE COORDINATION
Left message to return phone call to complete Care Coordination follow up call. Speech therapy eval is tomorrow. CT scan has been ordered and scheduled.

## 2022-08-19 ENCOUNTER — TELEPHONE (OUTPATIENT)
Dept: NEUROSURGERY | Age: 52
End: 2022-08-19

## 2022-08-19 ENCOUNTER — HOSPITAL ENCOUNTER (OUTPATIENT)
Dept: SPEECH THERAPY | Age: 52
Setting detail: THERAPIES SERIES
Discharge: HOME | End: 2022-08-19
Payer: COMMERCIAL

## 2022-08-19 PROCEDURE — 92523 SPEECH SOUND LANG COMPREHEN: CPT | Performed by: SPEECH-LANGUAGE PATHOLOGIST

## 2022-08-19 NOTE — PROGRESS NOTES
** PLEASE SIGN, DATE AND TIME CERTIFICATION BELOW AND RETURN TO [facility] OUTPATIENT REHABILITATION (FAX #: [phone]). ATTEST/CO-SIGN IF ACCESSING VIA INVhoto. THANK YOU.**    I certify that I have examined the patient below and determined that Physical Medicine and Rehabilitation service is necessary and that I approve the established plan of care for up to 90 days or as specifically noted. Attestation, signature or co-signature of physician indicates approval of certification requirements.    ________________________ ____________ __________  Physician Signature   Date   Time     [facility]  [x] SPEECH LANGUAGE COGNITIVE EVALUATION  [] DAILY NOTE   [] PROGRESS NOTE [] DISCHARGE NOTE    [x] OUTPATIENT REHABILITATION CENTER [facility]   [] Nick [address]    [] [address] [facility]   [] Kayleigh Nance    Date: 2022  Patient Name:  Pipo Winston  : 1970  MRN: [id number]  CSN: [id number]    Referring Practitioner MIRNA Mccann CNP   Diagnosis Other speech disturbances [R47.89]    Treatment Diagnosis Cognitive deficits, expressive language deficits   Date of Evaluation 22      Functional Outcome Measure Used [facility] NOMS: memory   Functional Outcome Score Level 4 (22)       Insurance: Primary: Payor: Jian Davies /  /  / ,   Secondary: [place] Lose: No precert required   Visit # 1, 1/10 for progress note   Visits Allowed: No visit limit   Recertification Date: 86   Physician Follow-Up: Hugo Lozada -  22   Physician Orders: Cognitive and higher level testing as patient's family is noticing patient having word finding difficulties after a fall at work . Pertinent History: Patient presents today ambulating with a cane after a fall at work . States that he slipped on oil and fell at work hitting his head on concrete. Reports he had a hard hat on when he fell.  Patient went to the emergency room when he fell, but did not have images. Patient has been experiencing more headaches since the fall. Reports his family has noticed that during conversation he says the wrong words that don't go with the conversation that he is having. Patient reports he has dizziness that comes and goes and reports just recently he has had some blurred vision that comes and goes. Patient reports he can't come up with the words he wants to say and he doesn't recall others telling him some things. SUBJECTIVE: Patient highly cooperative and pleasant. Social/Functional History:  Medications and Allergies have been reviewed and are listed on the Medical History Questionnaire, currently not taking mylicon, crestor, potassium chloride, banophen, drisdol, hydrochlorothiazide, lyrica, protonix, zofran, bentyl, zyrtec, bumex, spiriva, multivitamin, ergocalciferol. Wei Patterson lives  girlfriend, Onelia Flower and son  in a multiple floor home with ability to complete ADL's on main floor with stairs and a handrail to enter. .    Task Prior Level of Function Current Level of Function   ADLs  Independent Assistance Required   Finance Management Managed with girlfriend Manages with girlfriend   Medication Management Independent Independent   Educational Level 10th grade - learning disability (3rd-4th grade reading/spelling level) 10th grade - learning disability (3rd-4th grade reading/spelling level)   Driving Active  Active    Work [email or web address]. 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 Occupation: [address] Work on cars, fishing, playing with kids None at this time   Vision Status Corrected - glasses Corrected - reports blurred vision   Hearing Status [facility]   Diet Regular with thin liquids Regular with thin liquids       ORAL MOTOR:  Oral-Motor Assessment not completed, however, no overt deficits noted informally.     SPEECH / VOICE:  Speech and Voice appear to be grossly intact for basic and complex daily communication    LANGUAGE/COGNITION:  Jennifer Brenner completed the Neurobehavioral Cognitive Status Examination (COGNISTAT) with the following results:  Level of Consciousness:  Alert  Orientation:  - Typical Functioning  Attention:   - Mild Impairment  Language:  Comprehension:  - Typical Functioning  Repetition:  - Typical Functioning  Namin/8 - Typical Functioning  Constructions:  - Typical Functioning  Memory:  - Moderate Impairment  Calculations: 3/4 - Typical Functioning  Reasoning:  Similarities:  - Typical Functioning  Judgment:  - Typical Functioning    Patient with very mild word finding difficulty noted in conversation. Patient with occasional paraphasias, however, did self-correct during this evaluation. Will complete education regarding word finding strategies. Feel some of the patient's difficulties may be related to decreased attention/focus and impaired recall of information. IMPRESSIONS: Patient presents with mild to moderate cognitive deficits characterized by impaired complex attention and short term memory. Patient also presents with very mild expressive language deficits characterized by occasional word finding difficulties/paraphasias in conversation. Patient demonstrates good success self-correcting this date. Areas for Improvement: Impaired cognition and Impaired expressive language  Prognosis: good  Specific Interventions Next Treatment: education on word finding strategies and memory strategies, attention task, use of strategies in conversation    Activity/Treatment Tolerance:  [x]  Patient tolerated treatment well  []  Patient limited by fatigue  []  Patient limited by pain   []  Patient limited by other medical complications  []  Other:     GOALS:  Patient Goals:  1. Get back to work  2. Try to get better - be more aware  3.  Communicate better    Short Term Goals:  Short-term Goals  Timeframe for Short-term Goals: 4 weeks  Goal 1: Patient will complete selective, alternating and divided attention task with no more than 2 errors or redirects to task for improved success with IADL's and eventual return to work. Goal 2: Patient will complete delayed recall tasks and working memory tasks with up to 4 items with use of compensatory strategies with no more than min cues 80% of the time for improved recall of daily tasks and conversations. Goal 3: Patient will demonstrate use of word finding strategies in conversation without cues at least 90% of the time for improved success conversing with family and friends. Long Term Goals:  Long-term Goals  Timeframe for Long-term Goals: 6 weeks  Goal 1: Patient's Functional Outcome Measure Score (memory) will improve by at least 1 level (current level 4) for improved success completing IADL's. Patient Education:   [x]  HEP/Education Completed: Plan of Care, Goals, Results of testing  []  No new Education completed  []  Reviewed Prior HEP      [x]  Patient verbalized and/or demonstrated understanding of education provided. []  Patient unable to verbalize and/or demonstrate understanding of education provided. Will continue education. []  Barriers to learning:     PLAN:  Treatment Recommendations:     [x]  Plan of care initiated. Plan to see patient 2 times per week for 6 weeks to address the treatment planned outlined above.   []  Continue with current plan of care  []  Modify plan of care as follows:    []  Hold pending physician visit  []  Discharge    Time In 0804   Time Out 0900   Timed Code Minutes: 0 min   Total Treatment Time: 64 min       Yonathan Diaz M.S. [address] [address] [address]

## 2022-08-19 NOTE — TELEPHONE ENCOUNTER
The CT cervical spine has been denied by the insurance. Provider can perform an P2P at [phone]. Reference# [id number]. Pt currently on the schedule for the CT 8/22. I reached out to the pt an were given options. He could r/s to a later date until the P2P is performed, cancel until the auth has been obtained or sign an [address].  Pt states \"I'm having it done 8/22 and someone is going to pay for it\"

## 2022-08-22 ENCOUNTER — HOSPITAL ENCOUNTER (OUTPATIENT)
Dept: CT IMAGING | Age: 52
Discharge: HOME | End: 2022-08-22
Payer: COMMERCIAL

## 2022-08-22 DIAGNOSIS — G44.309 POST-TRAUMATIC HEADACHE, NOT INTRACTABLE, UNSPECIFIED CHRONICITY PATTERN: ICD-10-CM

## 2022-08-22 DIAGNOSIS — M54.2 CERVICAL PAIN (NECK): ICD-10-CM

## 2022-08-22 PROCEDURE — 72125 CT NECK SPINE W/O DYE: CPT

## 2022-08-22 PROCEDURE — 70450 CT HEAD/BRAIN W/O DYE: CPT

## 2022-08-24 ENCOUNTER — HOSPITAL ENCOUNTER (OUTPATIENT)
Dept: SPEECH THERAPY | Age: 52
Setting detail: THERAPIES SERIES
Discharge: HOME | End: 2022-08-24
Payer: COMMERCIAL

## 2022-08-24 PROCEDURE — 97129 THER IVNTJ 1ST 15 MIN: CPT

## 2022-08-24 PROCEDURE — 97130 THER IVNTJ EA ADDL 15 MIN: CPT

## 2022-08-24 NOTE — PROGRESS NOTES
[address] [facility]  [] SPEECH LANGUAGE COGNITIVE EVALUATION  [x] DAILY NOTE   [] PROGRESS NOTE [] DISCHARGE NOTE    [x] OUTPATIENT REHABILITATION [facility]   [] [place] [address]    [] [facility]   [] [facility]    Date: 2022  Patient Name:  Miko Ramirez  : 1970  MRN: [id number]  CSN: [id number]    Referring Practitioner MIRNA De Santiago - CNP   Diagnosis Other speech disturbances [R47.89]    Treatment Diagnosis Cognitive deficits, expressive language deficits   Date of Evaluation 22      Functional Outcome Measure Used [facility] NOMS: memory   Functional Outcome Score Level 4 (22)       Insurance: Primary: Payor: Barry Hanson /  /  / ,   Secondary: [place]: No precert required   Visit # 2, 2/10 for progress note   Visits Allowed: No visit limit   Recertification Date:    Physician Follow-Up: Corrie Valero -  22   Physician Orders: Cognitive and higher level testing as patient's family is noticing patient having word finding difficulties after a fall at work . Pertinent History: Patient presents today ambulating with a cane after a fall at work . States that he slipped on oil and fell at work hitting his head on concrete. Reports he had a hard hat on when he fell. Patient went to the emergency room when he fell, but did not have images. Patient has been experiencing more headaches since the fall. Reports his family has noticed that during conversation he says the wrong words that don't go with the conversation that he is having. Patient reports he has dizziness that comes and goes and reports just recently he has had some blurred vision that comes and goes. Patient reports he can't come up with the words he wants to say and he doesn't recall others telling him some things. SUBJECTIVE: Patient very pleasant and cooperative.  Reports he will start therapy for his [facility] at [facility] on 8/30. Has not been told a return date for work yet. SHORT TERM GOAL #1:  Goal 1: Patient will complete selective, alternating and divided attention task with no more than 2 errors or redirects to task for improved success with IADL's and eventual return to work. INTERVENTIONS:ST discussed goal and provided rationale for completing within therapy. Provided patient with a worksheet with numbers varying from 1-99 listed in lines. Instructed patient to [place] the #5 each time it appeared on the page. Patient completed activity in 5 minutes with 0 errors. Patient independently utilizing his finger to track each line. Good success with good use of attention strategies. SHORT TERM GOAL #2:  Goal 2: Patient will complete delayed recall tasks and working memory tasks with up to 4 items with use of compensatory strategies with no more than min cues 80% of the time for improved recall of daily tasks and conversations. INTERVENTIONS: ST discussed goal and provided rationale for completing within therapy. Provided patient with WRAP (Write it down, Repeat it, Associate it, Picture it) compensatory memory strategies. Discussed how to implement these into daily environment. Functional Recall: Patient given x4 grocery items. - Immediate: 4/4 indep   - 10 minutes: 4/4 indep   **Excellent success    Working Memory: Patient given x4 words via auditory provision and instructed to repeat the words in reverse order.   -38% accuracy given min cues  **Patient often stating the first word correctly and then providing the remaining x3 words in ST's original order. Moderate cues needed to reverse this order. SHORT TERM GOAL #3:  Goal 3: Patient will demonstrate use of word finding strategies in conversation without cues at least 90% of the time for improved success conversing with family and friends.   INTERVENTIONS: ST provided patient with a copy of word-finding strategies and discussed how to implement these into conversation. Patient verbalized understanding. Provided patient with topics of conversation and instructed him to answer and use word-finding strategies when needed. No word finding difficulties observed within conversation this date. LONG-TERM GOALS:  Long-term Goals  Timeframe for Long-term Goals: 6 weeks  Goal 1: Patient's Functional Outcome Measure Score (memory) will improve by at least 1 level (current level 4) for improved success completing IADL's. Assessment: Progressing towards goals   Specific Interventions Next Treatment: attention, delayed recall, working memory, conversation tasks with good use of word-finding strategies. Activity/Treatment Tolerance:  []  Patient tolerated treatment well  []  Patient limited by fatigue  []  Patient limited by pain   []  Patient limited by other medical complications  []  Other:     Patient Education:   [x]  HEP/Education Completed: Plan of Care, Goals, WRAP memory strategies, Word-finding strategies, Attention strategies  []  No new Education completed  [x]  Reviewed Prior HEP      [x]  Patient verbalized and/or demonstrated understanding of education provided. []  Patient unable to verbalize and/or demonstrate understanding of education provided. Will continue education. []  Barriers to learning:    PLAN:  [x]  Plan of care initiated. Plan to see patient 2 times per week for 6 weeks to address the treatment planned outlined above.   []  Continue with current plan of care  []  Modify plan of care as follows:    []  Hold pending physician visit  []  Discharge    Time In 0802   Time Out 0830   Timed Code Minutes: 28 min   Total Treatment Time: 28 min     [facility] [place] [address]

## 2022-08-25 ENCOUNTER — OFFICE VISIT (OUTPATIENT)
Dept: NEUROSURGERY | Age: 52
End: 2022-08-25
Payer: COMMERCIAL

## 2022-08-25 ENCOUNTER — HOSPITAL ENCOUNTER (OUTPATIENT)
Dept: SPEECH THERAPY | Age: 52
Setting detail: THERAPIES SERIES
Discharge: HOME | End: 2022-08-25
Payer: COMMERCIAL

## 2022-08-25 VITALS
SYSTOLIC BLOOD PRESSURE: 130 MMHG | BODY MASS INDEX: 41.75 KG/M2 | WEIGHT: 315 LBS | DIASTOLIC BLOOD PRESSURE: 80 MMHG | HEIGHT: 73 IN

## 2022-08-25 DIAGNOSIS — R47.89 WORD FINDING DIFFICULTY: Primary | ICD-10-CM

## 2022-08-25 DIAGNOSIS — G44.309 POST-TRAUMATIC HEADACHE, NOT INTRACTABLE, UNSPECIFIED CHRONICITY PATTERN: ICD-10-CM

## 2022-08-25 DIAGNOSIS — D35.2 PITUITARY ADENOMA (HCC): ICD-10-CM

## 2022-08-25 PROCEDURE — G8427 DOCREV CUR MEDS BY ELIG CLIN: HCPCS

## 2022-08-25 PROCEDURE — 3017F COLORECTAL CA SCREEN DOC REV: CPT

## 2022-08-25 PROCEDURE — 97129 THER IVNTJ 1ST 15 MIN: CPT | Performed by: SPEECH-LANGUAGE PATHOLOGIST

## 2022-08-25 PROCEDURE — 1036F TOBACCO NON-USER: CPT

## 2022-08-25 PROCEDURE — G8417 CALC BMI ABV UP PARAM F/U: HCPCS

## 2022-08-25 PROCEDURE — 97130 THER IVNTJ EA ADDL 15 MIN: CPT | Performed by: SPEECH-LANGUAGE PATHOLOGIST

## 2022-08-25 PROCEDURE — 99213 OFFICE O/P EST LOW 20 MIN: CPT

## 2022-08-25 ASSESSMENT — ENCOUNTER SYMPTOMS
COUGH: 0
CONSTIPATION: 0
TROUBLE SWALLOWING: 0
NAUSEA: 0
BACK PAIN: 0
SHORTNESS OF BREATH: 0
COLOR CHANGE: 0

## 2022-08-25 NOTE — PROGRESS NOTES
Neurosurgery Follow up Note    Date:8/25/2022         Patient Name:Osmin Garnett     YOB: 1970     Age:52 y.o. Reason for Follow up: Follow up to discuss CT and Speech therapy results      Chief Complaint:   Chief Complaint   Patient presents with    Follow-up     CT, Speech therapy         Subjective   Rubi Mariano is a 50year old male who presents tot the office alone ambulating with a cane. Patient presents to the office today to review CT scan results and speech therapy eval.He stated the continues with headaches and neck pain and stiffness. He denies pain with movement. He denies decreased range of motion. He stated that his dizziness is better. He stated he is not sleeping well. He stated that he is going to speech therapy and it is helping with his word finding. He stated that he is able to focus more with the techniques speech has provided him. He stated that he is going to start therapy for right right lower extremity next week. Patient denies weakness, numbness or tingling. Patient denies recent fall or trauma. Review of Systems   Review of Systems   Constitutional:  Negative for activity change, appetite change and fatigue. HENT:  Negative for trouble swallowing. Eyes:  Negative for visual disturbance. Respiratory:  Negative for cough and shortness of breath. Cardiovascular:  Negative for chest pain. Gastrointestinal:  Negative for constipation and nausea. Musculoskeletal:  Positive for neck pain and neck stiffness. Negative for back pain and gait problem. Skin:  Negative for color change, rash and wound. Neurological:  Positive for headaches. Negative for dizziness and weakness. Word finding    Psychiatric/Behavioral:  Positive for sleep disturbance. Negative for confusion. The patient is not nervous/anxious.       Medications     Current Outpatient Medications on File Prior to Visit   Medication Sig Dispense Refill    triamcinolone (KENALOG) 0.1 % cream APPLY TO AFFECTED AREA TWICE A DAY 1 each 2    HYDROcodone-acetaminophen (NORCO) 5-325 MG per tablet Take 1 tablet by mouth every 8 hours as needed for Pain for up to 30 days.  90 tablet 0    orphenadrine (NORFLEX) 100 MG extended release tablet Take 1 tablet by mouth 2 times daily as needed for Muscle spasms 60 tablet 3    simethicone (MYLICON) 80 MG chewable tablet Take 1 tablet by mouth 4 times daily as needed for Flatulence 180 tablet 3    omeprazole (PRILOSEC) 20 MG delayed release capsule Take 1 capsule by mouth every morning (before breakfast) 90 capsule 3    potassium chloride (KLOR-CON M) 10 MEQ extended release tablet take 1 tablet by mouth daily 90 tablet 3    butalbital-acetaminophen-caffeine (FIORICET, ESGIC) -40 MG per tablet take 1 tablet by mouth every 4 hours if needed for headache 90 tablet 0    aspirin EC 81 MG EC tablet Take 1 tablet by mouth daily 90 tablet 1    amitriptyline (ELAVIL) 50 MG tablet take 1 tablet by mouth at bedtime 90 tablet 1    rosuvastatin (CRESTOR) 20 MG tablet take 1 tablet by mouth once daily 90 tablet 3    BANOPHEN 25 MG capsule take 1 capsule by mouth every 6 hours if needed for itching 60 capsule 5    ergocalciferol (DRISDOL) 1.25 MG (55130 UT) capsule Take 1 capsule by mouth once a week 4 capsule 3    albuterol sulfate  (90 Base) MCG/ACT inhaler Inhale 2 puffs into the lungs 4 times daily as needed for Wheezing 3 each 3    hydroCHLOROthiazide (HYDRODIURIL) 25 MG tablet Take 1 tablet by mouth every morning 90 tablet 1    pantoprazole (PROTONIX) 40 MG tablet Take 1 tablet by mouth every morning (before breakfast) 30 tablet 0    betamethasone valerate (VALISONE) 0.1 % cream apply to affected area twice a day 45 g 3    Blood Pressure Monitoring (B-D ASSURE BPM/AUTO ARM CUFF) MISC 1 Units by Does not apply route daily 1 each 0    Cholecalciferol (VITAMIN D3) 1.25 MG (22747 UT) CAPS 1 capsule 2 times a week , Monday and Friday 24 capsule 0 budesonide-formoterol (SYMBICORT) 160-4.5 MCG/ACT AERO 2 puffs inhaled twice daily. Rinse mouth well after use. 3 each 3    ondansetron (ZOFRAN-ODT) 4 MG disintegrating tablet Take 1 tablet by mouth 3 times daily as needed for Nausea or Vomiting 21 tablet 0    albuterol (PROVENTIL) (2.5 MG/3ML) 0.083% nebulizer solution inhale contents of 1 vial ( 3 milliliters ) in nebulizer by mouth and INTO THE LUNGS every 4 hours if needed for wheezing 375 mL 3    Respiratory Therapy Supplies (NEBULIZER/TUBING/MOUTHPIECE) KIT Dispense tubing, mask, and mouthpiece for nebulizer machine. Dx: Asthma 1 kit 0    dicyclomine (BENTYL) 10 MG capsule Take 1 capsule by mouth 3 times daily as needed (abd cramping) 30 capsule 0    loratadine (CLARITIN) 10 MG tablet take 1 tablet by mouth once daily 90 tablet 3    montelukast (SINGULAIR) 10 MG tablet Take 1 tablet by mouth nightly 90 tablet 3    fluticasone (FLONASE) 50 MCG/ACT nasal spray 2 sprays by Each Nostril route daily 3 each 3    azelastine (OPTIVAR) 0.05 % ophthalmic solution Place 1 drop into both eyes 2 times daily 1 Bottle 11    FEROSUL 325 (65 Fe) MG tablet take 1 tablet by mouth once daily 90 tablet 3    EPINEPHrine (EPIPEN 2-RAGHAV) 0.3 MG/0.3ML SOAJ injection Inject one pen as directed STAT for allergic reaction, may disp generic NDC 93799-612-28 1 each 0    tiotropium (SPIRIVA RESPIMAT) 1.25 MCG/ACT AERS inhaler Inhale 2 puffs into the lungs daily 1 Inhaler 1    cetirizine (ZYRTEC) 10 MG tablet Take 1 tablet by mouth daily 30 tablet 11    bumetanide (BUMEX) 1 MG tablet Take 0.5 tablets by mouth daily 30 tablet 0    NARCAN 4 MG/0.1ML LIQD nasal spray       Multiple Vitamins-Minerals (MULTIVITAMIN ADULTS) TABS Take 1 tablet by mouth daily 90 tablet 3    ergocalciferol (ERGOCALCIFEROL) 35115 units capsule Take 50,000 Units by mouth as needed       pregabalin (LYRICA) 25 MG capsule Take 1 capsule by mouth 2 times daily for 30 days.  60 capsule 0     No current facility-administered medications on file prior to visit. Past History    Past Medical History:   has a past medical history of Aneurysm ([address] Asthma, Cardiomegaly, COVID-19, Fibromyalgia, and CLARIBEL on CPAP. Social History:   reports that he has never smoked. He has never used smokeless tobacco. He reports current alcohol use. He reports that he does not use drugs. Family History:   Family History   Problem Relation Age of Onset    High Blood Pressure Mother     Emphysema Mother     Other Mother         she was hit and killed by car    High Blood Pressure Father     Emphysema Father     Asthma Father        Physical Examination      Vitals:  /80 (Site: Left Upper Arm, Position: Sitting, Cuff Size: Large Adult)   Ht 6' 1\" (1.854 m)   Wt (!) 430 lb (195 kg)   BMI 56.73 kg/m²       Physical Exam  Constitutional:       Appearance: Normal appearance. He is not ill-appearing. HENT:      Nose: Nose normal.      Mouth/Throat:      Mouth: Mucous membranes are moist.   Eyes:      Pupils: Pupils are equal, round, and reactive to light. Cardiovascular:      Rate and Rhythm: Normal rate. Pulses: Normal pulses. Pulmonary:      Effort: Pulmonary effort is normal.   Abdominal:      General: Bowel sounds are normal.   Musculoskeletal:         General: Tenderness present. Cervical back: Normal range of motion. Spasms and tenderness present. Back:    Skin:     General: Skin is warm and dry. Findings: No erythema. Neurological:      Mental Status: He is alert and oriented to person, place, and time. Sensory: No sensory deficit. Motor: No weakness. Psychiatric:         Mood and Affect: Mood normal.         Behavior: Behavior normal.         Thought Content: Thought content normal.         Judgment: Judgment normal.     Neurologic Exam     Mental Status   Oriented to person, place, and time. Cranial Nerves     CN III, IV, VI   Pupils are equal, round, and reactive to light.      Imaging Imaging last 30 days:  CT HEAD WO CONTRAST    Result Date: 8/22/2022  Impression: 1. No acute intracranial hemorrhage or process identified. This document has been electronically signed by: Maine Wells MD on 08/22/2022 07:21 PM All CTs at this facility use dose modulation techniques and iterative reconstructions, and/or weight-based dosing when appropriate to reduce radiation to a low as reasonably achievable. CT CERVICAL SPINE WO CONTRAST    Result Date: 8/22/2022  Impression: 1. Stable appearance to the cervical spine since 3/2/2022. No acute fracture seen This document has been electronically signed by: Maine Wells MD on 08/22/2022 07:27 PM All CTs at this facility use dose modulation techniques and iterative reconstructions, and/or weight-based dosing when appropriate to reduce radiation to a low as reasonably achievable. Assessment and Plan:          Follow up to review CT of the head and cervical spine and speech therapy eval  CT of the cervical spine and hand reviewed with patient  Continue speech therapy  Fall precautions  Continue ambulating with cane. MRI of the brain with and without contrast in 6 months . Discussed following up with pain management to discuss trigger point injections   Follow up in 6 months. Call office is symptoms worsen or fail to improve  All patient questions answered. Pt voiced understanding.  Patient instructed to call the office with any questions or concerns      Electronically signed by MIRNA Kaplan CNP on 8/25/22 at 9:26 AM EDT

## 2022-08-25 NOTE — PROGRESS NOTES
cues to do so. SHORT TERM GOAL #3:  Goal 3: Patient will demonstrate use of word finding strategies in conversation without cues at least 90% of the time for improved success conversing with family and friends. INTERVENTIONS: No word finding difficulties noted within conversation this session. LONG-TERM GOALS:  Long-term Goals  Timeframe for Long-term Goals: 6 weeks  Goal 1: Patient's Functional Outcome Measure Score (memory) will improve by at least 1 level (current level 4) for improved success completing IADL's. - ONGOING    Assessment: Progressing towards goals   Specific Interventions Next Treatment: attention, delayed recall, working memory, conversation tasks with good use of word-finding strategies. Activity/Treatment Tolerance:  [x]  Patient tolerated treatment well  []  Patient limited by fatigue  []  Patient limited by pain   []  Patient limited by other medical complications  []  Other:     Patient Education:   [x]  HEP/Education Completed: WRAP memory strategies, Word-finding strategies, rationale for tasks  []  No new Education completed  [x]  Reviewed Prior HEP      [x]  Patient verbalized and/or demonstrated understanding of education provided. []  Patient unable to verbalize and/or demonstrate understanding of education provided. Will continue education. []  Barriers to learning:    PLAN:  []  Plan of care initiated. Plan to see patient 2 times per week for 6 weeks to address the treatment planned outlined above.   [x]  Continue with current plan of care  []  Modify plan of care as follows:    []  Hold pending physician visit  []  Discharge    Time In 0802   Time Out 0833   Timed Code Minutes: 31 min   Total Treatment Time: 31 min       Matheus Feliciano, M.S. [id number] [id number]

## 2022-08-26 ENCOUNTER — CARE COORDINATION (OUTPATIENT)
Dept: CARE COORDINATION | Age: 52
End: 2022-08-26

## 2022-08-26 NOTE — CARE COORDINATION
Ambulatory Care Coordination Note  8/26/2022    ACC: Eleanore Cowden, RN    Summary Note: Spoke with Ann Duty for continued Care Coordination  Continues to have barriers from fall at work after slipping and falling and hitting head  States he had neuro appt and was informed CT scan did not reveal anything abnormal  States he has been screened my [facility] and will be starting services with them due to difficulty finding words when he speaks  Continues to have soreness and pain in his back  Discussed that he continues to be off work and [employer] paperwork appears to be completed  Denies changes in his breathing, cough or sputum  CHF at baseline  Has follow up appts in place with specialty offices  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 is also no longer a barrier and no need for additional support with that    Plan  Ensure working with [facility] Rhea Sow as ordered  Reinforce blood pressure tracking and reporting  Encourage daily weight tracking and reporting  Diet as ordered  Reinforce importance of early symptom recognition and reporting to prevent exacerbation and unnecessary hospitalization  General Assessment    Do you have any symptoms that are causing concern?: Yes  Progression since Onset: Unchanged  Reported Symptoms: Other (Comment: difficulty finding words)         Congestive Heart Failure Assessment    Are you currently restricting fluids?: No Restriction  Do you understand a low sodium diet?: No  Do you understand how to read food labels?: No  How many restaurant meals do you eat per week?: 1-2  Do you salt your food before tasting it?: No         Symptoms:  CHF associated angina: Neg, CHF associated dyspnea on exertion: Pos, CHF associated fatigue: Neg, CHF associated orthostatic hypotension: Neg, CHF associated PND: Neg, CHF associated shortness of breath: Neg, CHF associated weakness: Neg      Symptom course: stable               Lab Results       None            Care Coordination Interventions    Program Enrollment: Complex Care  Referral from Primary Care Provider: No  Suggested Interventions and Community Resources  [facility] In [facility] (Comment: referral placed 3-23-22)  Home Health Services: Not Started  Medication Assistance Program: Not Started  Occupational Therapy: Not Started  Physical Therapy: Not Started  Registered Dietician: Completed (Comment: appt with RD scheduled)  Social Work: Not Started  [employer] Management Tools: Not Started  Other Services or Interventions: will be working with [facility] s/p fall at work          Goals Addressed                      This Visit's Progress      Conditions and Symptoms (pt-stated)   On track      I will schedule office visits, as directed by my provider. I will keep my appointment or reschedule if I have to cancel. I will notify my provider of any barriers to my plan of care. I will notify my provider of any symptoms that indicate a worsening of my condition. Barriers: need for additional support and education  Plan for overcoming my barriers: family and ACM support  Confidence: 8/10  Anticipated Goal Completion Date: 6/22/22 Update 8/20/22 Update 9/26/22          Reduce Falls  (pt-stated)   On track      I will reduce my risk of falls by the following:  s/p fall at work slipping in oil    Barriers: need for additional support and education  Plan for overcoming my barriers: family and ACM support  Confidence: 8/10  Anticipated Goal Completion Date: 9-13-22              Prior to Admission medications    Medication Sig Start Date End Date Taking? Authorizing Provider   triamcinolone (KENALOG) 0.1 % cream APPLY TO AFFECTED AREA TWICE A DAY 8/17/22   MIRNA Nayak CNP   HYDROcodone-acetaminophen (NORCO) 5-325 MG per tablet Take 1 tablet by mouth every 8 hours as needed for Pain for up to 30 days.  8/2/22 9/1/22  MIRNA Loo CNP   orphenadrine (NORFLEX) 100 MG extended release tablet Take 1 tablet by mouth 2 times daily as needed for Muscle Alease SolMIRNA kramer - CNP   Cholecalciferol (VITAMIN D3) 1.25 MG (75846 UT) CAPS 1 capsule 2 times a week , Monday and Friday 3/20/22   Nemo Grove MD   budesonide-formoterol [facility]) 160-4.5 MCG/ACT AERO 2 puffs inhaled twice daily. Rinse mouth well after use. 2/17/22   Alease SolumMIRNA - CNP   ondansetron (ZOFRAN-ODT) 4 MG disintegrating tablet Take 1 tablet by mouth 3 times daily as needed for Nausea or Vomiting 2/1/22   Alease SolumMIRNA - CNP   albuterol (PROVENTIL) (2.5 MG/3ML) 0.083% nebulizer solution inhale contents of 1 vial ( 3 milliliters ) in nebulizer by mouth and INTO THE LUNGS every 4 hours if needed for wheezing 1/31/22   Trista SolMIRNA kramer - CNP   Respiratory Therapy Supplies (NEBULIZER/TUBING/MOUTHPIECE) KIT Dispense tubing, mask, and mouthpiece for nebulizer machine.  Dx: Asthma 1/3/22   Alease SolMIRNA kramer - CNP   dicyclomine (BENTYL) 10 MG capsule Take 1 capsule by mouth 3 times daily as needed (abd cramping) 12/9/21   MIRNA Herrera - CNP   loratadine (CLARITIN) 10 MG tablet take 1 tablet by mouth once daily 11/29/21   Alease SolMIRNA kramer - CNP   montelukast (SINGULAIR) 10 MG tablet Take 1 tablet by mouth nightly 11/17/21   Alease SolBRITTANY kramerN - CNP   fluticasone [facility]) 50 MCG/ACT nasal spray 2 sprays by Each Nostril route daily 11/17/21   Alease SolBRITTANY kramerN - CNP   azelastine (OPTIVAR) 0.05 % ophthalmic solution Place 1 drop into both eyes 2 times daily 8/19/21   Alease Solum, APRN - CNP   FEROSUL 325 (65 Fe) MG tablet take 1 tablet by mouth once daily 6/28/21   MIRNA Sinclair CNP   EPINEPHrine (EPIPEN 2-RAGHAV) 0.3 MG/0.3ML SOAJ injection Inject one pen as directed STAT for allergic reaction, may disp generic [facility] 08692-561-25 2/25/21   MIRNA Jackson CNP   tiotropium (SPIRIVA RESPIMAT) 1.25 MCG/ACT AERS inhaler Inhale 2 puffs into the lungs daily 11/18/20   MIRNA Jackson CNP   cetirizine (ZYRTEC) 10 MG tablet Take 1 tablet by mouth daily 8/31/20 Phi Fisher, APRN - CNP   bumetanide (BUMEX) 1 MG tablet Take 0.5 tablets by mouth daily 5/28/20   Mohit Steward APRN - CNP   NARCAN 4 MG/0.1ML LIQD nasal spray  1/10/20   Historical Provider, MD   Multiple Vitamins-Minerals (MULTIVITAMIN ADULTS) TABS Take 1 tablet by mouth daily 1/9/20   Mortimer Griffiths, APRN - CNP   ergocalciferol (ERGOCALCIFEROL) 08155 units capsule Take 50,000 Units by mouth as needed     Historical Provider, MD       Future Appointments   Date Time Provider Jesus Iris   8/31/2022  9:00 AM GRACIE Rojas [address]   9/1/2022  8:00 AM Jorge A Pastures,  [address]   9/7/2022  9:00 AM GRACIE Rojas [address]   9/7/2022 10:15 AM IVAN Rodriguez SRPX Heart [facility] [place]   9/8/2022  8:00 AM Jorge A Pastures, SLP [facility] [place] [facility] [facility]   9/12/2022 11:15 AM MIRNA Mak CNP SRPX Pain [facility] [place]   9/14/2022  8:00 AM GRACIE Rojas STRZ SPEECH [place] [facility]   9/16/2022  8:00 AM Jorge A Pastures, SLP STRZ SPEECH [place]   2/28/2023  2:00 PM STR MRI RM1 STRZ MRI STR Radiolog   3/1/2023  9:30 AM IVAN Saba SRPXNEURSU [facility] [place]   3/22/2023  1:45 PM Jaycob Hemphill MD N SRPX Heart [facility] [place]

## 2022-08-29 DIAGNOSIS — G89.4 CHRONIC PAIN SYNDROME: ICD-10-CM

## 2022-08-29 DIAGNOSIS — M47.816 LUMBAR SPONDYLOSIS: ICD-10-CM

## 2022-08-29 DIAGNOSIS — M62.838 SPASM OF MUSCLE: ICD-10-CM

## 2022-08-29 DIAGNOSIS — M54.10 RADICULOPATHY AFFECTING UPPER EXTREMITY: ICD-10-CM

## 2022-08-29 RX ORDER — ORPHENADRINE CITRATE 100 MG/1
100 TABLET, EXTENDED RELEASE ORAL 2 TIMES DAILY PRN
Qty: 60 TABLET | Refills: 3 | OUTPATIENT
Start: 2022-08-29 | End: 2022-09-28

## 2022-08-29 RX ORDER — HYDROCODONE BITARTRATE AND ACETAMINOPHEN 5; 325 MG/1; MG/1
1 TABLET ORAL EVERY 8 HOURS PRN
Qty: 90 TABLET | Refills: 0 | Status: SHIPPED | OUTPATIENT
Start: 2022-09-03 | End: 2022-09-28 | Stop reason: SDUPTHER

## 2022-08-29 NOTE — TELEPHONE ENCOUNTER
Abdelrahman Mackey called requesting a refill on the following medications:  Requested Prescriptions     Pending Prescriptions Disp Refills    orphenadrine (NORFLEX) 100 MG extended release tablet 60 tablet 3     Sig: Take 1 tablet by mouth 2 times daily as needed for Muscle spasms    HYDROcodone-acetaminophen (NORCO) 5-325 MG per tablet 90 tablet 0     Sig: Take 1 tablet by mouth every 8 hours as needed for Pain for up to 30 days. Pharmacy verified:[facility] [address]  [address]      Date of last visit: 6-  Date of next visit (if applicable): 5/12/9301

## 2022-08-29 NOTE — TELEPHONE ENCOUNTER
OARRS reviewed. UDS: + for  Butalbital -consistent. + for Tramadol, Amitriptyline -inconsistent. Negative for Hydrocodone. Last seen: 6/6/2022.  Follow-up: 9/12/2022

## 2022-08-31 ENCOUNTER — HOSPITAL ENCOUNTER (OUTPATIENT)
Dept: SPEECH THERAPY | Age: 52
Setting detail: THERAPIES SERIES
Discharge: HOME | End: 2022-08-31
Payer: COMMERCIAL

## 2022-09-01 ENCOUNTER — HOSPITAL ENCOUNTER (OUTPATIENT)
Dept: SPEECH THERAPY | Age: 52
Setting detail: THERAPIES SERIES
Discharge: HOME | End: 2022-09-01
Payer: MEDICAID

## 2022-09-01 PROCEDURE — 97129 THER IVNTJ 1ST 15 MIN: CPT | Performed by: SPEECH-LANGUAGE PATHOLOGIST

## 2022-09-01 PROCEDURE — 97130 THER IVNTJ EA ADDL 15 MIN: CPT | Performed by: SPEECH-LANGUAGE PATHOLOGIST

## 2022-09-01 NOTE — PROGRESS NOTES
[address] [facility]  [] SPEECH LANGUAGE COGNITIVE EVALUATION  [x] DAILY NOTE   [] PROGRESS NOTE [] DISCHARGE NOTE    [x] OUTPATIENT REHABILITATION CENTER [facility]   [] [facility]    [] [facility]   [] Randeen Erps    Date: 2022  Patient Name:  Tandy Habermann  : 1970  MRN: [id number]  CSN: [id number]    Referring Practitioner MIRNA Cornejo - CNP   Diagnosis Other speech disturbances [R47.89]    Treatment Diagnosis Cognitive deficits, expressive language deficits   Date of Evaluation 22      Functional Outcome Measure Used TATUM NOMS: memory   Functional Outcome Score Level 4 (22)       Insurance: Primary: Payor: Raul Simmons /  /  / ,   Secondary: Amada Lema: No precert required   Visit # 4, 4/10 for progress note   Visits Allowed: No visit limit   Recertification Date: 7/10/77   Physician Follow-Up: Osito Felder - 3/1/23    Physician Orders: Cognitive and higher level testing as patient's family is noticing patient having word finding difficulties after a fall at work . Pertinent History: Patient presents today ambulating with a cane after a fall at work . States that he slipped on oil and fell at work hitting his head on concrete. Reports he had a hard hat on when he fell. Patient went to the emergency room when he fell, but did not have images. Patient has been experiencing more headaches since the fall. Reports his family has noticed that during conversation he says the wrong words that don't go with the conversation that he is having. Patient reports he has dizziness that comes and goes and reports just recently he has had some blurred vision that comes and goes. Patient reports he can't come up with the words he wants to say and he doesn't recall others telling him some things.        SUBJECTIVE: Patient reports he is having pain \"all over,\" but it is better than yesterday. Patient denies having a headache this date. SHORT TERM GOAL #1:  Goal 1: Patient will complete selective, alternating and divided attention task with no more than 2 errors or redirects to task for improved success with IADL's and eventual return to work. INTERVENTIONS:  Selective/Alternating/Divided attention task:  Patient asked to switch (when requested) between reading the words BIG/big/LITTLE/little and verbalizing the size of the print while ignoring what the word says throughout stimulus sheet #10 from the APT. Patient completed this task with music playing in the background and had 7 errors throughout the task with 1 immediate self-correction within 4 minutes and 20 seconds. **After completion of this task, patient reported he noticed having blurred vision as some of the letters starting running together and looking the same. Patient also reports when he is cleaning something at work, he notices that everything starts to look the same as well. Discussed the possibility of doing vision therapy to see if it is helpful. This ST spoke with Raissa Reyes regarding this situation following this session and she thought an evaluation to assess vision would be appropriate. Will send an order to Una Ovalles CNP for this OT evaluation. SHORT TERM GOAL #2:  Goal 2: Patient will complete delayed recall tasks and working memory tasks with up to 4 items with use of compensatory strategies with no more than min cues 80% of the time for improved recall of daily tasks and conversations. INTERVENTIONS:  Patient independently recalled 3/4 memory strategies and required min cues to recall 1/4. Patient was unable to recall the acronym WRAP to aid in recall. **Patient reported he is still getting confused with dates and times of appointments. Reports he will look at his schedule and read the time of the appointment, but he will think it is at a different time shortly after.  Ex: reports today he knew his appointment was at 8:00, but when he got to the clinic he thought it was at 8:30 until he looked at the clock and realized the time. Encouraged patient to repeat the information about his appointments out loud to assist with recall. **Patient arrived to this appointment with his schedule for [facility] stating that he might need to change some of his ST appointments because of conflicts in time. 1 of his ST appointments needed to be adjusted. Patient initiated setting a new alarm in his cell phone for the changed appointment time so he wouldn't forget. Functional Recall: Patient given x 5 pieces of information to recall about one of his future appointments and asked to recall:  - Immediate: 2/5 independent, 2/5 with min cues, 1/5 with mod cues  - 10 minute delay: 5/5 independent    SHORT TERM GOAL #3:  Goal 3: Patient will demonstrate use of word finding strategies in conversation without cues at least 90% of the time for improved success conversing with family and friends. INTERVENTIONS:  Briefly reviewed word findings strategies this session and the patient was able to recall some of the strategies on his own. Patient provided with an example of how to incorporate the strategies when necessary. No word finding difficulty noted in conversation this session. LONG-TERM GOALS:  Long-term Goals  Timeframe for Long-term Goals: 6 weeks  Goal 1: Patient's Functional Outcome Measure Score (memory) will improve by at least 1 level (current level 4) for improved success completing IADL's. - ONGOING    Assessment: Progressing towards goals   Specific Interventions Next Treatment: attention, delayed recall, working memory, conversation tasks with good use of word-finding strategies.      Activity/Treatment Tolerance:  [x]  Patient tolerated treatment well  []  Patient limited by fatigue  []  Patient limited by pain   []  Patient limited by other medical complications  []  Other:     Patient Education:   [x]  HEP/Education Completed: WRAP memory strategies, Word-finding strategies, rationale for tasks, will look into OT evaluation for vision  []  No new Education completed  [x]  Reviewed Prior HEP      [x]  Patient verbalized and/or demonstrated understanding of education provided. []  Patient unable to verbalize and/or demonstrate understanding of education provided. Will continue education. []  Barriers to learning:    PLAN:  []  Plan of care initiated. Plan to see patient 2 times per week for 6 weeks to address the treatment planned outlined above.   [x]  Continue with current plan of care  []  Modify plan of care as follows:    []  Hold pending physician visit  []  Discharge    Time In 0800   Time Out 0835   Timed Code Minutes: 35 min   Total Treatment Time: 35 min       Seda Gomez M.S. [id number] [place] [address]

## 2022-09-02 RX ORDER — EPINEPHRINE 0.3 MG/.3ML
INJECTION SUBCUTANEOUS
Qty: 2 EACH | OUTPATIENT
Start: 2022-09-02

## 2022-09-07 ENCOUNTER — OFFICE VISIT (OUTPATIENT)
Dept: CARDIOLOGY CLINIC | Age: 52
End: 2022-09-07
Payer: MEDICAID

## 2022-09-07 ENCOUNTER — HOSPITAL ENCOUNTER (OUTPATIENT)
Dept: SPEECH THERAPY | Age: 52
Setting detail: THERAPIES SERIES
Discharge: HOME | End: 2022-09-07
Payer: MEDICAID

## 2022-09-07 VITALS
WEIGHT: 315 LBS | DIASTOLIC BLOOD PRESSURE: 72 MMHG | HEART RATE: 79 BPM | BODY MASS INDEX: 41.75 KG/M2 | SYSTOLIC BLOOD PRESSURE: 136 MMHG | HEIGHT: 73 IN

## 2022-09-07 DIAGNOSIS — I10 ESSENTIAL HYPERTENSION: Primary | ICD-10-CM

## 2022-09-07 PROCEDURE — G8417 CALC BMI ABV UP PARAM F/U: HCPCS | Performed by: STUDENT IN AN ORGANIZED HEALTH CARE EDUCATION/TRAINING PROGRAM

## 2022-09-07 PROCEDURE — 97130 THER IVNTJ EA ADDL 15 MIN: CPT

## 2022-09-07 PROCEDURE — 97129 THER IVNTJ 1ST 15 MIN: CPT

## 2022-09-07 PROCEDURE — 99213 OFFICE O/P EST LOW 20 MIN: CPT | Performed by: STUDENT IN AN ORGANIZED HEALTH CARE EDUCATION/TRAINING PROGRAM

## 2022-09-07 PROCEDURE — 3017F COLORECTAL CA SCREEN DOC REV: CPT | Performed by: STUDENT IN AN ORGANIZED HEALTH CARE EDUCATION/TRAINING PROGRAM

## 2022-09-07 PROCEDURE — G8427 DOCREV CUR MEDS BY ELIG CLIN: HCPCS | Performed by: STUDENT IN AN ORGANIZED HEALTH CARE EDUCATION/TRAINING PROGRAM

## 2022-09-07 PROCEDURE — 1036F TOBACCO NON-USER: CPT | Performed by: STUDENT IN AN ORGANIZED HEALTH CARE EDUCATION/TRAINING PROGRAM

## 2022-09-07 NOTE — PROGRESS NOTES
[address] [facility]  [] SPEECH LANGUAGE COGNITIVE EVALUATION  [x] DAILY NOTE   [] PROGRESS NOTE [] DISCHARGE NOTE    [x] OUTPATIENT REHABILITATION [facility]   [] [place] [address]    [] [facility]   [] Nathaniel Barney    Date: 2022  Patient Name:  Lisbeth Chahal  : 1970  MRN: [id number]  CSN: [id number]    Referring Practitioner MIRNA Dillard - CNP   Diagnosis Other speech disturbances [R47.89]    Treatment Diagnosis Cognitive deficits, expressive language deficits   Date of Evaluation 22      Functional Outcome Measure Used TATUM NOMS: memory   Functional Outcome Score Level 4 (22)       Insurance: Primary: Payor: Stepan Noguera 150 /  /  / ,   Secondary: Hardeep Tony: No precert required   Visit # 5, 5/10 for progress note   Visits Allowed: No visit limit   Recertification Date: 17   Physician Follow-Up: Eve Gill - 3/1/23    Physician Orders: Cognitive and higher level testing as patient's family is noticing patient having word finding difficulties after a fall at work . Pertinent History: Patient presents today ambulating with a cane after a fall at work . States that he slipped on oil and fell at work hitting his head on concrete. Reports he had a hard hat on when he fell. Patient went to the emergency room when he fell, but did not have images. Patient has been experiencing more headaches since the fall. Reports his family has noticed that during conversation he says the wrong words that don't go with the conversation that he is having. Patient reports he has dizziness that comes and goes and reports just recently he has had some blurred vision that comes and goes. Patient reports he can't come up with the words he wants to say and he doesn't recall others telling him some things.        SUBJECTIVE: States he has a 10/10 headache this date with muscle spasms on his neck and back. Encouraged patient to reach out to neurology if these headaches persist.     SHORT TERM GOAL #1:  Goal 1: Patient will complete selective, alternating and divided attention task with no more than 2 errors or redirects to task for improved success with IADL's and eventual return to work. INTERVENTIONS:  Alternating Attention: Patient asked to alternate between stating a letter of the alphabet and a number (ex: A-1, B-2, C-3). Required initial cues to begin. Patient then completed A-M with sequential numbering with x4 errors. After providinh \"M-13\", patient reported \"I can't think of anything that would come next. \" ST then provided patient with a paper and instructed patient to write the alphabet on the left hand side of the page and numbers 1-26 on the right hand side of the page. As patient began to write the alphabet, he demonstrated x9 errors in the sequential order of the alphabet along with missing \"N\" and \"U\". Required mod-max cues throughout to have ST sing the \"ABCs\" while patient attempted to write in sequential order. Patient identified that this activity is similar to what he has to do at work. He states he must look at his pager that indicates a letter of the alphabet and a number as this is how their storage shelves are organized. Often, patient states he will get confused on the order of the alphabet. Encouraged patient to make a sheet sheet of the alphabet in the correct sequential order to keep in his pocket. He also reports to be easily distracted while he is ambulating to the correct storage unit. For example, he states if he has to look for \"R-19\", he will all of the sudden look up and be at \"X\" and then have to back track. ST inquired what his environment is like at work. 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  ST recommended patient attempt to keep his headphones in without the music playing to focus on the task at hand. Also, encouraged him to try to use the repeat it strategy the entire time he is ambulating to the storage unit. This conversation and ST's provision of compensatory strategies and external aids took the entire session. SHORT TERM GOAL #2:  Goal 2: Patient will complete delayed recall tasks and working memory tasks with up to 4 items with use of compensatory strategies with no more than min cues 80% of the time for improved recall of daily tasks and conversations. INTERVENTIONS: Not addressed specifically this date due to focus on additional goals. Patient does endorse difficulty with recalling which unit he is looking for if he becomes distracted. SHORT TERM GOAL #3:  Goal 3: Patient will demonstrate use of word finding strategies in conversation without cues at least 90% of the time for improved success conversing with family and friends. INTERVENTIONS: No word finding difficulty noted in conversation this session. LONG-TERM GOALS:  Long-term Goals  Timeframe for Long-term Goals: 6 weeks  Goal 1: Patient's Functional Outcome Measure Score (memory) will improve by at least 1 level (current level 4) for improved success completing IADL's. - ONGOING    Assessment: Progressing towards goals   Specific Interventions Next Treatment: attention, delayed recall, working memory, conversation tasks with good use of word-finding strategies. Activity/Treatment Tolerance:  [x]  Patient tolerated treatment well  []  Patient limited by fatigue  []  Patient limited by pain   []  Patient limited by other medical complications  []  Other:     Patient Education:   [x]  HEP/Education Completed: WRAP memory strategies, Word-finding strategies, rationale for tasks, will look into OT evaluation for vision  []  No new Education completed  [x]  Reviewed Prior HEP      [x]  Patient verbalized and/or demonstrated understanding of education provided.   []  Patient unable to verbalize and/or demonstrate understanding of education provided. Will continue education. []  Barriers to learning:    PLAN:  []  Plan of care initiated. Plan to see patient 2 times per week for 6 weeks to address the treatment planned outlined above.   [x]  Continue with current plan of care  []  Modify plan of care as follows:    []  Hold pending physician visit  []  Discharge    Time In 0900   Time Out 0931   Timed Code Minutes: 31 min   Total Treatment Time: 31 min       [address] [facility], [address] [place] [address]

## 2022-09-08 ENCOUNTER — CARE COORDINATION (OUTPATIENT)
Dept: CARE COORDINATION | Age: 52
End: 2022-09-08

## 2022-09-08 ENCOUNTER — HOSPITAL ENCOUNTER (OUTPATIENT)
Dept: SPEECH THERAPY | Age: 52
Setting detail: THERAPIES SERIES
Discharge: HOME | End: 2022-09-08
Payer: MEDICAID

## 2022-09-08 DIAGNOSIS — D50.9 IRON DEFICIENCY ANEMIA, UNSPECIFIED IRON DEFICIENCY ANEMIA TYPE: ICD-10-CM

## 2022-09-08 PROCEDURE — 97129 THER IVNTJ 1ST 15 MIN: CPT | Performed by: SPEECH-LANGUAGE PATHOLOGIST

## 2022-09-08 PROCEDURE — 97130 THER IVNTJ EA ADDL 15 MIN: CPT | Performed by: SPEECH-LANGUAGE PATHOLOGIST

## 2022-09-08 RX ORDER — FERROUS SULFATE 325(65) MG
TABLET ORAL
Qty: 90 TABLET | Refills: 3 | Status: SHIPPED | OUTPATIENT
Start: 2022-09-08

## 2022-09-08 NOTE — PROGRESS NOTES
[address] [facility]  [] SPEECH LANGUAGE COGNITIVE EVALUATION  [x] DAILY NOTE   [] PROGRESS NOTE [] DISCHARGE NOTE    [x] OUTPATIENT REHABILITATION [facility]   [] [place] [address]    [] [facility]   [] [facility]ie [facility]    Date: 2022  Patient Name:  Jaquelin Calderon  : 1970  MRN: [id number]  CSN: [id number]    Referring Practitioner MIRNA Perkins - CNP   Diagnosis Other speech disturbances [R47.89]    Treatment Diagnosis Cognitive deficits, expressive language deficits   Date of Evaluation 22      Functional Outcome Measure Used TATUM NOMS: memory   Functional Outcome Score Level 4 (22)       Insurance: Primary: Payor: Yoav Gardner /  /  / ,   Secondary: Eladio Mend: No precert required   Visit # 6, 6/10 for progress note   Visits Allowed: No visit limit   Recertification Date:    Physician Follow-Up: Rufino Puentes - 3/1/23    Physician Orders: Cognitive and higher level testing as patient's family is noticing patient having word finding difficulties after a fall at work . Pertinent History: Patient presents today ambulating with a cane after a fall at work . States that he slipped on oil and fell at work hitting his head on concrete. Reports he had a hard hat on when he fell. Patient went to the emergency room when he fell, but did not have images. Patient has been experiencing more headaches since the fall. Reports his family has noticed that during conversation he says the wrong words that don't go with the conversation that he is having. Patient reports he has dizziness that comes and goes and reports just recently he has had some blurred vision that comes and goes. Patient reports he can't come up with the words he wants to say and he doesn't recall others telling him some things.        SUBJECTIVE: Patient reports his vision was \"going in and out\" while completing some of the paper/pencil tasks this session. Patient also reported some pain in his neck that he rated as 9.5/10. Patient is scheduled to have an OT evaluation for his vision on 9/28/22. SHORT TERM GOAL #1:  Goal 1: Patient will complete selective, alternating and divided attention task with no more than 2 errors or redirects to task for improved success with IADL's and eventual return to work. INTERVENTIONS:  Alternating Attention tasks:   Patient asked to alternate between circling the even numbers in 1 row and the odd numbers in the second row and then continue to alternate row by row throughout stimulus sheet #8 of the APT. Patient completed this task with 6 errors within 5 minutes and 40 seconds. Patient had to pause a few times during this task because of pain in his neck and some vision difficulties. Patient completed the Trail Making Test Part B this session. Patient was asked to alternate between connecting the dots going from a number to a letter in ascending order. Patient initially started with a capital \"i\" rather than number 1. Once redirected to the correct number, patient completed the rest of the task with 3 errors, but self-corrected with 1 error. Patient completed the initial task within a functional time, however, required double the time to successfully correct. After completion of the above task, patient explained to this ST in detail what he needs to do at work that includes numbers and letters. Patient initially had difficulty \"laying\" out the floor of machines at work as he did it backwards. Patient reports he will sometimes go the opposite direction to find a specific machine with a letter-number combination. Patient reports he initially went back to work x 1 week after he found out he had a pituitary growth. He then had the fall at home and went back to work x 1 day.   Patient reported he noticed increased difficulty focusing when it was more busy/noisier ever since he was diagnosed with the pituitary growth. Patient reports he has noticed being disoriented at times. SHORT TERM GOAL #2:  Goal 2: Patient will complete delayed recall tasks and working memory tasks with up to 4 items with use of compensatory strategies with no more than min cues 80% of the time for improved recall of daily tasks and conversations. INTERVENTIONS: Not addressed specifically this date due to focus on additional goals. SHORT TERM GOAL #3:  Goal 3: Patient will demonstrate use of word finding strategies in conversation without cues at least 90% of the time for improved success conversing with family and friends. INTERVENTIONS: No word finding difficulty noted in conversation this session. Patient explained the set up of the machines at his work without difficulty. LONG-TERM GOALS:  Long-term Goals  Timeframe for Long-term Goals: 6 weeks  Goal 1: Patient's Functional Outcome Measure Score (memory) will improve by at least 1 level (current level 4) for improved success completing IADL's. - ONGOING    Assessment: Progressing towards goals   Specific Interventions Next Treatment: attention, delayed recall, working memory, conversation tasks with good use of word-finding strategies. Activity/Treatment Tolerance:  [x]  Patient tolerated treatment well  []  Patient limited by fatigue  []  Patient limited by pain   []  Patient limited by other medical complications  []  Other:     Patient Education:   [x]  HEP/Education Completed: strategies for improved attention, rationale for tasks  []  No new Education completed  [x]  Reviewed Prior HEP      [x]  Patient verbalized and/or demonstrated understanding of education provided. []  Patient unable to verbalize and/or demonstrate understanding of education provided. Will continue education. []  Barriers to learning:    PLAN:  []  Plan of care initiated.   Plan to see patient 2 times per week for 6 weeks to address the treatment planned outlined above.   [x]  Continue with current plan of care  []  Modify plan of care as follows:    []  Hold pending physician visit  []  Discharge    Time In 0930   Time Out 1000   Timed Code Minutes: 30 min   Total Treatment Time: 30 min         Eligio Rosario M.S. [id number] [place] [address]

## 2022-09-08 NOTE — CARE COORDINATION
Ambulatory Care Coordination Note  9/8/2022    ACC: Roel Miles RN    Summary Note: Spoke with Gardenia Blanchard for continued Care Coordination follow up  States he is stable for this review  Continues to work with [facility] and has follow up appt in place  Informed Gardenia Blanchard that I would be discharging him at this time due to goals completed and no new barriers  Encouraged him to keep my contact information for any new barriers or concerns        Lab Results       None            Care Coordination Interventions    Program Enrollment: Complex Care  Referral from Primary Care Provider: No  [facility] Interventions and [facility]: In Process (Comment: referral placed 3-23-22)  Home Health Services: Not Started  Medication Assistance Program: Not Started  Occupational Therapy: Not Started  Physical Therapy: Not Started  Registered Dietician: Completed (Comment: appt with RD scheduled)  Social Work: Not Started  Zone Management Tools: Not Started  Other Services or Interventions: will be working with [facility] s/p fall at work          Goals Addressed                      This Visit's Progress      Conditions and Symptoms (pt-stated)   On track      I will schedule office visits, as directed by my provider. I will keep my appointment or reschedule if I have to cancel. I will notify my provider of any barriers to my plan of care. I will notify my provider of any symptoms that indicate a worsening of my condition.     Barriers: need for additional support and education  Plan for overcoming my barriers: family and ACM support  Confidence: 8/10  Anticipated Goal Completion Date: 6/22/22 Update 8/20/22 Update 9/26/22          Reduce Falls  (pt-stated)   On track      I will reduce my risk of falls by the following:  s/p fall at work slipping in oil    Barriers: need for additional support and education  Plan for overcoming my barriers: family and ACM support  Confidence: 8/10  Anticipated Goal Completion Date: 9-13-22              Prior to Admission medications    Medication Sig Start Date End Date Taking? Authorizing Provider   FEROSUL 325 (65 Fe) MG tablet take 1 tablet by mouth once daily 9/8/22   MIRNA Recinos CNP   HYDROcodone-acetaminophen (NORCO) 5-325 MG per tablet Take 1 tablet by mouth every 8 hours as needed for Pain for up to 30 days.  9/3/22 10/3/22  Larry PunchMIRNA CNP   triamcinolone (KENALOG) 0.1 % cream APPLY TO AFFECTED AREA TWICE A DAY 8/17/22   MIRNA Recinos CNP   simethicone (MYLICON) 80 MG chewable tablet Take 1 tablet by mouth 4 times daily as needed for Flatulence 7/29/22   MIRNA Recinos CNP   omeprazole (PRILOSEC) 20 MG delayed release capsule Take 1 capsule by mouth every morning (before breakfast) 7/29/22   MIRNA Recinos CNP   potassium chloride (KLOR-CON M) 10 MEQ extended release tablet take 1 tablet by mouth daily 7/14/22   Carlos Enrique Rodriguez MD   butalbital-acetaminophen-caffeine (FIORICET, ESGIC) -40 MG per tablet take 1 tablet by mouth every 4 hours if needed for headache 7/11/22   MIRNA Recinos CNP   aspirin EC 81 MG EC tablet Take 1 tablet by mouth daily 7/1/22   Carlos Enrique Rodriguez MD   amitriptyline (ELAVIL) 50 MG tablet take 1 tablet by mouth at bedtime 5/31/22   MIRNA Reicnos CNP   rosuvastatin (CRESTOR) 20 MG tablet take 1 tablet by mouth once daily 5/18/22   MIRNA Recinos CNP   BANOPHEN 25 MG capsule take 1 capsule by mouth every 6 hours if needed for itching 5/11/22   MIRNA Recinos CNP   ergocalciferol (DRISDOL) 1.25 MG (52800 UT) capsule Take 1 capsule by mouth once a week 5/4/22   Sabrina Wood MD   albuterol sulfate  (90 Base) MCG/ACT inhaler Inhale 2 puffs into the lungs 4 times daily as needed for Wheezing 4/26/22   MIRNA Recinos CNP   hydroCHLOROthiazide (HYDRODIURIL) 25 MG tablet Take 1 tablet by mouth every morning 4/26/22   MIRNA Recinos - CNP   pantoprazole (PROTONIX) 40 MG tablet Take 1 tablet by mouth every morning (before breakfast) 4/2/22   MIRNA Wood CNP   betamethasone valerate (VALISONE) 0.1 % cream apply to affected area twice a day 3/24/22   MIRNA Alcala CNP   Blood Pressure Monitoring (B-D ASSURE BPM/AUTO ARM CUFF) MISC 1 Units by Does not apply route daily 3/22/22   MIRNA Alcala CNP   Cholecalciferol (VITAMIN D3) 1.25 MG (72859 UT) CAPS 1 capsule 2 times a week , Monday and Friday 3/20/22   Nat Louis MD   budesonide-formoterol [facility]) 160-4.5 MCG/ACT AERO 2 puffs inhaled twice daily. Rinse mouth well after use. 2/17/22   MIRNA Alcala CNP   ondansetron (ZOFRAN-ODT) 4 MG disintegrating tablet Take 1 tablet by mouth 3 times daily as needed for Nausea or Vomiting 2/1/22   MIRNA Alcala CNP   albuterol (PROVENTIL) (2.5 MG/3ML) 0.083% nebulizer solution inhale contents of 1 vial ( 3 milliliters ) in nebulizer by mouth and INTO THE LUNGS every 4 hours if needed for wheezing 1/31/22   MIRNA Alcala CNP   Respiratory Therapy Supplies (NEBULIZER/TUBING/MOUTHPIECE) KIT Dispense tubing, mask, and mouthpiece for nebulizer machine.  Dx: Asthma 1/3/22   MIRNA Alcala CNP   dicyclomine (BENTYL) 10 MG capsule Take 1 capsule by mouth 3 times daily as needed (abd cramping) 12/9/21   Lyndal Babinski, APRN - CNP   loratadine (CLARITIN) 10 MG tablet take 1 tablet by mouth once daily 11/29/21   MIRNA Alcala CNP   montelukast (SINGULAIR) 10 MG tablet Take 1 tablet by mouth nightly 11/17/21   MIRNA Alcala CNP   fluticasone [facility]) 50 MCG/ACT nasal spray 2 sprays by Each Nostril route daily 11/17/21   MIRNA Alcala CNP   azelastine (OPTIVAR) 0.05 % ophthalmic solution Place 1 drop into both eyes 2 times daily 8/19/21   Jojo Hernandez APRN - CNP   EPINEPHrine (EPIPEN 2-RAGHAV) 0.3 MG/0.3ML SOAJ injection Inject one pen as directed STAT for allergic reaction, may disp generic [address] [id number] 2/25/21 MIRNA Stokes CNP   tiotropium (SPIRIVA RESPIMAT) 1.25 MCG/ACT AERS inhaler Inhale 2 puffs into the lungs daily 11/18/20   MIRNA Stokes CNP   cetirizine (ZYRTEC) 10 MG tablet Take 1 tablet by mouth daily 8/31/20   MIRNA Stokes CNP   bumetanide (BUMEX) 1 MG tablet Take 0.5 tablets by mouth daily 5/28/20   MIRNA Das CNP   NARCAN 4 MG/0.1ML LIQD nasal spray  1/10/20   Historical Provider, MD   Multiple Vitamins-Minerals (MULTIVITAMIN ADULTS) TABS Take 1 tablet by mouth daily 1/9/20   MIRNA Apple CNP   ergocalciferol (ERGOCALCIFEROL) 58201 units capsule Take 50,000 Units by mouth as needed     Historical Provider, MD       Future Appointments   Date Time Provider Jesus Simmons   9/12/2022 11:15 AM MIRNA Terrell CNP N [facility] Pain P - [place]   9/14/2022  8:00 AM GRACIE Tristan [address]   9/16/2022  8:00 AM GRACIE Castellanos [facility] [place] [facility]   9/28/2022  8:15 AM Faraz Arguelles OT STRZ OT [address] [facility]   2/28/2023  2:00 PM STR MRI RM1 STRZ MRI STR Radiolog   3/1/2023  9:30 AM IVAN Silverman [facility] [place]   3/22/2023  1:45 PM Karen Fong MD N [facility] Heart P - [place]

## 2022-09-08 NOTE — CARE COORDINATION
Georgeanna Buerger have been working with Teresita Hunter on [employer] program to provide support and education to help manage chronic conditions. He has met those goals and all education has been completed with no new barriers noted. I would like to graduate him from Care Coordination at this time pending PCP approval.  Do you approve of this discharge? Please advise. Thank you.

## 2022-09-08 NOTE — TELEPHONE ENCOUNTER
Request sent from [facility] for refill of ferrous sulfate qd. Last seen 5/10/22, no future appt scheduled. Med verified. Order pended.

## 2022-09-09 DIAGNOSIS — H10.10 ALLERGIC RHINOCONJUNCTIVITIS: ICD-10-CM

## 2022-09-09 DIAGNOSIS — J30.9 ALLERGIC RHINOCONJUNCTIVITIS: ICD-10-CM

## 2022-09-09 DIAGNOSIS — J45.50 SEVERE PERSISTENT ASTHMA WITHOUT COMPLICATION: ICD-10-CM

## 2022-09-09 RX ORDER — AZELASTINE HYDROCHLORIDE 0.5 MG/ML
SOLUTION/ DROPS OPHTHALMIC
Qty: 1 EACH | Refills: 11 | Status: SHIPPED | OUTPATIENT
Start: 2022-09-09

## 2022-09-09 RX ORDER — ALBUTEROL SULFATE 2.5 MG/3ML
SOLUTION RESPIRATORY (INHALATION)
Qty: 375 ML | Refills: 3 | Status: SHIPPED | OUTPATIENT
Start: 2022-09-09

## 2022-09-09 NOTE — TELEPHONE ENCOUNTER
This medication refill is regarding a electronic request.  Refill requested by  [employer] .    Requested Prescriptions     Pending Prescriptions Disp Refills    azelastine (OPTIVAR) 0.05 % ophthalmic solution [Pharmacy Med Name: AZELASTINE HCL 0.05% DROPS] 1 each 11     Sig: instill 1 drop into both eyes twice a day    albuterol (PROVENTIL) (2.5 MG/3ML) 0.083% nebulizer solution [Pharmacy Med Name: ALBUTEROL SUL 2.5 MG/3 ML SOLN] 375 mL 3     Sig: inhale contents of 1 vial ( 3 milliliters ) in nebulizer by mouth and INTO THE LUNGS every 4 hours if needed for wheezing     Date of last visit: 5/10/2022   Date of next visit: None  Date of last refill:    -Albuterol Nebulizer 1/31/22 #375mL/3  -Azelastine 8/199/21 #1/11    Rx verified, ordered and set to EP.

## 2022-09-12 ENCOUNTER — OFFICE VISIT (OUTPATIENT)
Dept: PHYSICAL MEDICINE AND REHAB | Age: 52
End: 2022-09-12
Payer: MEDICAID

## 2022-09-12 ENCOUNTER — TELEPHONE (OUTPATIENT)
Dept: PHYSICAL MEDICINE AND REHAB | Age: 52
End: 2022-09-12

## 2022-09-12 VITALS
WEIGHT: 315 LBS | HEIGHT: 73 IN | BODY MASS INDEX: 41.75 KG/M2 | SYSTOLIC BLOOD PRESSURE: 136 MMHG | DIASTOLIC BLOOD PRESSURE: 84 MMHG

## 2022-09-12 DIAGNOSIS — M50.30 DDD (DEGENERATIVE DISC DISEASE), CERVICAL: ICD-10-CM

## 2022-09-12 DIAGNOSIS — M47.816 LUMBAR SPONDYLOSIS: Primary | ICD-10-CM

## 2022-09-12 DIAGNOSIS — M54.12 CERVICAL RADICULOPATHY: ICD-10-CM

## 2022-09-12 DIAGNOSIS — M54.17 LUMBOSACRAL RADICULITIS: ICD-10-CM

## 2022-09-12 DIAGNOSIS — M54.50 LUMBAR PAIN: ICD-10-CM

## 2022-09-12 DIAGNOSIS — Z98.1 HX OF FUSION OF CERVICAL SPINE: ICD-10-CM

## 2022-09-12 DIAGNOSIS — M51.36 LUMBAR DEGENERATIVE DISC DISEASE: ICD-10-CM

## 2022-09-12 DIAGNOSIS — M62.838 SPASM OF MUSCLE: ICD-10-CM

## 2022-09-12 DIAGNOSIS — Z98.1 S/P CERVICAL SPINAL FUSION: ICD-10-CM

## 2022-09-12 DIAGNOSIS — G89.4 CHRONIC PAIN SYNDROME: ICD-10-CM

## 2022-09-12 PROCEDURE — 3017F COLORECTAL CA SCREEN DOC REV: CPT | Performed by: NURSE PRACTITIONER

## 2022-09-12 PROCEDURE — 99214 OFFICE O/P EST MOD 30 MIN: CPT | Performed by: NURSE PRACTITIONER

## 2022-09-12 PROCEDURE — G8417 CALC BMI ABV UP PARAM F/U: HCPCS | Performed by: NURSE PRACTITIONER

## 2022-09-12 PROCEDURE — G8427 DOCREV CUR MEDS BY ELIG CLIN: HCPCS | Performed by: NURSE PRACTITIONER

## 2022-09-12 PROCEDURE — 1036F TOBACCO NON-USER: CPT | Performed by: NURSE PRACTITIONER

## 2022-09-12 RX ORDER — GABAPENTIN 300 MG/1
300 CAPSULE ORAL NIGHTLY
Qty: 30 CAPSULE | Refills: 0 | Status: SHIPPED | OUTPATIENT
Start: 2022-09-12 | End: 2022-10-10

## 2022-09-12 ASSESSMENT — ENCOUNTER SYMPTOMS
GASTROINTESTINAL NEGATIVE: 1
SHORTNESS OF BREATH: 0
BACK PAIN: 1
PHOTOPHOBIA: 1
APNEA: 1
COUGH: 0

## 2022-09-12 NOTE — PROGRESS NOTES
[facility] [address]  Dept: [phone]  Dept Fax: [phone]: [phone]    Visit Date: 9/12/2022    Functionality Assessment/Goals Worksheet     On a scale of 0 (Does not Interfere) to 10 (Completely Interferes)     1. Which number describes how during the past week pain has interfered with       the following:  A. General Activity:  10  B. Mood: 10  C. Walking Ability:  10  D. Normal Work (Includes both work outside the home and housework):  10  E. Relations with Other People:   10  F. Sleep:   10  G. Enjoyment of Life:   10    2. Patient Prefers to Take their Pain Medications:     [x]  On a regular basis   []  Only when necessary    []  Does not take pain medications    3. What are the Patient's Goals/Expectations for Visiting Pain Management? []  Learn about my pain    [x]  Receive Medication   []  Physical Therapy     []  Treat Depression   [x]  Receive Injections    []  Treat Sleep   []  Deal with Anxiety and Stress   []  Treat Opoid Dependence/Addiction   []  Other:      HPI:   Argelia Espinosa is a 50 y.o. male is here today for    Chief Complaint: Back pain, neck pain, muscle spasms, joint pain     HPI   2.5 month FU. Patient has complaints of pain all over. joint pain, neck pain, low back pain, muscle spasms all over. Pain in low back is stabbing burning stabbing pinching pain. Main complaint is spasms throughout back, abdomen, and legs     Continues to heal from surgery of right knee from July after a fall at work. Leg immobilizer today- continues to have right knee pain     Mood is flat as he is always in pain   Pain increases with bending, lifting, twisting , walking, standing, getting up and down, and housework or working at job. Current pain medications help take the edge off of pain     Medications reviewed.  Patient denies side effects with medications. Patient states he is taking medications as prescribed. Hedenies receiving pain medications from other sources. He  1 to 2 ER visits since last visit     Pain scale with out pain medications or at its worst is 10/10. Pain scale with pain medications or at its best is 6-9/10. Last dose of Norco and was today   Drug screen reviewed from 6/22/2022 and was appropriate was discussed   Pill count completed  today and WNL: Yes      The patientis allergic to dilantin [phenytoin], lyrica [pregabalin], dupixent [dupilumab], oxycodone, and valium [diazepam]. Subjective:      Review of Systems   Constitutional:  Positive for activity change and fatigue. Negative for chills, fever and unexpected weight change. HENT: Negative. Eyes:  Positive for photophobia and visual disturbance. Respiratory:  Positive for apnea. Negative for cough and shortness of breath. Wears cpap at night    Cardiovascular:  Positive for leg swelling. Gastrointestinal: Negative. Endocrine: Negative. Genitourinary: Negative. Musculoskeletal:  Positive for arthralgias, back pain, gait problem, joint swelling, myalgias, neck pain and neck stiffness. Ambulating with strait cane    Skin: Negative. Neurological:  Positive for weakness, numbness and headaches. Psychiatric/Behavioral:  Positive for dysphoric mood and sleep disturbance. The patient is not nervous/anxious. Objective:     Vitals:    09/12/22 1107   BP: 136/84   Weight: (!) 432 lb (196 kg)   Height: 6' 1\" (1.854 m)       Physical Exam  Vitals and nursing note reviewed. Constitutional:       General: He is not in acute distress. Appearance: He is obese. He is not diaphoretic. HENT:      Head: Normocephalic and atraumatic. Right Ear: External ear normal.      Left Ear: External ear normal.      Nose: Nose normal.      Mouth/Throat:      Mouth: Mucous membranes are moist.      Pharynx: No oropharyngeal exudate.    Eyes: General: No scleral icterus. Right eye: No discharge. Left eye: No discharge. Conjunctiva/sclera: Conjunctivae normal.      Pupils: Pupils are equal, round, and reactive to light. Neck:      Thyroid: No thyromegaly. Cardiovascular:      Rate and Rhythm: Normal rate and regular rhythm. Heart sounds: Normal heart sounds. No murmur heard. No friction rub. No gallop. Pulmonary:      Effort: Pulmonary effort is normal. No respiratory distress. Breath sounds: Normal breath sounds. No wheezing or rales. Chest:      Chest wall: No tenderness. Abdominal:      General: Bowel sounds are normal. There is no distension. Palpations: Abdomen is soft. Tenderness: There is no abdominal tenderness. There is no guarding or rebound. Musculoskeletal:         General: Swelling and tenderness present. Cervical back: Normal range of motion. Rigidity, spasms, tenderness and bony tenderness present. No edema or erythema. Pain with movement present. No muscular tenderness. Normal range of motion. Thoracic back: Spasms, tenderness and bony tenderness present. Lumbar back: Spasms, tenderness and bony tenderness present. Decreased range of motion. Positive right straight leg raise test and positive left straight leg raise test.        Back:       Right knee: Swelling and bony tenderness present. Decreased range of motion. Tenderness present. Left knee: Bony tenderness present. Tenderness present. Right lower leg: Swelling present. Edema present. Left lower leg: Swelling present. Edema present. Right ankle: Swelling present. Left ankle: Swelling present. Legs:    Skin:     General: Skin is warm. Coloration: Skin is not pale. Findings: No erythema or rash. Neurological:      General: No focal deficit present. Mental Status: He is alert and oriented to person, place, and time. He is not disoriented. Cranial Nerves:  No cranial nerve deficit. Sensory: Sensory deficit present. Motor: Weakness present. No atrophy or abnormal muscle tone. Coordination: Coordination normal.      Gait: Gait abnormal.      Comments: Motor 4/5 lower extremities, 5/5 upper    Psychiatric:         Attention and Perception: Attention normal. He is attentive. Mood and Affect: Mood normal. Mood is not anxious or depressed. Affect is not labile, blunt, angry or inappropriate. Speech: Speech normal. He is communicative. Speech is not rapid and pressured, delayed, slurred or tangential.         Behavior: Behavior normal. Behavior is not agitated, slowed, aggressive, withdrawn, hyperactive or combative. Behavior is cooperative. Thought Content: Thought content normal. Thought content is not paranoid or delusional. Thought content does not include homicidal or suicidal ideation. Thought content does not include homicidal or suicidal plan. Cognition and Memory: Cognition normal. Memory is not impaired. He does not exhibit impaired recent memory or impaired remote memory. Judgment: Judgment normal. Judgment is not impulsive or inappropriate. VAL  Patricks test  positive  Yeoman's  or Gaenslen's positive       Assessment:     1. Lumbar spondylosis    2. Lumbar degenerative disc disease    3. Lumbar pain    4. Lumbosacral radiculitis    5. Cervical radiculopathy    6. DDD (degenerative disc disease), cervical    7. Hx of fusion of cervical spine    8. S/P cervical spinal fusion    9. Spasm of muscle    10. Chronic pain syndrome            Plan:      OARRS reviewed. Current MED: 15.00  Patient was offered naloxone for home. Discussed long term side effects of medications, tolerance, dependency and addiction. Previous UDS reviewed  UDS preformed today for compliance. Patient told can not receive any pain medications from any other source. No evidence of abuse, diversion or aberrant behavior.   Medications and/or procedures to improve function and quality of life- patient understanding with this and that may not be pain free  Discussed with patient about safe storage of medications at home  Discussed possible weaning of medication dosing dependent on treatment/procedure results. Discussed with patient about risks with procedure including infection, reaction to medication, increased pain, or bleeding. Continues to heal from right knee surgery   Received 80% relief of low back pain and also relief of leg pain from bilateral L-facet MBB # 2 for 3 weeks with improvement of mobility and activity. Plan Bilateral L-facet RFA @ L3-4, L4-5, and L5-S1 for longer term pain relief. Procedure discussed in detail. Discussed possible LESI, trigger point injections in future   Continue current pain medications at this time while awaiting procedures- [place] 5/325 TID prn- filled 9/3/2022  Continue Norflex- has plenty, Continue Fioricet prn headaches from pcp  Had reaction with Lyrica. Trial Neurontin again 300 mg at bedtime- ordered 30 days   Continue Lidoderm ointment and pain cream - has plenty   Continue to follow with neurosurgery   If patient is on blood thinners will need approval to hold yes on baby aspirin. Needs clearance before procedure and also clearance from knee surgeon       Meds. Prescribed:   Orders Placed This Encounter   Medications    gabapentin (NEURONTIN) 300 MG capsule     Sig: Take 1 capsule by mouth at bedtime for 30 days. Dispense:  30 capsule     Refill:  0         Return for Bilateral L-facet RFA @ L3-4, L4-5, and L5-S1. , follow up after procedure.                Electronically signed by MIRNA Borden - CNP on9/12/2022 at 11:33 AM

## 2022-09-13 ENCOUNTER — APPOINTMENT (OUTPATIENT)
Dept: CT IMAGING | Age: 52
End: 2022-09-13
Payer: COMMERCIAL

## 2022-09-13 ENCOUNTER — HOSPITAL ENCOUNTER (EMERGENCY)
Age: 52
Discharge: HOME | End: 2022-09-13
Attending: EMERGENCY MEDICINE
Payer: COMMERCIAL

## 2022-09-13 VITALS
TEMPERATURE: 98.2 F | BODY MASS INDEX: 41.75 KG/M2 | DIASTOLIC BLOOD PRESSURE: 99 MMHG | SYSTOLIC BLOOD PRESSURE: 182 MMHG | OXYGEN SATURATION: 97 % | HEART RATE: 74 BPM | RESPIRATION RATE: 19 BRPM | WEIGHT: 315 LBS | HEIGHT: 73 IN

## 2022-09-13 DIAGNOSIS — R10.84 GENERALIZED ABDOMINAL PAIN: Primary | ICD-10-CM

## 2022-09-13 DIAGNOSIS — R16.0 LIVER MASS: ICD-10-CM

## 2022-09-13 LAB
ALBUMIN SERPL-MCNC: 3.9 G/DL (ref 3.5–5.1)
ALP BLD-CCNC: 147 U/L (ref 38–126)
ALT SERPL-CCNC: 20 U/L (ref 11–66)
ANION GAP SERPL CALCULATED.3IONS-SCNC: 11 MEQ/L (ref 8–16)
AST SERPL-CCNC: 18 U/L (ref 5–40)
BASOPHILS # BLD: 0.7 %
BASOPHILS ABSOLUTE: 0 THOU/MM3 (ref 0–0.1)
BILIRUB SERPL-MCNC: 0.3 MG/DL (ref 0.3–1.2)
BILIRUBIN DIRECT: < 0.2 MG/DL (ref 0–0.3)
BUN BLDV-MCNC: 10 MG/DL (ref 7–22)
CALCIUM SERPL-MCNC: 8.9 MG/DL (ref 8.5–10.5)
CHLORIDE BLD-SCNC: 101 MEQ/L (ref 98–111)
CO2: 25 MEQ/L (ref 23–33)
CREAT SERPL-MCNC: 0.9 MG/DL (ref 0.4–1.2)
EOSINOPHIL # BLD: 3 %
EOSINOPHILS ABSOLUTE: 0.2 THOU/MM3 (ref 0–0.4)
ERYTHROCYTE [DISTWIDTH] IN BLOOD BY AUTOMATED COUNT: 12.1 % (ref 11.5–14.5)
ERYTHROCYTE [DISTWIDTH] IN BLOOD BY AUTOMATED COUNT: 41.6 FL (ref 35–45)
GFR SERPL CREATININE-BSD FRML MDRD: > 90 ML/MIN/1.73M2
GLUCOSE BLD-MCNC: 105 MG/DL (ref 70–108)
HCT VFR BLD CALC: 38.9 % (ref 42–52)
HEMOGLOBIN: 12.5 GM/DL (ref 14–18)
IMMATURE GRANS (ABS): 0.01 THOU/MM3 (ref 0–0.07)
IMMATURE GRANULOCYTES: 0.2 %
LIPASE: 19.9 U/L (ref 5.6–51.3)
LYMPHOCYTES # BLD: 29.3 %
LYMPHOCYTES ABSOLUTE: 1.7 THOU/MM3 (ref 1–4.8)
MAGNESIUM: 1.9 MG/DL (ref 1.6–2.4)
MCH RBC QN AUTO: 30.3 PG (ref 26–33)
MCHC RBC AUTO-ENTMCNC: 32.1 GM/DL (ref 32.2–35.5)
MCV RBC AUTO: 94.4 FL (ref 80–94)
MONOCYTES # BLD: 11.7 %
MONOCYTES ABSOLUTE: 0.7 THOU/MM3 (ref 0.4–1.3)
NUCLEATED RED BLOOD CELLS: 0 /100 WBC
OSMOLALITY CALCULATION: 273.2 MOSMOL/KG (ref 275–300)
PLATELET # BLD: 218 THOU/MM3 (ref 130–400)
PMV BLD AUTO: 10.5 FL (ref 9.4–12.4)
POTASSIUM REFLEX MAGNESIUM: 3.5 MEQ/L (ref 3.5–5.2)
RBC # BLD: 4.12 MILL/MM3 (ref 4.7–6.1)
SEG NEUTROPHILS: 55.1 %
SEGMENTED NEUTROPHILS ABSOLUTE COUNT: 3.3 THOU/MM3 (ref 1.8–7.7)
SODIUM BLD-SCNC: 137 MEQ/L (ref 135–145)
TOTAL PROTEIN: 6.8 G/DL (ref 6.1–8)
WBC # BLD: 5.9 THOU/MM3 (ref 4.8–10.8)

## 2022-09-13 PROCEDURE — 83690 ASSAY OF LIPASE: CPT

## 2022-09-13 PROCEDURE — 83735 ASSAY OF MAGNESIUM: CPT

## 2022-09-13 PROCEDURE — 96372 THER/PROPH/DIAG INJ SC/IM: CPT

## 2022-09-13 PROCEDURE — 74174 CTA ABD&PLVS W/CONTRAST: CPT

## 2022-09-13 PROCEDURE — 6360000004 HC RX CONTRAST MEDICATION: Performed by: EMERGENCY MEDICINE

## 2022-09-13 PROCEDURE — 85025 COMPLETE CBC W/AUTO DIFF WBC: CPT

## 2022-09-13 PROCEDURE — 99285 EMERGENCY DEPT VISIT HI MDM: CPT

## 2022-09-13 PROCEDURE — 80076 HEPATIC FUNCTION PANEL: CPT

## 2022-09-13 PROCEDURE — 6360000002 HC RX W HCPCS: Performed by: STUDENT IN AN ORGANIZED HEALTH CARE EDUCATION/TRAINING PROGRAM

## 2022-09-13 PROCEDURE — 80048 BASIC METABOLIC PNL TOTAL CA: CPT

## 2022-09-13 RX ORDER — DROPERIDOL 2.5 MG/ML
1.25 INJECTION, SOLUTION INTRAMUSCULAR; INTRAVENOUS EVERY 6 HOURS PRN
Status: DISCONTINUED | OUTPATIENT
Start: 2022-09-13 | End: 2022-09-13 | Stop reason: HOSPADM

## 2022-09-13 RX ADMIN — DROPERIDOL 1.25 MG: 2.5 INJECTION, SOLUTION INTRAMUSCULAR; INTRAVENOUS at 02:19

## 2022-09-13 RX ADMIN — IOPAMIDOL 80 ML: 755 INJECTION, SOLUTION INTRAVENOUS at 03:08

## 2022-09-13 ASSESSMENT — ENCOUNTER SYMPTOMS
NAUSEA: 0
BACK PAIN: 1
DIARRHEA: 0
SORE THROAT: 0
VOMITING: 0
RHINORRHEA: 0
SHORTNESS OF BREATH: 0
ABDOMINAL PAIN: 1
CONSTIPATION: 0
COLOR CHANGE: 0
SINUS PAIN: 0
CHEST TIGHTNESS: 0
SINUS PRESSURE: 0
BACK PAIN: 0
COUGH: 0

## 2022-09-13 ASSESSMENT — PAIN DESCRIPTION - ORIENTATION: ORIENTATION: MID

## 2022-09-13 ASSESSMENT — PAIN - FUNCTIONAL ASSESSMENT: PAIN_FUNCTIONAL_ASSESSMENT: 0-10

## 2022-09-13 ASSESSMENT — PAIN DESCRIPTION - LOCATION: LOCATION: BACK;ABDOMEN

## 2022-09-13 ASSESSMENT — PAIN SCALES - GENERAL: PAINLEVEL_OUTOF10: 10

## 2022-09-13 NOTE — DISCHARGE INSTRUCTIONS
Please follow-up with your primary doctor to review your lab results and imaging results. As we discussed you may need to have an ultrasound of your liver based on the CAT scan. However, this is not something needs to be done the emergency department today. Continue your regularly scheduled pain medication and follow-up with your pain management physician as soon as possible.

## 2022-09-13 NOTE — ED PROVIDER NOTES
[facility] ENCOUNTER          Pt Name: Jennifer Brenner  MRN: [id number]  [place] 1970  Date of evaluation: 9/13/2022  Treating Resident Physician: Benjamin Esqueda DO  Supervising Physician: Daniel Morales    History obtained from chart review and the patient. CHIEF COMPLAINT       Chief Complaint   Patient presents with    Back Pain     Muscle spasms           HISTORY OF PRESENT ILLNESS    HPI  Jennifer Brenner is a 50 y.o. male who presents to the emergency department for evaluation of abdominal pain. Patient states this is chronic as he has significant muscle cramps and radiculopathy secondary to prior injury. Followed by pain management however today he was at the hospital began having muscle spasms overlying his stomach that worsen with movements. Describes it as a sharp and ripping pain with radiation to his back. Worse with any movement and Valsalva. Current pain 10 out of 10. Takes Norco at home, without relief. No associated chest pain, shortness of breath, nausea, vomiting, changes in bowel or bladder habits. No smoking history. Patient denies any drug use. The patient has no other acute complaints at this time. REVIEW OF SYSTEMS   Review of Systems   Constitutional:  Negative for activity change, chills and fever. HENT:  Negative for sinus pressure, sinus pain and sore throat. Eyes:  Negative for visual disturbance. Respiratory:  Negative for cough and shortness of breath. Cardiovascular:  Negative for chest pain and palpitations. Gastrointestinal:  Positive for abdominal pain. Negative for constipation, diarrhea, nausea and vomiting. Genitourinary:  Negative for difficulty urinating and dysuria. Musculoskeletal:  Positive for arthralgias and myalgias. Negative for back pain, neck pain and neck stiffness. Skin:  Negative for color change and rash.    Neurological:  Negative for dizziness, weakness, light-headedness, numbness and headaches. PAST MEDICAL AND SURGICAL HISTORY     Past Medical History:   Diagnosis Date    Aneurysm ([address]     pituitary gland    Asthma     Cardiomegaly     COVID-19 11/2021    [facility]    Fibromyalgia     CLARIBEL on CPAP      Past Surgical History:   Procedure Laterality Date    BACK SURGERY      KNEE SURGERY      lt    PAIN MANAGEMENT PROCEDURE Bilateral 02/11/2021    Bilateral L-facet MBB # 1 @ L3-4, L4-5, and L5-S1 performed by March Spatz, MD at [address] Bilateral 04/08/2021    Bilateral L-facet MBB # 2 @ L3-4, L4-5, and L5-S1 performed by March Spatz, MD at [address] Right 07/07/2022         MEDICATIONS     Current Facility-Administered Medications:     droperidol (INAPSINE) injection 1.25 mg, 1.25 mg, IntraMUSCular, Q6H PRN, Yoav Peaks, DO, 1.25 mg at 09/13/22 0219    Current Outpatient Medications:     gabapentin (NEURONTIN) 300 MG capsule, Take 1 capsule by mouth at bedtime for 30 days. , Disp: 30 capsule, Rfl: 0    azelastine (OPTIVAR) 0.05 % ophthalmic solution, instill 1 drop into both eyes twice a day, Disp: 1 each, Rfl: 11    albuterol (PROVENTIL) (2.5 MG/3ML) 0.083% nebulizer solution, inhale contents of 1 vial ( 3 milliliters ) in nebulizer by mouth and INTO THE LUNGS every 4 hours if needed for wheezing, Disp: 375 mL, Rfl: 3    FEROSUL 325 (65 Fe) MG tablet, take 1 tablet by mouth once daily, Disp: 90 tablet, Rfl: 3    HYDROcodone-acetaminophen (NORCO) 5-325 MG per tablet, Take 1 tablet by mouth every 8 hours as needed for Pain for up to 30 days. , Disp: 90 tablet, Rfl: 0    triamcinolone (KENALOG) 0.1 % cream, APPLY TO AFFECTED AREA TWICE A DAY, Disp: 1 each, Rfl: 2    simethicone (MYLICON) 80 MG chewable tablet, Take 1 tablet by mouth 4 times daily as needed for Flatulence, Disp: 180 tablet, Rfl: 3    omeprazole (PRILOSEC) 20 MG delayed release capsule, (NEBULIZER/TUBING/MOUTHPIECE) KIT, Dispense tubing, mask, and mouthpiece for nebulizer machine.  Dx: Asthma, Disp: 1 kit, Rfl: 0    dicyclomine (BENTYL) 10 MG capsule, Take 1 capsule by mouth 3 times daily as needed (abd cramping), Disp: 30 capsule, Rfl: 0    loratadine (CLARITIN) 10 MG tablet, take 1 tablet by mouth once daily, Disp: 90 tablet, Rfl: 3    montelukast (SINGULAIR) 10 MG tablet, Take 1 tablet by mouth nightly, Disp: 90 tablet, Rfl: 3    fluticasone (FLONASE) 50 MCG/ACT nasal spray, 2 sprays by Each Nostril route daily, Disp: 3 each, Rfl: 3    EPINEPHrine (EPIPEN 2-RAGHAV) 0.3 MG/0.3ML SOAJ injection, Inject one pen as directed STAT for allergic reaction, may disp generic NDC 78821-698-83, Disp: 1 each, Rfl: 0    tiotropium (SPIRIVA RESPIMAT) 1.25 MCG/ACT AERS inhaler, Inhale 2 puffs into the lungs daily, Disp: 1 Inhaler, Rfl: 1    cetirizine (ZYRTEC) 10 MG tablet, Take 1 tablet by mouth daily, Disp: 30 tablet, Rfl: 11    bumetanide (BUMEX) 1 MG tablet, Take 0.5 tablets by mouth daily, Disp: 30 tablet, Rfl: 0    NARCAN 4 MG/0.1ML LIQD nasal spray, , Disp: , Rfl:     Multiple Vitamins-Minerals (MULTIVITAMIN ADULTS) TABS, Take 1 tablet by mouth daily, Disp: 90 tablet, Rfl: 3    ergocalciferol (ERGOCALCIFEROL) 28624 units capsule, Take 50,000 Units by mouth as needed , Disp: , Rfl:       SOCIAL HISTORY     Social History     Social History Narrative    No barriers with transportation    No need for  support     Social History     Tobacco Use    Smoking status: Never    Smokeless tobacco: Never   Vaping Use    Vaping Use: Never used   Substance Use Topics    Alcohol use: Yes     Comment: occasionally    Drug use: No         ALLERGIES     Allergies   Allergen Reactions    Dilantin [Phenytoin] Anaphylaxis and Swelling    Lyrica [Pregabalin] Other (See Comments)     Bleeding in bowels    Dupixent [Dupilumab]      HIVES, THROAT ITCHING    Oxycodone      THROAT TIGHTNESS    Valium [Diazepam]          FAMILY HISTORY     Family History   Problem Relation Age of Onset    High Blood Pressure Mother     Emphysema Mother     Other Mother         she was hit and killed by car    High Blood Pressure Father     Emphysema Father     Asthma Father          PREVIOUS RECORDS   Previous records reviewed:  Patient last seen this department proxy a month and half ago for muscle spasm and chronic pain . PHYSICAL EXAM     ED Triage Vitals [09/13/22 0117]   BP Temp Temp Source Heart Rate Resp SpO2 Height Weight   (!) 182/99 98.2 °F (36.8 °C) Oral 74 19 97 % 6' 1\" (1.854 m) (!) 433 lb (196.4 kg)     Initial vital signs and nursing assessment reviewed and normal. Body mass index is 57.13 kg/m². Pulsoximetry is normal per my interpretation. Additional Vital Signs:  Vitals:    09/13/22 0117   BP: (!) 182/99   Pulse: 74   Resp: 19   Temp: 98.2 °F (36.8 °C)   SpO2: 97%       Physical Exam  Constitutional:       General: He is not in acute distress. Appearance: Normal appearance. He is not ill-appearing or diaphoretic. Comments: Right leg and supportive device secondary to recent muscular repair. Patient attending therapy without difficulty. HENT:      Head: Normocephalic and atraumatic. Mouth/Throat:      Mouth: Mucous membranes are moist.      Pharynx: Oropharynx is clear. No oropharyngeal exudate or posterior oropharyngeal erythema. Eyes:      Extraocular Movements: Extraocular movements intact. Conjunctiva/sclera: Conjunctivae normal.      Pupils: Pupils are equal, round, and reactive to light. Cardiovascular:      Rate and Rhythm: Normal rate and regular rhythm. Pulses: Normal pulses. Heart sounds: Normal heart sounds. No murmur heard. No friction rub. No gallop. Pulmonary:      Effort: Pulmonary effort is normal.      Breath sounds: Normal breath sounds. No wheezing, rhonchi or rales. Abdominal:      Palpations: Abdomen is soft. Tenderness:  There is abdominal tenderness (Generalized. ). There is no guarding or rebound. Musculoskeletal:      Cervical back: Normal range of motion. No rigidity. No muscular tenderness. Right lower leg: No edema. Left lower leg: No edema. Skin:     General: Skin is warm and dry. Capillary Refill: Capillary refill takes less than 2 seconds. Findings: No bruising, erythema or lesion. Neurological:      General: No focal deficit present. Mental Status: He is alert and oriented to person, place, and time. Sensory: No sensory deficit. Motor: No weakness. MEDICAL DECISION MAKING   Initial Assessment:   Pleasant 61-year-old male resting on the cot no acute distress. Mild hypertension, no fever tachycardia, tachypnea or hypoxia. Patient is neurovascularly intact. Ripping abdominal pain and abdominal cramping. Differential diagnosis includes but not limited to acute exacerbation of chronic pain, muscle spasm, electrolyte imbalance, unlikely aortic dissection, aortic aneurysm. Equal bilateral upper and lower extremity pulses. No cyanosis. Paraspinal lower lumbar tenderness that is consistent with patient's history. Patient is not conversationally dyspneic has no increased work of breathing. Abdomen is nonperitoneal.  No pulsatile mass. No overlying skin rashes.   Plan:   Abdominal labs  CTA of the abdomen pelvis  Analgesia  Expected disposition discharge home with pain management follow-up    Follow-up for liver mass    ED RESULTS   Laboratory results:  Labs Reviewed   CBC WITH AUTO DIFFERENTIAL - Abnormal; Notable for the following components:       Result Value    RBC 4.12 (*)     Hemoglobin 12.5 (*)     Hematocrit 38.9 (*)     MCV 94.4 (*)     MCHC 32.1 (*)     All other components within normal limits   HEPATIC FUNCTION PANEL - Abnormal; Notable for the following components:    Alkaline Phosphatase 147 (*)     All other components within normal limits   OSMOLALITY - Abnormal; Notable for the following components:    Osmolality Calc 273.2 (*)     All other components within normal limits   BASIC METABOLIC PANEL W/ REFLEX TO MG FOR LOW K   LIPASE   ANION GAP   GLOMERULAR FILTRATION RATE, ESTIMATED   MAGNESIUM       Radiologic studies results:  CTA ABDOMEN PELVIS W WO CONTRAST   Final Result      No evidence for abdominal aortic aneurysm. No evidence for abdominal    aortic dissection. Fatty change of the liver. 2.2 x 2.6 cm peripherally enhancing structure in the left lobe of the    liver, incompletely characterized and most compatible with a hemangioma. Recommend targeted ultrasound. Small gallstones within the gallbladder lumen. There is no CT evidence for    acute cholecystitis. This document has been electronically signed by: Brandee Zavala MD on    09/13/2022 06:56 AM      All CTs at this facility use dose modulation techniques and iterative    reconstructions, and/or weight-based dosing   when appropriate to reduce radiation to a low as reasonably achievable. 3D Post-processing was performed on this study. ED Medications administered this visit:   Medications   droperidol (INAPSINE) injection 1.25 mg (1.25 mg IntraMUSCular Given 9/13/22 0219)   iopamidol (ISOVUE-370) 76 % injection 80 mL (80 mLs IntraVENous Given 9/13/22 0308)         ED COURSE     ED Course as of 09/13/22 0709   Tue Sep 13, 2022   0629 He has continued to do well and sleep comfortably. Discharge pending CTA results read by radiologist.  No evidence of acute aortic dissection or aneurysm. [EM]      ED Course User Index  [EM] Tripp Rivera DO         Strict return precautions and follow up instructions were discussed with the patient prior to discharge, with which the patient agrees.       MEDICATION CHANGES     Current Discharge Medication List            FINAL DISPOSITION     Final diagnoses:   Generalized abdominal pain   Liver mass     Condition: condition: stable  Dispo: Discharge to home      This transcription was electronically signed. Parts of this transcriptions may have been dictated by use of voice recognition software and electronically transcribed, and parts may have been transcribed with the assistance of an ED scribe. The transcription may contain errors not detected in proofreading. Please refer to my supervising physician's documentation if my documentation differs.     Electronically Signed: Ryley Zambrano DO, 09/13/22, 7:09 AM        Ryley Zambrano DO  Resident  09/13/22 1996

## 2022-09-13 NOTE — ED PROVIDER NOTES
**This is a Medical/ PA/ APRN Student Note and is charted for educational purposes. The non-physician staff attested note is not to be used for billing purposes or to guide patient care. Please see the physician modifications/ attestation for treatment plan/suggestions. This note has been reviewed and feedback has been provided to the student. **    [facility] physician, Dr. Go Awan  ED visit Note  Pt Name: Heydi Damon Record Number: [id number]  Date of Birth 1970   Today's Date: 9/13/2022    CHIEF COMPLAINT:     Chief Complaint   Patient presents with    Back Pain     Muscle spasms       HISTORY OF PRESENT ILLNESS   Gita Andrew is a 50 y.o. male who presents to the ED c/o abdominal pain radiating into his back. The pt reports that today around 1500 he began having muscle spasms radiating from his stomach into his back. He described it as a sharp ripping pain into his back that is worse with movement and bearing down. He stated that he has a long history of muscle spasms and sees a pain doctor. He reports that this pain is consistent with his chronic pain. He also reports occasional sharp pain from his left leg up into his left chest and arm. He take Norco and another pain medication chronically. He denies any chest pain, shortness of breath, nausea or vomiting. He denies any change in bowel movements other than afraid to bear down when using the restroom because it makes the muscle spasms worse. REVIEW OF SYSTEMS:    Review of Systems   Constitutional:  Negative for appetite change, chills, fatigue and fever. HENT:  Negative for congestion, rhinorrhea and sore throat. Respiratory:  Negative for cough, chest tightness and shortness of breath. Cardiovascular:  Negative for chest pain. Gastrointestinal:  Positive for abdominal pain. Negative for constipation, nausea and vomiting.    Genitourinary: Negative for dysuria and urgency. Musculoskeletal:  Positive for back pain, gait problem and myalgias. Negative for arthralgias, joint swelling and neck pain. Skin:  Negative for color change and wound. Neurological:  Negative for weakness, numbness and headaches. Psychiatric/Behavioral:  Negative for agitation and decreased concentration. The patient is not nervous/anxious. 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 natalio    Fibromyalgia     CLARIBEL on CPAP          SURGICAL HISTORY:     Past Surgical History:   Procedure Laterality Date    BACK SURGERY      KNEE SURGERY      lt    PAIN MANAGEMENT PROCEDURE Bilateral 02/11/2021    Bilateral L-facet MBB # 1 @ L3-4, L4-5, and L5-S1 performed by Erika Jenkins MD at [facility] Bilateral 04/08/2021    Bilateral L-facet MBB # 2 @ L3-4, L4-5, and L5-S1 performed by Erika Jenkins MD at [address] [place] [address] Right 07/07/2022       MEDICATIONS     Current Facility-Administered Medications   Medication Dose Route Frequency Provider Last Rate Last Admin    droperidol (INAPSINE) injection 1.25 mg  1.25 mg IntraMUSCular Q6H PRN Noy Montiel, DO   1.25 mg at 09/13/22 0219     Current Outpatient Medications   Medication Sig Dispense Refill    gabapentin (NEURONTIN) 300 MG capsule Take 1 capsule by mouth at bedtime for 30 days.  30 capsule 0    azelastine (OPTIVAR) 0.05 % ophthalmic solution instill 1 drop into both eyes twice a day 1 each 11    albuterol (PROVENTIL) (2.5 MG/3ML) 0.083% nebulizer solution inhale contents of 1 vial ( 3 milliliters ) in nebulizer by mouth and INTO THE LUNGS every 4 hours if needed for wheezing 375 mL 3    FEROSUL 325 (65 Fe) MG tablet take 1 tablet by mouth once daily 90 tablet 3    HYDROcodone-acetaminophen (NORCO) 5-325 MG per tablet Take 1 tablet by mouth every 8 hours as needed for Pain for up to 30 days. 90 tablet 0    triamcinolone (KENALOG) 0.1 % cream APPLY TO AFFECTED AREA TWICE A DAY 1 each 2    simethicone (MYLICON) 80 MG chewable tablet Take 1 tablet by mouth 4 times daily as needed for Flatulence 180 tablet 3    omeprazole (PRILOSEC) 20 MG delayed release capsule Take 1 capsule by mouth every morning (before breakfast) 90 capsule 3    potassium chloride (KLOR-CON M) 10 MEQ extended release tablet take 1 tablet by mouth daily 90 tablet 3    butalbital-acetaminophen-caffeine (FIORICET, ESGIC) -40 MG per tablet take 1 tablet by mouth every 4 hours if needed for headache 90 tablet 0    aspirin EC 81 MG EC tablet Take 1 tablet by mouth daily 90 tablet 1    amitriptyline (ELAVIL) 50 MG tablet take 1 tablet by mouth at bedtime 90 tablet 1    rosuvastatin (CRESTOR) 20 MG tablet take 1 tablet by mouth once daily 90 tablet 3    BANOPHEN 25 MG capsule take 1 capsule by mouth every 6 hours if needed for itching 60 capsule 5    ergocalciferol (DRISDOL) 1.25 MG (95151 UT) capsule Take 1 capsule by mouth once a week 4 capsule 3    albuterol sulfate  (90 Base) MCG/ACT inhaler Inhale 2 puffs into the lungs 4 times daily as needed for Wheezing 3 each 3    hydroCHLOROthiazide (HYDRODIURIL) 25 MG tablet Take 1 tablet by mouth every morning 90 tablet 1    pantoprazole (PROTONIX) 40 MG tablet Take 1 tablet by mouth every morning (before breakfast) 30 tablet 0    betamethasone valerate (VALISONE) 0.1 % cream apply to affected area twice a day 45 g 3    Blood Pressure Monitoring (B-D ASSURE BPM/AUTO ARM CUFF) MISC 1 Units by Does not apply route daily 1 each 0    Cholecalciferol (VITAMIN D3) 1.25 MG (31434 UT) CAPS 1 capsule 2 times a week , Monday and Friday 24 capsule 0    budesonide-formoterol (SYMBICORT) 160-4.5 MCG/ACT AERO 2 puffs inhaled twice daily. Rinse mouth well after use.  3 each 3    ondansetron (ZOFRAN-ODT) 4 MG disintegrating tablet Take 1 tablet by mouth 3 times daily as needed for Nausea or Vomiting 21 tablet 0    Respiratory Therapy Supplies (NEBULIZER/TUBING/MOUTHPIECE) KIT Dispense tubing, mask, and mouthpiece for nebulizer machine.  Dx: Asthma 1 kit 0    dicyclomine (BENTYL) 10 MG capsule Take 1 capsule by mouth 3 times daily as needed (abd cramping) 30 capsule 0    loratadine (CLARITIN) 10 MG tablet take 1 tablet by mouth once daily 90 tablet 3    montelukast (SINGULAIR) 10 MG tablet Take 1 tablet by mouth nightly 90 tablet 3    fluticasone (FLONASE) 50 MCG/ACT nasal spray 2 sprays by Each Nostril route daily 3 each 3    EPINEPHrine (EPIPEN 2-RAGHAV) 0.3 MG/0.3ML SOAJ injection Inject one pen as directed STAT for allergic reaction, may disp generic NDC 12125-166-22 1 each 0    tiotropium (SPIRIVA RESPIMAT) 1.25 MCG/ACT AERS inhaler Inhale 2 puffs into the lungs daily 1 Inhaler 1    cetirizine (ZYRTEC) 10 MG tablet Take 1 tablet by mouth daily 30 tablet 11    bumetanide (BUMEX) 1 MG tablet Take 0.5 tablets by mouth daily 30 tablet 0    NARCAN 4 MG/0.1ML LIQD nasal spray       Multiple Vitamins-Minerals (MULTIVITAMIN ADULTS) TABS Take 1 tablet by mouth daily 90 tablet 3    ergocalciferol (ERGOCALCIFEROL) 88549 units capsule Take 50,000 Units by mouth as needed          ALLERGIES:     Allergies   Allergen Reactions    Dilantin [Phenytoin] Anaphylaxis and Swelling    Lyrica [Pregabalin] Other (See Comments)     Bleeding in bowels    Dupixent [Dupilumab]      HIVES, THROAT ITCHING    Oxycodone      THROAT TIGHTNESS    Valium [Diazepam]        FAMILY HISTORY:     Family History   Problem Relation Age of Onset    High Blood Pressure Mother     Emphysema Mother     Other Mother         she was hit and killed by car    High Blood Pressure Father     Emphysema Father     Asthma Father        SOCIAL HISTORY:     Social History     Tobacco Use    Smoking status: Never    Smokeless tobacco: Never   Substance Use Topics    Alcohol use: Yes     Comment: occasionally         VITAL SIGNS   CURRENT VITALS:  height is 6' 1\" (1.854 m) and weight is 433 lb (196.4 kg) (abnormal). His oral temperature is 98.2 °F (36.8 °C). His blood pressure is 182/99 (abnormal) and his pulse is 74. His respiration is 19 and oxygen saturation is 97%. Temperature Range (24h):Temp: 98.2 °F (36.8 °C) Temp  Av.2 °F (36.8 °C)  Min: 98.2 °F (36.8 °C)  Max: 98.2 °F (36.8 °C)  BP Range (80U): Systolic (75MOZ), JHO:385 , Min:182 , OSC:610     Diastolic (68AMO), YAM:45, Min:99, Max:99    Pulse Range (24h): Pulse  Av  Min: 74  Max: 74  Respiration Range (24h): Resp  Av  Min: 19  Max: 19  Current Pulse Ox (24h):  SpO2: 97 %  Pulse Ox Range (24h):  SpO2  Av %  Min: 97 %  Max: 97 %      PHYSICAL EXAM:   Physical Exam  Vitals reviewed. Constitutional:       General: He is not in acute distress. Appearance: He is obese. HENT:      Head: Normocephalic and atraumatic. Nose: Nose normal.      Mouth/Throat:      Mouth: Mucous membranes are moist.   Eyes:      Extraocular Movements: Extraocular movements intact. Cardiovascular:      Rate and Rhythm: Normal rate and regular rhythm. Pulses: Normal pulses. Heart sounds: Normal heart sounds. No murmur heard. No friction rub. No gallop. Pulmonary:      Effort: Pulmonary effort is normal.      Breath sounds: Normal breath sounds. No stridor. No wheezing or rales. Abdominal:      General: Abdomen is protuberant. Bowel sounds are normal.      Palpations: Abdomen is rigid. There is no pulsatile mass. Tenderness: There is generalized abdominal tenderness. There is no guarding or rebound. Musculoskeletal:         General: Normal range of motion. Cervical back: Normal range of motion. Thoracic back: Spasms and bony tenderness present. No signs of trauma. Lumbar back: Spasms and bony tenderness present. No signs of trauma. Skin:     General: Skin is warm.       Capillary

## 2022-09-14 ENCOUNTER — APPOINTMENT (OUTPATIENT)
Dept: SPEECH THERAPY | Age: 52
End: 2022-09-14
Payer: MEDICAID

## 2022-09-14 ENCOUNTER — CARE COORDINATION (OUTPATIENT)
Dept: CARE COORDINATION | Age: 52
End: 2022-09-14

## 2022-09-15 ENCOUNTER — CARE COORDINATION (OUTPATIENT)
Dept: CARE COORDINATION | Age: 52
End: 2022-09-15

## 2022-09-16 ENCOUNTER — CARE COORDINATION (OUTPATIENT)
Dept: CARE COORDINATION | Age: 52
End: 2022-09-16

## 2022-09-16 ENCOUNTER — HOSPITAL ENCOUNTER (OUTPATIENT)
Dept: SPEECH THERAPY | Age: 52
Setting detail: THERAPIES SERIES
Discharge: HOME | End: 2022-09-16
Payer: MEDICAID

## 2022-09-16 ENCOUNTER — TELEPHONE (OUTPATIENT)
Dept: FAMILY MEDICINE CLINIC | Age: 52
End: 2022-09-16

## 2022-09-16 DIAGNOSIS — K76.0 FATTY LIVER: Primary | ICD-10-CM

## 2022-09-16 PROCEDURE — 97129 THER IVNTJ 1ST 15 MIN: CPT | Performed by: SPEECH-LANGUAGE PATHOLOGIST

## 2022-09-16 NOTE — PROGRESS NOTES
[facility] THERAPY  [] SPEECH LANGUAGE COGNITIVE EVALUATION  [x] DAILY NOTE   [] PROGRESS NOTE [] DISCHARGE NOTE    [x] [facility] [place]   [] Nick Reyes    [] [facility]   [] Sdea Gooden    Date: 2022  Patient Name:  Abdelrahman Mackey  : 1970  MRN: [id number]  CSN: [id number]    Referring Practitioner MIRNA Hurley - CNP   Diagnosis Other speech disturbances [R47.89]    Treatment Diagnosis Cognitive deficits, expressive language deficits   Date of Evaluation 22      Functional Outcome Measure Used TATUM NOMS: memory   Functional Outcome Score Level 4 (22)       Insurance: Primary: Payor: Jeremias May /  /  / ,   Secondary: Xenia Courts: No precert required   Visit # 7, 7/10 for progress note   Visits Allowed: No visit limit   Recertification Date: 27   Physician Follow-Up: Darline Rhodes 3/1/23    Physician Orders: Cognitive and higher level testing as patient's family is noticing patient having word finding difficulties after a fall at work . Pertinent History: Patient presents today ambulating with a cane after a fall at work . States that he slipped on oil and fell at work hitting his head on concrete. Reports he had a hard hat on when he fell. Patient went to the emergency room when he fell, but did not have images. Patient has been experiencing more headaches since the fall. Reports his family has noticed that during conversation he says the wrong words that don't go with the conversation that he is having. Patient reports he has dizziness that comes and goes and reports just recently he has had some blurred vision that comes and goes. Patient reports he can't come up with the words he wants to say and he doesn't recall others telling him some things.        SUBJECTIVE: Patient arrived late for therapy this date because he thought his appointment was later than it was scheduled. Patient reports he is \"tired and hurting. \"  Patient reports his pain is 9.5/10 all over. Patient reports he is taking pain medications for it. SHORT TERM GOAL #1:  Goal 1: Patient will complete selective, alternating and divided attention task with no more than 2 errors or redirects to task for improved success with IADL's and eventual return to work. INTERVENTIONS: did not test this date d/t focus on other goals and shortened session. SHORT TERM GOAL #2:  Goal 2: Patient will complete delayed recall tasks and working memory tasks with up to 4 items with use of compensatory strategies with no more than min cues 80% of the time for improved recall of daily tasks and conversations. INTERVENTIONS: Patient reports he thought his appointment was at 8:30 this morning, but it was actually 8. Patient reports he has also been confused with what day it was this week. Ongoing education on memory strategies to utilize to assist with functional, daily recall. Delayed recall task:  Patient was provided with a list of 4 grocery items with auditory provision and asked to recall:   - immediate recall: 4/4 independent   - 6 minute delay: 4/4 independent    Working memory task:  Patient provided with a list of 3-4 words with auditory provision and asked to mentally manipulate the words into alphabetical order:   - list of 3 words: 8/10 without cues, 2/10 with min cues   - list of 4 words: 1/2 with min cues, 1/2 with max cues  **Significantly increased difficulty with lists of 4 words. **Patient required min cues to utilize repetition to aid in recall of the lists of words (especially 4 words) in order to put them in alphabetical order. SHORT TERM GOAL #3:  Goal 3: Patient will demonstrate use of word finding strategies in conversation without cues at least 90% of the time for improved success conversing with family and friends.   INTERVENTIONS:  Patient with

## 2022-09-16 NOTE — TELEPHONE ENCOUNTER
Noted from Elle 2484 as follows:    Jarrell Homans, RN   Registered Nurse      Care Coordination       Sign when Signing Visit   Encounter Date:  9/15/2022                         Jose De Anda called and states he was in ED on 9-13-22 for abdominal pain. States he was informed in the ED that he needed to ask his PCP to order an ultrasound of the liver. Ankita Emeterio is asking if PCP could order the Liver ultrasound to be completed at [address] [place] [address] per ED staff recommendations or does he need to make an appt first to see you in order to get the ultrasound ordered? Please advise. Thank you. Order placed for Liver US. Please let pt know and we will call with results. Pt should also follow up with his GI if he has one, we can refer him if he does not have one yet. Thanks -WS    Orders Placed This Encounter   Procedures    US LIVER     This procedure can be scheduled via [employer]. Access your [facility] account by visiting [email or web address]. Standing Status:   Future     Standing Expiration Date:   9/16/2023     Order Specific Question:   Reason for exam:     Answer:   fatty liver and changes noted on recent CT scan.

## 2022-09-16 NOTE — TELEPHONE ENCOUNTER
Noted.  Reviewed Recent CT of the abdomen and it noted fatty liver and some possible lesions. We will order US for further evaluation. Order will be placed in separate encounter.   Thanks -LIVIER

## 2022-09-19 ENCOUNTER — TELEPHONE (OUTPATIENT)
Dept: PHYSICAL MEDICINE AND REHAB | Age: 52
End: 2022-09-19

## 2022-09-19 NOTE — TELEPHONE ENCOUNTER
Disregard prior note. Pt denies taking any blood thinners except ASA 81 mg. (no cardiac events in the last 6 months) prior to scheduling procedure.

## 2022-09-19 NOTE — TELEPHONE ENCOUNTER
Patient has hospital f/u scheduled in office with WS tomorrow. Will schedule study and further discuss referral at appointment. Liver US scheduled at [facility] main radiology on 9/27/22. Arrival at [facility] for [facility] [facility]. NPO 8 hours prior.

## 2022-09-21 DIAGNOSIS — Z98.1 S/P CERVICAL SPINAL FUSION: ICD-10-CM

## 2022-09-21 NOTE — TELEPHONE ENCOUNTER
This medication refill is regarding a electronic request.  Refill requested by  [employer] . Requested Prescriptions     Pending Prescriptions Disp Refills    butalbital-acetaminophen-caffeine (FIORICET, ESGIC) -40 MG per tablet [Pharmacy Med Name: [id number]-[id number] -40] 90 tablet 0     Sig: take 1 tablet by mouth every 4 hours if needed for headache     Date of last visit: 5/10/2022   Date of next visit: None  Date of last refill: 7/11/22 #90/0    Rx verified, ordered and set to EP.

## 2022-09-22 RX ORDER — BUTALBITAL, ACETAMINOPHEN AND CAFFEINE 50; 325; 40 MG/1; MG/1; MG/1
TABLET ORAL
Qty: 90 TABLET | Refills: 0 | Status: SHIPPED | OUTPATIENT
Start: 2022-09-22 | End: 2022-10-20

## 2022-09-23 ENCOUNTER — CARE COORDINATION (OUTPATIENT)
Dept: CARE COORDINATION | Age: 52
End: 2022-09-23

## 2022-09-23 ENCOUNTER — TELEPHONE (OUTPATIENT)
Dept: FAMILY MEDICINE CLINIC | Age: 52
End: 2022-09-23

## 2022-09-23 DIAGNOSIS — M47.816 LUMBAR SPONDYLOSIS: ICD-10-CM

## 2022-09-23 DIAGNOSIS — Z98.1 S/P CERVICAL SPINAL FUSION: ICD-10-CM

## 2022-09-23 DIAGNOSIS — G95.9 DISEASE OF SPINAL CORD (HCC): Primary | ICD-10-CM

## 2022-09-23 NOTE — TELEPHONE ENCOUNTER
Pt is scheduled for an ultrasound of the liver at [facility] on 9/27/22 at 8:30 AM; he was going to be given this information at his ED f/up appt with WS on 9/20/22 but he did not show up so he is still unaware of the liver ultrasound information. Called pt and left a message to call the office back on Monday to get the information regarding the test, as it is scheduled for Tuesday.      Arrive at main radiology at 8:15 AM  NPO 8 hours prior

## 2022-09-23 NOTE — CARE COORDINATION
Velma Oregon called and would like a referral to podiatry to help cut his toenails. States he feels he needs the support of podiatry to help make sure his feet are in good health. He would like [facility] podiatrist if possible. Please advise. Thank you!

## 2022-09-26 NOTE — TELEPHONE ENCOUNTER
I spoke with Ann Duty. He states he is fine with either of those podiatrist.  He doesn't have a preference on one or the other. Thank you.

## 2022-09-26 NOTE — TELEPHONE ENCOUNTER
I was able to reach Maria Teresa. Informed him of details for US scheduled for tomorrow. He verbalized understanding. Thank you.

## 2022-09-27 ENCOUNTER — TELEPHONE (OUTPATIENT)
Dept: FAMILY MEDICINE CLINIC | Age: 52
End: 2022-09-27

## 2022-09-27 ENCOUNTER — HOSPITAL ENCOUNTER (OUTPATIENT)
Dept: ULTRASOUND IMAGING | Age: 52
Discharge: HOME | End: 2022-09-27
Payer: MEDICAID

## 2022-09-27 ENCOUNTER — CARE COORDINATION (OUTPATIENT)
Dept: CARE COORDINATION | Age: 52
End: 2022-09-27

## 2022-09-27 ENCOUNTER — TELEPHONE (OUTPATIENT)
Dept: CARDIOLOGY CLINIC | Age: 52
End: 2022-09-27

## 2022-09-27 ENCOUNTER — TELEPHONE (OUTPATIENT)
Dept: PHYSICAL MEDICINE AND REHAB | Age: 52
End: 2022-09-27

## 2022-09-27 DIAGNOSIS — M62.838 SPASM OF MUSCLE: ICD-10-CM

## 2022-09-27 DIAGNOSIS — G89.4 CHRONIC PAIN SYNDROME: ICD-10-CM

## 2022-09-27 DIAGNOSIS — K76.0 FATTY LIVER: ICD-10-CM

## 2022-09-27 DIAGNOSIS — M54.10 RADICULOPATHY AFFECTING UPPER EXTREMITY: ICD-10-CM

## 2022-09-27 DIAGNOSIS — R16.0 HEPATOMEGALY: Primary | ICD-10-CM

## 2022-09-27 DIAGNOSIS — M47.816 LUMBAR SPONDYLOSIS: Primary | ICD-10-CM

## 2022-09-27 PROCEDURE — 76705 ECHO EXAM OF ABDOMEN: CPT

## 2022-09-27 NOTE — TELEPHONE ENCOUNTER
OARRS reviewed. UDS: from 6/22+ for  . Butalbital, tramadol, amitriptyline. Negative hydrocodone. Do not have results from 9/12  Last seen: 9/12/2022.  Follow-up:   Future Appointments   Date Time Provider Jesus Iris   9/28/2022  8:15 AM Nakul Conroy OT STRZ OT [place] [facility]   9/28/2022  2:30 PM Adalid Menon, GRACIE [address] [facility]   12/12/2022 10:00 AM MIRNA Loo - CNP N SRPX Pain [address]   2/28/2023  2:00 PM STR MRI RM1 STRZ MRI STR Radiolog   3/1/2023  9:30 AM Meliton Soliman [facility] [address]   3/22/2023  1:45 PM Yasmeen Schaeffer MD N SR[facility] Heart [address]

## 2022-09-27 NOTE — TELEPHONE ENCOUNTER
Noted.  I will refer to GI for follow up, to Dr Osmel Shelby. Pt also had an abnormal liver Ultrasound and they will follow up with him for that as well. Our office will call with those results. For counseling I would recommend     [facility]:     [place] [facility]    [address] [facility]  [phone]  [email or web address]    Pt will need to call for an appt, they do note accept referrals. Please let office know if he needs any thing else.  Thanks -WS

## 2022-09-27 NOTE — TELEPHONE ENCOUNTER
----- Message from MIRNA Fox - CNP sent at 9/27/2022 10:55 AM EDT -----  Please let pt know that his US showed his liver is enlarged. I would like to refer him to GI for further evaluation. OK for referral to Dr Maddy Bella. Heidy Quintero RN has also been working with the patient for referral to Counseling. I recommended [address], pt to call for appt.  -WS

## 2022-09-27 NOTE — TELEPHONE ENCOUNTER
Pre op Risk Assessment    Procedure Vasectomy in OR, MAC  Physician [facility]  Date of surgery/procedure 10/31/22    Last OV 9/7/22 Yolette Essex, 3/23/22 [place]  Last Stress 4/7/22  Last Echo 4/7/22  Last Cath 7/1/22    Is patient on blood thinners ASA 81  Hold Meds/how many days ?     Fax to [phone]

## 2022-09-27 NOTE — TELEPHONE ENCOUNTER
Patient notified and agreeable to GI referral. This was placed in [facility], their office will call him to schedule. Lisa Kasper has provided pt with contact info for [employer]

## 2022-09-27 NOTE — CARE COORDINATION
Zara Weaver called and is asking for 2 referrals. First he would like a referral to GI, he states he was never able to make the appt with GI due to his leg injury and is not sure who that provider was that he was supposed to see. I could not find this as well in his chart. He is asking if PCP would place referral for GI and has no preference on who you choose for him to see. He is also asking for a referral for counseling. States he used to see Chinmay Goodwin in the office but has not seen anyone since she has left. Once again, he has no preference and respects who ever you decide. He states he would like counseling type support as he feels depressed with his current pain level and medical conditions. Please advise. Thank you!

## 2022-09-27 NOTE — CARE COORDINATION
Jonathan Bradshaw called back. I gave him all the information regarding Dr. Ryne More referral as well as [facility] contact information.

## 2022-09-27 NOTE — CARE COORDINATION
Referral faxed to Dr Alvarez [place] office.      [address] [facility] [place], [address] [facility]  MARIELENA ALBRECHT II.JESSIE, [address]  Ph: [phone]  Fax:  [phone]

## 2022-09-28 ENCOUNTER — HOSPITAL ENCOUNTER (OUTPATIENT)
Dept: INTERVENTIONAL RADIOLOGY/VASCULAR | Age: 52
Discharge: HOME | End: 2022-09-28
Payer: COMMERCIAL

## 2022-09-28 ENCOUNTER — HOSPITAL ENCOUNTER (OUTPATIENT)
Dept: SPEECH THERAPY | Age: 52
Setting detail: THERAPIES SERIES
End: 2022-09-28
Payer: MEDICAID

## 2022-09-28 ENCOUNTER — HOSPITAL ENCOUNTER (OUTPATIENT)
Dept: OCCUPATIONAL THERAPY | Age: 52
Setting detail: THERAPIES SERIES
Discharge: HOME | End: 2022-09-28
Payer: MEDICAID

## 2022-09-28 DIAGNOSIS — M79.89 SWELLING OF LIMB: ICD-10-CM

## 2022-09-28 PROCEDURE — 97535 SELF CARE MNGMENT TRAINING: CPT

## 2022-09-28 PROCEDURE — 93971 EXTREMITY STUDY: CPT

## 2022-09-28 PROCEDURE — 97165 OT EVAL LOW COMPLEX 30 MIN: CPT

## 2022-09-28 RX ORDER — HYDROCODONE BITARTRATE AND ACETAMINOPHEN 5; 325 MG/1; MG/1
1 TABLET ORAL EVERY 8 HOURS PRN
Qty: 90 TABLET | Refills: 0 | Status: SHIPPED | OUTPATIENT
Start: 2022-10-03 | End: 2022-11-02

## 2022-09-28 NOTE — TELEPHONE ENCOUNTER
Pt  came into the office for a urine drug screen. Request to have the one week supply of his pain medication called into the pharmacy.

## 2022-09-28 NOTE — TELEPHONE ENCOUNTER
Rechecked and despite saying in note 9/12 to have do URINE SCREEN cannot find order or in lab yet that was done. Can have come in for repeat urine but will you give any script till results back-- 1 week?   Setup for in case instead of 30 days

## 2022-09-28 NOTE — TELEPHONE ENCOUNTER
Called pt. And LVM of need for repeat urine screen to be done tommorow.  When comes in will then request refill for 1 week till get results back and if appropiate for Norco.

## 2022-09-28 NOTE — PROGRESS NOTES
** PLEASE SIGN, DATE AND TIME CERTIFICATION BELOW AND RETURN TO [facility] OUTPATIENT REHABILITATION (FAX #: [phone]). ATTEST/CO-SIGN IF ACCESSING VIA [employer]. THANK YOU.**    I certify that I have examined the patient below and determined that Physical Medicine and Rehabilitation service is necessary and that I approve the established plan of care for up to 90 days or as specifically noted. Attestation, signature or co-signature of physician indicates approval of certification requirements.    ________________________ ____________ __________  Physician Signature   Date   Time   [address] THERAPY  [x] EVALUATION  [] DAILY NOTE (LAND) [] DAILY NOTE (AQUATIC ) [] PROGRESS NOTE [] DISCHARGE NOTE    [x] [facility]   [] [facility]    [] [facility]   [] Kris Kim    Date: 2022  Patient Name:  Sven Fletcher  : 1970  MRN: [id number]  CSN: [id number]    Referring Practitioner MIRNA Mak CNP   Diagnosis VISION DISTURBANCE    Treatment Diagnosis Decreased IADLs   Date of Evaluation 22      Functional Outcome Measure Used [facility]   Functional Outcome Score 86 (22)       Insurance: Primary: Payor: José Luis Hernadez /  /  / ,   Secondary: [facility]   Authorization Information: INSURANCE THERAPY BENEFIT: No precert until after 63JJ visit. Visit Limit Based on Precert  AQUATIC THERAPY COVERED:   Yes  MODALITIES COVERED:  Yes except for Iontophoresis and Hot/Cold Packs   Visit # 1, 1/10 for progress note   Visits Allowed: 30    Recertification Date:    Physician Follow-Up: ?   Physician Orders: Low vision program   Pertinent History: Patient presents today ambulating with a cane after a fall at work . States that he slipped on oil and fell at work hitting his head on concrete. Reports he had a hard hat on when he fell.  Reports that he hit his head twice, once with the hard and and again after the hard hat fell off. Patient went to the emergency room when he fell. Reports that no images were taken that date. CT on 8/22/22   Impression:   1. No acute intracranial hemorrhage or process identified   Reports that since then, he has had difficulty with saying correct words. Reports poor memory also. Reports that he gets a headache when trying to read, even with glasses/bifocals, his letters/words get blurry. 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 Not a tumor, just a growth. SUBJECTIVE: Patient is pleasant and cooperative. Did have blurry vision prior to fall, but taking drops in the eyes typically fixed the problem. Only time drops did not fix the blurry vision was if his headache was really bad. Reports increased floaters in the right eye. Social/Functional History:  Electronic Medical Record reviewed    Trevin Graves lives with family. Task Prior Level of Function  (current level of function addressed below)   ADLs  Independent   Ambulation Independent   Transfers Independent   Hobbies Reports that he has not been performing hobbies for the past 15 years due to muscle spasms. Driving Active    Work [employer]. Occupation: [employer], cleaning up oil spills, pumping out machines, pull trash     OBJECTIVE:  Hand Dominance right handed   Observation Consistently tilts his head to the left   Vision   Vision History  Last to the ophthalmologist in May. (Dr. Debora Elizabeth). 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 Later as a child, had an eye injury into the left eye. As a result layers of cornea removed. Reports that glare was a problem and still is today. Wear glasses glasses with bifocals. Reports that he cannot tell a difference when looking through the bifocals now.   Reports that it does clear up when closing one eye, but still hard to comprehend when reading. Reports that he feels dry eye. Uses eye drops   Eye Dominance  left   Corneal Reflection  right eye reflection center over the top of the iris, left eye reflection on the top to the left of the iris. Saccades  right eye - good ability (reported in and out blurry vision)  Left eye - good ability (reports min blurriness with eye movement to the let)  Bilateral - good movement   Pursuits  right eye - blurry, losing target, difficulty following target to the right, increased blinking  Left eye - losing target, smoother movement than right eye   Bilateral - noting right eye turning in more than the left when following a target. Acuity  NT   Peripheral Vision  NT   Confrontation  NT   Convergence Blurry at 27 cm, unable to clear   Cover/Uncover    ADL's Reading is more difficult (mail or text messages) watching television is more difficulty sometimes, more difficult with writing (focusing)                TREATMENT   Precautions: Changes in vision, current with ST for cognitive changes also   Pain:  headache    X in shaded column indicates Activity Completed Today   Modalities Parameters/  Location  Notes/Comments                     Manual Therapy Time/  Technique  Notes/Comments                     Exercises   Sets/  Sec Reps  Notes/Comments   Push up stick   x                                              Activities Time    Notes/Comments   Decreased brightness of the lights to increase tolerance in the room  x                  Specific Interventions Next Treatment: 6 essential eye exercises, activity/environment modifications    Activity/Treatment Tolerance:  [x]  Patient tolerated treatment well  []  Patient limited by fatigue  []  Patient limited by pain   []  Patient limited by other medical complications  []  Other:     Assessment: Patient presents to the clinic with reports of blurry vision throughout his day.   Report that he has had blurry vision prior to the fall, but use of eye drops assisted to clear his vision. Patient reports blurry vision with bilateral and single eye vision. Encouraged appointment with ophthamologist due to changes in vision since falling and hitting his head. Also to determine if effects growth on the pituitary has on his vision. During the assessment this date, noted difficulty with pursuit of a target and remaining clear focus on a target as the target moves toward the face. Patient requires skilled therapy at this time to address changes in vision and to learn and incorporate adaptations to activity to environment to increase tolerance for daily activity. Areas for Improvement: vision disturbance  Prognosis: good    GOALS:  Patient Goal: return to previous abilities     Short Term Goals:  Time Frame: 4 weeks   Patient will reports use of at least 2 activity and/or environment modifications/adaptations during daily activity to increase tolerance with performance of IADLs. Patient will be independent with HEP to increase ease with reading. Long Term Goals:  Time Frame: 6 weeks   Patient will report decrease in score of the BIVSS to less than 52 to demonstrate increased ease and ability to perform IADLs. Patient Education:   [x]  HEP/Education Completed: Plan of Care, Goals  HEP: push up stick  []  No new Education completed  []  Reviewed Prior HEP      [x]  Patient verbalized and/or demonstrated understanding of education provided. []  Patient unable to verbalize and/or demonstrate understanding of education provided. Will continue education. [x]  Barriers to learning: difficulty with reading     PLAN:  Treatment Recommendations:  6 essential eye exercises, activity/environment modifications     [x]  Plan of care initiated. Plan to see patient 1 times per week for 6 weeks to address the treatment planned outlined above.   []  Continue with current plan of care  []  Modify plan of care as follows:    []  Hold pending physician visit  [] Discharge    Time In 0815   Time Out 0915   Timed Code Minutes: 10 min   Total Treatment Time: 66 min       Electronically Signed by: JASS Mandel, OTR/L [id number]

## 2022-10-05 ENCOUNTER — HOSPITAL ENCOUNTER (OUTPATIENT)
Dept: SPEECH THERAPY | Age: 52
Setting detail: THERAPIES SERIES
Discharge: HOME | End: 2022-10-05
Payer: MEDICAID

## 2022-10-05 PROCEDURE — 97130 THER IVNTJ EA ADDL 15 MIN: CPT

## 2022-10-05 PROCEDURE — 97129 THER IVNTJ 1ST 15 MIN: CPT

## 2022-10-05 NOTE — DISCHARGE SUMMARY
** PLEASE SIGN, DATE AND TIME CERTIFICATION BELOW AND RETURN TO [facility] OUTPATIENT REHABILITATION (FAX #: [phone]). ATTEST/CO-SIGN IF ACCESSING VIA [employer]. THANK YOU.**    I certify that I have examined the patient below and determined that Physical Medicine and Rehabilitation service is necessary and that I approve the established plan of care for up to 90 days or as specifically noted. Attestation, signature or co-signature of physician indicates approval of certification requirements.    ________________________ ____________ __________  Physician Signature   Date   Time     [address] [facility]  [] SPEECH LANGUAGE COGNITIVE EVALUATION  [] DAILY NOTE   [] PROGRESS NOTE [x] DISCHARGE NOTE    [x] [address] [facility]   [] [address]    [] [address] [facility]   [] Sourav Mcdonald    Date: 10/5/2022  Patient Name:  Huber Rogers  : 1970  MRN: [id number]  CSN: [id number]    Referring Practitioner MIRNA Cisse CNP   Diagnosis Other speech disturbances [R47.89]    Treatment Diagnosis Cognitive deficits, expressive language deficits   Date of Evaluation 22      [facility]   Functional Outcome Score Level 4 (22)       Insurance: Primary: Payor: Mojgan Herrmann /  /  / ,   Secondary: Shy Arizmendi 100 Information: No precert required   Visit # 8, 8/10 for progress note   Visits Allowed: No visit limit   Recertification Date:    Physician Follow-Up: Jose Morris - 3/1/23    Physician Orders: Cognitive and higher level testing as patient's family is noticing patient having word finding difficulties after a fall at work . Pertinent History: Patient presents today ambulating with a cane after a fall at work . States that he slipped on oil and fell at work hitting his head on concrete.  Reports he had a hard hat on when he fell. Patient went to the emergency room when he fell, but did not have images. Patient has been experiencing more headaches since the fall. Reports his family has noticed that during conversation he says the wrong words that don't go with the conversation that he is having. Patient reports he has dizziness that comes and goes and reports just recently he has had some blurred vision that comes and goes. Patient reports he can't come up with the words he wants to say and he doesn't recall others telling him some things. SUBJECTIVE: Patient arrived this date to complete his last scheduled therapy appointment. He had to cancel and reschedule the last x3 visits. In regards to his cognitive skills, patient states \"I have good days and bad days\". Discussed with patient his progress within therapy thus far. Completed cognitive testing this date to determine progress within therapy thus far and POC. Ilene Corona completed the Neurobehavioral Cognitive Status Examination (COGNISTAT) with the following results:    Level of Consciousness:  Alert  Orientation:  - Typical Functioning  Attention:   - Mild Impairment  Language:  Comprehension:  - Typical Functioning  Repetition:  - Typical Functioning  Namin/8 - Typical Functioning  Constructions:  - Moderate Impairment  Memory:  - Mild Impairment  Calculations:  - Mild Impairment  Reasoning:  Similarities:  - Typical Functioning  Judgment:  - Typical Functioning    Overall, patient demonstrated improved scores that were largely comparative to initial evaluation. He did improve his score on memory from a  initially to a  this date. SHORT TERM GOAL #1:  Goal 1: Patient will complete selective, alternating and divided attention task with no more than 2 errors or redirects to task for improved success with IADL's and eventual return to work. GOAL NOT MET. NO NEW GOAL.    INTERVENTIONS:  Not addressed specifically this date due to focus on additional goals. PREVIOUS SESSION:   Alternating Attention tasks:   Patient asked to alternate between circling the even numbers in 1 row and the odd numbers in the second row and then continue to alternate row by row throughout stimulus sheet #8 of the APT. Patient completed this task with 6 errors within 5 minutes and 40 seconds. Patient had to pause a few times during this task because of pain in his neck and some vision difficulties. Patient completed the Trail Making Test Part B this session. Patient was asked to alternate between connecting the dots going from a number to a letter in ascending order. Patient initially started with a capital \"i\" rather than number 1. Once redirected to the correct number, patient completed the rest of the task with 3 errors, but self-corrected with 1 error. Patient completed the initial task within a functional time, however, required double the time to successfully correct. After completion of the above task, patient explained to this ST in detail what he needs to do at work that includes numbers and letters. Patient initially had difficulty \"laying\" out the floor of machines at work as he did it backwards. Patient reports he will sometimes go the opposite direction to find a specific machine with a letter-number combination. Patient reports he initially went back to work x 1 week after he found out he had a pituitary growth. He then had the fall at home and went back to work x 1 day. Patient reported he noticed increased difficulty focusing when it was more busy/noisier ever since he was diagnosed with the pituitary growth. Patient reports he has noticed being disoriented at times.     SHORT TERM GOAL #2:  Goal 2: Patient will complete delayed recall tasks and working memory tasks with up to 4 items with use of compensatory strategies with no more than min cues 80% of the time for improved recall of daily tasks and conversations. GOAL MET. NO NEW GOAL. INTERVENTIONS: Not addressed specifically this date due to focus on additional goals. PREVIOUS SESSION:   Patient reports he thought his appointment was at 8:30 this morning, but it was actually 8. Patient reports he has also been confused with what day it was this week. Ongoing education on memory strategies to utilize to assist with functional, daily recall. Delayed recall task:  Patient was provided with a list of 4 grocery items with auditory provision and asked to recall:   - immediate recall: 4/4 independent   - 6 minute delay: 4/4 independent    Working memory task:  Patient provided with a list of 3-4 words with auditory provision and asked to mentally manipulate the words into alphabetical order:   - list of 3 words: 8/10 without cues, 2/10 with min cues   - list of 4 words: 1/2 with min cues, 1/2 with max cues  **Significantly increased difficulty with lists of 4 words. **Patient required min cues to utilize repetition to aid in recall of the lists of words (especially 4 words) in order to put them in alphabetical order. SHORT TERM GOAL #3:  Goal 3: Patient will demonstrate use of word finding strategies in conversation without cues at least 90% of the time for improved success conversing with family and friends. GOAL MET. NO NEW GOAL. INTERVENTIONS: Not addressed specifically this date due to focus on additional goals. PREVIOUS SESSION:    Patient with paraphasias within the working memory task. Patient also switching initial sounds of one word with another word at times within the working memory task. NO word finding issues noted in conversation, including extensive description of how they are treating his pain.       LONG-TERM GOALS:  Long Term Goals  Time Frame for Long Term Goals: 6 weeks  Goal 1: Patient's Functional Outcome Measure Score (memory) will improve by at least 1 level (current level 4) for improved success completing IADL's. GOAL NOT MET. NO NEW GOAL. DISCHARGE SUMMARY: Patient has met 2/3 STGs and 0/1 LTGs this therapy session. He has demonstrated great improvement within delayed recall, working memory and word-finding skills. Attention and schedule management skills continue to demonstrate deficits as patient's attendance within therapy has been unpredictable. Patient reports high pain levels on the days in which he reports to have more cognitive deficits. Repeated cognitive testing was completed this date in which patient has made minimal progress with only notable progress observed within delayed recall tasks. Discussed with patient the need to use compensatory memory strategies (ex: set alarms, reminders, fill out calendar, write to-do lists, plan his day with times to complete things) and how to implement these into daily activities to promote improved recall and management of schedule. Informed patient how to return to speech therapy in the future if desired. No further speech therapy visits needed at this time. Activity/Treatment Tolerance:  [x]  Patient tolerated treatment well  []  Patient limited by fatigue  []  Patient limited by pain   []  Patient limited by other medical complications  []  Other:     Patient Education:   [x]  HEP/Education Completed: memory strategies for functional tasks   []  No new Education completed  [x]  Reviewed Prior HEP      [x]  Patient verbalized and/or demonstrated understanding of education provided. []  Patient unable to verbalize and/or demonstrate understanding of education provided. Will continue education. []  Barriers to learning:    PLAN:  []  Plan of care initiated. Plan to see patient 2 times per week for 6 weeks to address the treatment planned outlined above.   []  Continue with current plan of care  []  Modify plan of care as follows:    []  Hold pending physician visit  [x]  Discharge    Time In 0826   Time Out 0900   Timed Code Minutes: 34 min   Total Treatment Time: [address] [facility] [place] [address]

## 2022-10-06 ENCOUNTER — APPOINTMENT (OUTPATIENT)
Dept: OCCUPATIONAL THERAPY | Age: 52
End: 2022-10-06
Payer: MEDICAID

## 2022-10-07 ENCOUNTER — HOSPITAL ENCOUNTER (OUTPATIENT)
Dept: OCCUPATIONAL THERAPY | Age: 52
Setting detail: THERAPIES SERIES
Discharge: HOME | End: 2022-10-07
Payer: MEDICAID

## 2022-10-07 NOTE — PROGRESS NOTES
[address]ERAPY  [] EVALUATION  [x] DAILY NOTE (LAND) [] DAILY NOTE (AQUATIC ) [] PROGRESS NOTE [] DISCHARGE NOTE    [x] OUTPATIENT REHABILITATION [facility]   [] [place] [address]    [] [facility]   [] David Batch    Date: 10/7/2022  Patient Name:  Emile Aguirre  : 1970  MRN: [id number]  CSN: [id number]    Referring Practitioner MIRNA Esquivel CNP   Diagnosis Other speech disturbances [R47.89]    Treatment Diagnosis Decreased IADLs   Date of Evaluation 22      Functional Outcome Measure Used [facility]   Functional Outcome Score 86 (22)       Insurance: Primary: Payor: [employer] /  /  / ,   Secondary: Tamea Sprain   Authorization Information: INSURANCE THERAPY BENEFIT: No precert until after 64MV visit. Visit Limit Based on Precert  AQUATIC THERAPY COVERED:   Yes  MODALITIES COVERED:  Yes except for Iontophoresis and Hot/Cold Packs   Visit # 2, 2/10 for progress note   Visits Allowed: 30    Recertification Date:    Physician Follow-Up: ?   Physician Orders: Low vision program   Pertinent History: Patient presents today ambulating with a cane after a fall at work . States that he slipped on oil and fell at work hitting his head on concrete. Reports he had a hard hat on when he fell. Reports that he hit his head twice, once with the hard and and again after the hard hat fell off. Patient went to the emergency room when he fell. Reports that no images were taken that date. CT on 22   Impression:   1. No acute intracranial hemorrhage or process identified   Reports that since then, he has had difficulty with saying correct words. Reports poor memory also. Reports that he gets a headache when trying to read, even with glasses/bifocals, his letters/words get blurry. 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 Not a tumor, just a growth.        SUBJECTIVE: OBJECTIVE: Evaluation data  Vision   Vision History  Last to the ophthalmologist in May. (Dr. Arlet Fermin). 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 Later as a child, had an eye injury into the left eye. As a result layers of cornea removed. Reports that glare was a problem and still is today. Wear glasses glasses with bifocals. Reports that he cannot tell a difference when looking through the bifocals now. Reports that it does clear up when closing one eye, but still hard to comprehend when reading. Reports that he feels dry eye. Uses eye drops   Eye Dominance  left   Corneal Reflection  right eye reflection center over the top of the iris, left eye reflection on the top to the left of the iris. Saccades  right eye - good ability (reported in and out blurry vision)  Left eye - good ability (reports min blurriness with eye movement to the let)  Bilateral - good movement   Pursuits  right eye - blurry, losing target, difficulty following target to the right, increased blinking  Left eye - losing target, smoother movement than right eye   Bilateral - noting right eye turning in more than the left when following a target.      Acuity  NT   Peripheral Vision  NT   Confrontation  NT   Convergence Blurry at 27 cm, unable to clear   Cover/Uncover    ADL's Reading is more difficult (mail or text messages) watching television is more difficulty sometimes, more difficult with writing (focusing)                TREATMENT   Precautions: Changes in vision, current with ST for cognitive changes also   Pain:  headache    X in shaded column indicates Activity Completed Today   Modalities Parameters/  Location  Notes/Comments                     Manual Therapy Time/  Technique  Notes/Comments                     Exercises   Sets/  Sec Reps  Notes/Comments   Push up stick   x                                              Activities Time    Notes/Comments   Decreased brightness of the lights to increase tolerance in the room  x                  Specific Interventions Next Treatment: 6 essential eye exercises, activity/environment modifications    Activity/Treatment Tolerance:  [x]  Patient tolerated treatment well  []  Patient limited by fatigue  []  Patient limited by pain   []  Patient limited by other medical complications  []  Other:     Assessment: Patient presents to the clinic with reports of blurry vision throughout his day. Report that he has had blurry vision prior to the fall, but use of eye drops assisted to clear his vision. Patient reports blurry vision with bilateral and single eye vision. Encouraged appointment with ophthamologist due to changes in vision since falling and hitting his head. Also to determine if effects growth on the pituitary has on his vision. During the assessment this date, noted difficulty with pursuit of a target and remaining clear focus on a target as the target moves toward the face. Patient requires skilled therapy at this time to address changes in vision and to learn and incorporate adaptations to activity to environment to increase tolerance for daily activity. Areas for Improvement: vision disturbance  Prognosis: good    GOALS:  Patient Goal: return to previous abilities     Short Term Goals:  Time Frame: 4 weeks   Patient will reports use of at least 2 activity and/or environment modifications/adaptations during daily activity to increase tolerance with performance of IADLs. Patient will be independent with HEP to increase ease with reading. Long Term Goals:  Time Frame: 6 weeks   Patient will report decrease in score of the BIVSS to less than 52 to demonstrate increased ease and ability to perform IADLs.       Patient Education:   [x]  HEP/Education Completed: Plan of Care, Goals  HEP: push up stick  []  No new Education completed  []  Reviewed Prior HEP      [x]  Patient verbalized and/or demonstrated understanding of education provided. []  Patient unable to verbalize and/or demonstrate understanding of education provided. Will continue education. [x]  Barriers to learning: difficulty with reading     PLAN:  Treatment Recommendations:  6 essential eye exercises, activity/environment modifications     [x]  Plan of care initiated. Plan to see patient 1 times per week for 6 weeks to address the treatment planned outlined above.   []  Continue with current plan of care  []  Modify plan of care as follows:    []  Hold pending physician visit  []  Discharge    Time In 0815   Time Out 0915   Timed Code Minutes: 10 min   Total Treatment Time: 66 min       Electronically Signed by: JASS Squires, OTR/L 2019

## 2022-10-08 DIAGNOSIS — I10 UNCONTROLLED HYPERTENSION: ICD-10-CM

## 2022-10-10 RX ORDER — HYDROCHLOROTHIAZIDE 25 MG/1
25 TABLET ORAL EVERY MORNING
Qty: 90 TABLET | Refills: 1 | Status: SHIPPED | OUTPATIENT
Start: 2022-10-10

## 2022-10-10 RX ORDER — GABAPENTIN 300 MG/1
300 CAPSULE ORAL DAILY
Qty: 30 CAPSULE | Refills: 0 | Status: SHIPPED | OUTPATIENT
Start: 2022-10-12 | End: 2022-11-11

## 2022-10-10 NOTE — TELEPHONE ENCOUNTER
This medication refill is regarding a electronic request.  Refill requested by  [facility] . Requested Prescriptions     Pending Prescriptions Disp Refills    hydroCHLOROthiazide (HYDRODIURIL) 25 MG tablet [Pharmacy Med Name: HYDROCHLOROTHIAZIDE 25 MG TAB] 90 tablet 1     Sig: take 1 tablet by mouth every morning     Date of last visit: 5/10/2022   Date of next visit: None  Date of last refill: 4/26/22 #90/1    Last CMP:   Lab Results   Component Value Date     09/13/2022    K 3.5 09/13/2022     09/13/2022    CO2 25 09/13/2022    BUN 10 09/13/2022    CREATININE 0.9 09/13/2022    GLUCOSE 105 09/13/2022    CALCIUM 8.9 09/13/2022    PROT 6.8 09/13/2022    LABALBU 3.9 09/13/2022    BILITOT 0.3 09/13/2022    ALKPHOS 147 (H) 09/13/2022    AST 18 09/13/2022    ALT 20 09/13/2022    LABGLOM >90 09/13/2022     Rx verified, ordered and set to EP.

## 2022-10-10 NOTE — TELEPHONE ENCOUNTER
OARRS reviewed. UDS: + for  Hydrocodone , Butalbital -consistent. + for Gabapentin , Amitriptyline -inconsistent. Last seen: 9/12/2022.  Follow-up: 12/12/2022

## 2022-10-12 DIAGNOSIS — J30.1 NON-SEASONAL ALLERGIC RHINITIS DUE TO POLLEN: ICD-10-CM

## 2022-10-12 RX ORDER — MONTELUKAST SODIUM 10 MG/1
TABLET ORAL
Qty: 90 TABLET | Refills: 3 | Status: SHIPPED | OUTPATIENT
Start: 2022-10-12

## 2022-10-12 NOTE — TELEPHONE ENCOUNTER
This medication refill is regarding a electronic request.  Refill requested by  [employer] [facility] . Requested Prescriptions     Pending Prescriptions Disp Refills    montelukast (SINGULAIR) 10 MG tablet [Pharmacy Med Name: MONTELUKAST SOD 10 MG TABLET] 90 tablet 3     Sig: take 1 tablet by mouth every evening     Date of last visit: 5/10/2022   Date of next visit: None  Date of last refill: 11/17/21 #90/3    Rx verified, ordered and set to EP.

## 2022-10-18 DIAGNOSIS — J30.1 NON-SEASONAL ALLERGIC RHINITIS DUE TO POLLEN: ICD-10-CM

## 2022-10-18 RX ORDER — FLUTICASONE PROPIONATE 50 MCG
SPRAY, SUSPENSION (ML) NASAL
Qty: 48 G | Refills: 5 | Status: SHIPPED | OUTPATIENT
Start: 2022-10-18

## 2022-10-18 NOTE — TELEPHONE ENCOUNTER
This medication refill is regarding a  refill . Refill requested by patient. Requested Prescriptions     Pending Prescriptions Disp Refills    fluticasone (FLONASE) 50 MCG/ACT nasal spray [Pharmacy Med Name: FLUTICASONE PROP 50 MCG SPRAY] 48 g      Sig: instill 2 sprays into each nostril once daily       Date of last visit: 5/10/2022   Date of next visit: Visit date not found  Date of last refill: 11/17/2021  Pharmacy Name: [facility] [address]       [facility]    Last Lipid Panel:    Lab Results   Component Value Date/Time    CHOL 207 07/01/2022 11:24 AM    TRIG 90 07/01/2022 11:24 AM    HDL 41 07/01/2022 11:24 AM    LDLCALC 148 07/01/2022 11:24 AM     Last CMP:   Lab Results   Component Value Date     09/13/2022    K 3.5 09/13/2022     09/13/2022    CO2 25 09/13/2022    BUN 10 09/13/2022    CREATININE 0.9 09/13/2022    GLUCOSE 105 09/13/2022    CALCIUM 8.9 09/13/2022    PROT 6.8 09/13/2022    LABALBU 3.9 09/13/2022    BILITOT 0.3 09/13/2022    ALKPHOS 147 (H) 09/13/2022    AST 18 09/13/2022    ALT 20 09/13/2022    LABGLOM >90 09/13/2022       Last Thyroid:    Lab Results   Component Value Date    TSH 2.660 03/24/2022    T4FREE 1.16 03/24/2022     Last Hemoglobin A1C:    Lab Results   Component Value Date/Time    LABA1C 4.9 07/01/2022 11:24 AM    AVGG 87 07/01/2022 11:24 AM       Rx verified, ordered and set to EP.

## 2022-10-20 DIAGNOSIS — Z98.1 S/P CERVICAL SPINAL FUSION: ICD-10-CM

## 2022-10-20 RX ORDER — BUTALBITAL, ACETAMINOPHEN AND CAFFEINE 50; 325; 40 MG/1; MG/1; MG/1
TABLET ORAL
Qty: 90 TABLET | Refills: 0 | Status: SHIPPED | OUTPATIENT
Start: 2022-10-20

## 2022-10-20 NOTE — TELEPHONE ENCOUNTER
This medication refill is regarding a electronic request. Refill requested by  [employer] [facility] . Requested Prescriptions     Pending Prescriptions Disp Refills    butalbital-acetaminophen-caffeine (FIORICET, ESGIC) -40 MG per tablet [Pharmacy Med Name: [id number] -40] 90 tablet 0     Sig: take 1 tablet by mouth every 4 hours if needed for headache     Date of last visit: 5/10/2022   Date of next visit: None  Date of last refill: 9/22/22 #90/0    Rx verified, ordered and set to EP.

## 2022-10-21 ENCOUNTER — HOSPITAL ENCOUNTER (OUTPATIENT)
Dept: OCCUPATIONAL THERAPY | Age: 52
Setting detail: THERAPIES SERIES
Discharge: HOME | End: 2022-10-21
Payer: MEDICAID

## 2022-10-21 PROCEDURE — 97535 SELF CARE MNGMENT TRAINING: CPT

## 2022-10-21 PROCEDURE — 97112 NEUROMUSCULAR REEDUCATION: CPT

## 2022-10-21 NOTE — PROGRESS NOTES
[address] THERAPY  [] EVALUATION  [x] DAILY NOTE (LAND) [] DAILY NOTE (AQUATIC ) [] PROGRESS NOTE [] DISCHARGE NOTE    [x] OUTPATIENT REHABILITATION [facility]   [] [place] [address]    [] [facility]   [] L[facility]    Date: 10/21/2022  Patient Name:  Lyndsay Bautista  : 1970  MRN: [id number]  CSN: [id number]    Referring Practitioner MIRNA Bermudez CNP   Diagnosis Other speech disturbances [R47.89]    Treatment Diagnosis Decreased IADLs   Date of Evaluation 22      Functional Outcome Measure Used [facility]   Functional Outcome Score 86 (22)       Insurance: Primary: Payor: Amaris Fu /  /  / ,   Secondary: [place] [employer]   Authorization Information: INSURANCE THERAPY BENEFIT: No precert until after 03GW visit. Visit Limit Based on Precert  AQUATIC THERAPY COVERED:   Yes  MODALITIES COVERED:  Yes except for Iontophoresis and Hot/Cold Packs   Visit # 2, /10 for progress note   Visits Allowed: 30    Recertification Date: 27/60/15   Physician Follow-Up: ?   Physician Orders: Low vision program   Pertinent History: Patient presents today ambulating with a cane after a fall at work . States that he slipped on oil and fell at work hitting his head on concrete. Reports he had a hard hat on when he fell. Reports that he hit his head twice, once with the hard and and again after the hard hat fell off. Patient went to the emergency room when he fell. Reports that no images were taken that date. CT on 22   Impression:   1. No acute intracranial hemorrhage or process identified   Reports that since then, he has had difficulty with saying correct words. Reports poor memory also. Reports that he gets a headache when trying to read, even with glasses/bifocals, his letters/words get blurry. 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 Not a tumor, just a growth.        SUBJECTIVE: Patient reports headache, and body ache. Reports that he has self tinting glasses that are useful at work. OBJECTIVE:  Hand Dominance right handed   Observation Consistently tilts his head to the left   Vision   Vision History  Last to the ophthalmologist in May. (Dr. Primitivo Oscar). 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 Later as a child, had an eye injury into the left eye. As a result layers of cornea removed. Reports that glare was a problem and still is today. Wear glasses glasses with bifocals. Reports that he cannot tell a difference when looking through the bifocals now. Reports that it does clear up when closing one eye, but still hard to comprehend when reading. Reports that he feels dry eye. Uses eye drops   Eye Dominance  left   Corneal Reflection  right eye reflection center over the top of the iris, left eye reflection on the top to the left of the iris. Saccades  right eye - good ability (reported in and out blurry vision)  Left eye - good ability (reports min blurriness with eye movement to the let)  Bilateral - good movement   Pursuits  right eye - blurry, losing target, difficulty following target to the right, increased blinking  Left eye - losing target, smoother movement than right eye   Bilateral - noting right eye turning in more than the left when following a target.      Acuity  NT   Peripheral Vision  NT   Confrontation  NT   Convergence Blurry at 27 cm, unable to clear   Cover/Uncover    ADL's Reading is more difficult (mail or text messages) watching television is more difficulty sometimes, more difficult with writing (focusing)                TREATMENT   Precautions: Changes in vision, current with ST for cognitive changes also   Pain:  headache    X in shaded column indicates Activity Completed Today   Modalities Parameters/  Location  Notes/Comments                     Manual Therapy Time/  Technique  Notes/Comments Exercises   Sets/  Sec Reps  Notes/Comments   Push up stick   x    Single eye pursuits   x Left eye watering after, increased blinking with right and left   Bilateral eye pursuits   x Increased ability this date from eval.                               Activities Time    Notes/Comments   Decreased brightness of the lights to increase tolerance in the room  x    Adaptations to brightness - sunglasses, hats with brims, filters. Colored paper to decrease light reflection  x Prefers valarie or gray for brightness   Spiritual care for coping  x    Taking breaks, alternating tasks. x                  Specific Interventions Next Treatment: 6 essential eye exercises, activity/environment modifications    Activity/Treatment Tolerance:  [x]  Patient tolerated treatment well  []  Patient limited by fatigue  []  Patient limited by pain   []  Patient limited by other medical complications  []  Other:     Assessment: Patient with good understanding of modifications to activity to increase tolerance. Areas for Improvement: vision disturbance  Prognosis: good    GOALS:  Patient Goal: return to previous abilities     Short Term Goals:  Time Frame: 4 weeks   Patient will reports use of at least 2 activity and/or environment modifications/adaptations during daily activity to increase tolerance with performance of IADLs. Patient will be independent with HEP to increase ease with reading. Long Term Goals:  Time Frame: 6 weeks   Patient will report decrease in score of the BIVSS to less than 52 to demonstrate increased ease and ability to perform IADLs.       Patient Education:   [x]  HEP/Education Completed: Plan of Care, Goals  HEP: push up stick  Single and bilateral pursuits  Modifications to activity and environment - filters, colored paper, taking breaks, alternating activity  []  No new Education completed  []  Reviewed Prior HEP      [x]  Patient verbalized and/or demonstrated understanding of education provided. []  Patient unable to verbalize and/or demonstrate understanding of education provided. Will continue education. [x]  Barriers to learning: difficulty with reading     PLAN:  Treatment Recommendations:  6 essential eye exercises, activity/environment modifications     []  Plan of care initiated. Plan to see patient 1 times per week for 6 weeks to address the treatment planned outlined above.   [x]  Continue with current plan of care  []  Modify plan of care as follows:    []  Hold pending physician visit  []  Discharge    Time In 0815   Time Out 0855   Timed Code Minutes: 40 min   Total Treatment Time: 40 min       Electronically Signed by: JASS Valles, OTR/L [id number]

## 2022-10-25 ENCOUNTER — CARE COORDINATION (OUTPATIENT)
Dept: CARE COORDINATION | Age: 52
End: 2022-10-25

## 2022-10-25 NOTE — CARE COORDINATION
Terry Caroms received a call from Bailey Nguyen asking PCP to order a psychological eval.  He states he wants to check his memory function and see what has been affected, if anything, from his head trauma. Please advise. Thank you!

## 2022-10-28 ENCOUNTER — HOSPITAL ENCOUNTER (OUTPATIENT)
Dept: OCCUPATIONAL THERAPY | Age: 52
Setting detail: THERAPIES SERIES
Discharge: HOME | End: 2022-10-28
Payer: MEDICAID

## 2022-10-28 PROCEDURE — 97112 NEUROMUSCULAR REEDUCATION: CPT

## 2022-10-28 NOTE — PROGRESS NOTES
[address] THERAPY  [] EVALUATION  [x] DAILY NOTE (LAND) [] DAILY NOTE (AQUATIC ) [] PROGRESS NOTE [] DISCHARGE NOTE    [x] OUTPATIENT REHABILITATION [facility]   [] [place] [address]    [] [facility]   [] [facility]    Date: 10/28/2022  Patient Name:  Darcy Sheehan  : 1970  MRN: [id number]  CSN: [id number]    Referring Practitioner MIRNA Olguin CNP   Diagnosis Other speech disturbances [R47.89]    Treatment Diagnosis Decreased IADLs   Date of Evaluation 22      Functional Outcome Measure Used [facility]   Functional Outcome Score 86 (22)       Insurance: Primary: Payor: Sari Pak /  /  / ,   Secondary: Jen Allen   Authorization Information: INSURANCE THERAPY BENEFIT: No precert until after 71GR visit. Visit Limit Based on Precert  AQUATIC THERAPY COVERED:   Yes  MODALITIES COVERED:  Yes except for Iontophoresis and Hot/Cold Packs   Visit # 3, 3/10 for progress note   Visits Allowed: 30    Recertification Date:    Physician Follow-Up: ?   Physician Orders: Low vision program   Pertinent History: Patient presents today ambulating with a cane after a fall at work . States that he slipped on oil and fell at work hitting his head on concrete. Reports he had a hard hat on when he fell. Reports that he hit his head twice, once with the hard and and again after the hard hat fell off. Patient went to the emergency room when he fell. Reports that no images were taken that date. CT on 22   Impression:   1. No acute intracranial hemorrhage or process identified   Reports that since then, he has had difficulty with saying correct words. Reports poor memory also. Reports that he gets a headache when trying to read, even with glasses/bifocals, his letters/words get blurry. 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 Not a tumor, just a growth.        SUBJECTIVE: Patient reports he is hurting \"all over\" due to fibromyalgia. Weather changes are affecting him with muscle spasms. Has a R knee brace on. Vision is \"off and on still\". He reports he has been stressed. Has had a migraine for the last 3 days. He is tired and sleepy today, \"overtired\". Eyes feel \"irritated\" today, watering. He notes that he is squinting. OBJECTIVE:  Hand Dominance right handed   Observation Consistently tilts his head to the left   Vision   Vision History  Last to the ophthalmologist in May. (Dr. Lady Macias). 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 Later as a child, had an eye injury into the left eye. As a result layers of cornea removed. Reports that glare was a problem and still is today. Wear glasses glasses with bifocals. Reports that he cannot tell a difference when looking through the bifocals now. Reports that it does clear up when closing one eye, but still hard to comprehend when reading. Reports that he feels dry eye. Uses eye drops   Eye Dominance  left   Corneal Reflection  right eye reflection center over the top of the iris, left eye reflection on the top to the left of the iris. Saccades  right eye - good ability (reported in and out blurry vision)  Left eye - good ability (reports min blurriness with eye movement to the let)  Bilateral - good movement   Pursuits  right eye - blurry, losing target, difficulty following target to the right, increased blinking  Left eye - losing target, smoother movement than right eye   Bilateral - noting right eye turning in more than the left when following a target.      Acuity  NT   Peripheral Vision  NT   Confrontation  NT   Convergence Blurry at 27 cm, unable to clear   Cover/Uncover    ADL's Reading is more difficult (mail or text messages) watching television is more difficulty sometimes, more difficult with writing (focusing)                TREATMENT   Precautions: Changes in vision, current with ST for cognitive changes also   Pain:  headache    X in shaded column indicates Activity Completed Today   Modalities Parameters/  Location  Notes/Comments                     Manual Therapy Time/  Technique  Notes/Comments                     Exercises   Sets/  Sec Reps  Notes/Comments   Push up stick       Single eye pursuits    Left eye watering after, increased blinking with right and left   Bilateral eye pursuits    Increased ability this date from eval.   Dynavision    x 1st trial 60 sec: 42 hits  2nd trial 60 sec upper left quadrant: 67  3rd trial 60 sec: upper right quadrant: 73  4th trial 60 sec: lower left quadrant: 66  5th trial 60 sec: lower right quadrant: 69   [employer] Dexterity   x Pt noted to adjust head to focus on hole; pt reported difficulty with focus/attention and then strains harder to focus and then this makes his vision blurry   Lacing activity -lace and unlace   x Elephant- pt reports fair level of difficulty and difficulty with motor processing          Activities Time    Notes/Comments   Decreased brightness of the lights to increase tolerance in the room      Adaptations to brightness - sunglasses, hats with brims, filters. Colored paper to decrease light reflection   Prefers valarie or gray for brightness   Spiritual care for coping      Taking breaks, alternating tasks. Specific Interventions Next Treatment: 6 essential eye exercises, activity/environment modifications, pt hobbies: drawing, sewing, fishing, coloring, putting things together     Activity/Treatment Tolerance:  [x]  Patient tolerated treatment well  []  Patient limited by fatigue  []  Patient limited by pain   []  Patient limited by other medical complications  []  Other:     Assessment: Patient with good understanding of modifications to activity to increase tolerance.   Did well with all activities but did have eye muscle fatigue when activities required focus and concentration. Areas for Improvement: vision disturbance  Prognosis: good    GOALS:  Patient Goal: return to previous abilities     Short Term Goals:  Time Frame: 4 weeks   Patient will reports use of at least 2 activity and/or environment modifications/adaptations during daily activity to increase tolerance with performance of IADLs. Patient will be independent with HEP to increase ease with reading. Long Term Goals:  Time Frame: 6 weeks   Patient will report decrease in score of the BIVSS to less than 52 to demonstrate increased ease and ability to perform IADLs. Patient Education:   [x]  HEP/Education Completed: Plan of Care, Goals  HEP: push up stick  Single and bilateral pursuits  Modifications to activity and environment - filters, colored paper, taking breaks, alternating activity  []  No new Education completed  []  Reviewed Prior HEP      [x]  Patient verbalized and/or demonstrated understanding of education provided. []  Patient unable to verbalize and/or demonstrate understanding of education provided. Will continue education. [x]  Barriers to learning: difficulty with reading     PLAN:  Treatment Recommendations:  6 essential eye exercises, activity/environment modifications     []  Plan of care initiated. Plan to see patient 1 times per week for 6 weeks to address the treatment planned outlined above.   [x]  Continue with current plan of care  []  Modify plan of care as follows:    []  Hold pending physician visit  []  Discharge    Time In 1200   Time Out 1230   Timed Code Minutes: 30 min   Total Treatment Time: 30 min       Electronically Signed by: Loreda Moritz, MOT/ANGELA, [address] [place] [address], [facility]

## 2022-11-02 ENCOUNTER — OFFICE VISIT (OUTPATIENT)
Dept: FAMILY MEDICINE CLINIC | Age: 52
End: 2022-11-02
Payer: MEDICAID

## 2022-11-02 VITALS
DIASTOLIC BLOOD PRESSURE: 76 MMHG | WEIGHT: 315 LBS | BODY MASS INDEX: 55.28 KG/M2 | HEART RATE: 72 BPM | SYSTOLIC BLOOD PRESSURE: 142 MMHG | RESPIRATION RATE: 22 BRPM

## 2022-11-02 DIAGNOSIS — E55.9 VITAMIN D DEFICIENCY: ICD-10-CM

## 2022-11-02 DIAGNOSIS — E83.42 HYPOMAGNESEMIA: ICD-10-CM

## 2022-11-02 DIAGNOSIS — D50.9 IRON DEFICIENCY ANEMIA, UNSPECIFIED IRON DEFICIENCY ANEMIA TYPE: ICD-10-CM

## 2022-11-02 DIAGNOSIS — S09.90XS INJURY OF HEAD, SEQUELA: ICD-10-CM

## 2022-11-02 DIAGNOSIS — R41.3 MEMORY DIFFICULTY: Primary | ICD-10-CM

## 2022-11-02 PROCEDURE — 1036F TOBACCO NON-USER: CPT | Performed by: NURSE PRACTITIONER

## 2022-11-02 PROCEDURE — 3017F COLORECTAL CA SCREEN DOC REV: CPT | Performed by: NURSE PRACTITIONER

## 2022-11-02 PROCEDURE — G8417 CALC BMI ABV UP PARAM F/U: HCPCS | Performed by: NURSE PRACTITIONER

## 2022-11-02 PROCEDURE — G8484 FLU IMMUNIZE NO ADMIN: HCPCS | Performed by: NURSE PRACTITIONER

## 2022-11-02 PROCEDURE — G8427 DOCREV CUR MEDS BY ELIG CLIN: HCPCS | Performed by: NURSE PRACTITIONER

## 2022-11-02 PROCEDURE — 99214 OFFICE O/P EST MOD 30 MIN: CPT | Performed by: NURSE PRACTITIONER

## 2022-11-02 RX ORDER — IPRATROPIUM BROMIDE AND ALBUTEROL SULFATE 2.5; .5 MG/3ML; MG/3ML
SOLUTION RESPIRATORY (INHALATION)
COMMUNITY
Start: 2017-09-08

## 2022-11-02 SDOH — ECONOMIC STABILITY: FOOD INSECURITY: WITHIN THE PAST 12 MONTHS, YOU WORRIED THAT YOUR FOOD WOULD RUN OUT BEFORE YOU GOT MONEY TO BUY MORE.: NEVER TRUE

## 2022-11-02 SDOH — ECONOMIC STABILITY: FOOD INSECURITY: WITHIN THE PAST 12 MONTHS, THE FOOD YOU BOUGHT JUST DIDN'T LAST AND YOU DIDN'T HAVE MONEY TO GET MORE.: NEVER TRUE

## 2022-11-02 ASSESSMENT — ENCOUNTER SYMPTOMS
RHINORRHEA: 0
BLOOD IN STOOL: 0
ABDOMINAL PAIN: 0
ABDOMINAL DISTENTION: 0
EYE DISCHARGE: 0
COUGH: 0
DIARRHEA: 0
EYE REDNESS: 0
SORE THROAT: 0
CONSTIPATION: 0
ANAL BLEEDING: 0
NAUSEA: 0
COLOR CHANGE: 0
SHORTNESS OF BREATH: 0

## 2022-11-02 ASSESSMENT — SOCIAL DETERMINANTS OF HEALTH (SDOH): HOW HARD IS IT FOR YOU TO PAY FOR THE VERY BASICS LIKE FOOD, HOUSING, MEDICAL CARE, AND HEATING?: NOT HARD AT ALL

## 2022-11-02 NOTE — PATIENT INSTRUCTIONS
Will check some labs to look at vitamin d, magnesium, calcium, and iron    Will refer to Neuropsychologist at [place] [id number] in 3 months or sooner as needed

## 2022-11-02 NOTE — PROGRESS NOTES
[facility]  [address]  [facility] [address]  Dept: [phone]  Loc: [phone]      Visit Date: 11/2/2022    Emile Aguirre is a 50 y.o. male who presents today for:  Chief Complaint   Patient presents with    Referral - General     Wants to get a referral to neuropsych. Due to multiple brain injuries. 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 Having issues with memory - forgetfulness, trouble with word recall, sometimes says things that he shouldn't say at inappropriate times. Completed speech therapy for this issue - does not feel much better. This issue is impacting his daily life - partner feels he isn't listening to her but really he is just forgetting the conversation. He is out of his iron supplement - \"for a minute\" last CBC still shows anemia - last iron level was from 2020.      Unsure if he is taking vitamin d or magnesium  HPI  Health Maintenance   Topic Date Due    Pneumococcal 0-64 years Vaccine (1 - PCV) Never done    DTaP/Tdap/Td vaccine (1 - Tdap) Never done    COVID-19 Vaccine (3 - Booster for Pfizer series) 06/29/2021    Shingles vaccine (1 of 2) Never done    Flu vaccine (1) Never done    Depression Monitoring  03/14/2023    Colorectal Cancer Screen  04/02/2023    Lipids  07/01/2027    Hepatitis A vaccine  Aged Out    Hib vaccine  Aged Out    Meningococcal (ACWY) vaccine  Aged Out    Hepatitis C screen  Discontinued    HIV screen  Discontinued     Past Medical History:   Diagnosis Date    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 [address] Right 07/07/2022     Family History   Problem Relation Age of Onset    High Blood Pressure Mother     Emphysema Mother     Other Mother         she was hit and killed by car    High Blood Pressure Father     Emphysema Father     Asthma Father      Social History     Tobacco Use    Smoking status: Never    Smokeless tobacco: Never   Substance Use Topics    Alcohol use: Yes     Comment: occasionally      Current Outpatient Medications   Medication Sig Dispense Refill    ipratropium-albuterol (DUONEB) 0.5-2.5 (3) MG/3ML SOLN nebulizer solution Inhale into the lungs      butalbital-acetaminophen-caffeine (FIORICET, ESGIC) -40 MG per tablet take 1 tablet by mouth every 4 hours if needed for headache 90 tablet 0    fluticasone (FLONASE) 50 MCG/ACT nasal spray instill 2 sprays into each nostril once daily 48 g 5    montelukast (SINGULAIR) 10 MG tablet take 1 tablet by mouth every evening 90 tablet 3    hydroCHLOROthiazide (HYDRODIURIL) 25 MG tablet take 1 tablet by mouth every morning 90 tablet 1    gabapentin (NEURONTIN) 300 MG capsule Take 1 capsule by mouth daily for 30 days. 30 capsule 0    traMADol (ULTRAM) 50 MG tablet take 1 tablet by mouth every 6 hours if needed for pain for 7 days      orphenadrine (NORFLEX) 100 MG extended release tablet take 1 tablet by mouth twice a day if needed for muscle spasm      lidocaine (XYLOCAINE) 5 % ointment apply 240 GRAMS TOPICALLY 4 TIMES A DAY AS NEEDED FOR PAIN      lidocaine (LIDODERM) 5 % apply 3 patches TO THE AFFECTED AREA DAILY.  LEAVE ON FOR 12 HOURS AND THEN OFF FOR 12 HOURS.      ibuprofen (ADVIL;MOTRIN) 600 MG tablet take 1 tablet by mouth every 6 hours      RA VITAMIN D-3 125 MCG (5000 UT) CAPS capsule take 1 capsule by mouth once daily      Magnesium Gluconate (MAGNESIUM 27 PO) Take by mouth      dicyclomine (BENTYL) 10 MG capsule Take 1 capsule by mouth in the morning and at bedtime 30 capsule 2    HYDROcodone-acetaminophen (NORCO) 5-325 MG per tablet Take 1 tablet by mouth every 8 hours as needed for Pain for up to 30 days. 90 tablet 0    azelastine (OPTIVAR) 0.05 % ophthalmic solution instill 1 drop into both eyes twice a day 1 each 11    albuterol (PROVENTIL) (2.5 MG/3ML) 0.083% nebulizer solution inhale contents of 1 vial ( 3 milliliters ) in nebulizer by mouth and INTO THE LUNGS every 4 hours if needed for wheezing 375 mL 3    FEROSUL 325 (65 Fe) MG tablet take 1 tablet by mouth once daily 90 tablet 3    triamcinolone (KENALOG) 0.1 % cream APPLY TO AFFECTED AREA TWICE A DAY 1 each 2    simethicone (MYLICON) 80 MG chewable tablet Take 1 tablet by mouth 4 times daily as needed for Flatulence 180 tablet 3    aspirin EC 81 MG EC tablet Take 1 tablet by mouth daily 90 tablet 1    amitriptyline (ELAVIL) 50 MG tablet take 1 tablet by mouth at bedtime 90 tablet 1    BANOPHEN 25 MG capsule take 1 capsule by mouth every 6 hours if needed for itching 60 capsule 5    ergocalciferol (DRISDOL) 1.25 MG (08258 UT) capsule Take 1 capsule by mouth once a week 4 capsule 3    albuterol sulfate  (90 Base) MCG/ACT inhaler Inhale 2 puffs into the lungs 4 times daily as needed for Wheezing 3 each 3    betamethasone valerate (VALISONE) 0.1 % cream apply to affected area twice a day 45 g 3    Blood Pressure Monitoring (B-D ASSURE BPM/AUTO ARM CUFF) MISC 1 Units by Does not apply route daily 1 each 0    Cholecalciferol (VITAMIN D3) 1.25 MG (78844 UT) CAPS 1 capsule 2 times a week , Monday and Friday 24 capsule 0    budesonide-formoterol (SYMBICORT) 160-4.5 MCG/ACT AERO 2 puffs inhaled twice daily. Rinse mouth well after use.  3 each 3    Respiratory Therapy Supplies (NEBULIZER/TUBING/MOUTHPIECE) KIT Dispense tubing, mask, and mouthpiece for nebulizer machine. Dx: Asthma 1 kit 0    dicyclomine (BENTYL) 10 MG capsule Take 1 capsule by mouth 3 times daily as needed (abd cramping) 30 capsule 0    loratadine (CLARITIN) 10 MG tablet take 1 tablet by mouth once daily 90 tablet 3    EPINEPHrine (EPIPEN 2-RAGHAV) 0.3 MG/0.3ML SOAJ injection Inject one pen as directed STAT for allergic reaction, may disp generic NDC 15627-511-98 1 each 0    cetirizine (ZYRTEC) 10 MG tablet Take 1 tablet by mouth daily 30 tablet 11    bumetanide (BUMEX) 1 MG tablet Take 0.5 tablets by mouth daily 30 tablet 0    NARCAN 4 MG/0.1ML LIQD nasal spray       Multiple Vitamins-Minerals (MULTIVITAMIN ADULTS) TABS Take 1 tablet by mouth daily 90 tablet 3     No current facility-administered medications for this visit. Allergies   Allergen Reactions    Dilantin [Phenytoin] Anaphylaxis and Swelling    Lyrica [Pregabalin] Other (See Comments)     Bleeding in bowels    Dupixent [Dupilumab]      HIVES, THROAT ITCHING    Oxycodone      THROAT TIGHTNESS    Valium [Diazepam]        Subjective:    Review of Systems   Constitutional:  Negative for chills, fatigue and fever. HENT:  Negative for congestion, ear pain, postnasal drip, rhinorrhea and sore throat. Eyes:  Negative for discharge and redness. Respiratory:  Negative for cough and shortness of breath. Cardiovascular:  Negative for chest pain and leg swelling. Gastrointestinal:  Negative for abdominal distention, abdominal pain, anal bleeding, blood in stool, constipation, diarrhea and nausea. Skin:  Negative for color change and rash. Neurological:  Negative for facial asymmetry, speech difficulty and weakness. Memory difficulty - trouble with speech and word recall   Hematological:  Does not bruise/bleed easily. Psychiatric/Behavioral:  Negative for agitation and confusion.       Objective:     Vitals:    11/02/22 1305   BP: (!) 142/76   Pulse: 72   Resp: 22   Weight: (!) 419 lb (190.1 kg)     Body mass index is 55.28 kg/m². @CJKICHK(6)@  BP Readings from Last 3 Encounters:   11/02/22 (!) 142/76   10/04/22 138/81   09/13/22 (!) 182/99     Physical Exam  Cardiovascular:      Rate and Rhythm: Normal rate and regular rhythm. Pulmonary:      Effort: Pulmonary effort is normal. No respiratory distress. Breath sounds: Normal breath sounds. Neurological:      Mental Status: He is alert. Psychiatric:         Speech: Speech is delayed. Comments: Dome difficulty with word recall       Lab Results   Component Value Date    WBC 4.4 10/24/2022    HGB 12.9 (L) 10/24/2022    HCT 38.4 (L) 10/24/2022     10/24/2022    CHOL 207 (H) 07/01/2022    TRIG 90 07/01/2022    HDL 41 07/01/2022    LDLCALC 148 07/01/2022    AST 18 09/13/2022     10/24/2022    K 3.5 (L) 10/24/2022     10/24/2022    CREATININE 0.95 10/24/2022    BUN 14 10/24/2022    CO2 28 10/24/2022    TSH 2.660 03/24/2022    INR 1.02 07/01/2022    LABA1C 4.9 07/01/2022    LABGLOM >90 09/13/2022    MG 1.9 09/13/2022    CALCIUM 9.40 10/24/2022    VITD25 16 (L) 03/24/2022     Assessment:       Diagnosis Orders   1. Memory difficulty  External Referral To Neuropsychology      2. Injury of head, sequela        3. Iron deficiency anemia, unspecified iron deficiency anemia type  Iron and TIBC      4. Hypomagnesemia  Calcium    Magnesium      5. Vitamin D deficiency  Vitamin D 25 Hydroxy          Plan: Will check some labs to look at vitamin d, magnesium, calcium, and iron    Will refer to Neuropsychologist at [place] [id number] in 3 months or sooner as needed    Return in about 3 months (around 2/2/2023). Orders Placed:  Orders Placed This Encounter   Procedures    Iron and TIBC    Calcium    Magnesium    Vitamin D 25 Hydroxy    External Referral To Neuropsychology     Medications Prescribed:  No orders of the defined types were placed in this encounter.     Future Appointments   Date Time Provider Jseus Iris   11/3/2022  8:00 AM [address] [place] [address], [address] [place] [address] Dr WHITLOCK   11/4/2022  8:00 AM STR NUCLEAR MEDICINE RM4 GE INFINIA 2 STRZ NUC MED STR Radiolog   11/7/2022 11:00 AM MIRNA Lopez CNP AFLGASL AFL Gastroen   12/12/2022 10:00 AM MIRNA Leon CNP N SRPX Pain P - [facility] [place]   2/28/2023  2:00 PM STR MRI RM1 STRZ MRI STR Radiolog   3/1/2023  9:30 AM Myranda Worrell PA-C N SRPXNEURSU [facility] - [place]   3/22/2023  1:45 PM Hugo Freedman MD N SRPX Heart P - [place]      Patient given educational materials - see patient instructions. Discussed use, benefit, and side effects of prescribedmedications. All patient questions answered. Pt voiced understanding. Reviewed health maintenance. Instructed to continue current medications, diet and exercise. Patient agreed with treatment plan. Follow up as directed.     Electronically signed by MIRNA Jain CNP on 11/2/2022 at 2:39 PM

## 2022-11-03 ENCOUNTER — TELEPHONE (OUTPATIENT)
Dept: FAMILY MEDICINE CLINIC | Age: 52
End: 2022-11-03

## 2022-11-03 ENCOUNTER — HOSPITAL ENCOUNTER (OUTPATIENT)
Age: 52
Discharge: HOME | End: 2022-11-03
Payer: MEDICAID

## 2022-11-03 ENCOUNTER — CLINICAL DOCUMENTATION (OUTPATIENT)
Dept: SPIRITUAL SERVICES | Facility: CLINIC | Age: 52
End: 2022-11-03

## 2022-11-03 ENCOUNTER — HOSPITAL ENCOUNTER (OUTPATIENT)
Dept: OCCUPATIONAL THERAPY | Age: 52
Setting detail: THERAPIES SERIES
Discharge: HOME | End: 2022-11-03
Payer: MEDICAID

## 2022-11-03 DIAGNOSIS — D50.9 IRON DEFICIENCY ANEMIA, UNSPECIFIED IRON DEFICIENCY ANEMIA TYPE: ICD-10-CM

## 2022-11-03 DIAGNOSIS — E55.9 VITAMIN D DEFICIENCY: ICD-10-CM

## 2022-11-03 DIAGNOSIS — G89.4 CHRONIC PAIN SYNDROME: ICD-10-CM

## 2022-11-03 DIAGNOSIS — E83.42 HYPOMAGNESEMIA: ICD-10-CM

## 2022-11-03 LAB
CALCIUM SERPL-MCNC: 9.7 MG/DL (ref 8.5–10.5)
IRON: 119 UG/DL (ref 65–195)
MAGNESIUM: 1.9 MG/DL (ref 1.6–2.4)
TOTAL IRON BINDING CAPACITY: 320 UG/DL (ref 171–450)
VITAMIN D 25-HYDROXY: 33 NG/ML (ref 30–100)

## 2022-11-03 PROCEDURE — 83550 IRON BINDING TEST: CPT

## 2022-11-03 PROCEDURE — 97112 NEUROMUSCULAR REEDUCATION: CPT

## 2022-11-03 PROCEDURE — 83735 ASSAY OF MAGNESIUM: CPT

## 2022-11-03 PROCEDURE — 83540 ASSAY OF IRON: CPT

## 2022-11-03 PROCEDURE — 97530 THERAPEUTIC ACTIVITIES: CPT

## 2022-11-03 PROCEDURE — 36415 COLL VENOUS BLD VENIPUNCTURE: CPT

## 2022-11-03 PROCEDURE — 82310 ASSAY OF CALCIUM: CPT

## 2022-11-03 PROCEDURE — 82306 VITAMIN D 25 HYDROXY: CPT

## 2022-11-03 NOTE — TELEPHONE ENCOUNTER
Patient was last seen 9/12/2022 and his next appt is 12/12/2022 with Herber. He is calling in for refills of his norco 5-325 mg. His lidocaine patches and lidocaine ointment, a muscle relaxer that is 100 mg to take twice daily, he didn't know the name and couldn't read the label on his bottle, and he said some kind of medicine for inflammation to take at night, he wasn't sure that name and he threw that bottle away. Please verify and pharmacy is [facility] on [address].

## 2022-11-03 NOTE — PROGRESS NOTES
[address] THERAPY  [] EVALUATION  [x] DAILY NOTE (LAND) [] DAILY NOTE (AQUATIC )   [] PROGRESS NOTE [] DISCHARGE NOTE    [x] OUTPATIENT REHABILITATION [facility]   [] [place] [address]    [] [facility]   [] Stuart Paulson    Date: 11/3/2022  Patient Name:  Daniel Encinas  : 1970  MRN: [id number]  CSN: [id number]    Referring Practitioner MIRNA Doherty CNP   Diagnosis Other speech disturbances [R47.89]    Treatment Diagnosis Decreased IADLs   Date of Evaluation 22      Functional Outcome Measure Used [facility]   Functional Outcome Score 86 (22)       Insurance: Primary: Payor: [employer] /  /  / ,   Secondary:    Authorization Information: INSURANCE THERAPY BENEFIT: No precert until after 25CC visit. Visit Limit Based on Precert  AQUATIC THERAPY COVERED:   Yes  MODALITIES COVERED:  Yes except for Iontophoresis and Hot/Cold Packs   Visit # 4, 4/10 for progress note   Visits Allowed: 30    Recertification Date:    Physician Follow-Up: No follow up scheduled   Physician Orders: Low vision program   Pertinent History: Patient presents today ambulating with a cane after a fall at work . States that he slipped on oil and fell at work hitting his head on concrete. Reports he had a hard hat on when he fell. Reports that he hit his head twice, once with the hard and and again after the hard hat fell off. Patient went to the emergency room when he fell. Reports that no images were taken that date. CT on 22   Impression:   1. No acute intracranial hemorrhage or process identified   Reports that since then, he has had difficulty with saying correct words. Reports poor memory also. Reports that he gets a headache when trying to read, even with glasses/bifocals, his letters/words get blurry. 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 Not a tumor, just a growth.        SUBJECTIVE: Patient reports he continues to have headaches off and on. Eyes are still irritated but feel more dry today. Focus is in and out. OBJECTIVE:  Hand Dominance right handed   Observation Consistently tilts his head to the left   Vision   Vision History  Last to the ophthalmologist in May. (Dr. Rebecca Marquis). 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 Later as a child, had an eye injury into the left eye. As a result layers of cornea removed. Reports that glare was a problem and still is today. Wear glasses glasses with bifocals. Reports that he cannot tell a difference when looking through the bifocals now. Reports that it does clear up when closing one eye, but still hard to comprehend when reading. Reports that he feels dry eye. Uses eye drops   Eye Dominance  left   Corneal Reflection  right eye reflection center over the top of the iris, left eye reflection on the top to the left of the iris. Saccades  right eye - good ability (reported in and out blurry vision)  Left eye - good ability (reports min blurriness with eye movement to the let)  Bilateral - good movement   Pursuits  right eye - blurry, losing target, difficulty following target to the right, increased blinking  Left eye - losing target, smoother movement than right eye   Bilateral - noting right eye turning in more than the left when following a target.      Acuity  NT   Peripheral Vision  NT   Confrontation  NT   Convergence Blurry at 27 cm, unable to clear   Cover/Uncover    ADL's Reading is more difficult (mail or text messages) watching television is more difficulty sometimes, more difficult with writing (focusing)                TREATMENT   Precautions: Changes in vision, current with ST for cognitive changes also   Pain:  Headache 8/10    X in shaded column indicates Activity Completed Today   Modalities Parameters/  Location  Notes/Comments                     Manual Therapy Time/  Technique Notes/Comments                     Exercises   Sets/  Sec Reps  Notes/Comments   Warm up with essential eye exercises: 1 sensory eye stretches, 2 tracking, and 3 gaze stabilization   x Tolerates < 30 seconds, left eye fatigues quicker and is watering today. Reports he had a previous eye injury in the left 25 years ago. Push up stick       Single eye pursuits    Left eye watering after, increased blinking with right and left   Bilateral eye pursuits    Increased ability this date from eval.   Dynavision    x 1st trial 60 sec: 56 hits  2nd trial 60 sec upper left quadrant: 72 Feels delayed  3rd trial 60 sec: upper right quadrant: 77  4th trial 60 sec: lower left quadrant: 69  5th trial 60 sec: lower right quadrant: 71   O'Matthias Mckeon Dexterity    Pt noted to adjust head to focus on hole; pt reported difficulty with focus/attention and then strains harder to focus and then this makes his vision blurry   Lacing activity -lace and unlace    Elephant- pt reports fair level of difficulty and difficulty with motor processing          Activities Time    Notes/Comments   Decreased brightness of the lights to increase tolerance in the room      Adaptations to brightness - sunglasses, hats with brims, filters. Colored paper to decrease light reflection   Prefers valarie or gray for brightness   Spiritual care for coping      Taking breaks, alternating tasks. Specific Interventions Next Treatment: 6 essential eye exercises, activity/environment modifications, pt hobbies: drawing, sewing, fishing, coloring, putting things together     Activity/Treatment Tolerance:  [x]  Patient tolerated treatment well  []  Patient limited by fatigue  []  Patient limited by pain   []  Patient limited by other medical complications  []  Other:     Assessment: Patient is progressing towards goals. Improvements with dynavision and good tolerance for eye exercises.  Does report feelings of frustration and feeling discouraged when his eyes get tired. Areas for Improvement: vision disturbance  Prognosis: good    GOALS:  Patient Goal: return to previous abilities     Short Term Goals:  Time Frame: 4 weeks   Patient will reports use of at least 2 activity and/or environment modifications/adaptations during daily activity to increase tolerance with performance of IADLs. Patient will be independent with HEP to increase ease with reading. Long Term Goals:  Time Frame: 6 weeks   Patient will report decrease in score of the BIVSS to less than 52 to demonstrate increased ease and ability to perform IADLs. Patient Education:   [x]  HEP/Education Completed: First 3 essential eye exercises with hand out given. HEP: push up stick  Single and bilateral pursuits  Modifications to activity and environment - filters, colored paper, taking breaks, alternating activity  []  No new Education completed  []  Reviewed Prior HEP      [x]  Patient verbalized and/or demonstrated understanding of education provided. []  Patient unable to verbalize and/or demonstrate understanding of education provided. Will continue education. [x]  Barriers to learning: difficulty with reading     PLAN:  Treatment Recommendations:  6 essential eye exercises, activity/environment modifications     []  Plan of care initiated. Plan to see patient 1 times per week for 6 weeks to address the treatment planned outlined above.   [x]  Continue with current plan of care  []  Modify plan of care as follows:    []  Hold pending physician visit  []  Discharge    Time In 0805   Time Out 0845   Timed Code Minutes: 40 min   Total Treatment Time: 40 min       APRYL WHITE/ANGELA #[id number]

## 2022-11-03 NOTE — TELEPHONE ENCOUNTER
Seen by Bernard Levi yesterday and a referral was placed to Dr. Kurt Conway, neuropsychologist. Referral and office notes faxed to his office at [phone]. The office can be reached at [phone] to schedule this appointment. Will f/up with this on Monday.

## 2022-11-03 NOTE — FLOWSHEET NOTE
[place] [address] PROGRESS NOTE    Patient: Manish Ware       YOB: 1970  Age: 50 y.o. Gender: male            Assessment:  Angel Lagunas is a 57-year-old male who is being referred for support due what is currently being experienced in his life. Per referral, feeling overwhelmed with multiple life-adjustments while in recovery following a work-related fall injuring his head, as well as his leg for which surgery was necessary and therapy is being received at this time to regain strength and mobility. Patient openly shared his story and freely expressed his feelings during the encounter as he engaged in life-review. He stated currently feeling alone and hopeless with little support from family and friends being extended to assist at this time. He shared, \"I feel like I've failed\" due to not yet returning to his abilities experienced prior to the work incident. Interventions:   was present during the encounter to provide a listening presence allowing patient to freely engage in conversation, responding to open-ended questions, as well as follow-up questions for clarity and greater understanding.  engaged in active listening, allowing patient openly share his feelings. Patient expressed simply being able to talk brought relief, and enhanced his wellbeing today.  shared tips for resiliency, as well as coping techniques to try and implement to assist moving forward to promote a positive outcome. Outcomes:  Patient was very appreciative of the opportunity to share his story and feelings in a safe place, and to be able to be heard. Patient stated it was beneficial for his holistic wellbeing, and desired to continue to \"do what were doing. \" He was very thankful and desired to schedule another appointment as we ended. Plan:  Patient requested ongoing spiritual and emotional support.   Next appointment is scheduled on 11/10 at [facility]    Electronically signed by Raymond Guerrero, on 11/3/2022 at 1:08 PM.  [facility]  [phone]

## 2022-11-04 ENCOUNTER — HOSPITAL ENCOUNTER (OUTPATIENT)
Dept: NUCLEAR MEDICINE | Age: 52
Discharge: HOME | End: 2022-11-04
Payer: MEDICAID

## 2022-11-04 ENCOUNTER — TELEPHONE (OUTPATIENT)
Dept: FAMILY MEDICINE CLINIC | Age: 52
End: 2022-11-04

## 2022-11-04 DIAGNOSIS — E83.42 HYPOMAGNESEMIA: ICD-10-CM

## 2022-11-04 DIAGNOSIS — K80.20 CALCULUS OF GALLBLADDER WITHOUT CHOLECYSTITIS WITHOUT OBSTRUCTION: ICD-10-CM

## 2022-11-04 DIAGNOSIS — D50.9 IRON DEFICIENCY ANEMIA, UNSPECIFIED IRON DEFICIENCY ANEMIA TYPE: ICD-10-CM

## 2022-11-04 DIAGNOSIS — E55.9 VITAMIN D DEFICIENCY: Primary | ICD-10-CM

## 2022-11-04 DIAGNOSIS — R10.11 RUQ PAIN: ICD-10-CM

## 2022-11-04 DIAGNOSIS — K80.50 BILIARY COLIC: ICD-10-CM

## 2022-11-04 PROCEDURE — 3430000000 HC RX DIAGNOSTIC RADIOPHARMACEUTICAL

## 2022-11-04 PROCEDURE — 78226 HEPATOBILIARY SYSTEM IMAGING: CPT

## 2022-11-04 PROCEDURE — A9537 TC99M MEBROFENIN: HCPCS

## 2022-11-04 RX ADMIN — Medication 8.3 MILLICURIE: at 08:10

## 2022-11-04 NOTE — TELEPHONE ENCOUNTER
----- Message from MIRNA Vyas - CNP sent at 11/3/2022  4:42 PM EDT -----  Iron level has improved - continue daily iron supplement    Can send a refill of his iron supplement - Ferrosul (65FE) - one tablet daily with breakfast #30 with 5 refills. Dx. Iron Deficiency Anemia, unspecified    Vitamin D is low at 33 - will start a 50,000 IU vitamin D3 capsule WEEKLY. #4 with 5 refills. Recheck vitamin d3 level in 3 monthsDx: Vitamin D Deficiency     Magnesium is low at 1.9 - start a magnesium Gluconate 250 mg tablet twice daily #60 with 5 refills. Recheck magnesium level in 3 months. Dx.  Hypomagnesia

## 2022-11-07 RX ORDER — HYDROCODONE BITARTRATE AND ACETAMINOPHEN 5; 325 MG/1; MG/1
1 TABLET ORAL EVERY 8 HOURS PRN
Qty: 90 TABLET | Refills: 0 | Status: SHIPPED | OUTPATIENT
Start: 2022-11-07 | End: 2022-12-07

## 2022-11-07 RX ORDER — GABAPENTIN 300 MG/1
300 CAPSULE ORAL DAILY
Qty: 30 CAPSULE | Refills: 0 | Status: SHIPPED | OUTPATIENT
Start: 2022-11-11 | End: 2022-12-11

## 2022-11-07 RX ORDER — LIDOCAINE 50 MG/G
240 OINTMENT TOPICAL 4 TIMES DAILY PRN
Qty: 240 G | Refills: 3 | Status: SHIPPED | OUTPATIENT
Start: 2022-11-07 | End: 2022-12-07

## 2022-11-07 RX ORDER — LIDOCAINE 50 MG/G
1 PATCH TOPICAL DAILY
Qty: 30 PATCH | Refills: 3 | Status: SHIPPED | OUTPATIENT
Start: 2022-11-07 | End: 2022-11-16

## 2022-11-07 NOTE — TELEPHONE ENCOUNTER
OARRS reviewed. UDS: + for  OARRS reviewed. UDS: + for  Butalbital, Hydrocodone -consistent. + for Gabapentin , Amitriptyline -inconsistent. Last seen: 9/12/2022. Follow-up: 12/12/2022   .

## 2022-11-08 RX ORDER — MULTIVIT,TX WITH IRON,MINERALS
250 TABLET, EXTENDED RELEASE ORAL 2 TIMES DAILY
Qty: 60 TABLET | Refills: 5 | Status: SHIPPED | OUTPATIENT
Start: 2022-11-08 | End: 2022-11-17 | Stop reason: ALTCHOICE

## 2022-11-08 RX ORDER — CHOLECALCIFEROL (VITAMIN D3) 1250 MCG
50000 CAPSULE ORAL WEEKLY
Qty: 4 CAPSULE | Refills: 5 | Status: SHIPPED | OUTPATIENT
Start: 2022-11-08

## 2022-11-08 RX ORDER — FERROUS SULFATE 325(65) MG
325 TABLET ORAL
Qty: 30 TABLET | Refills: 5 | Status: SHIPPED | OUTPATIENT
Start: 2022-11-08

## 2022-11-08 NOTE — TELEPHONE ENCOUNTER
Pt notified. He is agreeable to the medication and labs. Labs ordered and mailed. Medication pended with correct pharmacy.

## 2022-11-08 NOTE — TELEPHONE ENCOUNTER
Called the number provided and LM on  Mansi's VM to call the office back to schedule. Pt is agreeable to this appt and would prefer that the office schedules it for him. Will await call back.

## 2022-11-09 ENCOUNTER — TELEPHONE (OUTPATIENT)
Dept: FAMILY MEDICINE CLINIC | Age: 52
End: 2022-11-09

## 2022-11-09 ENCOUNTER — OFFICE VISIT (OUTPATIENT)
Dept: FAMILY MEDICINE CLINIC | Age: 52
End: 2022-11-09
Payer: MEDICAID

## 2022-11-09 VITALS
WEIGHT: 315 LBS | SYSTOLIC BLOOD PRESSURE: 130 MMHG | HEART RATE: 60 BPM | DIASTOLIC BLOOD PRESSURE: 84 MMHG | RESPIRATION RATE: 16 BRPM | BODY MASS INDEX: 54.86 KG/M2

## 2022-11-09 DIAGNOSIS — E83.42 HYPOMAGNESEMIA: ICD-10-CM

## 2022-11-09 DIAGNOSIS — M79.10 MYALGIA: ICD-10-CM

## 2022-11-09 DIAGNOSIS — E55.9 VITAMIN D DEFICIENCY: Primary | ICD-10-CM

## 2022-11-09 DIAGNOSIS — S39.012S STRAIN OF LUMBAR REGION, SEQUELA: ICD-10-CM

## 2022-11-09 DIAGNOSIS — K76.0 FATTY LIVER: ICD-10-CM

## 2022-11-09 DIAGNOSIS — D50.9 IRON DEFICIENCY ANEMIA, UNSPECIFIED IRON DEFICIENCY ANEMIA TYPE: ICD-10-CM

## 2022-11-09 DIAGNOSIS — E78.5 HYPERLIPIDEMIA, UNSPECIFIED HYPERLIPIDEMIA TYPE: ICD-10-CM

## 2022-11-09 PROCEDURE — 3017F COLORECTAL CA SCREEN DOC REV: CPT | Performed by: NURSE PRACTITIONER

## 2022-11-09 PROCEDURE — 1036F TOBACCO NON-USER: CPT | Performed by: NURSE PRACTITIONER

## 2022-11-09 PROCEDURE — G8427 DOCREV CUR MEDS BY ELIG CLIN: HCPCS | Performed by: NURSE PRACTITIONER

## 2022-11-09 PROCEDURE — 99214 OFFICE O/P EST MOD 30 MIN: CPT | Performed by: NURSE PRACTITIONER

## 2022-11-09 PROCEDURE — G8484 FLU IMMUNIZE NO ADMIN: HCPCS | Performed by: NURSE PRACTITIONER

## 2022-11-09 PROCEDURE — G8417 CALC BMI ABV UP PARAM F/U: HCPCS | Performed by: NURSE PRACTITIONER

## 2022-11-09 RX ORDER — PRAVASTATIN SODIUM 10 MG
10 TABLET ORAL DAILY
Qty: 30 TABLET | Refills: 3 | Status: SHIPPED | OUTPATIENT
Start: 2022-11-09

## 2022-11-09 RX ORDER — POTASSIUM CHLORIDE 750 MG/1
TABLET, EXTENDED RELEASE ORAL
COMMUNITY
Start: 2022-11-06

## 2022-11-09 RX ORDER — UBIDECARENONE 100 MG
100 CAPSULE ORAL DAILY
Qty: 30 CAPSULE | Refills: 5 | Status: SHIPPED | OUTPATIENT
Start: 2022-11-09

## 2022-11-09 RX ORDER — ROSUVASTATIN CALCIUM 20 MG/1
TABLET, COATED ORAL
COMMUNITY
Start: 2022-11-06 | End: 2022-11-09

## 2022-11-09 RX ORDER — GABAPENTIN 300 MG/1
300 CAPSULE ORAL DAILY
Qty: 30 CAPSULE | Refills: 0 | Status: CANCELLED | OUTPATIENT
Start: 2022-11-11 | End: 2022-12-11

## 2022-11-09 ASSESSMENT — ENCOUNTER SYMPTOMS
EYE DISCHARGE: 0
EYE REDNESS: 0
ANAL BLEEDING: 0
COLOR CHANGE: 0
SHORTNESS OF BREATH: 0
ABDOMINAL PAIN: 0
CONSTIPATION: 0
NAUSEA: 0
RHINORRHEA: 0
SORE THROAT: 0
ABDOMINAL DISTENTION: 0
COUGH: 0
BLOOD IN STOOL: 0
DIARRHEA: 0

## 2022-11-09 NOTE — PATIENT INSTRUCTIONS
Dr. Mason Nieves      & Neuropsychologist   Director of Neuropsychology Research      & Neuropsychologist   Director of Neuropsychology [address]   [address] [place] [address] [address]   [phone] (phone)   [phone] (fax)      Will stop crestor  Start pravastatin once daily  Will send a CoQ10 to take daily if you develop muscle pain with the cholesterol medication    Take the weekly vitamin D  Take the magnesium gluconate with meals  Take the daily iron with breakfast    Back in 3 months for recheck or as needed    Allergies:    Dilantin  Lyrica  Dupixent  Oxycodone  Valium

## 2022-11-09 NOTE — PROGRESS NOTES
[facility] FAM[facility]  [address] [facility] [address]  Dept: [phone]  Loc: [phone]      Visit Date: 11/9/2022    Brock Fink a 50 y.o. male who presents today for:   Chief Complaint   Patient presents with    Discuss Medications     Discuss which medications to keep and get rid of. HPI:     Would like to stop the crestor - causing muscle pain - has not taken in a couple of months.  Unsure why he was on it    Had vasectomy done - [facility] - this past Monday    Started taking a magnesium gluconate OTC for muscle cramps - seems to help    Has not started the weekly D3 yet - has to pick it up - along with magnesium and iron    HPI  Health Maintenance   Topic Date Due    Pneumococcal 0-64 years Vaccine (1 - PCV) Never done    DTaP/Tdap/Td vaccine (1 - Tdap) Never done    COVID-19 Vaccine (3 - Booster for Pfizer series) 06/29/2021    Shingles vaccine (1 of 2) Never done    Flu vaccine (1) Never done    Depression Monitoring  03/14/2023    Colorectal Cancer Screen  04/02/2023    Lipids  07/01/2023    Hepatitis A vaccine  Aged Out    Hib vaccine  Aged Out    Meningococcal (ACWY) vaccine  Aged Out    Hepatitis C screen  Discontinued    HIV screen  Discontinued       Current Outpatient Medications   Medication Sig Dispense Refill    potassium chloride (KLOR-CON M) 10 MEQ extended release tablet take 1 tablet by mouth daily      betamethasone valerate (VALISONE) 0.1 % cream apply to affected area twice a day 45 g 3    pravastatin (PRAVACHOL) 10 MG tablet Take 1 tablet by mouth daily 30 tablet 3    coenzyme Q10 (EQL COQ10) 100 MG CAPS capsule Take 1 capsule by mouth daily 30 capsule 5    ferrous sulfate (FEROSUL) 325 (65 Fe) MG tablet Take 1 tablet by mouth daily (with breakfast) 30 tablet 5    Cholecalciferol (VITAMIN D3) 1.25 MG (35399 UT) CAPS Take 1 capsule by mouth once a week 4 capsule 5    HYDROcodone-acetaminophen (NORCO) 5-325 MG per tablet Take 1 tablet by mouth every 8 hours as needed for Pain for up to 30 days. 90 tablet 0    [START ON 11/11/2022] gabapentin (NEURONTIN) 300 MG capsule Take 1 capsule by mouth daily for 30 days. 30 capsule 0    lidocaine (LIDODERM) 5 % Place 1 patch onto the skin daily 12 hours on, 12 hours off. 30 patch 3    lidocaine (XYLOCAINE) 5 % ointment Apply 240 g topically 4 times daily as needed for Pain Apply topically as needed. 240 g 3    pantoprazole (PROTONIX) 40 MG tablet Take 1 tablet by mouth every morning (before breakfast) 90 tablet 3    dicyclomine (BENTYL) 10 MG capsule Take 1 capsule by mouth 2 times daily 180 capsule 3    ipratropium-albuterol (DUONEB) 0.5-2.5 (3) MG/3ML SOLN nebulizer solution Inhale into the lungs      butalbital-acetaminophen-caffeine (FIORICET, ESGIC) -40 MG per tablet take 1 tablet by mouth every 4 hours if needed for headache 90 tablet 0    fluticasone (FLONASE) 50 MCG/ACT nasal spray instill 2 sprays into each nostril once daily 48 g 5    montelukast (SINGULAIR) 10 MG tablet take 1 tablet by mouth every evening 90 tablet 3    hydroCHLOROthiazide (HYDRODIURIL) 25 MG tablet take 1 tablet by mouth every morning 90 tablet 1    orphenadrine (NORFLEX) 100 MG extended release tablet take 1 tablet by mouth twice a day if needed for muscle spasm      lidocaine (LIDODERM) 5 % apply 3 patches TO THE AFFECTED AREA DAILY.  LEAVE ON FOR 12 HOURS AND THEN OFF FOR 12 HOURS.      azelastine (OPTIVAR) 0.05 % ophthalmic solution instill 1 drop into both eyes twice a day 1 each 11    albuterol (PROVENTIL) (2.5 MG/3ML) 0.083% nebulizer solution inhale contents of 1 vial ( 3 milliliters ) in nebulizer by mouth and INTO THE LUNGS every 4 hours if needed for wheezing 375 mL 3    triamcinolone (KENALOG) 0.1 % cream APPLY TO AFFECTED AREA TWICE A DAY 1 each 2    simethicone (MYLICON) 80 MG chewable tablet Take 1 tablet by mouth 4 times daily as needed for Flatulence 180 tablet 3    aspirin EC 81 MG EC tablet Take 1 tablet by mouth daily 90 tablet 1    amitriptyline (ELAVIL) 50 MG tablet take 1 tablet by mouth at bedtime 90 tablet 1    BANOPHEN 25 MG capsule take 1 capsule by mouth every 6 hours if needed for itching 60 capsule 5    albuterol sulfate  (90 Base) MCG/ACT inhaler Inhale 2 puffs into the lungs 4 times daily as needed for Wheezing 3 each 3    Blood Pressure Monitoring (B-D ASSURE BPM/AUTO ARM CUFF) MISC 1 Units by Does not apply route daily 1 each 0    budesonide-formoterol (SYMBICORT) 160-4.5 MCG/ACT AERO 2 puffs inhaled twice daily. Rinse mouth well after use. 3 each 3    Respiratory Therapy Supplies (NEBULIZER/TUBING/MOUTHPIECE) KIT Dispense tubing, mask, and mouthpiece for nebulizer machine. Dx: Asthma 1 kit 0    dicyclomine (BENTYL) 10 MG capsule Take 1 capsule by mouth 3 times daily as needed (abd cramping) 30 capsule 0    loratadine (CLARITIN) 10 MG tablet take 1 tablet by mouth once daily 90 tablet 3    EPINEPHrine (EPIPEN 2-RAGHAV) 0.3 MG/0.3ML SOAJ injection Inject one pen as directed STAT for allergic reaction, may disp generic NDC 49800-880-06 1 each 0    Magnesium Gluconate 250 MG TABS Take 250 capsules by mouth 2 times daily (Patient not taking: Reported on 11/9/2022) 60 tablet 5    NARCAN 4 MG/0.1ML LIQD nasal spray  (Patient not taking: Reported on 11/9/2022)       No current facility-administered medications for this visit. Allergies   Allergen Reactions    Dilantin [Phenytoin] Anaphylaxis and Swelling    Lyrica [Pregabalin] Other (See Comments)     Bleeding in bowels    Dupixent [Dupilumab]      HIVES, THROAT ITCHING    Oxycodone      THROAT TIGHTNESS    Valium [Diazepam]        Subjective:   Review of Systems   Constitutional:  Negative for chills, fatigue and fever. HENT:  Negative for congestion, ear pain, postnasal drip, rhinorrhea and sore throat. Eyes:  Negative for discharge and redness. Respiratory:  Negative for cough and shortness of breath. Cardiovascular:  Negative for chest pain and leg swelling. Gastrointestinal:  Negative for abdominal distention, abdominal pain, anal bleeding, blood in stool, constipation, diarrhea and nausea. Musculoskeletal:  Positive for myalgias. Skin:  Negative for color change and rash. Neurological:  Negative for facial asymmetry, speech difficulty and weakness. Hematological:  Does not bruise/bleed easily. Psychiatric/Behavioral:  Negative for agitation and confusion. Objective:     Vitals:    11/09/22 0828   BP: 130/84   Pulse: 60   Resp: 16   Weight: (!) 415 lb 12.8 oz (188.6 kg)       Body mass index is 54.86 kg/m². Wt Readings from Last 3 Encounters:   11/09/22 (!) 415 lb 12.8 oz (188.6 kg)   11/07/22 (!) 416 lb (188.7 kg)   11/02/22 (!) 419 lb (190.1 kg)     BP Readings from Last 3 Encounters:   11/09/22 130/84   11/07/22 (!) 140/74   11/02/22 (!) 142/76       Physical Exam  Constitutional:       Appearance: Normal appearance. He is obese. Neurological:      Mental Status: He is alert. Lab Results   Component Value Date    WBC 4.4 10/24/2022    HGB 12.9 (L) 10/24/2022    HCT 38.4 (L) 10/24/2022     10/24/2022    CHOL 207 (H) 07/01/2022    TRIG 90 07/01/2022    HDL 41 07/01/2022    LDLCALC 148 07/01/2022    AST 18 09/13/2022     10/24/2022    K 3.5 (L) 10/24/2022     10/24/2022    CREATININE 0.95 10/24/2022    BUN 14 10/24/2022    CO2 28 10/24/2022    TSH 2.660 03/24/2022    INR 1.02 07/01/2022    LABA1C 4.9 07/01/2022    LABGLOM >90 09/13/2022    MG 1.9 11/03/2022    CALCIUM 9.7 11/03/2022    VITD25 33 11/03/2022       :       Diagnosis Orders   1. Vitamin D deficiency  Vitamin D 25 Hydroxy      2. Hypomagnesemia  Magnesium      3. Iron deficiency anemia, unspecified iron deficiency anemia type  CBC with Auto Differential    Iron and TIBC      4.  Fatty liver  pravastatin (PRAVACHOL) 10 MG tablet    coenzyme Q10 (EQL COQ10) 100 MG CAPS capsule    Comprehensive Metabolic Panel    Lipid Panel    Hepatic Function Panel      5. Hyperlipidemia, unspecified hyperlipidemia type  pravastatin (PRAVACHOL) 10 MG tablet    coenzyme Q10 (EQL COQ10) 100 MG CAPS capsule    Comprehensive Metabolic Panel    Lipid Panel    Hepatic Function Panel      6. Strain of lumbar region, sequela  betamethasone valerate (VALISONE) 0.1 % cream      7. Myalgia            : Will stop crestor  Start pravastatin once daily  Will send a CoQ10 to take daily if you develop muscle pain with the cholesterol medication    Take the weekly vitamin D  Take the magnesium gluconate with meals  Take the daily iron with breakfast    Back in 3 months for recheck or as needed    Return in about 3 months (around 2/9/2023). Placed:  Orders Placed This Encounter   Procedures    Comprehensive Metabolic Panel    Lipid Panel    Hepatic Function Panel    CBC with Auto Differential    Iron and TIBC    Magnesium    Vitamin D 25 Hydroxy     Medications Prescribed:  Orders Placed This Encounter   Medications    betamethasone valerate (VALISONE) 0.1 % cream     Sig: apply to affected area twice a day     Dispense:  45 g     Refill:  3    pravastatin (PRAVACHOL) 10 MG tablet     Sig: Take 1 tablet by mouth daily     Dispense:  30 tablet     Refill:  3    coenzyme Q10 (EQL COQ10) 100 MG CAPS capsule     Sig: Take 1 capsule by mouth daily     Dispense:  30 capsule     Refill:  5          Discussed use,benefit, and side effects of prescribed medications. Barriers to medication complianceaddressed. All patient questions answered. Pt voiced understanding.      Electronicallysigned by MIRNA Plaza CNP on 11/9/2022 at 11:27 AM

## 2022-11-09 NOTE — TELEPHONE ENCOUNTER
PA for Magnesium Gluconate 250 mg BID was started today on CoverMyMeds. Will await response. Of note, a PA was originally started for daily dosing but was canceled due to pt needs BID dosing. PA reference number for BID dosing is [id number].

## 2022-11-10 ENCOUNTER — APPOINTMENT (OUTPATIENT)
Dept: OCCUPATIONAL THERAPY | Age: 52
End: 2022-11-10
Payer: MEDICAID

## 2022-11-14 ENCOUNTER — HOSPITAL ENCOUNTER (OUTPATIENT)
Dept: OCCUPATIONAL THERAPY | Age: 52
Setting detail: THERAPIES SERIES
Discharge: HOME | End: 2022-11-14
Payer: MEDICAID

## 2022-11-14 ENCOUNTER — CLINICAL DOCUMENTATION (OUTPATIENT)
Dept: SPIRITUAL SERVICES | Facility: CLINIC | Age: 52
End: 2022-11-14

## 2022-11-14 PROCEDURE — 97530 THERAPEUTIC ACTIVITIES: CPT

## 2022-11-14 NOTE — FLOWSHEET NOTE
[place] [address] PROGRESS NOTE      Patient: Marshel Kayser        YOB: 1970  Age: 50 y.o. Gender: male            Assessment:  Lawrence Mccullough is a 27-year-old male who is being referred for support due what is currently being experienced in his life. Per referral, feeling overwhelmed with multiple life-adjustments while in recovery following a work-related fall injuring his head, as well as his leg for which surgery was necessary and therapy is being received at this time to regain strength and mobility. Patient expressed feeling stronger as the OP Rehab continues. He stated experiencing an increase in pain due to the weather changes, resulting in increases to his medication to deal with the level of pain. Patient stated working on the coping activities shared at the prior encounter, and found them to be beneficial in dealing with life experiences. Patient is accepting of his current circumstances in life: repeating during the encounter, \"it is what it is. \"  Patient shared his ability to cope and deal with his current life-adjustments. He expressed, \"I'm not weak-minded. I always think ahead. \" He added that he is not a confrontational person intentionally, in order to allow others to make their own decisions moving forward. Interventions:   was present during the encounter to provide a listening presence allowing patient to freely engage in conversation, responding to open-ended questions, as well as follow-up questions for clarity and greater understanding.  engaged in active listening, allowing patient openly share his feelings. Patient expressed experiencing increased pain due to the weather change impacting his injuries.  shared techniques to cope with pain for him to practice including: Deep Breathing; Mindfulness (Self-care / Meditation); Guided Imagery. Patient shared still dealing with difficult relationships in his life.  also shared that communication skills are vital and needed constant attention / improvement for a quality relationship with others. Outcomes:  Patient was very appreciative of the opportunity to share his story and feelings in a safe place, and to be able to be heard. Plan:  Patient requested ongoing spiritual and emotional support. Next appointment will be scheduled after Thanksgiving.     Electronically signed by Hortencia Ly, on 11/14/2022 at 5:48 PM.  [address] [facility]  [phone]

## 2022-11-16 ENCOUNTER — OFFICE VISIT (OUTPATIENT)
Dept: BARIATRICS/WEIGHT MGMT | Age: 52
End: 2022-11-16
Payer: MEDICAID

## 2022-11-16 VITALS
DIASTOLIC BLOOD PRESSURE: 88 MMHG | HEART RATE: 72 BPM | WEIGHT: 315 LBS | TEMPERATURE: 96.1 F | HEIGHT: 73 IN | SYSTOLIC BLOOD PRESSURE: 124 MMHG | BODY MASS INDEX: 41.75 KG/M2

## 2022-11-16 DIAGNOSIS — M79.7 FIBROMYALGIA: ICD-10-CM

## 2022-11-16 DIAGNOSIS — E78.5 HYPERLIPIDEMIA, UNSPECIFIED HYPERLIPIDEMIA TYPE: ICD-10-CM

## 2022-11-16 DIAGNOSIS — I10 ESSENTIAL HYPERTENSION: ICD-10-CM

## 2022-11-16 DIAGNOSIS — G47.33 OSA ON CPAP: ICD-10-CM

## 2022-11-16 DIAGNOSIS — M54.40 CHRONIC LOW BACK PAIN WITH SCIATICA, SCIATICA LATERALITY UNSPECIFIED, UNSPECIFIED BACK PAIN LATERALITY: ICD-10-CM

## 2022-11-16 DIAGNOSIS — K21.9 GASTROESOPHAGEAL REFLUX DISEASE WITHOUT ESOPHAGITIS: ICD-10-CM

## 2022-11-16 DIAGNOSIS — Z99.89 OSA ON CPAP: ICD-10-CM

## 2022-11-16 DIAGNOSIS — M19.90 ARTHRITIS: ICD-10-CM

## 2022-11-16 DIAGNOSIS — G89.29 CHRONIC LOW BACK PAIN WITH SCIATICA, SCIATICA LATERALITY UNSPECIFIED, UNSPECIFIED BACK PAIN LATERALITY: ICD-10-CM

## 2022-11-16 PROCEDURE — 3017F COLORECTAL CA SCREEN DOC REV: CPT | Performed by: PHYSICIAN ASSISTANT

## 2022-11-16 PROCEDURE — G8484 FLU IMMUNIZE NO ADMIN: HCPCS | Performed by: PHYSICIAN ASSISTANT

## 2022-11-16 PROCEDURE — 3078F DIAST BP <80 MM HG: CPT | Performed by: PHYSICIAN ASSISTANT

## 2022-11-16 PROCEDURE — 1036F TOBACCO NON-USER: CPT | Performed by: PHYSICIAN ASSISTANT

## 2022-11-16 PROCEDURE — 3074F SYST BP LT 130 MM HG: CPT | Performed by: PHYSICIAN ASSISTANT

## 2022-11-16 PROCEDURE — G8427 DOCREV CUR MEDS BY ELIG CLIN: HCPCS | Performed by: PHYSICIAN ASSISTANT

## 2022-11-16 PROCEDURE — 99204 OFFICE O/P NEW MOD 45 MIN: CPT | Performed by: PHYSICIAN ASSISTANT

## 2022-11-16 PROCEDURE — G8417 CALC BMI ABV UP PARAM F/U: HCPCS | Performed by: PHYSICIAN ASSISTANT

## 2022-11-16 NOTE — PROGRESS NOTES
[address] [facility]  [address]  [facility] [address]  Dept: [phone]  Dept Fax: [phone]  Loc: [phone]         Visit Date:  11/16/2022  Weight Management Initial Evaluation Note    HPI:      The patient is a 50 y.o. male being seen for initial evaluation in the weight management center. He attended the informational seminar and is interested in medical management of weight loss. The patient's PCP is MIRNA Guerrero CNP. The patient first recognized that he had excess weight since childhood. His highest adult weight has been 470 lbs and lowest adult weight was 250 lbs. Current weight is 409. Current BMI is Body mass index is 54.01 kg/m². . The patient reports that his obesity is significantly affecting his life on a daily basis. Weight Loss History:   He has tried below methods to try to lose weight . These attempts have been somewhat successful with weight loss, but have failed to sustain adequate weight loss. The patient has also tried self directed diet and exercise in attempts to sustain weight loss. Specific programs tried include:    [] Meal replacement program:    [] Medications/ supplements:    [] Behavior modification:    [] Alternative health:    [] Work with a Dietitian:    [] :    [] Commercial weight loss:    [] [employer] Watchers    [] Ressibelle Motimmy    [] Nut[employer]ystem    [] Low Calorie diets: This patient has not had previous bariatric surgery    I have reviewed the 24 hour diet recall with Ilene Corona. Additionally, he rates his readiness to change 7. We also reviewed barriers to change and his personal statement.   Motivators include: to improve my health, to increase my energy, improve my depression, and to allow me to live a more active and social lifestyle        Weight Related Comorbid Conditions:   Significant weight related diseases affecting this patient are Hypertension, hyperlipidemia, Asthma, Obstructive Sleep Apnea treated with BiPAP/CPAP,  fibromyalgia, arthritis and GERD    Allergies: Allergies   Allergen Reactions    Dilantin [Phenytoin] Anaphylaxis and Swelling    Lyrica [Pregabalin] Other (See Comments)     Bleeding in bowels    Dupixent [Dupilumab]      HIVES, THROAT ITCHING    Oxycodone      THROAT TIGHTNESS    Valium [Diazepam]        Past Medical History:     Past Medical History:   Diagnosis Date    Aneurysm ([address]     pituitary gland    Arthritis     Asthma     Cardiomegaly     COVID-19 11/2021    [facility]    Depression     Fibromyalgia     GERD (gastroesophageal reflux disease)     Hypertension     Liver disease     CLARIBEL on CPAP    . Past Surgical History:  Past Surgical History:   Procedure Laterality Date    BACK SURGERY      KNEE SURGERY      lt    PAIN MANAGEMENT PROCEDURE Bilateral 02/11/2021    Bilateral L-facet MBB # 1 @ L3-4, L4-5, and L5-S1 performed by Chantal Scheuermann, MD at [facility] Bilateral 04/08/2021    Bilateral L-facet MBB # 2 @ L3-4, L4-5, and L5-S1 performed by Chantal Scheuermann, MD at [address] [place] [address] Right 07/07/2022       Family History:  Family History   Problem Relation Age of Onset    Arthritis Mother     Asthma Mother     High Blood Pressure Mother     Emphysema Mother     Other Mother         she was hit and killed by car    Arthritis Father     High Blood Pressure Father     Emphysema Father     Asthma Father        Social History:  Social History     Socioeconomic History    Marital status:      Spouse name: Not on file    Number of children: 5    Years of education: Not on file    Highest education level: Not on file   Occupational History    Not on file   Tobacco Use    Smoking status: Never    Smokeless tobacco: Never   Vaping Use    Vaping Use: Never used   Substance and Sexual Activity    Alcohol use:  Yes Comment: occasionally    Drug use: No    Sexual activity: Yes     Partners: Female   Other Topics Concern    Not on file   Social History Narrative    No barriers with transportation    No need for [facility] support     Social Determinants of Health     Financial Resource Strain: Low Risk     Difficulty of Paying Living Expenses: Not hard at all   Food Insecurity: No Food Insecurity    Worried About Running Out of Food in the Last Year: Never true    Ran Out of Food in the Last Year: Never true   Transportation Needs: Not on file   Physical Activity: Not on file   Stress: Not on file   Social Connections: Not on file   Intimate Partner Violence: Not on file   Housing Stability: Not on file       Current Medications:  Outpatient Medications Marked as Taking for the 11/16/22 encounter (Office Visit) with Vola Nageotte, PA   Medication Sig Dispense Refill    potassium chloride (KLOR-CON M) 10 MEQ extended release tablet take 1 tablet by mouth daily      betamethasone valerate (VALISONE) 0.1 % cream apply to affected area twice a day 45 g 3    pravastatin (PRAVACHOL) 10 MG tablet Take 1 tablet by mouth daily 30 tablet 3    coenzyme Q10 (EQL COQ10) 100 MG CAPS capsule Take 1 capsule by mouth daily 30 capsule 5    ferrous sulfate (FEROSUL) 325 (65 Fe) MG tablet Take 1 tablet by mouth daily (with breakfast) 30 tablet 5    Cholecalciferol (VITAMIN D3) 1.25 MG (95628 UT) CAPS Take 1 capsule by mouth once a week 4 capsule 5    Magnesium Gluconate 250 MG TABS Take 250 capsules by mouth 2 times daily 60 tablet 5    HYDROcodone-acetaminophen (NORCO) 5-325 MG per tablet Take 1 tablet by mouth every 8 hours as needed for Pain for up to 30 days. 90 tablet 0    gabapentin (NEURONTIN) 300 MG capsule Take 1 capsule by mouth daily for 30 days. 30 capsule 0    lidocaine (XYLOCAINE) 5 % ointment Apply 240 g topically 4 times daily as needed for Pain Apply topically as needed.  240 g 3    pantoprazole (PROTONIX) 40 MG tablet Take 1 tablet by mouth every morning (before breakfast) 90 tablet 3    dicyclomine (BENTYL) 10 MG capsule Take 1 capsule by mouth 2 times daily 180 capsule 3    ipratropium-albuterol (DUONEB) 0.5-2.5 (3) MG/3ML SOLN nebulizer solution Inhale into the lungs      butalbital-acetaminophen-caffeine (FIORICET, ESGIC) -40 MG per tablet take 1 tablet by mouth every 4 hours if needed for headache 90 tablet 0    fluticasone (FLONASE) 50 MCG/ACT nasal spray instill 2 sprays into each nostril once daily 48 g 5    montelukast (SINGULAIR) 10 MG tablet take 1 tablet by mouth every evening 90 tablet 3    hydroCHLOROthiazide (HYDRODIURIL) 25 MG tablet take 1 tablet by mouth every morning 90 tablet 1    orphenadrine (NORFLEX) 100 MG extended release tablet take 1 tablet by mouth twice a day if needed for muscle spasm      lidocaine (LIDODERM) 5 % apply 3 patches TO THE AFFECTED AREA DAILY.  LEAVE ON FOR 12 HOURS AND THEN OFF FOR 12 HOURS.      azelastine (OPTIVAR) 0.05 % ophthalmic solution instill 1 drop into both eyes twice a day 1 each 11    albuterol (PROVENTIL) (2.5 MG/3ML) 0.083% nebulizer solution inhale contents of 1 vial ( 3 milliliters ) in nebulizer by mouth and INTO THE LUNGS every 4 hours if needed for wheezing 375 mL 3    triamcinolone (KENALOG) 0.1 % cream APPLY TO AFFECTED AREA TWICE A DAY 1 each 2    simethicone (MYLICON) 80 MG chewable tablet Take 1 tablet by mouth 4 times daily as needed for Flatulence 180 tablet 3    aspirin EC 81 MG EC tablet Take 1 tablet by mouth daily 90 tablet 1    amitriptyline (ELAVIL) 50 MG tablet take 1 tablet by mouth at bedtime (Patient taking differently: As needed) 90 tablet 1    BANOPHEN 25 MG capsule take 1 capsule by mouth every 6 hours if needed for itching 60 capsule 5    albuterol sulfate  (90 Base) MCG/ACT inhaler Inhale 2 puffs into the lungs 4 times daily as needed for Wheezing 3 each 3    Blood Pressure Monitoring (B-D ASSURE BPM/AUTO ARM CUFF) MISC 1 Units by Does not apply route daily 1 each 0    budesonide-formoterol (SYMBICORT) 160-4.5 MCG/ACT AERO 2 puffs inhaled twice daily. Rinse mouth well after use. 3 each 3    Respiratory Therapy Supplies (NEBULIZER/TUBING/MOUTHPIECE) KIT Dispense tubing, mask, and mouthpiece for nebulizer machine. Dx: Asthma 1 kit 0    loratadine (CLARITIN) 10 MG tablet take 1 tablet by mouth once daily 90 tablet 3    EPINEPHrine (EPIPEN 2-RAGHAV) 0.3 MG/0.3ML SOAJ injection Inject one pen as directed STAT for allergic reaction, may disp generic NDC 66034-824-66 1 each 0         24 hour diet recall and physical activity reviewed. Review of Systems:   Constitutional: Denies any fever, chills, fatigue. Wound: Denies any rash, skin color changes or wound problems. Resp: Denies any cough, shortness of breath. CV: Denies any chest pain, orthopnea or syncope. Endocrine: Denies heat intolerance, cold intolerance,polydipsia, polyuria  MS: Denies any myalgias, (+)arthralgias  GI: Denies any nausea, vomiting, diarrhea, constipation. : Denies any hematuria, hesitancy or dysuria. Neuro: Denies dizziness, syncope, (+)headaches. Objective:    /88 (Site: Right Upper Arm, Position: Sitting, Cuff Size: Large Adult)   Pulse 72   Temp (!) 96.1 °F (35.6 °C) (Infrared)   Ht 6' 1\" (1.854 m)   Wt (!) 409 lb 6.4 oz (185.7 kg)   BMI 54.01 kg/m²   Body mass index is 54.01 kg/m². Physical Exam:  CONSTITUTIONAL: alert, cooperative, no apparent distress. Alert and oriented x 3. SKIN:  No visible rashes or lesions. HEENT: Head is normocephalic, atraumatic. EOMI b/l  NECK: Supple  CHEST/LUNGS: respirations unlabored. CARDIOVASCULAR:Regular rate and rhythm. NEUROLOGIC: There are no focalizing motor or sensory deficits. CN II-XII are grossly intact. Lucyann Push EXTREMITIES: no cyanosis, no clubbing and no edema. Assessment:       Diagnosis Orders   1. BMI 50.0-59.9, adult ([facility]        2. Hyperlipidemia, unspecified hyperlipidemia type        3.  Gastroesophageal reflux disease without esophagitis        4. CLARIBEL on CPAP        5. Essential hypertension        6. Chronic low back pain with sciatica, sciatica laterality unspecified, unspecified back pain laterality        7. Fibromyalgia        8. Arthritis            Plan:    Management of obesity through monitored weight loss. He is not interested in surgical options for weight loss at this time. He  Would like to proceed with supervised weight loss through our Weight Management program.     Marshel Kayser will be referred to our dietitian for evaluation and treatment. At that visit, goals and nutrition plan will be instituted, including vitamin supplementation. Additionally, this patient will begin a monitored exercise program with evaluation by our exercise physiologist, as discussed. He was given a copy of our nutrition classes and support group schedule and is encouraged to attend. Discussed FDA approved weight loss medications and will consider utilizing after maximum weight loss with diet and exercise. Discussed the [facility] program and will consider starting at next appointment. I spent a total of 45 minutes with Marshel Kayser today with greater than 50% of that time spent in face to face counseling and coordination of care regarding the issues covered in this note. Return in about 1 month (around 12/16/2022) for Follow up. I appreciate the opportunity to be involved in this patient's care.     Electronically signed by OJ Chavez on 11/16/2022 at 12:23 PM

## 2022-11-17 DIAGNOSIS — E83.42 HYPOMAGNESEMIA: ICD-10-CM

## 2022-11-17 RX ORDER — MAGNESIUM OXIDE 420 MG/1
TABLET ORAL
Qty: 60 TABLET | Refills: 11 | Status: SHIPPED | OUTPATIENT
Start: 2022-11-17

## 2022-11-17 NOTE — TELEPHONE ENCOUNTER
PA is not covered. I contacted [facility] and spoke to Joann and she said that he now has traditional Medicaid (not [employer]) and they will cover Magnesium Oxide 420 mg daily. Please EP new Rx if agreeable to the substitution.

## 2022-11-17 NOTE — TELEPHONE ENCOUNTER
Left another detailed message for Mansi YAP to call back to get the pt scheduled. The phone was initially answered by someone in general psych scheduling and they were unable to help me, would only transfer me to Jesus Colbert since she is the  for Dr. Marya Ricardo.

## 2022-11-18 NOTE — TELEPHONE ENCOUNTER
Called pt 11/17/22 and left a detailed message on his VM letting him know that a covered alternative was sent to the pharmacy.

## 2022-11-21 NOTE — TELEPHONE ENCOUNTER
[facility] called stating that they needed the pt's insurance information. I did give them this information and they stated that they would need to do an insurance exception and that this can take up to two weeks.  She states once approved or denied that they will reach out to the pt to schedule

## 2022-11-22 ENCOUNTER — HOSPITAL ENCOUNTER (OUTPATIENT)
Dept: OCCUPATIONAL THERAPY | Age: 52
Setting detail: THERAPIES SERIES
End: 2022-11-22
Payer: MEDICAID

## 2022-12-01 ENCOUNTER — TELEPHONE (OUTPATIENT)
Dept: FAMILY MEDICINE CLINIC | Age: 52
End: 2022-12-01

## 2022-12-01 DIAGNOSIS — G89.4 CHRONIC PAIN SYNDROME: ICD-10-CM

## 2022-12-01 NOTE — TELEPHONE ENCOUNTER
C/o symptoms related to asthma. Congestion in chest and wheezing. States that the last time this happened, you phoned in an oral steroid and a cough syrup. Would like to see if you would be willing to do again. If not, he did go ahead and schedule an appt for tomorrow. Pharmacy info entered.   Please let pt know either way

## 2022-12-01 NOTE — TELEPHONE ENCOUNTER
Ishmael Clayton called requesting a refill on the following medications:  Requested Prescriptions     Pending Prescriptions Disp Refills    HYDROcodone-acetaminophen (NORCO) 5-325 MG per tablet 90 tablet 0     Sig: Take 1 tablet by mouth every 8 hours as needed for Pain for up to 30 days. Pharmacy verified:[facility]   . pv      Date of last visit:   Date of next visit (if applicable): [id number]

## 2022-12-02 ENCOUNTER — OFFICE VISIT (OUTPATIENT)
Dept: FAMILY MEDICINE CLINIC | Age: 52
End: 2022-12-02
Payer: MEDICAID

## 2022-12-02 VITALS
WEIGHT: 315 LBS | BODY MASS INDEX: 55.02 KG/M2 | DIASTOLIC BLOOD PRESSURE: 66 MMHG | HEART RATE: 68 BPM | RESPIRATION RATE: 16 BRPM | SYSTOLIC BLOOD PRESSURE: 130 MMHG

## 2022-12-02 DIAGNOSIS — J45.909 MODERATE ASTHMA WITHOUT COMPLICATION, UNSPECIFIED WHETHER PERSISTENT: ICD-10-CM

## 2022-12-02 DIAGNOSIS — R05.1 ACUTE COUGH: Primary | ICD-10-CM

## 2022-12-02 DIAGNOSIS — J20.9 BRONCHITIS WITH ASTHMA, SUBACUTE: ICD-10-CM

## 2022-12-02 DIAGNOSIS — J45.909 BRONCHITIS WITH ASTHMA, SUBACUTE: ICD-10-CM

## 2022-12-02 DIAGNOSIS — L30.9 DERMATITIS: ICD-10-CM

## 2022-12-02 LAB
INFLUENZA A ANTIBODY: NEGATIVE
INFLUENZA B ANTIBODY: NEGATIVE
Lab: NORMAL
QC PASS/FAIL: NORMAL
SARS-COV-2 RDRP RESP QL NAA+PROBE: NEGATIVE

## 2022-12-02 PROCEDURE — 3017F COLORECTAL CA SCREEN DOC REV: CPT | Performed by: NURSE PRACTITIONER

## 2022-12-02 PROCEDURE — G8427 DOCREV CUR MEDS BY ELIG CLIN: HCPCS | Performed by: NURSE PRACTITIONER

## 2022-12-02 PROCEDURE — 99214 OFFICE O/P EST MOD 30 MIN: CPT | Performed by: NURSE PRACTITIONER

## 2022-12-02 PROCEDURE — G8417 CALC BMI ABV UP PARAM F/U: HCPCS | Performed by: NURSE PRACTITIONER

## 2022-12-02 PROCEDURE — 87804 INFLUENZA ASSAY W/OPTIC: CPT | Performed by: NURSE PRACTITIONER

## 2022-12-02 PROCEDURE — 87635 SARS-COV-2 COVID-19 AMP PRB: CPT | Performed by: NURSE PRACTITIONER

## 2022-12-02 PROCEDURE — G8484 FLU IMMUNIZE NO ADMIN: HCPCS | Performed by: NURSE PRACTITIONER

## 2022-12-02 PROCEDURE — 1036F TOBACCO NON-USER: CPT | Performed by: NURSE PRACTITIONER

## 2022-12-02 RX ORDER — AZITHROMYCIN 250 MG/1
250 TABLET, FILM COATED ORAL SEE ADMIN INSTRUCTIONS
Qty: 6 TABLET | Refills: 0 | Status: SHIPPED | OUTPATIENT
Start: 2022-12-02 | End: 2022-12-07

## 2022-12-02 RX ORDER — CAPSAICIN 0.07 G/100G
CREAM TOPICAL
Qty: 120 G | Refills: 2 | Status: SHIPPED | OUTPATIENT
Start: 2022-12-02 | End: 2023-01-03

## 2022-12-02 RX ORDER — LIDOCAINE 50 MG/G
240 OINTMENT TOPICAL 4 TIMES DAILY PRN
Qty: 240 G | Refills: 3 | OUTPATIENT
Start: 2022-12-02 | End: 2023-01-01

## 2022-12-02 RX ORDER — ORPHENADRINE CITRATE 100 MG/1
100 TABLET, EXTENDED RELEASE ORAL 2 TIMES DAILY PRN
Qty: 60 TABLET | Refills: 0 | Status: SHIPPED | OUTPATIENT
Start: 2022-12-02 | End: 2023-01-01

## 2022-12-02 RX ORDER — DEXTROMETHORPHAN HYDROBROMIDE AND PROMETHAZINE HYDROCHLORIDE 15; 6.25 MG/5ML; MG/5ML
5 SYRUP ORAL 4 TIMES DAILY PRN
Qty: 118 ML | Refills: 0 | Status: SHIPPED | OUTPATIENT
Start: 2022-12-02 | End: 2022-12-09

## 2022-12-02 RX ORDER — LORATADINE 10 MG/1
10 TABLET ORAL DAILY
Qty: 90 TABLET | Refills: 0 | OUTPATIENT
Start: 2022-12-02 | End: 2023-03-02

## 2022-12-02 RX ORDER — GABAPENTIN 300 MG/1
300 CAPSULE ORAL DAILY
Qty: 30 CAPSULE | Refills: 0 | OUTPATIENT
Start: 2022-12-02 | End: 2023-01-01

## 2022-12-02 RX ORDER — TRIAMCINOLONE ACETONIDE 1 MG/G
CREAM TOPICAL
Qty: 1 EACH | Refills: 2 | Status: SHIPPED | OUTPATIENT
Start: 2022-12-02

## 2022-12-02 RX ORDER — PREDNISONE 10 MG/1
TABLET ORAL
Qty: 30 TABLET | Refills: 0 | Status: SHIPPED | OUTPATIENT
Start: 2022-12-02 | End: 2022-12-12

## 2022-12-02 RX ORDER — HYDROCODONE BITARTRATE AND ACETAMINOPHEN 5; 325 MG/1; MG/1
1 TABLET ORAL EVERY 8 HOURS PRN
Qty: 90 TABLET | Refills: 0 | Status: SHIPPED | OUTPATIENT
Start: 2022-12-07 | End: 2023-01-06

## 2022-12-02 ASSESSMENT — ENCOUNTER SYMPTOMS
COUGH: 1
HEMOPTYSIS: 0
SHORTNESS OF BREATH: 1
HEARTBURN: 0
RHINORRHEA: 1
WHEEZING: 1
SORE THROAT: 1

## 2022-12-02 NOTE — TELEPHONE ENCOUNTER
Spoke with Kaya BOUCHER on 12/1/22 and instructed pt to go to Urgent care or follow up in our office for evaluation. Appointment scheduled.  -WS

## 2022-12-02 NOTE — TELEPHONE ENCOUNTER
OARRS reviewed. UDS: + for  Butalbital, Hydrocodone , Gabapentin , Amitriptyline. Last seen: 9/12/2022.  Follow-up: 12/12/2022

## 2022-12-02 NOTE — PROGRESS NOTES
[address] [facility] [address]  Dept: [phone]  Dept Fax: [phone]: [phone]     Visit Date:  12/2/2022      Patient:  Julio Mcnair  YOB: 1970    HPI:     Chief Complaint   Patient presents with    Cough     Pt c/o nausea, SOB, wheezing, cough and some congestion that started about 3 days ago. Pt has been doing his breathing treatments to help but would like to discuss. Pt does have some left sided pain that he has been dealing with, and it does cause pain to use the bathroom, cough, or take deep breaths. Other     Pt would like a copy of lab work Rosemary Olivia gave him to complete on 11/9/22. Pt presents to the office today for cough and congestion. He reports that this started 3-4 days ago and is getting worse. The kids in his house are sick also. Some chills and maybe a fever. He is coughing so hard that he has pain in his left abdomen. This happened the last time he got pneumonia and he is worried about that. Pt also needs refill of topical creams for dermatitis. Cough  This is a new problem. The current episode started in the past 7 days. The problem has been gradually worsening. The cough is Non-productive. Associated symptoms include chills, a fever, myalgias, nasal congestion, postnasal drip, rhinorrhea, a sore throat, shortness of breath and wheezing. Pertinent negatives include no chest pain, ear congestion, ear pain, headaches, heartburn, hemoptysis, rash, sweats or weight loss. The symptoms are aggravated by lying down. He has tried rest, OTC cough suppressant, body position changes, cool air and a beta-agonist inhaler for the symptoms. The treatment provided mild relief. His past medical history is significant for asthma, environmental allergies and pneumonia. There is no history of bronchiectasis, bronchitis, COPD or emphysema.      Medications    Current Outpatient Medications: [START ON 12/7/2022] HYDROcodone-acetaminophen (NORCO) 5-325 MG per tablet, Take 1 tablet by mouth every 8 hours as needed for Pain for up to 30 days. , Disp: 90 tablet, Rfl: 0    orphenadrine (NORFLEX) 100 MG extended release tablet, Take 1 tablet by mouth 2 times daily as needed for Muscle spasms, Disp: 60 tablet, Rfl: 0    triamcinolone (KENALOG) 0.1 % cream, APPLY TO AFFECTED AREA TWICE A DAY, Disp: 1 each, Rfl: 2    capsicum (TRIXAICIN HP) 0.075 % topical cream, Apply topically to back, abdomen and arms TID PRN, Disp: 120 g, Rfl: 2    azithromycin (ZITHROMAX) 250 MG tablet, Take 1 tablet by mouth See Admin Instructions for 5 days 500mg on day 1 followed by 250mg on days 2 - 5, Disp: 6 tablet, Rfl: 0    predniSONE (DELTASONE) 10 MG tablet, 4 po qd for 3 days, then 3 po qd for 3 days, then 2 po qd for 3 days, then 1 po qd for 3 days, Disp: 30 tablet, Rfl: 0    promethazine-dextromethorphan (PROMETHAZINE-DM) 6.25-15 MG/5ML syrup, Take 5 mLs by mouth 4 times daily as needed for Cough, Disp: 118 mL, Rfl: 0    Magnesium Oxide 420 MG TABS, One tablet twice daily with meals, Disp: 60 tablet, Rfl: 11    potassium chloride (KLOR-CON M) 10 MEQ extended release tablet, take 1 tablet by mouth daily, Disp: , Rfl:     betamethasone valerate (VALISONE) 0.1 % cream, apply to affected area twice a day, Disp: 45 g, Rfl: 3    pravastatin (PRAVACHOL) 10 MG tablet, Take 1 tablet by mouth daily, Disp: 30 tablet, Rfl: 3    coenzyme Q10 (EQL COQ10) 100 MG CAPS capsule, Take 1 capsule by mouth daily, Disp: 30 capsule, Rfl: 5    ferrous sulfate (FEROSUL) 325 (65 Fe) MG tablet, Take 1 tablet by mouth daily (with breakfast), Disp: 30 tablet, Rfl: 5    Cholecalciferol (VITAMIN D3) 1.25 MG (87770 UT) CAPS, Take 1 capsule by mouth once a week, Disp: 4 capsule, Rfl: 5    gabapentin (NEURONTIN) 300 MG capsule, Take 1 capsule by mouth daily for 30 days. , Disp: 30 capsule, Rfl: 0    lidocaine (XYLOCAINE) 5 % ointment, Apply 240 g topically 4 times daily as needed for Pain Apply topically as needed. , Disp: 240 g, Rfl: 3    pantoprazole (PROTONIX) 40 MG tablet, Take 1 tablet by mouth every morning (before breakfast), Disp: 90 tablet, Rfl: 3    dicyclomine (BENTYL) 10 MG capsule, Take 1 capsule by mouth 2 times daily, Disp: 180 capsule, Rfl: 3    ipratropium-albuterol (DUONEB) 0.5-2.5 (3) MG/3ML SOLN nebulizer solution, Inhale into the lungs, Disp: , Rfl:     butalbital-acetaminophen-caffeine (FIORICET, ESGIC) -40 MG per tablet, take 1 tablet by mouth every 4 hours if needed for headache, Disp: 90 tablet, Rfl: 0    fluticasone (FLONASE) 50 MCG/ACT nasal spray, instill 2 sprays into each nostril once daily, Disp: 48 g, Rfl: 5    montelukast (SINGULAIR) 10 MG tablet, take 1 tablet by mouth every evening, Disp: 90 tablet, Rfl: 3    hydroCHLOROthiazide (HYDRODIURIL) 25 MG tablet, take 1 tablet by mouth every morning, Disp: 90 tablet, Rfl: 1    lidocaine (LIDODERM) 5 %, apply 3 patches TO THE AFFECTED AREA DAILY.  LEAVE ON FOR 12 HOURS AND THEN OFF FOR 12 HOURS., Disp: , Rfl:     azelastine (OPTIVAR) 0.05 % ophthalmic solution, instill 1 drop into both eyes twice a day, Disp: 1 each, Rfl: 11    albuterol (PROVENTIL) (2.5 MG/3ML) 0.083% nebulizer solution, inhale contents of 1 vial ( 3 milliliters ) in nebulizer by mouth and INTO THE LUNGS every 4 hours if needed for wheezing, Disp: 375 mL, Rfl: 3    simethicone (MYLICON) 80 MG chewable tablet, Take 1 tablet by mouth 4 times daily as needed for Flatulence, Disp: 180 tablet, Rfl: 3    aspirin EC 81 MG EC tablet, Take 1 tablet by mouth daily, Disp: 90 tablet, Rfl: 1    amitriptyline (ELAVIL) 50 MG tablet, take 1 tablet by mouth at bedtime (Patient taking differently: As needed), Disp: 90 tablet, Rfl: 1    BANOPHEN 25 MG capsule, take 1 capsule by mouth every 6 hours if needed for itching, Disp: 60 capsule, Rfl: 5    albuterol sulfate  (90 Base) MCG/ACT inhaler, Inhale 2 puffs into the lungs 4 times daily as needed for Wheezing, Disp: 3 each, Rfl: 3    Blood Pressure Monitoring (B-D ASSURE BPM/AUTO ARM CUFF) MISC, 1 Units by Does not apply route daily, Disp: 1 each, Rfl: 0    budesonide-formoterol (SYMBICORT) 160-4.5 MCG/ACT AERO, 2 puffs inhaled twice daily. Rinse mouth well after use., Disp: 3 each, Rfl: 3    Respiratory Therapy Supplies (NEBULIZER/TUBING/MOUTHPIECE) KIT, Dispense tubing, mask, and mouthpiece for nebulizer machine. Dx: Asthma, Disp: 1 kit, Rfl: 0    loratadine (CLARITIN) 10 MG tablet, take 1 tablet by mouth once daily, Disp: 90 tablet, Rfl: 3    EPINEPHrine (EPIPEN 2-RAGHAV) 0.3 MG/0.3ML SOAJ injection, Inject one pen as directed STAT for allergic reaction, may disp generic NDC 43601-209-31, Disp: 1 each, Rfl: 0    NARCAN 4 MG/0.1ML LIQD nasal spray, , Disp: , Rfl:     The patient is allergic to dilantin [phenytoin], lyrica [pregabalin], dupixent [dupilumab], oxycodone, and valium [diazepam]. Past Medical History  Dustin Alexander  has a past medical history of Aneurysm ([address] Arthritis, Asthma, Cardiomegaly, COVID-19, Depression, Fibromyalgia, GERD (gastroesophageal reflux disease), Hypertension, Liver disease, and CLARIBEL on CPAP. Subjective:      Review of Systems   Constitutional:  Positive for chills and fever. Negative for weight loss. HENT:  Positive for postnasal drip, rhinorrhea and sore throat. Negative for ear pain. Respiratory:  Positive for cough, shortness of breath and wheezing. Negative for hemoptysis. Cardiovascular:  Negative for chest pain. Gastrointestinal:  Negative for heartburn. Musculoskeletal:  Positive for myalgias. Skin:  Negative for rash. Allergic/Immunologic: Positive for environmental allergies. Neurological:  Negative for headaches. Objective:     /66   Pulse 68   Resp 16   Wt (!) 417 lb (189.1 kg)   BMI 55.02 kg/m²     Physical Exam  Vitals reviewed. Constitutional:       General: He is not in acute distress.      Appearance: Normal appearance. He is well-developed. HENT:      Head: Normocephalic and atraumatic. Right Ear: Hearing, ear canal and external ear normal.      Left Ear: Hearing, ear canal and external ear normal.      Nose: Congestion and rhinorrhea present. No nasal tenderness. Mouth/Throat:      Lips: Pink. Mouth: Mucous membranes are moist. No oral lesions. Pharynx: Oropharynx is clear. Uvula midline. Eyes:      General:         Right eye: No discharge. Left eye: No discharge. Conjunctiva/sclera: Conjunctivae normal.   Neck:      Trachea: No tracheal deviation. Cardiovascular:      Rate and Rhythm: Normal rate and regular rhythm. Heart sounds: Normal heart sounds. No murmur heard. Pulmonary:      Effort: Pulmonary effort is normal. No respiratory distress. Breath sounds: Normal breath sounds. Abdominal:      General: Bowel sounds are normal.      Palpations: Abdomen is soft. Tenderness: There is no abdominal tenderness. Musculoskeletal:      Cervical back: Full passive range of motion without pain and neck supple. Lymphadenopathy:      Head:      Right side of head: No submental, submandibular, tonsillar, preauricular, posterior auricular or occipital adenopathy. Left side of head: No submental, submandibular, tonsillar, preauricular, posterior auricular or occipital adenopathy. Cervical: No cervical adenopathy. Skin:     General: Skin is warm and dry. Findings: No rash. Neurological:      General: No focal deficit present. Mental Status: He is alert and oriented to person, place, and time. Coordination: Coordination normal.   Psychiatric:         Mood and Affect: Mood normal.         Behavior: Behavior normal.         Thought Content:  Thought content normal.         Judgment: Judgment normal.     Results for POC orders placed in visit on 12/02/22   POCT Influenza A/B   Result Value Ref Range    Influenza A Ab Negative     Influenza B Ab Negative          Assessment/Plan:      Raul George was seen today for cough and other. Diagnoses and all orders for this visit:    Acute cough  -     POCT COVID-19 Rapid, NAAT  -     POCT Influenza A/B    Bronchitis with asthma, subacute  -     azithromycin (ZITHROMAX) 250 MG tablet; Take 1 tablet by mouth See Admin Instructions for 5 days 500mg on day 1 followed by 250mg on days 2 - 5  -     predniSONE (DELTASONE) 10 MG tablet; 4 po qd for 3 days, then 3 po qd for 3 days, then 2 po qd for 3 days, then 1 po qd for 3 days  -     promethazine-dextromethorphan (PROMETHAZINE-DM) 6.25-15 MG/5ML syrup; Take 5 mLs by mouth 4 times daily as needed for Cough    Moderate asthma without complication, unspecified whether persistent    Dermatitis  -     triamcinolone (KENALOG) 0.1 % cream; APPLY TO AFFECTED AREA TWICE A DAY  -     capsicum (TRIXAICIN HP) 0.075 % topical cream; Apply topically to back, abdomen and arms TID PRN    - Rest and increase fluids  - Tylenol as needed for pain and fever  - COVID and Flu testing in office was negative today. - Refills done. - Good handwashing and clean all surfaces touched  - Call office with any questions or concerns, or if symptoms are getting worse or changing  - ER for any chest pain or SOB    Return if symptoms worsen or fail to improve. Patient given educational materials - see patient instructions. Discussed use, benefit, and side effects of prescribed medications. All patient questions answered. Pt voiced understanding.         Electronically signed by MIRNA Julian CNP on 12/2/2022 at 12:50 PM

## 2022-12-12 ENCOUNTER — TELEPHONE (OUTPATIENT)
Dept: FAMILY MEDICINE CLINIC | Age: 52
End: 2022-12-12

## 2022-12-12 ENCOUNTER — OFFICE VISIT (OUTPATIENT)
Dept: PHYSICAL MEDICINE AND REHAB | Age: 52
End: 2022-12-12
Payer: MEDICAID

## 2022-12-12 VITALS
SYSTOLIC BLOOD PRESSURE: 124 MMHG | WEIGHT: 315 LBS | BODY MASS INDEX: 41.75 KG/M2 | DIASTOLIC BLOOD PRESSURE: 78 MMHG | HEIGHT: 73 IN

## 2022-12-12 DIAGNOSIS — M51.36 LUMBAR DEGENERATIVE DISC DISEASE: ICD-10-CM

## 2022-12-12 DIAGNOSIS — M62.830 BACK MUSCLE SPASM: ICD-10-CM

## 2022-12-12 DIAGNOSIS — M54.50 LUMBAR PAIN: ICD-10-CM

## 2022-12-12 DIAGNOSIS — Z98.1 HX OF FUSION OF CERVICAL SPINE: ICD-10-CM

## 2022-12-12 DIAGNOSIS — M47.816 LUMBAR SPONDYLOSIS: Primary | ICD-10-CM

## 2022-12-12 DIAGNOSIS — D35.2 PITUITARY ADENOMA (HCC): ICD-10-CM

## 2022-12-12 DIAGNOSIS — G47.30 SLEEP APNEA, UNSPECIFIED TYPE: Primary | ICD-10-CM

## 2022-12-12 DIAGNOSIS — S83.241A ACUTE MEDIAL MENISCUS TEAR OF RIGHT KNEE, INITIAL ENCOUNTER: ICD-10-CM

## 2022-12-12 DIAGNOSIS — G89.4 CHRONIC PAIN SYNDROME: ICD-10-CM

## 2022-12-12 DIAGNOSIS — M50.30 DDD (DEGENERATIVE DISC DISEASE), CERVICAL: ICD-10-CM

## 2022-12-12 DIAGNOSIS — M54.12 CERVICAL RADICULOPATHY: ICD-10-CM

## 2022-12-12 DIAGNOSIS — M54.17 LUMBOSACRAL RADICULITIS: ICD-10-CM

## 2022-12-12 PROCEDURE — G8484 FLU IMMUNIZE NO ADMIN: HCPCS | Performed by: NURSE PRACTITIONER

## 2022-12-12 PROCEDURE — 99214 OFFICE O/P EST MOD 30 MIN: CPT | Performed by: NURSE PRACTITIONER

## 2022-12-12 PROCEDURE — 1036F TOBACCO NON-USER: CPT | Performed by: NURSE PRACTITIONER

## 2022-12-12 PROCEDURE — G8417 CALC BMI ABV UP PARAM F/U: HCPCS | Performed by: NURSE PRACTITIONER

## 2022-12-12 PROCEDURE — 3017F COLORECTAL CA SCREEN DOC REV: CPT | Performed by: NURSE PRACTITIONER

## 2022-12-12 PROCEDURE — G8427 DOCREV CUR MEDS BY ELIG CLIN: HCPCS | Performed by: NURSE PRACTITIONER

## 2022-12-12 RX ORDER — GABAPENTIN 300 MG/1
CAPSULE ORAL
Qty: 30 CAPSULE | Refills: 0 | OUTPATIENT
Start: 2022-12-12

## 2022-12-12 RX ORDER — GABAPENTIN 300 MG/1
300 CAPSULE ORAL DAILY
Qty: 30 CAPSULE | Refills: 0 | Status: SHIPPED | OUTPATIENT
Start: 2022-12-12 | End: 2023-01-11

## 2022-12-12 RX ORDER — LORATADINE 10 MG/1
TABLET ORAL
Qty: 90 TABLET | Refills: 3 | Status: SHIPPED | OUTPATIENT
Start: 2022-12-12

## 2022-12-12 ASSESSMENT — ENCOUNTER SYMPTOMS
BACK PAIN: 1
COUGH: 0
SHORTNESS OF BREATH: 0
GASTROINTESTINAL NEGATIVE: 1
APNEA: 1
PHOTOPHOBIA: 1

## 2022-12-12 NOTE — TELEPHONE ENCOUNTER
Noted. Referral placed to sleep center to have follow up for CPAP follow up, this needs a specialists to monitor and order supplies.   Thanks -    Orders Placed This Encounter   Procedures    [address] [facility]     Referral Priority:   Routine     Referral Type:   Eval and Treat     Referral Reason:   Specialty Services Required     Requested Specialty:   Sleep Center     Number of Visits Requested:   1

## 2022-12-12 NOTE — PROGRESS NOTES
[address]  Dept: [phone]  Dept Fax: [phone]: [phone]    Visit Date: 12/12/2022    Functionality Assessment/Goals Worksheet     On a scale of 0 (Does not Interfere) to 10 (Completely Interferes)     1. Which number describes how during the past week pain has interfered with       the following:  A. General Activity:  8  B. Mood: 8  C. Walking Ability:  8  D. Normal Work (Includes both work outside the home and housework):  8  E. Relations with Other People:   8  F. Sleep:   8  G. Enjoyment of Life:   8    2. Patient Prefers to Take their Pain Medications:     [x]  On a regular basis   []  Only when necessary    []  Does not take pain medications    3. What are the Patient's Goals/Expectations for Visiting Pain Management? []  Learn about my pain    [x]  Receive Medication   []  Physical Therapy     []  Treat Depression   [x]  Receive Injections    []  Treat Sleep   []  Deal with Anxiety and Stress   []  Treat Opoid Dependence/Addiction   []  Other:      HPI:   Barber Calvo is a 50 y.o. male is here today for    Chief Complaint: Back pain, neck pain joint pain, muscle spasms     HPI   10 week FU. Patient has complaints of pain all over. joint pain, neck pain, low back pain, muscle spasms all over. States that pain is always present but has not been as bad. Feels that he feels overall he is doing better and pain is better controlled. Patient canceled L-facet RFA ordered last time as pain was improved. Continues to heal from surgery of right knee from July after a fall at work. Leg immobilizer today- continues to have right knee pain     Feels that current medications remain effective. Continues occupational therapy twice per week.      Pain increases with bending, lifting, twisting , turning torso, walking, standing, getting up and down, and housework or working at job, weather changes         Medications reviewed. Patient denies side effects with medications. Patient states he is taking medications as prescribed. Hedenies receiving pain medications from other sources. He denies any ER visits since last visit. Pain scale with out pain medications or at its worst is 9/10. Pain scale with pain medications or at its best is 6-7/10. Last dose of Norco, Neurontin was today   Drug screen reviewed from 9/28/2022 and was appropriate  Pill count completed  today and WNL: Yes      The patientis allergic to dilantin [phenytoin], lyrica [pregabalin], dupixent [dupilumab], oxycodone, and valium [diazepam]. Subjective:      Review of Systems   Constitutional:  Positive for activity change. Negative for chills, fatigue, fever and unexpected weight change. HENT: Negative. Eyes:  Positive for photophobia and visual disturbance. Respiratory:  Positive for apnea. Negative for cough and shortness of breath. Wears cpap at night    Cardiovascular:  Positive for leg swelling. Gastrointestinal: Negative. Endocrine: Negative. Genitourinary: Negative. Musculoskeletal:  Positive for arthralgias, back pain, gait problem, joint swelling, myalgias, neck pain and neck stiffness. Not using any assist devices    Skin: Negative. Neurological:  Positive for weakness, numbness and headaches. Psychiatric/Behavioral:  Positive for dysphoric mood and sleep disturbance. The patient is not nervous/anxious. Objective:     Vitals:    12/12/22 0948   BP: 124/78   Weight: (!) 420 lb (190.5 kg)   Height: 6' 1\" (1.854 m)       Physical Exam  Vitals and nursing note reviewed. Constitutional:       General: He is not in acute distress. Appearance: Normal appearance. He is obese. He is not diaphoretic. HENT:      Head: Normocephalic and atraumatic.       Right Ear: External ear normal.      Left Ear: External ear normal. Nose: Nose normal.      Mouth/Throat:      Mouth: Mucous membranes are moist.      Pharynx: No oropharyngeal exudate. Eyes:      General: No scleral icterus. Right eye: No discharge. Left eye: No discharge. Conjunctiva/sclera: Conjunctivae normal.      Pupils: Pupils are equal, round, and reactive to light. Neck:      Thyroid: No thyromegaly. Cardiovascular:      Rate and Rhythm: Normal rate and regular rhythm. Heart sounds: Normal heart sounds. No murmur heard. No friction rub. No gallop. Pulmonary:      Effort: Pulmonary effort is normal. No respiratory distress. Breath sounds: Normal breath sounds. No wheezing or rales. Chest:      Chest wall: No tenderness. Abdominal:      General: Bowel sounds are normal. There is no distension. Palpations: Abdomen is soft. Tenderness: There is no abdominal tenderness. There is no guarding or rebound. Musculoskeletal:         General: Swelling and tenderness present. Cervical back: Normal range of motion. Rigidity, spasms, tenderness and bony tenderness present. No edema or erythema. Pain with movement present. No muscular tenderness. Normal range of motion. Thoracic back: Spasms, tenderness and bony tenderness present. Lumbar back: Spasms, tenderness and bony tenderness present. Decreased range of motion. Positive right straight leg raise test and positive left straight leg raise test.        Back:       Right knee: Swelling and bony tenderness present. Decreased range of motion. Tenderness present. Left knee: Bony tenderness present. Tenderness present. Right lower leg: Swelling present. Edema present. Left lower leg: Swelling present. Edema present. Right ankle: Swelling present. Left ankle: Swelling present. Legs:    Skin:     General: Skin is warm. Coloration: Skin is not pale. Findings: No erythema or rash.           Neurological:      General: No focal deficit present. Mental Status: He is alert and oriented to person, place, and time. He is not disoriented. Cranial Nerves: No cranial nerve deficit. Sensory: Sensory deficit present. Motor: Weakness present. No atrophy or abnormal muscle tone. Coordination: Coordination normal.      Gait: Gait abnormal.      Comments: Motor 4/5 lower extremities, 5/5 upper    Psychiatric:         Attention and Perception: Attention normal. He is attentive. Mood and Affect: Mood normal. Mood is not anxious or depressed. Affect is not labile, blunt, angry or inappropriate. Speech: Speech normal. He is communicative. Speech is not rapid and pressured, delayed, slurred or tangential.         Behavior: Behavior normal. Behavior is not agitated, slowed, aggressive, withdrawn, hyperactive or combative. Behavior is cooperative. Thought Content: Thought content normal. Thought content is not paranoid or delusional. Thought content does not include homicidal or suicidal ideation. Thought content does not include homicidal or suicidal plan. Cognition and Memory: Cognition normal. Memory is not impaired. He does not exhibit impaired recent memory or impaired remote memory. Judgment: Judgment normal. Judgment is not impulsive or inappropriate. VAL  Patricks test  positive  Yeoman's  or Gaenslen's positive       Assessment:     1. Lumbar spondylosis    2. Lumbar degenerative disc disease    3. Lumbar pain    4. Lumbosacral radiculitis    5. Cervical radiculopathy    6. DDD (degenerative disc disease), cervical    7. Hx of fusion of cervical spine    8. Acute medial meniscus tear of right knee, initial encounter    9. Pituitary adenoma ([address]    10. Chronic pain syndrome    11. Back muscle spasm            Plan:      OARRS reviewed. Current MED: 15.00  Patient was offered naloxone for home.    Discussed long term side effects of medications, tolerance, dependency and addiction. Previous UDS reviewed  UDS preformed today for compliance. Patient told can not receive any pain medications from any other source. No evidence of abuse, diversion or aberrant behavior. Medications and/or procedures to improve function and quality of life- patient understanding with this and that may not be pain free  Discussed with patient about safe storage of medications at home  Discussed possible weaning of medication dosing dependent on treatment/procedure results. Discussed with patient about risks with procedure including infection, reaction to medication, increased pain, or bleeding. Continues to heal from right knee surgery   Received 80% relief of low back pain and also relief of leg pain from bilateral L-facet MBB # 2 for 3 weeks with improvement of mobility and activity. Discussed Bilateral L-facet RFA @ L3-4, L4-5, and L5-S1 when needed to for longer  term pain relief. He canceled procedure as he is doing better   Discussed possible LESI, trigger point injections in future   Continue current pain medications- Norco 5/325 TID prn- filled 12/7/2022. Neurontin 300 mg daily- ordered refill   Continue Norflex- Filled 12/2/2022, Continue Fioricet prn headaches from pcp  Is compliant. Meds. Prescribed:   Orders Placed This Encounter   Medications    gabapentin (NEURONTIN) 300 MG capsule     Sig: Take 1 capsule by mouth daily for 30 days. Dispense:  30 capsule     Refill:  0         Return in about 10 weeks (around 2/20/2023), or if symptoms worsen or fail to improve, for follow up  for medications.                Electronically signed by MIRAN Balderrama CNP on12/12/2022 at 10:15 AM

## 2022-12-12 NOTE — TELEPHONE ENCOUNTER
Patient requesting our office to order supplies for his C-pap machine to [facility].  States that he is not currently seeing pulmonary. Yasmeen Madrigal for orders? If so, please just fax.  Only need to call pt back if there is a problem with the request.

## 2022-12-12 NOTE — PROGRESS NOTES
Wade Tirado is a 50 y.o. male with a date of birth of 1970. Patient is a 50 y.o. male seen for initial MNT visit for medically supervised weight loss. Vitals from current and previous visits:  Vitals 37/67/3852   SYSTOLIC 314   DIASTOLIC 88   Site Right Upper Arm   Position Sitting   Cuff Size Large Adult   Pulse 72   Temp 96.1   Resp    SpO2    Weight 409 lb 6.4 oz   Height 6' 1\"   Body mass index 54.01 kg/m2   Pain Level    Waist (Inches) 62   Neck circumference (Inches) 20.5      Vitals 32/87/4900   SYSTOLIC    DIASTOLIC    Site    Position    Cuff Size    Pulse    Temp    Resp    SpO2    Weight 412 lb 3.2 oz   Height 6' 1\"   Body mass index 54.38 kg/m2   Pain Level    Waist (Inches)    Neck circumference (Inches)    333    Vitals:    12/14/22 1001   Weight: (!) 412 lb 3.2 oz (187 kg)   Height: 6' 1\" (1.854 m)    BMI: Body mass index is 54.38 kg/m². Obesity Classification: Class III    Weight History: Wt Readings from Last 3 Encounters:   12/14/22 (!) 412 lb 3.2 oz (187 kg)   12/12/22 (!) 420 lb (190.5 kg)   12/02/22 (!) 417 lb (189.1 kg)     Vitamins:  Vitamin D weekly, Iron 65 mg daily prescribed by PCP, [place] Chewable two a day  Pill taking:  Describe your current weight: \"My weight has always been up and down\". States from Age 17 to 24 states weight was ~ 250 lbs. After mid 20's started gaining weight with the start of muscle spasms and eating middle of nite. D/t poor sleep  Patient's highest adult weight was 470 lb at age 38. States Lost 100 lbs and didn't do anything intentially to lose weight- therefore isn't sure how lost the weight. .  Then muscle spasms came back and started gaining weight again. Overall weight hx was difficult to obtain with patient as appears to have weight fluctuations often      Patient has participated in the following weight loss programs: NONE     Patient has not participated in meal replacement/liquid diets.     Patient has not participated in weight loss medications. Patient is not lactose intolerant. Patient does not have Sikhism/cultural food concerns. Patient does not have food allergies. Patient dines out to a sit down restaurant 1 time per year. Patient has fast food or picks up carry out  5-7 times per week- pending fiances. Grocery Shopping in household done by: self  Cooking in household done by: self  or others in house ----  lives with ex significant other, her mom, sister and five kids    States last four days has only slept total of six hours. Reports very poor sleep for years with pain. Wears CPAP machine often when awake and sleeping  Maybe after noon before gets up. Times vary greatly when eating and sleeping    24 hour recall/food frequency chart:  States may only eat once a day  Breakfast: no.   Snack: no.   Lunch: yes. 10am - yesterday had elizabeth ayan that people in house cooked after doctor appt. Four pieces of toast with extra butter  and 1/2 cup pop  Snack: no.  Dinner: yes. 10:30pm last nite went to [employer]-  9 piece chicken nuggets and roast beef sandwich and [place] pop, chocolate chip cookies  Snack: no.  Drinks throughout the day: bottled schmidt ~ 10-20 per day, Pepsi, or Coke - \"it just depends\"- if has an urge will drink a 2 liter, Energy drinks- Monster, Hawaiian Fruit Punch  Do you drink alcohol? Occasionally per pt. Patient does not meet the criteria for binge eating disorder. Patient  does sometimes  have grazing. Patient does have night eating- \"I'll be up already\"- has box of vanilla wafers or beef sticks at bedside. Patient does have a history of emotional eating or eating out of boredom. - states has some stress eating. Has hx getting up in middle of nite to get fast food because hungry  When gets urges to eat then find self eating anything to fill hunger or satisfy craving  Patient describes self as fast eater    Patient describes level of activity as sedentary.   Had a fall at work in June 2022- has brace on leg. Goes to [facility] for PT twice a week    Pt in outpatient OT [facility] eyes at [facility]. States has memory, speech and head issues from fall at work June 2022. Discussed neuropsych eval and patient states doctors are working on this for an appointment      Assessment  Nutritional Needs: [place] = 2775 kcal x 1.2 (activity factor)= 3330 kcal - 500- 1000 (for 1-2 lb weight loss/week)= 8949-1498- kcals per day  Food recall reveals varied meal times/eating schedule. Suspect multifactorial with poor sleep in regards to eating pattern. Requires simple instructions as unable to read educational materials. Prefers pictures. Nutrition Diagnosis: Overweight/Obesity related to Lack of previous MNT as evidenced by Body mass index is 54.38 kg/m². Jone Olmstead Plan  Plan/Recommendations:  Used picture papers to provide education on snack ideas. Focused mainly on importance of attempting to start an eating schedule to aide with weight loss efforts. Set meal times reviewed with patient that patient was agreeable upon. Also addressed eliminating all sugary beverages including pop and fruit punch for weight loss and pt voiced able to start working on  this but unsure at times d/t urges. Goals reviewed with patient . Is agreeable to write down in a food journal portion sizes and time of day eating to review at next office visit with dietitian. Handouts given:   1. Snack Picture Ideas  2. Food Journal  3. Sample menus  4   My Plate Meal Planning Picture Guide  5. Portion size guide    -Goals:  Nutrition Goal:  Begin working on a consistent meal schedule; First meal/snack: 10am    Second meal/snack: 1-2pm  Third meal/snack- 6p-7pm   Evening Snack: 9p-10pm.  Goal is to try to avoid eating after 10pm  Water Goal:  Avoid all regular pop, energy drinks and Hawaiian Punch to reduce sugar and calories.   Continue bottled water  Exercise Goal:  Discuss with physical therapy at [facility] what exercises you can do at home on days not at therapy  I will use food journal to record meal times, serving sizes and bring back to next dietitian visit      -Followup visit: 4 weeks with dietitian and PA.   Pt requested monthly visits         Mainor Gruber RD, LD,  Dietitian- Weight Management [address] [place] [address]

## 2022-12-12 NOTE — TELEPHONE ENCOUNTER
This medication refill is regarding a electronic request. Refill requested by  [facility] . Requested Prescriptions     Pending Prescriptions Disp Refills    loratadine (CLARITIN) 10 MG tablet [Pharmacy Med Name: LORATADINE 10 MG TABLET] 90 tablet 3     Sig: take 1 tablet by mouth once daily     Date of last visit: 12/2/2022   Date of next visit: 02/13/2023  Date of last refill: 11/29/21 #90/3    Rx verified, ordered and set to EP.

## 2022-12-14 ENCOUNTER — TELEPHONE (OUTPATIENT)
Dept: FAMILY MEDICINE CLINIC | Age: 52
End: 2022-12-14

## 2022-12-14 ENCOUNTER — OFFICE VISIT (OUTPATIENT)
Dept: BARIATRICS/WEIGHT MGMT | Age: 52
End: 2022-12-14

## 2022-12-14 VITALS — WEIGHT: 315 LBS | HEIGHT: 73 IN | BODY MASS INDEX: 41.75 KG/M2

## 2022-12-14 DIAGNOSIS — R05.1 ACUTE COUGH: Primary | ICD-10-CM

## 2022-12-14 DIAGNOSIS — E66.01 MORBID OBESITY WITH BMI OF 50.0-59.9, ADULT (HCC): Primary | ICD-10-CM

## 2022-12-14 NOTE — PATIENT INSTRUCTIONS
Goals:  Nutrition Goal:  Begin working on a consistent meal schedule; First meal/snack: 10am    Second meal/snack: 1-2pm  Third meal/snack- 6p-7pm   Evening Snack: 9p-10pm.  Goal is to try to avoid eating after 10pm  Water Goal:  Avoid all regular pop, energy drinks and Hawaiian Punch to reduce sugar and calories.   Continue bottled water  Exercise Goal:  Discuss with physical therapy at [facility] what exercises you can do at home on days not at therapy  I will use food journal to record meal times, serving sizes and bring back to next dietitian visit

## 2022-12-14 NOTE — TELEPHONE ENCOUNTER
Pt notified of WS response and recommendations and is agreeable to the CXR. Will go to [facility]. Trisha

## 2022-12-14 NOTE — TELEPHONE ENCOUNTER
Pt says he is still coughing up phlegm, but it is not all coming up says he feels a little better but now days he feels like it is coming back, asking if he can do another round of  the z-pac or what you recommend, pt says he is still taking the Claritin and is doing breathing treatments 4-5 x per day. Pt says he just feels really congested now.     Allgy See Chart  SHARIF Tucker     cpb

## 2022-12-14 NOTE — TELEPHONE ENCOUNTER
Noted. I would recommend a chest x-ray today for evaluation. Order placed. Thanks -WS    Orders Placed This Encounter   Procedures    XR CHEST (2 VW)     Standing Status:   Future     Standing Expiration Date:   12/14/2023     Order Specific Question:   Reason for exam:     Answer:   ongoing issues with cough and congestion.

## 2022-12-15 ENCOUNTER — HOSPITAL ENCOUNTER (OUTPATIENT)
Age: 52
Discharge: HOME | End: 2022-12-15
Payer: MEDICAID

## 2022-12-15 ENCOUNTER — TELEPHONE (OUTPATIENT)
Dept: FAMILY MEDICINE CLINIC | Age: 52
End: 2022-12-15

## 2022-12-15 ENCOUNTER — HOSPITAL ENCOUNTER (OUTPATIENT)
Dept: GENERAL RADIOLOGY | Age: 52
Discharge: HOME | End: 2022-12-15
Payer: MEDICAID

## 2022-12-15 DIAGNOSIS — R05.1 ACUTE COUGH: ICD-10-CM

## 2022-12-15 PROCEDURE — 71046 X-RAY EXAM CHEST 2 VIEWS: CPT

## 2022-12-15 RX ORDER — CEFDINIR 300 MG/1
300 CAPSULE ORAL 2 TIMES DAILY
Qty: 20 CAPSULE | Refills: 0 | Status: SHIPPED | OUTPATIENT
Start: 2022-12-15 | End: 2022-12-25

## 2022-12-15 NOTE — TELEPHONE ENCOUNTER
----- Message from MIRNA Lugo CNP sent at 12/15/2022  4:39 PM EST -----  Please let pt know that chest x-ray does not show any pneumonia. OK for RX for omnicef 300 mg BID x 10 days, #20/0. Call office with any questions or concerns, or if symptoms are getting worse or changing. Er for any acute changes in symptoms.   -WS

## 2022-12-17 ENCOUNTER — HOSPITAL ENCOUNTER (EMERGENCY)
Age: 52
Discharge: HOME | End: 2022-12-17
Attending: EMERGENCY MEDICINE
Payer: MEDICAID

## 2022-12-17 ENCOUNTER — APPOINTMENT (OUTPATIENT)
Dept: CT IMAGING | Age: 52
End: 2022-12-17
Payer: MEDICAID

## 2022-12-17 ENCOUNTER — APPOINTMENT (OUTPATIENT)
Dept: GENERAL RADIOLOGY | Age: 52
End: 2022-12-17
Payer: MEDICAID

## 2022-12-17 VITALS
RESPIRATION RATE: 18 BRPM | OXYGEN SATURATION: 97 % | SYSTOLIC BLOOD PRESSURE: 168 MMHG | HEART RATE: 88 BPM | DIASTOLIC BLOOD PRESSURE: 88 MMHG | TEMPERATURE: 98.4 F

## 2022-12-17 DIAGNOSIS — J45.40 MODERATE PERSISTENT ASTHMA, UNSPECIFIED WHETHER COMPLICATED: Primary | ICD-10-CM

## 2022-12-17 LAB
ALBUMIN SERPL-MCNC: 4 G/DL (ref 3.5–5.1)
ALP BLD-CCNC: 152 U/L (ref 38–126)
ALT SERPL-CCNC: 27 U/L (ref 11–66)
ANION GAP SERPL CALCULATED.3IONS-SCNC: 7 MEQ/L (ref 8–16)
AST SERPL-CCNC: 23 U/L (ref 5–40)
BASOPHILS # BLD: 0.8 %
BASOPHILS ABSOLUTE: 0.1 THOU/MM3 (ref 0–0.1)
BILIRUB SERPL-MCNC: 0.4 MG/DL (ref 0.3–1.2)
BILIRUBIN DIRECT: < 0.2 MG/DL (ref 0–0.3)
BUN BLDV-MCNC: 11 MG/DL (ref 7–22)
CALCIUM SERPL-MCNC: 9.7 MG/DL (ref 8.5–10.5)
CHLORIDE BLD-SCNC: 103 MEQ/L (ref 98–111)
CO2: 28 MEQ/L (ref 23–33)
CREAT SERPL-MCNC: 0.9 MG/DL (ref 0.4–1.2)
EOSINOPHIL # BLD: 2.6 %
EOSINOPHILS ABSOLUTE: 0.2 THOU/MM3 (ref 0–0.4)
ERYTHROCYTE [DISTWIDTH] IN BLOOD BY AUTOMATED COUNT: 12.7 % (ref 11.5–14.5)
ERYTHROCYTE [DISTWIDTH] IN BLOOD BY AUTOMATED COUNT: 44.5 FL (ref 35–45)
GFR SERPL CREATININE-BSD FRML MDRD: > 60 ML/MIN/1.73M2
GLUCOSE BLD-MCNC: 91 MG/DL (ref 70–108)
HCT VFR BLD CALC: 40.7 % (ref 42–52)
HEMOGLOBIN: 13.2 GM/DL (ref 14–18)
IMMATURE GRANS (ABS): 0.02 THOU/MM3 (ref 0–0.07)
IMMATURE GRANULOCYTES: 0.3 %
INFLUENZA A: NOT DETECTED
INFLUENZA B: NOT DETECTED
LIPASE: 15 U/L (ref 5.6–51.3)
LYMPHOCYTES # BLD: 20.8 %
LYMPHOCYTES ABSOLUTE: 1.3 THOU/MM3 (ref 1–4.8)
MAGNESIUM: 1.6 MG/DL (ref 1.6–2.4)
MCH RBC QN AUTO: 31.1 PG (ref 26–33)
MCHC RBC AUTO-ENTMCNC: 32.4 GM/DL (ref 32.2–35.5)
MCV RBC AUTO: 96 FL (ref 80–94)
MONOCYTES # BLD: 10.2 %
MONOCYTES ABSOLUTE: 0.6 THOU/MM3 (ref 0.4–1.3)
NUCLEATED RED BLOOD CELLS: 0 /100 WBC
OSMOLALITY CALCULATION: 274.7 MOSMOL/KG (ref 275–300)
PLATELET # BLD: 226 THOU/MM3 (ref 130–400)
PMV BLD AUTO: 10 FL (ref 9.4–12.4)
POTASSIUM SERPL-SCNC: 3.7 MEQ/L (ref 3.5–5.2)
PRO-BNP: 14.8 PG/ML (ref 0–900)
RBC # BLD: 4.24 MILL/MM3 (ref 4.7–6.1)
SARS-COV-2 RNA, RT PCR: NOT DETECTED
SEG NEUTROPHILS: 65.3 %
SEGMENTED NEUTROPHILS ABSOLUTE COUNT: 4.1 THOU/MM3 (ref 1.8–7.7)
SODIUM BLD-SCNC: 138 MEQ/L (ref 135–145)
TOTAL PROTEIN: 6.7 G/DL (ref 6.1–8)
TROPONIN T: < 0.01 NG/ML
WBC # BLD: 6.3 THOU/MM3 (ref 4.8–10.8)

## 2022-12-17 PROCEDURE — 71275 CT ANGIOGRAPHY CHEST: CPT

## 2022-12-17 PROCEDURE — 36415 COLL VENOUS BLD VENIPUNCTURE: CPT

## 2022-12-17 PROCEDURE — 87636 SARSCOV2 & INF A&B AMP PRB: CPT

## 2022-12-17 PROCEDURE — 83690 ASSAY OF LIPASE: CPT

## 2022-12-17 PROCEDURE — 94640 AIRWAY INHALATION TREATMENT: CPT

## 2022-12-17 PROCEDURE — 83735 ASSAY OF MAGNESIUM: CPT

## 2022-12-17 PROCEDURE — 96374 THER/PROPH/DIAG INJ IV PUSH: CPT

## 2022-12-17 PROCEDURE — 80053 COMPREHEN METABOLIC PANEL: CPT

## 2022-12-17 PROCEDURE — 94761 N-INVAS EAR/PLS OXIMETRY MLT: CPT

## 2022-12-17 PROCEDURE — 84484 ASSAY OF TROPONIN QUANT: CPT

## 2022-12-17 PROCEDURE — 71045 X-RAY EXAM CHEST 1 VIEW: CPT

## 2022-12-17 PROCEDURE — 93005 ELECTROCARDIOGRAM TRACING: CPT | Performed by: EMERGENCY MEDICINE

## 2022-12-17 PROCEDURE — 83880 ASSAY OF NATRIURETIC PEPTIDE: CPT

## 2022-12-17 PROCEDURE — 6370000000 HC RX 637 (ALT 250 FOR IP): Performed by: EMERGENCY MEDICINE

## 2022-12-17 PROCEDURE — 6360000002 HC RX W HCPCS: Performed by: EMERGENCY MEDICINE

## 2022-12-17 PROCEDURE — 99285 EMERGENCY DEPT VISIT HI MDM: CPT

## 2022-12-17 PROCEDURE — 85025 COMPLETE CBC W/AUTO DIFF WBC: CPT

## 2022-12-17 PROCEDURE — 6360000004 HC RX CONTRAST MEDICATION: Performed by: EMERGENCY MEDICINE

## 2022-12-17 PROCEDURE — 82248 BILIRUBIN DIRECT: CPT

## 2022-12-17 RX ORDER — METHYLPREDNISOLONE SODIUM SUCCINATE 125 MG/2ML
125 INJECTION, POWDER, LYOPHILIZED, FOR SOLUTION INTRAMUSCULAR; INTRAVENOUS ONCE
Status: COMPLETED | OUTPATIENT
Start: 2022-12-17 | End: 2022-12-17

## 2022-12-17 RX ORDER — IPRATROPIUM BROMIDE AND ALBUTEROL SULFATE 2.5; .5 MG/3ML; MG/3ML
1 SOLUTION RESPIRATORY (INHALATION) ONCE
Status: COMPLETED | OUTPATIENT
Start: 2022-12-17 | End: 2022-12-17

## 2022-12-17 RX ORDER — PREDNISONE 20 MG/1
20 TABLET ORAL DAILY
Qty: 10 TABLET | Refills: 0 | Status: SHIPPED | OUTPATIENT
Start: 2022-12-17 | End: 2022-12-27

## 2022-12-17 RX ADMIN — IOPAMIDOL 80 ML: 755 INJECTION, SOLUTION INTRAVENOUS at 20:24

## 2022-12-17 RX ADMIN — IPRATROPIUM BROMIDE AND ALBUTEROL SULFATE 1 AMPULE: .5; 3 SOLUTION RESPIRATORY (INHALATION) at 21:22

## 2022-12-17 RX ADMIN — METHYLPREDNISOLONE SODIUM SUCCINATE 125 MG: 125 INJECTION, POWDER, FOR SOLUTION INTRAMUSCULAR; INTRAVENOUS at 21:19

## 2022-12-17 RX ADMIN — IPRATROPIUM BROMIDE AND ALBUTEROL SULFATE 1 AMPULE: .5; 3 SOLUTION RESPIRATORY (INHALATION) at 21:27

## 2022-12-17 ASSESSMENT — ENCOUNTER SYMPTOMS
SHORTNESS OF BREATH: 1
WHEEZING: 1
ABDOMINAL PAIN: 0
COUGH: 1
SINUS PRESSURE: 0
EYE PAIN: 0
EYE REDNESS: 0
SORE THROAT: 0
RHINORRHEA: 0
CHEST TIGHTNESS: 0
PHOTOPHOBIA: 0
BACK PAIN: 0
ABDOMINAL DISTENTION: 0
CHOKING: 0
NAUSEA: 0
EYE DISCHARGE: 0
CONSTIPATION: 0
TROUBLE SWALLOWING: 0
VOMITING: 0
VOICE CHANGE: 0
EYE ITCHING: 0
BLOOD IN STOOL: 0
DIARRHEA: 0

## 2022-12-17 ASSESSMENT — PAIN - FUNCTIONAL ASSESSMENT: PAIN_FUNCTIONAL_ASSESSMENT: NONE - DENIES PAIN

## 2022-12-18 LAB
EKG ATRIAL RATE: 89 BPM
EKG P AXIS: 76 DEGREES
EKG P-R INTERVAL: 162 MS
EKG Q-T INTERVAL: 372 MS
EKG QRS DURATION: 88 MS
EKG QTC CALCULATION (BAZETT): 452 MS
EKG R AXIS: 3 DEGREES
EKG T AXIS: 33 DEGREES
EKG VENTRICULAR RATE: 89 BPM

## 2022-12-18 PROCEDURE — 93010 ELECTROCARDIOGRAM REPORT: CPT | Performed by: INTERNAL MEDICINE

## 2022-12-18 NOTE — ED PROVIDER NOTES
[facility]  eMERGENCY dEPARTMENT eNCOUnter          CHIEF COMPLAINT       Chief Complaint   Patient presents with    Chest Pain    Shortness of Breath       Nurses Notes reviewed and I agree except as noted in the HPI. HISTORY OF PRESENT ILLNESS    Zaki Pedro is a 50 y.o. male who presents shortness of breath chest pain and cough with diffuse is productive. Patient states that this is been ongoing for the last 3 to 4 days. Patient did use his asthma medication at home which seemed to help a little bit but then it worsened again. Patient states he has pain with taking a deep breath in. It feels like a tightness. Patient is not having any radiation referral of this. Patient has had no syncopal or presyncopal episodes no nausea or vomiting or diaphoresis. Patient states that he is worried he may have COVID or pneumonia. He has had this in the past and this is what he thinks it is. Of note the patient recently underwent a orthopedic surgery and had his quadricep repaired. He is not currently on any anticoagulation. Patient is otherwise resting comfortably on the cot no apparent distress no other physical complaints at this time. REVIEW OF SYSTEMS     Review of Systems   Constitutional:  Negative for activity change, appetite change, diaphoresis, fatigue and unexpected weight change. HENT:  Negative for congestion, ear discharge, ear pain, hearing loss, nosebleeds, postnasal drip, rhinorrhea, sinus pressure, sore throat, trouble swallowing and voice change. Eyes:  Negative for photophobia, pain, discharge, redness and itching. Respiratory:  Positive for cough, shortness of breath and wheezing. Negative for choking and chest tightness. Cardiovascular:  Negative for chest pain, palpitations and leg swelling. Gastrointestinal:  Negative for abdominal distention, abdominal pain, blood in stool, constipation, diarrhea, nausea and vomiting.    Endocrine: Negative for polydipsia, polyphagia and polyuria. Genitourinary:  Negative for decreased urine volume, difficulty urinating, dysuria, enuresis, frequency, hematuria, penile discharge, penile pain, penile swelling, scrotal swelling, testicular pain and urgency. Musculoskeletal:  Negative for arthralgias, back pain, gait problem, myalgias, neck pain and neck stiffness. Skin:  Negative for pallor and rash. Allergic/Immunologic: Negative for immunocompromised state. Neurological:  Negative for dizziness, tremors, seizures, syncope, facial asymmetry, weakness, light-headedness, numbness and headaches. Hematological:  Negative for adenopathy. Does not bruise/bleed easily. Psychiatric/Behavioral:  Negative for agitation, hallucinations and suicidal ideas. The patient is not nervous/anxious. PAST MEDICAL HISTORY    has a past medical history of Aneurysm ([address] Arthritis, Asthma, Cardiomegaly, COVID-19, Depression, Fibromyalgia, GERD (gastroesophageal reflux disease), Hypertension, Liver disease, and CLARIBEL on CPAP. SURGICAL HISTORY      has a past surgical history that includes back surgery; knee surgery; Pain management procedure (Bilateral, 02/11/2021); Pain management procedure (Bilateral, 04/08/2021); and Quadriceps repair (Right, 07/07/2022). CURRENT MEDICATIONS       Discharge Medication List as of 12/17/2022  9:38 PM        CONTINUE these medications which have NOT CHANGED    Details   cefdinir (OMNICEF) 300 MG capsule Take 1 capsule by mouth 2 times daily for 10 days, Disp-20 capsule, R-0Normal      loratadine (CLARITIN) 10 MG tablet take 1 tablet by mouth once daily, Disp-90 tablet, R-3Normal      gabapentin (NEURONTIN) 300 MG capsule Take 1 capsule by mouth daily for 30 days. , Disp-30 capsule, R-0Normal      HYDROcodone-acetaminophen (NORCO) 5-325 MG per tablet Take 1 tablet by mouth every 8 hours as needed for Pain for up to 30 days. , Disp-90 tablet, R-0Normal      orphenadrine (NORFLEX) 100 MG extended release tablet Take 1 tablet by mouth 2 times daily as needed for Muscle spasms, Disp-60 tablet, R-0Normal      triamcinolone (KENALOG) 0.1 % cream APPLY TO AFFECTED AREA TWICE A DAY, Disp-1 each, R-2, Normal      capsicum (TRIXAICIN HP) 0.075 % topical cream Apply topically to back, abdomen and arms TID PRN, Disp-120 g, R-2, Normal      Magnesium Oxide 420 MG TABS One tablet twice daily with meals, Disp-60 tablet, R-11Normal      potassium chloride (KLOR-CON M) 10 MEQ extended release tablet take 1 tablet by mouth dailyHistorical Med      betamethasone valerate (VALISONE) 0.1 % cream apply to affected area twice a day, Disp-45 g, R-3, Normal      pravastatin (PRAVACHOL) 10 MG tablet Take 1 tablet by mouth daily, Disp-30 tablet, R-3Normal      coenzyme Q10 (EQL COQ10) 100 MG CAPS capsule Take 1 capsule by mouth daily, Disp-30 capsule, R-5Normal      ferrous sulfate (FEROSUL) 325 (65 Fe) MG tablet Take 1 tablet by mouth daily (with breakfast), Disp-30 tablet, R-5Normal      Cholecalciferol (VITAMIN D3) 1.25 MG (42105 UT) CAPS Take 1 capsule by mouth once a week, Disp-4 capsule, R-5Normal      pantoprazole (PROTONIX) 40 MG tablet Take 1 tablet by mouth every morning (before breakfast), Disp-90 tablet, R-3Normal      dicyclomine (BENTYL) 10 MG capsule Take 1 capsule by mouth 2 times daily, Disp-180 capsule, R-3Normal      ipratropium-albuterol (DUONEB) 0.5-2.5 (3) MG/3ML SOLN nebulizer solution Inhale into the lungsHistorical Med      butalbital-acetaminophen-caffeine (FIORICET, ESGIC) -40 MG per tablet take 1 tablet by mouth every 4 hours if needed for headache, Disp-90 tablet, R-0Normal      fluticasone (FLONASE) 50 MCG/ACT nasal spray instill 2 sprays into each nostril once daily, Disp-48 g, R-5Normal      montelukast (SINGULAIR) 10 MG tablet take 1 tablet by mouth every evening, Disp-90 tablet, R-3Normal      hydroCHLOROthiazide (HYDRODIURIL) 25 MG tablet take 1 tablet by mouth every morning, Disp-90 tablet, R-1Normal      lidocaine (LIDODERM) 5 % apply 3 patches TO THE AFFECTED AREA DAILY. LEAVE ON FOR 12 HOURS AND THEN OFF FOR 12 HOURS. Historical Med      azelastine (OPTIVAR) 0.05 % ophthalmic solution instill 1 drop into both eyes twice a day, Disp-1 each, R-11Normal      albuterol (PROVENTIL) (2.5 MG/3ML) 0.083% nebulizer solution inhale contents of 1 vial ( 3 milliliters ) in nebulizer by mouth and INTO THE LUNGS every 4 hours if needed for wheezing, Disp-375 mL, R-3Normal      simethicone (MYLICON) 80 MG chewable tablet Take 1 tablet by mouth 4 times daily as needed for Flatulence, Disp-180 tablet, R-3Normal      aspirin EC 81 MG EC tablet Take 1 tablet by mouth daily, Disp-90 tablet, R-1Normal      amitriptyline (ELAVIL) 50 MG tablet take 1 tablet by mouth at bedtime, Disp-90 tablet, R-1Normal      BANOPHEN 25 MG capsule take 1 capsule by mouth every 6 hours if needed for itching, Disp-60 capsule, R-5Normal      albuterol sulfate  (90 Base) MCG/ACT inhaler Inhale 2 puffs into the lungs 4 times daily as needed for Wheezing, Disp-3 each, R-3Normal      Blood Pressure Monitoring (B-D ASSURE BPM/AUTO ARM CUFF) MISC DAILY Starting Tue 3/22/2022, Disp-1 each, R-0, Print      budesonide-formoterol (SYMBICORT) 160-4.5 MCG/ACT AERO 2 puffs inhaled twice daily. Rinse mouth well after use., Disp-3 each, R-3Normal      Respiratory Therapy Supplies (NEBULIZER/TUBING/MOUTHPIECE) KIT Disp-1 kit, R-0, PrintDispense tubing, mask, and mouthpiece for nebulizer machine. Dx: Asthma      EPINEPHrine (EPIPEN 2-RAGHAV) 0.3 MG/0.3ML SOAJ injection Inject one pen as directed STAT for allergic reaction, may disp generic NDC 53377-651-47, Disp-1 each, R-0Normal      NARCAN 4 MG/0.1ML LIQD nasal spray DAWHistorical Med             ALLERGIES     is allergic to dilantin [phenytoin], lyrica [pregabalin], dupixent [dupilumab], oxycodone, and valium [diazepam]. FAMILY HISTORY     He indicated that his mother is .  He indicated that his father is . family history includes Arthritis in his father and mother; Asthma in his father and mother; Emphysema in his father and mother; High Blood Pressure in his father and mother; Other in his mother. SOCIAL HISTORY      reports that he has never smoked. He has never used smokeless tobacco. He reports current alcohol use. He reports that he does not use drugs. PHYSICAL EXAM     INITIAL VITALS:  oral temperature is 98.4 °F (36.9 °C). His blood pressure is 168/88 (abnormal) and his pulse is 88. His respiration is 18 and oxygen saturation is 97%. Physical Exam  Vitals and nursing note reviewed. Constitutional:       General: He is not in acute distress. Appearance: He is well-developed. He is not diaphoretic. HENT:      Head: Normocephalic and atraumatic. Right Ear: External ear normal.      Left Ear: External ear normal.      Nose: Nose normal.   Eyes:      General: Lids are normal. No scleral icterus. Right eye: No discharge. Left eye: No discharge. Conjunctiva/sclera: Conjunctivae normal.      Right eye: No exudate. Left eye: No exudate. Pupils: Pupils are equal, round, and reactive to light. Neck:      Thyroid: No thyromegaly. Vascular: No JVD. Trachea: No tracheal deviation. Cardiovascular:      Rate and Rhythm: Normal rate and regular rhythm. Pulses: Normal pulses. Carotid pulses are 2+ on the right side and 2+ on the left side. Radial pulses are 2+ on the right side and 2+ on the left side. Femoral pulses are 2+ on the right side and 2+ on the left side. Popliteal pulses are 2+ on the right side and 2+ on the left side. Dorsalis pedis pulses are 2+ on the right side and 2+ on the left side. Posterior tibial pulses are 2+ on the right side and 2+ on the left side. Heart sounds: Normal heart sounds, S1 normal and S2 normal. No murmur heard. No friction rub.  No gallop. Pulmonary:      Effort: Pulmonary effort is normal. No respiratory distress. Breath sounds: No stridor. Examination of the right-upper field reveals wheezing. Examination of the left-upper field reveals wheezing. Examination of the right-middle field reveals wheezing. Examination of the left-middle field reveals wheezing. Examination of the right-lower field reveals decreased breath sounds. Examination of the left-lower field reveals decreased breath sounds. Decreased breath sounds and wheezing present. No rhonchi or rales. Chest:      Chest wall: No tenderness. Abdominal:      General: Bowel sounds are normal. There is no distension. Palpations: Abdomen is soft. There is no mass. Tenderness: There is no abdominal tenderness. There is no guarding or rebound. Musculoskeletal:         General: No tenderness. Normal range of motion. Cervical back: Normal range of motion and neck supple. Normal range of motion. Right lower leg: No edema. Left lower leg: No edema. Lymphadenopathy:      Cervical: No cervical adenopathy. Skin:     General: Skin is warm and dry. Findings: No bruising, ecchymosis, lesion or rash. Neurological:      Mental Status: He is alert and oriented to person, place, and time. Cranial Nerves: No cranial nerve deficit. Sensory: No sensory deficit. Coordination: Coordination normal.      Deep Tendon Reflexes: Reflexes are normal and symmetric. Psychiatric:         Speech: Speech normal.         Behavior: Behavior normal.         Thought Content:  Thought content normal.         Judgment: Judgment normal.         DIFFERENTIAL DIAGNOSIS:   Asthma COPD pneumonia bronchitis COVID influenza less likely PE    DIAGNOSTIC RESULTS     EKG: All EKG's are interpreted by the Emergency Department Physician who either signs or Co-signs this chart in the absence of a cardiologist.  Sinus rhythm normal axis frequent PVCs ventricular rate of 89 MI interval 162 QRS duration 88 QT interval 372 QTC of 452. RADIOLOGY: non-plain film images(s) such as CT, Ultrasound and MRI are read by the radiologist.  [address]A [address]   Final Result   1. No pulmonary emboli. No acute parenchymal lung disease. 2. Borderline cardiomegaly. This document has been electronically signed by: Smamy Dyson MD on    12/17/2022 09:07 PM      All CTs at this facility use dose modulation techniques and iterative    reconstructions, and/or weight-based dosing   when appropriate to reduce radiation to a low as reasonably achievable. 3D Post-processing was performed on this study. XR CHEST PORTABLE   Final Result   1. Normal heart size. No effusion. 2. Questionable mild retrocardiac atelectasis/pneumonia. **This report has been created using voice recognition software. It may contain minor errors which are inherent in voice recognition technology. **      Final report electronically signed by Dr. Jesus Cano on 12/17/2022 7:58 PM        t.     LABS:   Labs Reviewed   CBC WITH AUTO DIFFERENTIAL - Abnormal; Notable for the following components:       Result Value    RBC 4.24 (*)     Hemoglobin 13.2 (*)     Hematocrit 40.7 (*)     MCV 96.0 (*)     All other components within normal limits   HEPATIC FUNCTION PANEL - Abnormal; Notable for the following components:    Alkaline Phosphatase 152 (*)     All other components within normal limits   ANION GAP - Abnormal; Notable for the following components:    Anion Gap 7.0 (*)     All other components within normal limits   OSMOLALITY - Abnormal; Notable for the following components:    Osmolality Calc 274.7 (*)     All other components within normal limits   COVID-19 & INFLUENZA COMBO   BRAIN NATRIURETIC PEPTIDE   BASIC METABOLIC PANEL   LIPASE   TROPONIN   MAGNESIUM   GLOMERULAR FILTRATION RATE, ESTIMATED       EMERGENCY DEPARTMENT COURSE:   Vitals:    Vitals:    12/17/22 1935 12/17/22 2008 12/17/22 2018 12/17/22 2122   BP: (!) 182/88 (!) 153/92 (!) 168/88    Pulse: 85 77 80 88   Resp: 19 20 16 18   Temp: 98.4 °F (36.9 °C)      TempSrc: Oral      SpO2: 97% 94% 94% 97%     Patient was evaluated bedside appropriate labs and imaging were ordered. Here today because of the patient's recent orthopedic surgery and his underlying asthma we decided to do CTA of the chest.  I reviewed all labs. And imaging all within normal limits. CT of the chest was negative for any blood clots or pneumonia. At this point I feel he has an asthma exacerbation. Patient states he does have all his medications at home. He will be given steroids to help with this. He is instructed to follow-up with a primary care physician and do so within the next 1 to 2 days and return to the nearest emergency room immediately for any new or worsening complaints. Patient states he felt better agreed with this plan. Patient is subsequently discharged home in stable condition. Patient has what appears to be an asthma exacerbation. Patient is instructed to take his medications as prescribed. Has been given a short course of steroids he is instructed to take those as prescribed. He is instructed to follow-up with a primary care physician to do so within the next 1 to 2 days. He is instructed to return to the nearest emergency room immediately for any new or worsening complaints. CRITICAL CARE:   None    CONSULTS:  None    PROCEDURES:  None    FINAL IMPRESSION      1. Moderate persistent asthma, unspecified whether complicated          DISPOSITION/PLAN   Discharge    PATIENT REFERRED TO:  Jack Aj, MIRNA - CNP  [facility] [address] [place] [id number]  [phone]    Call in 1 day      DISCHARGE MEDICATIONS:  Discharge Medication List as of 12/17/2022  9:38 PM        START taking these medications    Details   predniSONE (DELTASONE) 20 MG tablet Take 1 tablet by mouth daily for 10 days, Disp-10 tablet, R-0Print             (Please note that portions of this note were completed with a voice recognition program.  Efforts were made to edit the dictations but occasionally words are mis-transcribed.)    Yared Mccormack, [address],   12/18/22 [id number]

## 2022-12-18 NOTE — ED NOTES
Pt to ED c/o chest pain and shortness of breath since yesterday. Pt states hx of enlarged heart, COPD, and HTN. Pt denies pain at this time, intermittent pain 7/10. VSS. EKG complete.       Ani Renner RN  12/17/22 [id number]

## 2022-12-18 NOTE — DISCHARGE INSTRUCTIONS
Patient has what appears to be an asthma exacerbation. Patient is instructed to take his medications as prescribed. Has been given a short course of steroids he is instructed to take those as prescribed. He is instructed to follow-up with a primary care physician to do so within the next 1 to 2 days. He is instructed to return to the nearest emergency room immediately for any new or worsening complaints.

## 2022-12-19 ENCOUNTER — APPOINTMENT (OUTPATIENT)
Dept: CT IMAGING | Age: 52
End: 2022-12-19
Payer: MEDICAID

## 2022-12-19 ENCOUNTER — TELEPHONE (OUTPATIENT)
Dept: FAMILY MEDICINE CLINIC | Age: 52
End: 2022-12-19

## 2022-12-19 ENCOUNTER — APPOINTMENT (OUTPATIENT)
Dept: GENERAL RADIOLOGY | Age: 52
End: 2022-12-19
Payer: MEDICAID

## 2022-12-19 ENCOUNTER — HOSPITAL ENCOUNTER (EMERGENCY)
Age: 52
Discharge: HOME | End: 2022-12-19
Attending: STUDENT IN AN ORGANIZED HEALTH CARE EDUCATION/TRAINING PROGRAM
Payer: MEDICAID

## 2022-12-19 VITALS
HEART RATE: 71 BPM | TEMPERATURE: 98.2 F | WEIGHT: 315 LBS | BODY MASS INDEX: 41.75 KG/M2 | RESPIRATION RATE: 22 BRPM | DIASTOLIC BLOOD PRESSURE: 89 MMHG | SYSTOLIC BLOOD PRESSURE: 143 MMHG | HEIGHT: 73 IN | OXYGEN SATURATION: 96 %

## 2022-12-19 DIAGNOSIS — R04.2 COUGH WITH HEMOPTYSIS: ICD-10-CM

## 2022-12-19 DIAGNOSIS — R51.9 ACUTE NONINTRACTABLE HEADACHE, UNSPECIFIED HEADACHE TYPE: Primary | ICD-10-CM

## 2022-12-19 LAB
ANION GAP SERPL CALCULATED.3IONS-SCNC: 10 MEQ/L (ref 8–16)
BACTERIA: NORMAL /HPF
BASOPHILS # BLD: 0.2 %
BASOPHILS ABSOLUTE: 0 THOU/MM3 (ref 0–0.1)
BILIRUBIN URINE: NEGATIVE
BLOOD, URINE: NEGATIVE
BUN BLDV-MCNC: 14 MG/DL (ref 7–22)
CALCIUM SERPL-MCNC: 9.6 MG/DL (ref 8.5–10.5)
CASTS 2: NORMAL /LPF
CASTS UA: NORMAL /LPF
CHARACTER, URINE: NORMAL
CHLORIDE BLD-SCNC: 99 MEQ/L (ref 98–111)
CO2: 26 MEQ/L (ref 23–33)
COLOR: YELLOW
CREAT SERPL-MCNC: 0.9 MG/DL (ref 0.4–1.2)
CRYSTALS, UA: NORMAL
EOSINOPHIL # BLD: 0 %
EOSINOPHILS ABSOLUTE: 0 THOU/MM3 (ref 0–0.4)
EPITHELIAL CELLS, UA: NORMAL /HPF
ERYTHROCYTE [DISTWIDTH] IN BLOOD BY AUTOMATED COUNT: 13 % (ref 11.5–14.5)
ERYTHROCYTE [DISTWIDTH] IN BLOOD BY AUTOMATED COUNT: 46.9 FL (ref 35–45)
GFR SERPL CREATININE-BSD FRML MDRD: > 60 ML/MIN/1.73M2
GLUCOSE BLD-MCNC: 120 MG/DL (ref 70–108)
GLUCOSE URINE: NEGATIVE MG/DL
HCT VFR BLD CALC: 39.5 % (ref 42–52)
HEMOGLOBIN: 12.8 GM/DL (ref 14–18)
IMMATURE GRANS (ABS): 0.04 THOU/MM3 (ref 0–0.07)
IMMATURE GRANULOCYTES: 0.3 %
KETONES, URINE: NEGATIVE
LEUKOCYTE ESTERASE, URINE: NEGATIVE
LYMPHOCYTES # BLD: 8.2 %
LYMPHOCYTES ABSOLUTE: 1 THOU/MM3 (ref 1–4.8)
MCH RBC QN AUTO: 31.9 PG (ref 26–33)
MCHC RBC AUTO-ENTMCNC: 32.4 GM/DL (ref 32.2–35.5)
MCV RBC AUTO: 98.5 FL (ref 80–94)
MISCELLANEOUS 2: NORMAL
MONOCYTES # BLD: 4.2 %
MONOCYTES ABSOLUTE: 0.5 THOU/MM3 (ref 0.4–1.3)
NITRITE, URINE: NEGATIVE
NUCLEATED RED BLOOD CELLS: 0 /100 WBC
OSMOLALITY CALCULATION: 271.8 MOSMOL/KG (ref 275–300)
PH UA: 7.5 (ref 5–9)
PLATELET # BLD: 236 THOU/MM3 (ref 130–400)
PMV BLD AUTO: 10.3 FL (ref 9.4–12.4)
POTASSIUM REFLEX MAGNESIUM: 4.4 MEQ/L (ref 3.5–5.2)
PRO-BNP: 21.9 PG/ML (ref 0–900)
PROTEIN UA: NEGATIVE
RBC # BLD: 4.01 MILL/MM3 (ref 4.7–6.1)
RBC URINE: NORMAL /HPF
RENAL EPITHELIAL, UA: NORMAL
SEG NEUTROPHILS: 87.1 %
SEGMENTED NEUTROPHILS ABSOLUTE COUNT: 11.1 THOU/MM3 (ref 1.8–7.7)
SODIUM BLD-SCNC: 135 MEQ/L (ref 135–145)
SPECIFIC GRAVITY, URINE: 1.02 (ref 1–1.03)
TROPONIN T: < 0.01 NG/ML
UROBILINOGEN, URINE: 1 EU/DL (ref 0–1)
WBC # BLD: 12.7 THOU/MM3 (ref 4.8–10.8)
WBC UA: NORMAL /HPF
YEAST: NORMAL

## 2022-12-19 PROCEDURE — 94761 N-INVAS EAR/PLS OXIMETRY MLT: CPT

## 2022-12-19 PROCEDURE — 99285 EMERGENCY DEPT VISIT HI MDM: CPT

## 2022-12-19 PROCEDURE — 36415 COLL VENOUS BLD VENIPUNCTURE: CPT

## 2022-12-19 PROCEDURE — 6370000000 HC RX 637 (ALT 250 FOR IP): Performed by: STUDENT IN AN ORGANIZED HEALTH CARE EDUCATION/TRAINING PROGRAM

## 2022-12-19 PROCEDURE — 94640 AIRWAY INHALATION TREATMENT: CPT

## 2022-12-19 PROCEDURE — 71045 X-RAY EXAM CHEST 1 VIEW: CPT

## 2022-12-19 PROCEDURE — 6360000002 HC RX W HCPCS: Performed by: STUDENT IN AN ORGANIZED HEALTH CARE EDUCATION/TRAINING PROGRAM

## 2022-12-19 PROCEDURE — 81001 URINALYSIS AUTO W/SCOPE: CPT

## 2022-12-19 PROCEDURE — 96374 THER/PROPH/DIAG INJ IV PUSH: CPT

## 2022-12-19 PROCEDURE — 84484 ASSAY OF TROPONIN QUANT: CPT

## 2022-12-19 PROCEDURE — 80048 BASIC METABOLIC PNL TOTAL CA: CPT

## 2022-12-19 PROCEDURE — 70450 CT HEAD/BRAIN W/O DYE: CPT

## 2022-12-19 PROCEDURE — 85025 COMPLETE CBC W/AUTO DIFF WBC: CPT

## 2022-12-19 PROCEDURE — 83880 ASSAY OF NATRIURETIC PEPTIDE: CPT

## 2022-12-19 PROCEDURE — 96375 TX/PRO/DX INJ NEW DRUG ADDON: CPT

## 2022-12-19 PROCEDURE — 93005 ELECTROCARDIOGRAM TRACING: CPT | Performed by: STUDENT IN AN ORGANIZED HEALTH CARE EDUCATION/TRAINING PROGRAM

## 2022-12-19 RX ORDER — IPRATROPIUM BROMIDE AND ALBUTEROL SULFATE 2.5; .5 MG/3ML; MG/3ML
2 SOLUTION RESPIRATORY (INHALATION) ONCE
Status: COMPLETED | OUTPATIENT
Start: 2022-12-19 | End: 2022-12-19

## 2022-12-19 RX ORDER — DEXAMETHASONE SODIUM PHOSPHATE 4 MG/ML
10 INJECTION, SOLUTION INTRA-ARTICULAR; INTRALESIONAL; INTRAMUSCULAR; INTRAVENOUS; SOFT TISSUE ONCE
Status: COMPLETED | OUTPATIENT
Start: 2022-12-19 | End: 2022-12-19

## 2022-12-19 RX ORDER — KETOROLAC TROMETHAMINE 30 MG/ML
15 INJECTION, SOLUTION INTRAMUSCULAR; INTRAVENOUS ONCE
Status: COMPLETED | OUTPATIENT
Start: 2022-12-19 | End: 2022-12-19

## 2022-12-19 RX ORDER — DIPHENHYDRAMINE HCL 25 MG
25 TABLET ORAL EVERY 6 HOURS PRN
Status: DISCONTINUED | OUTPATIENT
Start: 2022-12-19 | End: 2022-12-19 | Stop reason: HOSPADM

## 2022-12-19 RX ORDER — PROCHLORPERAZINE EDISYLATE 5 MG/ML
10 INJECTION INTRAMUSCULAR; INTRAVENOUS ONCE
Status: COMPLETED | OUTPATIENT
Start: 2022-12-19 | End: 2022-12-19

## 2022-12-19 RX ADMIN — DEXAMETHASONE SODIUM PHOSPHATE 10 MG: 4 INJECTION, SOLUTION INTRAMUSCULAR; INTRAVENOUS at 18:39

## 2022-12-19 RX ADMIN — DIPHENHYDRAMINE HYDROCHLORIDE 25 MG: 25 TABLET ORAL at 18:40

## 2022-12-19 RX ADMIN — KETOROLAC TROMETHAMINE 15 MG: 30 INJECTION, SOLUTION INTRAMUSCULAR; INTRAVENOUS at 18:38

## 2022-12-19 RX ADMIN — PROCHLORPERAZINE EDISYLATE 10 MG: 5 INJECTION INTRAMUSCULAR; INTRAVENOUS at 18:41

## 2022-12-19 RX ADMIN — IPRATROPIUM BROMIDE AND ALBUTEROL SULFATE 2 AMPULE: .5; 3 SOLUTION RESPIRATORY (INHALATION) at 18:03

## 2022-12-19 ASSESSMENT — ENCOUNTER SYMPTOMS
WHEEZING: 0
COLOR CHANGE: 0
ABDOMINAL PAIN: 0
SHORTNESS OF BREATH: 1
RHINORRHEA: 0
COUGH: 0
DIARRHEA: 0
VOMITING: 0
ABDOMINAL DISTENTION: 0
NAUSEA: 0
CONSTIPATION: 0
CHEST TIGHTNESS: 0
BACK PAIN: 0
SORE THROAT: 0

## 2022-12-19 ASSESSMENT — PAIN - FUNCTIONAL ASSESSMENT
PAIN_FUNCTIONAL_ASSESSMENT: NONE - DENIES PAIN

## 2022-12-19 NOTE — TELEPHONE ENCOUNTER
Received a call from [address] [place] [address] (out of office for the day) and she stated she received a VM from the pt c/o coughing up blood and blowing blood from his nose. She would like the pt to go to the ED for evaluation. Spoke to the pt and notified him of this and he is agreeable. He also mentioned that his BP is 199/75.

## 2022-12-19 NOTE — ED TRIAGE NOTES
Presents to ER with complaints of high blood pressure that he noted this morning and PTA. Pt states after checking his BP PTA he noted a nose bleed. Pt states he has been taking new medicine called cefdinir 300mg and magnesium oxide and feels he is having a reaction.

## 2022-12-19 NOTE — ED PROVIDER NOTES
[place] ENCOUNTER          Pt Name: Alec Perez  MRN: [id number]  [place] 1970  Date of Evaluation: 12/19/2022  Treating Resident Physician: Nathalie Alejandra MD  Supervising Physician: Genet Olmos MD    CHIEF COMPLAINT       Chief Complaint   Patient presents with    Hypertension    Hemoptysis     History obtained from chart review and the patient. HISTORY OF PRESENT ILLNESS    HPI    Alec Perez is a 50 y.o. male with a past medical history significant for degenerative disc disease, lumbar spondylosis, cervical radiculopathy, cervical spinal fusion, pituitary adenoma, chronic back pain, obstructive sleep apnea on CPAP obesity, hypertension, hyperlipidemia, abnormal stress test, , presents to the emergency department for evaluation of headache, chest pain, high blood pressure at home. Patient was seen 2 days ago here in the emergency department had extensive work-up at that time which included lab work and a CT of his chest which was all unremarkable. He was discharged home with suspected asthma exacerbation on antibiotics, steroids. His breathing has improved over the past couple of days however today he has a headache that feels different from his normal migraines, has taken Fioricet at home without any improvement. He also checked his blood pressure and it was elevated at home around 658 systolically. He is also complaining of some right sided chest tightness. He talked to his primary care office and was advised from the emergency room given his elevated blood pressure. The patient has no other acute complaints at this time. REVIEW OF SYSTEMS   Review of Systems   Constitutional:  Negative for activity change, appetite change, chills, diaphoresis, fatigue, fever and unexpected weight change. HENT:  Negative for congestion, rhinorrhea and sore throat. Respiratory:  Positive for shortness of breath.  Negative mouth 2 times daily for 10 days, Disp: 20 capsule, Rfl: 0    loratadine (CLARITIN) 10 MG tablet, take 1 tablet by mouth once daily, Disp: 90 tablet, Rfl: 3    gabapentin (NEURONTIN) 300 MG capsule, Take 1 capsule by mouth daily for 30 days. , Disp: 30 capsule, Rfl: 0    HYDROcodone-acetaminophen (NORCO) 5-325 MG per tablet, Take 1 tablet by mouth every 8 hours as needed for Pain for up to 30 days. , Disp: 90 tablet, Rfl: 0    orphenadrine (NORFLEX) 100 MG extended release tablet, Take 1 tablet by mouth 2 times daily as needed for Muscle spasms, Disp: 60 tablet, Rfl: 0    triamcinolone (KENALOG) 0.1 % cream, APPLY TO AFFECTED AREA TWICE A DAY, Disp: 1 each, Rfl: 2    capsicum (TRIXAICIN HP) 0.075 % topical cream, Apply topically to back, abdomen and arms TID PRN, Disp: 120 g, Rfl: 2    Magnesium Oxide 420 MG TABS, One tablet twice daily with meals, Disp: 60 tablet, Rfl: 11    potassium chloride (KLOR-CON M) 10 MEQ extended release tablet, take 1 tablet by mouth daily, Disp: , Rfl:     betamethasone valerate (VALISONE) 0.1 % cream, apply to affected area twice a day, Disp: 45 g, Rfl: 3    pravastatin (PRAVACHOL) 10 MG tablet, Take 1 tablet by mouth daily, Disp: 30 tablet, Rfl: 3    coenzyme Q10 (EQL COQ10) 100 MG CAPS capsule, Take 1 capsule by mouth daily, Disp: 30 capsule, Rfl: 5    ferrous sulfate (FEROSUL) 325 (65 Fe) MG tablet, Take 1 tablet by mouth daily (with breakfast), Disp: 30 tablet, Rfl: 5    Cholecalciferol (VITAMIN D3) 1.25 MG (17066 UT) CAPS, Take 1 capsule by mouth once a week, Disp: 4 capsule, Rfl: 5    pantoprazole (PROTONIX) 40 MG tablet, Take 1 tablet by mouth every morning (before breakfast), Disp: 90 tablet, Rfl: 3    dicyclomine (BENTYL) 10 MG capsule, Take 1 capsule by mouth 2 times daily, Disp: 180 capsule, Rfl: 3    ipratropium-albuterol (DUONEB) 0.5-2.5 (3) MG/3ML SOLN nebulizer solution, Inhale into the lungs, Disp: , Rfl:     butalbital-acetaminophen-caffeine (FIORICET, ESGIC) -40 MG per tablet, take 1 tablet by mouth every 4 hours if needed for headache, Disp: 90 tablet, Rfl: 0    fluticasone (FLONASE) 50 MCG/ACT nasal spray, instill 2 sprays into each nostril once daily, Disp: 48 g, Rfl: 5    montelukast (SINGULAIR) 10 MG tablet, take 1 tablet by mouth every evening, Disp: 90 tablet, Rfl: 3    hydroCHLOROthiazide (HYDRODIURIL) 25 MG tablet, take 1 tablet by mouth every morning, Disp: 90 tablet, Rfl: 1    lidocaine (LIDODERM) 5 %, apply 3 patches TO THE AFFECTED AREA DAILY. LEAVE ON FOR 12 HOURS AND THEN OFF FOR 12 HOURS., Disp: , Rfl:     azelastine (OPTIVAR) 0.05 % ophthalmic solution, instill 1 drop into both eyes twice a day, Disp: 1 each, Rfl: 11    albuterol (PROVENTIL) (2.5 MG/3ML) 0.083% nebulizer solution, inhale contents of 1 vial ( 3 milliliters ) in nebulizer by mouth and INTO THE LUNGS every 4 hours if needed for wheezing, Disp: 375 mL, Rfl: 3    simethicone (MYLICON) 80 MG chewable tablet, Take 1 tablet by mouth 4 times daily as needed for Flatulence, Disp: 180 tablet, Rfl: 3    aspirin EC 81 MG EC tablet, Take 1 tablet by mouth daily, Disp: 90 tablet, Rfl: 1    amitriptyline (ELAVIL) 50 MG tablet, take 1 tablet by mouth at bedtime (Patient taking differently: As needed), Disp: 90 tablet, Rfl: 1    BANOPHEN 25 MG capsule, take 1 capsule by mouth every 6 hours if needed for itching, Disp: 60 capsule, Rfl: 5    albuterol sulfate  (90 Base) MCG/ACT inhaler, Inhale 2 puffs into the lungs 4 times daily as needed for Wheezing, Disp: 3 each, Rfl: 3    Blood Pressure Monitoring (B-D ASSURE BPM/AUTO ARM CUFF) MISC, 1 Units by Does not apply route daily, Disp: 1 each, Rfl: 0    budesonide-formoterol (SYMBICORT) 160-4.5 MCG/ACT AERO, 2 puffs inhaled twice daily. Rinse mouth well after use., Disp: 3 each, Rfl: 3    Respiratory Therapy Supplies (NEBULIZER/TUBING/MOUTHPIECE) KIT, Dispense tubing, mask, and mouthpiece for nebulizer machine.  Dx: Asthma, Disp: 1 kit, Rfl: 0    EPINEPHrine (EPIPEN 2-RAGHAV) 0.3 MG/0.3ML SOAJ injection, Inject one pen as directed STAT for allergic reaction, may disp generic NDC [id number], Disp: 1 each, Rfl: 0    NARCAN 4 MG/0.1ML LIQD nasal spray, , Disp: , Rfl:       SOCIAL HISTORY     Social History     Social History Narrative    No barriers with transportation    No need for HH support     Social History     Tobacco Use    Smoking status: Never    Smokeless tobacco: Never   Vaping Use    Vaping Use: Never used   Substance Use Topics    Alcohol use: Yes     Comment: occasionally    Drug use: No         ALLERGIES     Allergies   Allergen Reactions    Dilantin [Phenytoin] Anaphylaxis and Swelling    Lyrica [Pregabalin] Other (See Comments)     Bleeding in bowels    Dupixent [Dupilumab]      HIVES, THROAT ITCHING    Oxycodone      THROAT TIGHTNESS    Valium [Diazepam]          FAMILY HISTORY     Family History   Problem Relation Age of Onset    Arthritis Mother     Asthma Mother     High Blood Pressure Mother     Emphysema Mother     Other Mother         she was hit and killed by car    Arthritis Father     High Blood Pressure Father     Emphysema Father     Asthma Father          PREVIOUS RECORDS   Previous records reviewed:  ED visit on 12/17/2022 -reviewed patient had labs as well as a CT of the chest which was negative for pulmonary embolism and for parenchymal lung disease. He was diagnosed with acute exacerbation of asthma and discharged home with a steroid prescription. Nimco Luz PHYSICAL EXAM     ED Triage Vitals [12/19/22 1722]   BP Temp Temp Source Heart Rate Resp SpO2 Height Weight   (!) 168/82 98.2 °F (36.8 °C) Oral 84 18 97 % 6' 1\" (1.854 m) (!) 415 lb (188.2 kg)     Initial vital signs and nursing assessment reviewed and normal. Body mass index is 54.75 kg/m². Pulsoximetry is normal per my interpretation.     Additional Vital Signs:  Vitals:    12/19/22 2059   BP: (!) 143/89   Pulse: 71   Resp: 22   Temp:    SpO2: 96%       Physical Exam  Constitutional: General: He is not in acute distress. Appearance: Normal appearance. He is obese. He is not ill-appearing, toxic-appearing or diaphoretic. HENT:      Head: Normocephalic and atraumatic. Nose: Nose normal.      Mouth/Throat:      Mouth: Mucous membranes are moist.      Pharynx: Oropharynx is clear. Eyes:      Conjunctiva/sclera: Conjunctivae normal.   Cardiovascular:      Rate and Rhythm: Normal rate and regular rhythm. Pulses: Normal pulses. Heart sounds: Normal heart sounds. Pulmonary:      Effort: Pulmonary effort is normal.      Breath sounds: Normal breath sounds. Abdominal:      General: Abdomen is flat. Bowel sounds are normal.      Palpations: Abdomen is soft. Tenderness: There is no abdominal tenderness. Musculoskeletal:      Cervical back: Normal range of motion. Right lower le+ Pitting Edema present. Left lower le+ Pitting Edema present. Skin:     General: Skin is warm and dry. Capillary Refill: Capillary refill takes less than 2 seconds. Neurological:      Mental Status: He is alert and oriented to person, place, and time. Psychiatric:         Mood and Affect: Mood normal.         Behavior: Behavior normal.           MEDICAL DECISION MAKING   Brief Overview: 79-year-old male with extensive medical history presenting with headache and right-sided chest discomfort, elevated blood pressure.   Also has a history of pituitary adenoma,    Initial Differential: Includes but is not limited to worsening asthma, migraine, pneumonia, electrolyte abnormality, complication from patient's known pituitary adenoma    Initial Testing: CBC, BMP, LFT, BNP, troponin, urinalysis    Initial Treatment: DuoNeb x2, ketorolac, dexamethasone, diphenhydramine, prochlorperazine, will avoid IV fluid bolus given patient's CHF history and pitting edema in his legs    ____________    Final Therapy: No additional    Final Testing: Lab work is reassuring, ECG unremarkable, patient's blood pressure in the emergency department with was 170 at the highest and has been in the 130s to 140s since I assessed him. CT scan of his head was unremarkable    Discrepancies: n/a    Final Differential: Known asthma exacerbation, headache    Aftercare: Patient we discharged home, his headache has improved, has been advised to follow-up with his primary care physician. Strict return precautions and follow up instructions were discussed with the patient prior to discharge, with which the patient agrees. ED RESULTS   Laboratory results:  Labs Reviewed   BASIC METABOLIC PANEL W/ REFLEX TO MG FOR LOW K - Abnormal; Notable for the following components:       Result Value    Glucose 120 (*)     All other components within normal limits   CBC WITH AUTO DIFFERENTIAL - Abnormal; Notable for the following components:    WBC 12.7 (*)     RBC 4.01 (*)     Hemoglobin 12.8 (*)     Hematocrit 39.5 (*)     MCV 98.5 (*)     RDW-SD 46.9 (*)     Segs Absolute 11.1 (*)     All other components within normal limits   OSMOLALITY - Abnormal; Notable for the following components:    Osmolality Calc 271.8 (*)     All other components within normal limits   BRAIN NATRIURETIC PEPTIDE   TROPONIN   ANION GAP   GLOMERULAR FILTRATION RATE, ESTIMATED   URINE WITH REFLEXED MICRO       Radiologic studies results:  CT HEAD WO CONTRAST   Final Result   1. No acute intracranial findings. 2. No pituitary mass identified by CT. This document has been electronically signed by: Jordan Kasper MD on    12/19/2022 08:32 PM      All CTs at this facility use dose modulation techniques and iterative    reconstructions, and/or weight-based dosing   when appropriate to reduce radiation to a low as reasonably achievable. XR CHEST PORTABLE   Final Result   1. Low lung volumes. No consolidation. This document has been electronically signed by:  Jordan Kasper MD on    12/19/2022 06:21 PM          ED Medications administered this visit:   Medications   ipratropium-albuterol (DUONEB) nebulizer solution 2 ampule (2 ampules Inhalation Given 12/19/22 1803)   ketorolac (TORADOL) injection 15 mg (15 mg IntraVENous Given 12/19/22 1838)   Dexamethasone Sodium Phosphate injection 10 mg (10 mg IntraVENous Given 12/19/22 1839)   prochlorperazine (COMPAZINE) injection 10 mg (10 mg IntraVENous Given 12/19/22 1841)         ED COURSE     ED Course as of 12/19/22 2326   Mon Dec 19, 2022   2022 Headache improved, pending CT results [SC]      ED Course User Index  [SC] Cristina Damian MD       PROCEDURES   Procedures      MEDICATION CHANGES     Discharge Medication List as of 12/19/2022  9:07 PM            FINAL DISPOSITION     Final diagnoses:   Acute nonintractable headache, unspecified headache type   Cough with hemoptysis       DISPOSITION Decision To Discharge 12/19/2022 09:02:52 PM      Marla Berger, APRN - CNP  [address] [facility] [address]  [phone]    Schedule an appointment as soon as possible for a visit       [address] [place] [address]  [address]  Go to   If symptoms worsen    Condition: condition: fair  Dispo: Discharge to home          This transcription was electronically signed. Parts of this transcriptions may have been dictated by use of voice recognition software and electronically transcribed, and parts may have been transcribed with the assistance of an ED scribe. The transcription may contain errors not detected in proofreading. Please refer to my supervising physician's documentation if my documentation differs.     Electronically Signed: Rekha Martins MD, 12/19/22, 11:26 Tung Amaya MD  Resident  12/19/22 0171

## 2022-12-20 LAB
EKG ATRIAL RATE: 72 BPM
EKG P AXIS: 62 DEGREES
EKG P-R INTERVAL: 158 MS
EKG Q-T INTERVAL: 392 MS
EKG QRS DURATION: 86 MS
EKG QTC CALCULATION (BAZETT): 429 MS
EKG R AXIS: -20 DEGREES
EKG T AXIS: 34 DEGREES
EKG VENTRICULAR RATE: 72 BPM

## 2022-12-20 PROCEDURE — 93010 ELECTROCARDIOGRAM REPORT: CPT | Performed by: INTERNAL MEDICINE

## 2022-12-20 NOTE — DISCHARGE INSTRUCTIONS
In today for headache and other complaints your labs are reassuring, your CT of your head was reassuring, your symptoms were improved after symptomatic treatment in the emergency department. Follow-up with your primary care physician.     If your symptoms are worse including wreak current headache that does not improve medications at home, nausea, vomiting, worsening chest pain or shortness of breath with him back to the emergency room for reevaluation

## 2022-12-21 ENCOUNTER — CLINICAL DOCUMENTATION (OUTPATIENT)
Dept: SPIRITUAL SERVICES | Facility: CLINIC | Age: 52
End: 2022-12-21

## 2022-12-21 NOTE — FLOWSHEET NOTE
[place] [address] PROGRESS NOTE    Patient: Josiane Venegas        YOB: 1970  Age: 50 y.o. Gender: male            Assessment:  Jessica Simmons is a 72-year-old male who is being referred for support due what is currently being experienced in his life. Per referral, feeling overwhelmed with multiple life-adjustments while in recovery following a work-related fall injuring his head, as well as his leg for which surgery was necessary and therapy is being received at this time to regain strength and mobility. Lola Lieberman left a voicemail expressing feeling \"stressed out\" due to the circumstances in his life. Patient arrived today feeling more calm, but still dealing with anxiety created by multiple relationships impacting his life. Lola Lieberman stated feeling misunderstood by his actions / lack of actions taken as he relates to family and friends, causing frustration. He is hoping to take needed steps to improve his circumstance which promote wellbeing and a positive outcome. Interventions:   was present during the encounter to provide a listening presence allowing patient to freely engage in conversation, responding to open-ended questions, as well as follow-up questions for clarity and greater understanding. Patient shared continuing to deal with difficult relationships in his life, which have increased with another person(s) being added in the home.  continued to stress that communication skills are vital and need constant attention / improvement for a quality relationship with others. Outcomes:  Lola Lieberman was very appreciative of an opportunity to freely express his story and vent his emotions in a safe place. He was thankful for appointment taking place on short notice to accommodate his current need. Plan:  Lola Lieberman will contact  when additional emotional / spiritual support is desired or needed.    remains available to assist as needed moving forward.     Electronically signed by Margarita Bain, on 12/21/2022 at [place] [address]  [phone]

## 2022-12-27 RX ORDER — DIPHENHYDRAMINE HYDROCHLORIDE 25 MG/1
CAPSULE ORAL
Qty: 60 CAPSULE | Refills: 5 | Status: SHIPPED | OUTPATIENT
Start: 2022-12-27

## 2022-12-27 NOTE — TELEPHONE ENCOUNTER
This medication refill is regarding an electronic request. Refill requested by  Shay . Requested Prescriptions     Pending Prescriptions Disp Refills    BANOPHEN 25 MG capsule [Pharmacy Med Name: BANOPHEN 25 MG CAPSULE] 360 capsule      Sig: take 1 capsule by mouth every 6 hours if needed for itching       Date of last visit: 12/2/2022   Date of next visit: 2/13/23  Date of last refill: 5/11/22 for 60/5   Pharmacy Name: [facility]     Rx verified, ordered and set to EP.

## 2022-12-29 ENCOUNTER — CLINICAL DOCUMENTATION (OUTPATIENT)
Dept: SPIRITUAL SERVICES | Facility: CLINIC | Age: 52
End: 2022-12-29

## 2022-12-29 ENCOUNTER — HOSPITAL ENCOUNTER (OUTPATIENT)
Dept: OCCUPATIONAL THERAPY | Age: 52
Setting detail: THERAPIES SERIES
Discharge: HOME | End: 2022-12-29
Payer: MEDICAID

## 2022-12-29 DIAGNOSIS — G89.4 CHRONIC PAIN SYNDROME: ICD-10-CM

## 2022-12-29 PROCEDURE — 97530 THERAPEUTIC ACTIVITIES: CPT

## 2022-12-29 NOTE — DISCHARGE SUMMARY
[address] THERAPY  [] EVALUATION  [] DAILY NOTE (LAND) [] DAILY NOTE (AQUATIC )   [] PROGRESS NOTE [x] DISCHARGE NOTE    [x] OUTPATIENT REHABILITATION [facility]   [] [place] [address]    [] [facility]   [] Jenise Whitney    Date: 2022  Patient Name:  Duane Welsh  : 1970  MRN: [id number]  CSN: [id number]    Referring Practitioner MIRNA Johns - CNP   Diagnosis Other speech disturbances [R47.89]    Treatment Diagnosis Decreased IADLs   Date of Evaluation 22      Functional Outcome Measure Used [facility]   Functional Outcome Score 86 (22); 68 (22)      Insurance: Primary: Payor: [employer] /  /  / ,   Secondary:    Authorization Information: INSURANCE THERAPY BENEFIT: No precert until after 11AB visit. Visit Limit Based on Precert  AQUATIC THERAPY COVERED:   Yes  MODALITIES COVERED:  Yes except for Iontophoresis and Hot/Cold Packs   Visit # 6, discharge completed 1/3/23   Visits Allowed: 30    Recertification Date:    Physician Follow-Up: No follow up scheduled   Physician Orders: Low vision program   Pertinent History: Patient presents today ambulating with a cane after a fall at work . States that he slipped on oil and fell at work hitting his head on concrete. Reports he had a hard hat on when he fell. Reports that he hit his head twice, once with the hard and and again after the hard hat fell off. Patient went to the emergency room when he fell. Reports that no images were taken that date. CT on 22   Impression:   1. No acute intracranial hemorrhage or process identified   Reports that since then, he has had difficulty with saying correct words. Reports poor memory also. Reports that he gets a headache when trying to read, even with glasses/bifocals, his letters/words get blurry. 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 Not a tumor, just a growth. SUBJECTIVE:  Patient reports since he was last seen in November, he continues to have headaches, watery eyes, and eye fatigue. Relates the glare with the snowy weather is really bothering him. Patient is interested in returning to the eye doctor to have them recheck the eyes. OBJECTIVE:  Hand Dominance right handed   Observation Consistently tilts his head to the left   Vision   Vision History  Last to the ophthalmologist in May. (Dr. Pickens April). 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 Later as a child, had an eye injury into the left eye. As a result layers of cornea removed. Reports that glare was a problem and still is today. Wear glasses glasses with bifocals. Reports that he cannot tell a difference when looking through the bifocals now. Reports that it does clear up when closing one eye, but still hard to comprehend when reading. Reports that he feels dry eye. Uses eye drops   Eye Dominance  left   Corneal Reflection  right eye reflection center over the top of the iris, left eye reflection on the top to the left of the iris. Saccades  right eye - good ability (reported in and out blurry vision)  Left eye - good ability (reports min blurriness with eye movement to the let)  Bilateral - good movement   Pursuits  right eye - blurry, losing target, difficulty following target to the right, increased blinking  Left eye - losing target, smoother movement than right eye   Bilateral - noting right eye turning in more than the left when following a target.      Acuity  NT   Peripheral Vision  NT   Confrontation  NT   Convergence Blurry at 27 cm, unable to clear   Cover/Uncover    ADL's Reading is more difficult (mail or text messages) watching television is more difficulty sometimes, more difficult with writing (focusing)                TREATMENT   Precautions: Changes in vision, current with ST for cognitive changes also   Pain:  Headache 6.5/10, pain is in the back of his head. X in shaded column indicates Activity Completed Today   Modalities Parameters/  Location  Notes/Comments                     Manual Therapy Time/  Technique  Notes/Comments                     Exercises   Sets/  Sec Reps  Notes/Comments   Review of essential eye exercises: 1 sensory eye stretches, 2 tracking, and 3 gaze stabilization  Has hand outs for all 6   x    Push up stick       Single eye pursuits    Left eye watering after, increased blinking with right and left   Bilateral eye pursuits    Increased ability this date from eval.   [employer]  57 hits  x Mod A self paced for 2 minutes with min eye fatigue and report of blurriness in lower quadrants   [place] Dexterity    Pt noted to adjust head to focus on hole; pt reported difficulty with focus/attention and then strains harder to focus and then this makes his vision blurry   Lacing activity -lace and unlace    Elephant- pt reports fair level of difficulty and difficulty with motor processing          Activities Time    Notes/Comments   Table top writing/scanning activity while trialing modifications to improve tolerance. Utilized small desk light at a distance and then moving it closer to the page. Better improvements with light positioned on the left side versus on the right as on the right he was losing focus. Completed 4 clusters of crossing out numbers with mod fatigue and eye watering at the end. Discussed other adaptations including brightness of his phone and the lighting in the room when watching tv/using his phone. Patient reports he is typically in a dark room. Encouraged taking rest breaks when eye fatigue begins, patient reports this frustrates him. Adaptations to brightness - sunglasses, hats with brims, filters. Colored paper to decrease light reflection   Prefers valarie or gray for brightness   Spiritual care for coping  x    Taking breaks, alternating tasks.                       Specific Interventions Next Treatment: 6 essential eye exercises, activity/environment modifications, pt hobbies: drawing, sewing, fishing, coloring, putting things together     Activity/Treatment Tolerance:  [x]  Patient tolerated treatment well  []  Patient limited by fatigue  []  Patient limited by pain   []  Patient limited by other medical complications  []  Other:     Assessment: Patient has been participating in OT with diagnosis of decrease IADLs and vision problems. Continues to work on [employer] for essential eye exercises and modifying environment. Symptoms continue with report he does notice an increased ability to focus. Patient is comfortable with continuing on his own and is agreeable to following up with the eye doctor for further guidance with his prescriptions. Plan discharge 1/3/23. Areas for Improvement: vision disturbance  Prognosis: good    GOALS:  Patient Goal: return to previous abilities     Short Term Goals:  Time Frame: 4 weeks   Patient will reports use of at least 2 activity and/or environment modifications/adaptations during daily activity to increase tolerance with performance of IADLs. GOAL MET Patient reports he has tried modifications to lighting and size of letters he reads. Even with these modifications he still continues to have symptoms. DISCHARGE GOAL 12/29/22  Patient will be independent with HEP to increase ease with reading. GOAL MET Patient is completing the eye exercises \"a couple of times a day\". With no questions about technique but is asking for additional hand outs. DISCHARGE 12/29/22    Long Term Goals:  Time Frame: 6 weeks   Patient will report decrease in score of the BIVSS to less than 52 to demonstrate increased ease and ability to perform IADLs. GOAL NOT MET Patient indicates a functional score of 68 on BIVSS. Continues with significant limitation due to vision.  DISCHARGE 12/29/22    Patient Education:   [x]  HEP/Education Completed: Progress towards goals and POC for

## 2022-12-29 NOTE — FLOWSHEET NOTE
[place] [address] PROGRESS NOTE      Patient: Rolando Nurse      YOB: 1970  Age: 50 y.o. Gender: male            Assessment:  Tianna Pink is a 68-year-old male who is being referred for support due what is currently being experienced in his life. Per referral, feeling overwhelmed with multiple life-adjustments while in recovery following a work-related fall injuring his head, as well as his leg for which surgery was necessary and therapy is being received at this time to regain strength and mobility. Tony De Anda left a voicemail expressing a desire to schedule a follow-up appointment. Interventions:   made a follow-up attempt to contact Tianna Pink via telephone call to offer support, and schedule the desired appointment. Outcomes:  Tony De Anda was very appreciative of an opportunity to schedule a follow-up appointment. Plan:  An appointment is scheduled for ongoing emotional / spiritual support on 1/3/2023 at [facility].     Electronically signed by Chadd Chavez on 12/29/2022 at 10:44 AM.  [facility]  [phone]

## 2022-12-29 NOTE — TELEPHONE ENCOUNTER
Julio Mcnair called requesting a refill on the following medications:  Requested Prescriptions     Pending Prescriptions Disp Refills    HYDROcodone-acetaminophen (NORCO) 5-325 MG per tablet 90 tablet 0     Sig: Take 1 tablet by mouth every 8 hours as needed for Pain for up to 30 days.      Pharmacy verified:[facility] [address] [place] [address]  .pv      Date of last visit: 12/12/22  Date of next visit (if applicable): 5/98/7158

## 2022-12-30 ENCOUNTER — HOSPITAL ENCOUNTER (OUTPATIENT)
Age: 52
Discharge: HOME | End: 2022-12-30
Payer: MEDICAID

## 2022-12-30 DIAGNOSIS — E83.42 HYPOMAGNESEMIA: ICD-10-CM

## 2022-12-30 DIAGNOSIS — E55.9 VITAMIN D DEFICIENCY: ICD-10-CM

## 2022-12-30 DIAGNOSIS — D50.9 IRON DEFICIENCY ANEMIA, UNSPECIFIED IRON DEFICIENCY ANEMIA TYPE: ICD-10-CM

## 2022-12-30 DIAGNOSIS — K76.0 FATTY LIVER: ICD-10-CM

## 2022-12-30 DIAGNOSIS — E78.5 HYPERLIPIDEMIA, UNSPECIFIED HYPERLIPIDEMIA TYPE: ICD-10-CM

## 2022-12-30 LAB
ALBUMIN SERPL-MCNC: 3.9 G/DL (ref 3.5–5.1)
ALP BLD-CCNC: 134 U/L (ref 38–126)
ALT SERPL-CCNC: 39 U/L (ref 11–66)
ANION GAP SERPL CALCULATED.3IONS-SCNC: 12 MEQ/L (ref 8–16)
AST SERPL-CCNC: 23 U/L (ref 5–40)
BASOPHILS # BLD: 0.8 %
BASOPHILS ABSOLUTE: 0 THOU/MM3 (ref 0–0.1)
BILIRUB SERPL-MCNC: 0.5 MG/DL (ref 0.3–1.2)
BILIRUBIN DIRECT: < 0.2 MG/DL (ref 0–0.3)
BUN BLDV-MCNC: 13 MG/DL (ref 7–22)
CALCIUM SERPL-MCNC: 9.4 MG/DL (ref 8.5–10.5)
CHLORIDE BLD-SCNC: 102 MEQ/L (ref 98–111)
CHOLESTEROL, TOTAL: 161 MG/DL (ref 100–199)
CO2: 27 MEQ/L (ref 23–33)
CREAT SERPL-MCNC: 1 MG/DL (ref 0.4–1.2)
EOSINOPHIL # BLD: 4.1 %
EOSINOPHILS ABSOLUTE: 0.2 THOU/MM3 (ref 0–0.4)
ERYTHROCYTE [DISTWIDTH] IN BLOOD BY AUTOMATED COUNT: 12.8 % (ref 11.5–14.5)
ERYTHROCYTE [DISTWIDTH] IN BLOOD BY AUTOMATED COUNT: 45.8 FL (ref 35–45)
GFR SERPL CREATININE-BSD FRML MDRD: > 60 ML/MIN/1.73M2
GLUCOSE BLD-MCNC: 98 MG/DL (ref 70–108)
HCT VFR BLD CALC: 42.9 % (ref 42–52)
HDLC SERPL-MCNC: 55 MG/DL
HEMOGLOBIN: 13.9 GM/DL (ref 14–18)
IMMATURE GRANS (ABS): 0.03 THOU/MM3 (ref 0–0.07)
IMMATURE GRANULOCYTES: 0.5 %
IRON: 120 UG/DL (ref 65–195)
LDL CHOLESTEROL CALCULATED: 87 MG/DL
LYMPHOCYTES # BLD: 21.7 %
LYMPHOCYTES ABSOLUTE: 1.3 THOU/MM3 (ref 1–4.8)
MAGNESIUM: 1.8 MG/DL (ref 1.6–2.4)
MCH RBC QN AUTO: 31.7 PG (ref 26–33)
MCHC RBC AUTO-ENTMCNC: 32.4 GM/DL (ref 32.2–35.5)
MCV RBC AUTO: 97.9 FL (ref 80–94)
MONOCYTES # BLD: 8.5 %
MONOCYTES ABSOLUTE: 0.5 THOU/MM3 (ref 0.4–1.3)
NUCLEATED RED BLOOD CELLS: 0 /100 WBC
PLATELET # BLD: 244 THOU/MM3 (ref 130–400)
PMV BLD AUTO: 9.9 FL (ref 9.4–12.4)
POTASSIUM SERPL-SCNC: 4.5 MEQ/L (ref 3.5–5.2)
RBC # BLD: 4.38 MILL/MM3 (ref 4.7–6.1)
SEG NEUTROPHILS: 64.4 %
SEGMENTED NEUTROPHILS ABSOLUTE COUNT: 3.8 THOU/MM3 (ref 1.8–7.7)
SODIUM BLD-SCNC: 141 MEQ/L (ref 135–145)
TOTAL IRON BINDING CAPACITY: 319 UG/DL (ref 171–450)
TOTAL PROTEIN: 6.5 G/DL (ref 6.1–8)
TRIGL SERPL-MCNC: 93 MG/DL (ref 0–199)
VITAMIN D 25-HYDROXY: 61 NG/ML (ref 30–100)
WBC # BLD: 5.9 THOU/MM3 (ref 4.8–10.8)

## 2022-12-30 PROCEDURE — 82306 VITAMIN D 25 HYDROXY: CPT

## 2022-12-30 PROCEDURE — 82248 BILIRUBIN DIRECT: CPT

## 2022-12-30 PROCEDURE — 83735 ASSAY OF MAGNESIUM: CPT

## 2022-12-30 PROCEDURE — 85025 COMPLETE CBC W/AUTO DIFF WBC: CPT

## 2022-12-30 PROCEDURE — 80053 COMPREHEN METABOLIC PANEL: CPT

## 2022-12-30 PROCEDURE — 36415 COLL VENOUS BLD VENIPUNCTURE: CPT

## 2022-12-30 PROCEDURE — 83550 IRON BINDING TEST: CPT

## 2022-12-30 PROCEDURE — 83540 ASSAY OF IRON: CPT

## 2022-12-30 PROCEDURE — 80061 LIPID PANEL: CPT

## 2022-12-30 RX ORDER — HYDROCODONE BITARTRATE AND ACETAMINOPHEN 5; 325 MG/1; MG/1
1 TABLET ORAL EVERY 8 HOURS PRN
Qty: 90 TABLET | Refills: 0 | Status: SHIPPED | OUTPATIENT
Start: 2023-01-06 | End: 2023-02-05

## 2022-12-30 NOTE — TELEPHONE ENCOUNTER
OARRS reviewed. UDS: Hydrocodone, Gabapentin, Amitriptyline    Last seen: 12/12/2022.  Follow-up: 02/20/2023

## 2023-01-03 ENCOUNTER — CLINICAL DOCUMENTATION (OUTPATIENT)
Dept: SPIRITUAL SERVICES | Facility: CLINIC | Age: 53
End: 2023-01-03

## 2023-01-03 RX ORDER — ASPIRIN 81 MG/1
TABLET, COATED ORAL
Qty: 90 TABLET | Refills: 0 | Status: SHIPPED | OUTPATIENT
Start: 2023-01-03

## 2023-01-03 NOTE — FLOWSHEET NOTE
[place] [address] PROGRESS NOTE      Patient: Mahendra Gray      YOB: 1970  Age: 50 y.o. Gender: male            Assessment:  She Hernandez is a 82-year-old male who is being referred for support due what is currently being experienced in his life. Per referral, feeling overwhelmed with multiple life-adjustments. Gabriela Morrison stated still feeling frustrated and misunderstood by his actions / lack of actions taken as he relates to family and friends. Gabriela Morrison expressed that money is tight, and cannot make desired life-change steps at this time. He is hoping to take needed steps to improve his circumstance which promote wellbeing and a positive outcome. Gabriela Morrison stated that December - February are difficult months due to losses experienced in his life. Interventions:   was present during the encounter to provide a listening presence allowing patient to freely engage in conversation, responding to open-ended questions, as well as follow-up questions for clarity and greater understanding. Patient shared continuing to deal with difficult relationships in his life.  ended the session with prayer. Outcomes:  Gabriela Morrison was very appreciative of an opportunity to freely express his story and vent his emotions in a safe place. Plan:  Gabriela Morrison will contact  when additional emotional / spiritual support is desired or needed.  remains available to assist as needed moving forward.     Electronically signed by Sydni Grubbs on 1/3/2023 at 2:48 PM.  Juan Russell  [phone]

## 2023-01-06 DIAGNOSIS — J45.40 MODERATE PERSISTENT ASTHMA WITHOUT COMPLICATION: ICD-10-CM

## 2023-01-06 RX ORDER — BUDESONIDE AND FORMOTEROL FUMARATE DIHYDRATE 160; 4.5 UG/1; UG/1
AEROSOL RESPIRATORY (INHALATION)
Qty: 30.6 G | Refills: 3 | Status: SHIPPED | OUTPATIENT
Start: 2023-01-06

## 2023-01-07 DIAGNOSIS — Z98.1 S/P CERVICAL SPINAL FUSION: ICD-10-CM

## 2023-01-09 RX ORDER — BUTALBITAL, ACETAMINOPHEN AND CAFFEINE 50; 325; 40 MG/1; MG/1; MG/1
TABLET ORAL
Qty: 90 TABLET | Refills: 0 | Status: SHIPPED | OUTPATIENT
Start: 2023-01-09

## 2023-01-09 RX ORDER — GABAPENTIN 300 MG/1
CAPSULE ORAL
Qty: 30 CAPSULE | Refills: 0 | Status: SHIPPED | OUTPATIENT
Start: 2023-01-13 | End: 2023-02-12

## 2023-01-09 NOTE — TELEPHONE ENCOUNTER
This medication refill is regarding a electronic request. Refill requested by  [facility] . Requested Prescriptions     Pending Prescriptions Disp Refills    butalbital-acetaminophen-caffeine (FIORICET, ESGIC) -40 MG per tablet [Pharmacy Med Name: [id number] -40] 90 tablet 0     Sig: take 1 tablet by mouth every 4 hours if needed for headache     Date of last visit: 12/2/2022   Date of next visit: 2/13/2023  Date of last refill: 10/20/22 #90/0    Rx verified, ordered and set to EP.

## 2023-01-09 NOTE — TELEPHONE ENCOUNTER
OARRS reviewed. UDS: + for  Hydrocodone. Last seen: 12/12/2022.  Follow-up:   Future Appointments   Date Time Provider Department Center   1/17/2023 10:00 AM Marva Rogers RD, LD N SRPX WT MG [facility]   1/17/2023 10:30 AM OJ Quiroga N SRPX WT MG [facility]   2/13/2023  8:20 AM MIRNA Nicolas - CNP Fam Medicine [facility]   2/20/2023 10:00 AM MIRNA Chowdary - CNP N SRPX Pain [facility]   2/28/2023  2:00 PM STR MRI RM1 STRZ MRI STR Radiolog   3/1/2023  9:30 AM Feliberto Brantley TREATMENT NETWORK [facility]   3/22/2023  1:45 PM Brendan Sanders MD N SR[facility]

## 2023-01-11 ENCOUNTER — TELEPHONE (OUTPATIENT)
Dept: ENT CLINIC | Age: 53
End: 2023-01-11

## 2023-01-11 NOTE — TELEPHONE ENCOUNTER
Called pt back and informed him he was dismissed from the practice and can no longer schedule appointments with our office. Pt hung up the phone.

## 2023-01-11 NOTE — TELEPHONE ENCOUNTER
Pt called wanting to be seen again for allergy/asthma issues. It would not allow me to schedule. It says pt was dismissed for too many no shows. He asked if you would allow him to be scheduled again.

## 2023-01-12 ENCOUNTER — OFFICE VISIT (OUTPATIENT)
Dept: FAMILY MEDICINE CLINIC | Age: 53
End: 2023-01-12
Payer: MEDICAID

## 2023-01-12 VITALS
WEIGHT: 315 LBS | SYSTOLIC BLOOD PRESSURE: 162 MMHG | RESPIRATION RATE: 16 BRPM | DIASTOLIC BLOOD PRESSURE: 104 MMHG | HEART RATE: 88 BPM | BODY MASS INDEX: 55.02 KG/M2

## 2023-01-12 DIAGNOSIS — G44.89 CHRONIC MIXED HEADACHE SYNDROME: ICD-10-CM

## 2023-01-12 DIAGNOSIS — E66.01 MORBID OBESITY WITH BMI OF 50.0-59.9, ADULT (HCC): ICD-10-CM

## 2023-01-12 DIAGNOSIS — I51.7 ENLARGED HEART: Primary | ICD-10-CM

## 2023-01-12 DIAGNOSIS — J45.50 SEVERE PERSISTENT ASTHMA WITHOUT COMPLICATION: ICD-10-CM

## 2023-01-12 DIAGNOSIS — E78.5 HYPERLIPIDEMIA, UNSPECIFIED HYPERLIPIDEMIA TYPE: ICD-10-CM

## 2023-01-12 DIAGNOSIS — M50.20 OTHER CERVICAL DISC DISPLACEMENT, UNSPECIFIED CERVICAL REGION: ICD-10-CM

## 2023-01-12 DIAGNOSIS — R41.3 MEMORY PROBLEM: ICD-10-CM

## 2023-01-12 PROCEDURE — 3017F COLORECTAL CA SCREEN DOC REV: CPT | Performed by: NURSE PRACTITIONER

## 2023-01-12 PROCEDURE — G8427 DOCREV CUR MEDS BY ELIG CLIN: HCPCS | Performed by: NURSE PRACTITIONER

## 2023-01-12 PROCEDURE — 1036F TOBACCO NON-USER: CPT | Performed by: NURSE PRACTITIONER

## 2023-01-12 PROCEDURE — G8417 CALC BMI ABV UP PARAM F/U: HCPCS | Performed by: NURSE PRACTITIONER

## 2023-01-12 PROCEDURE — G8484 FLU IMMUNIZE NO ADMIN: HCPCS | Performed by: NURSE PRACTITIONER

## 2023-01-12 PROCEDURE — 99214 OFFICE O/P EST MOD 30 MIN: CPT | Performed by: NURSE PRACTITIONER

## 2023-01-12 RX ORDER — LIDOCAINE 50 MG/G
OINTMENT TOPICAL
COMMUNITY
Start: 2022-12-27

## 2023-01-12 RX ORDER — LANOLIN ALCOHOL/MO/W.PET/CERES
CREAM (GRAM) TOPICAL
COMMUNITY
Start: 2022-12-16

## 2023-01-12 RX ORDER — ROSUVASTATIN CALCIUM 20 MG/1
TABLET, COATED ORAL
COMMUNITY
Start: 2023-01-02

## 2023-01-12 ASSESSMENT — PATIENT HEALTH QUESTIONNAIRE - PHQ9
4. FEELING TIRED OR HAVING LITTLE ENERGY: 0
8. MOVING OR SPEAKING SO SLOWLY THAT OTHER PEOPLE COULD HAVE NOTICED. OR THE OPPOSITE, BEING SO FIGETY OR RESTLESS THAT YOU HAVE BEEN MOVING AROUND A LOT MORE THAN USUAL: 0
SUM OF ALL RESPONSES TO PHQ9 QUESTIONS 1 & 2: 0
3. TROUBLE FALLING OR STAYING ASLEEP: 0
10. IF YOU CHECKED OFF ANY PROBLEMS, HOW DIFFICULT HAVE THESE PROBLEMS MADE IT FOR YOU TO DO YOUR WORK, TAKE CARE OF THINGS AT HOME, OR GET ALONG WITH OTHER PEOPLE: 0
SUM OF ALL RESPONSES TO PHQ QUESTIONS 1-9: 0
9. THOUGHTS THAT YOU WOULD BE BETTER OFF DEAD, OR OF HURTING YOURSELF: 0
SUM OF ALL RESPONSES TO PHQ QUESTIONS 1-9: 0
7. TROUBLE CONCENTRATING ON THINGS, SUCH AS READING THE NEWSPAPER OR WATCHING TELEVISION: 0
2. FEELING DOWN, DEPRESSED OR HOPELESS: 0
SUM OF ALL RESPONSES TO PHQ QUESTIONS 1-9: 0
5. POOR APPETITE OR OVEREATING: 0
SUM OF ALL RESPONSES TO PHQ QUESTIONS 1-9: 0
1. LITTLE INTEREST OR PLEASURE IN DOING THINGS: 0
6. FEELING BAD ABOUT YOURSELF - OR THAT YOU ARE A FAILURE OR HAVE LET YOURSELF OR YOUR FAMILY DOWN: 0

## 2023-01-12 NOTE — PROGRESS NOTES
[address] [facility] [address]  Dept: [phone]  Dept Fax: [phone]  Loc: [phone]     2023     Mynor Simmons (:  1970) is a 50 y.o. male, here for evaluation of the following medical concerns:    Chief Complaint   Patient presents with    Dizziness     C/O headaches, dizziness, night sweats, irregular sleep schedule, nausea x 2 weeks. Called neurology and was told to discuss what's going on, but was told to see Dr. David Wilcox. Dr. David Wilcox does not take the pt's insurance so he was told to see PCP. Pt presents to the office today for ongoing issues with dizziness, headaches and nausea that have increased the past 2-4 weeks. He does follow up with neurology, but was told to come to our office to discuss. HX of pituitary gland lesion, however, most resent CT scan did not show any pituitary mass. Pt has been sleeping more and sleep has been off. His whole body has been hurting. BP normal at home. Today its elevated due to rushing around. He sleeps all the time, but when he wants to sleep, he can't. His body is itching more. He had a cardiac cath 2022 and that was normal.  He follows up with pain management for all over body pain and has been hard to control in the past. His headaches are continuing to get worse. He has had follow up labs since feeling this way and everything has been normal.     Dizziness  This is a recurrent problem. Episode onset: 2 weeks. The problem occurs intermittently. The problem has been gradually worsening. Associated symptoms include fatigue, headaches, myalgias, nausea, a rash and vertigo. Pertinent negatives include no abdominal pain, anorexia, arthralgias, change in bowel habit, chest pain, chills, congestion, coughing, diaphoresis, fever, joint swelling, neck pain, numbness, sore throat, swollen glands, urinary symptoms, visual change, vomiting or weakness.  Nothing aggravates the symptoms. He has tried sleep, rest, lying down, position changes and relaxation for the symptoms. The treatment provided moderate relief. Impression   1. No acute intracranial findings. 2. No pituitary mass identified by CT. This document has been electronically signed by: Servando Roche MD on    12/19/2022 08:32 PM       All CTs at this facility use dose modulation techniques and iterative    reconstructions, and/or weight-based dosing   when appropriate to reduce radiation to a low as reasonably achievable. Treatment Adherence:   Medication compliance:  compliant all of the time  Diet compliance:  compliant most of the time  Weight trend: stable  Current exercise: no regular exercise  Barriers: impairment:  physical: knee pain    Hypertension:  Home blood pressure monitoring: Yes - 130-140's. Patient denies chest pain, palpitations, but is having fatigue and headaches. Antihypertensive medication side effects: no medication side effects noted. Use of agents associated with hypertension: none. Sodium (meq/L)   Date Value   12/30/2022 141    BUN (mg/dL)   Date Value   12/30/2022 13    Glucose (mg/dL)   Date Value   12/30/2022 98   10/24/2022 98      Potassium (meq/L)   Date Value   12/30/2022 4.5     Potassium reflex Magnesium (meq/L)   Date Value   12/19/2022 4.4    Creatinine (mg/dL)   Date Value   12/30/2022 1.0         Hyperlipidemia:  No new myalgias or GI upset on rosuvastatin (Crestor).      Lab Results   Component Value Date    CHOL 161 12/30/2022    TRIG 93 12/30/2022    HDL 55 12/30/2022    LDLCALC 87 12/30/2022     Lab Results   Component Value Date    ALT 39 12/30/2022    AST 23 12/30/2022        Patient Active Problem List   Diagnosis    Cervical stenosis of spinal canal    Lower back pain    Disease of spinal cord ([address]    Morbid obesity with BMI of 50.0-59.9, adult ([address]    Other cervical disc displacement, unspecified cervical region    Radiculopathy affecting upper extremity    S/P cervical spinal fusion    Sleep apnea    Hyperlipidemia    H/O: HTN (hypertension)    Moderate asthma without complication    Enlarged heart    Chronic pansinusitis    Pain in both lower extremities    Non-seasonal allergic rhinitis due to pollen    Allergy desensitization therapy    Lumbar spondylosis    Severe episode of recurrent major depressive disorder, without psychotic features [address]    Strain of lumbar region    Abnormal stress test       Review of Systems   Constitutional:  Positive for fatigue. Negative for chills, diaphoresis and fever. HENT:  Positive for rhinorrhea. Negative for congestion and sore throat. Respiratory:  Negative for cough. Cardiovascular:  Negative for chest pain. Gastrointestinal:  Positive for nausea. Negative for abdominal pain, anorexia, change in bowel habit, diarrhea and vomiting. Genitourinary:  Negative for difficulty urinating and frequency. Musculoskeletal:  Positive for myalgias. Negative for arthralgias, joint swelling and neck pain. Skin:  Positive for rash. Negative for color change. Neurological:  Positive for dizziness, vertigo and headaches. Negative for weakness and numbness. Psychiatric/Behavioral:  Positive for dysphoric mood and sleep disturbance. Negative for agitation, decreased concentration, self-injury and suicidal ideas. The patient is nervous/anxious. Prior to Visit Medications    Medication Sig Taking?  Authorizing Provider   lidocaine (XYLOCAINE) 5 % ointment apply 1 to 2 grams four times a day if needed Yes Historical Provider, MD   Magnesium Oxide 420 (252 Mg) MG TABS take 1 tablet by mouth twice a day with meals Yes Historical Provider, MD   rosuvastatin (CRESTOR) 20 MG tablet take 1 tablet by mouth once daily Yes Historical Provider, MD   dicyclomine (BENTYL) 10 MG capsule Take 1 capsule by mouth 4 times daily (before meals and nightly) Yes MIRNA Funez - CNP butalbital-acetaminophen-caffeine (FIORICET, ESGIC) -40 MG per tablet take 1 tablet by mouth every 4 hours if needed for headache Yes MIRNA Lenz CNP   gabapentin (NEURONTIN) 300 MG capsule take 1 capsule by mouth daily Yes MIRNA Ugarte CNP   budesonide-formoterol (SYMBICORT) 160-4.5 MCG/ACT AERO inhale 2 puffs by mouth twice a day **RINSE MOUTH AFTER USE** Yes MIRNA Lenz CNP   ASPIRIN LOW DOSE 81 MG EC tablet take 1 tablet by mouth once daily Yes Christopher North MD   HYDROcodone-acetaminophen (NORCO) 5-325 MG per tablet Take 1 tablet by mouth every 8 hours as needed for Pain for up to 30 days.  Yes MIRNA Ugarte CNP   BANOPHEN 25 MG capsule take 1 capsule by mouth every 6 hours if needed for itching Yes MIRNA Lenz CNP   loratadine (CLARITIN) 10 MG tablet take 1 tablet by mouth once daily Yes MIRNA Lenz CNP   triamcinolone (KENALOG) 0.1 % cream APPLY TO AFFECTED AREA TWICE A DAY Yes MIRNA Lenz CNP   potassium chloride (KLOR-CON M) 10 MEQ extended release tablet take 1 tablet by mouth daily Yes Historical Provider, MD   betamethasone valerate (VALISONE) 0.1 % cream apply to affected area twice a day Yes MIRNA Whitley CNP   coenzyme Q10 (EQL COQ10) 100 MG CAPS capsule Take 1 capsule by mouth daily Yes MIRNA Whitley CNP   ferrous sulfate (FEROSUL) 325 (65 Fe) MG tablet Take 1 tablet by mouth daily (with breakfast) Yes MIRNA Whitley CNP   Cholecalciferol (VITAMIN D3) 1.25 MG (29591 UT) CAPS Take 1 capsule by mouth once a week Yes MIRNA Whitley CNP   pantoprazole (PROTONIX) 40 MG tablet Take 1 tablet by mouth every morning (before breakfast) Yes MIRNA Vidal CNP   ipratropium-albuterol (DUONEB) 0.5-2.5 (3) MG/3ML SOLN nebulizer solution Inhale into the lungs Yes Historical Provider, MD   fluticasone (FLONASE) 50 MCG/ACT nasal spray instill 2 sprays into each nostril once daily Yes Eleonore Dominguez, APRN - CNP   montelukast (SINGULAIR) 10 MG tablet take 1 tablet by mouth every evening Yes Eleonore Dominguez, APRN - CNP   hydroCHLOROthiazide (HYDRODIURIL) 25 MG tablet take 1 tablet by mouth every morning Yes Eleonore Dominguez, APRN - CNP   azelastine (OPTIVAR) 0.05 % ophthalmic solution instill 1 drop into both eyes twice a day Yes Eleonore Dominguez, APRN - CNP   albuterol (PROVENTIL) (2.5 MG/3ML) 0.083% nebulizer solution inhale contents of 1 vial ( 3 milliliters ) in nebulizer by mouth and INTO THE LUNGS every 4 hours if needed for wheezing Yes Eleonore Dominguez, APRN - CNP   simethicone (MYLICON) 80 MG chewable tablet Take 1 tablet by mouth 4 times daily as needed for Flatulence Yes Eleonore Dominguez, APRN - CNP   amitriptyline (ELAVIL) 50 MG tablet take 1 tablet by mouth at bedtime  Patient taking differently: As needed Yes Eleonore Dominguez, APRN - CNP   albuterol sulfate  (90 Base) MCG/ACT inhaler Inhale 2 puffs into the lungs 4 times daily as needed for Wheezing Yes Eleonore Dominguez, APRN - CNP   Blood Pressure Monitoring (B-D ASSURE BPM/AUTO ARM CUFF) MISC 1 Units by Does not apply route daily Yes Eleonore Dominguez, APRN - CNP   Respiratory Therapy Supplies (NEBULIZER/TUBING/MOUTHPIECE) KIT Dispense tubing, mask, and mouthpiece for nebulizer machine. Dx: Asthma Yes Eleonore Dominguez, APRN - CNP   EPINEPHrine (EPIPEN 2-RAGHAV) 0.3 MG/0.3ML SOAJ injection Inject one pen as directed STAT for allergic reaction, may disp generic [address] Yes MIRNA [place] - CNP   NARCAN 4 MG/0.1ML LIQD nasal spray  Yes Historical Provider, MD        Social History     Tobacco Use    Smoking status: Never    Smokeless tobacco: Never   Substance Use Topics    Alcohol use: Yes     Comment: occasionally        Vitals:    01/12/23 1127   BP: (!) 162/104   Pulse: 88   Resp: 16   Weight: (!) 417 lb (189.1 kg)     Estimated body mass index is 55.02 kg/m² as calculated from the following:    Height as of 12/19/22: 6' 1\" (1.854 m). Weight as of this encounter: 417 lb (189.1 kg). Physical Exam  Vitals reviewed. Constitutional:       General: He is not in acute distress. Appearance: He is well-developed. HENT:      Head: Normocephalic and atraumatic. Nose: Nose normal.      Mouth/Throat:      Mouth: Mucous membranes are moist.      Pharynx: Oropharynx is clear. Eyes:      General:         Right eye: No discharge. Left eye: No discharge. Extraocular Movements: Extraocular movements intact. Conjunctiva/sclera: Conjunctivae normal.      Pupils: Pupils are equal, round, and reactive to light. Cardiovascular:      Rate and Rhythm: Normal rate and regular rhythm. Heart sounds: Normal heart sounds. Pulmonary:      Effort: Pulmonary effort is normal. No respiratory distress. Breath sounds: Normal breath sounds. Abdominal:      General: Bowel sounds are normal.      Palpations: Abdomen is soft. Skin:     General: Skin is warm and dry. Neurological:      General: No focal deficit present. Mental Status: He is alert and oriented to person, place, and time. Cranial Nerves: No cranial nerve deficit. Coordination: Coordination normal.   Psychiatric:         Mood and Affect: Mood normal.         Behavior: Behavior normal.         Thought Content: Thought content normal.         Judgment: Judgment normal.       ASSESSMENT/PLAN:  1. Enlarged heart  - External Referral To Internal Medicine    2. Morbid obesity with BMI of 50.0-59.9, adult [facility])  - External Referral To Internal Medicine    3. Other cervical disc displacement, unspecified cervical region  - External Referral To Internal Medicine    4. Hyperlipidemia, unspecified hyperlipidemia type    5. Memory problem  - External Referral To Internal Medicine    6. Severe persistent asthma without complication  - External Referral To Internal Medicine    7.  Chronic mixed headache syndrome  - External Referral To Internal Medicine    - Discussed symptoms with patient and reviewed labs, CT scans and ER notes from recent visits. Pt has a lot of complicated reactions to medications and symptoms are not improving with local specialities, recommended going to [facility] for further evaluation at their tertiary center for comprehensive care. Pt agreeable. Will continue with current medications and treatments, and continue to follow up with local specialities while we wait for [facility] visit. Pt happy with this option and agreeable to plan  - Rest and increase fluids  - Pt is currently off work for his right knee. Continue light duty. - Monitor BP at home, goal <140/90. Call cardiology if elevated. - No refills needed today  - ER for any acute changes. - Greater than 50% of this 30 min visit was spent on counseling and coordination of care. Return if symptoms worsen or fail to improve. Patient given educational materials - see patient instructions. Discussed use, benefit, and side effects of prescribed medications. All patient questions answered. Pt voiced understanding. Reviewed health maintenance. An electronic signature was used to authenticate this note.     --MIRNA Barreto - CNP on 1/13/2023 at 8:02 AM

## 2023-01-13 RX ORDER — ORPHENADRINE CITRATE 100 MG/1
100 TABLET, EXTENDED RELEASE ORAL 2 TIMES DAILY
Qty: 60 TABLET | Refills: 0 | Status: SHIPPED | OUTPATIENT
Start: 2023-01-13 | End: 2023-02-12

## 2023-01-13 ASSESSMENT — ENCOUNTER SYMPTOMS
ABDOMINAL PAIN: 0
DIARRHEA: 0
COLOR CHANGE: 0
CHANGE IN BOWEL HABIT: 0
SWOLLEN GLANDS: 0
VOMITING: 0
VISUAL CHANGE: 0
SORE THROAT: 0
RHINORRHEA: 1
COUGH: 0
NAUSEA: 1

## 2023-01-13 NOTE — TELEPHONE ENCOUNTER
OARRS reviewed. UDS: + for  Hydrocodone, Gabapentin, Amitriptyline. Last seen: 12/12/2022.  Follow-up: 2/20/2023

## 2023-01-17 ENCOUNTER — TELEPHONE (OUTPATIENT)
Dept: FAMILY MEDICINE CLINIC | Age: 53
End: 2023-01-17

## 2023-01-17 ENCOUNTER — CARE COORDINATION (OUTPATIENT)
Dept: CARE COORDINATION | Age: 53
End: 2023-01-17

## 2023-01-17 NOTE — CARE COORDINATION
Left message to return phone call. ACM will offer RPM to pt to see if he would like to join to help manage health.

## 2023-01-17 NOTE — TELEPHONE ENCOUNTER
Patient was seen by [address] [place] [address] on 01/12/2023. At that visit it was discussed that a referral would be made for tertiary care. [facility] ([phone]) and asked to speak with the . When speaking with them they stated that their process was the patient would be seen by their primary care provider and that provider would refer to specialist that they saw fit. Discussed with WS and she would like to proceed with internal medicine referral rather then tertiary care. Referral faxed to [phone] with insurance cards and [facility] referral form. They will contact the patient to schedule.

## 2023-01-17 NOTE — CARE COORDINATION
Spoke with Miguel Vázquez. Introduced and educated on [facility] Marc program through [facility]. He declined at this time. States if he changes his mind he will call and let me know.

## 2023-01-23 ENCOUNTER — APPOINTMENT (OUTPATIENT)
Dept: ULTRASOUND IMAGING | Age: 53
End: 2023-01-23
Payer: MEDICAID

## 2023-01-23 ENCOUNTER — APPOINTMENT (OUTPATIENT)
Dept: CT IMAGING | Age: 53
End: 2023-01-23
Payer: MEDICAID

## 2023-01-23 ENCOUNTER — HOSPITAL ENCOUNTER (EMERGENCY)
Age: 53
Discharge: HOME | End: 2023-01-24
Attending: STUDENT IN AN ORGANIZED HEALTH CARE EDUCATION/TRAINING PROGRAM
Payer: MEDICAID

## 2023-01-23 DIAGNOSIS — M54.31 SCIATICA OF RIGHT SIDE: ICD-10-CM

## 2023-01-23 DIAGNOSIS — R10.9 FLANK PAIN: Primary | ICD-10-CM

## 2023-01-23 DIAGNOSIS — K80.50 BILIARY COLIC: ICD-10-CM

## 2023-01-23 DIAGNOSIS — K76.9 LIVER LESION: ICD-10-CM

## 2023-01-23 LAB
ALBUMIN SERPL BCG-MCNC: 4.1 G/DL (ref 3.5–5.1)
ALP SERPL-CCNC: 155 U/L (ref 38–126)
ALT SERPL W/O P-5'-P-CCNC: 22 U/L (ref 11–66)
ANION GAP SERPL CALC-SCNC: 12 MEQ/L (ref 8–16)
AST SERPL-CCNC: 19 U/L (ref 5–40)
BASOPHILS ABSOLUTE: 0 THOU/MM3 (ref 0–0.1)
BASOPHILS NFR BLD AUTO: 0.7 %
BILIRUB CONJ SERPL-MCNC: < 0.2 MG/DL (ref 0–0.3)
BILIRUB SERPL-MCNC: 0.3 MG/DL (ref 0.3–1.2)
BUN SERPL-MCNC: 11 MG/DL (ref 7–22)
CALCIUM SERPL-MCNC: 8.8 MG/DL (ref 8.5–10.5)
CHLORIDE SERPL-SCNC: 102 MEQ/L (ref 98–111)
CO2 SERPL-SCNC: 25 MEQ/L (ref 23–33)
CREAT SERPL-MCNC: 0.8 MG/DL (ref 0.4–1.2)
DEPRECATED RDW RBC AUTO: 42.3 FL (ref 35–45)
EOSINOPHIL NFR BLD AUTO: 1.6 %
EOSINOPHILS ABSOLUTE: 0.1 THOU/MM3 (ref 0–0.4)
ERYTHROCYTE [DISTWIDTH] IN BLOOD BY AUTOMATED COUNT: 12.1 % (ref 11.5–14.5)
GFR SERPL CREATININE-BSD FRML MDRD: > 60 ML/MIN/1.73M2
GLUCOSE SERPL-MCNC: 116 MG/DL (ref 70–108)
HCT VFR BLD AUTO: 39.4 % (ref 42–52)
HGB BLD-MCNC: 12.9 GM/DL (ref 14–18)
IMM GRANULOCYTES # BLD AUTO: 0.02 THOU/MM3 (ref 0–0.07)
IMM GRANULOCYTES NFR BLD AUTO: 0.4 %
LACTATE SERPL-SCNC: 1.1 MMOL/L (ref 0.5–2)
LIPASE SERPL-CCNC: 14 U/L (ref 5.6–51.3)
LYMPHOCYTES ABSOLUTE: 1.3 THOU/MM3 (ref 1–4.8)
LYMPHOCYTES NFR BLD AUTO: 23.5 %
MCH RBC QN AUTO: 31.3 PG (ref 26–33)
MCHC RBC AUTO-ENTMCNC: 32.7 GM/DL (ref 32.2–35.5)
MCV RBC AUTO: 95.6 FL (ref 80–94)
MONOCYTES ABSOLUTE: 0.4 THOU/MM3 (ref 0.4–1.3)
MONOCYTES NFR BLD AUTO: 7.2 %
NEUTROPHILS NFR BLD AUTO: 66.6 %
NRBC BLD AUTO-RTO: 0 /100 WBC
OSMOLALITY SERPL CALC.SUM OF ELEC: 277.9 MOSMOL/KG (ref 275–300)
PLATELET # BLD AUTO: 223 THOU/MM3 (ref 130–400)
PMV BLD AUTO: 10.2 FL (ref 9.4–12.4)
POTASSIUM SERPL-SCNC: 3.7 MEQ/L (ref 3.5–5.2)
PROT SERPL-MCNC: 6.6 G/DL (ref 6.1–8)
RBC # BLD AUTO: 4.12 MILL/MM3 (ref 4.7–6.1)
SEGMENTED NEUTROPHILS ABSOLUTE COUNT: 3.8 THOU/MM3 (ref 1.8–7.7)
SODIUM SERPL-SCNC: 139 MEQ/L (ref 135–145)
TROPONIN T: < 0.01 NG/ML
WBC # BLD AUTO: 5.7 THOU/MM3 (ref 4.8–10.8)

## 2023-01-23 PROCEDURE — 76705 ECHO EXAM OF ABDOMEN: CPT

## 2023-01-23 PROCEDURE — 93005 ELECTROCARDIOGRAM TRACING: CPT | Performed by: STUDENT IN AN ORGANIZED HEALTH CARE EDUCATION/TRAINING PROGRAM

## 2023-01-23 PROCEDURE — 83605 ASSAY OF LACTIC ACID: CPT

## 2023-01-23 PROCEDURE — 99284 EMERGENCY DEPT VISIT MOD MDM: CPT

## 2023-01-23 PROCEDURE — 74176 CT ABD & PELVIS W/O CONTRAST: CPT

## 2023-01-23 PROCEDURE — 96372 THER/PROPH/DIAG INJ SC/IM: CPT

## 2023-01-23 PROCEDURE — 84484 ASSAY OF TROPONIN QUANT: CPT

## 2023-01-23 PROCEDURE — 80053 COMPREHEN METABOLIC PANEL: CPT

## 2023-01-23 PROCEDURE — 85025 COMPLETE CBC W/AUTO DIFF WBC: CPT

## 2023-01-23 PROCEDURE — 83690 ASSAY OF LIPASE: CPT

## 2023-01-23 PROCEDURE — 82248 BILIRUBIN DIRECT: CPT

## 2023-01-23 PROCEDURE — 36415 COLL VENOUS BLD VENIPUNCTURE: CPT

## 2023-01-23 ASSESSMENT — PAIN DESCRIPTION - DESCRIPTORS: DESCRIPTORS: SHOOTING;POUNDING

## 2023-01-23 ASSESSMENT — PAIN - FUNCTIONAL ASSESSMENT: PAIN_FUNCTIONAL_ASSESSMENT: 0-10

## 2023-01-23 ASSESSMENT — PAIN DESCRIPTION - PAIN TYPE: TYPE: ACUTE PAIN

## 2023-01-23 ASSESSMENT — PAIN DESCRIPTION - LOCATION: LOCATION: FLANK

## 2023-01-23 ASSESSMENT — PAIN DESCRIPTION - ORIENTATION: ORIENTATION: RIGHT

## 2023-01-24 VITALS
WEIGHT: 315 LBS | HEART RATE: 63 BPM | BODY MASS INDEX: 41.75 KG/M2 | DIASTOLIC BLOOD PRESSURE: 88 MMHG | RESPIRATION RATE: 18 BRPM | SYSTOLIC BLOOD PRESSURE: 147 MMHG | OXYGEN SATURATION: 96 % | HEIGHT: 73 IN | TEMPERATURE: 97.9 F

## 2023-01-24 PROCEDURE — 6360000002 HC RX W HCPCS: Performed by: STUDENT IN AN ORGANIZED HEALTH CARE EDUCATION/TRAINING PROGRAM

## 2023-01-24 RX ORDER — MORPHINE SULFATE 10 MG/ML
6 INJECTION INTRAVENOUS ONCE
Status: COMPLETED | OUTPATIENT
Start: 2023-01-24 | End: 2023-01-24

## 2023-01-24 RX ORDER — MORPHINE SULFATE 10 MG/ML
6 INJECTION, SOLUTION INTRAMUSCULAR; INTRAVENOUS ONCE
Status: DISCONTINUED | OUTPATIENT
Start: 2023-01-24 | End: 2023-01-24

## 2023-01-24 RX ADMIN — MORPHINE SULFATE 6 MG: 10 INJECTION, SOLUTION INTRAMUSCULAR; INTRAVENOUS at 00:41

## 2023-01-24 ASSESSMENT — PAIN SCALES - GENERAL
PAINLEVEL_OUTOF10: 7
PAINLEVEL_OUTOF10: 9

## 2023-01-24 ASSESSMENT — PAIN - FUNCTIONAL ASSESSMENT: PAIN_FUNCTIONAL_ASSESSMENT: 0-10

## 2023-01-24 NOTE — ED TRIAGE NOTES
Patient to ED from home c/c right flank pain. States he has confirmed gallstones. Patient complains of nausea. Rates pain 8/10. States the pain radiates to the abdomen and back.

## 2023-01-24 NOTE — ED PROVIDER NOTES
[facility] [address] [address] EMERGENCY DEPT      EMERGENCY MEDICINE     Pt Name: Vani Robles  MRN: [id number]  [place] 1970  Date of evaluation: 1/23/2023  Provider: Mira Chavez MD    CHIEF COMPLAINT       Chief Complaint   Patient presents with    Flank Pain     right     HISTORY OF PRESENT ILLNESS   Vani Robles is a pleasant 50 y.o. male who presents to the emergency department from from home, by private vehicle for evaluation of right upper quadrant abdominal pain as well as right flank pain that is moderate in severity and associated with nausea without vomiting. His pain is localized to the right upper quadrant/right flank region with occasional radiation down to the right lower back as well as the right buttock/right upper thigh. He denies dysuria, hematuria, increased urinary frequency or urgency. He denies fevers. Denies midline back pain. He denies chest pain or shortness of breath. He reports he was recently diagnosed with gallstones and was referred to the emergency department after calling his primary care physician with the symptoms. He denies testicular pain or swelling. He denies lower extremity edema, erythema, pain. He denies pleuritic pain. He denies lower extremity focal neurological deficits.     PASTMEDICAL HISTORY     Past Medical History:   Diagnosis Date    Aneurysm ([facility] [address]     pituitary gland    Arthritis     Asthma     Cardiomegaly     [facility]    Depression     Fibromyalgia     GERD (gastroesophageal reflux disease)     Hypertension     Liver disease     CLARIBEL on CPAP        Patient Active Problem List   Diagnosis Code    Cervical stenosis of spinal canal M48.02    Lower back pain M54.50    Disease of spinal cord (HCC) G95.9    Morbid obesity with BMI of 50.0-59.9, adult ([facility] [address] E66.01, Z68.43    Other cervical disc displacement, unspecified cervical region M50.20    Radiculopathy affecting upper extremity M54.10    S/P cervical spinal fusion Z98.1    Sleep apnea G47.30    Hyperlipidemia E78.5    H/O: HTN (hypertension) Z86.79    Moderate asthma without complication A50.649    Enlarged heart I51.7    Chronic pansinusitis J32.4    Pain in both lower extremities M79.604, M79.605    Non-seasonal allergic rhinitis due to pollen J30.1    Allergy desensitization therapy Z51.6    Lumbar spondylosis M47.816    Severe episode of recurrent major depressive disorder, without psychotic features ([facility] [address] F33.2    Strain of lumbar region S39.012A    Abnormal stress test R94.39     SURGICAL HISTORY       Past Surgical History:   Procedure Laterality Date    BACK SURGERY      KNEE SURGERY      lt    PAIN MANAGEMENT PROCEDURE Bilateral 02/11/2021    Bilateral L-facet MBB # 1 @ L3-4, L4-5, and L5-S1 performed by March Spatz, MD at [facility] Bilateral 04/08/2021    Bilateral L-facet MBB # 2 @ L3-4, L4-5, and L5-S1 performed by March Spatz, MD at [address] [place] [address] Right 07/07/2022       CURRENT MEDICATIONS       Previous Medications    ALBUTEROL (PROVENTIL) (2.5 MG/3ML) 0.083% NEBULIZER SOLUTION    inhale contents of 1 vial ( 3 milliliters ) in nebulizer by mouth and INTO THE LUNGS every 4 hours if needed for wheezing    ALBUTEROL SULFATE  (90 BASE) MCG/ACT INHALER    Inhale 2 puffs into the lungs 4 times daily as needed for Wheezing    AMITRIPTYLINE (ELAVIL) 50 MG TABLET    take 1 tablet by mouth at bedtime    ASPIRIN LOW DOSE 81 MG EC TABLET    take 1 tablet by mouth once daily    AZELASTINE (OPTIVAR) 0.05 % OPHTHALMIC SOLUTION    instill 1 drop into both eyes twice a day    BANOPHEN 25 MG CAPSULE    take 1 capsule by mouth every 6 hours if needed for itching    BETAMETHASONE VALERATE (VALISONE) 0.1 % CREAM    apply to affected area twice a day    BLOOD PRESSURE MONITORING (B-D ASSURE BPM/AUTO ARM CUFF) MISC    1 Units by Does not apply route daily    BUDESONIDE-FORMOTEROL (SYMBICORT) 160-4.5 MCG/ACT AERO    inhale 2 puffs by mouth twice a day **RINSE MOUTH AFTER USE**    BUTALBITAL-ACETAMINOPHEN-CAFFEINE (FIORICET, ESGIC) -40 MG PER TABLET    take 1 tablet by mouth every 4 hours if needed for headache    CHOLECALCIFEROL (VITAMIN D3) 1.25 MG (48707 UT) CAPS    Take 1 capsule by mouth once a week    COENZYME Q10 (EQL COQ10) 100 MG CAPS CAPSULE    Take 1 capsule by mouth daily    DICYCLOMINE (BENTYL) 10 MG CAPSULE    Take 1 capsule by mouth 4 times daily (before meals and nightly)    EPINEPHRINE (EPIPEN 2-RAGAHV) 0.3 MG/0.3ML SOAJ INJECTION    Inject one pen as directed STAT for allergic reaction, may disp generic NDC 23265-639-84    FERROUS SULFATE (FEROSUL) 325 (65 FE) MG TABLET    Take 1 tablet by mouth daily (with breakfast)    FLUTICASONE (FLONASE) 50 MCG/ACT NASAL SPRAY    instill 2 sprays into each nostril once daily    GABAPENTIN (NEURONTIN) 300 MG CAPSULE    take 1 capsule by mouth daily    HYDROCHLOROTHIAZIDE (HYDRODIURIL) 25 MG TABLET    take 1 tablet by mouth every morning    HYDROCODONE-ACETAMINOPHEN (NORCO) 5-325 MG PER TABLET    Take 1 tablet by mouth every 8 hours as needed for Pain for up to 30 days.    IPRATROPIUM-ALBUTEROL (DUONEB) 0.5-2.5 (3) MG/3ML SOLN NEBULIZER SOLUTION    Inhale into the lungs    LIDOCAINE (XYLOCAINE) 5 % OINTMENT    apply 1 to 2 grams four times a day if needed    LORATADINE (CLARITIN) 10 MG TABLET    take 1 tablet by mouth once daily    MAGNESIUM OXIDE 420 (252 MG) MG TABS    take 1 tablet by mouth twice a day with meals    MONTELUKAST (SINGULAIR) 10 MG TABLET    take 1 tablet by mouth every evening    NARCAN 4 MG/0.1ML LIQD NASAL SPRAY        ORPHENADRINE (NORFLEX) 100 MG EXTENDED RELEASE TABLET    Take 1 tablet by mouth 2 times daily    PANTOPRAZOLE (PROTONIX) 40 MG TABLET    Take 1 tablet by mouth every morning (before breakfast)    POTASSIUM CHLORIDE (KLOR-CON M) 10 MEQ EXTENDED RELEASE TABLET    take 1 tablet by mouth daily     RESPIRATORY THERAPY SUPPLIES (NEBULIZER/TUBING/MOUTHPIECE) KIT    Dispense tubing, mask, and mouthpiece for nebulizer machine. Dx: Asthma    ROSUVASTATIN (CRESTOR) 20 MG TABLET    take 1 tablet by mouth once daily    SIMETHICONE (MYLICON) 80 MG CHEWABLE TABLET    Take 1 tablet by mouth 4 times daily as needed for Flatulence    TRIAMCINOLONE (KENALOG) 0.1 % CREAM    APPLY TO AFFECTED AREA TWICE A DAY       ALLERGIES     is allergic to dilantin [phenytoin], lyrica [pregabalin], dupixent [dupilumab], oxycodone, and valium [diazepam]. FAMILY HISTORY     He indicated that his mother is . He indicated that his father is . SOCIAL HISTORY       Social History     Tobacco Use    Smoking status: Never    Smokeless tobacco: Never   Vaping Use    Vaping Use: Never used   Substance Use Topics    Alcohol use: Yes     Comment: occasionally    Drug use: No       PHYSICAL EXAM       ED Triage Vitals [23]   BP Temp Temp Source Heart Rate Resp SpO2 Height Weight   (!) 150/100 97.9 °F (36.6 °C) Oral 79 20 97 % 6' 1\" (1.854 m) (!) 407 lb (184.6 kg)       Additional Vital Signs:  Vitals:    23   BP: (!) 150/100   Pulse: 79   Resp: 20   Temp: 97.9 °F (36.6 °C)   SpO2: 97%     Physical Exam  Constitutional:  Well developed, well nourished, no acute distress, non-toxic appearance   Eyes:  PERRL, conjunctiva normal, no scleral icterus  HENT:  Atraumatic, external ears normal, nose normal, oropharynx moist, no pharyngeal exudates.    Respiratory:  No respiratory distress, normal breath sounds, no rales, no wheezing   Cardiovascular:  Normal rate, normal rhythm, no murmurs, no gallops, no rubs   GI:  Soft, nondistended, normal bowel sounds, mild right upper quadrant tenderness, no organomegaly, no mass, no rebound, no guarding   : Mild right flank tenderness  Back- no midline tenderness, no step-offs, right-sided paraspinal thoracolumbar muscle spasms  Integument:  Well hydrated, no rash Lymphatic:  No lymphadenopathy noted   Neurologic:  Alert & oriented x 3, normal motor function of the bilateral lower extremities, normal sensory function of the bilateral lower extremities, no focal deficits noted   Psychiatric:  Speech and behavior appropriate  FORMAL DIAGNOSTIC RESULTS     RADIOLOGY: Interpretation per the Radiologist below, if available at the time of this note (none if blank):    CT ABDOMEN PELVIS WO CONTRAST Additional Contrast? None   Final Result   No acute findings. Similar mass in the left hepatic lobe, possibly a hemangioma. Consider    nonemergent liver MRI for confirmation if indicated. Nonemergent/incidental findings in the report. This document has been electronically signed by: Brii Mathews MD on    01/24/2023 12:13 AM      All CTs at this facility use dose modulation techniques and iterative    reconstructions, and/or weight-based dosing   when appropriate to reduce radiation to a low as reasonably achievable. US GALLBLADDER RUQ   Final Result   No acute findings. Echogenic liver mass, possibly corresponding to similar-appearing mass    seen on the prior study, possibly a hemangioma. Possible gallbladder polyps. Consider follow-up ultrasound in 6 months. Nonemergent/incidental findings in the report.       This document has been electronically signed by: Brii Mathews MD on    01/23/2023 11:21 PM          LABS: (none if blank)  Labs Reviewed   CBC WITH AUTO DIFFERENTIAL - Abnormal; Notable for the following components:       Result Value    RBC 4.12 (*)     Hemoglobin 12.9 (*)     Hematocrit 39.4 (*)     MCV 95.6 (*)     All other components within normal limits   BASIC METABOLIC PANEL - Abnormal; Notable for the following components:    Glucose 116 (*)     All other components within normal limits   HEPATIC FUNCTION PANEL - Abnormal; Notable for the following components:    Alkaline Phosphatase 155 (*)     All other components within normal limits   LACTIC ACID TROPONIN   LIPASE   ANION GAP   GLOMERULAR FILTRATION RATE, ESTIMATED   OSMOLALITY       (Any cultures that may have been sent were not resulted at the time of this patient visit)    [address] / ED COURSE:     1) Number and Complexity of Problems            Problem List This Visit:         Chief Complaint   Patient presents with    Flank Pain     right            Differential Diagnosis includes (but not limited to): Biliary colic, cholecystitis, ureterolithiasis, pyelonephritis, sciatica, musculoskeletal spasms, acute on chronic sciatica        Diagnoses Considered but I have low suspicion of:   Aortic dissection, AAA, renal artery dissection, cord compressive pathology             Pertinent Comorbid Conditions: Morbid obesity, hypertension, history of sciatica    2)  Data Reviewed (none if left blank)          My Independent interpretations:     EKG:      Normal sinus rhythm with occasional PVC, left axis deviation, poor R wave progression, no ST elevations or depressions, prolonged QT    Labs:      Grossly unremarkable                 Decision Rules/Clinical Scores utilized: None            External Documentation Reviewed:         Previous patient encounter documents & history available on EMR was reviewed              See Formal Diagnostic Results above for the lab and radiology tests and orders. 3)  Treatment and Disposition         ED Reassessment: Laboratory eval unremarkable with exception of baseline elevation of alk phos. CT of the abdomen pelvis shows gallstones without acute cholecystitis as well as previously characterized liver lesions. Ultrasound of the right upper quadrant without evidence of cholecystitis. No evidence for ureterolithiasis on the CT scan. Suspect the patient has muscle spasms (history of, on home Norflex) as well as an acute on chronic exacerbation of his sciatica. Morphine resolved the patient's symptoms. Already has a prescription for [place] at home. Recommend follow-up with PCP as well as surgery. Signs and symptoms of cholecystitis discussed at length and strict return precautions discussed. Patient comfortable with plan of care. Recommended he follow-up with his primary care for further evaluation of his liver lesions. Case discussed with consulting clinician: None         Shared Decision-Making was performed and disposition discussed with the        Patient/Family and questions answered         Social determinants of health impacting treatment or disposition: None        Summary of Patient Presentation:      MDM  /   Vitals Reviewed:    Vitals:    01/23/23 2154   BP: (!) 150/100   Pulse: 79   Resp: 20   Temp: 97.9 °F (36.6 °C)   TempSrc: Oral   SpO2: 97%   Weight: (!) 407 lb (184.6 kg)   Height: 6' 1\" (1.854 m)       The patient was seen and examined. Appropriate diagnostic testing was performed and results reviewed with the patient. The results of pertinent diagnostic studies and exam findings were discussed. The patients provisional diagnosis and plan of care were discussed with the patient and present family who expressed understanding. Any medications were reviewed and indications and risks of medications were discussed with the patient /family present. Strict verbal and written return precautions, instructions and appropriate follow-up provided to  the patient. ED Medications administered this visit:  (None if blank)  Medications   morphine injection 6 mg (6 mg IntraMUSCular Given 1/24/23 0041)         PROCEDURES: (None if blank)  Procedures:     CRITICAL CARE: (None if blank)      DISCHARGE PRESCRIPTIONS: (None if blank)  New Prescriptions    No medications on file       FINAL IMPRESSION      1. Flank pain    2. Biliary colic    3. Sciatica of right side    4. Liver lesion          DISPOSITION/PLAN   DISPOSITION    Discharge    OUTPATIENT FOLLOW UP THE PATIENT:  Brii Smith, APRN - CNP  [address]  [address] [facility] [address]  [phone]    In 3 days      Rg Dudley MD  [address] [facility] MD Mira Vasquez MD  01/24/23 4220

## 2023-01-24 NOTE — DISCHARGE INSTRUCTIONS
Please follow-up with your primary care physician to further evaluate the liver lesion as discussed.

## 2023-01-26 LAB
EKG ATRIAL RATE: 68 BPM
EKG P AXIS: 75 DEGREES
EKG P-R INTERVAL: 176 MS
EKG Q-T INTERVAL: 428 MS
EKG QRS DURATION: 94 MS
EKG QTC CALCULATION (BAZETT): 497 MS
EKG R AXIS: -5 DEGREES
EKG T AXIS: 37 DEGREES
EKG VENTRICULAR RATE: 81 BPM

## 2023-01-26 PROCEDURE — 93010 ELECTROCARDIOGRAM REPORT: CPT | Performed by: NUCLEAR MEDICINE

## 2023-01-27 ENCOUNTER — TELEPHONE (OUTPATIENT)
Dept: FAMILY MEDICINE CLINIC | Age: 53
End: 2023-01-27

## 2023-01-27 DIAGNOSIS — Z86.79 H/O: HTN (HYPERTENSION): ICD-10-CM

## 2023-01-27 DIAGNOSIS — E66.01 MORBID OBESITY WITH BMI OF 50.0-59.9, ADULT (HCC): Primary | ICD-10-CM

## 2023-01-27 DIAGNOSIS — I51.7 ENLARGED HEART: ICD-10-CM

## 2023-01-27 DIAGNOSIS — E78.5 HYPERLIPIDEMIA, UNSPECIFIED HYPERLIPIDEMIA TYPE: ICD-10-CM

## 2023-01-27 DIAGNOSIS — G47.30 SLEEP APNEA, UNSPECIFIED TYPE: ICD-10-CM

## 2023-01-27 NOTE — TELEPHONE ENCOUNTER
Noted.  Will try to refer to [facility] internal med. Referral placed.  -WS    Orders Placed This Encounter   Procedures    Jackie Min MD, Internal Medicine, [address] [facility]     Referral Priority:   Routine     Referral Type:   Eval and Treat     Referral Reason:   Specialty Services Required     Referred to Provider:   Teagan Webster MD     Requested Specialty:   Internal Medicine     Number of Visits Requested:   1

## 2023-01-27 NOTE — TELEPHONE ENCOUNTER
Call received from the office of Dr. Ace East. States that patient was recently referred there but Dr. Ace East does not see pts with [facility]. Please advise.

## 2023-02-01 DIAGNOSIS — G89.4 CHRONIC PAIN SYNDROME: ICD-10-CM

## 2023-02-01 RX ORDER — ORPHENADRINE CITRATE 100 MG/1
100 TABLET, EXTENDED RELEASE ORAL 2 TIMES DAILY
Qty: 60 TABLET | Refills: 0 | Status: SHIPPED | OUTPATIENT
Start: 2023-02-12 | End: 2023-03-14

## 2023-02-01 RX ORDER — GABAPENTIN 300 MG/1
CAPSULE ORAL
Qty: 30 CAPSULE | Refills: 0 | Status: CANCELLED | OUTPATIENT
Start: 2023-02-01 | End: 2023-03-03

## 2023-02-01 RX ORDER — LIDOCAINE 50 MG/G
PATCH TOPICAL
Qty: 30 PATCH | Status: CANCELLED | OUTPATIENT
Start: 2023-02-01

## 2023-02-01 RX ORDER — HYDROCODONE BITARTRATE AND ACETAMINOPHEN 5; 325 MG/1; MG/1
1 TABLET ORAL EVERY 8 HOURS PRN
Qty: 90 TABLET | Refills: 0 | Status: SHIPPED | OUTPATIENT
Start: 2023-02-05 | End: 2023-03-07

## 2023-02-01 NOTE — TELEPHONE ENCOUNTER
OARRS reviewed. UDS: + for  Hydrocodone, Gabapentin  Present-- Amitriptyline   Last seen: 12/12/2022.  Follow-up: 02/20/2023

## 2023-02-01 NOTE — TELEPHONE ENCOUNTER
Ishmael Clayton called requesting a refill on the following medications:  Requested Prescriptions     Pending Prescriptions Disp Refills    HYDROcodone-acetaminophen (NORCO) 5-325 MG per tablet 90 tablet 0     Sig: Take 1 tablet by mouth every 8 hours as needed for Pain for up to 30 days. orphenadrine (NORFLEX) 100 MG extended release tablet 60 tablet 0     Sig: Take 1 tablet by mouth 2 times daily    gabapentin (NEURONTIN) 300 MG capsule 30 capsule 0    lidocaine (LIDODERM) 5 % 30 patch      Si hours on, 12 hours off. Pharmacy verified: [facility] on [address]      Date of last visit: 2022  Date of next visit (if applicable):

## 2023-02-06 ENCOUNTER — CLINICAL DOCUMENTATION (OUTPATIENT)
Dept: SPIRITUAL SERVICES | Facility: CLINIC | Age: 53
End: 2023-02-06

## 2023-02-06 RX ORDER — GABAPENTIN 300 MG/1
300 CAPSULE ORAL DAILY
Qty: 30 CAPSULE | Refills: 0 | Status: SHIPPED | OUTPATIENT
Start: 2023-02-12 | End: 2023-03-14

## 2023-02-06 NOTE — FLOWSHEET NOTE
[place] [address] PROGRESS NOTE      Patient: Alex Velez         YOB: 1970  Age: 50 y.o. Gender: male            Assessment:  Agustín Nicholson is a 57-year-old male who is being referred for support due what is currently being experienced in his life. Per referral, feeling overwhelmed with multiple life-adjustments. Interventions:   made a follow-up attempt to contact Agustín Nicholson via telephone call, responding to a voicemail message requesting an appointment for ongoing support. Outcomes:  Agustín Nicholson confirmed his desire for a scheduled appointment when available. Plan:  A follow-up appointment is scheduled on 2/8 at [facility].  remains available to assist as needed moving forward.     Electronically signed by Dominique West on 2/6/2023 at 1:51 PM.  [facility]  [phone]

## 2023-02-08 ENCOUNTER — CLINICAL DOCUMENTATION (OUTPATIENT)
Dept: SPIRITUAL SERVICES | Facility: CLINIC | Age: 53
End: 2023-02-08

## 2023-02-08 NOTE — FLOWSHEET NOTE
[place] [address] PROGRESS NOTE    Patient: Emelia Santacruz       YOB: 1970  Age: 50 y.o. Gender: male            Assessment:  Ricci Bee is a 51-year-old male who is being referred for emotional / spiritual support due what is currently being experienced in his life. Per referral, feeling overwhelmed with multiple life-adjustments. Jennifer Mariano stated \"things are still crazy. \" His prior relationships are creating stressful circumstances which complicate his ability to successfully move forward toward his goals in life. Jennifer Mariano expressed that money is still tight, and multiple vehicles need repairs. In addition, his future living circumstances (looking at 2 locations) have not opened for him, therefore he cannot make desired life-change steps at this time. He is still hopeful for a positive outcome. Jennifer Mariano stated his leg is healing slowly, but making progress. He expressed still being weakened and agitated with use. He is leaving for therapy following today's encounter. Interventions:   was present during the encounter to provide a listening presence allowing patient to freely engage in conversation, responding to open-ended questions, as well as follow-up questions for clarity and greater understanding.  shared words of , encouragement, and hope as appropriate.  ended the session with prayer. Outcomes:  Jennifer Mariano was very appreciative of an opportunity to freely express his story and vent his emotions in a safe place. He stated \"feeling better already\" following the encounter. Plan:  Jennifer Mariano will contact  when additional emotional / spiritual support is desired or needed.  remains available to assist as needed moving forward.     Electronically signed by Jamal Gaspar on 2/8/2023 at 9:55 AM.  [facility]  [phone]

## 2023-02-09 ENCOUNTER — OFFICE VISIT (OUTPATIENT)
Dept: SURGERY | Age: 53
End: 2023-02-09
Payer: MEDICAID

## 2023-02-09 ENCOUNTER — TELEPHONE (OUTPATIENT)
Dept: SURGERY | Age: 53
End: 2023-02-09

## 2023-02-09 VITALS
SYSTOLIC BLOOD PRESSURE: 120 MMHG | TEMPERATURE: 97.2 F | WEIGHT: 315 LBS | OXYGEN SATURATION: 98 % | RESPIRATION RATE: 20 BRPM | DIASTOLIC BLOOD PRESSURE: 62 MMHG | BODY MASS INDEX: 41.75 KG/M2 | HEIGHT: 73 IN | HEART RATE: 72 BPM

## 2023-02-09 DIAGNOSIS — K80.20 GALLSTONES: Primary | ICD-10-CM

## 2023-02-09 DIAGNOSIS — K80.50 BILIARY COLIC: ICD-10-CM

## 2023-02-09 DIAGNOSIS — G47.30 SLEEP APNEA, UNSPECIFIED TYPE: ICD-10-CM

## 2023-02-09 DIAGNOSIS — E66.01 MORBID OBESITY WITH BMI OF 50.0-59.9, ADULT (HCC): ICD-10-CM

## 2023-02-09 PROCEDURE — 1036F TOBACCO NON-USER: CPT | Performed by: SURGERY

## 2023-02-09 PROCEDURE — 99204 OFFICE O/P NEW MOD 45 MIN: CPT | Performed by: SURGERY

## 2023-02-09 PROCEDURE — G8484 FLU IMMUNIZE NO ADMIN: HCPCS | Performed by: SURGERY

## 2023-02-09 PROCEDURE — 3017F COLORECTAL CA SCREEN DOC REV: CPT | Performed by: SURGERY

## 2023-02-09 PROCEDURE — G8417 CALC BMI ABV UP PARAM F/U: HCPCS | Performed by: SURGERY

## 2023-02-09 PROCEDURE — G8427 DOCREV CUR MEDS BY ELIG CLIN: HCPCS | Performed by: SURGERY

## 2023-02-09 NOTE — TELEPHONE ENCOUNTER
You follow the patient for chronic back pain and prescribe Norco every 8 hrs. He is scheduled for robotic laparoscopic cholecystectomy on 2/15 with Dr. Luis Gay. Can she increase frequency for a short postop period or would you like to manage postop?

## 2023-02-09 NOTE — PROGRESS NOTES
Yuli Aguilar MD   General Surgery  New Patient Evaluation in Office  Pt Name: Iris Abdalla  Date of Birth 1970   Today's Date: 2/9/2023  Medical Record Number: [id number]  Referring Provider: Dr. Ciera Irizarry  Primary Care Provider: MIRNA Medel - CNP  Chief Complaint:  Chief Complaint   Patient presents with    Surgical Consult     New patient-in ER 1/23/23 -Flank pain, Biliary colic            ASSESSMENT      1. Gallstones    2. Sleep apnea, unspecified type    3. Morbid obesity with BMI of 50.0-59.9, adult ([address]    4. Biliary colic    5. HTN  6. asthma     PLANS      Robotic assisted laparoscopic cholecystectomy possible open  2. Techniques and risks of procedure discussed with patient at length. Risks include but not limited to bleeding, infection, conversion to open procedure, bile duct leak, bile duct injury, postoperative diarrhea as well as potential for perioperative cardiac or pulmonary complications all discussed. Mr. Issac Cespedes lexpressed understanding wishes to proceed. 3. Preoperative testing per anesthesia guidelines. 4. Cardiology records reviewed  5.general anesthesia      Sia Rosado is a 50y.o. year old male who is presenting today in the office for persistent, intermittent epigastric and right upper quadrant pain. He denies a prior history of hepatitis pancreatitis or jaundice. He was recently in the emergency department with those complaints. Imaging studies included an ultrasound of the gallbladder as well as a CT scan of the abdomen and pelvis. Previous hepatobiliary scan showed a normal ejection fraction. He has a documented fatty liver. He had several echogenic structures in the lumen of the gallbladder. Largest was 1.1 cm in size. There was no gallbladder wall thickening. He had a small stable echogenic mass in the liver which has the appearance of a benign hemangioma. He had no biliary duct dilation.   Hepatic function tests were within normal limits with the exception of a mildly elevated alkaline phosphatase. History of sciatica and  has seen pain management. Past Medical History  Past Medical History:   Diagnosis Date    Aneurysm ([address]     pituitary gland    Arthritis     Asthma     Cardiomegaly     COVID-19 11/2021    stormy lance    Depression     Fibromyalgia     GERD (gastroesophageal reflux disease)     Hypertension     melhem    Liver disease     CLARIBEL on CPAP        Past Surgical History  Past Surgical History:   Procedure Laterality Date    BACK SURGERY      COLONOSCOPY      KNEE SURGERY Left     NECK SURGERY      PAIN MANAGEMENT PROCEDURE Bilateral 02/11/2021    Bilateral L-facet MBB # 1 @ L3-4, L4-5, and L5-S1 performed by Linda Contreras MD at [facility] Bilateral 04/08/2021    Bilateral L-facet MBB # 2 @ L3-4, L4-5, and L5-S1 performed by Linda Contreras MD at [address] [place], [place] [address]w Right 07/07/2022       Medications  Current Outpatient Medications   Medication Sig Dispense Refill    [START ON 2/12/2023] gabapentin (NEURONTIN) 300 MG capsule Take 1 capsule by mouth daily for 30 days. 30 capsule 0    HYDROcodone-acetaminophen (NORCO) 5-325 MG per tablet Take 1 tablet by mouth every 8 hours as needed for Pain for up to 30 days.  90 tablet 0    [START ON 2/12/2023] orphenadrine (NORFLEX) 100 MG extended release tablet Take 1 tablet by mouth 2 times daily 60 tablet 0    lidocaine (XYLOCAINE) 5 % ointment apply 1 to 2 grams four times a day if needed      Magnesium Oxide 420 (252 Mg) MG TABS take 1 tablet by mouth twice a day with meals      rosuvastatin (CRESTOR) 20 MG tablet take 1 tablet by mouth once daily      dicyclomine (BENTYL) 10 MG capsule Take 1 capsule by mouth 4 times daily (before meals and nightly) 120 capsule 3    butalbital-acetaminophen-caffeine (FIORICET, ESGIC) -40 MG per tablet take 1 tablet by mouth every 4 hours if needed for headache 90 tablet 0    budesonide-formoterol (SYMBICORT) 160-4.5 MCG/ACT AERO inhale 2 puffs by mouth twice a day **RINSE MOUTH AFTER USE** 30.6 g 3    ASPIRIN LOW DOSE 81 MG EC tablet take 1 tablet by mouth once daily 90 tablet 0    BANOPHEN 25 MG capsule take 1 capsule by mouth every 6 hours if needed for itching 60 capsule 5    loratadine (CLARITIN) 10 MG tablet take 1 tablet by mouth once daily 90 tablet 3    triamcinolone (KENALOG) 0.1 % cream APPLY TO AFFECTED AREA TWICE A DAY 1 each 2    potassium chloride (KLOR-CON M) 10 MEQ extended release tablet take 1 tablet by mouth daily      betamethasone valerate (VALISONE) 0.1 % cream apply to affected area twice a day 45 g 3    coenzyme Q10 (EQL COQ10) 100 MG CAPS capsule Take 1 capsule by mouth daily 30 capsule 5    ferrous sulfate (FEROSUL) 325 (65 Fe) MG tablet Take 1 tablet by mouth daily (with breakfast) 30 tablet 5    Cholecalciferol (VITAMIN D3) 1.25 MG (06290 UT) CAPS Take 1 capsule by mouth once a week 4 capsule 5    pantoprazole (PROTONIX) 40 MG tablet Take 1 tablet by mouth every morning (before breakfast) 90 tablet 3    ipratropium-albuterol (DUONEB) 0.5-2.5 (3) MG/3ML SOLN nebulizer solution Inhale into the lungs      fluticasone (FLONASE) 50 MCG/ACT nasal spray instill 2 sprays into each nostril once daily 48 g 5    montelukast (SINGULAIR) 10 MG tablet take 1 tablet by mouth every evening 90 tablet 3    hydroCHLOROthiazide (HYDRODIURIL) 25 MG tablet take 1 tablet by mouth every morning 90 tablet 1    azelastine (OPTIVAR) 0.05 % ophthalmic solution instill 1 drop into both eyes twice a day 1 each 11    albuterol (PROVENTIL) (2.5 MG/3ML) 0.083% nebulizer solution inhale contents of 1 vial ( 3 milliliters ) in nebulizer by mouth and INTO THE LUNGS every 4 hours if needed for wheezing 375 mL 3    simethicone (MYLICON) 80 MG chewable tablet Take 1 tablet by mouth 4 times daily as needed for Flatulence 180 tablet 3    amitriptyline (ELAVIL) 50 MG tablet take 1 tablet by mouth at bedtime (Patient taking differently: As needed) 90 tablet 1    albuterol sulfate  (90 Base) MCG/ACT inhaler Inhale 2 puffs into the lungs 4 times daily as needed for Wheezing 3 each 3    Blood Pressure Monitoring (B-D ASSURE BPM/AUTO ARM CUFF) MISC 1 Units by Does not apply route daily 1 each 0    Respiratory Therapy Supplies (NEBULIZER/TUBING/MOUTHPIECE) KIT Dispense tubing, mask, and mouthpiece for nebulizer machine. Dx: Asthma 1 kit 0    EPINEPHrine (EPIPEN 2-RAGHAV) 0.3 MG/0.3ML SOAJ injection Inject one pen as directed STAT for allergic reaction, may disp generic NDC 88140-881-08 1 each 0    NARCAN 4 MG/0.1ML LIQD nasal spray        No current facility-administered medications for this visit.      Allergies     Allergies   Allergen Reactions    Dilantin [Phenytoin] Anaphylaxis and Swelling    Lyrica [Pregabalin] Other (See Comments)     Bleeding in bowels    Dupixent [Dupilumab]      HIVES, THROAT ITCHING    Oxycodone      THROAT TIGHTNESS    Valium [Diazepam]        Family History  Family History   Problem Relation Age of Onset    Arthritis Mother     Asthma Mother     High Blood Pressure Mother     Emphysema Mother     Other Mother         she was hit and killed by car    Arthritis Father     High Blood Pressure Father     Emphysema Father     Asthma Father        SocialHistory  Social History     Socioeconomic History    Marital status:      Spouse name: Not on file    Number of children: 5    Years of education: Not on file    Highest education level: Not on file   Occupational History    Not on file   Tobacco Use    Smoking status: Never    Smokeless tobacco: Never   Vaping Use    Vaping Use: Never used   Substance and Sexual Activity    Alcohol use: Yes     Comment: occasionally    Drug use: No    Sexual activity: Yes     Partners: Female   Other Topics Concern    Not on file   Social History Narrative    No barriers with transportation    [facility] support Social Determinants of Health     Financial Resource Strain: Low Risk     Difficulty of Paying Living Expenses: Not hard at all   Food Insecurity: No Food Insecurity    Worried About Running Out of Food in the Last Year: Never true    Ran Out of Food in the Last Year: Never true   Transportation Needs: Not on file   Physical Activity: Not on file   Stress: Not on file   Social Connections: Not on file   Intimate Partner Violence: Not on file   Housing Stability: Not on file           Review of Systems  Review of Systems   Constitutional:  Negative for chills, fatigue, fever and unexpected weight change. HENT:  Negative for sore throat, trouble swallowing and voice change. Eyes:  Negative for visual disturbance. Respiratory:  Negative for cough, shortness of breath and wheezing. Cardiovascular:  Negative for chest pain and palpitations. Gastrointestinal:  Positive for abdominal pain and nausea. Negative for blood in stool and vomiting. Endocrine: Negative for cold intolerance, heat intolerance and polydipsia. Genitourinary:  Negative for dysuria, flank pain and hematuria. Musculoskeletal:  Positive for arthralgias and back pain. Negative for gait problem, joint swelling and myalgias. Skin:  Negative for color change and rash. Allergic/Immunologic: Negative for immunocompromised state. Neurological:  Negative for dizziness, tremors, seizures and speech difficulty. Hematological:  Does not bruise/bleed easily. Psychiatric/Behavioral:  Negative for behavioral problems and confusion.       OBJECTIVE     /62 (Site: Right Upper Arm, Position: Sitting, Cuff Size: Medium Adult)   Pulse 72   Temp 97.2 °F (36.2 °C) (Temporal)   Resp 20   Ht 6' 1\" (1.854 m)   Wt (!) 421 lb 9.6 oz (191.2 kg)   SpO2 98%   BMI 55.62 kg/m²      Physical Exam    Lab Results   Component Value Date    WBC 5.7 01/23/2023    HGB 12.9 (L) 01/23/2023    HCT 39.4 (L) 01/23/2023     01/23/2023    CHOL 161 12/30/2022    TRIG 93 12/30/2022    HDL 55 12/30/2022    ALT 22 01/23/2023    AST 19 01/23/2023     01/23/2023    K 3.7 01/23/2023     01/23/2023    CREATININE 0.8 01/23/2023    BUN 11 01/23/2023    CO2 25 01/23/2023    TSH 2.660 03/24/2022    INR 1.02 07/01/2022    LABA1C 4.9 07/01/2022        US Abdomen Limited, Right Upper Quadrant       COMPARISON: US/KO/SR - US LIVER - 09/27/2022 08:25 AM EDT       FINDINGS:   Echogenic, shadowing, nonmobile adherent calculi and/or polyps in the    gallbladder measuring up to 1.1 cm and 0.6 cm. Echogenic mobile calculi in    the gallbladder. No gallbladder wall thickening. No pericholecystic fluid. Negative sonographic Mccord sign. No bile duct dilatation. Common bile duct measures 5 mm in diameter. The visualized pancreas is unremarkable. Hepatomegaly. Mildly nodular liver contours suggestive of hepatic    cirrhosis. Ovoid echogenic mass in the right hepatic lobe measuring 1.5 x    1.4 x 0.8 cm, possibly corresponding to similar-appearing lesion    previously described in the left hepatic lobe measuring 1.6 x 1.3 x 0.9 cm    on the prior study. Impression   No acute findings. Echogenic liver mass, possibly corresponding to similar-appearing mass    seen on the prior study, possibly a hemangioma. Possible gallbladder polyps. Consider follow-up ultrasound in 6 months. Nonemergent/incidental findings in the report. This document has been electronically signed by: Maddie Hallman MD on    01/23/2023 11:21 PM         Narrative   CT Abdomen and Pelvis WO Contrast       COMPARISON: US/SR - US GALLBLADDER RUQ - 01/23/2023 10:11 PM EST   US/KO/SR - US LIVER - 09/27/2022 08:25 AM EDT   CT/SR - CTA ABDOMEN PELVIS W WO CONTRAST - 09/13/2022 03:06 AM EDT       FINDINGS:   Hepatomegaly. Nodular liver contours consistent with hepatic cirrhosis.     Ovoid hypodense mass in the left hepatic lobe (series 301, image 11)    measuring 3.3 x 2.8 cm (3.0 x 2.9 cm on 09/13/2022 when measured in the    same way). Normal spleen. Normal kidneys. No urolithiasis or hydronephrosis. Normal adrenal glands. Normal pancreas. Cholelithiasis. No CT evidence of acute cholecystitis. No biliary    dilatation. No evidence of bowel obstruction or colitis. Normal appendix. Unremarkable bladder. No ascites. No pneumoperitoneum. No lymphadenopathy. No acute fracture. Degenerative changes in the spine. No abdominal aortic aneurysm. Fat-containing umbilical hernia. Impression   No acute findings. Similar mass in the left hepatic lobe, possibly a hemangioma. Consider    nonemergent liver MRI for confirmation if indicated. Nonemergent/incidental findings in the report. This document has been electronically signed by: Getachew Flores MD on    01/24/2023 12:13 AM       All CTs at this facility use dose modulation techniques and iterative    reconstructions, and/or weight-based dosing   when appropriate to reduce radiation to a low as reasonably achievable. Narrative   PROCEDURE: US LIVER       CLINICAL INFORMATION: Fatty liver       TECHNIQUE: Ultrasound of the liver was performed. Grayscale and color images were obtained. COMPARISON: None       FINDINGS: There are echogenic structures in the lumen of the gallbladder. The largest measures 0.7 cm. There is no gallbladder wall thickening or pericholecystic fluid. The common bile duct is normal, measuring 0.5 cm. There is no intrahepatic biliary    ductal dilation. The liver is mildly enlarged, measuring 22 cm. Hyperechoic lesion in the left hepatic lobe measures 1.6 x 1.3 x 0.9 cm. Visualized pancreas and right kidney are unremarkable. Portal vessels are patent. Impression   1. Cholelithiasis without evidence of acute cholecystitis. 2. Hepatomegaly. 3. Hyperechoic structure in the left hepatic lobe, likely a hemangioma.        Final report electronically signed by  Genny Spatz on 9/27/2022 10:34 AM

## 2023-02-11 ASSESSMENT — ENCOUNTER SYMPTOMS
WHEEZING: 0
BLOOD IN STOOL: 0
SORE THROAT: 0
SHORTNESS OF BREATH: 0
NAUSEA: 1
COUGH: 0
VOICE CHANGE: 0
TROUBLE SWALLOWING: 0
BACK PAIN: 1
VOMITING: 0
COLOR CHANGE: 0
ABDOMINAL PAIN: 1

## 2023-02-13 RX ORDER — LANOLIN ALCOHOL/MO/W.PET/CERES
CREAM (GRAM) TOPICAL
Qty: 180 TABLET | Refills: 3 | Status: SHIPPED | OUTPATIENT
Start: 2023-02-13

## 2023-02-13 NOTE — TELEPHONE ENCOUNTER
This medication refill is regarding a telephone request. Refill requested by patient. Requested Prescriptions     Pending Prescriptions Disp Refills    Magnesium Oxide 420 (252 Mg) MG TABS 180 tablet 3     Sig: take 1 tablet by mouth twice daily       Date of last visit: 1/12/2023   Date of next visit: none  Date of last refill: 11/17/22 for 60/11 by Halina Ortiz; pt states he contacted the pharmacy and the Rx was canceled by this office. He takes this for muscle cramping. Pharmacy Name: [facility]    Rx verified, ordered and set to AARTI ISABEL

## 2023-02-14 NOTE — PROGRESS NOTES
Pharmacy Pre-Op Antibiotic Dose Adjustment    Diane Judd is a 50 y.o. male scheduled for surgery. Pharmacy will adjust the following pre-op antibiotic per P[employer]T approved policy: cefoxitin    Weight:  Wt Readings from Last 1 Encounters:   02/09/23 (!) 421 lb 9.6 oz (191.2 kg)     There is no height or weight on file to calculate BMI.     Plan:   Pre-op antibiotic adjustment: increase cefoxitin from 2 g to 3 g    Deepika Corral PharmD 2/14/2023 10:01 AM

## 2023-02-15 ENCOUNTER — ANESTHESIA (OUTPATIENT)
Dept: OPERATING ROOM | Age: 53
End: 2023-02-15
Payer: MEDICAID

## 2023-02-15 ENCOUNTER — ANESTHESIA EVENT (OUTPATIENT)
Dept: OPERATING ROOM | Age: 53
End: 2023-02-15
Payer: MEDICAID

## 2023-02-15 ENCOUNTER — HOSPITAL ENCOUNTER (OUTPATIENT)
Age: 53
Setting detail: OUTPATIENT SURGERY
Discharge: HOME | End: 2023-02-15
Attending: SURGERY | Admitting: SURGERY
Payer: MEDICAID

## 2023-02-15 VITALS
DIASTOLIC BLOOD PRESSURE: 84 MMHG | BODY MASS INDEX: 41.75 KG/M2 | SYSTOLIC BLOOD PRESSURE: 148 MMHG | WEIGHT: 315 LBS | HEART RATE: 78 BPM | TEMPERATURE: 97.8 F | HEIGHT: 73 IN | RESPIRATION RATE: 20 BRPM | OXYGEN SATURATION: 99 %

## 2023-02-15 DIAGNOSIS — K80.20 GALLSTONES: ICD-10-CM

## 2023-02-15 LAB — POTASSIUM SERPL-SCNC: 3.9 MEQ/L (ref 3.5–5.2)

## 2023-02-15 PROCEDURE — 2500000003 HC RX 250 WO HCPCS: Performed by: SURGERY

## 2023-02-15 PROCEDURE — S2900 ROBOTIC SURGICAL SYSTEM: HCPCS | Performed by: SURGERY

## 2023-02-15 PROCEDURE — C1889 IMPLANT/INSERT DEVICE, NOC: HCPCS | Performed by: SURGERY

## 2023-02-15 PROCEDURE — 88304 TISSUE EXAM BY PATHOLOGIST: CPT

## 2023-02-15 PROCEDURE — 6360000002 HC RX W HCPCS: Performed by: SURGERY

## 2023-02-15 PROCEDURE — 6360000002 HC RX W HCPCS: Performed by: NURSE ANESTHETIST, CERTIFIED REGISTERED

## 2023-02-15 PROCEDURE — 84132 ASSAY OF SERUM POTASSIUM: CPT

## 2023-02-15 PROCEDURE — 3700000001 HC ADD 15 MINUTES (ANESTHESIA): Performed by: SURGERY

## 2023-02-15 PROCEDURE — 2580000003 HC RX 258: Performed by: SURGERY

## 2023-02-15 PROCEDURE — 6370000000 HC RX 637 (ALT 250 FOR IP): Performed by: STUDENT IN AN ORGANIZED HEALTH CARE EDUCATION/TRAINING PROGRAM

## 2023-02-15 PROCEDURE — 6360000002 HC RX W HCPCS

## 2023-02-15 PROCEDURE — 36415 COLL VENOUS BLD VENIPUNCTURE: CPT

## 2023-02-15 PROCEDURE — 7100000001 HC PACU RECOVERY - ADDTL 15 MIN: Performed by: SURGERY

## 2023-02-15 PROCEDURE — 3700000000 HC ANESTHESIA ATTENDED CARE: Performed by: SURGERY

## 2023-02-15 PROCEDURE — 3600000019 HC SURGERY ROBOT ADDTL 15MIN: Performed by: SURGERY

## 2023-02-15 PROCEDURE — 7100000011 HC PHASE II RECOVERY - ADDTL 15 MIN: Performed by: SURGERY

## 2023-02-15 PROCEDURE — 7100000010 HC PHASE II RECOVERY - FIRST 15 MIN: Performed by: SURGERY

## 2023-02-15 PROCEDURE — 3600000009 HC SURGERY ROBOT BASE: Performed by: SURGERY

## 2023-02-15 PROCEDURE — 2709999900 HC NON-CHARGEABLE SUPPLY: Performed by: SURGERY

## 2023-02-15 PROCEDURE — 94640 AIRWAY INHALATION TREATMENT: CPT

## 2023-02-15 PROCEDURE — 7100000000 HC PACU RECOVERY - FIRST 15 MIN: Performed by: SURGERY

## 2023-02-15 PROCEDURE — 2500000003 HC RX 250 WO HCPCS: Performed by: NURSE ANESTHETIST, CERTIFIED REGISTERED

## 2023-02-15 DEVICE — CLIP INT L POLYMER LOK LIG HEM O LOK: Type: IMPLANTABLE DEVICE | Status: FUNCTIONAL

## 2023-02-15 RX ORDER — IPRATROPIUM BROMIDE AND ALBUTEROL SULFATE 2.5; .5 MG/3ML; MG/3ML
1 SOLUTION RESPIRATORY (INHALATION) ONCE
Status: COMPLETED | OUTPATIENT
Start: 2023-02-15 | End: 2023-02-15

## 2023-02-15 RX ORDER — MORPHINE SULFATE 2 MG/ML
4 INJECTION, SOLUTION INTRAMUSCULAR; INTRAVENOUS
Status: CANCELLED | OUTPATIENT
Start: 2023-02-15

## 2023-02-15 RX ORDER — LIDOCAINE HYDROCHLORIDE 20 MG/ML
INJECTION, SOLUTION INTRAVENOUS PRN
Status: DISCONTINUED | OUTPATIENT
Start: 2023-02-15 | End: 2023-02-15 | Stop reason: SDUPTHER

## 2023-02-15 RX ORDER — FENTANYL CITRATE 50 UG/ML
50 INJECTION, SOLUTION INTRAMUSCULAR; INTRAVENOUS EVERY 5 MIN PRN
Status: DISCONTINUED | OUTPATIENT
Start: 2023-02-15 | End: 2023-02-15 | Stop reason: HOSPADM

## 2023-02-15 RX ORDER — SODIUM CHLORIDE 0.9 % (FLUSH) 0.9 %
5-40 SYRINGE (ML) INJECTION PRN
Status: DISCONTINUED | OUTPATIENT
Start: 2023-02-15 | End: 2023-02-15 | Stop reason: HOSPADM

## 2023-02-15 RX ORDER — ONDANSETRON 2 MG/ML
4 INJECTION INTRAMUSCULAR; INTRAVENOUS
Status: DISCONTINUED | OUTPATIENT
Start: 2023-02-15 | End: 2023-02-15 | Stop reason: HOSPADM

## 2023-02-15 RX ORDER — DIPHENHYDRAMINE HYDROCHLORIDE 50 MG/ML
12.5 INJECTION INTRAMUSCULAR; INTRAVENOUS
Status: DISCONTINUED | OUTPATIENT
Start: 2023-02-15 | End: 2023-02-15 | Stop reason: HOSPADM

## 2023-02-15 RX ORDER — SODIUM CHLORIDE 0.9 % (FLUSH) 0.9 %
5-40 SYRINGE (ML) INJECTION EVERY 12 HOURS SCHEDULED
Status: CANCELLED | OUTPATIENT
Start: 2023-02-15

## 2023-02-15 RX ORDER — HYDROCODONE BITARTRATE AND ACETAMINOPHEN 7.5; 325 MG/1; MG/1
1 TABLET ORAL EVERY 6 HOURS PRN
Qty: 28 TABLET | Refills: 0 | Status: SHIPPED | OUTPATIENT
Start: 2023-02-15 | End: 2023-02-22

## 2023-02-15 RX ORDER — DEXAMETHASONE SODIUM PHOSPHATE 10 MG/ML
INJECTION, EMULSION INTRAMUSCULAR; INTRAVENOUS PRN
Status: DISCONTINUED | OUTPATIENT
Start: 2023-02-15 | End: 2023-02-15 | Stop reason: SDUPTHER

## 2023-02-15 RX ORDER — ONDANSETRON 4 MG/1
4 TABLET, FILM COATED ORAL 3 TIMES DAILY PRN
Qty: 15 TABLET | Refills: 0 | Status: SHIPPED | OUTPATIENT
Start: 2023-02-15

## 2023-02-15 RX ORDER — LABETALOL HYDROCHLORIDE 5 MG/ML
INJECTION, SOLUTION INTRAVENOUS PRN
Status: DISCONTINUED | OUTPATIENT
Start: 2023-02-15 | End: 2023-02-15 | Stop reason: SDUPTHER

## 2023-02-15 RX ORDER — INDOCYANINE GREEN AND WATER 25 MG
2.5 KIT INJECTION ONCE
Status: COMPLETED | OUTPATIENT
Start: 2023-02-15 | End: 2023-02-15

## 2023-02-15 RX ORDER — FENTANYL CITRATE 50 UG/ML
INJECTION, SOLUTION INTRAMUSCULAR; INTRAVENOUS PRN
Status: DISCONTINUED | OUTPATIENT
Start: 2023-02-15 | End: 2023-02-15 | Stop reason: SDUPTHER

## 2023-02-15 RX ORDER — ROCURONIUM BROMIDE 10 MG/ML
INJECTION, SOLUTION INTRAVENOUS PRN
Status: DISCONTINUED | OUTPATIENT
Start: 2023-02-15 | End: 2023-02-15 | Stop reason: SDUPTHER

## 2023-02-15 RX ORDER — HYDROCODONE BITARTRATE AND ACETAMINOPHEN 7.5; 325 MG/1; MG/1
1 TABLET ORAL EVERY 6 HOURS PRN
Status: DISCONTINUED | OUTPATIENT
Start: 2023-02-15 | End: 2023-02-15 | Stop reason: HOSPADM

## 2023-02-15 RX ORDER — BUPIVACAINE HYDROCHLORIDE 2.5 MG/ML
INJECTION, SOLUTION EPIDURAL; INFILTRATION; INTRACAUDAL PRN
Status: DISCONTINUED | OUTPATIENT
Start: 2023-02-15 | End: 2023-02-15 | Stop reason: ALTCHOICE

## 2023-02-15 RX ORDER — SODIUM CHLORIDE 9 MG/ML
INJECTION, SOLUTION INTRAVENOUS PRN
Status: DISCONTINUED | OUTPATIENT
Start: 2023-02-15 | End: 2023-02-15 | Stop reason: HOSPADM

## 2023-02-15 RX ORDER — SODIUM CHLORIDE 9 MG/ML
INJECTION, SOLUTION INTRAVENOUS CONTINUOUS
Status: CANCELLED | OUTPATIENT
Start: 2023-02-15

## 2023-02-15 RX ORDER — ONDANSETRON 2 MG/ML
4 INJECTION INTRAMUSCULAR; INTRAVENOUS EVERY 6 HOURS PRN
Status: CANCELLED | OUTPATIENT
Start: 2023-02-15

## 2023-02-15 RX ORDER — SODIUM CHLORIDE 0.9 % (FLUSH) 0.9 %
5-40 SYRINGE (ML) INJECTION EVERY 12 HOURS SCHEDULED
Status: DISCONTINUED | OUTPATIENT
Start: 2023-02-15 | End: 2023-02-15 | Stop reason: HOSPADM

## 2023-02-15 RX ORDER — MORPHINE SULFATE 2 MG/ML
2 INJECTION, SOLUTION INTRAMUSCULAR; INTRAVENOUS
Status: CANCELLED | OUTPATIENT
Start: 2023-02-15

## 2023-02-15 RX ORDER — MEPERIDINE HYDROCHLORIDE 25 MG/ML
12.5 INJECTION INTRAMUSCULAR; INTRAVENOUS; SUBCUTANEOUS EVERY 5 MIN PRN
Status: DISCONTINUED | OUTPATIENT
Start: 2023-02-15 | End: 2023-02-15 | Stop reason: HOSPADM

## 2023-02-15 RX ORDER — SODIUM CHLORIDE 0.9 % (FLUSH) 0.9 %
5-40 SYRINGE (ML) INJECTION PRN
Status: CANCELLED | OUTPATIENT
Start: 2023-02-15

## 2023-02-15 RX ORDER — ONDANSETRON 4 MG/1
4 TABLET, ORALLY DISINTEGRATING ORAL EVERY 8 HOURS PRN
Status: CANCELLED | OUTPATIENT
Start: 2023-02-15

## 2023-02-15 RX ORDER — SODIUM CHLORIDE 9 MG/ML
INJECTION, SOLUTION INTRAVENOUS PRN
Status: CANCELLED | OUTPATIENT
Start: 2023-02-15

## 2023-02-15 RX ORDER — FENTANYL CITRATE 50 UG/ML
INJECTION, SOLUTION INTRAMUSCULAR; INTRAVENOUS
Status: COMPLETED
Start: 2023-02-15 | End: 2023-02-15

## 2023-02-15 RX ORDER — SUCCINYLCHOLINE/SOD CL,ISO/PF 200MG/10ML
SYRINGE (ML) INTRAVENOUS PRN
Status: DISCONTINUED | OUTPATIENT
Start: 2023-02-15 | End: 2023-02-15 | Stop reason: SDUPTHER

## 2023-02-15 RX ORDER — PROPOFOL 10 MG/ML
INJECTION, EMULSION INTRAVENOUS PRN
Status: DISCONTINUED | OUTPATIENT
Start: 2023-02-15 | End: 2023-02-15 | Stop reason: SDUPTHER

## 2023-02-15 RX ADMIN — LIDOCAINE HYDROCHLORIDE 100 MG: 20 INJECTION INTRAVENOUS at 13:55

## 2023-02-15 RX ADMIN — FENTANYL CITRATE 50 MCG: 50 INJECTION, SOLUTION INTRAMUSCULAR; INTRAVENOUS at 15:10

## 2023-02-15 RX ADMIN — DEXAMETHASONE SODIUM PHOSPHATE 6 MG: 10 INJECTION, EMULSION INTRAMUSCULAR; INTRAVENOUS at 13:58

## 2023-02-15 RX ADMIN — FENTANYL CITRATE 100 MCG: 50 INJECTION, SOLUTION INTRAMUSCULAR; INTRAVENOUS at 13:55

## 2023-02-15 RX ADMIN — PROPOFOL 180 MG: 10 INJECTION, EMULSION INTRAVENOUS at 13:55

## 2023-02-15 RX ADMIN — Medication 5 MG: at 14:10

## 2023-02-15 RX ADMIN — FENTANYL CITRATE 100 MCG: 50 INJECTION, SOLUTION INTRAMUSCULAR; INTRAVENOUS at 14:00

## 2023-02-15 RX ADMIN — SODIUM CHLORIDE 50 ML/HR: 9 INJECTION, SOLUTION INTRAVENOUS at 10:52

## 2023-02-15 RX ADMIN — ROCURONIUM BROMIDE 10 MG: 50 INJECTION INTRAVENOUS at 13:55

## 2023-02-15 RX ADMIN — Medication 5 MG: at 14:20

## 2023-02-15 RX ADMIN — Medication 2.5 MG: at 10:53

## 2023-02-15 RX ADMIN — SUGAMMADEX 200 MG: 100 INJECTION, SOLUTION INTRAVENOUS at 14:40

## 2023-02-15 RX ADMIN — IPRATROPIUM BROMIDE AND ALBUTEROL SULFATE 1 AMPULE: .5; 3 SOLUTION RESPIRATORY (INHALATION) at 10:45

## 2023-02-15 RX ADMIN — CEFOXITIN SODIUM 3000 MG: 1 POWDER, FOR SOLUTION INTRAVENOUS at 13:57

## 2023-02-15 RX ADMIN — Medication 140 MG: at 13:55

## 2023-02-15 ASSESSMENT — PAIN DESCRIPTION - PAIN TYPE
TYPE: SURGICAL PAIN
TYPE: SURGICAL PAIN

## 2023-02-15 ASSESSMENT — PAIN DESCRIPTION - FREQUENCY: FREQUENCY: CONTINUOUS

## 2023-02-15 ASSESSMENT — PAIN DESCRIPTION - DESCRIPTORS: DESCRIPTORS: ACHING;STABBING

## 2023-02-15 ASSESSMENT — PAIN DESCRIPTION - DIRECTION: RADIATING_TOWARDS: BACK

## 2023-02-15 ASSESSMENT — PAIN DESCRIPTION - ORIENTATION
ORIENTATION: RIGHT
ORIENTATION: MID

## 2023-02-15 ASSESSMENT — PAIN SCALES - GENERAL
PAINLEVEL_OUTOF10: 6
PAINLEVEL_OUTOF10: 7
PAINLEVEL_OUTOF10: 5
PAINLEVEL_OUTOF10: 0
PAINLEVEL_OUTOF10: 5

## 2023-02-15 ASSESSMENT — LIFESTYLE VARIABLES: SMOKING_STATUS: 0

## 2023-02-15 ASSESSMENT — PAIN - FUNCTIONAL ASSESSMENT: PAIN_FUNCTIONAL_ASSESSMENT: 0-10

## 2023-02-15 ASSESSMENT — PAIN DESCRIPTION - ONSET: ONSET: ON-GOING

## 2023-02-15 ASSESSMENT — PAIN DESCRIPTION - LOCATION
LOCATION: ABDOMEN
LOCATION: ABDOMEN

## 2023-02-15 NOTE — ANESTHESIA PRE PROCEDURE
Department of Anesthesiology  Preprocedure Note       Name:  Tandy Habermann   Age:  50 y.o.  :  1970                                          MRN:  [id number]         Date:  2/15/2023      Surgeon: Mckenna Tate):  Erick Pierre MD    Procedure: Procedure(s):  ROOTIC CHOLECYSTECTOMY, POSS OPEN    Medications prior to admission:   Prior to Admission medications    Medication Sig Start Date End Date Taking? Authorizing Provider   Magnesium Oxide 420 (252 Mg) MG TABS take 1 tablet by mouth twice daily 23   MIRNA Alvarado CNP   gabapentin (NEURONTIN) 300 MG capsule Take 1 capsule by mouth daily for 30 days. 2/12/23 3/14/23  Polly Curling, APRN - CNP   HYDROcodone-acetaminophen (NORCO) 5-325 MG per tablet Take 1 tablet by mouth every 8 hours as needed for Pain for up to 30 days.  2/5/23 3/7/23  Pollyora Curling, APRN - CNP   orphenadrine (NORFLEX) 100 MG extended release tablet Take 1 tablet by mouth 2 times daily 2/12/23 3/14/23  Elora Curling, APRN - CNP   lidocaine (XYLOCAINE) 5 % ointment apply 1 to 2 grams four times a day if needed 22   Historical Provider, MD   rosuvastatin (CRESTOR) 20 MG tablet take 1 tablet by mouth once daily 23   Historical Provider, MD   dicyclomine (BENTYL) 10 MG capsule Take 1 capsule by mouth 4 times daily (before meals and nightly) 1/11/23 2/15/23  MIRNA Fischer CNP   butalbital-acetaminophen-caffeine (FIORICET, ESGIC) -76 MG per tablet take 1 tablet by mouth every 4 hours if needed for headache 23   MIRNA Alvarado CNP   budesonide-formoterol (SYMBICORT) 160-4.5 MCG/ACT AERO inhale 2 puffs by mouth twice a day **RINSE MOUTH AFTER USE** 23   MIRNA Alvarado CNP   ASPIRIN LOW DOSE 81 MG EC tablet take 1 tablet by mouth once daily 1/3/23   Kay Castillo MD   BANOPHEN 25 MG capsule take 1 capsule by mouth every 6 hours if needed for itching 22   Naima Mueller, APRN - CNP   loratadine (CLARITIN) 10 MG tablet take 1 tablet by mouth once daily 12/12/22   MIRNA Ellsworth CNP   triamcinolone (KENALOG) 0.1 % cream APPLY TO AFFECTED AREA TWICE A DAY 12/2/22   MIRNA Ellsworth CNP   potassium chloride (KLOR-CON M) 10 MEQ extended release tablet take 1 tablet by mouth daily 11/6/22   Historical Provider, MD   betamethasone valerate (VALISONE) 0.1 % cream apply to affected area twice a day 11/9/22   MIRNA Uriostegui CNP   coenzyme Q10 (EQL COQ10) 100 MG CAPS capsule Take 1 capsule by mouth daily 11/9/22   MIRNA Uriostegui CNP   ferrous sulfate (FEROSUL) 325 (65 Fe) MG tablet Take 1 tablet by mouth daily (with breakfast) 11/8/22   MIRNA Uriostegui CNP   Cholecalciferol (VITAMIN D3) 1.25 MG (73275 UT) CAPS Take 1 capsule by mouth once a week 11/8/22   MIRNA Uriostegui CNP   pantoprazole (PROTONIX) 40 MG tablet Take 1 tablet by mouth every morning (before breakfast) 11/7/22 11/2/23  MIRNA Hayes CNP   ipratropium-albuterol (DUONEB) 0.5-2.5 (3) MG/3ML SOLN nebulizer solution Inhale into the lungs 9/8/17   Historical Provider, MD   fluticasone (FLONASE) 50 MCG/ACT nasal spray instill 2 sprays into each nostril once daily 10/18/22   MIRNA Ellsworth CNP   montelukast (SINGULAIR) 10 MG tablet take 1 tablet by mouth every evening 10/12/22   MIRNA Ellsworth CNP   hydroCHLOROthiazide (HYDRODIURIL) 25 MG tablet take 1 tablet by mouth every morning 10/10/22   MIRNA Ellsworth CNP   azelastine (OPTIVAR) 0.05 % ophthalmic solution instill 1 drop into both eyes twice a day 9/9/22   MIRNA Ellsworth CNP   albuterol (PROVENTIL) (2.5 MG/3ML) 0.083% nebulizer solution inhale contents of 1 vial ( 3 milliliters ) in nebulizer by mouth and INTO THE LUNGS every 4 hours if needed for wheezing 9/9/22   MIRNA Ellsworth CNP   simethicone (MYLICON) 80 MG chewable tablet Take 1 tablet by mouth 4 times daily as needed for Flatulence 7/29/22   MIRNA Ellsworth CNP   amitriptyline (ELAVIL) 50 MG tablet take 1 tablet by mouth at bedtime  Patient taking differently: As needed 5/31/22   MIRNA Allen CNP   albuterol sulfate  (90 Base) MCG/ACT inhaler Inhale 2 puffs into the lungs 4 times daily as needed for Wheezing 4/26/22   MIRNA Allen CNP   Blood Pressure Monitoring (B-D ASSURE BPM/AUTO ARM CUFF) MISC 1 Units by Does not apply route daily 3/22/22   MIRNA Allen CNP   Respiratory Therapy Supplies (NEBULIZER/TUBING/MOUTHPIECE) KIT Dispense tubing, mask, and mouthpiece for nebulizer machine. Dx: Asthma 1/3/22   MIRNA Allen CNP   EPINEPHrine (EPIPEN 2-RAGHAV) 0.3 MG/0.3ML SOAJ injection Inject one pen as directed STAT for allergic reaction, may disp generic [facility] [id number] 2/25/21   [facility], APRN - CNP   NARCAN 4 MG/0.1ML LIQD nasal spray  1/10/20   Historical Provider, MD       Current medications:    Current Facility-Administered Medications   Medication Dose Route Frequency Provider Last Rate Last Admin    sodium chloride flush 0.9 % injection 5-40 mL  5-40 mL IntraVENous 2 times per day Faviola Meléndez MD        sodium chloride flush 0.9 % injection 5-40 mL  5-40 mL IntraVENous PRN Faviola Meléndez MD        0.9 % sodium chloride infusion   IntraVENous PRN Faviola Meléndez MD        cefOXitin (MEFOXIN) 3000 mg in sodium chloride 0.9% 100 mL  3,000 mg IntraVENous On Call to [address] [place] [address], MD        indocyanine green (IC-GREEN) syringe 2.5 mg  2.5 mg IntraVENous Once Faviola Meléndez MD           Allergies:     Allergies   Allergen Reactions    Dilantin [Phenytoin] Anaphylaxis and Swelling    Lyrica [Pregabalin] Other (See Comments)     Bleeding in bowels    Dupixent [Dupilumab]      HIVES, THROAT ITCHING    Oxycodone      THROAT TIGHTNESS    Valium [Diazepam]        Problem List:    Patient Active Problem List   Diagnosis Code    Cervical stenosis of spinal canal M48.02    Lower back pain M54.50    Disease of spinal cord (HCC) G95.9    Morbid obesity with BMI of 50.0-59.9, adult ([facility]) E66.01, Z68.43    Other cervical disc displacement, unspecified cervical region M50.20    Radiculopathy affecting upper extremity M54.10    S/P cervical spinal fusion Z98.1    Sleep apnea G47.30    Hyperlipidemia E78.5    H/O: HTN (hypertension) Z86.79    Moderate asthma without complication S29.428    Enlarged heart I51.7    Chronic pansinusitis J32.4    Pain in both lower extremities M79.604, M79.605    Non-seasonal allergic rhinitis due to pollen J30.1    Allergy desensitization therapy Z51.6    Lumbar spondylosis M47.816    Severe episode of recurrent major depressive disorder, without psychotic features ([address] F33.2    Strain of lumbar region S39.012A    Abnormal stress test R94.39       Past Medical History:        Diagnosis Date    Aneurysm [facility] [address]     pituitary gland    Arthritis     Asthma     Cardiomegaly     COVID-19 11/2021    [facility]    Depression     Fibromyalgia     GERD (gastroesophageal reflux disease)     Hypertension     melhem    Liver disease     CLARIBEL on CPAP        Past Surgical History:        Procedure Laterality Date    BACK SURGERY      COLONOSCOPY      KNEE SURGERY Left     NECK SURGERY      PAIN MANAGEMENT PROCEDURE Bilateral 02/11/2021    Bilateral L-facet MBB # 1 @ L3-4, L4-5, and L5-S1 performed by Duncan Smith MD at [place] [address] Bilateral 04/08/2021    Bilateral L-facet MBB # 2 @ L3-4, L4-5, and L5-S1 performed by Duncan Smith MD at [address] [place] [address] Right 07/07/2022       Social History:    Social History     Tobacco Use    Smoking status: Never    Smokeless tobacco: Never   Substance Use Topics    Alcohol use: Yes     Comment: occasionally                                Counseling given: Not Answered      Vital Signs (Current):   Vitals:    02/15/23 0957 02/15/23 1002   BP: (!) 151/74    Pulse: 60    Resp: 16 Temp: (!) 96.5 °F (35.8 °C)    TempSrc: Temporal    SpO2: 96%    Weight:  (!) 411 lb (186.4 kg)   Height:  6' 1\" (1.854 m)                                              BP Readings from Last 3 Encounters:   02/15/23 (!) 151/74   02/09/23 120/62   01/24/23 (!) 147/88       NPO Status: Time of last liquid consumption: 2300                        Time of last solid consumption: 2300                        Date of last liquid consumption: 02/14/23                        Date of last solid food consumption: 02/14/23    BMI:   Wt Readings from Last 3 Encounters:   02/15/23 (!) 411 lb (186.4 kg)   02/09/23 (!) 421 lb 9.6 oz (191.2 kg)   01/23/23 (!) 407 lb (184.6 kg)     Body mass index is 54.22 kg/m². CBC:   Lab Results   Component Value Date/Time    WBC 5.7 01/23/2023 10:08 PM    RBC 4.12 01/23/2023 10:08 PM    HGB 12.9 01/23/2023 10:08 PM    HCT 39.4 01/23/2023 10:08 PM    MCV 95.6 01/23/2023 10:08 PM    RDW 13.3 10/24/2022 01:36 PM     01/23/2023 10:08 PM       CMP:   Lab Results   Component Value Date/Time     01/23/2023 10:08 PM    K 3.7 01/23/2023 10:08 PM    K 4.4 12/19/2022 05:57 PM     01/23/2023 10:08 PM    CO2 25 01/23/2023 10:08 PM    BUN 11 01/23/2023 10:08 PM    CREATININE 0.8 01/23/2023 10:08 PM    LABGLOM >60 01/23/2023 10:08 PM    GLUCOSE 116 01/23/2023 10:08 PM    GLUCOSE 98 10/24/2022 01:36 PM    PROT 6.6 01/23/2023 10:08 PM    CALCIUM 8.8 01/23/2023 10:08 PM    BILITOT 0.3 01/23/2023 10:08 PM    ALKPHOS 155 01/23/2023 10:08 PM    AST 19 01/23/2023 10:08 PM    ALT 22 01/23/2023 10:08 PM       POC Tests: No results for input(s): POCGLU, POCNA, POCK, POCCL, POCBUN, POCHEMO, POCHCT in the last 72 hours.     Coags:   Lab Results   Component Value Date/Time    INR 1.02 07/01/2022 11:24 AM       HCG (If Applicable): No results found for: PREGTESTUR, PREGSERUM, HCG, HCGQUANT     ABGs: No results found for: PHART, PO2ART, OOL8FBF, LQL6IDS, BEART, P0RCAECV     Type & Screen (If Applicable):  Lab Results   Component Value Date    LABRH POS 07/01/2022       Drug/Infectious Status (If Applicable):  No results found for: HIV, HEPCAB    COVID-19 Screening (If Applicable):   Lab Results   Component Value Date/Time    COVID19 NOT DETECTED 12/17/2022 07:35 PM           Anesthesia Evaluation  Patient summary reviewed no history of anesthetic complications:   Airway: Mallampati: II  TM distance: >3 FB   Neck ROM: full  Mouth opening: > = 3 FB   Dental:          Pulmonary:   (+) sleep apnea: on CPAP,  decreased breath sounds asthma: seasonal asthma,     (-) COPD and not a current smoker                           Cardiovascular:  Exercise tolerance: good (>4 METS),   (+) hypertension:,     (-) past MI, CAD, CABG/stent and dysrhythmias             ROS comment: Enlarged heart     Neuro/Psych:   (+) neuromuscular disease:, depression/anxiety    (-) seizures and CVA           GI/Hepatic/Renal:   (+) GERD: well controlled, liver disease (fatty liver):, morbid obesity     (-) no renal disease       Endo/Other:        (-) diabetes mellitus, hypothyroidism, hyperthyroidism               Abdominal:   (+) obese,           Vascular:     - DVT and PE. Other Findings:           Anesthesia Plan      general     ASA 3     (PIV. Additional access can be obtained after induction if needed. Standard ASA monitors. IV/PO opioids and other adjuncts as needed for pain control. PACU post op for recovery. Possible anesthetics complications were discussed with the patient, including but not limited to: PONV, damage to the airway and surrounding structures (teeth, lips, gums, tongue, etc.), adverse reactions to medicine, cardiac complications (MI, CHF, arrhythmias, etc.), respiratory complications (post-op ventilation, respiratory failure, etc.), neurologic complications (nerve damage, stroke, seizure), and death.  The patient was given the opportunity to ask questions and all questions were answered to the patient's satisfaction. The patient is in agreement with the anesthetic plan. Duoneb in SDS)  Induction: intravenous. Anesthetic plan and risks discussed with patient. Plan discussed with CRNA.                     Thierry Hammonds DO   2/15/2023

## 2023-02-15 NOTE — DISCHARGE INSTRUCTIONS
DR PALMA'S DISCHARGE INSTRUCTIONS    Pt Name: Alfredo Rosales Record Number: [id number]  Today's Date: 2/15/2023    GENERAL ANESTHESIA OR SEDATION  1. Do not drive or operate hazardous machinery for 24 hours. 2. Do not make important business or personal decisions for 24 hours. 3. Do not drink alcoholic beverages or use tobacco for 24 hours. ACTIVITY INSTRUCTIONS:  [] Rest today. Resume light to normal activity tomorrow.   [] You may resume normal activity tomorrow. Do not engage in strenuous activity that may place stress on your incision. [x] Do not drive for 3-5 days and avoid heavy lifting, tugging, pullings greater than 10-20 lbs until seen in the office. DIET INSTRUCTIONS:  []Begin with clear liquids. If not nauseated, may increase to a low-fat diet when you desire. Greasy and spicy foods are not advised. [x]Regular diet as tolerated. []Other:     MEDICATIONS  [x]Prescription sent with you to be used as directed. []Lortab   [x]Norco   []Percocet   []Tylenol #3   []Oxycontin   Do not drink alcohol or drive while taking these medications. You may experience dizziness or drowsiness with these medications. You may also experience constipation which can be relieved with stool softners or laxatives. [x]You may resume your daily prescription medication schedule unless otherwise specified. [x]Do not take 325mg Aspirin or other blood thinners such as Coumadin or Plavix for 5 days. WOUND/DRESSING INSTRUCTIONS:  Always ensure you and your care giver clean hands before and after caring for the wound. [] Keep dressing clean and dry for 48 hours. Change when soiled or wet. [] Allow steri-strips to fall off on their own.   [] Ice operative site for 20 minutes 4 times a day. [x] May wash over incision in shower in 48 hours, but do not soak in a bath.  [] Take sitz bath for 20 minutes twice daily and after bowel movements. [] Keep the abdominal binder in place during the day.  May remove to shower and at night.  [] Remove packing from wound in 24 hours and replace with AMD dressings daily. [] Empty MARINO drain daily and record the amounts. BREAST PROCEDURES  []Following a breast procedure, it is important to continue to where supportive garments. []Following a sentinal lymph node biopsy, you should not be alarmed if your urine has a blue color to it. This is your body eliminating the dye used for the procedure. ABDOMINAL/LAPAROSCOPIC SURGERY  [x]You are encouraged to get up and move around as this helps with the circulation and speeds up the healing process. [x]Breath deeply and cough from time to time. This helps to clear your lungs and helps prevent pneumonia. [x]Supporting your incision with a pillow or your hand helps to minimize discomfort and pain. [x]Laparoscopic patients may develop shoulder pain in the first 48 hours from the gas used during the procedure. FOLLOW-UP CARE. SPECIFICALLY WATCH FOR:   Fever over 101 degrees by mouth   Increased redness, warmth, hardness at operative site. Blood soaked dressing (small amounts of oozing may be normal.)   Increased or progressive drainage from the surgical area   Inability to urinate or blood in the urine   Pain not relieved by the medications ordered   Persistent nausea and/or vomiting, unable to retain fluids. FOLLOW-UP APPOINTMENT   []1 week   [x]2 weeks   []Other    Call my office if you have any problem that concerns you [phone]. After hours, you can reach the answering service via the office phone number. IF YOU NEED IMMEDIATE ATTENTION, GO TO THE EMERGENCY ROOM AND YOUR DOCTOR WILL BE CONTACTED. Courtney Yanez MD MD  [address] [place] [phone]  [facility] DEBORAH ALBRECHT II[address], [address]  Electronically signed 2/15/2023 at 2:41 PM

## 2023-02-15 NOTE — PROGRESS NOTES
[phone] pt arrived to PACU, awakens to voice and denies pain. Sites CDI x4. VSS  1502 pt states pain 6/10 and tolerable. Denies need for pain medication at this time. VSS  1510 c/o pain 7/10, medicated with 50 mcg fentanyl  1515 pt states pain 5/10 and tolerable. VSS  1530 pt states pain 5/10 and tolerable. Denies need for pain medications.  Meets criteria for discharge from PACU, transported to [facility]

## 2023-02-15 NOTE — H&P
[address] [address]  History and Physical Update    Pt Name: Wood Scales  MRN: [id number]  YOB: 1970  Date of evaluation: 2/15/2023    [x] I have examined the patient and reviewed the H&P/Consult and there are no changes to the patient or plans. [] I have examined the patient and reviewed the H&P/Consult and have noted the following changes:        Brody Merida MD MD  Electronically signed 2/15/2023 at 12:05 PM                                                                                                                                                                                                                                                                                                                                                                                                                                                                                         Brody Merida MD   General Surgery  New Patient Evaluation in Office  Pt Name: Wood Scales  Date of Birth 1970   Today's Date: 2/9/2023  Medical Record Number: [id number]  Referring Provider: Dr. Larisa Rico  Primary Care Provider: MIRNA Sanders - CNP  Chief Complaint:       Chief Complaint   Patient presents with    Surgical Consult       New patient-in ER 1/23/23 -Flank pain, Biliary colic                ASSESSMENT   1. Gallstones    2. Sleep apnea, unspecified type    3. Morbid obesity with BMI of 50.0-59.9, adult ([address]    4. Biliary colic    5. HTN  6. asthma  PLANS   Robotic assisted laparoscopic cholecystectomy possible open  2. Techniques and risks of procedure discussed with patient at length. Risks include but not limited to bleeding, infection, conversion to open procedure, bile duct leak, bile duct injury, postoperative diarrhea as well as potential for perioperative cardiac or pulmonary complications all discussed. Mr. Gold Smart lexpressed understanding wishes to proceed.   3. Preoperative testing per anesthesia guidelines. 4. Cardiology records reviewed  5.general anesthesia      Moise Lewis is a 50y.o. year old male who is presenting today in the office for persistent, intermittent epigastric and right upper quadrant pain. He denies a prior history of hepatitis pancreatitis or jaundice. He was recently in the emergency department with those complaints. Imaging studies included an ultrasound of the gallbladder as well as a CT scan of the abdomen and pelvis. Previous hepatobiliary scan showed a normal ejection fraction. He has a documented fatty liver. He had several echogenic structures in the lumen of the gallbladder. Largest was 1.1 cm in size. There was no gallbladder wall thickening. He had a small stable echogenic mass in the liver which has the appearance of a benign hemangioma. He had no biliary duct dilation. Hepatic function tests were within normal limits with the exception of a mildly elevated alkaline phosphatase. History of sciatica and  has seen pain management.      Past Medical History  Past Medical History        Past Medical History:   Diagnosis Date    Aneurysm ([address]       pituitary gland    Arthritis      Asthma      Cardiomegaly      COVID-19 11/2021     [facility]    Depression      Fibromyalgia      GERD (gastroesophageal reflux disease)      Hypertension       melhem    Liver disease      CLARIBEL on CPAP            Past Surgical History  Past Surgical History         Past Surgical History:   Procedure Laterality Date    BACK SURGERY        COLONOSCOPY        KNEE SURGERY Left      NECK SURGERY        PAIN MANAGEMENT PROCEDURE Bilateral 02/11/2021     Bilateral L-facet MBB # 1 @ L3-4, L4-5, and L5-S1 performed by Aidan Humphries MD at [facility] Bilateral 04/08/2021     Bilateral L-facet MBB # 2 @ L3-4, L4-5, and L5-S1 performed by Aidan Humphries MD at [address] [place] [address] REPAIR Right 07/07/2022          Medications  Current Facility-Administered Medications          Current Outpatient Medications   Medication Sig Dispense Refill    [START ON 2/12/2023] gabapentin (NEURONTIN) 300 MG capsule Take 1 capsule by mouth daily for 30 days. 30 capsule 0    HYDROcodone-acetaminophen (NORCO) 5-325 MG per tablet Take 1 tablet by mouth every 8 hours as needed for Pain for up to 30 days.  90 tablet 0    [START ON 2/12/2023] orphenadrine (NORFLEX) 100 MG extended release tablet Take 1 tablet by mouth 2 times daily 60 tablet 0    lidocaine (XYLOCAINE) 5 % ointment apply 1 to 2 grams four times a day if needed        Magnesium Oxide 420 (252 Mg) MG TABS take 1 tablet by mouth twice a day with meals        rosuvastatin (CRESTOR) 20 MG tablet take 1 tablet by mouth once daily        dicyclomine (BENTYL) 10 MG capsule Take 1 capsule by mouth 4 times daily (before meals and nightly) 120 capsule 3    butalbital-acetaminophen-caffeine (FIORICET, ESGIC) -40 MG per tablet take 1 tablet by mouth every 4 hours if needed for headache 90 tablet 0    budesonide-formoterol (SYMBICORT) 160-4.5 MCG/ACT AERO inhale 2 puffs by mouth twice a day **RINSE MOUTH AFTER USE** 30.6 g 3    ASPIRIN LOW DOSE 81 MG EC tablet take 1 tablet by mouth once daily 90 tablet 0    BANOPHEN 25 MG capsule take 1 capsule by mouth every 6 hours if needed for itching 60 capsule 5    loratadine (CLARITIN) 10 MG tablet take 1 tablet by mouth once daily 90 tablet 3    triamcinolone (KENALOG) 0.1 % cream APPLY TO AFFECTED AREA TWICE A DAY 1 each 2    potassium chloride (KLOR-CON M) 10 MEQ extended release tablet take 1 tablet by mouth daily        betamethasone valerate (VALISONE) 0.1 % cream apply to affected area twice a day 45 g 3    coenzyme Q10 (EQL COQ10) 100 MG CAPS capsule Take 1 capsule by mouth daily 30 capsule 5    ferrous sulfate (FEROSUL) 325 (65 Fe) MG tablet Take 1 tablet by mouth daily (with breakfast) 30 tablet 5 Cholecalciferol (VITAMIN D3) 1.25 MG (71116 UT) CAPS Take 1 capsule by mouth once a week 4 capsule 5    pantoprazole (PROTONIX) 40 MG tablet Take 1 tablet by mouth every morning (before breakfast) 90 tablet 3    ipratropium-albuterol (DUONEB) 0.5-2.5 (3) MG/3ML SOLN nebulizer solution Inhale into the lungs        fluticasone (FLONASE) 50 MCG/ACT nasal spray instill 2 sprays into each nostril once daily 48 g 5    montelukast (SINGULAIR) 10 MG tablet take 1 tablet by mouth every evening 90 tablet 3    hydroCHLOROthiazide (HYDRODIURIL) 25 MG tablet take 1 tablet by mouth every morning 90 tablet 1    azelastine (OPTIVAR) 0.05 % ophthalmic solution instill 1 drop into both eyes twice a day 1 each 11    albuterol (PROVENTIL) (2.5 MG/3ML) 0.083% nebulizer solution inhale contents of 1 vial ( 3 milliliters ) in nebulizer by mouth and INTO THE LUNGS every 4 hours if needed for wheezing 375 mL 3    simethicone (MYLICON) 80 MG chewable tablet Take 1 tablet by mouth 4 times daily as needed for Flatulence 180 tablet 3    amitriptyline (ELAVIL) 50 MG tablet take 1 tablet by mouth at bedtime (Patient taking differently: As needed) 90 tablet 1    albuterol sulfate  (90 Base) MCG/ACT inhaler Inhale 2 puffs into the lungs 4 times daily as needed for Wheezing 3 each 3    Blood Pressure Monitoring (B-D ASSURE BPM/AUTO ARM CUFF) MISC 1 Units by Does not apply route daily 1 each 0    Respiratory Therapy Supplies (NEBULIZER/TUBING/MOUTHPIECE) KIT Dispense tubing, mask, and mouthpiece for nebulizer machine. Dx: Asthma 1 kit 0    EPINEPHrine (EPIPEN 2-RAGHAV) 0.3 MG/0.3ML SOAJ injection Inject one pen as directed STAT for allergic reaction, may disp generic NDC 01096-579-86 1 each 0    NARCAN 4 MG/0.1ML LIQD nasal spray            No current facility-administered medications for this visit.          Allergies           Allergies   Allergen Reactions    Dilantin [Phenytoin] Anaphylaxis and Swelling    Lyrica [Pregabalin] Other (See Comments)       Bleeding in bowels    Dupixent [Dupilumab]         HIVES, THROAT ITCHING    Oxycodone         THROAT TIGHTNESS    Valium [Diazepam]         Family History  Family History         Family History   Problem Relation Age of Onset    Arthritis Mother      Asthma Mother      High Blood Pressure Mother      Emphysema Mother      Other Mother           she was hit and killed by car    Arthritis Father      High Blood Pressure Father      Emphysema Father      Asthma Father            SocialHistory  Social History               Socioeconomic History    Marital status:        Spouse name: Not on file    Number of children: 5    Years of education: Not on file    Highest education level: Not on file   Occupational History    Not on file   Tobacco Use    Smoking status: Never    Smokeless tobacco: Never   Vaping Use    Vaping Use: Never used   Substance and Sexual Activity    Alcohol use: Yes       Comment: occasionally    Drug use: No    Sexual activity: Yes       Partners: Female   Other Topics Concern    Not on file   Social History Narrative     No barriers with transportation     No need for HH support      Social Determinants of Health          Financial Resource Strain: Low Risk     Difficulty of Paying Living Expenses: Not hard at all   Food Insecurity: No Food Insecurity    Worried About Running Out of Food in the Last Year: Never true    Ran Out of Food in the Last Year: Never true   Transportation Needs: Not on file   Physical Activity: Not on file   Stress: Not on file   Social Connections: Not on file   Intimate Partner Violence: Not on file   Housing Stability: Not on file              Review of Systems  Review of Systems   Constitutional:  Negative for chills, fatigue, fever and unexpected weight change. HENT:  Negative for sore throat, trouble swallowing and voice change. Eyes:  Negative for visual disturbance. Respiratory:  Negative for cough, shortness of breath and wheezing. Cardiovascular:  Negative for chest pain and palpitations. Gastrointestinal:  Positive for abdominal pain and nausea. Negative for blood in stool and vomiting. Endocrine: Negative for cold intolerance, heat intolerance and polydipsia. Genitourinary:  Negative for dysuria, flank pain and hematuria. Musculoskeletal:  Positive for arthralgias and back pain. Negative for gait problem, joint swelling and myalgias. Skin:  Negative for color change and rash. Allergic/Immunologic: Negative for immunocompromised state. Neurological:  Negative for dizziness, tremors, seizures and speech difficulty. Hematological:  Does not bruise/bleed easily. Psychiatric/Behavioral:  Negative for behavioral problems and confusion. OBJECTIVE      /62 (Site: Right Upper Arm, Position: Sitting, Cuff Size: Medium Adult)   Pulse 72   Temp 97.2 °F (36.2 °C) (Temporal)   Resp 20   Ht 6' 1\" (1.854 m)   Wt (!) 421 lb 9.6 oz (191.2 kg)   SpO2 98%   BMI 55.62 kg/m²       Physical Exam           Lab Results   Component Value Date     WBC 5.7 01/23/2023     HGB 12.9 (L) 01/23/2023     HCT 39.4 (L) 01/23/2023      01/23/2023     CHOL 161 12/30/2022     TRIG 93 12/30/2022     HDL 55 12/30/2022     ALT 22 01/23/2023     AST 19 01/23/2023      01/23/2023     K 3.7 01/23/2023      01/23/2023     CREATININE 0.8 01/23/2023     BUN 11 01/23/2023     CO2 25 01/23/2023     TSH 2.660 03/24/2022     INR 1.02 07/01/2022     LABA1C 4.9 07/01/2022          US Abdomen Limited, Right Upper Quadrant       COMPARISON: US/KO/SR - US LIVER - 09/27/2022 08:25 AM EDT       FINDINGS:   Echogenic, shadowing, nonmobile adherent calculi and/or polyps in the    gallbladder measuring up to 1.1 cm and 0.6 cm. Echogenic mobile calculi in    the gallbladder. No gallbladder wall thickening. No pericholecystic fluid. Negative sonographic Mccord sign. No bile duct dilatation. Common bile duct measures 5 mm in diameter.    The visualized pancreas is unremarkable. Hepatomegaly. Mildly nodular liver contours suggestive of hepatic    cirrhosis. Ovoid echogenic mass in the right hepatic lobe measuring 1.5 x    1.4 x 0.8 cm, possibly corresponding to similar-appearing lesion    previously described in the left hepatic lobe measuring 1.6 x 1.3 x 0.9 cm    on the prior study. Impression   No acute findings. Echogenic liver mass, possibly corresponding to similar-appearing mass    seen on the prior study, possibly a hemangioma. Possible gallbladder polyps. Consider follow-up ultrasound in 6 months. Nonemergent/incidental findings in the report. This document has been electronically signed by: Gopi James MD on    01/23/2023 11:21 PM          Narrative   CT Abdomen and Pelvis WO Contrast       COMPARISON: US/SR - US GALLBLADDER RUQ - 01/23/2023 10:11 PM EST   US/KO/SR - US LIVER - 09/27/2022 08:25 AM EDT   CT/SR - CTA ABDOMEN PELVIS W WO CONTRAST - 09/13/2022 03:06 AM EDT       FINDINGS:   Hepatomegaly. Nodular liver contours consistent with hepatic cirrhosis. Ovoid hypodense mass in the left hepatic lobe (series 301, image 11)    measuring 3.3 x 2.8 cm (3.0 x 2.9 cm on 09/13/2022 when measured in the    same way). Normal spleen. Normal kidneys. No urolithiasis or hydronephrosis. Normal adrenal glands. Normal pancreas. Cholelithiasis. No CT evidence of acute cholecystitis. No biliary    dilatation. No evidence of bowel obstruction or colitis. Normal appendix. Unremarkable bladder. No ascites. No pneumoperitoneum. No lymphadenopathy. No acute fracture. Degenerative changes in the spine. No abdominal aortic aneurysm. Fat-containing umbilical hernia. Impression   No acute findings. Similar mass in the left hepatic lobe, possibly a hemangioma. Consider    nonemergent liver MRI for confirmation if indicated. Nonemergent/incidental findings in the report.        This document has been electronically signed by: Kathleen Stokes MD on    01/24/2023 12:13 AM       All CTs at this facility use dose modulation techniques and iterative    reconstructions, and/or weight-based dosing   when appropriate to reduce radiation to a low as reasonably achievable. Narrative   PROCEDURE: US LIVER       CLINICAL INFORMATION: Fatty liver       TECHNIQUE: Ultrasound of the liver was performed. Grayscale and color images were obtained. COMPARISON: None       FINDINGS: There are echogenic structures in the lumen of the gallbladder. The largest measures 0.7 cm. There is no gallbladder wall thickening or pericholecystic fluid. The common bile duct is normal, measuring 0.5 cm. There is no intrahepatic biliary    ductal dilation. The liver is mildly enlarged, measuring 22 cm. Hyperechoic lesion in the left hepatic lobe measures 1.6 x 1.3 x 0.9 cm. Visualized pancreas and right kidney are unremarkable. Portal vessels are patent. Impression   1. Cholelithiasis without evidence of acute cholecystitis. 2. Hepatomegaly. 3. Hyperechoic structure in the left hepatic lobe, likely a hemangioma.        Final report electronically signed by Dr. Dianna Meyer on 9/27/2022 10:34 AM

## 2023-02-15 NOTE — ANESTHESIA POSTPROCEDURE EVALUATION
Department of Anesthesiology  Postprocedure Note    Patient: Beverly Liu  MRN: [id number]  YOB: 1970  Date of evaluation: 2/15/2023      Procedure Summary     Date: 02/15/23 Room / Location: [address] [place] [address] MARIA ESTHER Sow    Anesthesia Start: 6017 Anesthesia Stop: 1392    Procedure: Surinder Verde (Abdomen) Diagnosis:       Gallstones      (Gallstones [K80.20])    Surgeons: Cody Millan MD Responsible Provider: Karla Oneil DO    Anesthesia Type: general ASA Status: 3          Anesthesia Type: No value filed.     To Phase I: To Score: 9    To Phase II:        Anesthesia Post Evaluation    Patient location during evaluation: PACU  Patient participation: complete - patient participated  Level of consciousness: awake  Airway patency: patent  Nausea & Vomiting: no nausea  Complications: no  Cardiovascular status: hemodynamically stable  Respiratory status: acceptable  Hydration status: stable

## 2023-02-16 ENCOUNTER — TELEPHONE (OUTPATIENT)
Dept: SURGERY | Age: 53
End: 2023-02-16

## 2023-02-16 NOTE — TELEPHONE ENCOUNTER
I called pt in regards to recent robotic assisted laparoscopic cholecystectomy. Pt states he is doing fine. He is taking his pain meds and having bowel movements without any problems. He is eating and walking okay. He will call w/any questions.

## 2023-02-16 NOTE — PROGRESS NOTES
1531  pt. Awake and oriented on arrival to phase II. Pt. States pain is 6 out of 10. Pt. States he can tolerate that because that is his normal.  Abdominal sites x 4 with surgical glue intact and no drainage noted. 1545  pt. Eating and drinking without difficulty. 1610  pt. Ambulated to bathroom without difficulty. Pt. Voided. 1625  discharge instructions given. Pt. Maria D Hurtado understanding. 1631  phase II criteria met. Pt. Stable. Pt. Discharged per wheelchair with nurse to [facility]. Pt. Tolerated well.

## 2023-02-20 ENCOUNTER — OFFICE VISIT (OUTPATIENT)
Dept: PHYSICAL MEDICINE AND REHAB | Age: 53
End: 2023-02-20
Payer: MEDICAID

## 2023-02-20 VITALS
BODY MASS INDEX: 41.75 KG/M2 | HEIGHT: 73 IN | SYSTOLIC BLOOD PRESSURE: 124 MMHG | DIASTOLIC BLOOD PRESSURE: 82 MMHG | WEIGHT: 315 LBS

## 2023-02-20 DIAGNOSIS — M54.17 LUMBOSACRAL RADICULITIS: ICD-10-CM

## 2023-02-20 DIAGNOSIS — G89.4 CHRONIC PAIN SYNDROME: ICD-10-CM

## 2023-02-20 DIAGNOSIS — M47.816 LUMBAR SPONDYLOSIS: Primary | ICD-10-CM

## 2023-02-20 DIAGNOSIS — G89.18 POST-OP PAIN: ICD-10-CM

## 2023-02-20 DIAGNOSIS — M54.12 CERVICAL RADICULOPATHY: ICD-10-CM

## 2023-02-20 DIAGNOSIS — Z98.1 S/P CERVICAL SPINAL FUSION: ICD-10-CM

## 2023-02-20 DIAGNOSIS — S83.241A ACUTE MEDIAL MENISCUS TEAR OF RIGHT KNEE, INITIAL ENCOUNTER: ICD-10-CM

## 2023-02-20 DIAGNOSIS — M50.30 DDD (DEGENERATIVE DISC DISEASE), CERVICAL: ICD-10-CM

## 2023-02-20 DIAGNOSIS — D35.2 PITUITARY ADENOMA (HCC): ICD-10-CM

## 2023-02-20 DIAGNOSIS — Z98.1 HX OF FUSION OF CERVICAL SPINE: ICD-10-CM

## 2023-02-20 DIAGNOSIS — M51.36 LUMBAR DEGENERATIVE DISC DISEASE: ICD-10-CM

## 2023-02-20 DIAGNOSIS — M54.50 LUMBAR PAIN: ICD-10-CM

## 2023-02-20 PROCEDURE — G8484 FLU IMMUNIZE NO ADMIN: HCPCS | Performed by: NURSE PRACTITIONER

## 2023-02-20 PROCEDURE — 1036F TOBACCO NON-USER: CPT | Performed by: NURSE PRACTITIONER

## 2023-02-20 PROCEDURE — G8417 CALC BMI ABV UP PARAM F/U: HCPCS | Performed by: NURSE PRACTITIONER

## 2023-02-20 PROCEDURE — G8427 DOCREV CUR MEDS BY ELIG CLIN: HCPCS | Performed by: NURSE PRACTITIONER

## 2023-02-20 PROCEDURE — 3017F COLORECTAL CA SCREEN DOC REV: CPT | Performed by: NURSE PRACTITIONER

## 2023-02-20 PROCEDURE — 99214 OFFICE O/P EST MOD 30 MIN: CPT | Performed by: NURSE PRACTITIONER

## 2023-02-20 RX ORDER — LIDOCAINE 50 MG/G
1 PATCH TOPICAL DAILY
Qty: 30 PATCH | Refills: 3 | Status: SHIPPED | OUTPATIENT
Start: 2023-02-20 | End: 2023-03-22

## 2023-02-20 RX ORDER — LIDOCAINE 50 MG/G
240 OINTMENT TOPICAL 4 TIMES DAILY PRN
Qty: 240 G | Refills: 3 | Status: SHIPPED | OUTPATIENT
Start: 2023-02-20 | End: 2023-03-22

## 2023-02-20 RX ORDER — BUTALBITAL, ACETAMINOPHEN AND CAFFEINE 50; 325; 40 MG/1; MG/1; MG/1
1 TABLET ORAL EVERY 8 HOURS PRN
Qty: 90 TABLET | Refills: 0 | Status: SHIPPED | OUTPATIENT
Start: 2023-02-20

## 2023-02-20 ASSESSMENT — ENCOUNTER SYMPTOMS
SHORTNESS OF BREATH: 0
ABDOMINAL PAIN: 1
APNEA: 1
PHOTOPHOBIA: 1
ABDOMINAL DISTENTION: 1
NAUSEA: 1
COUGH: 0
BACK PAIN: 1

## 2023-02-20 NOTE — PROGRESS NOTES
[address] [facility] [address]  Dept: [phone]  Dept Fax: [phone]: [phone]    Visit Date: 2/20/2023    Functionality Assessment/Goals Worksheet     On a scale of 0 (Does not Interfere) to 10 (Completely Interferes)     1. Which number describes how during the past week pain has interfered with       the following:  A. General Activity:  9  B. Mood: 9  C. Walking Ability:  9  D. Normal Work (Includes both work outside the home and housework):  9  E. Relations with Other People:   9  F. Sleep:   9  G. Enjoyment of Life:   9    2. Patient Prefers to Take their Pain Medications:     []  On a regular basis   [x]  Only when necessary    []  Does not take pain medications    3. What are the Patient's Goals/Expectations for Visiting Pain Management? []  Learn about my pain    [x]  Receive Medication   []  Physical Therapy     []  Treat Depression   [x]  Receive Injections    []  Treat Sleep   []  Deal with Anxiety and Stress   []  Treat Opoid Dependence/Addiction   []  Other:      HPI:   Julio Mcnair is a 50 y.o. male is here today for    Chief Complaint: Back pain, neck pain, joint pain. \"Pain all over\"    HPI   10 week FU. Patient has complaints of pain all over. joint pain, neck pain, low back pain, muscle spasms all over. States aching pain everywhere. States not one place that hurts more than others. Has good and bad days. Right knee pain slowly is improving. Incision all healed and not wearing brace now. Continues to ambulate with strait cane. Had a laparoscopic robotic cholecystectomy on 2/15/2023 which heal is healing from. Has tenderness at incision sites.    Continues PT twice per week   Pain increases with bending, lifting, twisting , walking, standing, sitting, getting up and down, and housework or working at job, laying, weather changes     Current pain medication continues to help At this time is on higer dose [place] post op from surgeon       Medications reviewed. Patient denies side effects with medications. Patient states he is taking medications as prescribed. Hedenies receiving pain medications from other sources. He  had 2 ER visits since last visit due to abdominal pain   any ER visits since last visit. Pain scale with out pain medications or at its worst is 10/10. Pain scale with pain medications or at its best is 7/10. Last dose of Norco was today Neurontin was yesterday   Drug screen reviewed from 12/12/2022 and was appropriate  Pill count completed  today and WNL: Yes      The patientis allergic to dilantin [phenytoin], lyrica [pregabalin], dupixent [dupilumab], oxycodone, and valium [diazepam]. Subjective:      Review of Systems   Constitutional:  Positive for activity change. Negative for chills, fatigue, fever and unexpected weight change. HENT: Negative. Eyes:  Positive for photophobia and visual disturbance. Respiratory:  Positive for apnea. Negative for cough and shortness of breath. Wears cpap at night    Cardiovascular:  Positive for leg swelling. Gastrointestinal:  Positive for abdominal distention, abdominal pain and nausea. Endocrine: Negative. Genitourinary: Negative. Musculoskeletal:  Positive for arthralgias, back pain, gait problem, joint swelling, myalgias, neck pain and neck stiffness. Ambulating with strait cane    Skin: Negative. Neurological:  Positive for weakness, numbness and headaches. Psychiatric/Behavioral:  Positive for sleep disturbance. Negative for dysphoric mood. The patient is not nervous/anxious. Objective:     Vitals:    02/20/23 0947   BP: 124/82   Weight: (!) 413 lb (187.3 kg)   Height: 6' 1\" (1.854 m)       Physical Exam  Vitals and nursing note reviewed. Constitutional:       General: He is not in acute distress. Appearance: Normal appearance.  He is obese. He is not diaphoretic. HENT:      Head: Normocephalic and atraumatic. Right Ear: External ear normal.      Left Ear: External ear normal.      Nose: Nose normal.      Mouth/Throat:      Mouth: Mucous membranes are moist.      Pharynx: No oropharyngeal exudate. Eyes:      General: No scleral icterus. Right eye: No discharge. Left eye: No discharge. Conjunctiva/sclera: Conjunctivae normal.      Pupils: Pupils are equal, round, and reactive to light. Neck:      Thyroid: No thyromegaly. Cardiovascular:      Rate and Rhythm: Normal rate and regular rhythm. Heart sounds: Normal heart sounds. No murmur heard. No friction rub. No gallop. Pulmonary:      Effort: Pulmonary effort is normal. No respiratory distress. Breath sounds: Normal breath sounds. No wheezing or rales. Chest:      Chest wall: No tenderness. Abdominal:      General: Bowel sounds are normal. There is no distension. Palpations: Abdomen is soft. Tenderness: There is abdominal tenderness. There is no guarding or rebound. Musculoskeletal:         General: Swelling and tenderness present. Cervical back: Normal range of motion. Rigidity, spasms, tenderness and bony tenderness present. No edema or erythema. Pain with movement present. No muscular tenderness. Normal range of motion. Thoracic back: Spasms, tenderness and bony tenderness present. Lumbar back: Spasms, tenderness and bony tenderness present. Decreased range of motion. Positive right straight leg raise test and positive left straight leg raise test.        Back:       Right knee: Swelling and bony tenderness present. Decreased range of motion. Tenderness present. Left knee: Bony tenderness present. Tenderness present. Right lower leg: Swelling present. Edema present. Left lower leg: Swelling present. Edema present. Right ankle: Swelling present. Left ankle: Swelling present. Legs:    Skin:     General: Skin is warm. Coloration: Skin is not pale. Findings: No erythema or rash. Neurological:      General: No focal deficit present. Mental Status: He is alert and oriented to person, place, and time. He is not disoriented. Cranial Nerves: No cranial nerve deficit. Sensory: Sensory deficit present. Motor: Weakness present. No atrophy or abnormal muscle tone. Coordination: Coordination normal.      Gait: Gait abnormal.      Comments: Motor 4/5 lower extremities, 5/5 upper    Psychiatric:         Attention and Perception: Attention normal. He is attentive. Mood and Affect: Mood normal. Mood is not anxious or depressed. Affect is not labile, blunt, angry or inappropriate. Speech: Speech normal. He is communicative. Speech is not rapid and pressured, delayed, slurred or tangential.         Behavior: Behavior normal. Behavior is not agitated, slowed, aggressive, withdrawn, hyperactive or combative. Behavior is cooperative. Thought Content: Thought content normal. Thought content is not paranoid or delusional. Thought content does not include homicidal or suicidal ideation. Thought content does not include homicidal or suicidal plan. Cognition and Memory: Cognition normal. Memory is not impaired. He does not exhibit impaired recent memory or impaired remote memory. Judgment: Judgment normal. Judgment is not impulsive or inappropriate. VAL  Patricks test  positive  Yeoman's  or Gaenslen's positive         Assessment:     1. Lumbar spondylosis    2. Lumbar degenerative disc disease    3. Lumbar pain    4. Lumbosacral radiculitis    5. Cervical radiculopathy    6. DDD (degenerative disc disease), cervical    7. Hx of fusion of cervical spine    8. Acute medial meniscus tear of right knee, initial encounter    9. Pituitary adenoma ([facility] [address]    10. Chronic pain syndrome    11. Post-op pain    12.  S/P cervical spinal fusion            Plan:      OARRS reviewed. Current MED: 15.00  Patient was offered naloxone for home. Discussed long term side effects of medications, tolerance, dependency and addiction. Previous UDS reviewed  UDS preformed today for compliance. Patient told can not receive any pain medications from any other source. No evidence of abuse, diversion or aberrant behavior. Medications and/or procedures to improve function and quality of life- patient understanding with this and that may not be pain free  Discussed with patient about safe storage of medications at home  Discussed possible weaning of medication dosing dependent on treatment/procedure results. Discussed with patient about risks with procedure including infection, reaction to medication, increased pain, or bleeding. Procedure notes reveiwed in detail   Received 80% relief of low back pain and also relief of leg pain from bilateral L-facet MBB # 2 for 3 weeks with improvement of mobility and activity. Discussed Bilateral L-facet RFA @ L4-5, and L5-S1 when needed to for longer  term pain relief. Healing from lap erica from last week. Continues therapy for right knee   Discussed possible LESI, trigger point injections in future   Continue current pain medications- Norco 5/325 TID prn- filled 2/5/23 has plenty and at this time finishing post op medications and when finished will resume 5/325 tabs, Neurontin 300 mg daily- filled 2/12/2023  Continue Norflex- Filled 12/12/2023, Continue Fioricet prn headaches- ordered refill,  Lidocaine and Lidoderm patches- ordered refill   Is compliant     Meds. Prescribed:   Orders Placed This Encounter   Medications    lidocaine (XYLOCAINE) 5 % ointment     Sig: Apply 240 g topically 4 times daily as needed for Pain Apply topically as needed. Dispense:  240 g     Refill:  3    lidocaine (LIDODERM) 5 %     Sig: Place 1 patch onto the skin daily 12 hours on, 12 hours off.      Dispense:  30 patch     Refill: 3    butalbital-acetaminophen-caffeine (FIORICET, ESGIC) -40 MG per tablet     Sig: Take 1 tablet by mouth every 8 hours as needed for Headaches     Dispense:  90 tablet     Refill:  0         Return in about 10 weeks (around 5/1/2023), or if symptoms worsen or fail to improve, for follow up  for medications.                Electronically signed by MIRNA Hercules CNP on2/20/2023 at 10:11 AM

## 2023-02-24 ENCOUNTER — TELEPHONE (OUTPATIENT)
Dept: SURGERY | Age: 53
End: 2023-02-24

## 2023-02-24 NOTE — TELEPHONE ENCOUNTER
S/P Getachew Jones on 02-15. Complaining of his stomach itching. Denies any redness or rash. He put Neosporin on it yesterday. He has not tried Benadryl. He is wanting something for the itching? Do you want him to try Benadryl cream or something else over the counter?

## 2023-02-25 ENCOUNTER — HOSPITAL ENCOUNTER (EMERGENCY)
Age: 53
Discharge: HOME | End: 2023-02-25
Attending: EMERGENCY MEDICINE
Payer: MEDICAID

## 2023-02-25 VITALS
RESPIRATION RATE: 18 BRPM | OXYGEN SATURATION: 94 % | HEART RATE: 71 BPM | SYSTOLIC BLOOD PRESSURE: 155 MMHG | DIASTOLIC BLOOD PRESSURE: 96 MMHG | TEMPERATURE: 98.3 F | WEIGHT: 315 LBS | HEIGHT: 73 IN | BODY MASS INDEX: 41.75 KG/M2

## 2023-02-25 DIAGNOSIS — T81.9XXA SURGICAL SITE REACTION, INITIAL ENCOUNTER: Primary | ICD-10-CM

## 2023-02-25 PROCEDURE — 99283 EMERGENCY DEPT VISIT LOW MDM: CPT

## 2023-02-25 PROCEDURE — 6370000000 HC RX 637 (ALT 250 FOR IP): Performed by: STUDENT IN AN ORGANIZED HEALTH CARE EDUCATION/TRAINING PROGRAM

## 2023-02-25 RX ORDER — CETIRIZINE HYDROCHLORIDE 10 MG/1
10 TABLET ORAL ONCE
Status: COMPLETED | OUTPATIENT
Start: 2023-02-25 | End: 2023-02-25

## 2023-02-25 RX ORDER — ACETAMINOPHEN 500 MG
1000 TABLET ORAL ONCE
Status: COMPLETED | OUTPATIENT
Start: 2023-02-25 | End: 2023-02-25

## 2023-02-25 RX ORDER — METHYLPREDNISOLONE 4 MG/1
TABLET ORAL
Qty: 1 KIT | Refills: 0 | Status: SHIPPED | OUTPATIENT
Start: 2023-02-25 | End: 2023-03-03

## 2023-02-25 RX ORDER — CEPHALEXIN 500 MG/1
500 CAPSULE ORAL 4 TIMES DAILY
Qty: 20 CAPSULE | Refills: 0 | Status: SHIPPED | OUTPATIENT
Start: 2023-02-25 | End: 2023-03-02

## 2023-02-25 RX ADMIN — CETIRIZINE HYDROCHLORIDE 10 MG: 10 TABLET, FILM COATED ORAL at 11:04

## 2023-02-25 RX ADMIN — ACETAMINOPHEN 1000 MG: 500 TABLET ORAL at 11:04

## 2023-02-25 ASSESSMENT — ENCOUNTER SYMPTOMS
CHEST TIGHTNESS: 0
ABDOMINAL DISTENTION: 0
COLOR CHANGE: 0
SHORTNESS OF BREATH: 0
EYE ITCHING: 0
EYE REDNESS: 0
RHINORRHEA: 0
EYE PAIN: 0
NAUSEA: 0
SINUS PAIN: 0
EYE DISCHARGE: 0
SINUS PRESSURE: 0
ABDOMINAL PAIN: 1
VOMITING: 0
DIARRHEA: 0
CONSTIPATION: 0

## 2023-02-25 NOTE — DISCHARGE INSTRUCTIONS
Do not hesitate to return to the emergency department if you are experiencing any new or worsening symptoms such as worsening abdominal pain, nausea and vomiting that does not go away, fever, throat swelling, shortness of breath, or if you have any other concerns.    Begin taking Keflex and methylprednisolone as prescribed.  It is also okay to use acetaminophen over-the-counter as directed on the packaging for pain and Benadryl or Claritin over-the-counter as directed on the packaging for itchiness.  Do not use Benadryl and Claritin at the same time.  You can also use Benadryl ointment over-the-counter as directed on the packaging for itchiness.    Follow-up with your primary care provider and with Dr. Beckwith regarding your emergency department visit today.  Call Dr. Beckwith's office.  As soon as possible to follow-up.

## 2023-02-25 NOTE — ED PROVIDER NOTES
PATIENT NAME: Susanna Grimes  MRN: [id number]  : 1970  DOOLEY: 2023    I performed a history and physical examination of the patient and discussed management with the Resident. I reviewed the Resident's note and agree with the documented findings and plan of care. Any areas of disagreement are noted on the chart. I was personally present for the key portions of any procedures and have documented in the chart those procedures where I was not present during the key portions. I have reviewed the emergency nurses triage note and agree with the chief complaint, past medical history, past surgical history, allergies, medications, social and family history as documented unless otherwise noted below. MEDICAL DEDISION MAKING (MDM)     Susanna Griems is a 50 y.o. male who presents to Emergency Department with Post-op Problem (Incision sites red, itching, swollen s/p gallbladder surgery)     Patient presents for postop wound evaluation. Status post lap erica postop day 10. Patient noticed erythema from three port insertion sites 2 days ago. No fever or chills. Exam: Afebrile, vital signs stable. Mild erythema from all three ports with some blanching. No obvious tenderness. No discharge. Patient's history and exam suggest allergic reaction to sutures/glue versus postop infection although clinical exam favors allergic reaction. Discussed with general surgery on-call, recommending prescribing Keflex and Medrol Dosepak. Office follow-up in 2-3 days.     Vitals:    23 1035 23 1119   BP: (!) 157/107 (!) 155/96   Pulse: 71    Resp: 18    Temp: 98.3 °F (36.8 °C)    TempSrc: Oral    SpO2: 96% 94%   Weight: (!) 414 lb (187.8 kg)    Height: 6' 1\" (1.854 m)      Medications   acetaminophen (TYLENOL) tablet 1,000 mg (1,000 mg Oral Given 23 1104)   cetirizine (ZYRTEC) tablet 10 mg (10 mg Oral Given 23 1104)     Labs Reviewed - No data to display  No orders to display       FINAL IMPRESSION AND DISPOSITION      1. Surgical site reaction, initial encounter        DISPOSITION Decision To Discharge 02/25/2023 11:39:01 AM      PATIENT REFERRED TO:  MIRNA Ribeiro - CNP  [address] [facility]. [place] [id number]  [phone]    Schedule an appointment as soon as possible for a visit       [address] [place] [address]  [address]  Go to   If experiencing any new or worsening symptoms    Juan Dyson MD  [address] [facility].  [place]. [address] [place] [address]  [phone]    Call       DISCHARGE MEDICATIONS:  Discharge Medication List as of 2/25/2023 11:44 AM        START taking these medications    Details   methylPREDNISolone (MEDROL, RAGHAV,) 4 MG tablet By mouth., Disp-1 kit, R-0Normal      cephALEXin (KEFLEX) 500 MG capsule Take 1 capsule by mouth 4 times daily for 5 days, Disp-20 capsule, R-0Normal             (Please note that portions of this note were completed with a voice recognition program.  Efforts were made to edit the dictations but occasionally words aremis-transcribed.)    MD Vale Torrez MD  02/25/23 1981

## 2023-02-25 NOTE — ED PROVIDER NOTES
[facility] ENCOUNTER          Pt Name: Ange Fields  MRN: [id number]  [place] 1970  Date of evaluation: 2/25/2023  Emergency Physician: Maye Mejia DO  Supervising Physician: Dr. Carol Bolden       Chief Complaint   Patient presents with    Post-op Problem     Incision sites red, itching, swollen s/p gallbladder surgery     History obtained from the patient. HISTORY OF PRESENT ILLNESS    HPI  Ange Fields is a 50 y.o. male who presents to the emergency department for evaluation of postop incision itchiness. The patient is postop day 10 from a cholecystectomy with . He states that 2 days ago he developed itchiness to his abdominal incisions. He reports that he is allergic to marijuana, which he states causes him to feel itchy, and initially thought that the itchiness could have been secondary to being around people who were smoking marijuana. He denies any new detergents or lotions. He has been applying topical Benadryl and Neosporin ointment to his abdominal incisions, and has also been taking oral Benadryl and Claritin. He reported that the topical Benadryl improved his itchiness for approximately 6 hours last night, but it wore off and the itchiness recurred, and this morning he reports that his abdominal incisions appeared erythematous, which prompted him to go to the emergency department. He reports that when the itchiness started, the clear plastic dressing that had been in place since surgery peeled off. He denies fevers and wound drainage. He states that last night he had a headache and lightheadedness which have since resolved. He reports that he is eating and drinking normally and currently has normal bowel and bladder habits. The patient has no other acute complaints at this time. REVIEW OF SYSTEMS   Review of Systems   Constitutional:  Negative for fever.    HENT:  Negative for ear pain, rhinorrhea, sinus pressure and sinus pain. Eyes:  Negative for pain, discharge, redness and itching. Respiratory:  Negative for chest tightness and shortness of breath. Cardiovascular:  Negative for chest pain and palpitations. Gastrointestinal:  Positive for abdominal pain. Negative for abdominal distention, constipation, diarrhea, nausea and vomiting. Genitourinary:  Negative for dysuria, frequency, hematuria and urgency. Musculoskeletal:  Negative for arthralgias. Skin:  Negative for color change.        + Pruritus. Neurological:  Negative for dizziness, syncope and light-headedness. PAST MEDICAL AND SURGICAL HISTORY     Past Medical History:   Diagnosis Date    Aneurysm ([address]     pituitary gland    Arthritis     Asthma     Cardiomegaly     COVID-19 11/2021    [facility]    Depression     Fibromyalgia     GERD (gastroesophageal reflux disease)     Hypertension     melhem    Liver disease     CLARIBEL on CPAP      Past Surgical History:   Procedure Laterality Date    BACK SURGERY      CHOLECYSTECTOMY, LAPAROSCOPIC N/A 2/15/2023    ROOTIC CHOLECYSTECTOMY performed by Scotty Strong MD at [place] [address] Left     NECK SURGERY      PAIN MANAGEMENT PROCEDURE Bilateral 02/11/2021    Bilateral L-facet MBB # 1 @ L3-4, L4-5, and L5-S1 performed by Unruly García MD at [facility] Bilateral 04/08/2021    Bilateral L-facet MBB # 2 @ L3-4, L4-5, and L5-S1 performed by Unruly García MD at [address] [place] [address] Right 07/07/2022         MEDICATIONS   No current facility-administered medications for this encounter.     Current Outpatient Medications:     methylPREDNISolone (MEDROL, RAGHAV,) 4 MG tablet, By mouth., Disp: 1 kit, Rfl: 0    cephALEXin (KEFLEX) 500 MG capsule, Take 1 capsule by mouth 4 times daily for 5 days, Disp: 20 capsule, Rfl: 0    lidocaine (XYLOCAINE) 5 % ointment, Apply 240 g topically 4 times daily as needed for Pain Apply topically as needed. , Disp: 240 g, Rfl: 3    lidocaine (LIDODERM) 5 %, Place 1 patch onto the skin daily 12 hours on, 12 hours off., Disp: 30 patch, Rfl: 3    butalbital-acetaminophen-caffeine (FIORICET, ESGIC) -40 MG per tablet, Take 1 tablet by mouth every 8 hours as needed for Headaches, Disp: 90 tablet, Rfl: 0    ondansetron (ZOFRAN) 4 MG tablet, Take 1 tablet by mouth 3 times daily as needed for Nausea or Vomiting, Disp: 15 tablet, Rfl: 0    Magnesium Oxide 420 (252 Mg) MG TABS, take 1 tablet by mouth twice daily, Disp: 180 tablet, Rfl: 3    gabapentin (NEURONTIN) 300 MG capsule, Take 1 capsule by mouth daily for 30 days. , Disp: 30 capsule, Rfl: 0    orphenadrine (NORFLEX) 100 MG extended release tablet, Take 1 tablet by mouth 2 times daily, Disp: 60 tablet, Rfl: 0    lidocaine (XYLOCAINE) 5 % ointment, apply 1 to 2 grams four times a day if needed, Disp: , Rfl:     rosuvastatin (CRESTOR) 20 MG tablet, take 1 tablet by mouth once daily, Disp: , Rfl:     dicyclomine (BENTYL) 10 MG capsule, Take 1 capsule by mouth 4 times daily (before meals and nightly), Disp: 120 capsule, Rfl: 3    budesonide-formoterol (SYMBICORT) 160-4.5 MCG/ACT AERO, inhale 2 puffs by mouth twice a day **RINSE MOUTH AFTER USE**, Disp: 30.6 g, Rfl: 3    ASPIRIN LOW DOSE 81 MG EC tablet, take 1 tablet by mouth once daily, Disp: 90 tablet, Rfl: 0    BANOPHEN 25 MG capsule, take 1 capsule by mouth every 6 hours if needed for itching, Disp: 60 capsule, Rfl: 5    loratadine (CLARITIN) 10 MG tablet, take 1 tablet by mouth once daily, Disp: 90 tablet, Rfl: 3    triamcinolone (KENALOG) 0.1 % cream, APPLY TO AFFECTED AREA TWICE A DAY, Disp: 1 each, Rfl: 2    potassium chloride (KLOR-CON M) 10 MEQ extended release tablet, take 1 tablet by mouth daily, Disp: , Rfl:     betamethasone valerate (VALISONE) 0.1 % cream, apply to affected area twice a day, Disp: 45 g, Rfl: 3    coenzyme Q10 (EQL COQ10) 100 MG CAPS capsule, Take 1 capsule by mouth daily, Disp: 30 capsule, Rfl: 5    ferrous sulfate (FEROSUL) 325 (65 Fe) MG tablet, Take 1 tablet by mouth daily (with breakfast), Disp: 30 tablet, Rfl: 5    Cholecalciferol (VITAMIN D3) 1.25 MG (43011 UT) CAPS, Take 1 capsule by mouth once a week, Disp: 4 capsule, Rfl: 5    pantoprazole (PROTONIX) 40 MG tablet, Take 1 tablet by mouth every morning (before breakfast), Disp: 90 tablet, Rfl: 3    ipratropium-albuterol (DUONEB) 0.5-2.5 (3) MG/3ML SOLN nebulizer solution, Inhale into the lungs, Disp: , Rfl:     fluticasone (FLONASE) 50 MCG/ACT nasal spray, instill 2 sprays into each nostril once daily, Disp: 48 g, Rfl: 5    montelukast (SINGULAIR) 10 MG tablet, take 1 tablet by mouth every evening, Disp: 90 tablet, Rfl: 3    hydroCHLOROthiazide (HYDRODIURIL) 25 MG tablet, take 1 tablet by mouth every morning, Disp: 90 tablet, Rfl: 1    azelastine (OPTIVAR) 0.05 % ophthalmic solution, instill 1 drop into both eyes twice a day, Disp: 1 each, Rfl: 11    albuterol (PROVENTIL) (2.5 MG/3ML) 0.083% nebulizer solution, inhale contents of 1 vial ( 3 milliliters ) in nebulizer by mouth and INTO THE LUNGS every 4 hours if needed for wheezing, Disp: 375 mL, Rfl: 3    simethicone (MYLICON) 80 MG chewable tablet, Take 1 tablet by mouth 4 times daily as needed for Flatulence, Disp: 180 tablet, Rfl: 3    amitriptyline (ELAVIL) 50 MG tablet, take 1 tablet by mouth at bedtime (Patient taking differently: As needed), Disp: 90 tablet, Rfl: 1    albuterol sulfate  (90 Base) MCG/ACT inhaler, Inhale 2 puffs into the lungs 4 times daily as needed for Wheezing, Disp: 3 each, Rfl: 3    Blood Pressure Monitoring (B-D ASSURE BPM/AUTO ARM CUFF) MISC, 1 Units by Does not apply route daily, Disp: 1 each, Rfl: 0    Respiratory Therapy Supplies (NEBULIZER/TUBING/MOUTHPIECE) KIT, Dispense tubing, mask, and mouthpiece for nebulizer machine.  Dx: Asthma, Disp: 1 kit, Rfl: 0    EPINEPHrine (EPIPEN 2-RAGHAV) 0.3 MG/0.3ML SOAJ injection, Inject one pen as directed STAT for allergic reaction, may disp generic NDC [id number], Disp: 1 each, Rfl: 0    NARCAN 4 MG/0.1ML LIQD nasal spray, , Disp: , Rfl:       SOCIAL HISTORY     Social History     Social History Narrative    No barriers with transportation    No need for HH support     Social History     Tobacco Use    Smoking status: Never    Smokeless tobacco: Never   Vaping Use    Vaping Use: Never used   Substance Use Topics    Alcohol use: Yes     Comment: occasionally    Drug use: No         ALLERGIES     Allergies   Allergen Reactions    Dilantin [Phenytoin] Anaphylaxis and Swelling    Lyrica [Pregabalin] Other (See Comments)     Bleeding in bowels    Dupixent [Dupilumab]      HIVES, THROAT ITCHING    Oxycodone      THROAT TIGHTNESS    Valium [Diazepam]          FAMILY HISTORY     Family History   Problem Relation Age of Onset    Arthritis Mother     Asthma Mother     High Blood Pressure Mother     Emphysema Mother     Other Mother         she was hit and killed by car    Arthritis Father     High Blood Pressure Father     Emphysema Father     Asthma Father          PHYSICAL EXAM     ED Triage Vitals [02/25/23 1035]   BP Temp Temp Source Heart Rate Resp SpO2 Height Weight   (!) 157/107 98.3 °F (36.8 °C) Oral 71 18 96 % 6' 1\" (1.854 m) (!) 414 lb (187.8 kg)         Additional Vital Signs:  Vitals:    02/25/23 1119   BP: (!) 155/96   Pulse:    Resp:    Temp:    SpO2: 94%       Physical Exam  Constitutional:       General: He is not in acute distress. Appearance: Normal appearance. He is not ill-appearing. HENT:      Head: Normocephalic and atraumatic. Nose: Nose normal. No congestion or rhinorrhea. Mouth/Throat:      Mouth: Mucous membranes are moist.      Pharynx: Oropharynx is clear. No oropharyngeal exudate or posterior oropharyngeal erythema. Eyes:      Extraocular Movements: Extraocular movements intact.       Pupils: Pupils are equal, round, and reactive to light. Cardiovascular:      Rate and Rhythm: Normal rate and regular rhythm. Pulses: Normal pulses. Heart sounds: Normal heart sounds. Pulmonary:      Effort: Pulmonary effort is normal. No respiratory distress. Breath sounds: Normal breath sounds. Abdominal:      General: Abdomen is flat. There is no distension. Palpations: Abdomen is soft. Tenderness: There is abdominal tenderness. Comments: Mild right upper quadrant TTP. The patient's abdominal incisions appear intact. There is swelling, faint erythema, and superficial induration around all of the abdominal incisions. Musculoskeletal:         General: No swelling or tenderness. Normal range of motion. Cervical back: Normal range of motion and neck supple. Right lower leg: No edema. Left lower leg: No edema. Skin:     General: Skin is warm and dry. Capillary Refill: Capillary refill takes less than 2 seconds. Neurological:      General: No focal deficit present. Mental Status: He is alert and oriented to person, place, and time. Mental status is at baseline. INITIAL IMPRESSION, DIFFERENTIAL DIAGNOSIS, AND PLAN   Initial Assessment: Given the patient's above chief complaint and findings on history and physical examination, I thought it was appropriate to consider the following emergency medical conditions: Surgical site infection, hypersensitivity reaction, unlikely bile leak, hypertension. Although some of these diagnoses are unlikely they were considered in my medical decision making.     Chronic Conditions considered:   Patient Active Problem List   Diagnosis    Cervical stenosis of spinal canal    Lower back pain    Disease of spinal cord (HCC)    Morbid obesity with BMI of 50.0-59.9, adult ([facility]    Other cervical disc displacement, unspecified cervical region    Radiculopathy affecting upper extremity    S/P cervical spinal fusion    Sleep apnea    Hyperlipidemia    H/O: HTN (hypertension)    Moderate asthma without complication    Enlarged heart    Chronic pansinusitis    Pain in both lower extremities    Non-seasonal allergic rhinitis due to pollen    Allergy desensitization therapy    Lumbar spondylosis    Severe episode of recurrent major depressive disorder, without psychotic features ([address]    Strain of lumbar region    Abnormal stress test    Gallstones       Diagnostic:     Therapeutic: Zyrtec and acetaminophen. ED RESULTS   Laboratory results:  Labs Reviewed - No data to display    Radiologic studies results:  No orders to display       ED Medications administered this visit:   Medications   acetaminophen (TYLENOL) tablet 1,000 mg (1,000 mg Oral Given 2/25/23 1104)   cetirizine (ZYRTEC) tablet 10 mg (10 mg Oral Given 2/25/23 1104)         [facility]     ED Course as of 02/25/23 1207   Sat Feb 25, 2023   1108 Case discussed with Dr. Scott Bajwa, who plans to see the patient in the emergency department. [DO]   3954 Dr. Scott Bajwa saw the patient in the emergency department. She stated that the patient was likely having a hypersensitivity reaction to skin glue or the absorbable suture and recommended covering the patient on Keflex in case of a surgical site infection however this was less likely due to multiple surgical sites being affected at the same time, starting a Medrol Dosepak, and agreed with discharging the patient home. [DO]      ED Course User Index  [DO] Penny Watson DO     Available laboratory and imaging results were independently reviewed and clinically correlated. Decision Rules/Clinical Scores utilized: Tram Beech Code Status:  Not addressed during this ED visit    [facility] Social determinants of health considered to potentially effect treatment and/or disposition plan:    Healthy People 2030, U.[place]. Department of Health and Human Services, Office of Disease Prevention and Health Promotion. Race/ethnicity.      Medical Commodities impacting treatment or disposition:  Not Applicable. Past Medical History:   Diagnosis Date    Aneurysm ([address]     pituitary gland    Arthritis     Asthma     Cardiomegaly     COVID-19 11/2021    [facility]    Depression     Fibromyalgia     GERD (gastroesophageal reflux disease)     Hypertension     melhem    Liver disease     CLARIBEL on CPAP        Consultants:  General surgery    Final Assessment and Plan:   Per ED course      The diagnosis, extensive differential diagnosis, plan of care, laboratory, and imaging findings were discussed at the bedside. All questions and concerns were addressed at the time of the encounter. MEDICATION CHANGES     DISCHARGE MEDICATIONS:  Discharge Medication List as of 2/25/2023 11:44 AM        START taking these medications    Details   methylPREDNISolone (MEDROL, RAGHAV,) 4 MG tablet By mouth., Disp-1 kit, R-0Normal      cephALEXin (KEFLEX) 500 MG capsule Take 1 capsule by mouth 4 times daily for 5 days, Disp-20 capsule, R-0Normal                  FINAL DISPOSITION     Final diagnoses:   Surgical site reaction, initial encounter     Condition: condition: good  Dispo: Discharge to home    PATIENT REFERRED TO:  Vargas Epperson APRN - CNP  [address] [facility] [address]  [phone]    Schedule an appointment as soon as possible for a visit       [address] [place] [address]  [address]  Go to   If experiencing any new or worsening symptoms    Megha Will MD  [address]  [address] [facility]  [phone]    Call       Critical Care Time   CRITICAL CARE:  None    PROCEDURES: (None if blank)  Procedures:   Medical Decision Making      This transcription was electronically signed. Parts of this transcriptions may have been dictated by use of voice recognition software and electronically transcribed, and parts may have been transcribed with the assistance of an ED scribe.  The transcription may contain errors not detected in proofreading.     Electronically Signed: Ilsa Davis DO, 02/25/23, 12:07 PM        Ilsa Davis DO  Resident  02/25/23 9611

## 2023-02-25 NOTE — ED NOTES
Patient presents to ED with complaint of abdominal incisions itching, redness and swelling s/p gallbladder surgery a week ago Wednesday. Patient states Thursday this week he noticed severe itching and put Neosporin on incision sites. Patient states he takes Benadryl daily for weed allergies. Patient states there was a clear substance on each incision that had started peeling Thursday. Patient states he put Benadryl cream on incision sites with no relief. No discharge noted at incision sites. Sites are scabbed with redness and swelling at 4 sites.      Arely Stewart RN  02/25/23 7696

## 2023-02-27 ENCOUNTER — OFFICE VISIT (OUTPATIENT)
Dept: SURGERY | Age: 53
End: 2023-02-27

## 2023-02-27 VITALS
SYSTOLIC BLOOD PRESSURE: 116 MMHG | HEIGHT: 73 IN | OXYGEN SATURATION: 96 % | DIASTOLIC BLOOD PRESSURE: 68 MMHG | BODY MASS INDEX: 41.75 KG/M2 | TEMPERATURE: 96.6 F | WEIGHT: 315 LBS | HEART RATE: 71 BPM

## 2023-02-27 DIAGNOSIS — Z09 POSTOP CHECK: ICD-10-CM

## 2023-02-27 DIAGNOSIS — K80.50 BILIARY COLIC: ICD-10-CM

## 2023-02-27 DIAGNOSIS — K80.20 GALLSTONES: Primary | ICD-10-CM

## 2023-02-27 PROCEDURE — 99024 POSTOP FOLLOW-UP VISIT: CPT | Performed by: SURGERY

## 2023-02-27 NOTE — PROGRESS NOTES
Taya Mattson MD   General Surgery  Postprocedure Evaluation in Office  Pt Name: Janel Barrera  Date of Birth 1970   Today's Date: 2/27/2023  Medical Record Number: [id number]  Primary Care Provider: MIRNA Rojas - CNP  Chief Complaint   Patient presents with    Post-Op Check     S/p robotic assisted laparoscopic cholecystectomy 2/15/23-Pt in ER 2/25/23     ASSESSMENT      1. Gallstones    2. Biliary colic    3. Postop check    4.  pruritis at surgical incisions     PLAN       Patient had gone to emergency department had gotten steroid cream was using Benadryl cream as well as a Medrol Dosepak. 1 week postop developed pruritus at his incisions. May have been an allergic reaction to the glue but seems odd a week later. Symptoms are improving. Patient without postoperative side effects of cholecystectomy denies chronic diarrhea. Pathology    3. Recommend no lifting over 25 to 30 pounds for 4 to 6 weeks. 4.  Follow-up surgical clinic as needed. Davidson Fox is seen today for post-op follow-up. He is 2 week(s) status post robotic assisted laparoscopic cholecystectomy. He is tolerating oral intake. Denies nausea or chronic diarrhea. Symptoms of epigastric and right upper quadrant pain have improved post surgery. His only complaint was of itching that developed at his surgical sites about a week after surgery. He did go to the emergency department for persistent symptoms was given a Medrol Dosepak as well as topical steroid all cream.  Symptoms are improved somewhat persistent no real rashes in the area. Surgical wounds are all healing well.   Medications    Current Outpatient Medications:     methylPREDNISolone (MEDROL, RAGHAV,) 4 MG tablet, By mouth., Disp: 1 kit, Rfl: 0    cephALEXin (KEFLEX) 500 MG capsule, Take 1 capsule by mouth 4 times daily for 5 days, Disp: 20 capsule, Rfl: 0    lidocaine (XYLOCAINE) 5 % ointment, Apply 240 g topically 4 times daily as needed for Pain Apply topically as needed. , Disp: 240 g, Rfl: 3    lidocaine (LIDODERM) 5 %, Place 1 patch onto the skin daily 12 hours on, 12 hours off., Disp: 30 patch, Rfl: 3    butalbital-acetaminophen-caffeine (FIORICET, ESGIC) -40 MG per tablet, Take 1 tablet by mouth every 8 hours as needed for Headaches, Disp: 90 tablet, Rfl: 0    ondansetron (ZOFRAN) 4 MG tablet, Take 1 tablet by mouth 3 times daily as needed for Nausea or Vomiting, Disp: 15 tablet, Rfl: 0    Magnesium Oxide 420 (252 Mg) MG TABS, take 1 tablet by mouth twice daily, Disp: 180 tablet, Rfl: 3    gabapentin (NEURONTIN) 300 MG capsule, Take 1 capsule by mouth daily for 30 days. , Disp: 30 capsule, Rfl: 0    orphenadrine (NORFLEX) 100 MG extended release tablet, Take 1 tablet by mouth 2 times daily, Disp: 60 tablet, Rfl: 0    lidocaine (XYLOCAINE) 5 % ointment, apply 1 to 2 grams four times a day if needed, Disp: , Rfl:     rosuvastatin (CRESTOR) 20 MG tablet, take 1 tablet by mouth once daily, Disp: , Rfl:     dicyclomine (BENTYL) 10 MG capsule, Take 1 capsule by mouth 4 times daily (before meals and nightly), Disp: 120 capsule, Rfl: 3    budesonide-formoterol (SYMBICORT) 160-4.5 MCG/ACT AERO, inhale 2 puffs by mouth twice a day **RINSE MOUTH AFTER USE**, Disp: 30.6 g, Rfl: 3    ASPIRIN LOW DOSE 81 MG EC tablet, take 1 tablet by mouth once daily, Disp: 90 tablet, Rfl: 0    BANOPHEN 25 MG capsule, take 1 capsule by mouth every 6 hours if needed for itching, Disp: 60 capsule, Rfl: 5    loratadine (CLARITIN) 10 MG tablet, take 1 tablet by mouth once daily, Disp: 90 tablet, Rfl: 3    triamcinolone (KENALOG) 0.1 % cream, APPLY TO AFFECTED AREA TWICE A DAY, Disp: 1 each, Rfl: 2    potassium chloride (KLOR-CON M) 10 MEQ extended release tablet, take 1 tablet by mouth daily, Disp: , Rfl:     betamethasone valerate (VALISONE) 0.1 % cream, apply to affected area twice a day, Disp: 45 g, Rfl: 3    coenzyme Q10 (EQL COQ10) 100 MG CAPS capsule, Take 1 capsule by mouth daily, Disp: 30 capsule, Rfl: 5    ferrous sulfate (FEROSUL) 325 (65 Fe) MG tablet, Take 1 tablet by mouth daily (with breakfast), Disp: 30 tablet, Rfl: 5    Cholecalciferol (VITAMIN D3) 1.25 MG (57088 UT) CAPS, Take 1 capsule by mouth once a week, Disp: 4 capsule, Rfl: 5    pantoprazole (PROTONIX) 40 MG tablet, Take 1 tablet by mouth every morning (before breakfast), Disp: 90 tablet, Rfl: 3    ipratropium-albuterol (DUONEB) 0.5-2.5 (3) MG/3ML SOLN nebulizer solution, Inhale into the lungs, Disp: , Rfl:     fluticasone (FLONASE) 50 MCG/ACT nasal spray, instill 2 sprays into each nostril once daily, Disp: 48 g, Rfl: 5    montelukast (SINGULAIR) 10 MG tablet, take 1 tablet by mouth every evening, Disp: 90 tablet, Rfl: 3    hydroCHLOROthiazide (HYDRODIURIL) 25 MG tablet, take 1 tablet by mouth every morning, Disp: 90 tablet, Rfl: 1    azelastine (OPTIVAR) 0.05 % ophthalmic solution, instill 1 drop into both eyes twice a day, Disp: 1 each, Rfl: 11    albuterol (PROVENTIL) (2.5 MG/3ML) 0.083% nebulizer solution, inhale contents of 1 vial ( 3 milliliters ) in nebulizer by mouth and INTO THE LUNGS every 4 hours if needed for wheezing, Disp: 375 mL, Rfl: 3    simethicone (MYLICON) 80 MG chewable tablet, Take 1 tablet by mouth 4 times daily as needed for Flatulence, Disp: 180 tablet, Rfl: 3    amitriptyline (ELAVIL) 50 MG tablet, take 1 tablet by mouth at bedtime (Patient taking differently: As needed), Disp: 90 tablet, Rfl: 1    albuterol sulfate  (90 Base) MCG/ACT inhaler, Inhale 2 puffs into the lungs 4 times daily as needed for Wheezing, Disp: 3 each, Rfl: 3    Blood Pressure Monitoring (B-D ASSURE BPM/AUTO ARM CUFF) MISC, 1 Units by Does not apply route daily, Disp: 1 each, Rfl: 0    Respiratory Therapy Supplies (NEBULIZER/TUBING/MOUTHPIECE) KIT, Dispense tubing, mask, and mouthpiece for nebulizer machine.  Dx: Asthma, Disp: 1 kit, Rfl: 0    EPINEPHrine (EPIPEN 2-RAGHAV) 0.3 MG/0.3ML SOAJ injection, Inject one pen as directed STAT for allergic reaction, may disp generic NDC 87047-822-59, Disp: 1 each, Rfl: 0    NARCAN 4 MG/0.1ML LIQD nasal spray, , Disp: , Rfl:     Allergies  Allergies   Allergen Reactions    Dilantin [Phenytoin] Anaphylaxis and Swelling    Lyrica [Pregabalin] Other (See Comments)     Bleeding in bowels    Dupixent [Dupilumab]      HIVES, THROAT ITCHING    Oxycodone      THROAT TIGHTNESS    Valium [Diazepam]        Review of Systems  History obtained from the patient. Constitutional: Denies any fever, chills, fatigue. Wound: Denies any rash, skin color changes or wound problems. Resp: Denies any cough, shortness of breath. CV: Denies any chest pain, orthopnea or syncope. GI: Positive for incisional discomfort only. Denies any nausea, vomiting, blood in the stool, constipation or diarrhea. : Denies any hematuria, hesitancy or dysuria. OBJECTIVE     VITALS: /68 (Site: Right Upper Arm, Position: Sitting, Cuff Size: Large Adult)   Pulse 71   Temp (!) 96.6 °F (35.9 °C) (Temporal)   Ht 6' 1\" (1.854 m)   Wt (!) 403 lb (182.8 kg)   SpO2 96%   BMI 53.17 kg/m²     CONSTITUTIONAL: Alert and oriented times 3, no acute distress and cooperative to examination. SKIN: Skin color, texture, turgor normal. No rashes or lesions. INCISION: wound margins intact and healing well. No signs of infection. No drainage. LUNGS: Lungs Clear  CARDIOVASCULAR: Normal Rate  ABDOMEN: Soft nontender no hernia  NEUROLOGIC: No sensory or motor nerve irritation    Performed by: Melvin Ye. Pathology      [place], [place]              [phone]   Assoc.                                               [address] of [address], [address]                                                       PROC: 02/15/2023   [facility]                                    RECV: 02/16/2023   [address]                                    RPTD: 02/17/2023   [facility], [address]                       MRN: [id number]    LOC: OR                       ACCT: [de-identified]  SEX: M                       : 1970  AGE: 50 Y                          PATHOLOGY REPORT                       ATTN: Carlos PALMA       Copies To:   Zeynep Villanueva       Clinical Information: GALLSTONES     FINAL DIAGNOSIS:   Gallbladder, cholecystectomy:             Cholelithiasis and cholesterolosis. Specimen:   GALLBLADDER       Gross Examination:   The container is labeled Jason Randle., gallbladder. Received in   formalin is an intact gallbladder measuring 7.5 cm in length x 3 cm in   diameter. The surface is yellow-green, smooth, and glistening. The   wall is intact. There are no lymph nodes. The lumen contains green   bile and several yellow-black stones, the largest about 0.5 cm. The   wall measures 0.1 cm in average thickness and the mucosal surface is   bile-stained. There are several yellow cholesterol-type polyps. The   cystic duct is patent. Representative sections including the cystic   duct margin are submitted. 1 ss. SMW/DKR:[id number]     Microscopic Examination:   Microscopic examination was performed. [id number]                                                       <Sign Out Dr. Joesphine Gottron, M.D., F.C.A.P.        NV[facility]/ [address]   Printed on:  2023   [address]   [facility] DEBORAH ALBRECHT II[address], [address] [employer] [facility]   Original print date: 2023      Specimen Collected: 02/15/23 07:09 EST Last Resulted: 23 11:14 EST

## 2023-02-28 ENCOUNTER — HOSPITAL ENCOUNTER (OUTPATIENT)
Dept: MRI IMAGING | Age: 53
Discharge: HOME | End: 2023-02-28
Payer: MEDICAID

## 2023-02-28 ENCOUNTER — TELEPHONE (OUTPATIENT)
Dept: FAMILY MEDICINE CLINIC | Age: 53
End: 2023-02-28

## 2023-02-28 DIAGNOSIS — D35.2 PITUITARY ADENOMA (HCC): ICD-10-CM

## 2023-02-28 PROCEDURE — A9579 GAD-BASE MR CONTRAST NOS,1ML: HCPCS

## 2023-02-28 PROCEDURE — 70553 MRI BRAIN STEM W/O & W/DYE: CPT

## 2023-02-28 PROCEDURE — 6360000004 HC RX CONTRAST MEDICATION

## 2023-02-28 RX ADMIN — GADOTERIDOL 20 ML: 279.3 INJECTION, SOLUTION INTRAVENOUS at 15:20

## 2023-03-01 DIAGNOSIS — K76.0 FATTY LIVER: ICD-10-CM

## 2023-03-01 DIAGNOSIS — E78.5 HYPERLIPIDEMIA, UNSPECIFIED HYPERLIPIDEMIA TYPE: ICD-10-CM

## 2023-03-01 RX ORDER — PRAVASTATIN SODIUM 10 MG
TABLET ORAL
Qty: 30 TABLET | Refills: 3 | OUTPATIENT
Start: 2023-03-01

## 2023-03-01 NOTE — TELEPHONE ENCOUNTER
Pravastatin refused due to being discontinued on 1/12/23 by WS. Pt does not need a refill at this time.

## 2023-03-02 DIAGNOSIS — J45.50 SEVERE PERSISTENT ASTHMA WITHOUT COMPLICATION: ICD-10-CM

## 2023-03-02 NOTE — TELEPHONE ENCOUNTER
This medication refill is regarding a electronic request. Refill requested by  [facility] . Requested Prescriptions     Pending Prescriptions Disp Refills    albuterol (PROVENTIL) (2.5 MG/3ML) 0.083% nebulizer solution [Pharmacy Med Name: ALBUTEROL SUL 2.5 MG/3 ML SOLN] 375 mL 3     Sig: inhale contents of 1 vial ( 3 milliliters ) in nebulizer by mouth and INTO THE LUNGS every 4 hours if needed for wheezing     Date of last visit: 1/12/2023   Date of next visit: None  Date of last refill: 9/9/22 #375mL/3    Rx verified, ordered and set to EP.

## 2023-03-03 ENCOUNTER — TELEPHONE (OUTPATIENT)
Dept: SURGERY | Age: 53
End: 2023-03-03

## 2023-03-03 RX ORDER — ALBUTEROL SULFATE 2.5 MG/3ML
SOLUTION RESPIRATORY (INHALATION)
Qty: 375 ML | Refills: 3 | Status: SHIPPED | OUTPATIENT
Start: 2023-03-03

## 2023-03-03 NOTE — TELEPHONE ENCOUNTER
Patient c/o rectal bleeding. He thinks it could be from his stomach ulcer and was asking about getting some medication. This is the first time he has noticed any bleeding. It was not dark red blood. He denies pain. He denies having a hemorrhoid. He denies being constipated or hard stools from pain medication. He will try some Mylanta to coat his stomach, in case it is from an ulcer. I advised him to keep on eye on this if it continues to call the office back. He asked that I let Dr. Lj Elizabeth know.

## 2023-03-06 DIAGNOSIS — G89.4 CHRONIC PAIN SYNDROME: ICD-10-CM

## 2023-03-06 NOTE — TELEPHONE ENCOUNTER
Pt called back, explained he would need to f/u with his PCP or gastroenterologist for his rectal bleeding.  He voiced understanding

## 2023-03-06 NOTE — TELEPHONE ENCOUNTER
OARRS reviewed. UDS: + for  Hydrocodone. Butalbital, gabapentin, amitriptyline, cyclobenzaprine present.   -Pt. Was prescribed 7 day supply of Hydrocodone on 2/15/23 due to having sx according to notes by a different provider. Last seen: 2/20/2023.  Follow-up:   Future Appointments   Date Time Provider Jesus Iris   3/9/2023  9:00 AM Getachew Casillas [facility]   3/22/2023  1:45 PM Isac Lorenz MD [facility]   5/1/2023 11:00 AM MIRNA Hercules CNP N [facility] Pain [facility]

## 2023-03-06 NOTE — TELEPHONE ENCOUNTER
OARRS reviewed. UDS: + for  hydrocodone. Butalbital, gabapentin, amitriptyline, cyclobenzaprine present. Last seen: 2/20/2023.  Follow-up:   Future Appointments   Date Time Provider Jesus Simmons   3/9/2023  [facility]   3/22/2023  1:45 PM Lb Grimm [facility]  [place] [facility].[place]   5/1/2023 11:00 AM MIRNA Garrett CNP N SRP[facility] Pain [facility] [place] II.JESSIE

## 2023-03-06 NOTE — TELEPHONE ENCOUNTER
Daniel Encinas called requesting a refill on the following medications:  Requested Prescriptions     Pending Prescriptions Disp Refills    HYDROcodone-acetaminophen (NORCO) 5-325 MG per tablet 90 tablet 0     Sig: Take 1 tablet by mouth every 8 hours as needed for Pain for up to 30 days.      Pharmacy verified: [facility]  .pv      Date of last visit: 2/20/2023  Date of next visit (if applicable): 5/8/5073

## 2023-03-07 RX ORDER — ORPHENADRINE CITRATE 100 MG/1
TABLET, EXTENDED RELEASE ORAL
Qty: 60 TABLET | Refills: 0 | Status: SHIPPED | OUTPATIENT
Start: 2023-03-14 | End: 2023-03-08 | Stop reason: SDUPTHER

## 2023-03-08 RX ORDER — ORPHENADRINE CITRATE 100 MG/1
TABLET, EXTENDED RELEASE ORAL
Qty: 60 TABLET | Refills: 0 | Status: SHIPPED | OUTPATIENT
Start: 2023-03-08

## 2023-03-08 RX ORDER — ONDANSETRON 4 MG/1
TABLET, FILM COATED ORAL
Qty: 15 TABLET | Refills: 0 | OUTPATIENT
Start: 2023-03-08

## 2023-03-08 NOTE — TELEPHONE ENCOUNTER
Fill date for muscle relaxant Norflex put in for 3/14 and according to last time sent in script should be able to fill today. PT. Is out of and asking for fill to be changed to today. Resetting up for.

## 2023-03-09 RX ORDER — GABAPENTIN 300 MG/1
CAPSULE ORAL
Qty: 30 CAPSULE | Refills: 0 | Status: SHIPPED | OUTPATIENT
Start: 2023-03-14 | End: 2023-04-13

## 2023-03-09 NOTE — TELEPHONE ENCOUNTER
OARRS reviewed. UDS: + for  Hydrocodone. Butalbital, gabapentin, amitriptyline, cyclobenzaprine present. Last seen: 2/20/2023.  Follow-up:   Future Appointments   Date Time Provider Jesus Simmons   3/22/2023  1:45 PM Crispin Santos MD N SR[facility] Heart [facility] MARIELENA ALBRECHT II.JESSIE   4/24/2023 10:30 AM Warren De La Cruz TREATMENT [facility]   5/1/2023 11:00 AM MIRNA Billings CNP [facility]

## 2023-03-13 ENCOUNTER — TELEPHONE (OUTPATIENT)
Dept: PHYSICAL MEDICINE AND REHAB | Age: 53
End: 2023-03-13

## 2023-03-13 DIAGNOSIS — M54.50 LUMBAR PAIN: ICD-10-CM

## 2023-03-13 DIAGNOSIS — M47.816 LUMBAR SPONDYLOSIS: Primary | ICD-10-CM

## 2023-03-13 DIAGNOSIS — M51.36 LUMBAR DEGENERATIVE DISC DISEASE: ICD-10-CM

## 2023-03-13 DIAGNOSIS — K21.9 GASTROESOPHAGEAL REFLUX DISEASE WITHOUT ESOPHAGITIS: ICD-10-CM

## 2023-03-13 RX ORDER — SIMETHICONE 80 MG
TABLET,CHEWABLE ORAL
Qty: 180 TABLET | Refills: 3 | Status: SHIPPED | OUTPATIENT
Start: 2023-03-13

## 2023-03-13 RX ORDER — HYDROCODONE BITARTRATE AND ACETAMINOPHEN 5; 325 MG/1; MG/1
1 TABLET ORAL EVERY 8 HOURS PRN
Qty: 90 TABLET | Refills: 0 | Status: SHIPPED | OUTPATIENT
Start: 2023-03-13 | End: 2023-04-12

## 2023-03-13 NOTE — TELEPHONE ENCOUNTER
Pt. Called the office and states that he would like to be scheduled for his Lumbar RFA's that were discuss at his last office visit 2/20/2023. OK to schedule?  Please advise, Thanks

## 2023-03-13 NOTE — TELEPHONE ENCOUNTER
Request sent from  pharmacy for refill of simethicone 80 mg chewable tabs. Last seen 1/12/23, no future appt scheduled. Med verified. Order pended.

## 2023-03-13 NOTE — TELEPHONE ENCOUNTER
Pt. Called to pass on that he had to stop the Gabapentin last week because he started to to have blood in his stool, black. The next day it resolved.  Has had issues with gastro bleeding in past.

## 2023-03-14 ENCOUNTER — TELEPHONE (OUTPATIENT)
Dept: PHYSICAL MEDICINE AND REHAB | Age: 53
End: 2023-03-14

## 2023-03-14 NOTE — TELEPHONE ENCOUNTER
Pt denies taking any blood thinners except ASA 81 mg. (no cardiac events in the last 6 months) prior to scheduling procedure. Agree with assessment and plan.  Trend creatinine and urine output from rosado and nephrostomy

## 2023-03-20 ENCOUNTER — CLINICAL DOCUMENTATION (OUTPATIENT)
Dept: SPIRITUAL SERVICES | Facility: CLINIC | Age: 53
End: 2023-03-20

## 2023-03-20 NOTE — FLOWSHEET NOTE
[place] [address] PROGRESS NOTE    Patient: Janel Barrera          YOB: 1970  Age: 50 y.o. Gender: male            Assessment:  Sharad Menjivar is a 56-year-old male who is being referred for emotional / spiritual support due what is currently being experienced in his life. Per referral, feeling overwhelmed with multiple life-adjustments. Interventions:   received a telephone call from Sharad Menjivar requesting an appointment for ongoing support. Outcomes:  Sharad Menjivar confirmed his desire for a scheduled appointment when available. Plan:  A follow-up appointment is scheduled on 3/21 at [facility].  remains available to assist as needed moving forward.     Electronically signed by Jordan Lynne, on 3/20/2023 at 3:53 PM.  [facility] [facility]  [phone]

## 2023-03-22 ENCOUNTER — CLINICAL DOCUMENTATION (OUTPATIENT)
Dept: SPIRITUAL SERVICES | Facility: CLINIC | Age: 53
End: 2023-03-22

## 2023-03-22 NOTE — FLOWSHEET NOTE
[place] [address] PROGRESS NOTE    Patient: Jessy High       YOB: 1970  Age: 48 y.o. Gender: male            Assessment:  Willistine Fothergill is a 70-year-old male who is being referred for emotional / spiritual support due what is currently being experienced in his life. Per referral, feeling overwhelmed with multiple life-adjustments. Interventions:   received a telephone call from Willistine Fothergill requesting an appointment for ongoing support. Outcomes:  Willistine Fothergill confirmed his desire for a scheduled appointment when available. Plan:  A follow-up appointment is scheduled on 3/24 at [facility].  remains available to assist as needed moving forward.     Electronically signed by Robert Quiñones on 3/22/2023 at 1:11 PM.  [facility]  [phone]

## 2023-03-27 ENCOUNTER — HOSPITAL ENCOUNTER (EMERGENCY)
Age: 53
Discharge: HOME | End: 2023-03-28
Payer: MEDICAID

## 2023-03-27 ENCOUNTER — APPOINTMENT (OUTPATIENT)
Dept: CT IMAGING | Age: 53
End: 2023-03-27
Payer: MEDICAID

## 2023-03-27 VITALS
BODY MASS INDEX: 41.75 KG/M2 | HEART RATE: 71 BPM | RESPIRATION RATE: 16 BRPM | WEIGHT: 315 LBS | TEMPERATURE: 98.2 F | SYSTOLIC BLOOD PRESSURE: 140 MMHG | HEIGHT: 73 IN | OXYGEN SATURATION: 98 % | DIASTOLIC BLOOD PRESSURE: 73 MMHG

## 2023-03-27 DIAGNOSIS — G44.209 TENSION HEADACHE: Primary | ICD-10-CM

## 2023-03-27 PROCEDURE — 70450 CT HEAD/BRAIN W/O DYE: CPT

## 2023-03-27 PROCEDURE — 96375 TX/PRO/DX INJ NEW DRUG ADDON: CPT

## 2023-03-27 PROCEDURE — 2580000003 HC RX 258: Performed by: PHYSICIAN ASSISTANT

## 2023-03-27 PROCEDURE — 85025 COMPLETE CBC W/AUTO DIFF WBC: CPT

## 2023-03-27 PROCEDURE — 6360000002 HC RX W HCPCS: Performed by: PHYSICIAN ASSISTANT

## 2023-03-27 PROCEDURE — 80053 COMPREHEN METABOLIC PANEL: CPT

## 2023-03-27 PROCEDURE — 36415 COLL VENOUS BLD VENIPUNCTURE: CPT

## 2023-03-27 PROCEDURE — 96374 THER/PROPH/DIAG INJ IV PUSH: CPT

## 2023-03-27 PROCEDURE — 84443 ASSAY THYROID STIM HORMONE: CPT

## 2023-03-27 PROCEDURE — 99284 EMERGENCY DEPT VISIT MOD MDM: CPT

## 2023-03-27 RX ORDER — PROCHLORPERAZINE EDISYLATE 5 MG/ML
10 INJECTION INTRAMUSCULAR; INTRAVENOUS ONCE
Status: COMPLETED | OUTPATIENT
Start: 2023-03-28 | End: 2023-03-27

## 2023-03-27 RX ORDER — DIPHENHYDRAMINE HYDROCHLORIDE 50 MG/ML
25 INJECTION INTRAMUSCULAR; INTRAVENOUS ONCE
Status: COMPLETED | OUTPATIENT
Start: 2023-03-28 | End: 2023-03-27

## 2023-03-27 RX ORDER — 0.9 % SODIUM CHLORIDE 0.9 %
1000 INTRAVENOUS SOLUTION INTRAVENOUS ONCE
Status: COMPLETED | OUTPATIENT
Start: 2023-03-28 | End: 2023-03-28

## 2023-03-27 RX ORDER — KETOROLAC TROMETHAMINE 30 MG/ML
30 INJECTION, SOLUTION INTRAMUSCULAR; INTRAVENOUS ONCE
Status: COMPLETED | OUTPATIENT
Start: 2023-03-28 | End: 2023-03-27

## 2023-03-27 RX ADMIN — SODIUM CHLORIDE 1000 ML: 9 INJECTION, SOLUTION INTRAVENOUS at 23:48

## 2023-03-27 RX ADMIN — DIPHENHYDRAMINE HYDROCHLORIDE 25 MG: 50 INJECTION, SOLUTION INTRAMUSCULAR; INTRAVENOUS at 23:49

## 2023-03-27 RX ADMIN — PROCHLORPERAZINE EDISYLATE 10 MG: 5 INJECTION INTRAMUSCULAR; INTRAVENOUS at 23:49

## 2023-03-27 RX ADMIN — KETOROLAC TROMETHAMINE 30 MG: 30 INJECTION, SOLUTION INTRAMUSCULAR at 23:49

## 2023-03-27 ASSESSMENT — PAIN DESCRIPTION - LOCATION: LOCATION: HEAD

## 2023-03-27 ASSESSMENT — PAIN - FUNCTIONAL ASSESSMENT: PAIN_FUNCTIONAL_ASSESSMENT: 0-10

## 2023-03-27 ASSESSMENT — PAIN DESCRIPTION - FREQUENCY: FREQUENCY: CONTINUOUS

## 2023-03-27 ASSESSMENT — PAIN SCALES - GENERAL: PAINLEVEL_OUTOF10: 9

## 2023-03-27 ASSESSMENT — PAIN DESCRIPTION - DESCRIPTORS: DESCRIPTORS: ACHING;THROBBING

## 2023-03-28 ENCOUNTER — CLINICAL DOCUMENTATION (OUTPATIENT)
Dept: SPIRITUAL SERVICES | Facility: CLINIC | Age: 53
End: 2023-03-28

## 2023-03-28 LAB
ALBUMIN SERPL BCG-MCNC: 3.8 G/DL (ref 3.5–5.1)
ALP SERPL-CCNC: 151 U/L (ref 38–126)
ALT SERPL W/O P-5'-P-CCNC: 25 U/L (ref 11–66)
ANION GAP SERPL CALC-SCNC: 9 MEQ/L (ref 8–16)
AST SERPL-CCNC: 18 U/L (ref 5–40)
BASOPHILS ABSOLUTE: 0 THOU/MM3 (ref 0–0.1)
BASOPHILS NFR BLD AUTO: 0.5 %
BILIRUB SERPL-MCNC: 0.2 MG/DL (ref 0.3–1.2)
BUN SERPL-MCNC: 9 MG/DL (ref 7–22)
CALCIUM SERPL-MCNC: 8.8 MG/DL (ref 8.5–10.5)
CHLORIDE SERPL-SCNC: 104 MEQ/L (ref 98–111)
CO2 SERPL-SCNC: 26 MEQ/L (ref 23–33)
CREAT SERPL-MCNC: 0.8 MG/DL (ref 0.4–1.2)
DEPRECATED RDW RBC AUTO: 45 FL (ref 35–45)
EOSINOPHIL NFR BLD AUTO: 2.2 %
EOSINOPHILS ABSOLUTE: 0.1 THOU/MM3 (ref 0–0.4)
ERYTHROCYTE [DISTWIDTH] IN BLOOD BY AUTOMATED COUNT: 12.3 % (ref 11.5–14.5)
FLUAV RNA RESP QL NAA+PROBE: NOT DETECTED
FLUBV RNA RESP QL NAA+PROBE: NOT DETECTED
GFR SERPL CREATININE-BSD FRML MDRD: > 60 ML/MIN/1.73M2
GLUCOSE SERPL-MCNC: 102 MG/DL (ref 70–108)
HCT VFR BLD AUTO: 37.5 % (ref 42–52)
HGB BLD-MCNC: 11.7 GM/DL (ref 14–18)
IMM GRANULOCYTES # BLD AUTO: 0.03 THOU/MM3 (ref 0–0.07)
IMM GRANULOCYTES NFR BLD AUTO: 0.5 %
LYMPHOCYTES ABSOLUTE: 1.5 THOU/MM3 (ref 1–4.8)
LYMPHOCYTES NFR BLD AUTO: 25.1 %
MCH RBC QN AUTO: 30.9 PG (ref 26–33)
MCHC RBC AUTO-ENTMCNC: 31.2 GM/DL (ref 32.2–35.5)
MCV RBC AUTO: 98.9 FL (ref 80–94)
MONOCYTES ABSOLUTE: 0.5 THOU/MM3 (ref 0.4–1.3)
MONOCYTES NFR BLD AUTO: 7.9 %
NEUTROPHILS NFR BLD AUTO: 63.8 %
NRBC BLD AUTO-RTO: 0 /100 WBC
OSMOLALITY SERPL CALC.SUM OF ELEC: 276.4 MOSMOL/KG (ref 275–300)
PLATELET # BLD AUTO: 212 THOU/MM3 (ref 130–400)
PMV BLD AUTO: 10.1 FL (ref 9.4–12.4)
POTASSIUM SERPL-SCNC: 3.7 MEQ/L (ref 3.5–5.2)
PROT SERPL-MCNC: 6.2 G/DL (ref 6.1–8)
RBC # BLD AUTO: 3.79 MILL/MM3 (ref 4.7–6.1)
SARS-COV-2 RNA RESP QL NAA+PROBE: NOT DETECTED
SEGMENTED NEUTROPHILS ABSOLUTE COUNT: 3.7 THOU/MM3 (ref 1.8–7.7)
SODIUM SERPL-SCNC: 139 MEQ/L (ref 135–145)
TSH SERPL DL<=0.005 MIU/L-ACNC: 1.63 UIU/ML (ref 0.4–4.2)
WBC # BLD AUTO: 5.8 THOU/MM3 (ref 4.8–10.8)

## 2023-03-28 PROCEDURE — 6360000002 HC RX W HCPCS: Performed by: PHYSICIAN ASSISTANT

## 2023-03-28 PROCEDURE — 87636 SARSCOV2 & INF A&B AMP PRB: CPT

## 2023-03-28 PROCEDURE — 96375 TX/PRO/DX INJ NEW DRUG ADDON: CPT

## 2023-03-28 RX ORDER — DEXAMETHASONE SODIUM PHOSPHATE 4 MG/ML
10 INJECTION, SOLUTION INTRA-ARTICULAR; INTRALESIONAL; INTRAMUSCULAR; INTRAVENOUS; SOFT TISSUE ONCE
Status: COMPLETED | OUTPATIENT
Start: 2023-03-28 | End: 2023-03-28

## 2023-03-28 RX ADMIN — DEXAMETHASONE SODIUM PHOSPHATE 10 MG: 4 INJECTION, SOLUTION INTRAMUSCULAR; INTRAVENOUS at 01:44

## 2023-03-28 ASSESSMENT — ENCOUNTER SYMPTOMS
NAUSEA: 1
DIARRHEA: 0
SORE THROAT: 1
PHOTOPHOBIA: 0
RHINORRHEA: 0
SHORTNESS OF BREATH: 0
SINUS PRESSURE: 0
ABDOMINAL PAIN: 0
COUGH: 0
VOMITING: 0
EYE PAIN: 0

## 2023-03-28 NOTE — ED PROVIDER NOTES
[address] [facility] [address] [address] EMERGENCY DEPT      Pt Name: Lisbeth Chahal  MRN: [id number]  [place] 1970  Date of evaluation: 3/27/2023  Provider: Hector Nixon PA-C    CHIEF COMPLAINT       Chief Complaint   Patient presents with    Headache    Nausea       Nurses Notes reviewed and I agree except as noted in the HPI. HISTORY OF PRESENT ILLNESS    Lisbeth Chahal is a 48 y.o. male who presents via private vehicle from home for headache. Patient reports abrupt onset of left occipital headache described as throbbing and pressure yesterday. Spontaneously improved and then worsened today. The headache has no modifying factors. He has taken Fioricet, Norco, muscle relaxer, and used his CPAP without relief. Usually these methods would help. Patient affirms floaters in his right eye, left ear popping and tingling, decreased appetite secondary to having his taste buds being \"off,\" myalgias, nasal congestion, and a scratchy throat. Patient attributes the last 2 to seasonal allergies. This is similar to previous headaches with the exception to the nausea and the unrelenting nature of the headache. Patient denies fever, chills, chest pain, dyspnea, dizziness, vomiting, cough, or other complaints. Patient does have a history of chronic headaches as well as other chronic painful syndromes. 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 David Silva. Patient had an MRI 3 weeks ago which was stable. Patient also has history of spinal fusion and a head injury 1 year ago. Location/Symptom: Left occipital headache  Timing/Onset: Yesterday  Context/Setting: Abrupt onset with spontaneous improvement then worsening today. Patient has history of headaches linked with cervical fusion, pituitary gland tumor, and a head injury 1 year ago. His normal means of improving headaches has not worked today.   Quality: Throbbing, pressure  Duration: Constant  Modifying Factors:

## 2023-03-28 NOTE — ED NOTES
Pt presents to the ED c/o a headache the has been ongoing since this morning and part of yesterday. Pt states that he started to have this headache prior to going to sleep yesterday and woke up with it this morning and it has worsened throughout the day. Pt is alert and oriented, respirations are equal and unlabored.  Pt denies any chest pain or sob     Makenna Ramos RN  03/27/23 5073

## 2023-03-28 NOTE — FLOWSHEET NOTE
[place] [address] PROGRESS NOTE    Patient: Veda Patiño        YOB: 1970  Age: 48 y.o. Gender: male            Assessment:  Varghese Bryson is a 68-year-old male who is being referred for emotional / spiritual support due what is currently being experienced in his life. Per referral, feeling overwhelmed with multiple life-adjustments. Interventions:  No Show for scheduled appointment on 3/28/2023.  received a telephone call from Varghese Bryson requesting an appointment for ongoing support. Outcomes:  Varghese Bryson confirmed his desire for a scheduled appointment when available. Plan:  A follow-up appointment is rescheduled on 3/29 at [facility].  remains available to assist as needed moving forward.     Electronically signed by Matthew Potts on 3/28/2023 at 2:35 PM.  [facility] [facility]  [phone]

## 2023-03-29 ENCOUNTER — CLINICAL DOCUMENTATION (OUTPATIENT)
Dept: SPIRITUAL SERVICES | Facility: CLINIC | Age: 53
End: 2023-03-29

## 2023-03-29 NOTE — FLOWSHEET NOTE
[place] [address] PROGRESS NOTE    Patient: Lindsey Longo      YOB: 1970  Age: 48 y.o. Gender: male            Assessment:  Claudette Han is a 82-year-old male who is being referred for emotional / spiritual support due what is currently being experienced in his life. Per referral, feeling overwhelmed with multiple life-adjustments. Emil Henning summarized life by stating, \"I've just been stressed. \"  Emil Henning expressed that money is still tight, and multiple vehicles still need repairs. In addition, his future living circumstances (looking at 2 locations) have still not opened for him. He is still hopeful for a positive outcome. Emil Henning stated his leg is healing slowly, but making progress. He is no longer wearing the brace. Interventions:   was present during the encounter to provide a listening presence allowing patient to freely engage in conversation, responding to open-ended questions, as well as follow-up questions for clarity and greater understanding.  shared words of , encouragement, and hope as appropriate.  ended the session with prayer. Outcomes:  Emil Henning was appreciative of an opportunity to freely express his story and vent his emotions in a safe place. Plan:  Emil Henning will contact  when additional emotional / spiritual support is desired or needed.     Electronically signed by Azar Hensley on 3/29/2023 at 3:50 PM.  Juan Russell  [phone]

## 2023-03-29 NOTE — DISCHARGE INSTRUCTIONS
surgery using electric clippers if the hair is in the same area where the procedure will occur. They should not shave you with a razor. Give you antibiotics before your surgery starts. In most cases, you should get antibiotics within 60 minutes before the surgery starts and the antibiotics should be stopped within 24 hours after surgery. Clean the skin at the site of your surgery with a special soap that kills germs. Clean their hands and arms up to their elbows with an antiseptic agent just before the surgery. Wear special hair covers, masks, gowns, and gloves during surgery to  keep the surgery area clean. Clean their hands with soap and water or an alcohol-based hand rub before and after caring for each patient. If you do not see your providers clean their hands, please     ask  them to do so. What can I do to help prevent SSIs? Before your surgery:  Tell your doctor about other medical problems you may have. Health problems such as allergies, diabetes, and obesity could affect your surgery and your treatment. Quit smoking. Patients who smoke get more infections. Talk to your doctor about how you can quit before your surgery. Do not shave near where you will have surgery. Shaving with a razor can irritate your skin and make it easier to develop an infection. At the time of your surgery:  Speak up if someone tries to shave you with a razor before surgery. Ask why you need to be shaved and talk with your surgeon if you have any concerns. Ask if you will get antibiotics before surgery. After your surgery:  Make sure that your healthcare providers clean their hands before examining you, either with soap and water or an alcohol-based hand rub. Family and friends who visit you should not touch the surgical wound or dressings. Family and friends should clean their hands with soap and water or an alcohol-based hand rub before and after visiting you.  If you do not see them clean their hands,

## 2023-03-30 ENCOUNTER — TELEPHONE (OUTPATIENT)
Dept: FAMILY MEDICINE CLINIC | Age: 53
End: 2023-03-30

## 2023-04-03 ENCOUNTER — PREP FOR PROCEDURE (OUTPATIENT)
Dept: PHYSICAL MEDICINE AND REHAB | Age: 53
End: 2023-04-03

## 2023-04-03 DIAGNOSIS — G89.4 CHRONIC PAIN SYNDROME: ICD-10-CM

## 2023-04-03 RX ORDER — HYDROCODONE BITARTRATE AND ACETAMINOPHEN 5; 325 MG/1; MG/1
1 TABLET ORAL EVERY 8 HOURS PRN
Qty: 90 TABLET | Refills: 0 | Status: CANCELLED | OUTPATIENT
Start: 2023-04-03 | End: 2023-05-03

## 2023-04-03 RX ORDER — HYDROCODONE BITARTRATE AND ACETAMINOPHEN 5; 325 MG/1; MG/1
1 TABLET ORAL EVERY 8 HOURS PRN
Qty: 90 TABLET | Refills: 0 | OUTPATIENT
Start: 2023-04-03 | End: 2023-05-03

## 2023-04-03 RX ORDER — LIDOCAINE 50 MG/G
1 PATCH TOPICAL DAILY
Qty: 30 PATCH | Refills: 3 | OUTPATIENT
Start: 2023-04-03 | End: 2023-05-03

## 2023-04-03 RX ORDER — ORPHENADRINE CITRATE 100 MG/1
TABLET, EXTENDED RELEASE ORAL
Qty: 60 TABLET | Refills: 0 | Status: SHIPPED | OUTPATIENT
Start: 2023-04-07 | End: 2023-05-07

## 2023-04-03 NOTE — TELEPHONE ENCOUNTER
OARRS reviewed. UDS: + for  Hydrocodone, Butalbital, Gabapentin, Amitriptyline, Cyclobenzaprine. Last seen: 2/20/2023.  Follow-up: 4/25/2023

## 2023-04-03 NOTE — TELEPHONE ENCOUNTER
Darcy Sheehan called requesting a refill on the following medications:  Requested Prescriptions     Pending Prescriptions Disp Refills    orphenadrine (NORFLEX) 100 MG extended release tablet 60 tablet 0     Sig: take 1 tablet by mouth twice a day    HYDROcodone-acetaminophen (NORCO) 5-325 MG per tablet 90 tablet 0     Sig: Take 1 tablet by mouth every 8 hours as needed for Pain for up to 30 days. lidocaine (LIDODERM) 5 % 30 patch 3     Sig: Place 1 patch onto the skin daily 12 hours on, 12 hours off.      Pharmacy verified:[facility] [address]  .pv      Date of last visit:2-20-23   Date of next visit (if applicable): 9/50/5344

## 2023-04-04 ENCOUNTER — ANESTHESIA EVENT (OUTPATIENT)
Dept: OPERATING ROOM | Age: 53
End: 2023-04-04
Payer: MEDICAID

## 2023-04-04 ENCOUNTER — HOSPITAL ENCOUNTER (OUTPATIENT)
Age: 53
Setting detail: OUTPATIENT SURGERY
Discharge: HOME | End: 2023-04-04
Attending: PAIN MEDICINE | Admitting: PAIN MEDICINE
Payer: MEDICAID

## 2023-04-04 ENCOUNTER — ANESTHESIA (OUTPATIENT)
Dept: OPERATING ROOM | Age: 53
End: 2023-04-04
Payer: MEDICAID

## 2023-04-04 ENCOUNTER — APPOINTMENT (OUTPATIENT)
Dept: GENERAL RADIOLOGY | Age: 53
End: 2023-04-04
Attending: PAIN MEDICINE
Payer: MEDICAID

## 2023-04-04 VITALS
DIASTOLIC BLOOD PRESSURE: 70 MMHG | OXYGEN SATURATION: 95 % | HEART RATE: 64 BPM | HEIGHT: 73 IN | SYSTOLIC BLOOD PRESSURE: 133 MMHG | TEMPERATURE: 95.9 F | WEIGHT: 315 LBS | BODY MASS INDEX: 41.75 KG/M2 | RESPIRATION RATE: 16 BRPM

## 2023-04-04 PROCEDURE — 64635 DESTROY LUMB/SAC FACET JNT: CPT | Performed by: PAIN MEDICINE

## 2023-04-04 PROCEDURE — 7100000010 HC PHASE II RECOVERY - FIRST 15 MIN: Performed by: PAIN MEDICINE

## 2023-04-04 PROCEDURE — 6360000002 HC RX W HCPCS: Performed by: ANESTHESIOLOGY

## 2023-04-04 PROCEDURE — 3600000057 HC PAIN LEVEL 4 ADDL 15 MIN: Performed by: PAIN MEDICINE

## 2023-04-04 PROCEDURE — 3700000001 HC ADD 15 MINUTES (ANESTHESIA): Performed by: PAIN MEDICINE

## 2023-04-04 PROCEDURE — 7100000011 HC PHASE II RECOVERY - ADDTL 15 MIN: Performed by: PAIN MEDICINE

## 2023-04-04 PROCEDURE — 64636 DESTROY L/S FACET JNT ADDL: CPT | Performed by: PAIN MEDICINE

## 2023-04-04 PROCEDURE — 3209999900 FLUORO FOR SURGICAL PROCEDURES

## 2023-04-04 PROCEDURE — 2709999900 HC NON-CHARGEABLE SUPPLY: Performed by: PAIN MEDICINE

## 2023-04-04 PROCEDURE — 2500000003 HC RX 250 WO HCPCS: Performed by: PAIN MEDICINE

## 2023-04-04 PROCEDURE — 2500000003 HC RX 250 WO HCPCS: Performed by: ANESTHESIOLOGY

## 2023-04-04 PROCEDURE — 3700000000 HC ANESTHESIA ATTENDED CARE: Performed by: PAIN MEDICINE

## 2023-04-04 PROCEDURE — 3600000056 HC PAIN LEVEL 4 BASE: Performed by: PAIN MEDICINE

## 2023-04-04 RX ORDER — BUPIVACAINE HYDROCHLORIDE 2.5 MG/ML
INJECTION, SOLUTION EPIDURAL; INFILTRATION; INTRACAUDAL PRN
Status: DISCONTINUED | OUTPATIENT
Start: 2023-04-04 | End: 2023-04-04 | Stop reason: ALTCHOICE

## 2023-04-04 RX ORDER — PROPOFOL 10 MG/ML
INJECTION, EMULSION INTRAVENOUS PRN
Status: DISCONTINUED | OUTPATIENT
Start: 2023-04-04 | End: 2023-04-04 | Stop reason: SDUPTHER

## 2023-04-04 RX ORDER — LIDOCAINE HYDROCHLORIDE 20 MG/ML
INJECTION, SOLUTION EPIDURAL; INFILTRATION; INTRACAUDAL; PERINEURAL PRN
Status: DISCONTINUED | OUTPATIENT
Start: 2023-04-04 | End: 2023-04-04 | Stop reason: SDUPTHER

## 2023-04-04 RX ORDER — FENTANYL CITRATE 50 UG/ML
INJECTION, SOLUTION INTRAMUSCULAR; INTRAVENOUS PRN
Status: DISCONTINUED | OUTPATIENT
Start: 2023-04-04 | End: 2023-04-04 | Stop reason: SDUPTHER

## 2023-04-04 RX ORDER — LIDOCAINE HYDROCHLORIDE 10 MG/ML
INJECTION, SOLUTION EPIDURAL; INFILTRATION; INTRACAUDAL; PERINEURAL PRN
Status: DISCONTINUED | OUTPATIENT
Start: 2023-04-04 | End: 2023-04-04 | Stop reason: ALTCHOICE

## 2023-04-04 RX ADMIN — FENTANYL CITRATE 100 MCG: 50 INJECTION, SOLUTION INTRAMUSCULAR; INTRAVENOUS at 09:22

## 2023-04-04 RX ADMIN — LIDOCAINE HYDROCHLORIDE 40 MG: 20 INJECTION, SOLUTION EPIDURAL; INFILTRATION; INTRACAUDAL; PERINEURAL at 09:22

## 2023-04-04 RX ADMIN — FENTANYL CITRATE 100 MCG: 50 INJECTION, SOLUTION INTRAMUSCULAR; INTRAVENOUS at 09:32

## 2023-04-04 RX ADMIN — PROPOFOL 100 MG: 10 INJECTION, EMULSION INTRAVENOUS at 09:34

## 2023-04-04 ASSESSMENT — PAIN SCALES - GENERAL: PAINLEVEL_OUTOF10: 0

## 2023-04-04 ASSESSMENT — PAIN - FUNCTIONAL ASSESSMENT: PAIN_FUNCTIONAL_ASSESSMENT: 0-10

## 2023-04-04 NOTE — OP NOTE
Pre-Procedure Note    Patient Name: Huber Rogers   YOB: 1970  Medical Record Number: [id number]  Date: 4/4/23    Indication:  Lower back pain    Consent: On file. Vital Signs:   Vitals:    04/04/23 0756   BP: 136/60   Pulse: 63   Resp: 18   Temp: (!) 96.7 °F (35.9 °C)   SpO2: 94%       Past Medical History:   has a past medical history of Aneurysm ([address] Arthritis, Asthma, Cardiomegaly, COVID-19, Depression, Fibromyalgia, GERD (gastroesophageal reflux disease), Hypertension, Liver disease, and CLARIBEL on CPAP. Past Surgical History:   has a past surgical history that includes back surgery; knee surgery (Left); Pain management procedure (Bilateral, 02/11/2021); Pain management procedure (Bilateral, 04/08/2021); Quadriceps repair (Right, 07/07/2022); Colonoscopy; Neck surgery; and Cholecystectomy, laparoscopic (N/A, 2/15/2023). Pre-Sedation Documentation and Exam:   Vital signs have been reviewed (see flow sheet for vitals). Sedation/ Anesthesia Plan:   MAC    Patient is an appropriate candidate for plan of sedation: yes    Preoperative Diagnosis:  L-spondylosis    Post-Op Dx: as above    Procedure Performed:  :Radiofrequency ablation of median branches at the levels of L4-5 and L5-S1 bilateral under fluoroscopic guidance     Indication for the Procedure: The patient has ahistory of chronic low back pain that is not responding well to the conservative treatment. Patient's pain is mostly axial in nature. Pain is interfering with the activities of daily living. Physical examination revealed facet tenderness and facet loading is positive. Patient had undergone lumbar facet joint injections with pain relief that lasted for only a short period of time and had greater than 70% pain relief with confirmatory median branch blocks. Hence we decided to do radiofrequency abalation of median branches for long term pain releif.    The procedure and risks  were discussed with the patient and an

## 2023-04-04 NOTE — ANESTHESIA POSTPROCEDURE EVALUATION
Department of Anesthesiology  Postprocedure Note    Patient: Arielle Rivera  MRN: [id number]  YOB: 1970  Date of evaluation: 4/4/2023      Procedure Summary     Date: 04/04/23 Room / Location: [facility] 03 / [facility]    Anesthesia Start: 8944 Anesthesia Stop: 5994    Procedure: Bilateral Lumbar 4-5, 5-sacral 1 facet Radiofrequency Ablation (Bilateral) Diagnosis:       Lumbar spondylosis      (Lumbar spondylosis [L07.052])    Surgeons: Tami Hugo MD Responsible Provider: Suzanna Bright DO    Anesthesia Type: MAC ASA Status: 3          Anesthesia Type: No value filed.     To Phase I: To Score: 10    To Phase II: To Score: 9      Anesthesia Post Evaluation    Patient location during evaluation: bedside  Patient participation: complete - patient participated  Level of consciousness: awake and alert  Pain score: 1  Airway patency: patent  Nausea & Vomiting: no nausea and no vomiting  Complications: no  Cardiovascular status: hemodynamically stable and blood pressure returned to baseline  Respiratory status: spontaneous ventilation, room air and acceptable  Hydration status: stable

## 2023-04-04 NOTE — ANESTHESIA PRE PROCEDURE
Allergy desensitization therapy Z51.6    Lumbar spondylosis M47.816    Severe episode of recurrent major depressive disorder, without psychotic features ([facility] [address] F33.2    Strain of lumbar region S39.012A    Abnormal stress test R94.39    Gallstones K80.20       Past Medical History:        Diagnosis Date    Aneurysm ([facility] [address]     pituitary gland    Arthritis     Asthma     Cardiomegaly     COVID-19 11/2021    [facility]    Depression     Fibromyalgia     GERD (gastroesophageal reflux disease)     Hypertension     melhem    Liver disease     CLARIBEL on CPAP        Past Surgical History:        Procedure Laterality Date    BACK SURGERY      CHOLECYSTECTOMY, LAPAROSCOPIC N/A 2/15/2023    ROOTIC CHOLECYSTECTOMY performed by Scotty Strong MD at [address] Left     NECK SURGERY      PAIN MANAGEMENT PROCEDURE Bilateral 02/11/2021    Bilateral L-facet MBB # 1 @ L3-4, L4-5, and L5-S1 performed by Unruly García MD at [place] [address] Bilateral 04/08/2021    Bilateral L-facet MBB # 2 @ L3-4, L4-5, and L5-S1 performed by Unruly García MD at [address] [place] [address] Right 07/07/2022       Social History:    Social History     Tobacco Use    Smoking status: Never    Smokeless tobacco: Never   Substance Use Topics    Alcohol use: Yes     Comment: occasionally                                Counseling given: Not Answered      Vital Signs (Current): There were no vitals filed for this visit.                                            BP Readings from Last 3 Encounters:   03/27/23 (!) 140/73   03/06/23 (!) 142/81   02/27/23 116/68       NPO Status:                                                                                 BMI:   Wt Readings from Last 3 Encounters:   03/27/23 (!) 419 lb (190.1 kg)   03/06/23 (!) 406 lb (184.2 kg)   02/27/23 (!) 403 lb (182.8 kg)     There is no height or weight on file to

## 2023-04-04 NOTE — PROGRESS NOTES
0941-Patient to Phase II. Report received from surgical RN. Patient awake and alert. Vital signs obtained and stable. Respirations unlabored on room air. Patient denies pain and nausea. Denies numbness and tingling in extremities. Injection site open to air and no drainage noted. Patient instructed to stay in bed. Call light in reach. 0945-snack and drink provided    0955-IV removed at this time, sitting at the side of the bed and getting dressed.  Attempting to notify ride    1004-ride notified and will be here in 5 minutes    1015-Pt discharged home in stable condition

## 2023-04-04 NOTE — H&P
[facility]  History and Physical Update    Pt Name: Shadi Azevedo  MRN: [id number]  YOB: 1970  Date of evaluation: 4/4/2023      I have examined the patient and reviewed the H&P/Consult and there are no changes to the patient or plans.         Electronically signed by Jose D Venegas MD on 4/4/2023 at 8:36 AM
Memory: Cognition normal. Memory is not impaired. He does not exhibit impaired recent memory or impaired remote memory. Judgment: Judgment normal. Judgment is not impulsive or inappropriate. VAL  Patricks test  positive  Yeoman's  or Gaenslen's positive        Assessment:      1. Lumbar spondylosis    2. Lumbar degenerative disc disease    3. Lumbar pain    4. Lumbosacral radiculitis    5. Cervical radiculopathy    6. DDD (degenerative disc disease), cervical    7. Hx of fusion of cervical spine    8. Acute medial meniscus tear of right knee, initial encounter    9. Pituitary adenoma ([facility] [address]    10. Chronic pain syndrome    11. Post-op pain    12. S/P cervical spinal fusion       Plan:      OARRS reviewed. Current MED: 15.00  Patient was offered naloxone for home. Discussed long term side effects of medications, tolerance, dependency and addiction. Previous UDS reviewed  UDS preformed today for compliance. Patient told can not receive any pain medications from any other source. No evidence of abuse, diversion or aberrant behavior. Medications and/or procedures to improve function and quality of life- patient understanding with this and that may not be pain free  Discussed with patient about safe storage of medications at home  Discussed possible weaning of medication dosing dependent on treatment/procedure results. Discussed with patient about risks with procedure including infection, reaction to medication, increased pain, or bleeding. Procedure notes reveiwed in detail   Received 80% relief of low back pain and also relief of leg pain from bilateral L-facet MBB # 2 for 3 weeks with improvement of mobility and activity. Discussed Bilateral L-facet RFA @ L4-5, and L5-S1 when needed to for longer  term pain relief. Healing from lap erica from last week.  Continues therapy for right knee   Discussed possible LESI, trigger point injections in future   Continue current pain medications- Norco

## 2023-04-05 DIAGNOSIS — I10 UNCONTROLLED HYPERTENSION: ICD-10-CM

## 2023-04-05 RX ORDER — HYDROCHLOROTHIAZIDE 25 MG/1
25 TABLET ORAL EVERY MORNING
Qty: 90 TABLET | Refills: 3 | Status: SHIPPED | OUTPATIENT
Start: 2023-04-05

## 2023-04-05 NOTE — TELEPHONE ENCOUNTER
This medication refill is regarding a electronic request. Refill requested by  [facility] . Requested Prescriptions     Pending Prescriptions Disp Refills    hydroCHLOROthiazide (HYDRODIURIL) 25 MG tablet [Pharmacy Med Name: HYDROCHLOROTHIAZIDE 25 MG TAB] 90 tablet 3     Sig: take 1 tablet by mouth every morning     Date of last visit: 1/12/2023   Date of next visit: None  Date of last refill: 10/10/22 #90/1    Last CMP:   Lab Results   Component Value Date     03/27/2023    K 3.7 03/27/2023     03/27/2023    CO2 26 03/27/2023    BUN 9 03/27/2023    CREATININE 0.8 03/27/2023    GLUCOSE 102 03/27/2023    CALCIUM 8.8 03/27/2023    PROT 6.2 03/27/2023    LABALBU 3.8 03/27/2023    BILITOT 0.2 (L) 03/27/2023    ALKPHOS 151 (H) 03/27/2023    AST 18 03/27/2023    ALT 25 03/27/2023    LABGLOM >60 03/27/2023     Rx verified, ordered and set to EP.

## 2023-04-17 ENCOUNTER — TELEPHONE (OUTPATIENT)
Dept: FAMILY MEDICINE CLINIC | Age: 53
End: 2023-04-17

## 2023-04-17 ENCOUNTER — OFFICE VISIT (OUTPATIENT)
Dept: FAMILY MEDICINE CLINIC | Age: 53
End: 2023-04-17
Payer: MEDICAID

## 2023-04-17 VITALS
DIASTOLIC BLOOD PRESSURE: 76 MMHG | WEIGHT: 315 LBS | RESPIRATION RATE: 16 BRPM | SYSTOLIC BLOOD PRESSURE: 140 MMHG | BODY MASS INDEX: 54.22 KG/M2 | TEMPERATURE: 97.9 F | HEART RATE: 68 BPM

## 2023-04-17 DIAGNOSIS — R11.0 NAUSEA: ICD-10-CM

## 2023-04-17 DIAGNOSIS — D50.9 IRON DEFICIENCY ANEMIA, UNSPECIFIED IRON DEFICIENCY ANEMIA TYPE: ICD-10-CM

## 2023-04-17 DIAGNOSIS — M48.02 CERVICAL STENOSIS OF SPINAL CANAL: ICD-10-CM

## 2023-04-17 DIAGNOSIS — E55.9 VITAMIN D DEFICIENCY: ICD-10-CM

## 2023-04-17 DIAGNOSIS — G95.9 DISEASE OF SPINAL CORD (HCC): ICD-10-CM

## 2023-04-17 DIAGNOSIS — E66.01 MORBID OBESITY WITH BMI OF 50.0-59.9, ADULT (HCC): ICD-10-CM

## 2023-04-17 DIAGNOSIS — R53.83 OTHER FATIGUE: ICD-10-CM

## 2023-04-17 DIAGNOSIS — I25.118 ATHEROSCLEROTIC HEART DISEASE OF NATIVE CORONARY ARTERY WITH OTHER FORMS OF ANGINA PECTORIS (HCC): ICD-10-CM

## 2023-04-17 DIAGNOSIS — I10 UNCONTROLLED HYPERTENSION: Primary | ICD-10-CM

## 2023-04-17 DIAGNOSIS — S09.90XS INJURY OF HEAD, SEQUELA: ICD-10-CM

## 2023-04-17 DIAGNOSIS — G47.30 SLEEP APNEA, UNSPECIFIED TYPE: ICD-10-CM

## 2023-04-17 DIAGNOSIS — R41.3 MEMORY PROBLEM: ICD-10-CM

## 2023-04-17 DIAGNOSIS — F33.2 SEVERE EPISODE OF RECURRENT MAJOR DEPRESSIVE DISORDER, WITHOUT PSYCHOTIC FEATURES (HCC): ICD-10-CM

## 2023-04-17 PROCEDURE — G8427 DOCREV CUR MEDS BY ELIG CLIN: HCPCS | Performed by: NURSE PRACTITIONER

## 2023-04-17 PROCEDURE — 3017F COLORECTAL CA SCREEN DOC REV: CPT | Performed by: NURSE PRACTITIONER

## 2023-04-17 PROCEDURE — 1036F TOBACCO NON-USER: CPT | Performed by: NURSE PRACTITIONER

## 2023-04-17 PROCEDURE — 3077F SYST BP >= 140 MM HG: CPT | Performed by: NURSE PRACTITIONER

## 2023-04-17 PROCEDURE — 3078F DIAST BP <80 MM HG: CPT | Performed by: NURSE PRACTITIONER

## 2023-04-17 PROCEDURE — 99214 OFFICE O/P EST MOD 30 MIN: CPT | Performed by: NURSE PRACTITIONER

## 2023-04-17 PROCEDURE — G8417 CALC BMI ABV UP PARAM F/U: HCPCS | Performed by: NURSE PRACTITIONER

## 2023-04-17 RX ORDER — POTASSIUM CHLORIDE 750 MG/1
10 TABLET, EXTENDED RELEASE ORAL DAILY
COMMUNITY
Start: 2023-03-28 | End: 2023-04-17

## 2023-04-17 RX ORDER — ONDANSETRON 4 MG/1
4 TABLET, FILM COATED ORAL 3 TIMES DAILY PRN
Qty: 15 TABLET | Refills: 0 | Status: SHIPPED | OUTPATIENT
Start: 2023-04-17

## 2023-04-17 RX ORDER — ROSUVASTATIN CALCIUM 20 MG/1
20 TABLET, COATED ORAL DAILY
COMMUNITY
Start: 2023-03-28 | End: 2023-04-17

## 2023-04-17 RX ORDER — LIDOCAINE 50 MG/G
PATCH TOPICAL
COMMUNITY
Start: 2023-04-07

## 2023-04-17 SDOH — ECONOMIC STABILITY: HOUSING INSECURITY
IN THE LAST 12 MONTHS, WAS THERE A TIME WHEN YOU DID NOT HAVE A STEADY PLACE TO SLEEP OR SLEPT IN A SHELTER (INCLUDING NOW)?: NO

## 2023-04-17 SDOH — ECONOMIC STABILITY: INCOME INSECURITY: HOW HARD IS IT FOR YOU TO PAY FOR THE VERY BASICS LIKE FOOD, HOUSING, MEDICAL CARE, AND HEATING?: VERY HARD

## 2023-04-17 SDOH — ECONOMIC STABILITY: FOOD INSECURITY: WITHIN THE PAST 12 MONTHS, YOU WORRIED THAT YOUR FOOD WOULD RUN OUT BEFORE YOU GOT MONEY TO BUY MORE.: OFTEN TRUE

## 2023-04-17 SDOH — ECONOMIC STABILITY: FOOD INSECURITY: WITHIN THE PAST 12 MONTHS, THE FOOD YOU BOUGHT JUST DIDN'T LAST AND YOU DIDN'T HAVE MONEY TO GET MORE.: OFTEN TRUE

## 2023-04-17 ASSESSMENT — ENCOUNTER SYMPTOMS: NAUSEA: 1

## 2023-04-17 NOTE — PROGRESS NOTES
Placed:  Orders Placed This Encounter   Procedures    Vitamin D 25 Hydroxy    Iron and TIBC    Vitamin B12 & Folate     Medications Prescribed:  Orders Placed This Encounter   Medications    ondansetron (ZOFRAN) 4 MG tablet     Sig: Take 1 tablet by mouth 3 times daily as needed for Nausea or Vomiting     Dispense:  15 tablet     Refill:  0     Future Appointments   Date Time Provider Jesus Iris   4/24/2023 10:30 AM Ren Samano [facility]   4/25/2023  9:15 AM MIRNA William CNP N SRPX Pain [facility]   7/19/2023  8:30 AM MIRNA Minor CNP Fam Medicine [facility]      Patient given educational materials - see patient instructions. Discussed use, benefit, and side effects of prescribedmedications. All patient questions answered. Pt voiced understanding. Reviewed health maintenance. Instructed to continue current medications, diet and exercise. Patient agreed with treatment plan. Follow up as directed.     Electronically signed by MIRNA Robison CNP on 4/17/2023 at 2:30 PM

## 2023-04-17 NOTE — PATIENT INSTRUCTIONS
[address] [address]. Marjan Garza. [facility], [address] [place] [address]. Get Directions Tel: [phone]. Hours: Monday.  7:30 am - 4:00 pm.

## 2023-04-19 ENCOUNTER — CARE COORDINATION (OUTPATIENT)
Dept: CARE COORDINATION | Age: 53
End: 2023-04-19

## 2023-04-19 DIAGNOSIS — Z98.1 S/P CERVICAL SPINAL FUSION: ICD-10-CM

## 2023-04-19 NOTE — CARE COORDINATION
SW attempted to contact pt after referral from CATHERINE Prater 19. Pt unavailable and msg was left introducing self and my role. Inquired about mailing address and needs. Advised pt to return my call to [phone] to provide this information.

## 2023-04-20 RX ORDER — BUTALBITAL, ACETAMINOPHEN AND CAFFEINE 50; 325; 40 MG/1; MG/1; MG/1
1 TABLET ORAL EVERY 8 HOURS PRN
Qty: 90 TABLET | Refills: 0 | Status: SHIPPED | OUTPATIENT
Start: 2023-04-20 | End: 2023-05-20

## 2023-04-21 NOTE — CARE COORDINATION
Pt returned my call from 4/19 and left me a vm verifying his mailing address so I can send him [address]. Will mail out next week.

## 2023-04-24 ENCOUNTER — TELEPHONE (OUTPATIENT)
Dept: PULMONOLOGY | Age: 53
End: 2023-04-24

## 2023-04-24 ENCOUNTER — TELEPHONE (OUTPATIENT)
Dept: FAMILY MEDICINE CLINIC | Age: 53
End: 2023-04-24

## 2023-04-25 ENCOUNTER — OFFICE VISIT (OUTPATIENT)
Dept: PHYSICAL MEDICINE AND REHAB | Age: 53
End: 2023-04-25
Payer: MEDICAID

## 2023-04-25 VITALS
SYSTOLIC BLOOD PRESSURE: 130 MMHG | WEIGHT: 315 LBS | BODY MASS INDEX: 41.75 KG/M2 | DIASTOLIC BLOOD PRESSURE: 78 MMHG | HEIGHT: 73 IN

## 2023-04-25 DIAGNOSIS — G89.4 CHRONIC PAIN SYNDROME: ICD-10-CM

## 2023-04-25 DIAGNOSIS — M54.50 LUMBAR PAIN: ICD-10-CM

## 2023-04-25 DIAGNOSIS — M51.36 LUMBAR DEGENERATIVE DISC DISEASE: ICD-10-CM

## 2023-04-25 DIAGNOSIS — M47.816 LUMBAR SPONDYLOSIS: Primary | ICD-10-CM

## 2023-04-25 DIAGNOSIS — M54.17 LUMBOSACRAL RADICULITIS: ICD-10-CM

## 2023-04-25 DIAGNOSIS — S83.241A ACUTE MEDIAL MENISCUS TEAR OF RIGHT KNEE, INITIAL ENCOUNTER: ICD-10-CM

## 2023-04-25 DIAGNOSIS — M50.30 DDD (DEGENERATIVE DISC DISEASE), CERVICAL: ICD-10-CM

## 2023-04-25 DIAGNOSIS — M54.12 CERVICAL RADICULOPATHY: ICD-10-CM

## 2023-04-25 DIAGNOSIS — Z98.1 HX OF FUSION OF CERVICAL SPINE: ICD-10-CM

## 2023-04-25 DIAGNOSIS — Z98.1 S/P CERVICAL SPINAL FUSION: ICD-10-CM

## 2023-04-25 PROCEDURE — G8427 DOCREV CUR MEDS BY ELIG CLIN: HCPCS | Performed by: NURSE PRACTITIONER

## 2023-04-25 PROCEDURE — 3017F COLORECTAL CA SCREEN DOC REV: CPT | Performed by: NURSE PRACTITIONER

## 2023-04-25 PROCEDURE — G8417 CALC BMI ABV UP PARAM F/U: HCPCS | Performed by: NURSE PRACTITIONER

## 2023-04-25 PROCEDURE — 99214 OFFICE O/P EST MOD 30 MIN: CPT | Performed by: NURSE PRACTITIONER

## 2023-04-25 PROCEDURE — 1036F TOBACCO NON-USER: CPT | Performed by: NURSE PRACTITIONER

## 2023-04-25 ASSESSMENT — ENCOUNTER SYMPTOMS
ABDOMINAL PAIN: 1
ABDOMINAL DISTENTION: 0
PHOTOPHOBIA: 0
BACK PAIN: 1
COUGH: 0
SHORTNESS OF BREATH: 0
APNEA: 1
NAUSEA: 1

## 2023-04-25 NOTE — PROGRESS NOTES
[facility] [address]  Dept: [phone]  Dept Fax: [phone]: [phone]    Visit Date: 4/25/2023    Functionality Assessment/Goals Worksheet     On a scale of 0 (Does not Interfere) to 10 (Completely Interferes)     1. Which number describes how during the past week pain has interfered with       the following:  A. General Activity:  8  B. Mood: 8  C. Walking Ability:  8  D. Normal Work (Includes both work outside the home and housework):  8  E. Relations with Other People:   9  F. Sleep:   9  G. Enjoyment of Life:   9    2. Patient Prefers to Take their Pain Medications:     []  On a regular basis   []  Only when necessary    []  Does not take pain medications    3. What are the Patient's Goals/Expectations for Visiting Pain Management? []  Learn about my pain    [x]  Receive Medication   []  Physical Therapy     []  Treat Depression   [x]  Receive Injections    []  Treat Sleep   []  Deal with Anxiety and Stress   []  Treat Opoid Dependence/Addiction   []  Other:      HPI:   Nata Bass is a 48 y.o. male is here today for    Chief Complaint: Back pain, neck pain, joint pain    HPI   FU from bilateral L-facet RFA from 4/4/2023. Patient reports that he is not receiving much relief of low back pain from this procedure maybe only 15% relief. States stabbing in low back is gone but continues to have pain in low constant sore tight pressure axial worse across. Denies any radicular pain but again gets intermittent spasms in legs which is very bothersome. Continues to have intermittent muscle spasms all over his body. Finished therapy recently because he ws not tolerating and it was not helping. Continues home exercises     Continues to have pain in his right lower extremity right quad and knee stabbing and pounding. Wears brace prn.    Continues

## 2023-04-27 ENCOUNTER — CARE COORDINATION (OUTPATIENT)
Dept: CARE COORDINATION | Age: 53
End: 2023-04-27

## 2023-04-27 NOTE — CARE COORDINATION
SW mailed out food pantry resources and [employer] resources to pt. Will follow up and make sure he receives.

## 2023-04-28 ENCOUNTER — OFFICE VISIT (OUTPATIENT)
Dept: NEUROSURGERY | Age: 53
End: 2023-04-28
Payer: MEDICAID

## 2023-04-28 VITALS
SYSTOLIC BLOOD PRESSURE: 122 MMHG | WEIGHT: 315 LBS | BODY MASS INDEX: 41.75 KG/M2 | DIASTOLIC BLOOD PRESSURE: 78 MMHG | HEIGHT: 73 IN

## 2023-04-28 DIAGNOSIS — D35.2 PITUITARY ADENOMA (HCC): Primary | ICD-10-CM

## 2023-04-28 PROCEDURE — 1036F TOBACCO NON-USER: CPT | Performed by: PHYSICIAN ASSISTANT

## 2023-04-28 PROCEDURE — G8427 DOCREV CUR MEDS BY ELIG CLIN: HCPCS | Performed by: PHYSICIAN ASSISTANT

## 2023-04-28 PROCEDURE — G8417 CALC BMI ABV UP PARAM F/U: HCPCS | Performed by: PHYSICIAN ASSISTANT

## 2023-04-28 PROCEDURE — 3017F COLORECTAL CA SCREEN DOC REV: CPT | Performed by: PHYSICIAN ASSISTANT

## 2023-04-28 PROCEDURE — 99213 OFFICE O/P EST LOW 20 MIN: CPT | Performed by: PHYSICIAN ASSISTANT

## 2023-04-28 NOTE — PROGRESS NOTES
and concerns addressed and answered. Patient was evaluated today and is doing well overall. No new complaints were voiced. Patient  lives with their family  Wound: none  Follow-up Studies: No orders of the defined types were placed in this encounter. Assessment/Plan:  Status Post for Cherise Barefoot adenoma follow-up  Doing well overall  Encouraged gradual increase in physical and mental activity. Fall precaution and home safety education provided to patient. Follow-up: With stable nature of today's exam and stable findings on recent imaging we have agreed to follow-up with him again in 6 months with a new MRI of the brain to be imaged with and without contrast to rule out any significant changes in pituitary adenoma. Since significant changes on this follow-up image Future appointments may be extended to 1 year. He is encouraged in the interim to contact our office with any additional question or concerns or should experience any significant changes in his general health. He remains happy with the plan with today's appointment having questions and concerns addressed and answered.       Electronically signed by Jose Morris PA-C on 4/28/23 at 7:54 AM EDT

## 2023-05-01 DIAGNOSIS — E78.5 HYPERLIPIDEMIA, UNSPECIFIED HYPERLIPIDEMIA TYPE: ICD-10-CM

## 2023-05-01 RX ORDER — ROSUVASTATIN CALCIUM 20 MG/1
TABLET, COATED ORAL
Qty: 90 TABLET | Refills: 3 | OUTPATIENT
Start: 2023-05-01

## 2023-05-01 NOTE — TELEPHONE ENCOUNTER
Request sent from [facility] for refill of Crestor. Contacted patient as this medication is not on his current list.  Patient states that he is no longer taking this medication. This request refused.

## 2023-05-04 DIAGNOSIS — G89.4 CHRONIC PAIN SYNDROME: ICD-10-CM

## 2023-05-04 RX ORDER — ORPHENADRINE CITRATE 100 MG/1
TABLET, EXTENDED RELEASE ORAL
Qty: 60 TABLET | Refills: 0 | Status: SHIPPED | OUTPATIENT
Start: 2023-05-07 | End: 2023-06-06

## 2023-05-04 RX ORDER — LIDOCAINE 50 MG/G
OINTMENT TOPICAL
Qty: 1 G | Refills: 0 | Status: SHIPPED | OUTPATIENT
Start: 2023-05-04 | End: 2023-06-03

## 2023-05-04 RX ORDER — LIDOCAINE 50 MG/G
1 PATCH TOPICAL EVERY 12 HOURS
Qty: 60 PATCH | Refills: 0 | Status: SHIPPED | OUTPATIENT
Start: 2023-05-07 | End: 2023-06-06

## 2023-05-04 RX ORDER — HYDROCODONE BITARTRATE AND ACETAMINOPHEN 5; 325 MG/1; MG/1
1 TABLET ORAL EVERY 8 HOURS PRN
Qty: 90 TABLET | Refills: 0 | OUTPATIENT
Start: 2023-05-04 | End: 2023-06-03

## 2023-05-04 NOTE — TELEPHONE ENCOUNTER
OARRS reviewed. UDS: + for  Hydrocodone, Butalbital, Gabapentin, Amitriptyline, Cyclobenzaprine. Last seen: 4/25/2023.  Follow-up:

## 2023-05-04 NOTE — TELEPHONE ENCOUNTER
[facility] called requesting a refill on the following medications:  Requested Prescriptions     Pending Prescriptions Disp Refills    orphenadrine (NORFLEX) 100 MG extended release tablet 60 tablet 0     Sig: take 1 tablet by mouth twice a day    HYDROcodone-acetaminophen (NORCO) 5-325 MG per tablet 90 tablet 0     Sig: Take 1 tablet by mouth every 8 hours as needed for Pain for up to 30 days. lidocaine (LIDODERM) 5 % 30 patch      Si hours on, 12 hours off.    lidocaine (XYLOCAINE) 5 % ointment       Sig: Apply topically as needed.      Pharmacy verified:  .pv  [facility]     Date of last visit: 2023  Date of next visit (if applicable): Visit date not found

## 2023-05-04 NOTE — TELEPHONE ENCOUNTER
E-Mail received and reviewed. I contacted all of the offices on the list that were marked as accepting new patients and only one of them was and they were out 1 1/2 years. Spoke to the pt and notified him of the above. Pt will discuss the neuropsychiatry referral with [address] again or Pricilla Barfield RN. Copy of neuropsychologists in pts network was scanned into chart for reference.

## 2023-05-05 ENCOUNTER — CARE COORDINATION (OUTPATIENT)
Dept: CARE COORDINATION | Age: 53
End: 2023-05-05

## 2023-05-05 NOTE — CARE COORDINATION
SW follow up with resources mailed to pt. Pt stated he did \" receive them, but hasn't had a chance to look through them. \" Advised pt to do so and to call with any questions or additional needs. Pt voiced understanding. Will resolve at this time.

## 2023-05-09 ENCOUNTER — OFFICE VISIT (OUTPATIENT)
Dept: PULMONOLOGY | Age: 53
End: 2023-05-09
Payer: MEDICAID

## 2023-05-09 VITALS
HEART RATE: 58 BPM | TEMPERATURE: 98.5 F | OXYGEN SATURATION: 96 % | DIASTOLIC BLOOD PRESSURE: 76 MMHG | BODY MASS INDEX: 41.75 KG/M2 | HEIGHT: 73 IN | WEIGHT: 315 LBS | SYSTOLIC BLOOD PRESSURE: 138 MMHG

## 2023-05-09 DIAGNOSIS — J45.50 SEVERE PERSISTENT ASTHMA WITHOUT COMPLICATION: ICD-10-CM

## 2023-05-09 DIAGNOSIS — R51.9 CHRONIC NONINTRACTABLE HEADACHE, UNSPECIFIED HEADACHE TYPE: ICD-10-CM

## 2023-05-09 DIAGNOSIS — G89.29 CHRONIC NONINTRACTABLE HEADACHE, UNSPECIFIED HEADACHE TYPE: ICD-10-CM

## 2023-05-09 DIAGNOSIS — F11.90 CHRONIC NARCOTIC USE: ICD-10-CM

## 2023-05-09 DIAGNOSIS — G89.4 CHRONIC PAIN SYNDROME: ICD-10-CM

## 2023-05-09 DIAGNOSIS — G47.33 OSA (OBSTRUCTIVE SLEEP APNEA): Primary | ICD-10-CM

## 2023-05-09 DIAGNOSIS — E66.01 MORBID OBESITY WITH BMI OF 50.0-59.9, ADULT (HCC): ICD-10-CM

## 2023-05-09 PROCEDURE — G8427 DOCREV CUR MEDS BY ELIG CLIN: HCPCS | Performed by: INTERNAL MEDICINE

## 2023-05-09 PROCEDURE — 3017F COLORECTAL CA SCREEN DOC REV: CPT | Performed by: INTERNAL MEDICINE

## 2023-05-09 PROCEDURE — 1036F TOBACCO NON-USER: CPT | Performed by: INTERNAL MEDICINE

## 2023-05-09 PROCEDURE — 99204 OFFICE O/P NEW MOD 45 MIN: CPT | Performed by: INTERNAL MEDICINE

## 2023-05-09 PROCEDURE — G8417 CALC BMI ABV UP PARAM F/U: HCPCS | Performed by: INTERNAL MEDICINE

## 2023-05-09 RX ORDER — HYDROCODONE BITARTRATE AND ACETAMINOPHEN 5; 325 MG/1; MG/1
1 TABLET ORAL EVERY 8 HOURS PRN
Qty: 90 TABLET | Refills: 0 | Status: SHIPPED | OUTPATIENT
Start: 2023-05-09 | End: 2023-06-08

## 2023-05-09 NOTE — TELEPHONE ENCOUNTER
Melanie Ortega called requesting a refill on the following medications:  Requested Prescriptions     Pending Prescriptions Disp Refills    HYDROcodone-acetaminophen (NORCO) 5-325 MG per tablet 90 tablet 0     Sig: Take 1 tablet by mouth every 8 hours as needed for Pain for up to 30 days.      Pharmacy verified: [facility] on [facility]  .pv      Date of last visit: 4/25/2023  Date of next visit (if applicable): Visit date not found

## 2023-05-09 NOTE — TELEPHONE ENCOUNTER
OARRS reviewed. UDS: + for  Hydrocodone, butalbital and amitriptyline. Last seen: 4/25/2023.  Follow-up:   Future Appointments   Date Time Provider Jesus Manjarrezi   5/10/2023  1:20 PM Prabhakar Baker [facility]   7/19/2023  8:30 AM MIRNA Weber - CNP [facility] [address] [facility]   10/23/2023 12:30 PM STR MRI RM1 STRZ MRI STR Radiolog   10/30/2023  9:00 AM IVAN Brunson TREATMENT NETWORK [facility] - [address] [facility]   5/8/2024  8:20 AM Brooks Parlor, APRN - CNP [facility] [address]
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
## 2023-05-10 RX ORDER — ALBUTEROL SULFATE 90 UG/1
AEROSOL, METERED RESPIRATORY (INHALATION)
Qty: 54 G | Refills: 5 | Status: SHIPPED | OUTPATIENT
Start: 2023-05-10

## 2023-05-10 NOTE — TELEPHONE ENCOUNTER
This medication refill is regarding a electronic request. Refill requested by  [facility] . Requested Prescriptions     Pending Prescriptions Disp Refills    VENTOLIN  (90 Base) MCG/ACT inhaler [Pharmacy Med Name: VENTOLIN HFA 90 MCG INHALER] 54 g      Sig: inhale 2 puffs by mouth and INTO THE LUNGS four times a day if needed for wheezing       Date of last visit: 4/17/2023   Date of next visit: 7/19/2023  Date of last refill: 4/28/22  Pharmacy Name: [email or web address] [address]       Rx verified, ordered and set to EP.

## 2023-05-17 ENCOUNTER — APPOINTMENT (OUTPATIENT)
Dept: GENERAL RADIOLOGY | Age: 53
End: 2023-05-17
Payer: MEDICAID

## 2023-05-17 ENCOUNTER — HOSPITAL ENCOUNTER (EMERGENCY)
Age: 53
Discharge: HOME | End: 2023-05-17
Attending: EMERGENCY MEDICINE
Payer: MEDICAID

## 2023-05-17 VITALS
DIASTOLIC BLOOD PRESSURE: 72 MMHG | RESPIRATION RATE: 14 BRPM | TEMPERATURE: 98.3 F | HEART RATE: 70 BPM | SYSTOLIC BLOOD PRESSURE: 145 MMHG | WEIGHT: 315 LBS | HEIGHT: 73 IN | BODY MASS INDEX: 41.75 KG/M2 | OXYGEN SATURATION: 97 %

## 2023-05-17 DIAGNOSIS — J45.21 MILD INTERMITTENT ASTHMATIC BRONCHITIS WITH ACUTE EXACERBATION: Primary | ICD-10-CM

## 2023-05-17 LAB
ANION GAP SERPL CALC-SCNC: 12 MEQ/L (ref 8–16)
BASOPHILS ABSOLUTE: 0 THOU/MM3 (ref 0–0.1)
BASOPHILS NFR BLD AUTO: 0.8 %
BUN SERPL-MCNC: 9 MG/DL (ref 7–22)
CALCIUM SERPL-MCNC: 9.3 MG/DL (ref 8.5–10.5)
CHLORIDE SERPL-SCNC: 102 MEQ/L (ref 98–111)
CO2 SERPL-SCNC: 26 MEQ/L (ref 23–33)
CREAT SERPL-MCNC: 0.9 MG/DL (ref 0.4–1.2)
DEPRECATED RDW RBC AUTO: 44.3 FL (ref 35–45)
EOSINOPHIL NFR BLD AUTO: 3.8 %
EOSINOPHILS ABSOLUTE: 0.1 THOU/MM3 (ref 0–0.4)
ERYTHROCYTE [DISTWIDTH] IN BLOOD BY AUTOMATED COUNT: 12.3 % (ref 11.5–14.5)
FLUAV RNA RESP QL NAA+PROBE: NOT DETECTED
FLUBV RNA RESP QL NAA+PROBE: NOT DETECTED
GFR SERPL CREATININE-BSD FRML MDRD: > 60 ML/MIN/1.73M2
GLUCOSE SERPL-MCNC: 110 MG/DL (ref 70–108)
HCT VFR BLD AUTO: 41.9 % (ref 42–52)
HGB BLD-MCNC: 13.2 GM/DL (ref 14–18)
IMM GRANULOCYTES # BLD AUTO: 0.01 THOU/MM3 (ref 0–0.07)
IMM GRANULOCYTES NFR BLD AUTO: 0.3 %
LYMPHOCYTES ABSOLUTE: 1 THOU/MM3 (ref 1–4.8)
LYMPHOCYTES NFR BLD AUTO: 25.5 %
MCH RBC QN AUTO: 30.8 PG (ref 26–33)
MCHC RBC AUTO-ENTMCNC: 31.5 GM/DL (ref 32.2–35.5)
MCV RBC AUTO: 97.7 FL (ref 80–94)
MONOCYTES ABSOLUTE: 0.6 THOU/MM3 (ref 0.4–1.3)
MONOCYTES NFR BLD AUTO: 14.3 %
NEUTROPHILS NFR BLD AUTO: 55.3 %
NRBC BLD AUTO-RTO: 0 /100 WBC
OSMOLALITY SERPL CALC.SUM OF ELEC: 278.7 MOSMOL/KG (ref 275–300)
PLATELET # BLD AUTO: 212 THOU/MM3 (ref 130–400)
PMV BLD AUTO: 10.3 FL (ref 9.4–12.4)
POTASSIUM SERPL-SCNC: 3.6 MEQ/L (ref 3.5–5.2)
RBC # BLD AUTO: 4.29 MILL/MM3 (ref 4.7–6.1)
SARS-COV-2 RNA RESP QL NAA+PROBE: NOT DETECTED
SEGMENTED NEUTROPHILS ABSOLUTE COUNT: 2.2 THOU/MM3 (ref 1.8–7.7)
SODIUM SERPL-SCNC: 140 MEQ/L (ref 135–145)
TROPONIN T: < 0.01 NG/ML
WBC # BLD AUTO: 3.9 THOU/MM3 (ref 4.8–10.8)

## 2023-05-17 PROCEDURE — 93005 ELECTROCARDIOGRAM TRACING: CPT | Performed by: EMERGENCY MEDICINE

## 2023-05-17 PROCEDURE — 84484 ASSAY OF TROPONIN QUANT: CPT

## 2023-05-17 PROCEDURE — 71046 X-RAY EXAM CHEST 2 VIEWS: CPT

## 2023-05-17 PROCEDURE — 87636 SARSCOV2 & INF A&B AMP PRB: CPT

## 2023-05-17 PROCEDURE — 85025 COMPLETE CBC W/AUTO DIFF WBC: CPT

## 2023-05-17 PROCEDURE — 94640 AIRWAY INHALATION TREATMENT: CPT

## 2023-05-17 PROCEDURE — 99285 EMERGENCY DEPT VISIT HI MDM: CPT

## 2023-05-17 PROCEDURE — 6370000000 HC RX 637 (ALT 250 FOR IP): Performed by: EMERGENCY MEDICINE

## 2023-05-17 PROCEDURE — 93010 ELECTROCARDIOGRAM REPORT: CPT | Performed by: INTERNAL MEDICINE

## 2023-05-17 PROCEDURE — 6360000002 HC RX W HCPCS: Performed by: EMERGENCY MEDICINE

## 2023-05-17 PROCEDURE — 36415 COLL VENOUS BLD VENIPUNCTURE: CPT

## 2023-05-17 PROCEDURE — 80048 BASIC METABOLIC PNL TOTAL CA: CPT

## 2023-05-17 PROCEDURE — 94761 N-INVAS EAR/PLS OXIMETRY MLT: CPT

## 2023-05-17 PROCEDURE — 96374 THER/PROPH/DIAG INJ IV PUSH: CPT

## 2023-05-17 RX ORDER — PREDNISONE 20 MG/1
40 TABLET ORAL DAILY
Qty: 10 TABLET | Refills: 0 | Status: SHIPPED | OUTPATIENT
Start: 2023-05-17 | End: 2023-05-22

## 2023-05-17 RX ORDER — IPRATROPIUM BROMIDE AND ALBUTEROL SULFATE 2.5; .5 MG/3ML; MG/3ML
1 SOLUTION RESPIRATORY (INHALATION) ONCE
Status: COMPLETED | OUTPATIENT
Start: 2023-05-17 | End: 2023-05-17

## 2023-05-17 RX ORDER — METHYLPREDNISOLONE SODIUM SUCCINATE 40 MG/ML
120 INJECTION, POWDER, LYOPHILIZED, FOR SOLUTION INTRAMUSCULAR; INTRAVENOUS ONCE
Status: COMPLETED | OUTPATIENT
Start: 2023-05-17 | End: 2023-05-17

## 2023-05-17 RX ADMIN — METHYLPREDNISOLONE SODIUM SUCCINATE 120 MG: 40 INJECTION INTRAMUSCULAR; INTRAVENOUS at 14:01

## 2023-05-17 RX ADMIN — IPRATROPIUM BROMIDE AND ALBUTEROL SULFATE 1 AMPULE: .5; 3 SOLUTION RESPIRATORY (INHALATION) at 14:05

## 2023-05-17 ASSESSMENT — PAIN - FUNCTIONAL ASSESSMENT: PAIN_FUNCTIONAL_ASSESSMENT: 0-10

## 2023-05-17 ASSESSMENT — PAIN SCALES - GENERAL: PAINLEVEL_OUTOF10: 6

## 2023-05-17 ASSESSMENT — PAIN DESCRIPTION - LOCATION: LOCATION: CHEST

## 2023-05-17 NOTE — ED PROVIDER NOTES
[address] ENCOUNTER        PATIENT NAME: Marshel Kayser  MRN: [id number]  : 1970  DOOLEY: 2023  PROVIDER: Edwin Brasher MD    CHIEF COMPLAINT       Chief Complaint   Patient presents with    Shortness of Breath       Patient is seen and evaluated in a timely fashion. Nurses Notes are reviewed and I agree except as noted in the HPI. HISTORY OF PRESENT ILLNESS   Marshel Kayser is a 48 y.o. male who presents to Emergency Department with Shortness of Breath     A 53-year-old male with past medical history of asthma, pneumonia, cardiomegaly, depression, fibromyalgia, GERD, obesity, CLARIBEL, hypertension, and brain aneurysm presents for evaluation of increasing cough, chest tightness, chest congestion, and shortness of breath over last 3 days duration. Patient has tried multiple home albuterol nebulizer treatment with only some relief. He reports no fever or chills. Cough is nonproductive. Chest pain is worse during coughing. No nausea or vomiting. No abdominal pain. No leg swelling. He does not smoke cigarettes. Denies history of CAD or CHF. This HPI was provided by patient. PASTMEDICAL HISTORY    has a past medical history of Aneurysm ([facility] [address] Arthritis, Asthma, Cardiomegaly, COVID-19, Depression, Fibromyalgia, GERD (gastroesophageal reflux disease), Hypertension, Liver disease, and CLARIBEL on CPAP. SURGICAL HISTORY      has a past surgical history that includes back surgery; knee surgery (Left); Pain management procedure (Bilateral, 2021); Pain management procedure (Bilateral, 2021); Quadriceps repair (Right, 2022); Colonoscopy; Neck surgery; Cholecystectomy, laparoscopic (N/A, 2/15/2023); and Pain management procedure (Bilateral, 2023).     CURRENT MEDICATIONS       Previous Medications    ALBUTEROL (PROVENTIL) (2.5 MG/3ML) 0.083% NEBULIZER SOLUTION    inhale contents of 1 vial ( 3 milliliters ) in nebulizer by mouth and INTO THE

## 2023-05-17 NOTE — ED NOTES
Patient resting in bed. Respirations easy and unlabored. No distress noted. Call light within reach.        Jordan Peralta RN  05/17/23 2600

## 2023-05-18 ENCOUNTER — TELEPHONE (OUTPATIENT)
Dept: FAMILY MEDICINE CLINIC | Age: 53
End: 2023-05-18

## 2023-05-19 LAB
EKG ATRIAL RATE: 76 BPM
EKG P AXIS: 73 DEGREES
EKG P-R INTERVAL: 130 MS
EKG Q-T INTERVAL: 370 MS
EKG QRS DURATION: 88 MS
EKG QTC CALCULATION (BAZETT): 416 MS
EKG R AXIS: 11 DEGREES
EKG T AXIS: 21 DEGREES
EKG VENTRICULAR RATE: 76 BPM

## 2023-05-25 ENCOUNTER — APPOINTMENT (OUTPATIENT)
Dept: GENERAL RADIOLOGY | Age: 53
End: 2023-05-25
Payer: MEDICAID

## 2023-05-25 ENCOUNTER — HOSPITAL ENCOUNTER (EMERGENCY)
Age: 53
Discharge: HOME | End: 2023-05-25
Attending: STUDENT IN AN ORGANIZED HEALTH CARE EDUCATION/TRAINING PROGRAM
Payer: MEDICAID

## 2023-05-25 VITALS
OXYGEN SATURATION: 98 % | BODY MASS INDEX: 41.75 KG/M2 | SYSTOLIC BLOOD PRESSURE: 126 MMHG | DIASTOLIC BLOOD PRESSURE: 86 MMHG | HEART RATE: 71 BPM | HEIGHT: 73 IN | RESPIRATION RATE: 20 BRPM | WEIGHT: 315 LBS | TEMPERATURE: 98.4 F

## 2023-05-25 DIAGNOSIS — M62.838 SPASM OF MUSCLE: Primary | ICD-10-CM

## 2023-05-25 DIAGNOSIS — M79.7 FIBROMYALGIA: ICD-10-CM

## 2023-05-25 LAB
ANION GAP SERPL CALC-SCNC: 11 MEQ/L (ref 8–16)
BASOPHILS ABSOLUTE: 0 THOU/MM3 (ref 0–0.1)
BASOPHILS NFR BLD AUTO: 0.4 %
BUN SERPL-MCNC: 18 MG/DL (ref 7–22)
CALCIUM SERPL-MCNC: 8.7 MG/DL (ref 8.5–10.5)
CHLORIDE SERPL-SCNC: 102 MEQ/L (ref 98–111)
CO2 SERPL-SCNC: 26 MEQ/L (ref 23–33)
CREAT SERPL-MCNC: 1.1 MG/DL (ref 0.4–1.2)
DEPRECATED RDW RBC AUTO: 43.3 FL (ref 35–45)
EOSINOPHIL NFR BLD AUTO: 2 %
EOSINOPHILS ABSOLUTE: 0.1 THOU/MM3 (ref 0–0.4)
ERYTHROCYTE [DISTWIDTH] IN BLOOD BY AUTOMATED COUNT: 12 % (ref 11.5–14.5)
FLUAV RNA RESP QL NAA+PROBE: NOT DETECTED
FLUBV RNA RESP QL NAA+PROBE: NOT DETECTED
GFR SERPL CREATININE-BSD FRML MDRD: > 60 ML/MIN/1.73M2
GLUCOSE SERPL-MCNC: 88 MG/DL (ref 70–108)
HCT VFR BLD AUTO: 42.1 % (ref 42–52)
HGB BLD-MCNC: 13.4 GM/DL (ref 14–18)
IMM GRANULOCYTES # BLD AUTO: 0.02 THOU/MM3 (ref 0–0.07)
IMM GRANULOCYTES NFR BLD AUTO: 0.4 %
LYMPHOCYTES ABSOLUTE: 1.4 THOU/MM3 (ref 1–4.8)
LYMPHOCYTES NFR BLD AUTO: 26.3 %
MCH RBC QN AUTO: 31 PG (ref 26–33)
MCHC RBC AUTO-ENTMCNC: 31.8 GM/DL (ref 32.2–35.5)
MCV RBC AUTO: 97.5 FL (ref 80–94)
MONOCYTES ABSOLUTE: 0.5 THOU/MM3 (ref 0.4–1.3)
MONOCYTES NFR BLD AUTO: 9.6 %
NEUTROPHILS NFR BLD AUTO: 61.3 %
NRBC BLD AUTO-RTO: 0 /100 WBC
NT-PROBNP SERPL IA-MCNC: < 36 PG/ML (ref 0–124)
OSMOLALITY SERPL CALC.SUM OF ELEC: 278.9 MOSMOL/KG (ref 275–300)
PLATELET # BLD AUTO: 233 THOU/MM3 (ref 130–400)
PMV BLD AUTO: 10.3 FL (ref 9.4–12.4)
POTASSIUM SERPL-SCNC: 3.9 MEQ/L (ref 3.5–5.2)
RBC # BLD AUTO: 4.32 MILL/MM3 (ref 4.7–6.1)
SARS-COV-2 RNA RESP QL NAA+PROBE: NOT DETECTED
SEGMENTED NEUTROPHILS ABSOLUTE COUNT: 3.3 THOU/MM3 (ref 1.8–7.7)
SODIUM SERPL-SCNC: 139 MEQ/L (ref 135–145)
TROPONIN T: < 0.01 NG/ML
WBC # BLD AUTO: 5.4 THOU/MM3 (ref 4.8–10.8)

## 2023-05-25 PROCEDURE — 84484 ASSAY OF TROPONIN QUANT: CPT

## 2023-05-25 PROCEDURE — 71046 X-RAY EXAM CHEST 2 VIEWS: CPT

## 2023-05-25 PROCEDURE — 6370000000 HC RX 637 (ALT 250 FOR IP): Performed by: STUDENT IN AN ORGANIZED HEALTH CARE EDUCATION/TRAINING PROGRAM

## 2023-05-25 PROCEDURE — 85025 COMPLETE CBC W/AUTO DIFF WBC: CPT

## 2023-05-25 PROCEDURE — 83880 ASSAY OF NATRIURETIC PEPTIDE: CPT

## 2023-05-25 PROCEDURE — 80048 BASIC METABOLIC PNL TOTAL CA: CPT

## 2023-05-25 PROCEDURE — 87636 SARSCOV2 & INF A&B AMP PRB: CPT

## 2023-05-25 PROCEDURE — 99285 EMERGENCY DEPT VISIT HI MDM: CPT

## 2023-05-25 PROCEDURE — 93005 ELECTROCARDIOGRAM TRACING: CPT | Performed by: STUDENT IN AN ORGANIZED HEALTH CARE EDUCATION/TRAINING PROGRAM

## 2023-05-25 PROCEDURE — 36415 COLL VENOUS BLD VENIPUNCTURE: CPT

## 2023-05-25 RX ORDER — CYCLOBENZAPRINE HCL 10 MG
10 TABLET ORAL ONCE
Status: COMPLETED | OUTPATIENT
Start: 2023-05-25 | End: 2023-05-25

## 2023-05-25 RX ORDER — HYDROCODONE BITARTRATE AND ACETAMINOPHEN 5; 325 MG/1; MG/1
1 TABLET ORAL ONCE
Status: COMPLETED | OUTPATIENT
Start: 2023-05-25 | End: 2023-05-25

## 2023-05-25 RX ORDER — CYCLOBENZAPRINE HCL 10 MG
10 TABLET ORAL 3 TIMES DAILY PRN
Qty: 21 TABLET | Refills: 0 | Status: SHIPPED | OUTPATIENT
Start: 2023-05-25 | End: 2023-05-25 | Stop reason: SINTOL

## 2023-05-25 RX ADMIN — CYCLOBENZAPRINE 10 MG: 10 TABLET, FILM COATED ORAL at 19:45

## 2023-05-25 RX ADMIN — HYDROCODONE BITARTRATE AND ACETAMINOPHEN 1 TABLET: 5; 325 TABLET ORAL at 19:46

## 2023-05-25 ASSESSMENT — ENCOUNTER SYMPTOMS
NAUSEA: 0
CHEST TIGHTNESS: 1
SHORTNESS OF BREATH: 0
CONSTIPATION: 0
ABDOMINAL PAIN: 0
EYE DISCHARGE: 0
COLOR CHANGE: 0
DIARRHEA: 0
SINUS PRESSURE: 0
EYE ITCHING: 0
VOMITING: 0
EYE REDNESS: 0
EYE PAIN: 0
SINUS PAIN: 0
ABDOMINAL DISTENTION: 0
RHINORRHEA: 0

## 2023-05-25 ASSESSMENT — PAIN - FUNCTIONAL ASSESSMENT
PAIN_FUNCTIONAL_ASSESSMENT: 0-10

## 2023-05-25 ASSESSMENT — PAIN DESCRIPTION - PAIN TYPE: TYPE: CHRONIC PAIN

## 2023-05-25 ASSESSMENT — PAIN SCALES - GENERAL
PAINLEVEL_OUTOF10: 9

## 2023-05-25 NOTE — ED NOTES
Pt resting on cot. Dr. Kendra Malik at bedside performing assessment at this time. Call light in reach.       Stephanie Peace RN  05/25/23 6096

## 2023-05-25 NOTE — ED TRIAGE NOTES
Pt to ED via self with c/o wheezing and muscle spasms x3 days. Pt states he has a hx of fibromyalgia and his meds are not keeping it under control at the moment. Pt reports wheezing. No audible wheezes heard during triage, however reports he did an albuterol treatment today and is taking the steroids prescribed to him. Pt states his last Norco and Norflex were taken today at 1100. Pt currently rates pain a 9/10.

## 2023-05-25 NOTE — ED NOTES
Pt medicated per MAR at this time. Denies any needs at this time. Call light in reach.       Don Beck RN  05/25/23 1949

## 2023-05-25 NOTE — ED PROVIDER NOTES
[place] ENCOUNTER          Pt Name: Paul Turk  MRN: [id number]  [place] 1970  Date of evaluation: 5/25/2023  Emergency Physician: Milton Delatorre DO  Supervising Physician: Dr. French Paz       Chief Complaint   Patient presents with    Wheezing    Spasms     Muscles spasms in chest, stomach, back, legs. History obtained from the patient. HISTORY OF PRESENT ILLNESS    HPI  Paul Turk is a 48 y.o. male who presents to the emergency department for evaluation of wheezing and muscle spasms. The patient has a past medical history of asthma and fibromyalgia. He was recently seen and evaluated in the emergency department and diagnosed with asthmatic bronchitis. He was prescribed a course of steroids. He reports that his respiratory symptoms have improved since that emergency department visit but that he still has some residual chest congestion. He reports that he had a 4-day gastrointestinal illness from 5/19 to 5/22 which caused him to have worsening muscle spasms. He follows with pain management and takes Norco chronically. He reports that his muscle spasms are becoming more severe, causing bruising, and interfering with him eating which prompted him to go to the emergency department today. The patient reports diffuse muscle spasms from his chest to his legs which he describes as a \"soreness. \"  He reports that these muscle spasms are usually treated with a medication cocktail and while he is not sure what medications this involves he thinks it might include Valium. The patient has no other acute complaints at this time. REVIEW OF SYSTEMS   Review of Systems   Constitutional:  Negative for fever. HENT:  Negative for ear pain, rhinorrhea, sinus pressure and sinus pain. Eyes:  Negative for pain, discharge, redness and itching. Respiratory:  Positive for chest tightness.  Negative for shortness of

## 2023-05-26 LAB
EKG ATRIAL RATE: 73 BPM
EKG P AXIS: 69 DEGREES
EKG P-R INTERVAL: 158 MS
EKG Q-T INTERVAL: 422 MS
EKG QRS DURATION: 92 MS
EKG QTC CALCULATION (BAZETT): 464 MS
EKG R AXIS: 6 DEGREES
EKG T AXIS: 58 DEGREES
EKG VENTRICULAR RATE: 73 BPM

## 2023-05-26 PROCEDURE — 93010 ELECTROCARDIOGRAM REPORT: CPT | Performed by: INTERNAL MEDICINE

## 2023-05-26 NOTE — DISCHARGE INSTRUCTIONS
Do not hesitate to return to the emergency department if you are experiencing any new or worsening symptoms such as chest pain, shortness of breath, worsening muscle spasms, fever, or if you have any other concerns. Follow-up with your primary care provider, your internal medicine provider, and with Dr. Yancy Aragon regarding your emergency department visit today.

## 2023-06-02 DIAGNOSIS — G89.4 CHRONIC PAIN SYNDROME: ICD-10-CM

## 2023-06-02 DIAGNOSIS — Z98.1 S/P CERVICAL SPINAL FUSION: ICD-10-CM

## 2023-06-02 RX ORDER — HYDROCODONE BITARTRATE AND ACETAMINOPHEN 5; 325 MG/1; MG/1
1 TABLET ORAL EVERY 8 HOURS PRN
Qty: 90 TABLET | Refills: 0 | Status: SHIPPED | OUTPATIENT
Start: 2023-06-09 | End: 2023-07-09

## 2023-06-02 RX ORDER — LIDOCAINE 50 MG/G
OINTMENT TOPICAL
Qty: 1 G | Refills: 0 | Status: SHIPPED | OUTPATIENT
Start: 2023-06-03 | End: 2023-07-02

## 2023-06-02 RX ORDER — BUTALBITAL, ACETAMINOPHEN AND CAFFEINE 50; 325; 40 MG/1; MG/1; MG/1
1 TABLET ORAL EVERY 8 HOURS PRN
Qty: 90 TABLET | Refills: 0 | Status: SHIPPED | OUTPATIENT
Start: 2023-06-02 | End: 2023-07-02

## 2023-06-02 RX ORDER — LIDOCAINE 50 MG/G
1 PATCH TOPICAL EVERY 12 HOURS
Qty: 60 PATCH | Refills: 0 | Status: SHIPPED | OUTPATIENT
Start: 2023-06-06 | End: 2023-07-06

## 2023-06-02 RX ORDER — ORPHENADRINE CITRATE 100 MG/1
TABLET, EXTENDED RELEASE ORAL
Qty: 60 TABLET | Refills: 0 | Status: SHIPPED | OUTPATIENT
Start: 2023-06-06 | End: 2023-07-02

## 2023-06-02 NOTE — TELEPHONE ENCOUNTER
OARRS reviewed. UDS: + for  hydrocodone, butalbital, gabapentin, amitriptyline, cyclobenzaprine. Last seen: 4/25/2023. Follow-up:   Future Appointments   Date Time Provider Jesus Simmons   6/7/2023  2:40 PM Hai Hutchins DO SRPX Physic [address]   7/19/2023  8:30 AM MIRNA Amador - CNP [facility] Medicine [facility]   10/23/2023 12:30 PM STR MRI RM1 STRZ MRI STR Radiolog   10/30/2023  9:00 AM IVAN MonsivaisNEURSU [facility]   5/8/2024  8:20 AM MIRNA Fatima - VITA [address] [place]    Pt. Is calling to schedule MRI on mon. Then will call to schedule aptm.  With you

## 2023-06-07 ENCOUNTER — OFFICE VISIT (OUTPATIENT)
Dept: INTERNAL MEDICINE CLINIC | Age: 53
End: 2023-06-07

## 2023-06-07 VITALS
BODY MASS INDEX: 53.54 KG/M2 | HEART RATE: 66 BPM | DIASTOLIC BLOOD PRESSURE: 80 MMHG | WEIGHT: 315 LBS | TEMPERATURE: 98.1 F | RESPIRATION RATE: 18 BRPM | SYSTOLIC BLOOD PRESSURE: 134 MMHG | OXYGEN SATURATION: 97 %

## 2023-06-07 DIAGNOSIS — I10 PRIMARY HYPERTENSION: ICD-10-CM

## 2023-06-07 DIAGNOSIS — Z99.89 OSA ON CPAP: ICD-10-CM

## 2023-06-07 DIAGNOSIS — E66.01 MORBID OBESITY WITH BMI OF 50.0-59.9, ADULT (HCC): Primary | ICD-10-CM

## 2023-06-07 DIAGNOSIS — E78.00 PURE HYPERCHOLESTEROLEMIA: ICD-10-CM

## 2023-06-07 DIAGNOSIS — G47.33 OSA ON CPAP: ICD-10-CM

## 2023-06-07 RX ORDER — OMEPRAZOLE 20 MG/1
CAPSULE, DELAYED RELEASE ORAL
COMMUNITY
Start: 2023-05-21

## 2023-06-07 ASSESSMENT — ENCOUNTER SYMPTOMS
RESPIRATORY NEGATIVE: 1
CONSTIPATION: 0
NAUSEA: 1
VOMITING: 0
ABDOMINAL PAIN: 0
DIARRHEA: 0
COUGH: 0
SHORTNESS OF BREATH: 0

## 2023-06-07 NOTE — PROGRESS NOTES
Movements: Extraocular movements intact. Conjunctiva/sclera: Conjunctivae normal.      Pupils: Pupils are equal, round, and reactive to light. Cardiovascular:      Rate and Rhythm: Normal rate and regular rhythm. Pulses: Normal pulses. Heart sounds: Normal heart sounds. No murmur heard. No friction rub. No gallop. Pulmonary:      Effort: Pulmonary effort is normal. No respiratory distress. Breath sounds: Normal breath sounds. No wheezing, rhonchi or rales. Abdominal:      General: Abdomen is flat. Bowel sounds are normal. There is no distension. Palpations: Abdomen is soft. Tenderness: There is no abdominal tenderness. Musculoskeletal:         General: No swelling or tenderness. Normal range of motion. Cervical back: Normal range of motion and neck supple. Skin:     General: Skin is warm and dry. Capillary Refill: Capillary refill takes less than 2 seconds. Neurological:      General: No focal deficit present. Mental Status: He is alert and oriented to person, place, and time. Mental status is at baseline. Cranial Nerves: No cranial nerve deficit. Sensory: No sensory deficit. Psychiatric:         Mood and Affect: Mood normal.         Behavior: Behavior normal.         Thought Content: Thought content normal.         Judgment: Judgment normal.          On this date 6/7/2023 I have spent 30 minutes reviewing previous notes, test results and face to face with the patient discussing the diagnosis and importance of compliance with the treatment plan as well as documenting on the day of the visit. The patient was seen and evaluated with Dr. Bhavani Weathers. An electronic signature was used to authenticate this note.     --Charlotte Oro DO

## 2023-06-07 NOTE — PATIENT INSTRUCTIONS
Get lab work done before your next visit  Try to schedule a meeting with the dietitian as soon as possible  Follow-up in 1 month

## 2023-06-22 ENCOUNTER — HOSPITAL ENCOUNTER (OUTPATIENT)
Age: 53
Discharge: HOME | End: 2023-06-22
Payer: COMMERCIAL

## 2023-06-22 DIAGNOSIS — E66.01 MORBID OBESITY WITH BMI OF 50.0-59.9, ADULT (HCC): ICD-10-CM

## 2023-06-22 DIAGNOSIS — E78.00 PURE HYPERCHOLESTEROLEMIA: ICD-10-CM

## 2023-06-22 LAB
CHOLEST SERPL-MCNC: 206 MG/DL (ref 100–199)
DEPRECATED MEAN GLUCOSE BLD GHB EST-ACNC: 96 MG/DL (ref 70–126)
HBA1C MFR BLD HPLC: 5.2 % (ref 4.4–6.4)
HDLC SERPL-MCNC: 43 MG/DL
LDLC SERPL CALC-MCNC: 144 MG/DL
TRIGL SERPL-MCNC: 96 MG/DL (ref 0–199)

## 2023-06-22 PROCEDURE — 36415 COLL VENOUS BLD VENIPUNCTURE: CPT

## 2023-06-22 PROCEDURE — 84681 ASSAY OF C-PEPTIDE: CPT

## 2023-06-22 PROCEDURE — 83036 HEMOGLOBIN GLYCOSYLATED A1C: CPT

## 2023-06-22 PROCEDURE — 80061 LIPID PANEL: CPT

## 2023-06-23 LAB — C PEPTIDE SERPL-MCNC: 3.6 NG/ML (ref 1.1–4.4)

## 2023-06-23 RX ORDER — POTASSIUM CHLORIDE 750 MG/1
10 TABLET, EXTENDED RELEASE ORAL DAILY
Qty: 90 TABLET | Refills: 3 | OUTPATIENT
Start: 2023-06-23

## 2023-06-28 ENCOUNTER — OFFICE VISIT (OUTPATIENT)
Dept: PHYSICAL MEDICINE AND REHAB | Age: 53
End: 2023-06-28

## 2023-06-28 ENCOUNTER — HOSPITAL ENCOUNTER (OUTPATIENT)
Dept: PHYSICAL THERAPY | Age: 53
Setting detail: THERAPIES SERIES
Discharge: HOME | End: 2023-06-28
Payer: COMMERCIAL

## 2023-06-28 VITALS
WEIGHT: 315 LBS | BODY MASS INDEX: 41.75 KG/M2 | DIASTOLIC BLOOD PRESSURE: 80 MMHG | HEIGHT: 73 IN | SYSTOLIC BLOOD PRESSURE: 126 MMHG

## 2023-06-28 DIAGNOSIS — M54.50 LUMBAR PAIN: ICD-10-CM

## 2023-06-28 DIAGNOSIS — M54.17 LUMBOSACRAL RADICULITIS: ICD-10-CM

## 2023-06-28 DIAGNOSIS — M50.30 DDD (DEGENERATIVE DISC DISEASE), CERVICAL: ICD-10-CM

## 2023-06-28 DIAGNOSIS — M47.816 LUMBAR SPONDYLOSIS: ICD-10-CM

## 2023-06-28 DIAGNOSIS — G89.4 CHRONIC PAIN SYNDROME: ICD-10-CM

## 2023-06-28 DIAGNOSIS — Z98.1 S/P CERVICAL SPINAL FUSION: ICD-10-CM

## 2023-06-28 DIAGNOSIS — S83.241A ACUTE MEDIAL MENISCUS TEAR OF RIGHT KNEE, INITIAL ENCOUNTER: ICD-10-CM

## 2023-06-28 DIAGNOSIS — Z98.1 HX OF FUSION OF CERVICAL SPINE: ICD-10-CM

## 2023-06-28 DIAGNOSIS — G89.29 ACUTE EXACERBATION OF CHRONIC LOW BACK PAIN: Primary | ICD-10-CM

## 2023-06-28 DIAGNOSIS — M51.36 LUMBAR DEGENERATIVE DISC DISEASE: ICD-10-CM

## 2023-06-28 DIAGNOSIS — M54.50 ACUTE EXACERBATION OF CHRONIC LOW BACK PAIN: Primary | ICD-10-CM

## 2023-06-28 DIAGNOSIS — M54.12 CERVICAL RADICULOPATHY: ICD-10-CM

## 2023-06-28 PROCEDURE — 97750 PHYSICAL PERFORMANCE TEST: CPT

## 2023-06-28 RX ORDER — METHYLPREDNISOLONE 4 MG/1
TABLET ORAL
Qty: 1 KIT | Refills: 0 | Status: SHIPPED | OUTPATIENT
Start: 2023-06-28 | End: 2023-07-04

## 2023-06-28 ASSESSMENT — ENCOUNTER SYMPTOMS
SHORTNESS OF BREATH: 0
ABDOMINAL DISTENTION: 0
APNEA: 1
NAUSEA: 0
BACK PAIN: 1
ABDOMINAL PAIN: 1
COUGH: 0
PHOTOPHOBIA: 0

## 2023-07-03 DIAGNOSIS — G89.4 CHRONIC PAIN SYNDROME: ICD-10-CM

## 2023-07-03 RX ORDER — HYDROCODONE BITARTRATE AND ACETAMINOPHEN 5; 325 MG/1; MG/1
1 TABLET ORAL EVERY 8 HOURS PRN
Qty: 90 TABLET | Refills: 0 | Status: SHIPPED | OUTPATIENT
Start: 2023-07-09 | End: 2023-08-08

## 2023-07-03 RX ORDER — ORPHENADRINE CITRATE 100 MG/1
TABLET, EXTENDED RELEASE ORAL
Qty: 60 TABLET | Refills: 0 | Status: SHIPPED | OUTPATIENT
Start: 2023-07-03 | End: 2023-08-02

## 2023-07-03 NOTE — TELEPHONE ENCOUNTER
OARRS reviewed. UDS: + for  Amitriptyline, Butalbital, Hydrocodone. Last seen: 6/28/2023.  Follow-up: 7/12/2023

## 2023-07-03 NOTE — TELEPHONE ENCOUNTER
Moni Blake called requesting a refill on the following medications:  Requested Prescriptions     Pending Prescriptions Disp Refills    HYDROcodone-acetaminophen (NORCO) 5-325 MG per tablet 90 tablet 0     Sig: Take 1 tablet by mouth every 8 hours as needed for Pain for up to 30 days.     orphenadrine (NORFLEX) 100 MG extended release tablet 60 tablet 0     Sig: take 1 tablet by mouth twice a day     Pharmacy verified:  [facility]      Date of last visit: 06-28-23  Date of next visit (if applicable): 9/29/4957

## 2023-07-10 DIAGNOSIS — Z98.1 S/P CERVICAL SPINAL FUSION: ICD-10-CM

## 2023-07-10 NOTE — TELEPHONE ENCOUNTER
Jose Aguilar called requesting a refill on the following medications:  Requested Prescriptions     Pending Prescriptions Disp Refills    butalbital-acetaminophen-caffeine (FIORICET, ESGIC) -40 MG per tablet 90 tablet 0     Sig: Take 1 tablet by mouth every 8 hours as needed for Headaches     Pharmacy verified:  .pv    [address] [id number] - [place], [place] - [address] [facility] - [phone]    Date of last visit: 06/28/2023  Date of next visit (if applicable): Visit date not found

## 2023-07-11 RX ORDER — BUTALBITAL, ACETAMINOPHEN AND CAFFEINE 50; 325; 40 MG/1; MG/1; MG/1
1 TABLET ORAL EVERY 8 HOURS PRN
Qty: 90 TABLET | Refills: 0 | Status: SHIPPED | OUTPATIENT
Start: 2023-07-11 | End: 2023-08-10

## 2023-07-11 NOTE — TELEPHONE ENCOUNTER
OARRS reviewed. UDS: + for  Amitriptyline, Butalbital, Hydrocodone. Last seen: 6/28/2023.  Follow-up:

## 2023-07-13 NOTE — TELEPHONE ENCOUNTER
This medication refill is regarding a electronic request. Refill requested by  [facility]  . Requested Prescriptions     Pending Prescriptions Disp Refills    BANOPHEN 25 MG capsule [Pharmacy Med Name: BANOPHEN 25 MG CAPSULE] 60 capsule 5     Sig: take 1 capsule by mouth every 6 hours if needed for itching     Date of last visit: 4/17/2023   Date of next visit: None  Date of last refill: [facility]    Rx verified, ordered and set to EP.

## 2023-07-14 RX ORDER — DIPHENHYDRAMINE HYDROCHLORIDE 25 MG/1
CAPSULE ORAL
Qty: 60 CAPSULE | Refills: 5 | Status: SHIPPED | OUTPATIENT
Start: 2023-07-14

## 2023-07-31 RX ORDER — LIDOCAINE 50 MG/G
1 PATCH TOPICAL EVERY 12 HOURS
Qty: 60 PATCH | Refills: 0 | Status: SHIPPED | OUTPATIENT
Start: 2023-07-31 | End: 2023-08-30

## 2023-08-01 ENCOUNTER — APPOINTMENT (OUTPATIENT)
Dept: GENERAL RADIOLOGY | Age: 53
End: 2023-08-01
Payer: COMMERCIAL

## 2023-08-01 ENCOUNTER — HOSPITAL ENCOUNTER (EMERGENCY)
Age: 53
Discharge: HOME | End: 2023-08-01
Attending: EMERGENCY MEDICINE
Payer: COMMERCIAL

## 2023-08-01 VITALS
RESPIRATION RATE: 23 BRPM | BODY MASS INDEX: 41.75 KG/M2 | SYSTOLIC BLOOD PRESSURE: 102 MMHG | DIASTOLIC BLOOD PRESSURE: 90 MMHG | HEIGHT: 73 IN | HEART RATE: 58 BPM | TEMPERATURE: 98.3 F | WEIGHT: 315 LBS | OXYGEN SATURATION: 97 %

## 2023-08-01 DIAGNOSIS — J30.9 ALLERGIC RHINOCONJUNCTIVITIS: ICD-10-CM

## 2023-08-01 DIAGNOSIS — M25.552 LEFT HIP PAIN: ICD-10-CM

## 2023-08-01 DIAGNOSIS — M54.32 SCIATICA OF LEFT SIDE: Primary | ICD-10-CM

## 2023-08-01 DIAGNOSIS — H10.10 ALLERGIC RHINOCONJUNCTIVITIS: ICD-10-CM

## 2023-08-01 DIAGNOSIS — G89.4 CHRONIC PAIN SYNDROME: ICD-10-CM

## 2023-08-01 LAB
ANION GAP SERPL CALC-SCNC: 11 MEQ/L (ref 8–16)
BASOPHILS ABSOLUTE: 0 THOU/MM3 (ref 0–0.1)
BASOPHILS NFR BLD AUTO: 0.7 %
BUN SERPL-MCNC: 11 MG/DL (ref 7–22)
CALCIUM SERPL-MCNC: 8.8 MG/DL (ref 8.5–10.5)
CHLORIDE SERPL-SCNC: 100 MEQ/L (ref 98–111)
CO2 SERPL-SCNC: 27 MEQ/L (ref 23–33)
CREAT SERPL-MCNC: 1 MG/DL (ref 0.4–1.2)
DEPRECATED RDW RBC AUTO: 43.7 FL (ref 35–45)
EOSINOPHIL NFR BLD AUTO: 2.7 %
EOSINOPHILS ABSOLUTE: 0.2 THOU/MM3 (ref 0–0.4)
ERYTHROCYTE [DISTWIDTH] IN BLOOD BY AUTOMATED COUNT: 12.1 % (ref 11.5–14.5)
GFR SERPL CREATININE-BSD FRML MDRD: > 60 ML/MIN/1.73M2
GLUCOSE SERPL-MCNC: 97 MG/DL (ref 70–108)
HCT VFR BLD AUTO: 42.1 % (ref 42–52)
HGB BLD-MCNC: 13.2 GM/DL (ref 14–18)
IMM GRANULOCYTES # BLD AUTO: 0.03 THOU/MM3 (ref 0–0.07)
IMM GRANULOCYTES NFR BLD AUTO: 0.5 %
LYMPHOCYTES ABSOLUTE: 1.3 THOU/MM3 (ref 1–4.8)
LYMPHOCYTES NFR BLD AUTO: 23 %
MCH RBC QN AUTO: 30.9 PG (ref 26–33)
MCHC RBC AUTO-ENTMCNC: 31.4 GM/DL (ref 32.2–35.5)
MCV RBC AUTO: 98.6 FL (ref 80–94)
MONOCYTES ABSOLUTE: 0.7 THOU/MM3 (ref 0.4–1.3)
MONOCYTES NFR BLD AUTO: 11.7 %
NEUTROPHILS NFR BLD AUTO: 61.4 %
NRBC BLD AUTO-RTO: 0 /100 WBC
NT-PROBNP SERPL IA-MCNC: < 36 PG/ML (ref 0–124)
OSMOLALITY SERPL CALC.SUM OF ELEC: 275 MOSMOL/KG (ref 275–300)
PLATELET # BLD AUTO: 233 THOU/MM3 (ref 130–400)
PMV BLD AUTO: 10.6 FL (ref 9.4–12.4)
POTASSIUM SERPL-SCNC: 3.8 MEQ/L (ref 3.5–5.2)
RBC # BLD AUTO: 4.27 MILL/MM3 (ref 4.7–6.1)
SEGMENTED NEUTROPHILS ABSOLUTE COUNT: 3.5 THOU/MM3 (ref 1.8–7.7)
SODIUM SERPL-SCNC: 138 MEQ/L (ref 135–145)
TROPONIN, HIGH SENSITIVITY: 7 NG/L (ref 0–12)
WBC # BLD AUTO: 5.7 THOU/MM3 (ref 4.8–10.8)

## 2023-08-01 PROCEDURE — 6370000000 HC RX 637 (ALT 250 FOR IP): Performed by: EMERGENCY MEDICINE

## 2023-08-01 PROCEDURE — 93005 ELECTROCARDIOGRAM TRACING: CPT | Performed by: EMERGENCY MEDICINE

## 2023-08-01 PROCEDURE — 73502 X-RAY EXAM HIP UNI 2-3 VIEWS: CPT

## 2023-08-01 PROCEDURE — 36415 COLL VENOUS BLD VENIPUNCTURE: CPT

## 2023-08-01 PROCEDURE — 6360000002 HC RX W HCPCS

## 2023-08-01 PROCEDURE — 6370000000 HC RX 637 (ALT 250 FOR IP)

## 2023-08-01 PROCEDURE — 71045 X-RAY EXAM CHEST 1 VIEW: CPT

## 2023-08-01 PROCEDURE — 99285 EMERGENCY DEPT VISIT HI MDM: CPT

## 2023-08-01 PROCEDURE — 96374 THER/PROPH/DIAG INJ IV PUSH: CPT

## 2023-08-01 PROCEDURE — 80048 BASIC METABOLIC PNL TOTAL CA: CPT

## 2023-08-01 PROCEDURE — 83880 ASSAY OF NATRIURETIC PEPTIDE: CPT

## 2023-08-01 PROCEDURE — 84484 ASSAY OF TROPONIN QUANT: CPT

## 2023-08-01 PROCEDURE — 85025 COMPLETE CBC W/AUTO DIFF WBC: CPT

## 2023-08-01 RX ORDER — KETOROLAC TROMETHAMINE 30 MG/ML
15 INJECTION, SOLUTION INTRAMUSCULAR; INTRAVENOUS ONCE
Status: COMPLETED | OUTPATIENT
Start: 2023-08-01 | End: 2023-08-01

## 2023-08-01 RX ORDER — CYCLOBENZAPRINE HCL 10 MG
10 TABLET ORAL ONCE
Status: COMPLETED | OUTPATIENT
Start: 2023-08-01 | End: 2023-08-01

## 2023-08-01 RX ORDER — HYDROCODONE BITARTRATE AND ACETAMINOPHEN 5; 325 MG/1; MG/1
1 TABLET ORAL EVERY 8 HOURS PRN
Qty: 90 TABLET | Refills: 0 | Status: CANCELLED | OUTPATIENT
Start: 2023-08-08 | End: 2023-09-07

## 2023-08-01 RX ORDER — HYDROCODONE BITARTRATE AND ACETAMINOPHEN 5; 325 MG/1; MG/1
1 TABLET ORAL ONCE
Status: COMPLETED | OUTPATIENT
Start: 2023-08-01 | End: 2023-08-01

## 2023-08-01 RX ORDER — LIDOCAINE 4 G/G
1 PATCH TOPICAL DAILY
Status: DISCONTINUED | OUTPATIENT
Start: 2023-08-01 | End: 2023-08-01 | Stop reason: HOSPADM

## 2023-08-01 RX ORDER — ORPHENADRINE CITRATE 100 MG/1
100 TABLET, EXTENDED RELEASE ORAL 2 TIMES DAILY
Qty: 20 TABLET | Refills: 0 | Status: SHIPPED | OUTPATIENT
Start: 2023-08-01 | End: 2023-08-11

## 2023-08-01 RX ADMIN — KETOROLAC TROMETHAMINE 15 MG: 30 INJECTION, SOLUTION INTRAMUSCULAR; INTRAVENOUS at 07:53

## 2023-08-01 RX ADMIN — CYCLOBENZAPRINE 10 MG: 10 TABLET, FILM COATED ORAL at 07:53

## 2023-08-01 RX ADMIN — HYDROCODONE BITARTRATE AND ACETAMINOPHEN 1 TABLET: 5; 325 TABLET ORAL at 07:53

## 2023-08-01 ASSESSMENT — ENCOUNTER SYMPTOMS
VOMITING: 0
ABDOMINAL PAIN: 0
SHORTNESS OF BREATH: 1
DIARRHEA: 1
CONSTIPATION: 1
NAUSEA: 0
WHEEZING: 0

## 2023-08-01 ASSESSMENT — PAIN DESCRIPTION - ORIENTATION: ORIENTATION: LEFT

## 2023-08-01 ASSESSMENT — PAIN DESCRIPTION - LOCATION: LOCATION: ABDOMEN;LEG;ARM

## 2023-08-01 ASSESSMENT — PAIN SCALES - GENERAL: PAINLEVEL_OUTOF10: 10

## 2023-08-01 ASSESSMENT — PAIN - FUNCTIONAL ASSESSMENT: PAIN_FUNCTIONAL_ASSESSMENT: 0-10

## 2023-08-01 NOTE — TELEPHONE ENCOUNTER
OARRS reviewed. UDS: + for  amitriptyline, butalbital,hydrocodone. Last seen: 6/28/2023. Follow-up:   Future Appointments   Date Time Provider [address]   8/8/2023  7:00 PM STR MRI RM1 STRZ MRI STR Radiolog   8/9/2023  1:30 PM Maite Marino RD, LD SRPX Physic [facility]   10/23/2023 12:30 PM STR MRI RM1 STRZ MRI STR Radiolog   10/30/2023  9:00 AM IVAN Hoover SRPXNEURSU [facility]   5/8/2024  8:20 AM MIRNA Noyola - CNP [address]    Pt. Requesting early fill as insists you knew he was taking extra and he is out needing an early fill or an increase in pain meds. .Told your last note does not indicate that and would not get early fill unless you advise differently. Refill due 8/8  Went to ER again today and given a shot for pain times 1.also given Norflex 100MG BID TIMES 10 DAYS Says helped a little for the left thigh/hip pain which is stabbing in nature. Setup aptm. For 8/3 and told to address pain meds then.

## 2023-08-01 NOTE — ED TRIAGE NOTES
Patient presents to ED with chief complaint of pain down the left side of his body that is causing shortness of breath. Patient states he always has pain and sees a pain management doctor but a few days ago he began having 10/10 pain down the left side of his body. Patient reports taking norco and Lidoderm patches along with muscle relaxer at home. No known injury to cause pain. Patient resting in bed. Respirations easy and unlabored. No distress noted. Call light within reach.

## 2023-08-01 NOTE — ED PROVIDER NOTES
[facility] DEPT  EMERGENCY DEPARTMENT ENCOUNTER          Pt Name: Mallika Jiménez  MRN: [id number]  [address] 1970  Date of evaluation: 8/1/2023  Physician: Bia Munoz MD  Supervising Attending Physician: Dr. Marilin Obrien       Chief Complaint   Patient presents with    Shortness of Breath    Pain         HISTORY OF PRESENT ILLNESS    HPI  Mallika Jiménez is a 48 y.o. male who presents to the emergency department from home, by private vehicle for evaluation of left hip pain. Patient reports that over the past couple days he has had worsening of his sharp stabbing pain at a 10/10 in his left hip down the back of his leg into his calf. Patient reports that he has always had this pain however it is usually controlled. Patient reports that he ran out of his muscle relaxer and since the pain has gotten worse. Patient reports that he took a [place] at 2 this morning for the pain. Patient denies any weakness in lower extremities. Patient reports that he has become short of breath the past couple days as well. Patient states that he has been doing his breathing treatments without much resolve. Patient denies any chest pain. Patient reports that he has had both constipation and diarrhea. Patient states that he has had increased urgency with urination. Patient reports she has also had a headache for the past couple days. The patient has no other acute complaints at this time. REVIEW OF SYSTEMS   Review of Systems   Constitutional:  Negative for chills and fever. Respiratory:  Positive for shortness of breath. Negative for wheezing. Cardiovascular:  Negative for chest pain. Gastrointestinal:  Positive for constipation and diarrhea. Negative for abdominal pain, nausea and vomiting. Musculoskeletal:  Positive for arthralgias and myalgias. Neurological:  Positive for headaches. Negative for dizziness, weakness and numbness.        PAST

## 2023-08-01 NOTE — DISCHARGE INSTRUCTIONS
Return to the ED immediately for any change or worsening of symptoms including chest pain, shortness of breath, dizziness, syncope, bleeding, nausea or vomiting.

## 2023-08-01 NOTE — TELEPHONE ENCOUNTER
I can't fill Norco early and patient needs to take as prescribed and not run out early. He is already on Norflex from me why did ER give him more? Also what happened to Lumbar MRI that I ordered previous visit on 4/25/2023 he told me it was scheduled July 6th but I don't see this was completed or any results? I need these results to come up with plan.

## 2023-08-01 NOTE — TELEPHONE ENCOUNTER
Thank you.  Yes, will need Lumbar MRi to determine what is really going on and treatment moving forward

## 2023-08-01 NOTE — TELEPHONE ENCOUNTER
MRI is scheduled next week 8/8. Recalled pt. And LVM for him to call back and reschedule his aptm for after the MRI on the 8th so you can determine treatment. Also reminded no early fills and refill due 8/8. Will resetup tomorrow to get signed.

## 2023-08-02 PROCEDURE — 93010 ELECTROCARDIOGRAM REPORT: CPT | Performed by: INTERNAL MEDICINE

## 2023-08-02 RX ORDER — AZELASTINE HYDROCHLORIDE 0.5 MG/ML
SOLUTION/ DROPS OPHTHALMIC
Qty: 1 EACH | Refills: 11 | Status: SHIPPED | OUTPATIENT
Start: 2023-08-02

## 2023-08-02 RX ORDER — HYDROCODONE BITARTRATE AND ACETAMINOPHEN 5; 325 MG/1; MG/1
1 TABLET ORAL EVERY 8 HOURS PRN
Qty: 90 TABLET | Refills: 0 | Status: SHIPPED | OUTPATIENT
Start: 2023-08-08 | End: 2023-09-07

## 2023-08-02 NOTE — TELEPHONE ENCOUNTER
This medication refill is regarding a electronic request. Refill requested by  [facility] . Requested Prescriptions     Pending Prescriptions Disp Refills    azelastine (OPTIVAR) 0.05 % ophthalmic solution [Pharmacy Med Name: AZELASTINE HCL 0.05% DROPS] 1 each 11     Sig: instill 1 drop into both eyes twice a day     Date of last visit: 4/17/2023   Date of next visit: None  Date of last refill: 9/9/22 #1/11    Rx verified, ordered and set to EP.

## 2023-08-02 NOTE — TELEPHONE ENCOUNTER
Yes, okay to schedule with [facility] for trigger point injections. Make sure results from MRI are complete before appointment. I reviewed left hip xray from [facility] yesterday which showed Mild-to-moderate degenerative changes in the left hip. We can discuss a future joint injection in the future if needed.

## 2023-08-02 NOTE — TELEPHONE ENCOUNTER
Setting up fill. Information attached. Recalled pt. And changed aptm to after MRI FOR 8/10. He now wants to know if can get trigger point injections with [facility].

## 2023-08-02 NOTE — TELEPHONE ENCOUNTER
Called pt. And LVM in regards to [facility].  Response and to call back to get scheduled for aptm for TPI with Itzel Mckeon NP.

## 2023-08-03 ENCOUNTER — OFFICE VISIT (OUTPATIENT)
Dept: PHYSICAL MEDICINE AND REHAB | Age: 53
End: 2023-08-03

## 2023-08-03 VITALS
DIASTOLIC BLOOD PRESSURE: 78 MMHG | BODY MASS INDEX: 41.75 KG/M2 | WEIGHT: 315 LBS | HEIGHT: 73 IN | SYSTOLIC BLOOD PRESSURE: 126 MMHG

## 2023-08-03 DIAGNOSIS — M79.18 MYOFASCIAL PAIN: Primary | ICD-10-CM

## 2023-08-03 DIAGNOSIS — G89.4 CHRONIC PAIN SYNDROME: ICD-10-CM

## 2023-08-03 RX ORDER — METHOCARBAMOL 100 MG/ML
100 INJECTION, SOLUTION INTRAMUSCULAR; INTRAVENOUS ONCE
Status: COMPLETED | OUTPATIENT
Start: 2023-08-03 | End: 2023-08-03

## 2023-08-03 RX ORDER — LIDOCAINE 50 MG/G
OINTMENT TOPICAL
COMMUNITY
Start: 2023-07-09

## 2023-08-03 RX ADMIN — METHOCARBAMOL 100 MG: 100 INJECTION, SOLUTION INTRAMUSCULAR; INTRAVENOUS at 08:50

## 2023-08-04 LAB
EKG ATRIAL RATE: 59 BPM
EKG P AXIS: 66 DEGREES
EKG P-R INTERVAL: 182 MS
EKG Q-T INTERVAL: 450 MS
EKG QRS DURATION: 88 MS
EKG QTC CALCULATION (BAZETT): 445 MS
EKG R AXIS: 23 DEGREES
EKG T AXIS: 13 DEGREES
EKG VENTRICULAR RATE: 59 BPM

## 2023-08-08 ENCOUNTER — HOSPITAL ENCOUNTER (OUTPATIENT)
Dept: MRI IMAGING | Age: 53
Discharge: HOME | End: 2023-08-08
Payer: COMMERCIAL

## 2023-08-08 DIAGNOSIS — M54.17 LUMBOSACRAL RADICULITIS: ICD-10-CM

## 2023-08-08 DIAGNOSIS — M47.816 LUMBAR SPONDYLOSIS: ICD-10-CM

## 2023-08-08 DIAGNOSIS — M51.36 LUMBAR DEGENERATIVE DISC DISEASE: ICD-10-CM

## 2023-08-08 DIAGNOSIS — M54.50 LUMBAR PAIN: ICD-10-CM

## 2023-08-08 PROCEDURE — 72148 MRI LUMBAR SPINE W/O DYE: CPT

## 2023-08-10 ENCOUNTER — OFFICE VISIT (OUTPATIENT)
Dept: PHYSICAL MEDICINE AND REHAB | Age: 53
End: 2023-08-10
Payer: COMMERCIAL

## 2023-08-10 VITALS
DIASTOLIC BLOOD PRESSURE: 80 MMHG | WEIGHT: 315 LBS | BODY MASS INDEX: 41.75 KG/M2 | HEIGHT: 73 IN | SYSTOLIC BLOOD PRESSURE: 132 MMHG

## 2023-08-10 DIAGNOSIS — M48.062 SPINAL STENOSIS OF LUMBAR REGION WITH NEUROGENIC CLAUDICATION: ICD-10-CM

## 2023-08-10 DIAGNOSIS — M62.838 SPASM OF MUSCLE: ICD-10-CM

## 2023-08-10 DIAGNOSIS — M47.816 LUMBAR SPONDYLOSIS: ICD-10-CM

## 2023-08-10 DIAGNOSIS — M50.30 DDD (DEGENERATIVE DISC DISEASE), CERVICAL: ICD-10-CM

## 2023-08-10 DIAGNOSIS — M79.18 CHRONIC MYOFASCIAL PAIN: ICD-10-CM

## 2023-08-10 DIAGNOSIS — Z98.1 HX OF FUSION OF CERVICAL SPINE: ICD-10-CM

## 2023-08-10 DIAGNOSIS — G89.4 CHRONIC PAIN SYNDROME: ICD-10-CM

## 2023-08-10 DIAGNOSIS — Z98.1 S/P CERVICAL SPINAL FUSION: ICD-10-CM

## 2023-08-10 DIAGNOSIS — M79.18 MYOFASCIAL PAIN: ICD-10-CM

## 2023-08-10 DIAGNOSIS — M51.36 LUMBAR DEGENERATIVE DISC DISEASE: ICD-10-CM

## 2023-08-10 DIAGNOSIS — G89.29 CHRONIC MYOFASCIAL PAIN: ICD-10-CM

## 2023-08-10 DIAGNOSIS — S39.012S STRAIN OF LUMBAR REGION, SEQUELA: ICD-10-CM

## 2023-08-10 DIAGNOSIS — M54.12 CERVICAL RADICULOPATHY: ICD-10-CM

## 2023-08-10 DIAGNOSIS — M54.50 LUMBAR PAIN: ICD-10-CM

## 2023-08-10 DIAGNOSIS — M54.17 LUMBOSACRAL RADICULITIS: Primary | ICD-10-CM

## 2023-08-10 PROCEDURE — 99214 OFFICE O/P EST MOD 30 MIN: CPT | Performed by: NURSE PRACTITIONER

## 2023-08-10 PROCEDURE — G8427 DOCREV CUR MEDS BY ELIG CLIN: HCPCS | Performed by: NURSE PRACTITIONER

## 2023-08-10 PROCEDURE — 3017F COLORECTAL CA SCREEN DOC REV: CPT | Performed by: NURSE PRACTITIONER

## 2023-08-10 PROCEDURE — G8417 CALC BMI ABV UP PARAM F/U: HCPCS | Performed by: NURSE PRACTITIONER

## 2023-08-10 PROCEDURE — 1036F TOBACCO NON-USER: CPT | Performed by: NURSE PRACTITIONER

## 2023-08-10 RX ORDER — GABAPENTIN 300 MG/1
300 CAPSULE ORAL NIGHTLY
Qty: 30 CAPSULE | Refills: 0 | Status: SHIPPED | OUTPATIENT
Start: 2023-08-10 | End: 2023-09-09

## 2023-08-10 RX ORDER — BUTALBITAL, ACETAMINOPHEN AND CAFFEINE 50; 325; 40 MG/1; MG/1; MG/1
1 TABLET ORAL EVERY 8 HOURS PRN
Qty: 90 TABLET | Refills: 0 | Status: SHIPPED | OUTPATIENT
Start: 2023-08-10 | End: 2023-09-09

## 2023-08-10 RX ORDER — LIDOCAINE 50 MG/G
OINTMENT TOPICAL
Qty: 2500 G | Refills: 2 | Status: SHIPPED | OUTPATIENT
Start: 2023-08-10 | End: 2023-09-08

## 2023-08-10 ASSESSMENT — ENCOUNTER SYMPTOMS
NAUSEA: 0
ABDOMINAL PAIN: 1
COUGH: 0
APNEA: 1
BACK PAIN: 1
SHORTNESS OF BREATH: 0
PHOTOPHOBIA: 0
ABDOMINAL DISTENTION: 0

## 2023-08-10 NOTE — PROGRESS NOTES
was ordered\"   Pain is most bothersome in low back 70% bothersome compared to leg   Has muscle spasms all over and aching in joints with weather changes     States that pain has been elevated and limiting with activity, sleep. States started back to work as  which is difficult     Current pain medications help take edge off of pain but does not take it away     Medrol dose pack from last visit helped short term. Pain increases with bending, lifting, twisting , walking, standing, getting up and down, and housework or working at job, any activity, weather changes   Treatments Tried PT/HEP, ICE/HEAT, Neuropathic medications, NSAIDS, narcotics, muscle relaxer, OTC rubs creams patches, steroid burst, and injections        Prior Injections:  Lumbar facet RFA that was completed 4/4/2023- no relief     Radiology:  Left hip xray:  FINDINGS:           There is no fracture or dislocation. There are some degenerative changes. There is some sclerosis of the superior acetabulum. There is a small osteophyte of the femoral head. The sacroiliac joint appears normal. Sacral struts are intact. The superior and   inferior pubic rami are normal. The soft tissues are normal.           IMPRESSION:  1. Mild-to-moderate degenerative changes in the left hip. Lumbar MRI:     FINDINGS:        The lumbar vertebral bodies are normally aligned. There is normal marrow signal throughout. There is no bone marrow edema. There are no compression fractures. No pars defects are noted. 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 .     The distal spinal cord, conus medullaris and cauda equina are normal.      There are no gross abnormalities in the visualized aspects of the distal thoracic spine. On the axial images, at T12-L1, there is no disc herniation, canal or foraminal stenosis. At L1-L2, there is no disc herniation, canal or foraminal stenosis.      At L2-L3, there is a 2.1 mm

## 2023-08-11 NOTE — TELEPHONE ENCOUNTER
Request sent from [facility] for refill of betamethasone 0.1% cream.  Last seen 4/17/23, no future appt scheduled. Med verified. Order pended.

## 2023-08-15 ENCOUNTER — APPOINTMENT (OUTPATIENT)
Dept: GENERAL RADIOLOGY | Age: 53
End: 2023-08-15
Payer: COMMERCIAL

## 2023-08-15 ENCOUNTER — HOSPITAL ENCOUNTER (EMERGENCY)
Age: 53
Discharge: HOME | End: 2023-08-15
Attending: EMERGENCY MEDICINE
Payer: COMMERCIAL

## 2023-08-15 ENCOUNTER — OFFICE VISIT (OUTPATIENT)
Dept: FAMILY MEDICINE CLINIC | Age: 53
End: 2023-08-15
Payer: COMMERCIAL

## 2023-08-15 VITALS
SYSTOLIC BLOOD PRESSURE: 135 MMHG | TEMPERATURE: 98.7 F | DIASTOLIC BLOOD PRESSURE: 74 MMHG | OXYGEN SATURATION: 97 % | RESPIRATION RATE: 18 BRPM | HEART RATE: 64 BPM

## 2023-08-15 VITALS
DIASTOLIC BLOOD PRESSURE: 62 MMHG | WEIGHT: 315 LBS | HEART RATE: 68 BPM | RESPIRATION RATE: 16 BRPM | BODY MASS INDEX: 53.35 KG/M2 | SYSTOLIC BLOOD PRESSURE: 142 MMHG | TEMPERATURE: 98.1 F

## 2023-08-15 DIAGNOSIS — E66.01 MORBID OBESITY WITH BMI OF 50.0-59.9, ADULT (HCC): ICD-10-CM

## 2023-08-15 DIAGNOSIS — E78.5 HYPERLIPIDEMIA, UNSPECIFIED HYPERLIPIDEMIA TYPE: ICD-10-CM

## 2023-08-15 DIAGNOSIS — R07.9 LEFT-SIDED CHEST PAIN: Primary | ICD-10-CM

## 2023-08-15 DIAGNOSIS — R51.9 LEFT-SIDED HEADACHE: ICD-10-CM

## 2023-08-15 DIAGNOSIS — R07.89 ATYPICAL CHEST PAIN: Primary | ICD-10-CM

## 2023-08-15 DIAGNOSIS — I50.9 HEART FAILURE, UNSPECIFIED HF CHRONICITY, UNSPECIFIED HEART FAILURE TYPE (HCC): ICD-10-CM

## 2023-08-15 LAB
ALBUMIN SERPL BCG-MCNC: 3.9 G/DL (ref 3.5–5.1)
ALP SERPL-CCNC: 144 U/L (ref 38–126)
ALT SERPL W/O P-5'-P-CCNC: 20 U/L (ref 11–66)
ANION GAP SERPL CALC-SCNC: 11 MEQ/L (ref 8–16)
AST SERPL-CCNC: 17 U/L (ref 5–40)
BASOPHILS ABSOLUTE: 0 THOU/MM3 (ref 0–0.1)
BASOPHILS NFR BLD AUTO: 0.9 %
BILIRUB SERPL-MCNC: 0.5 MG/DL (ref 0.3–1.2)
BUN SERPL-MCNC: 12 MG/DL (ref 7–22)
CALCIUM SERPL-MCNC: 9.2 MG/DL (ref 8.5–10.5)
CHLORIDE SERPL-SCNC: 104 MEQ/L (ref 98–111)
CO2 SERPL-SCNC: 26 MEQ/L (ref 23–33)
CREAT SERPL-MCNC: 0.9 MG/DL (ref 0.4–1.2)
DEPRECATED RDW RBC AUTO: 41.9 FL (ref 35–45)
EKG ATRIAL RATE: 69 BPM
EKG P AXIS: 69 DEGREES
EKG P-R INTERVAL: 170 MS
EKG Q-T INTERVAL: 444 MS
EKG QRS DURATION: 90 MS
EKG QTC CALCULATION (BAZETT): 475 MS
EKG R AXIS: 14 DEGREES
EKG T AXIS: 35 DEGREES
EKG VENTRICULAR RATE: 69 BPM
EOSINOPHIL NFR BLD AUTO: 2.2 %
EOSINOPHILS ABSOLUTE: 0.1 THOU/MM3 (ref 0–0.4)
ERYTHROCYTE [DISTWIDTH] IN BLOOD BY AUTOMATED COUNT: 11.9 % (ref 11.5–14.5)
GFR SERPL CREATININE-BSD FRML MDRD: > 60 ML/MIN/1.73M2
GLUCOSE SERPL-MCNC: 98 MG/DL (ref 70–108)
HCT VFR BLD AUTO: 44.2 % (ref 42–52)
HGB BLD-MCNC: 14.1 GM/DL (ref 14–18)
IMM GRANULOCYTES # BLD AUTO: 0.02 THOU/MM3 (ref 0–0.07)
IMM GRANULOCYTES NFR BLD AUTO: 0.4 %
LYMPHOCYTES ABSOLUTE: 1.1 THOU/MM3 (ref 1–4.8)
LYMPHOCYTES NFR BLD AUTO: 24.5 %
MCH RBC QN AUTO: 30.8 PG (ref 26–33)
MCHC RBC AUTO-ENTMCNC: 31.9 GM/DL (ref 32.2–35.5)
MCV RBC AUTO: 96.5 FL (ref 80–94)
MONOCYTES ABSOLUTE: 0.5 THOU/MM3 (ref 0.4–1.3)
MONOCYTES NFR BLD AUTO: 10.4 %
NEUTROPHILS NFR BLD AUTO: 61.6 %
NRBC BLD AUTO-RTO: 0 /100 WBC
NT-PROBNP SERPL IA-MCNC: < 36 PG/ML (ref 0–124)
OSMOLALITY SERPL CALC.SUM OF ELEC: 281 MOSMOL/KG (ref 275–300)
PLATELET # BLD AUTO: 229 THOU/MM3 (ref 130–400)
PMV BLD AUTO: 10.4 FL (ref 9.4–12.4)
POTASSIUM SERPL-SCNC: 3.8 MEQ/L (ref 3.5–5.2)
PROT SERPL-MCNC: 7.1 G/DL (ref 6.1–8)
RBC # BLD AUTO: 4.58 MILL/MM3 (ref 4.7–6.1)
REASON FOR REJECTION: NORMAL
REJECTED TEST: NORMAL
SEGMENTED NEUTROPHILS ABSOLUTE COUNT: 2.8 THOU/MM3 (ref 1.8–7.7)
SODIUM SERPL-SCNC: 141 MEQ/L (ref 135–145)
TROPONIN, HIGH SENSITIVITY: 9 NG/L (ref 0–12)
TROPONIN, HIGH SENSITIVITY: 9 NG/L (ref 0–12)
WBC # BLD AUTO: 4.5 THOU/MM3 (ref 4.8–10.8)

## 2023-08-15 PROCEDURE — 6370000000 HC RX 637 (ALT 250 FOR IP): Performed by: STUDENT IN AN ORGANIZED HEALTH CARE EDUCATION/TRAINING PROGRAM

## 2023-08-15 PROCEDURE — 71046 X-RAY EXAM CHEST 2 VIEWS: CPT

## 2023-08-15 PROCEDURE — 96374 THER/PROPH/DIAG INJ IV PUSH: CPT

## 2023-08-15 PROCEDURE — 93005 ELECTROCARDIOGRAM TRACING: CPT | Performed by: EMERGENCY MEDICINE

## 2023-08-15 PROCEDURE — 3017F COLORECTAL CA SCREEN DOC REV: CPT | Performed by: NURSE PRACTITIONER

## 2023-08-15 PROCEDURE — 99214 OFFICE O/P EST MOD 30 MIN: CPT | Performed by: NURSE PRACTITIONER

## 2023-08-15 PROCEDURE — 99285 EMERGENCY DEPT VISIT HI MDM: CPT

## 2023-08-15 PROCEDURE — 84484 ASSAY OF TROPONIN QUANT: CPT

## 2023-08-15 PROCEDURE — 85025 COMPLETE CBC W/AUTO DIFF WBC: CPT

## 2023-08-15 PROCEDURE — 1036F TOBACCO NON-USER: CPT | Performed by: NURSE PRACTITIONER

## 2023-08-15 PROCEDURE — G8427 DOCREV CUR MEDS BY ELIG CLIN: HCPCS | Performed by: NURSE PRACTITIONER

## 2023-08-15 PROCEDURE — 83880 ASSAY OF NATRIURETIC PEPTIDE: CPT

## 2023-08-15 PROCEDURE — 6360000002 HC RX W HCPCS: Performed by: STUDENT IN AN ORGANIZED HEALTH CARE EDUCATION/TRAINING PROGRAM

## 2023-08-15 PROCEDURE — 93010 ELECTROCARDIOGRAM REPORT: CPT | Performed by: INTERNAL MEDICINE

## 2023-08-15 PROCEDURE — 93000 ELECTROCARDIOGRAM COMPLETE: CPT | Performed by: NURSE PRACTITIONER

## 2023-08-15 PROCEDURE — 36415 COLL VENOUS BLD VENIPUNCTURE: CPT

## 2023-08-15 PROCEDURE — 80053 COMPREHEN METABOLIC PANEL: CPT

## 2023-08-15 PROCEDURE — G8417 CALC BMI ABV UP PARAM F/U: HCPCS | Performed by: NURSE PRACTITIONER

## 2023-08-15 RX ORDER — CYCLOBENZAPRINE HCL 10 MG
10 TABLET ORAL ONCE
Status: COMPLETED | OUTPATIENT
Start: 2023-08-15 | End: 2023-08-15

## 2023-08-15 RX ORDER — KETOROLAC TROMETHAMINE 30 MG/ML
15 INJECTION, SOLUTION INTRAMUSCULAR; INTRAVENOUS ONCE
Status: COMPLETED | OUTPATIENT
Start: 2023-08-15 | End: 2023-08-15

## 2023-08-15 RX ADMIN — CYCLOBENZAPRINE 10 MG: 10 TABLET, FILM COATED ORAL at 13:29

## 2023-08-15 RX ADMIN — KETOROLAC TROMETHAMINE 15 MG: 30 INJECTION, SOLUTION INTRAMUSCULAR; INTRAVENOUS at 13:29

## 2023-08-15 ASSESSMENT — ENCOUNTER SYMPTOMS
ABDOMINAL PAIN: 0
ORTHOPNEA: 0
SPUTUM PRODUCTION: 0
BACK PAIN: 0
SHORTNESS OF BREATH: 0
NAUSEA: 0
HEMOPTYSIS: 0
VOMITING: 0
COUGH: 0

## 2023-08-15 ASSESSMENT — PAIN SCALES - GENERAL: PAINLEVEL_OUTOF10: 6

## 2023-08-15 ASSESSMENT — PAIN - FUNCTIONAL ASSESSMENT: PAIN_FUNCTIONAL_ASSESSMENT: 0-10

## 2023-08-15 NOTE — ED PROVIDER NOTES
ATTENDING NOTE:    I supervised and discussed the history, physical exam and the management of this patient with the resident. I reviewed the resident's note and agree with the documented findings and plan of care. Please see my additional note. I personally saw and examined the patient. I have reviewed and agree with the resident's findings, including all diagnostic interpretations and treatment plans as written. I was present for the key portion of any procedures performed and the inclusive time noted in any critical care statement.     Electronically verified by Rozina Sotelo MD  08/15/23 2015
with which the patient agrees. DISCHARGE PRESCRIPTIONS: (None if blank)  New Prescriptions    No medications on file         This transcription was electronically signed. Parts of this transcriptions may have been dictated by use of voice recognition software and electronically transcribed, and parts may have been transcribed with the assistance of an ED scribe. The transcription may contain errors not detected in proofreading. Please refer to my supervising physician's documentation if my documentation differs.     Electronically Signed: Deb Fletcher MD, 08/15/23, 5:16 PM       Dejuan Crespo MD  Resident  08/15/23 0973

## 2023-08-15 NOTE — ED TRIAGE NOTES
Pt to ED with chest pressure. Pt states that he felt the pressure this morning and went to go see his doctor. Pt had an EKG done and was told to come here for evaluation. EMS gave the patient 324 asa. Pt stats that his pain went from a 8/10 to a 6/10. Pt denies any sob.

## 2023-08-15 NOTE — ED NOTES
Pt is resting in cot with respirations even and unlabored. Pt voices no concern or need at this time. Call light is within reach.       Lizbet Taylor RN  08/15/23 [id number]

## 2023-08-15 NOTE — ED NOTES
Pt resting on cot. Pt provided blanket for comfort. Call light in reach.       Jermaine Pablo RN  08/15/23 [id number]

## 2023-08-15 NOTE — ED NOTES
Pt is resting in cot with respirations even and unlabored. RN updated pt on POC. No concerns voiced. Call light is within reach.       Addi Pham, RN  08/15/23 0256

## 2023-08-16 ENCOUNTER — CLINICAL DOCUMENTATION (OUTPATIENT)
Dept: SPIRITUAL SERVICES | Facility: CLINIC | Age: 53
End: 2023-08-16

## 2023-08-16 NOTE — FLOWSHEET NOTE
[facility]. [address] [facility] PROGRESS NOTE    Patient: Nya Mike          YOB: 1970  Age: 48 y.o. Gender: male            Assessment:  Heide Almanza is a 82-year-old male who was initially referred for emotional / spiritual support due what was being experienced in his life. Per referral, feeling overwhelmed with multiple life-adjustments. Ramsey Tamayo stated experiencing a panic attack, causing a trip to his PCP to address the pain, as well as ED for further evaluation. Ramsey Tamayo expressed being under a lot of stress due to being without work (fired from his job due to point accumulation). Ramsey Tamayo expressed anxiety due to being uncertain of his next steps to take to deal with needed finances moving forward. Interventions:   made a follow-up telephone call to contact Ramsey Starkleslye, as requested via voicemail, providing a listening presence allowing patient to freely engage in conversation, responding to open-ended questions, as well as follow-up questions for clarity and greater understanding.  shared words of , encouragement, and hope as appropriate.  ended the session with prayer. Outcomes:  Ramsey Tamayo was appreciative of an opportunity to freely express his story and vent his emotions in a safe place. Plan:  Ramsey Belkis will contact  when additional emotional / spiritual support is desired or needed.     Electronically signed by Tomeka Greco on 8/16/2023 at 2:19 PM.  [address] [place]  [phone]

## 2023-08-17 ENCOUNTER — OFFICE VISIT (OUTPATIENT)
Dept: FAMILY MEDICINE CLINIC | Age: 53
End: 2023-08-17
Payer: COMMERCIAL

## 2023-08-17 VITALS
RESPIRATION RATE: 20 BRPM | SYSTOLIC BLOOD PRESSURE: 140 MMHG | BODY MASS INDEX: 53.57 KG/M2 | HEART RATE: 68 BPM | TEMPERATURE: 98 F | WEIGHT: 315 LBS | DIASTOLIC BLOOD PRESSURE: 82 MMHG

## 2023-08-17 DIAGNOSIS — J30.9 ALLERGIC RHINOCONJUNCTIVITIS: ICD-10-CM

## 2023-08-17 DIAGNOSIS — J30.1 NON-SEASONAL ALLERGIC RHINITIS DUE TO POLLEN: ICD-10-CM

## 2023-08-17 DIAGNOSIS — M54.50 CHRONIC MIDLINE LOW BACK PAIN WITHOUT SCIATICA: ICD-10-CM

## 2023-08-17 DIAGNOSIS — F41.9 ANXIETY: Primary | ICD-10-CM

## 2023-08-17 DIAGNOSIS — G89.29 CHRONIC MIDLINE LOW BACK PAIN WITHOUT SCIATICA: ICD-10-CM

## 2023-08-17 DIAGNOSIS — F33.2 SEVERE EPISODE OF RECURRENT MAJOR DEPRESSIVE DISORDER, WITHOUT PSYCHOTIC FEATURES (HCC): ICD-10-CM

## 2023-08-17 DIAGNOSIS — H10.10 ALLERGIC RHINOCONJUNCTIVITIS: ICD-10-CM

## 2023-08-17 PROCEDURE — G8417 CALC BMI ABV UP PARAM F/U: HCPCS | Performed by: NURSE PRACTITIONER

## 2023-08-17 PROCEDURE — 3017F COLORECTAL CA SCREEN DOC REV: CPT | Performed by: NURSE PRACTITIONER

## 2023-08-17 PROCEDURE — 99214 OFFICE O/P EST MOD 30 MIN: CPT | Performed by: NURSE PRACTITIONER

## 2023-08-17 PROCEDURE — G8427 DOCREV CUR MEDS BY ELIG CLIN: HCPCS | Performed by: NURSE PRACTITIONER

## 2023-08-17 PROCEDURE — 1036F TOBACCO NON-USER: CPT | Performed by: NURSE PRACTITIONER

## 2023-08-17 RX ORDER — DULOXETIN HYDROCHLORIDE 30 MG/1
30 CAPSULE, DELAYED RELEASE ORAL DAILY
Qty: 30 CAPSULE | Refills: 3 | Status: SHIPPED | OUTPATIENT
Start: 2023-08-17

## 2023-08-17 RX ORDER — MELOXICAM 15 MG/1
15 TABLET ORAL DAILY
Qty: 90 TABLET | Refills: 1 | Status: SHIPPED | OUTPATIENT
Start: 2023-08-17

## 2023-08-17 RX ORDER — MONTELUKAST SODIUM 10 MG/1
10 TABLET ORAL NIGHTLY
Qty: 90 TABLET | Refills: 3 | Status: SHIPPED | OUTPATIENT
Start: 2023-08-17

## 2023-08-17 RX ORDER — LORATADINE 10 MG/1
10 TABLET ORAL DAILY
Qty: 90 TABLET | Refills: 1 | Status: SHIPPED | OUTPATIENT
Start: 2023-08-17

## 2023-08-17 RX ORDER — HYDROXYZINE HYDROCHLORIDE 25 MG/1
25 TABLET, FILM COATED ORAL EVERY 8 HOURS PRN
Qty: 90 TABLET | Refills: 0 | Status: SHIPPED | OUTPATIENT
Start: 2023-08-17 | End: 2023-09-16

## 2023-08-17 ASSESSMENT — ENCOUNTER SYMPTOMS
ABDOMINAL PAIN: 0
WHEEZING: 0
NAUSEA: 0
SHORTNESS OF BREATH: 0
DIARRHEA: 0
VOMITING: 0
TROUBLE SWALLOWING: 0
EYE PAIN: 0
FACIAL SWELLING: 0
BACK PAIN: 1
COUGH: 0
SORE THROAT: 0
SINUS PAIN: 0
COLOR CHANGE: 0

## 2023-08-17 NOTE — PROGRESS NOTES
[facility]  [address] [address]  Dept: [phone]  Dept Fax: [phone]  Loc: [phone]     2023     Amirah Oneal (:  1970) is a 48 y.o. male, here for evaluation of the following medical concerns:    Chief Complaint   Patient presents with    Follow-up     ED follow up for chest pain. Given muscle relaxer and anti-inflammatory in ED that was beneficial. (Toradol and Flexeril)       HPI  Pt presents to the office today for follow up after ER for chest pain. Pt has this a lot and thinks its related to anxiety. He has to deal with a lot of pain and now he has lost his job and his anxiety is worse. Pt currently taking elavil 10 mg nightly. He follows up with pain management as directed for his pain medications at this helps, but he still has breakthrough pain. Side effects noted: none    Sleep-OK  Interest- OK  Guilt- OK  Energy- less  Concentration- less  Appetite- OK  Psychomotor Retardation- NO  Suicidal/ Homicidal Ideations- NO  Anxiety-increased. Review of Systems   Constitutional:  Negative for chills, fatigue and fever. HENT:  Negative for congestion, facial swelling, sinus pain, sore throat and trouble swallowing. Eyes:  Negative for pain and visual disturbance. Respiratory:  Negative for cough, shortness of breath and wheezing. Cardiovascular:  Negative for chest pain and palpitations. Gastrointestinal:  Negative for abdominal pain, diarrhea, nausea and vomiting. Genitourinary:  Negative for difficulty urinating, dysuria and urgency. Musculoskeletal:  Positive for arthralgias, back pain and myalgias. Negative for gait problem and neck pain. Skin:  Negative for color change and rash. Neurological:  Negative for dizziness, seizures, weakness and headaches. Psychiatric/Behavioral:  Positive for agitation, decreased concentration and dysphoric mood. Negative for sleep disturbance.

## 2023-08-21 RX ORDER — OMEPRAZOLE 20 MG/1
CAPSULE, DELAYED RELEASE ORAL
Qty: 90 CAPSULE | Refills: 3 | Status: SHIPPED | OUTPATIENT
Start: 2023-08-21

## 2023-08-21 NOTE — TELEPHONE ENCOUNTER
Request sent from [facility] for refill of omeprazole 20 mg qd. Last seen 8/17/23, next appt 9/13/23. Med verified. Order pended.

## 2023-08-23 ENCOUNTER — TELEPHONE (OUTPATIENT)
Dept: FAMILY MEDICINE CLINIC | Age: 53
End: 2023-08-23

## 2023-08-23 DIAGNOSIS — G89.29 CHRONIC MIDLINE LOW BACK PAIN WITHOUT SCIATICA: Primary | ICD-10-CM

## 2023-08-23 DIAGNOSIS — M54.50 CHRONIC MIDLINE LOW BACK PAIN WITHOUT SCIATICA: Primary | ICD-10-CM

## 2023-08-23 NOTE — TELEPHONE ENCOUNTER
Pt is asking for a referral to Dr Esther Abraham at [facility]. For his back for possible surgery. States his pain Dr is unable to do any more for him to help with the pain. Ok to set appt for Thursday/ Fridays afternoons, Am can be any day.       [facility]

## 2023-08-24 ENCOUNTER — OFFICE VISIT (OUTPATIENT)
Dept: PHYSICAL MEDICINE AND REHAB | Age: 53
End: 2023-08-24

## 2023-08-24 VITALS
SYSTOLIC BLOOD PRESSURE: 126 MMHG | DIASTOLIC BLOOD PRESSURE: 78 MMHG | BODY MASS INDEX: 41.75 KG/M2 | WEIGHT: 315 LBS | HEIGHT: 73 IN

## 2023-08-24 DIAGNOSIS — G89.4 CHRONIC PAIN SYNDROME: ICD-10-CM

## 2023-08-24 DIAGNOSIS — M79.18 MYOFASCIAL PAIN: Primary | ICD-10-CM

## 2023-08-24 DIAGNOSIS — M62.838 SPASM OF MUSCLE: ICD-10-CM

## 2023-08-24 RX ORDER — TRIAMCINOLONE ACETONIDE 40 MG/ML
40 INJECTION, SUSPENSION INTRA-ARTICULAR; INTRAMUSCULAR ONCE
Status: COMPLETED | OUTPATIENT
Start: 2023-08-24 | End: 2023-08-24

## 2023-08-24 RX ADMIN — TRIAMCINOLONE ACETONIDE 40 MG: 40 INJECTION, SUSPENSION INTRA-ARTICULAR; INTRAMUSCULAR at 11:50

## 2023-08-26 LAB
EKG ATRIAL RATE: 59 BPM
EKG ATRIAL RATE: 69 BPM
EKG P AXIS: 66 DEGREES
EKG P AXIS: 69 DEGREES
EKG P-R INTERVAL: 170 MS
EKG P-R INTERVAL: 182 MS
EKG Q-T INTERVAL: 444 MS
EKG Q-T INTERVAL: 450 MS
EKG QRS DURATION: 88 MS
EKG QRS DURATION: 90 MS
EKG QTC CALCULATION (BAZETT): 445 MS
EKG QTC CALCULATION (BAZETT): 475 MS
EKG R AXIS: 14 DEGREES
EKG R AXIS: 23 DEGREES
EKG T AXIS: 13 DEGREES
EKG T AXIS: 35 DEGREES
EKG VENTRICULAR RATE: 59 BPM
EKG VENTRICULAR RATE: 69 BPM

## 2023-08-30 NOTE — TELEPHONE ENCOUNTER
Referral placed and faxed on 8/30/23 with insurance card and office note. They will call patient to schedule. Phone: [phone]  Fax: [phone] message sent to patient with referral information.

## 2023-08-31 DIAGNOSIS — S83.241A ACUTE MEDIAL MENISCUS TEAR OF RIGHT KNEE, INITIAL ENCOUNTER: ICD-10-CM

## 2023-08-31 DIAGNOSIS — G89.29 CHRONIC MYOFASCIAL PAIN: ICD-10-CM

## 2023-08-31 DIAGNOSIS — M79.18 CHRONIC MYOFASCIAL PAIN: ICD-10-CM

## 2023-08-31 DIAGNOSIS — M79.18 MYOFASCIAL PAIN: Primary | ICD-10-CM

## 2023-08-31 RX ORDER — LIDOCAINE 50 MG/G
1 PATCH TOPICAL EVERY 12 HOURS
Qty: 60 PATCH | Refills: 0 | Status: SHIPPED | OUTPATIENT
Start: 2023-08-31 | End: 2023-09-30

## 2023-08-31 NOTE — TELEPHONE ENCOUNTER
OARRS reviewed. UDS:.   Last seen: 8/10/2023.  Follow-up:   Future Appointments   Date Time Provider [address]   9/13/2023 11:00 AM MIRNA Wing CNP [facility] Medicine [facility]   10/10/2023  8:00 AM MIRNA Metzger CNP N SRPX Pain [facility]   10/23/2023 12:30 PM STR MRI RM1 STRZ MRI STR Radiolog   10/30/2023  9:00 AM Angel Kaur PA-C N SRPXNEURSU [facility]   5/8/2024  8:20 [facility]

## 2023-09-07 ENCOUNTER — TELEPHONE (OUTPATIENT)
Dept: PHYSICAL MEDICINE AND REHAB | Age: 53
End: 2023-09-07

## 2023-09-07 DIAGNOSIS — G89.4 CHRONIC PAIN SYNDROME: ICD-10-CM

## 2023-09-07 RX ORDER — ORPHENADRINE CITRATE 100 MG/1
100 TABLET, EXTENDED RELEASE ORAL 2 TIMES DAILY
Qty: 60 TABLET | Refills: 0 | Status: SHIPPED | OUTPATIENT
Start: 2023-09-07 | End: 2023-10-02 | Stop reason: SDUPTHER

## 2023-09-07 RX ORDER — GABAPENTIN 300 MG/1
300 CAPSULE ORAL NIGHTLY
Qty: 30 CAPSULE | Refills: 0 | Status: SHIPPED | OUTPATIENT
Start: 2023-09-09 | End: 2023-10-10

## 2023-09-07 RX ORDER — HYDROCODONE BITARTRATE AND ACETAMINOPHEN 5; 325 MG/1; MG/1
1 TABLET ORAL EVERY 8 HOURS PRN
Qty: 90 TABLET | Refills: 0 | Status: SHIPPED | OUTPATIENT
Start: 2023-09-07 | End: 2023-10-02 | Stop reason: SDUPTHER

## 2023-09-07 NOTE — TELEPHONE ENCOUNTER
Isa Goldberg requested a refill of Norco, Gabapentin and Norflex . Please call this into the patient's pharmacy.

## 2023-09-07 NOTE — TELEPHONE ENCOUNTER
OARRS reviewed. UDS: + for  . Hydrocodone,amitriptyline and cyclobenzaprine  Last seen: 8/10/2023.  Follow-up:   Future Appointments   Date Time Provider [address]   9/13/2023 11:00 AM MIRNA Martin CNP [facility]   10/10/2023  8:00 AM MIRNA Martin CNP N SRPX Pain [facility]   10/23/2023 12:30 PM STR MRI RM1 STRZ MRI STR Radiolog   10/30/2023  9:00 AM Ulysses Encinas PA-C N SRPXNEURSU [facility]   5/8/2024  8:20 AM MIRNA Crabtree CNP [facility] [address]

## 2023-09-16 DIAGNOSIS — D50.9 IRON DEFICIENCY ANEMIA, UNSPECIFIED IRON DEFICIENCY ANEMIA TYPE: ICD-10-CM

## 2023-09-16 DIAGNOSIS — K21.9 GASTROESOPHAGEAL REFLUX DISEASE WITHOUT ESOPHAGITIS: ICD-10-CM

## 2023-09-18 RX ORDER — FERROUS SULFATE 325(65) MG
1 TABLET ORAL DAILY
Qty: 90 TABLET | Refills: 3 | Status: SHIPPED | OUTPATIENT
Start: 2023-09-18

## 2023-09-18 RX ORDER — SIMETHICONE 80 MG
TABLET,CHEWABLE ORAL
Qty: 180 TABLET | Refills: 3 | Status: SHIPPED | OUTPATIENT
Start: 2023-09-18

## 2023-09-18 NOTE — TELEPHONE ENCOUNTER
Bed: H18  Expected date:   Expected time:   Means of arrival: [employer]  Comments:  CHRIS   Requests sent from [facility] for refills of simethicone 80 mg qid prn and ferosul 325 mg qd. Last seen 8/17/23, no future appt scheduled. Medications verified. Orders pended.

## 2023-09-19 DIAGNOSIS — F41.9 ANXIETY: ICD-10-CM

## 2023-09-19 RX ORDER — HYDROXYZINE HYDROCHLORIDE 25 MG/1
TABLET, FILM COATED ORAL
Qty: 90 TABLET | Refills: 1 | Status: SHIPPED | OUTPATIENT
Start: 2023-09-19

## 2023-09-19 NOTE — TELEPHONE ENCOUNTER
Request sent from [facility] for refill of hydroxyzine 25 mg q8 hours prn anxiety. Last seen 8/17/23,no future appt scheduled. Med verified with historical list.  Order pended.

## 2023-09-20 ENCOUNTER — TELEPHONE (OUTPATIENT)
Dept: FAMILY MEDICINE CLINIC | Age: 53
End: 2023-09-20

## 2023-09-20 DIAGNOSIS — E66.01 MORBID OBESITY WITH BMI OF 50.0-59.9, ADULT (HCC): Primary | ICD-10-CM

## 2023-09-20 DIAGNOSIS — I50.9 HEART FAILURE, UNSPECIFIED HF CHRONICITY, UNSPECIFIED HEART FAILURE TYPE (HCC): ICD-10-CM

## 2023-09-20 RX ORDER — BLOOD PRESSURE TEST KIT
1 KIT MISCELLANEOUS DAILY
Qty: 1 KIT | Refills: 0 | Status: SHIPPED | OUTPATIENT
Start: 2023-09-20

## 2023-09-20 NOTE — TELEPHONE ENCOUNTER
Pt stopped in because he has a nurse from [facility] that helps monitor his health. She told Dianna Stone, based on his weight and high blood pressure, he is at risk for CHF. She suggested that he go to his pcp and see if she will write him an rx for a blood pressure monitor he could use at home and a weight scale that goes up to 500 lbs. Please advise.  Best number to call the patient is [phone]

## 2023-09-20 NOTE — TELEPHONE ENCOUNTER
Spoke with pt and let him know Rx was ready. Pt will  prescriptions and will reach out to home medical equipment to see if they can provide the scale and blood pressure kit.

## 2023-09-20 NOTE — TELEPHONE ENCOUNTER
Noted.  RX for both written, pt can pick them up or they can be mailed. Pt can contact home health equipment or pharmacy and see if they are able to provide this for him. He can also contact the nurse that suggested he have these with his insurance company and see where to get them. -WS  Orders Placed This Encounter    Blood Pressure Monitor KIT     Si each by Does not apply route daily     Dispense:  1 kit     Refill:  0    Misc. Devices (CLEVER CHOICE BMI SCALE) MISC     Si each by Does not apply route daily Pt needs scale that goes up to 500 lbs for daily weights.      Dispense:  1 each     Refill:  0

## 2023-10-02 DIAGNOSIS — G89.29 CHRONIC MYOFASCIAL PAIN: ICD-10-CM

## 2023-10-02 DIAGNOSIS — M79.18 CHRONIC MYOFASCIAL PAIN: ICD-10-CM

## 2023-10-02 DIAGNOSIS — S83.241A ACUTE MEDIAL MENISCUS TEAR OF RIGHT KNEE, INITIAL ENCOUNTER: ICD-10-CM

## 2023-10-02 DIAGNOSIS — M79.18 MYOFASCIAL PAIN: ICD-10-CM

## 2023-10-02 DIAGNOSIS — G89.4 CHRONIC PAIN SYNDROME: ICD-10-CM

## 2023-10-02 RX ORDER — LIDOCAINE 50 MG/G
1 OINTMENT TOPICAL EVERY 12 HOURS
Refills: 0 | OUTPATIENT
Start: 2023-10-02 | End: 2023-11-01

## 2023-10-02 RX ORDER — ORPHENADRINE CITRATE 100 MG/1
100 TABLET, EXTENDED RELEASE ORAL 2 TIMES DAILY
Qty: 60 TABLET | Refills: 0 | Status: SHIPPED | OUTPATIENT
Start: 2023-10-07 | End: 2023-11-06

## 2023-10-02 RX ORDER — GABAPENTIN 300 MG/1
300 CAPSULE ORAL NIGHTLY
Qty: 30 CAPSULE | Refills: 0 | OUTPATIENT
Start: 2023-10-02 | End: 2023-11-01

## 2023-10-02 RX ORDER — LIDOCAINE 50 MG/G
1 PATCH TOPICAL EVERY 12 HOURS
Qty: 60 PATCH | Refills: 0 | Status: SHIPPED | OUTPATIENT
Start: 2023-10-02 | End: 2023-11-01

## 2023-10-02 RX ORDER — HYDROCODONE BITARTRATE AND ACETAMINOPHEN 5; 325 MG/1; MG/1
1 TABLET ORAL EVERY 8 HOURS PRN
Qty: 90 TABLET | Refills: 0 | Status: SHIPPED | OUTPATIENT
Start: 2023-10-07 | End: 2023-11-06

## 2023-10-02 RX ORDER — LIDOCAINE 50 MG/G
1 PATCH TOPICAL EVERY 12 HOURS
Qty: 60 PATCH | Refills: 0 | OUTPATIENT
Start: 2023-10-02 | End: 2023-11-01

## 2023-10-02 NOTE — TELEPHONE ENCOUNTER
Kathie Rao called requesting a refill on the following medications:  Requested Prescriptions     Pending Prescriptions Disp Refills    HYDROcodone-acetaminophen (NORCO) 5-325 MG per tablet 90 tablet 0     Sig: Take 1 tablet by mouth every 8 hours as needed for Pain for up to 30 days. orphenadrine (NORFLEX) 100 MG extended release tablet 60 tablet 0     Sig: Take 1 tablet by mouth 2 times daily    lidocaine (XYLOCAINE) 5 % ointment       Sig: Apply topically as needed. gabapentin (NEURONTIN) 300 MG capsule 30 capsule 0     Sig: Take 1 capsule by mouth nightly for 30 days.     lidocaine (LIDODERM) 5 % 60 patch 0     Sig: Place 1 patch onto the skin every 12 hours     Pharmacy verified:  [address]      Date of last visit: 08-24-23  Date of next visit (if applicable): 20/42/0683

## 2023-10-02 NOTE — TELEPHONE ENCOUNTER
OARRS reviewed. UDS: + for  hydrocodone. Amitriptyline and cyclobenzaprine present. Last seen: 8/10/2023.  Follow-up:   Future Appointments   Date Time Provider [address]   10/3/2023 11:40 AM April IMRNA Harvey CNP N [facility] Pain [facility]   10/10/2023  8:00 AM MIRNA Edmond CNP N SRPX Pain [facility]   10/23/2023 12:30 PM STR MRI RM1 STRZ MRI STR Rad/Card   10/30/2023  9:00 AM Meagan Brooks PA-C N SRPXNEURSU [facility]   5/8/2024  8:20 AM Jewel Romberg, APRN - CNP [address] [facility]

## 2023-10-02 NOTE — TELEPHONE ENCOUNTER
OARRS reviewed. UDS: + for  Hydrocodone, Amitriptyline, Cyclobenzaprine. Last seen: 8/10/2023.  Follow-up: 10/10/2023

## 2023-10-03 ENCOUNTER — OFFICE VISIT (OUTPATIENT)
Dept: PHYSICAL MEDICINE AND REHAB | Age: 53
End: 2023-10-03
Payer: COMMERCIAL

## 2023-10-03 VITALS
WEIGHT: 315 LBS | SYSTOLIC BLOOD PRESSURE: 131 MMHG | DIASTOLIC BLOOD PRESSURE: 70 MMHG | HEIGHT: 73 IN | BODY MASS INDEX: 41.75 KG/M2

## 2023-10-03 DIAGNOSIS — G89.4 CHRONIC PAIN SYNDROME: ICD-10-CM

## 2023-10-03 DIAGNOSIS — M79.18 MYOFASCIAL PAIN: Primary | ICD-10-CM

## 2023-10-03 PROCEDURE — G8417 CALC BMI ABV UP PARAM F/U: HCPCS | Performed by: NURSE PRACTITIONER

## 2023-10-03 PROCEDURE — 3017F COLORECTAL CA SCREEN DOC REV: CPT | Performed by: NURSE PRACTITIONER

## 2023-10-03 PROCEDURE — 1036F TOBACCO NON-USER: CPT | Performed by: NURSE PRACTITIONER

## 2023-10-03 PROCEDURE — G8427 DOCREV CUR MEDS BY ELIG CLIN: HCPCS | Performed by: NURSE PRACTITIONER

## 2023-10-03 PROCEDURE — 20553 NJX 1/MLT TRIGGER POINTS 3/>: CPT | Performed by: NURSE PRACTITIONER

## 2023-10-03 PROCEDURE — 99213 OFFICE O/P EST LOW 20 MIN: CPT | Performed by: NURSE PRACTITIONER

## 2023-10-03 PROCEDURE — G8484 FLU IMMUNIZE NO ADMIN: HCPCS | Performed by: NURSE PRACTITIONER

## 2023-10-03 RX ORDER — METHOCARBAMOL 100 MG/ML
100 INJECTION, SOLUTION INTRAMUSCULAR; INTRAVENOUS ONCE
Status: COMPLETED | OUTPATIENT
Start: 2023-10-03 | End: 2023-10-03

## 2023-10-03 RX ADMIN — METHOCARBAMOL 100 MG: 100 INJECTION, SOLUTION INTRAMUSCULAR; INTRAVENOUS at 13:39

## 2023-10-03 NOTE — PROGRESS NOTES
Chronic Pain/PM&R Clinic Note     Encounter Date: 10/3/23    Subjective:   Chief Complaint:   Chief Complaint   Patient presents with    Injections       History of Present Illness:   Jeana Wiseman is a 48 y.o. male seen in the clinic on 08/03/2023 in regards to trigger point injections for myofascial pain. The patient follows in clinic with MIRNA Becker. Patient reports he is having bilateral low back pain, as well as left buttocks radiating into his left outer hip. He states his bilateral back pain has been occurring for years, but this left outer hip pain and buttocks pain has been occurring for about a week. He states he drove to [facility] and feels like it worsened his pain. He states this pain is a stabbing, tight, burning pain. He states he did go to the emergency department because pain was so severe, feels like nothing is helping calm it down. He states it is making it difficult being able to get up and down, walk, stand, housework, working. He states he has never had muscle injections in the past, and is asking if these will help control his pain. He denies any new loss of bowel bladder control. Today, 8/24/2023, patient presents for requested follow-up. He states that his pain continues to be in his bilateral low back, and into the left buttocks radiating into his outer thigh. He states that previous trigger point injections did give him some mild relief, which he feels was enough to help and requested to have them completed again. He states he felt like he noticed relief after 2 or 3 days, but then it tended to be on on and off relief. He also reports that he recently did get started on some new depression medications, and he states one of them he was told it could help with pain as well. He denies any other areas of concern at this time and is asking if we can repeat trigger point injections today.   He is also asking if there is other medications to try and help assist with

## 2023-10-10 RX ORDER — GABAPENTIN 300 MG/1
300 CAPSULE ORAL NIGHTLY
Qty: 30 CAPSULE | Refills: 0 | Status: SHIPPED | OUTPATIENT
Start: 2023-10-11 | End: 2023-10-12 | Stop reason: SDUPTHER

## 2023-10-10 NOTE — TELEPHONE ENCOUNTER
OARRS reviewed. UDS: + for  hydrocodone. Amitriptyline and cyclobenzaprine present. Last seen: 8/10/2023.  Follow-up:   Future Appointments   Date Time Provider [address]   10/12/2023  9:45 AM Jana Chauhan, APRN - CNP N SRPX Pain [facility]   10/23/2023 12:30 PM STR MRI RM1 STRZ MRI STR Rad/Card   10/30/2023  9:00 AM [facility]   5/8/2024  8:20 AM Tanvi Michaels APRN - [facility]

## 2023-10-12 ENCOUNTER — OFFICE VISIT (OUTPATIENT)
Dept: PHYSICAL MEDICINE AND REHAB | Age: 53
End: 2023-10-12
Payer: COMMERCIAL

## 2023-10-12 VITALS
WEIGHT: 315 LBS | BODY MASS INDEX: 41.75 KG/M2 | HEIGHT: 73 IN | DIASTOLIC BLOOD PRESSURE: 76 MMHG | SYSTOLIC BLOOD PRESSURE: 134 MMHG

## 2023-10-12 DIAGNOSIS — M48.062 SPINAL STENOSIS OF LUMBAR REGION WITH NEUROGENIC CLAUDICATION: Primary | ICD-10-CM

## 2023-10-12 DIAGNOSIS — M54.12 CERVICAL RADICULOPATHY: ICD-10-CM

## 2023-10-12 DIAGNOSIS — M25.50 POLYARTHRALGIA: ICD-10-CM

## 2023-10-12 DIAGNOSIS — M16.12 PRIMARY OSTEOARTHRITIS OF LEFT HIP: ICD-10-CM

## 2023-10-12 DIAGNOSIS — G89.29 CHRONIC MYOFASCIAL PAIN: ICD-10-CM

## 2023-10-12 DIAGNOSIS — M50.30 DDD (DEGENERATIVE DISC DISEASE), CERVICAL: ICD-10-CM

## 2023-10-12 DIAGNOSIS — Z98.1 HX OF FUSION OF CERVICAL SPINE: ICD-10-CM

## 2023-10-12 DIAGNOSIS — M47.816 LUMBAR SPONDYLOSIS: ICD-10-CM

## 2023-10-12 DIAGNOSIS — M54.50 LUMBAR PAIN: ICD-10-CM

## 2023-10-12 DIAGNOSIS — M51.36 LUMBAR DEGENERATIVE DISC DISEASE: ICD-10-CM

## 2023-10-12 DIAGNOSIS — M54.17 LUMBOSACRAL RADICULITIS: ICD-10-CM

## 2023-10-12 DIAGNOSIS — M25.552 LEFT HIP PAIN: ICD-10-CM

## 2023-10-12 DIAGNOSIS — M79.18 CHRONIC MYOFASCIAL PAIN: ICD-10-CM

## 2023-10-12 PROCEDURE — G8427 DOCREV CUR MEDS BY ELIG CLIN: HCPCS | Performed by: NURSE PRACTITIONER

## 2023-10-12 PROCEDURE — 99214 OFFICE O/P EST MOD 30 MIN: CPT | Performed by: NURSE PRACTITIONER

## 2023-10-12 PROCEDURE — G8484 FLU IMMUNIZE NO ADMIN: HCPCS | Performed by: NURSE PRACTITIONER

## 2023-10-12 PROCEDURE — G8417 CALC BMI ABV UP PARAM F/U: HCPCS | Performed by: NURSE PRACTITIONER

## 2023-10-12 PROCEDURE — 3017F COLORECTAL CA SCREEN DOC REV: CPT | Performed by: NURSE PRACTITIONER

## 2023-10-12 PROCEDURE — 1036F TOBACCO NON-USER: CPT | Performed by: NURSE PRACTITIONER

## 2023-10-12 RX ORDER — GABAPENTIN 300 MG/1
300 CAPSULE ORAL 3 TIMES DAILY
Qty: 90 CAPSULE | Refills: 0 | Status: SHIPPED | OUTPATIENT
Start: 2023-10-12 | End: 2023-11-11

## 2023-10-12 ASSESSMENT — ENCOUNTER SYMPTOMS
APNEA: 1
ABDOMINAL PAIN: 1
SHORTNESS OF BREATH: 0
BACK PAIN: 1
NAUSEA: 0
COUGH: 0
ABDOMINAL DISTENTION: 0
PHOTOPHOBIA: 0

## 2023-10-12 NOTE — PROGRESS NOTES
as I feel this would help but he refused again as he had multiple at previous pain clinic in [place] without relief and made pain and spasms worse. Reviewed left hip xray again and discussed can set up for left hip injection if needed. Following with neurosurgery at [facility] and continues work up. Instructed to send notes and tests results to this clinic   Continue current pain medications- Norco 5/325 TID prn- filled 10/7/2023  Continue Norflex- recently filled filled 10/7/36234, Continue Fioricet prn headaches- has plenty,  Lidocaine and Lidoderm patches- recently filled   Increased Neurontin 300 mg to TID for nerve pain- ordered  Encouraged to continue home exercises and and weight loss. Is compliant     Meds. Prescribed:   Orders Placed This Encounter   Medications    gabapentin (NEURONTIN) 300 MG capsule     Sig: Take 1 capsule by mouth 3 times daily for 30 days. Dispense:  90 capsule     Refill:  0     Increased dosage       Return in about 2 months (around 12/12/2023), or if symptoms worsen or fail to improve, for follow up  for medications.                Electronically signed by MIRNA Zamora CNP on10/12/2023 at 10:17 AM

## 2023-10-30 ENCOUNTER — HOSPITAL ENCOUNTER (EMERGENCY)
Age: 53
Discharge: HOME | End: 2023-10-30
Payer: COMMERCIAL

## 2023-10-30 ENCOUNTER — APPOINTMENT (OUTPATIENT)
Dept: GENERAL RADIOLOGY | Age: 53
End: 2023-10-30
Payer: COMMERCIAL

## 2023-10-30 VITALS
BODY MASS INDEX: 41.75 KG/M2 | DIASTOLIC BLOOD PRESSURE: 78 MMHG | SYSTOLIC BLOOD PRESSURE: 156 MMHG | HEIGHT: 73 IN | OXYGEN SATURATION: 97 % | WEIGHT: 315 LBS | HEART RATE: 73 BPM | TEMPERATURE: 97.9 F | RESPIRATION RATE: 16 BRPM

## 2023-10-30 DIAGNOSIS — R51.9 ACUTE NONINTRACTABLE HEADACHE, UNSPECIFIED HEADACHE TYPE: ICD-10-CM

## 2023-10-30 DIAGNOSIS — R07.9 CHEST PAIN, UNSPECIFIED TYPE: Primary | ICD-10-CM

## 2023-10-30 LAB
ALBUMIN SERPL BCG-MCNC: 4.2 G/DL (ref 3.5–5.1)
ALP SERPL-CCNC: 138 U/L (ref 38–126)
ALT SERPL W/O P-5'-P-CCNC: 25 U/L (ref 11–66)
ANION GAP SERPL CALC-SCNC: 12 MEQ/L (ref 8–16)
AST SERPL-CCNC: 21 U/L (ref 5–40)
BASOPHILS ABSOLUTE: 0 THOU/MM3 (ref 0–0.1)
BASOPHILS NFR BLD AUTO: 0.7 %
BILIRUB SERPL-MCNC: 0.4 MG/DL (ref 0.3–1.2)
BUN SERPL-MCNC: 9 MG/DL (ref 7–22)
CALCIUM SERPL-MCNC: 9.4 MG/DL (ref 8.5–10.5)
CHLORIDE SERPL-SCNC: 102 MEQ/L (ref 98–111)
CO2 SERPL-SCNC: 26 MEQ/L (ref 23–33)
CREAT SERPL-MCNC: 0.8 MG/DL (ref 0.4–1.2)
DEPRECATED RDW RBC AUTO: 45.4 FL (ref 35–45)
EOSINOPHIL NFR BLD AUTO: 2.2 %
EOSINOPHILS ABSOLUTE: 0.1 THOU/MM3 (ref 0–0.4)
ERYTHROCYTE [DISTWIDTH] IN BLOOD BY AUTOMATED COUNT: 12.7 % (ref 11.5–14.5)
GFR SERPL CREATININE-BSD FRML MDRD: > 60 ML/MIN/1.73M2
GLUCOSE BLD STRIP.AUTO-MCNC: 92 MG/DL (ref 70–108)
GLUCOSE SERPL-MCNC: 94 MG/DL (ref 70–108)
HCT VFR BLD AUTO: 41.7 % (ref 42–52)
HGB BLD-MCNC: 13.5 GM/DL (ref 14–18)
IMM GRANULOCYTES # BLD AUTO: 0.01 THOU/MM3 (ref 0–0.07)
IMM GRANULOCYTES NFR BLD AUTO: 0.2 %
LYMPHOCYTES ABSOLUTE: 1.4 THOU/MM3 (ref 1–4.8)
LYMPHOCYTES NFR BLD AUTO: 25.5 %
MAGNESIUM SERPL-MCNC: 1.9 MG/DL (ref 1.6–2.4)
MCH RBC QN AUTO: 31.4 PG (ref 26–33)
MCHC RBC AUTO-ENTMCNC: 32.4 GM/DL (ref 32.2–35.5)
MCV RBC AUTO: 97 FL (ref 80–94)
MONOCYTES ABSOLUTE: 0.5 THOU/MM3 (ref 0.4–1.3)
MONOCYTES NFR BLD AUTO: 9.1 %
NEUTROPHILS NFR BLD AUTO: 62.3 %
NRBC BLD AUTO-RTO: 0 /100 WBC
OSMOLALITY SERPL CALC.SUM OF ELEC: 277.8 MOSMOL/KG (ref 275–300)
PLATELET # BLD AUTO: 228 THOU/MM3 (ref 130–400)
PMV BLD AUTO: 10.6 FL (ref 9.4–12.4)
POTASSIUM SERPL-SCNC: 3.6 MEQ/L (ref 3.5–5.2)
PROT SERPL-MCNC: 7.4 G/DL (ref 6.1–8)
RBC # BLD AUTO: 4.3 MILL/MM3 (ref 4.7–6.1)
SEGMENTED NEUTROPHILS ABSOLUTE COUNT: 3.4 THOU/MM3 (ref 1.8–7.7)
SODIUM SERPL-SCNC: 140 MEQ/L (ref 135–145)
TROPONIN, HIGH SENSITIVITY: 9 NG/L (ref 0–12)
WBC # BLD AUTO: 5.4 THOU/MM3 (ref 4.8–10.8)

## 2023-10-30 PROCEDURE — 96374 THER/PROPH/DIAG INJ IV PUSH: CPT

## 2023-10-30 PROCEDURE — 80053 COMPREHEN METABOLIC PANEL: CPT

## 2023-10-30 PROCEDURE — 83735 ASSAY OF MAGNESIUM: CPT

## 2023-10-30 PROCEDURE — 82948 REAGENT STRIP/BLOOD GLUCOSE: CPT

## 2023-10-30 PROCEDURE — 6360000002 HC RX W HCPCS

## 2023-10-30 PROCEDURE — 85025 COMPLETE CBC W/AUTO DIFF WBC: CPT

## 2023-10-30 PROCEDURE — 99285 EMERGENCY DEPT VISIT HI MDM: CPT

## 2023-10-30 PROCEDURE — 36415 COLL VENOUS BLD VENIPUNCTURE: CPT

## 2023-10-30 PROCEDURE — 2580000003 HC RX 258

## 2023-10-30 PROCEDURE — 71046 X-RAY EXAM CHEST 2 VIEWS: CPT

## 2023-10-30 PROCEDURE — 84484 ASSAY OF TROPONIN QUANT: CPT

## 2023-10-30 PROCEDURE — 93010 ELECTROCARDIOGRAM REPORT: CPT | Performed by: NUCLEAR MEDICINE

## 2023-10-30 PROCEDURE — 93005 ELECTROCARDIOGRAM TRACING: CPT

## 2023-10-30 RX ORDER — KETOROLAC TROMETHAMINE 30 MG/ML
30 INJECTION, SOLUTION INTRAMUSCULAR; INTRAVENOUS ONCE
Status: COMPLETED | OUTPATIENT
Start: 2023-10-30 | End: 2023-10-30

## 2023-10-30 RX ORDER — 0.9 % SODIUM CHLORIDE 0.9 %
1000 INTRAVENOUS SOLUTION INTRAVENOUS ONCE
Status: COMPLETED | OUTPATIENT
Start: 2023-10-30 | End: 2023-10-30

## 2023-10-30 RX ORDER — NAPROXEN 500 MG/1
500 TABLET ORAL 2 TIMES DAILY PRN
Qty: 60 TABLET | Refills: 0 | Status: SHIPPED | OUTPATIENT
Start: 2023-10-30

## 2023-10-30 RX ADMIN — SODIUM CHLORIDE 1000 ML: 9 INJECTION, SOLUTION INTRAVENOUS at 02:51

## 2023-10-30 RX ADMIN — KETOROLAC TROMETHAMINE 30 MG: 30 INJECTION, SOLUTION INTRAMUSCULAR at 03:13

## 2023-10-30 ASSESSMENT — PAIN DESCRIPTION - FREQUENCY: FREQUENCY: CONTINUOUS

## 2023-10-30 ASSESSMENT — PAIN DESCRIPTION - LOCATION
LOCATION: HEAD

## 2023-10-30 ASSESSMENT — PAIN DESCRIPTION - ONSET: ONSET: ON-GOING

## 2023-10-30 ASSESSMENT — PAIN DESCRIPTION - PAIN TYPE: TYPE: CHRONIC PAIN

## 2023-10-30 ASSESSMENT — PAIN SCALES - GENERAL
PAINLEVEL_OUTOF10: 3
PAINLEVEL_OUTOF10: 3
PAINLEVEL_OUTOF10: 8
PAINLEVEL_OUTOF10: 8

## 2023-10-30 ASSESSMENT — PAIN - FUNCTIONAL ASSESSMENT
PAIN_FUNCTIONAL_ASSESSMENT: 0-10

## 2023-10-30 ASSESSMENT — PAIN DESCRIPTION - DESCRIPTORS: DESCRIPTORS: ACHING

## 2023-10-30 NOTE — ED NOTES
Pt vitals collected. Pt medicated per MAR. Pt denies any needs at this time. Pt respirations easy and unlabored.      Linnette Riley RN  10/30/23 3933

## 2023-10-30 NOTE — ED PROVIDER NOTES
[facility] EMERGENCY DEPT      EMERGENCY MEDICINE     Pt Name: Nya Mike  MRN: [id number]  [address] [facility] 1970  Date of evaluation: 10/30/2023  Provider: Katerina Silva PA-C    CHIEF COMPLAINT       Chief Complaint   Patient presents with    Chest Pain    Migraine     HISTORY OF PRESENT ILLNESS   Nya Mike is a 48 y.o. male who presents to the ED for evaluation of chest pain onset yesterday. Patient states that he has been having intermittent nonradiating chest pain since yesterday with associated lightheadedness, fatigue, headache, balance issues, and frequent urination. Patient admits to some associated chills. Patient states that he has a history of pituitary adenoma and has needed to see endocrinology in the past for issues with hormones. Patient admits to history of hypertension. Patient denies diaphoresis, cough, congestion, sore throat, rhinorrhea, shortness of breath, nausea, vomiting, abdominal pain, rash, and neck pain.         PASTMEDICAL HISTORY     Past Medical History:   Diagnosis Date    Aneurysm ([address] [facility])     pituitary gland    Arthritis     Asthma     Cardiomegaly     COVID-19 11/2021    [facility]    Depression     Fibromyalgia     GERD (gastroesophageal reflux disease)     Hypertension     melhem    Liver disease     CLARIBEL on CPAP        Patient Active Problem List   Diagnosis Code    Cervical stenosis of spinal canal M48.02    Lower back pain M54.50    Disease of spinal cord (HCC) G95.9    Morbid obesity with BMI of 50.0-59.9, adult ([address]) E66.01, Z68.43    Other cervical disc displacement, unspecified cervical region M50.20    Radiculopathy affecting upper extremity M54.10    S/P cervical spinal fusion Z98.1    Sleep apnea G47.30    Hyperlipidemia E78.5    H/O: HTN (hypertension) Z86.79    Moderate asthma without complication G46.976    Enlarged heart I51.7    Chronic pansinusitis J32.4    Pain in both lower extremities M79.604, M79.605    Non-seasonal allergic

## 2023-10-30 NOTE — ED TRIAGE NOTES
Pt presents to the ED with c/o a migraine in which he has a history of and off and on chest pain for the past three days. Pt states he feels like he has been experiencing light-headed and weak as well. VSS. EKG complete.

## 2023-11-01 LAB
EKG ATRIAL RATE: 78 BPM
EKG P AXIS: 72 DEGREES
EKG P-R INTERVAL: 162 MS
EKG Q-T INTERVAL: 402 MS
EKG QRS DURATION: 88 MS
EKG QTC CALCULATION (BAZETT): 458 MS
EKG R AXIS: 0 DEGREES
EKG T AXIS: 47 DEGREES
EKG VENTRICULAR RATE: 78 BPM

## 2023-11-02 ENCOUNTER — OFFICE VISIT (OUTPATIENT)
Dept: FAMILY MEDICINE CLINIC | Age: 53
End: 2023-11-02
Payer: COMMERCIAL

## 2023-11-02 VITALS
HEART RATE: 80 BPM | TEMPERATURE: 97.8 F | SYSTOLIC BLOOD PRESSURE: 132 MMHG | DIASTOLIC BLOOD PRESSURE: 72 MMHG | RESPIRATION RATE: 16 BRPM | WEIGHT: 315 LBS | BODY MASS INDEX: 53.49 KG/M2

## 2023-11-02 DIAGNOSIS — D50.9 IRON DEFICIENCY ANEMIA, UNSPECIFIED IRON DEFICIENCY ANEMIA TYPE: ICD-10-CM

## 2023-11-02 DIAGNOSIS — E66.01 MORBID OBESITY WITH BMI OF 50.0-59.9, ADULT (HCC): Primary | ICD-10-CM

## 2023-11-02 DIAGNOSIS — R11.0 NAUSEA: ICD-10-CM

## 2023-11-02 DIAGNOSIS — G89.4 CHRONIC PAIN SYNDROME: ICD-10-CM

## 2023-11-02 DIAGNOSIS — I50.9 HEART FAILURE, UNSPECIFIED HF CHRONICITY, UNSPECIFIED HEART FAILURE TYPE (HCC): ICD-10-CM

## 2023-11-02 DIAGNOSIS — D35.2 PITUITARY EPIDERMOID TUMOR (HCC): ICD-10-CM

## 2023-11-02 DIAGNOSIS — M79.18 CHRONIC MYOFASCIAL PAIN: ICD-10-CM

## 2023-11-02 DIAGNOSIS — E78.5 HYPERLIPIDEMIA, UNSPECIFIED HYPERLIPIDEMIA TYPE: ICD-10-CM

## 2023-11-02 DIAGNOSIS — M54.12 CERVICAL RADICULOPATHY: ICD-10-CM

## 2023-11-02 DIAGNOSIS — F41.9 ANXIETY: ICD-10-CM

## 2023-11-02 DIAGNOSIS — R41.3 MEMORY PROBLEM: ICD-10-CM

## 2023-11-02 DIAGNOSIS — Z98.1 S/P CERVICAL SPINAL FUSION: ICD-10-CM

## 2023-11-02 DIAGNOSIS — J30.1 NON-SEASONAL ALLERGIC RHINITIS DUE TO POLLEN: ICD-10-CM

## 2023-11-02 DIAGNOSIS — J45.50 SEVERE PERSISTENT ASTHMA WITHOUT COMPLICATION: ICD-10-CM

## 2023-11-02 DIAGNOSIS — G89.29 CHRONIC MYOFASCIAL PAIN: ICD-10-CM

## 2023-11-02 DIAGNOSIS — J45.40 MODERATE PERSISTENT ASTHMA WITHOUT COMPLICATION: ICD-10-CM

## 2023-11-02 DIAGNOSIS — M54.17 LUMBOSACRAL RADICULITIS: ICD-10-CM

## 2023-11-02 DIAGNOSIS — S39.012S STRAIN OF LUMBAR REGION, SEQUELA: ICD-10-CM

## 2023-11-02 DIAGNOSIS — S83.241A ACUTE MEDIAL MENISCUS TEAR OF RIGHT KNEE, INITIAL ENCOUNTER: ICD-10-CM

## 2023-11-02 DIAGNOSIS — M79.18 MYOFASCIAL PAIN: ICD-10-CM

## 2023-11-02 PROCEDURE — 3017F COLORECTAL CA SCREEN DOC REV: CPT | Performed by: NURSE PRACTITIONER

## 2023-11-02 PROCEDURE — G8427 DOCREV CUR MEDS BY ELIG CLIN: HCPCS | Performed by: NURSE PRACTITIONER

## 2023-11-02 PROCEDURE — 99214 OFFICE O/P EST MOD 30 MIN: CPT | Performed by: NURSE PRACTITIONER

## 2023-11-02 PROCEDURE — G8484 FLU IMMUNIZE NO ADMIN: HCPCS | Performed by: NURSE PRACTITIONER

## 2023-11-02 PROCEDURE — 1036F TOBACCO NON-USER: CPT | Performed by: NURSE PRACTITIONER

## 2023-11-02 PROCEDURE — G8417 CALC BMI ABV UP PARAM F/U: HCPCS | Performed by: NURSE PRACTITIONER

## 2023-11-02 RX ORDER — HYDROXYZINE HYDROCHLORIDE 25 MG/1
TABLET, FILM COATED ORAL
Qty: 90 TABLET | Refills: 1 | Status: SHIPPED | OUTPATIENT
Start: 2023-11-02

## 2023-11-02 RX ORDER — ONDANSETRON 4 MG/1
4 TABLET, FILM COATED ORAL 3 TIMES DAILY PRN
Qty: 15 TABLET | Refills: 0 | Status: SHIPPED | OUTPATIENT
Start: 2023-11-02

## 2023-11-02 RX ORDER — ALBUTEROL SULFATE 2.5 MG/3ML
SOLUTION RESPIRATORY (INHALATION)
Qty: 375 ML | Refills: 3 | Status: SHIPPED | OUTPATIENT
Start: 2023-11-02

## 2023-11-02 RX ORDER — NALOXONE HYDROCHLORIDE 4 MG/.1ML
SPRAY NASAL
Status: CANCELLED | OUTPATIENT
Start: 2023-11-02

## 2023-11-02 RX ORDER — BUDESONIDE AND FORMOTEROL FUMARATE DIHYDRATE 160; 4.5 UG/1; UG/1
AEROSOL RESPIRATORY (INHALATION)
Qty: 30.6 G | Refills: 3 | Status: SHIPPED | OUTPATIENT
Start: 2023-11-02

## 2023-11-02 RX ORDER — BUTALBITAL, ACETAMINOPHEN AND CAFFEINE 50; 325; 40 MG/1; MG/1; MG/1
1 TABLET ORAL EVERY 8 HOURS PRN
Qty: 90 TABLET | Refills: 0 | Status: SHIPPED | OUTPATIENT
Start: 2023-11-02 | End: 2023-12-02

## 2023-11-02 RX ORDER — FLUTICASONE PROPIONATE 50 MCG
2 SPRAY, SUSPENSION (ML) NASAL DAILY
Qty: 48 G | Refills: 5 | Status: SHIPPED | OUTPATIENT
Start: 2023-11-02

## 2023-11-02 RX ORDER — EPINEPHRINE 0.3 MG/.3ML
INJECTION SUBCUTANEOUS
Qty: 1 EACH | Refills: 0 | Status: SHIPPED | OUTPATIENT
Start: 2023-11-02

## 2023-11-02 RX ORDER — PANTOPRAZOLE SODIUM 40 MG/1
40 TABLET, DELAYED RELEASE ORAL
Qty: 90 TABLET | Refills: 3 | Status: SHIPPED | OUTPATIENT
Start: 2023-11-02 | End: 2024-10-27

## 2023-11-02 ASSESSMENT — ENCOUNTER SYMPTOMS
VOMITING: 0
NAUSEA: 0
ABDOMINAL PAIN: 0
BACK PAIN: 1
SHORTNESS OF BREATH: 0
DIARRHEA: 0
CHEST TIGHTNESS: 0
COLOR CHANGE: 0
STRIDOR: 0

## 2023-11-02 NOTE — PROGRESS NOTES
[address] [address]  Dept: [phone]  Dept Fax: [phone]  Loc: [phone]     2023     Hua Feliciano (:  1970) is a 48 y.o. male, here for evaluation of the following medical concerns:    Chief Complaint   Patient presents with    ED Follow-up     [facility] ed follow up from 10/30/2023 for chest pain and headaches. Pt would like to discuss having some labs completed. HPI    Pt presents to the office today for follow up from the ER for headaches and chest pain. Pt is feeling better now, but still having issues with memory and headaches. He has been trying to get in for over a year with Neuropsych at [facility] and would like to try the referral again. He also needs referral back to endocrinology for follow up on his pituitary tumor and labs. Having a nerve study done at [facility] on 11/15/23 at 2:15. Dr Jozef Buckner, for back pain. Labs done in ER yesterday. Pt needs a full hematology work up since its been a while and he is back to having some memory problems. Pt also needs refills today. Chronic conditions are stable at this time. Pt follows up with pain management on a regular basis.      Problem list as follows:  Patient Active Problem List   Diagnosis    Cervical stenosis of spinal canal    Lower back pain    Disease of spinal cord ([address])    Morbid obesity with BMI of 50.0-59.9, adult ([address])    Other cervical disc displacement, unspecified cervical region    Radiculopathy affecting upper extremity    S/P cervical spinal fusion    Sleep apnea    Hyperlipidemia    H/O: HTN (hypertension)    Moderate asthma without complication    Enlarged heart    Chronic pansinusitis    Pain in both lower extremities    Non-seasonal allergic rhinitis due to pollen    Allergy desensitization therapy    Lumbar spondylosis    Severe episode of recurrent major depressive disorder, without psychotic features ([address])

## 2023-11-02 NOTE — TELEPHONE ENCOUNTER
Lucrecia Gomez called requesting a refill on the following medications:  Requested Prescriptions     Pending Prescriptions Disp Refills    HYDROcodone-acetaminophen (NORCO) 5-325 MG per tablet 90 tablet 0     Sig: Take 1 tablet by mouth every 8 hours as needed for Pain for up to 30 days. orphenadrine (NORFLEX) 100 MG extended release tablet 60 tablet 0     Sig: Take 1 tablet by mouth 2 times daily    lidocaine (XYLOCAINE) 5 % ointment       Sig: Apply topically as needed.     lidocaine (LIDODERM) 5 % 60 patch 0     Sig: Place 1 patch onto the skin every 12 hours     Pharmacy verified:  [address] [facility]      Date of last visit: 10-12-23  Date of next visit (if applicable): 56/88/8679

## 2023-11-02 NOTE — PROGRESS NOTES
Referral to [facility] Neuropsychology faxed on 11/2/23 with office note, demographics, recent imaging, and previous note regarding previous referral back in November attached. They will call the pt to schedule. Behavioral Health:  [address] [address]  Phone number: [phone]  Fax number: [phone]    Referral faxed to number on referral form for [facility] at [phone]. Referral to Dr. Gasper Rodriguez faxed on 11/2/23 with office note and insurance card attached. They will call the pt to schedule an appt.     [facility]:  [address] [place] [address], [address] [place] [address]  Phone number: [phone]  Fax number: [phone]

## 2023-11-03 ENCOUNTER — TELEPHONE (OUTPATIENT)
Dept: FAMILY MEDICINE CLINIC | Age: 53
End: 2023-11-03

## 2023-11-03 RX ORDER — LIDOCAINE 50 MG/G
1 PATCH TOPICAL EVERY 12 HOURS
Qty: 60 PATCH | Refills: 0 | Status: SHIPPED | OUTPATIENT
Start: 2023-11-03 | End: 2023-12-03

## 2023-11-03 RX ORDER — HYDROCODONE BITARTRATE AND ACETAMINOPHEN 5; 325 MG/1; MG/1
1 TABLET ORAL EVERY 8 HOURS PRN
Qty: 90 TABLET | Refills: 0 | Status: SHIPPED | OUTPATIENT
Start: 2023-11-06 | End: 2023-12-06

## 2023-11-03 RX ORDER — LIDOCAINE 50 MG/G
OINTMENT TOPICAL
Qty: 50 G | Refills: 2 | Status: SHIPPED | OUTPATIENT
Start: 2023-11-03

## 2023-11-03 RX ORDER — ORPHENADRINE CITRATE 100 MG/1
100 TABLET, EXTENDED RELEASE ORAL 2 TIMES DAILY
Qty: 60 TABLET | Refills: 0 | Status: SHIPPED | OUTPATIENT
Start: 2023-11-03 | End: 2023-12-03

## 2023-11-03 NOTE — TELEPHONE ENCOUNTER
Left message for pt letting him know I was calling regarding FMLA paperwork we received and asked he gie the office a call back. WS states that FMLA was not discussed during his recent visit, will need clarification from pt as to what pt needs it filled out for. Will await call back.

## 2023-11-03 NOTE — TELEPHONE ENCOUNTER
OARRS reviewed. UDS: + for  amitriptyline, butalbital, hydrocodone . Last seen: 10/12/2023.  Follow-up: 12/12/2023

## 2023-11-06 NOTE — TELEPHONE ENCOUNTER
Pt states that the [facility] paperwork that we received is to renew the [facility] paperwork that we have filled out for pt previously. Pt states that his job has changed companies with who they do FMLA through and needed a new FMLA sent in. Pt states that all employees had to fill out a new application and to get new forms sent in. Please advise. Pt states we can call back if we have questions or concerns.

## 2023-11-06 NOTE — TELEPHONE ENCOUNTER
Noted.  OK for [facility]. Reviewed previous paperwork from 6/2/2022, will fill out like before.   Thanks- WS

## 2023-11-25 DIAGNOSIS — S83.241A ACUTE MEDIAL MENISCUS TEAR OF RIGHT KNEE, INITIAL ENCOUNTER: ICD-10-CM

## 2023-11-25 DIAGNOSIS — G89.4 CHRONIC PAIN SYNDROME: ICD-10-CM

## 2023-11-25 DIAGNOSIS — G89.29 CHRONIC MYOFASCIAL PAIN: ICD-10-CM

## 2023-11-25 DIAGNOSIS — M79.18 CHRONIC MYOFASCIAL PAIN: ICD-10-CM

## 2023-11-25 DIAGNOSIS — M79.18 MYOFASCIAL PAIN: ICD-10-CM

## 2023-11-27 RX ORDER — HYDROCODONE BITARTRATE AND ACETAMINOPHEN 5; 325 MG/1; MG/1
1 TABLET ORAL EVERY 8 HOURS PRN
Qty: 90 TABLET | Refills: 0 | OUTPATIENT
Start: 2023-11-27 | End: 2023-12-27

## 2023-11-27 RX ORDER — LIDOCAINE 50 MG/G
PATCH TOPICAL
Qty: 60 PATCH | Refills: 0 | OUTPATIENT
Start: 2023-11-27

## 2023-11-27 NOTE — TELEPHONE ENCOUNTER
OARRS reviewed. UDS: + for  Amitriptyline, Butalbital, Hydrocodone.   Last seen: 10/12/2023. Follow-up: 12/12/2023     normal

## 2023-12-04 DIAGNOSIS — M54.12 CERVICAL RADICULOPATHY: ICD-10-CM

## 2023-12-04 DIAGNOSIS — S83.241A ACUTE MEDIAL MENISCUS TEAR OF RIGHT KNEE, INITIAL ENCOUNTER: ICD-10-CM

## 2023-12-04 DIAGNOSIS — M79.18 MYOFASCIAL PAIN: ICD-10-CM

## 2023-12-04 DIAGNOSIS — G89.29 CHRONIC MYOFASCIAL PAIN: ICD-10-CM

## 2023-12-04 DIAGNOSIS — M54.17 LUMBOSACRAL RADICULITIS: ICD-10-CM

## 2023-12-04 DIAGNOSIS — G89.4 CHRONIC PAIN SYNDROME: ICD-10-CM

## 2023-12-04 DIAGNOSIS — M79.18 CHRONIC MYOFASCIAL PAIN: ICD-10-CM

## 2023-12-04 NOTE — TELEPHONE ENCOUNTER
Luis Gutiérrez called requesting a refill on the following medications:    PT IS OUT OF NORFLEX  Requested Prescriptions     Pending Prescriptions Disp Refills    orphenadrine (NORFLEX) 100 MG extended release tablet 60 tablet 0     Sig: Take 1 tablet by mouth 2 times daily    HYDROcodone-acetaminophen (NORCO) 5-325 MG per tablet 90 tablet 0     Sig: Take 1 tablet by mouth every 8 hours as needed for Pain for up to 30 days. gabapentin (NEURONTIN) 300 MG capsule 90 capsule 0     Sig: Take 1 capsule by mouth 3 times daily for 30 days.     lidocaine (XYLOCAINE) 5 % ointment 50 g 2     Sig: apply to affected area four times a day if needed for pain    lidocaine (LIDODERM) 5 % 60 patch 0     Sig: Place 1 patch onto the skin every 12 hours     Pharmacy verified:  [address] [facility]      Date of last visit: 10-12-23  Date of next visit (if applicable): 23/87/9323

## 2023-12-05 RX ORDER — HYDROCODONE BITARTRATE AND ACETAMINOPHEN 5; 325 MG/1; MG/1
1 TABLET ORAL EVERY 8 HOURS PRN
Qty: 90 TABLET | Refills: 0 | Status: SHIPPED | OUTPATIENT
Start: 2023-12-06 | End: 2024-01-05

## 2023-12-05 RX ORDER — LIDOCAINE 50 MG/G
1 PATCH TOPICAL EVERY 12 HOURS
Qty: 60 PATCH | Refills: 0 | Status: SHIPPED | OUTPATIENT
Start: 2023-12-05 | End: 2024-01-04

## 2023-12-05 RX ORDER — ORPHENADRINE CITRATE 100 MG/1
100 TABLET, EXTENDED RELEASE ORAL 2 TIMES DAILY
Qty: 60 TABLET | Refills: 0 | Status: SHIPPED | OUTPATIENT
Start: 2023-12-05 | End: 2024-01-04

## 2023-12-05 RX ORDER — LIDOCAINE 50 MG/G
OINTMENT TOPICAL
Qty: 50 G | Refills: 2 | Status: SHIPPED | OUTPATIENT
Start: 2023-12-05

## 2023-12-05 RX ORDER — GABAPENTIN 300 MG/1
300 CAPSULE ORAL 3 TIMES DAILY
Qty: 90 CAPSULE | Refills: 0 | Status: SHIPPED | OUTPATIENT
Start: 2023-12-05 | End: 2024-01-04

## 2023-12-13 DIAGNOSIS — F41.9 ANXIETY: ICD-10-CM

## 2023-12-13 DIAGNOSIS — F33.2 SEVERE EPISODE OF RECURRENT MAJOR DEPRESSIVE DISORDER, WITHOUT PSYCHOTIC FEATURES (HCC): ICD-10-CM

## 2023-12-13 RX ORDER — DULOXETIN HYDROCHLORIDE 30 MG/1
30 CAPSULE, DELAYED RELEASE ORAL DAILY
Qty: 90 CAPSULE | Refills: 3 | Status: SHIPPED | OUTPATIENT
Start: 2023-12-13

## 2023-12-13 NOTE — TELEPHONE ENCOUNTER
This medication refill is regarding a electronic request. Refill requested by  Ilya Zee . Requested Prescriptions     Pending Prescriptions Disp Refills    DULoxetine (CYMBALTA) 30 MG extended release capsule [Pharmacy Med Name: DULOXETINE HCL DR 30 MG CAP] 90 capsule 3     Sig: take 1 capsule by mouth once daily     Date of last visit: 11/2/2023   Date of next visit: 5/1/2024  Date of last refill: 8/17/23 #30/3    Rx verified, ordered and set to EP.

## 2023-12-21 NOTE — TELEPHONE ENCOUNTER
I have ordered the patient a ONE TIME dose of steroids. His PCP prescribed him and August 2020. If this does not clear his cough he will need to get a chest x-ray which have gone ahead and ordered.   If he gets the chest x-ray I want to see him back in my office
Pt called asking about the prednisone. Stated the cough has subsided but still has some throat tightness mostly at night, chest still has some tightness. Can you please address today?
Pt does not appear to fluid overloaded.  - continue to monitor volume status.

## 2023-12-23 DIAGNOSIS — H10.10 ALLERGIC RHINOCONJUNCTIVITIS: ICD-10-CM

## 2023-12-23 DIAGNOSIS — J30.9 ALLERGIC RHINOCONJUNCTIVITIS: ICD-10-CM

## 2023-12-26 RX ORDER — LORATADINE 10 MG/1
10 TABLET ORAL DAILY
Qty: 90 TABLET | Refills: 3 | Status: SHIPPED | OUTPATIENT
Start: 2023-12-26

## 2023-12-26 NOTE — TELEPHONE ENCOUNTER
This medication refill is regarding a electronic request. Refill requested by  [facility] . Requested Prescriptions     Pending Prescriptions Disp Refills    loratadine (CLARITIN) 10 MG tablet [Pharmacy Med Name: LORATADINE 10 MG TABLET] 90 tablet 1     Sig: take 1 tablet by mouth once daily     Date of last visit: 11/2/2023   Date of next visit: 1/4/2024  Date of last refill: 8/17/23 #90/1    Rx verified, ordered and set to EP.

## 2024-01-02 DIAGNOSIS — M54.12 CERVICAL RADICULOPATHY: ICD-10-CM

## 2024-01-02 DIAGNOSIS — M54.17 LUMBOSACRAL RADICULITIS: ICD-10-CM

## 2024-01-02 DIAGNOSIS — G89.4 CHRONIC PAIN SYNDROME: ICD-10-CM

## 2024-01-02 RX ORDER — ORPHENADRINE CITRATE 100 MG/1
100 TABLET, EXTENDED RELEASE ORAL 2 TIMES DAILY
Qty: 60 TABLET | Refills: 0 | Status: SHIPPED | OUTPATIENT
Start: 2024-01-04 | End: 2024-02-03

## 2024-01-02 RX ORDER — GABAPENTIN 300 MG/1
300 CAPSULE ORAL 3 TIMES DAILY
Qty: 90 CAPSULE | Refills: 0 | Status: SHIPPED | OUTPATIENT
Start: 2024-01-04 | End: 2024-02-03

## 2024-01-02 RX ORDER — HYDROCODONE BITARTRATE AND ACETAMINOPHEN 5; 325 MG/1; MG/1
1 TABLET ORAL EVERY 8 HOURS PRN
Qty: 90 TABLET | Refills: 0 | Status: SHIPPED | OUTPATIENT
Start: 2024-01-05 | End: 2024-02-04

## 2024-01-02 NOTE — TELEPHONE ENCOUNTER
OARRS reviewed. UDS: + for  Amitriptyline, Butalbital, Hydrocodone.   Last seen: 10/12/2023. Follow-up: 1/11/2024

## 2024-01-02 NOTE — TELEPHONE ENCOUNTER
Osmin Stephen Jr called requesting a refill on the following medications:  Requested Prescriptions     Pending Prescriptions Disp Refills    HYDROcodone-acetaminophen (NORCO) 5-325 MG per tablet 90 tablet 0     Sig: Take 1 tablet by mouth every 8 hours as needed for Pain for up to 30 days.    orphenadrine (NORFLEX) 100 MG extended release tablet 60 tablet 0     Sig: Take 1 tablet by mouth 2 times daily    gabapentin (NEURONTIN) 300 MG capsule 90 capsule 0     Sig: Take 1 capsule by mouth 3 times daily for 30 days.     Pharmacy verified: [facility]      Date of last visit: 10/12/2023  Date of next visit (if applicable): Visit date not found

## 2024-01-10 ENCOUNTER — OFFICE VISIT (OUTPATIENT)
Age: 54
End: 2024-01-10
Payer: COMMERCIAL

## 2024-01-10 ENCOUNTER — OFFICE VISIT (OUTPATIENT)
Dept: FAMILY MEDICINE CLINIC | Age: 54
End: 2024-01-10
Payer: COMMERCIAL

## 2024-01-10 VITALS
WEIGHT: 315 LBS | BODY MASS INDEX: 41.75 KG/M2 | DIASTOLIC BLOOD PRESSURE: 82 MMHG | HEART RATE: 57 BPM | SYSTOLIC BLOOD PRESSURE: 120 MMHG | OXYGEN SATURATION: 96 % | HEIGHT: 73 IN

## 2024-01-10 VITALS
HEART RATE: 57 BPM | HEIGHT: 73 IN | BODY MASS INDEX: 41.75 KG/M2 | DIASTOLIC BLOOD PRESSURE: 86 MMHG | SYSTOLIC BLOOD PRESSURE: 142 MMHG | WEIGHT: 315 LBS

## 2024-01-10 DIAGNOSIS — J45.50 SEVERE PERSISTENT ASTHMA WITHOUT COMPLICATION: ICD-10-CM

## 2024-01-10 DIAGNOSIS — I50.9 HEART FAILURE, UNSPECIFIED HF CHRONICITY, UNSPECIFIED HEART FAILURE TYPE (HCC): ICD-10-CM

## 2024-01-10 DIAGNOSIS — D35.2 PITUITARY EPIDERMOID TUMOR (HCC): ICD-10-CM

## 2024-01-10 DIAGNOSIS — E55.9 HYPOVITAMINOSIS D: ICD-10-CM

## 2024-01-10 DIAGNOSIS — F33.2 SEVERE EPISODE OF RECURRENT MAJOR DEPRESSIVE DISORDER, WITHOUT PSYCHOTIC FEATURES (HCC): ICD-10-CM

## 2024-01-10 DIAGNOSIS — F41.9 ANXIETY: ICD-10-CM

## 2024-01-10 DIAGNOSIS — J45.40 MODERATE PERSISTENT ASTHMA WITHOUT COMPLICATION: ICD-10-CM

## 2024-01-10 DIAGNOSIS — J30.1 NON-SEASONAL ALLERGIC RHINITIS DUE TO POLLEN: ICD-10-CM

## 2024-01-10 DIAGNOSIS — H10.10 ALLERGIC RHINOCONJUNCTIVITIS: ICD-10-CM

## 2024-01-10 DIAGNOSIS — J30.9 ALLERGIC RHINOCONJUNCTIVITIS: ICD-10-CM

## 2024-01-10 DIAGNOSIS — G95.9 DISEASE OF SPINAL CORD (HCC): ICD-10-CM

## 2024-01-10 DIAGNOSIS — D35.2 PITUITARY MICROADENOMA (HCC): Primary | ICD-10-CM

## 2024-01-10 DIAGNOSIS — E66.01 MORBID OBESITY WITH BMI OF 50.0-59.9, ADULT (HCC): Primary | ICD-10-CM

## 2024-01-10 DIAGNOSIS — I25.118 ATHEROSCLEROTIC HEART DISEASE OF NATIVE CORONARY ARTERY WITH OTHER FORMS OF ANGINA PECTORIS (HCC): ICD-10-CM

## 2024-01-10 PROCEDURE — G8427 DOCREV CUR MEDS BY ELIG CLIN: HCPCS | Performed by: NURSE PRACTITIONER

## 2024-01-10 PROCEDURE — G8427 DOCREV CUR MEDS BY ELIG CLIN: HCPCS | Performed by: INTERNAL MEDICINE

## 2024-01-10 PROCEDURE — 99214 OFFICE O/P EST MOD 30 MIN: CPT | Performed by: INTERNAL MEDICINE

## 2024-01-10 PROCEDURE — 3017F COLORECTAL CA SCREEN DOC REV: CPT | Performed by: INTERNAL MEDICINE

## 2024-01-10 PROCEDURE — G8484 FLU IMMUNIZE NO ADMIN: HCPCS | Performed by: NURSE PRACTITIONER

## 2024-01-10 PROCEDURE — 1036F TOBACCO NON-USER: CPT | Performed by: NURSE PRACTITIONER

## 2024-01-10 PROCEDURE — G8484 FLU IMMUNIZE NO ADMIN: HCPCS | Performed by: INTERNAL MEDICINE

## 2024-01-10 PROCEDURE — G8417 CALC BMI ABV UP PARAM F/U: HCPCS | Performed by: INTERNAL MEDICINE

## 2024-01-10 PROCEDURE — G8417 CALC BMI ABV UP PARAM F/U: HCPCS | Performed by: NURSE PRACTITIONER

## 2024-01-10 PROCEDURE — 1036F TOBACCO NON-USER: CPT | Performed by: INTERNAL MEDICINE

## 2024-01-10 PROCEDURE — 99214 OFFICE O/P EST MOD 30 MIN: CPT | Performed by: NURSE PRACTITIONER

## 2024-01-10 PROCEDURE — 3017F COLORECTAL CA SCREEN DOC REV: CPT | Performed by: NURSE PRACTITIONER

## 2024-01-10 RX ORDER — DULOXETIN HYDROCHLORIDE 60 MG/1
60 CAPSULE, DELAYED RELEASE ORAL DAILY
Qty: 90 CAPSULE | Refills: 1 | Status: SHIPPED | OUTPATIENT
Start: 2024-01-10

## 2024-01-10 RX ORDER — BUDESONIDE AND FORMOTEROL FUMARATE DIHYDRATE 160; 4.5 UG/1; UG/1
AEROSOL RESPIRATORY (INHALATION)
Qty: 30.6 G | Refills: 3 | Status: SHIPPED | OUTPATIENT
Start: 2024-01-10

## 2024-01-10 RX ORDER — ALBUTEROL SULFATE 90 UG/1
2 AEROSOL, METERED RESPIRATORY (INHALATION) EVERY 4 HOURS PRN
Qty: 54 G | Refills: 5 | Status: SHIPPED | OUTPATIENT
Start: 2024-01-10

## 2024-01-10 RX ORDER — MONTELUKAST SODIUM 10 MG/1
10 TABLET ORAL NIGHTLY
Qty: 90 TABLET | Refills: 3 | Status: SHIPPED | OUTPATIENT
Start: 2024-01-10

## 2024-01-10 RX ORDER — IPRATROPIUM BROMIDE AND ALBUTEROL SULFATE 2.5; .5 MG/3ML; MG/3ML
1 SOLUTION RESPIRATORY (INHALATION) PRN
Qty: 360 ML | Refills: 5 | Status: SHIPPED | OUTPATIENT
Start: 2024-01-10

## 2024-01-10 RX ORDER — ALBUTEROL SULFATE 2.5 MG/3ML
SOLUTION RESPIRATORY (INHALATION)
Qty: 375 ML | Refills: 3 | Status: SHIPPED | OUTPATIENT
Start: 2024-01-10

## 2024-01-10 RX ORDER — AZELASTINE HYDROCHLORIDE 0.5 MG/ML
1 SOLUTION/ DROPS OPHTHALMIC 2 TIMES DAILY
Qty: 1 EACH | Refills: 11 | Status: SHIPPED | OUTPATIENT
Start: 2024-01-10

## 2024-01-10 ASSESSMENT — PATIENT HEALTH QUESTIONNAIRE - PHQ9
10. IF YOU CHECKED OFF ANY PROBLEMS, HOW DIFFICULT HAVE THESE PROBLEMS MADE IT FOR YOU TO DO YOUR WORK, TAKE CARE OF THINGS AT HOME, OR GET ALONG WITH OTHER PEOPLE: 1
SUM OF ALL RESPONSES TO PHQ QUESTIONS 1-9: 21
9. THOUGHTS THAT YOU WOULD BE BETTER OFF DEAD, OR OF HURTING YOURSELF: 0
8. MOVING OR SPEAKING SO SLOWLY THAT OTHER PEOPLE COULD HAVE NOTICED. OR THE OPPOSITE, BEING SO FIGETY OR RESTLESS THAT YOU HAVE BEEN MOVING AROUND A LOT MORE THAN USUAL: 3
1. LITTLE INTEREST OR PLEASURE IN DOING THINGS: 3
SUM OF ALL RESPONSES TO PHQ QUESTIONS 1-9: 21
5. POOR APPETITE OR OVEREATING: 3
2. FEELING DOWN, DEPRESSED OR HOPELESS: 2
SUM OF ALL RESPONSES TO PHQ QUESTIONS 1-9: 21
3. TROUBLE FALLING OR STAYING ASLEEP: 3
6. FEELING BAD ABOUT YOURSELF - OR THAT YOU ARE A FAILURE OR HAVE LET YOURSELF OR YOUR FAMILY DOWN: 1
7. TROUBLE CONCENTRATING ON THINGS, SUCH AS READING THE NEWSPAPER OR WATCHING TELEVISION: 3
SUM OF ALL RESPONSES TO PHQ9 QUESTIONS 1 & 2: 5
SUM OF ALL RESPONSES TO PHQ QUESTIONS 1-9: 21
4. FEELING TIRED OR HAVING LITTLE ENERGY: 3

## 2024-01-10 ASSESSMENT — ENCOUNTER SYMPTOMS
SINUS PAIN: 0
SHORTNESS OF BREATH: 0
TROUBLE SWALLOWING: 0
VOMITING: 0
COUGH: 0
WHEEZING: 0
DIARRHEA: 0
SORE THROAT: 0
COLOR CHANGE: 0
ABDOMINAL PAIN: 0
NAUSEA: 0
FACIAL SWELLING: 0

## 2024-01-10 ASSESSMENT — COLUMBIA-SUICIDE SEVERITY RATING SCALE - C-SSRS
1. WITHIN THE PAST MONTH, HAVE YOU WISHED YOU WERE DEAD OR WISHED YOU COULD GO TO SLEEP AND NOT WAKE UP?: NO
6. HAVE YOU EVER DONE ANYTHING, STARTED TO DO ANYTHING, OR PREPARED TO DO ANYTHING TO END YOUR LIFE?: NO
2. HAVE YOU ACTUALLY HAD ANY THOUGHTS OF KILLING YOURSELF?: NO

## 2024-01-10 NOTE — PROGRESS NOTES
[facility] [place] [facility]  [facility] ENDOCRINOLOGY  [address]  [facility] [place] [address]  Dept: [phone]  Loc: [phone]     Visit Date: 1/10/2024    Osmin Stephen Jr is a 53 y.o. male who presents today for:  Chief Complaint   Patient presents with    Pituitary epidermoid tumor        Subjective:    HPI   Osmin Setphen Jr is a 52 y.o. , male who comes for f/u for pituitary microadenoma.  Referring provider:Theodore Owens PA-C  This patient was diagnosed with pituitary microadenoma on 11/12/2021 when he had an MRI of the head for evaluation of headache.  This was a noncontrasted study which showed the presence of a 6 mm l lesion within the pituitary gland.  He then had a contrasted MRI on 12/2/2021 Which confirmed that the lesion measured 6.4 x 4.3 mm and was hypoechoic.  There was no compression of the optic chiasma.  The patient has since been seen by neurosurgery and ophthalmology, which he is still seeing. He saw the eye doctor about 6 months ago and said there was nothing wrong with his eyes.  His last Brain MRI was done back in 3/1/23 which showed a stable 6 mm microadenoma in the R side of the pituitary gland and was otherwise unremarkable. His MRI is being handled by his neurosurgeon.  He fell last year in June 2023 and ever since then has been having increasingly frequent headaches on the back of his head down to his neck, described as both sharp and pressure, currently 8-9/10 in severity. The headaches are constant, and make him feel nauseous. He has also had intermittent loss of taste randomly. His vision has also gotten blurry recently as well. He also reports increased sweating recently as well as anxiety and insomnia, and has been increasing his water intake because he has been very thirsty. He denies discharge from his breasts or any other symptoms at this time.  His PCP recently ordered labwork for him but requested Dr. Dukes see it as well in case

## 2024-01-10 NOTE — PATIENT INSTRUCTIONS
Get lab work done  We will leave management of your MRI to your neurosurgeon.  Follow-up in 3 months

## 2024-01-11 ENCOUNTER — OFFICE VISIT (OUTPATIENT)
Dept: PHYSICAL MEDICINE AND REHAB | Age: 54
End: 2024-01-11
Payer: COMMERCIAL

## 2024-01-11 VITALS
BODY MASS INDEX: 41.75 KG/M2 | DIASTOLIC BLOOD PRESSURE: 78 MMHG | WEIGHT: 315 LBS | SYSTOLIC BLOOD PRESSURE: 132 MMHG | HEIGHT: 73 IN

## 2024-01-11 DIAGNOSIS — M54.50 LUMBAR PAIN: ICD-10-CM

## 2024-01-11 DIAGNOSIS — M48.062 SPINAL STENOSIS OF LUMBAR REGION WITH NEUROGENIC CLAUDICATION: ICD-10-CM

## 2024-01-11 DIAGNOSIS — G89.29 CHRONIC MYOFASCIAL PAIN: ICD-10-CM

## 2024-01-11 DIAGNOSIS — M54.17 LUMBOSACRAL RADICULITIS: Primary | ICD-10-CM

## 2024-01-11 DIAGNOSIS — M50.30 DDD (DEGENERATIVE DISC DISEASE), CERVICAL: ICD-10-CM

## 2024-01-11 DIAGNOSIS — M25.50 POLYARTHRALGIA: ICD-10-CM

## 2024-01-11 DIAGNOSIS — M79.18 MYOFASCIAL PAIN: ICD-10-CM

## 2024-01-11 DIAGNOSIS — M54.12 CERVICAL RADICULOPATHY: ICD-10-CM

## 2024-01-11 DIAGNOSIS — M47.816 LUMBAR SPONDYLOSIS: ICD-10-CM

## 2024-01-11 DIAGNOSIS — M79.18 CHRONIC MYOFASCIAL PAIN: ICD-10-CM

## 2024-01-11 DIAGNOSIS — G89.4 CHRONIC PAIN SYNDROME: ICD-10-CM

## 2024-01-11 DIAGNOSIS — M51.36 LUMBAR DEGENERATIVE DISC DISEASE: ICD-10-CM

## 2024-01-11 PROCEDURE — G8417 CALC BMI ABV UP PARAM F/U: HCPCS | Performed by: NURSE PRACTITIONER

## 2024-01-11 PROCEDURE — 99214 OFFICE O/P EST MOD 30 MIN: CPT | Performed by: NURSE PRACTITIONER

## 2024-01-11 PROCEDURE — 1036F TOBACCO NON-USER: CPT | Performed by: NURSE PRACTITIONER

## 2024-01-11 PROCEDURE — G8427 DOCREV CUR MEDS BY ELIG CLIN: HCPCS | Performed by: NURSE PRACTITIONER

## 2024-01-11 PROCEDURE — G8484 FLU IMMUNIZE NO ADMIN: HCPCS | Performed by: NURSE PRACTITIONER

## 2024-01-11 PROCEDURE — 3017F COLORECTAL CA SCREEN DOC REV: CPT | Performed by: NURSE PRACTITIONER

## 2024-01-11 ASSESSMENT — ENCOUNTER SYMPTOMS
COUGH: 0
BACK PAIN: 1
APNEA: 1
SHORTNESS OF BREATH: 0
BACK PAIN: 1
ABDOMINAL DISTENTION: 0
ABDOMINAL PAIN: 1
NAUSEA: 0
PHOTOPHOBIA: 0

## 2024-01-11 NOTE — PROGRESS NOTES
[facility]  [address]  [place] [id number]  Dept: [phone]  Dept Fax: [phone]  Loc: [phone]    Visit Date: 1/11/2024    Functionality Assessment/Goals Worksheet     On a scale of 0 (Does not Interfere) to 10 (Completely Interferes)     1.  Which number describes how during the past week pain has interfered with       the following:  A.  General Activity:  9  B.  Mood: 8  C.  Walking Ability:  9  D.  Normal Work (Includes both work outside the home and housework):  9  E.  Relations with Other People:   9  F.  Sleep:   9  G.  Enjoyment of Life:   9    2.  Patient Prefers to Take their Pain Medications:     [x]  On a regular basis   []  Only when necessary    []  Does not take pain medications    3.  What are the Patient's Goals/Expectations for Visiting Pain Management?     []  Learn about my pain    []  Receive Medication   []  Physical Therapy     []  Treat Depression   [x]  Receive Injections    [x]  Treat Sleep   []  Deal with Anxiety and Stress   []  Treat Opoid Dependence/Addiction   []  Other:        HPI:   Osmin Stephen Jr is a 53 y.o. male is here today for    Chief Complaint: Low back pain, leg pain, nneck pain, generalized pain     HPI   3 month FU. Patient continues to have complaints of pain all over his body- low back, neck, headaches, spasms all over, joints.    States having spasms in stomach.     States always in pain and medications help take the edge off.    low back- this pain is described as stabbing and burning and pain radiates Patient radiating in buttock and around left hip and groin and thigh in left leg and and down to calf in left leg- burning and stabbing and heaviness. Pain going into bilateral thighs as well.     Neck pain into head is pressure.   Patient had EMG with neurology since last visit I still need results sent here but patient states he was told it was normal.

## 2024-01-18 ENCOUNTER — HOSPITAL ENCOUNTER (OUTPATIENT)
Dept: MRI IMAGING | Age: 54
Discharge: HOME | End: 2024-01-18
Payer: COMMERCIAL

## 2024-01-18 DIAGNOSIS — D35.2 PITUITARY ADENOMA (HCC): ICD-10-CM

## 2024-01-18 PROCEDURE — 70553 MRI BRAIN STEM W/O & W/DYE: CPT

## 2024-01-18 PROCEDURE — 6360000004 HC RX CONTRAST MEDICATION: Performed by: PHYSICIAN ASSISTANT

## 2024-01-18 PROCEDURE — A9579 GAD-BASE MR CONTRAST NOS,1ML: HCPCS | Performed by: PHYSICIAN ASSISTANT

## 2024-01-18 RX ADMIN — GADOTERIDOL 20 ML: 279.3 INJECTION, SOLUTION INTRAVENOUS at 19:51

## 2024-01-27 ENCOUNTER — APPOINTMENT (OUTPATIENT)
Dept: CT IMAGING | Age: 54
End: 2024-01-27
Payer: COMMERCIAL

## 2024-01-27 ENCOUNTER — HOSPITAL ENCOUNTER (EMERGENCY)
Age: 54
Discharge: HOME | End: 2024-01-27
Attending: STUDENT IN AN ORGANIZED HEALTH CARE EDUCATION/TRAINING PROGRAM
Payer: COMMERCIAL

## 2024-01-27 VITALS
BODY MASS INDEX: 41.75 KG/M2 | RESPIRATION RATE: 16 BRPM | HEART RATE: 73 BPM | SYSTOLIC BLOOD PRESSURE: 130 MMHG | OXYGEN SATURATION: 97 % | WEIGHT: 315 LBS | DIASTOLIC BLOOD PRESSURE: 71 MMHG | HEIGHT: 73 IN

## 2024-01-27 DIAGNOSIS — R51.9 ACUTE NONINTRACTABLE HEADACHE, UNSPECIFIED HEADACHE TYPE: Primary | ICD-10-CM

## 2024-01-27 DIAGNOSIS — D35.2 PITUITARY MICROADENOMA (HCC): ICD-10-CM

## 2024-01-27 LAB
ANION GAP SERPL CALC-SCNC: 9 MEQ/L (ref 8–16)
BASOPHILS ABSOLUTE: 0 THOU/MM3 (ref 0–0.1)
BASOPHILS NFR BLD AUTO: 0.7 %
BUN SERPL-MCNC: 10 MG/DL (ref 7–22)
CALCIUM SERPL-MCNC: 9.2 MG/DL (ref 8.5–10.5)
CHLORIDE SERPL-SCNC: 102 MEQ/L (ref 98–111)
CO2 SERPL-SCNC: 25 MEQ/L (ref 23–33)
CREAT SERPL-MCNC: 0.9 MG/DL (ref 0.4–1.2)
DEPRECATED RDW RBC AUTO: 42.5 FL (ref 35–45)
EOSINOPHIL NFR BLD AUTO: 1.5 %
EOSINOPHILS ABSOLUTE: 0.1 THOU/MM3 (ref 0–0.4)
ERYTHROCYTE [DISTWIDTH] IN BLOOD BY AUTOMATED COUNT: 12.1 % (ref 11.5–14.5)
FOLATE SERPL-MCNC: 5.7 NG/ML (ref 4.8–24.2)
GFR SERPL CREATININE-BSD FRML MDRD: > 60 ML/MIN/1.73M2
GLUCOSE SERPL-MCNC: 110 MG/DL (ref 70–108)
HCT VFR BLD AUTO: 40.9 % (ref 42–52)
HGB BLD-MCNC: 13.3 GM/DL (ref 14–18)
IMM GRANULOCYTES # BLD AUTO: 0.02 THOU/MM3 (ref 0–0.07)
IMM GRANULOCYTES NFR BLD AUTO: 0.3 %
LYMPHOCYTES ABSOLUTE: 1.1 THOU/MM3 (ref 1–4.8)
LYMPHOCYTES NFR BLD AUTO: 18.1 %
MCH RBC QN AUTO: 31.1 PG (ref 26–33)
MCHC RBC AUTO-ENTMCNC: 32.5 GM/DL (ref 32.2–35.5)
MCV RBC AUTO: 95.8 FL (ref 80–94)
MONOCYTES ABSOLUTE: 0.6 THOU/MM3 (ref 0.4–1.3)
MONOCYTES NFR BLD AUTO: 9.3 %
NEUTROPHILS NFR BLD AUTO: 70.1 %
NRBC BLD AUTO-RTO: 0 /100 WBC
OSMOLALITY SERPL CALC.SUM OF ELEC: 271.6 MOSMOL/KG (ref 275–300)
PLATELET # BLD AUTO: 240 THOU/MM3 (ref 130–400)
PMV BLD AUTO: 10.7 FL (ref 9.4–12.4)
POTASSIUM SERPL-SCNC: 3.6 MEQ/L (ref 3.5–5.2)
PROLACTIN SERPL-MCNC: 7.8 NG/ML
RBC # BLD AUTO: 4.27 MILL/MM3 (ref 4.7–6.1)
SEGMENTED NEUTROPHILS ABSOLUTE COUNT: 4.3 THOU/MM3 (ref 1.8–7.7)
SODIUM SERPL-SCNC: 136 MEQ/L (ref 135–145)
T4 FREE SERPL-MCNC: 1.08 NG/DL (ref 0.93–1.76)
TROPONIN, HIGH SENSITIVITY: 12 NG/L (ref 0–12)
TSH SERPL DL<=0.005 MIU/L-ACNC: 0.54 UIU/ML (ref 0.4–4.2)
VIT B12 SERPL-MCNC: 370 PG/ML (ref 211–911)
WBC # BLD AUTO: 6.1 THOU/MM3 (ref 4.8–10.8)

## 2024-01-27 PROCEDURE — 36415 COLL VENOUS BLD VENIPUNCTURE: CPT

## 2024-01-27 PROCEDURE — 84443 ASSAY THYROID STIM HORMONE: CPT

## 2024-01-27 PROCEDURE — 70450 CT HEAD/BRAIN W/O DYE: CPT

## 2024-01-27 PROCEDURE — 96375 TX/PRO/DX INJ NEW DRUG ADDON: CPT

## 2024-01-27 PROCEDURE — 70496 CT ANGIOGRAPHY HEAD: CPT

## 2024-01-27 PROCEDURE — 80048 BASIC METABOLIC PNL TOTAL CA: CPT

## 2024-01-27 PROCEDURE — 6370000000 HC RX 637 (ALT 250 FOR IP): Performed by: STUDENT IN AN ORGANIZED HEALTH CARE EDUCATION/TRAINING PROGRAM

## 2024-01-27 PROCEDURE — 84146 ASSAY OF PROLACTIN: CPT

## 2024-01-27 PROCEDURE — 82607 VITAMIN B-12: CPT

## 2024-01-27 PROCEDURE — 84484 ASSAY OF TROPONIN QUANT: CPT

## 2024-01-27 PROCEDURE — 99285 EMERGENCY DEPT VISIT HI MDM: CPT

## 2024-01-27 PROCEDURE — 85025 COMPLETE CBC W/AUTO DIFF WBC: CPT

## 2024-01-27 PROCEDURE — 2580000003 HC RX 258: Performed by: STUDENT IN AN ORGANIZED HEALTH CARE EDUCATION/TRAINING PROGRAM

## 2024-01-27 PROCEDURE — 82746 ASSAY OF FOLIC ACID SERUM: CPT

## 2024-01-27 PROCEDURE — 93005 ELECTROCARDIOGRAM TRACING: CPT | Performed by: STUDENT IN AN ORGANIZED HEALTH CARE EDUCATION/TRAINING PROGRAM

## 2024-01-27 PROCEDURE — 70498 CT ANGIOGRAPHY NECK: CPT

## 2024-01-27 PROCEDURE — 96374 THER/PROPH/DIAG INJ IV PUSH: CPT

## 2024-01-27 PROCEDURE — 84439 ASSAY OF FREE THYROXINE: CPT

## 2024-01-27 PROCEDURE — 6360000002 HC RX W HCPCS: Performed by: STUDENT IN AN ORGANIZED HEALTH CARE EDUCATION/TRAINING PROGRAM

## 2024-01-27 PROCEDURE — 6360000004 HC RX CONTRAST MEDICATION: Performed by: STUDENT IN AN ORGANIZED HEALTH CARE EDUCATION/TRAINING PROGRAM

## 2024-01-27 RX ORDER — LIDOCAINE 4 G/G
1 PATCH TOPICAL ONCE
Status: DISCONTINUED | OUTPATIENT
Start: 2024-01-27 | End: 2024-01-27 | Stop reason: HOSPADM

## 2024-01-27 RX ORDER — 0.9 % SODIUM CHLORIDE 0.9 %
1000 INTRAVENOUS SOLUTION INTRAVENOUS ONCE
Status: COMPLETED | OUTPATIENT
Start: 2024-01-27 | End: 2024-01-27

## 2024-01-27 RX ORDER — PROCHLORPERAZINE EDISYLATE 5 MG/ML
10 INJECTION INTRAMUSCULAR; INTRAVENOUS ONCE
Status: COMPLETED | OUTPATIENT
Start: 2024-01-27 | End: 2024-01-27

## 2024-01-27 RX ORDER — ACETAMINOPHEN 500 MG
1000 TABLET ORAL ONCE
Status: COMPLETED | OUTPATIENT
Start: 2024-01-27 | End: 2024-01-27

## 2024-01-27 RX ORDER — ONDANSETRON 2 MG/ML
4 INJECTION INTRAMUSCULAR; INTRAVENOUS ONCE
Status: COMPLETED | OUTPATIENT
Start: 2024-01-27 | End: 2024-01-27

## 2024-01-27 RX ADMIN — PROCHLORPERAZINE EDISYLATE 10 MG: 5 INJECTION INTRAMUSCULAR; INTRAVENOUS at 16:33

## 2024-01-27 RX ADMIN — ACETAMINOPHEN 1000 MG: 500 TABLET ORAL at 16:33

## 2024-01-27 RX ADMIN — IOPAMIDOL 80 ML: 755 INJECTION, SOLUTION INTRAVENOUS at 17:43

## 2024-01-27 RX ADMIN — SODIUM CHLORIDE 1000 ML: 9 INJECTION, SOLUTION INTRAVENOUS at 16:38

## 2024-01-27 RX ADMIN — ONDANSETRON 4 MG: 2 INJECTION INTRAMUSCULAR; INTRAVENOUS at 16:33

## 2024-01-27 ASSESSMENT — PAIN SCALES - GENERAL
PAINLEVEL_OUTOF10: 7
PAINLEVEL_OUTOF10: 9
PAINLEVEL_OUTOF10: 9

## 2024-01-27 ASSESSMENT — PAIN DESCRIPTION - LOCATION
LOCATION: HEAD
LOCATION: HEAD

## 2024-01-27 ASSESSMENT — PAIN - FUNCTIONAL ASSESSMENT
PAIN_FUNCTIONAL_ASSESSMENT: 0-10
PAIN_FUNCTIONAL_ASSESSMENT: 0-10

## 2024-01-27 ASSESSMENT — PAIN DESCRIPTION - ORIENTATION: ORIENTATION: POSTERIOR

## 2024-01-27 NOTE — ED NOTES
Patient resting on cart with eyes closed, room lights dimmed, respirations unlabored.  Patient arouses easily to voice.  Patient denies nausea, states headache decreased with medications given.  Patient updated on plan of care.  Call light in reach.

## 2024-01-27 NOTE — ED NOTES
Patient resting on cart with room lights dimmed.  Patient updated on plan of care.  Patient up to restroom.  Call light in reach.

## 2024-01-27 NOTE — DISCHARGE INSTRUCTIONS
Smoking cigarettes or being around anyone that smokes cigarettes can cause constriction of the blood vessels in the brain which in turn can cause you to have further headaches.    For pain use acetaminophen (Tylenol) or ibuprofen (Motrin / Advil), unless prescribed medications that have acetaminophen or ibuprofen (or similar medications) in it.  You can take over the counter acetaminophen tablets (1 - 2 tablets of the 500-mg strength every 6 hours) or ibuprofen tablets (2 tablets every 4 hours).   Avoid taking narcotics for headaches (can cause a worse headache in several hours after taking the medication).  Make sure that you drink plenty of water or Gatorade (or similar solution) to keep yourself hydrated.    PLEASE RETURN TO THE EMERGENCY DEPARTMENT IMMEDIATELY for worsening symptoms, change in vision / hearing / taste, ringing in your ears, loss of sensation or difficulty moving your arms or legs, or if you develop any concerning symptoms such as: high fever not relieved by acetaminophen (Tylenol) and/or ibuprofen (Motrin / Advil), chills, shortness of breath, chest pain, feeling of your heart fluttering or racing, persistent nausea and/or vomiting, vomiting up blood, blood in your stool, numbness, loss of consciousness, weakness or tingling in the arms or legs or change in color of the extremities, changes in mental status, persistent headache, blurry vision, loss of bladder / bowel control, unable to follow up with your physician, or other any other care or concern

## 2024-01-27 NOTE — ED NOTES
Patient presents to ED with complaint of migraine headache intermittently for past couple of months with past week worsening.  Patient states history of growth on pituitary gland.  Patient states headache is mostly posterior.  Patient further complains of 5 nose bleeds the past week, stating yesterday was last.  Patient further complains of nausea and feeling \"off balance\".  Patient denies fever or recent illness.  Respirations unlabored.

## 2024-01-27 NOTE — ED PROVIDER NOTES
[facility]  EMERGENCY DEPARTMENT ENCOUNTER          Pt Name: Osmin Stephen Jr  MRN: [id number]  Birthdate 1970  Date of evaluation: 1/27/2024  Emergency Physician: Harrison Bar MD    CHIEF COMPLAINT       Chief Complaint   Patient presents with    Migraine    Epistaxis     yesterday    Dizziness     History obtained from the patient.      HISTORY OF PRESENT ILLNESS    HPI  Osmin Stephen Jr is a 53 y.o. male with past medical history of hyperlipidemia, CHF, CAD, MDD, pituitary microadenoma who presents to the emergency department for evaluation of headaches. Patient currently being worked up for pituitary microadenoma. Has not had time to get his labs done due to work hours. Reports throbbing occipital headache with right eye throbbing pain. Reports intermittent blurry vision. Feels more off balance than usual today, more noticeably after waking up from nap around noon. Reports nausea as well. No syncopal episodes. Also noticing bilateral epistaxis intermittently. No rhinorrhea, congestion, sore throat, cough, chest pain.   The patient has no other acute complaints at this time.          REVIEW OF SYSTEMS   Review of Systems    See HPI. 12 point ROS performed, pertinent positives listed above otherwise negative  PAST MEDICAL AND SURGICAL HISTORY     Past Medical History:   Diagnosis Date    Aneurysm (HCC)     pituitary gland    Arthritis     Asthma     Cardiomegaly     COVID-19 11/2021    toysin- natalio    Depression     Fibromyalgia     GERD (gastroesophageal reflux disease)     Hypertension     melhem    Liver disease     CLARIBEL on CPAP      Past Surgical History:   Procedure Laterality Date    BACK SURGERY      CHOLECYSTECTOMY, LAPAROSCOPIC N/A 2/15/2023    ROOTIC CHOLECYSTECTOMY performed by Marli Beckwith MD at [facility] OR    COLONOSCOPY      KNEE SURGERY Left     NECK SURGERY      PAIN MANAGEMENT PROCEDURE Bilateral 02/11/2021    Bilateral L-facet MBB # 1 @

## 2024-01-28 LAB
EKG ATRIAL RATE: 77 BPM
EKG P AXIS: 62 DEGREES
EKG P-R INTERVAL: 166 MS
EKG Q-T INTERVAL: 382 MS
EKG QRS DURATION: 94 MS
EKG QTC CALCULATION (BAZETT): 432 MS
EKG R AXIS: -4 DEGREES
EKG T AXIS: 29 DEGREES
EKG VENTRICULAR RATE: 77 BPM

## 2024-01-28 PROCEDURE — 93010 ELECTROCARDIOGRAM REPORT: CPT | Performed by: INTERNAL MEDICINE

## 2024-02-02 DIAGNOSIS — M79.18 CHRONIC MYOFASCIAL PAIN: ICD-10-CM

## 2024-02-02 DIAGNOSIS — M79.18 MYOFASCIAL PAIN: ICD-10-CM

## 2024-02-02 DIAGNOSIS — S83.241A ACUTE MEDIAL MENISCUS TEAR OF RIGHT KNEE, INITIAL ENCOUNTER: ICD-10-CM

## 2024-02-02 DIAGNOSIS — G89.29 CHRONIC MYOFASCIAL PAIN: ICD-10-CM

## 2024-02-02 DIAGNOSIS — G89.4 CHRONIC PAIN SYNDROME: ICD-10-CM

## 2024-02-02 NOTE — TELEPHONE ENCOUNTER
Osmin Stephen Jr called requesting a refill on the following medications:  Requested Prescriptions     Pending Prescriptions Disp Refills    orphenadrine (NORFLEX) 100 MG extended release tablet 60 tablet 0     Sig: Take 1 tablet by mouth 2 times daily    HYDROcodone-acetaminophen (NORCO) 5-325 MG per tablet 90 tablet 0     Sig: Take 1 tablet by mouth every 8 hours as needed for Pain for up to 30 days.    lidocaine (XYLOCAINE) 5 % ointment 50 g 2     Sig: apply to affected area four times a day if needed for pain    lidocaine (LIDODERM) 5 % 60 patch 0     Sig: Place 1 patch onto the skin every 12 hours     Pharmacy verified:  SHARIF Tucker       Date of last visit: 01-11-24  Date of next visit (if applicable): 4/11/2024

## 2024-02-02 NOTE — TELEPHONE ENCOUNTER
[facility]   Thanks -LIVIER
OK for referral to [facility] for depression. OK for Dr Ashlyn Hodgson for eye exam, but he may need to call his insurance to see who is covered.   Thanks -WS
Pt called requesting a referral to a counselor for his depression and an eye doctor for complaints of blurry vision and dry eyes. Pt will to see whomever WS recommends. Pt has Estevan Benjamin. Please advise.
Referral to Shad Jay placed in [facility]--patient will be contacted to schedule  He will check with insurance regarding coverage for eye doctor and let us know. He is also requesting rx for handicap placard for his chronic back pain and arthritis. [id number] for this? Duration 5 years?
none

## 2024-02-05 RX ORDER — LIDOCAINE 50 MG/G
1 PATCH TOPICAL EVERY 12 HOURS
Qty: 60 PATCH | Refills: 0 | Status: SHIPPED | OUTPATIENT
Start: 2024-02-05 | End: 2024-03-06

## 2024-02-05 RX ORDER — ORPHENADRINE CITRATE 100 MG/1
100 TABLET, EXTENDED RELEASE ORAL 2 TIMES DAILY
Qty: 60 TABLET | Refills: 0 | Status: SHIPPED | OUTPATIENT
Start: 2024-02-05 | End: 2024-03-06

## 2024-02-05 RX ORDER — HYDROCODONE BITARTRATE AND ACETAMINOPHEN 5; 325 MG/1; MG/1
1 TABLET ORAL EVERY 8 HOURS PRN
Qty: 90 TABLET | Refills: 0 | Status: SHIPPED | OUTPATIENT
Start: 2024-02-05 | End: 2024-03-06

## 2024-02-05 RX ORDER — LIDOCAINE 50 MG/G
OINTMENT TOPICAL
Qty: 50 G | Refills: 2 | Status: SHIPPED | OUTPATIENT
Start: 2024-02-05

## 2024-02-05 NOTE — TELEPHONE ENCOUNTER
OARRS reviewed. UDS: + for  hydrocodone.amitriptyline and cyclobenzaprine is present.   Last seen: 1/11/2024. Follow-up: 4/11/24

## 2024-02-06 ENCOUNTER — TELEPHONE (OUTPATIENT)
Dept: NEUROSURGERY | Age: 54
End: 2024-02-06

## 2024-02-06 NOTE — TELEPHONE ENCOUNTER
A telephone conversation was had with the patient, Osmin Stephen, with discussions centering on the results of the MRI of the brain imaged with and without contrast as part of a 6-month comparison review image.  The images unchanged and compares identically with the previous image.  The patient is stable and intact to his baseline and denies any significant changes since his last evaluation with our service.  Based on the stable intact nature of his visit and the stable findings on the MRI we have agreed to follow-up with him again in 6 months with a new MRI of the brain to be imaged with and without contrast utilizing sella protocol as a comparison image.  He was very happy with the conversation and with our office having made an effort to reach out to him via telephone, saving him a trip to the hospital.    He is encouraged to reach out to our service with any additional questions or concerns or should he experience any significant changes in his general health.

## 2024-02-07 DIAGNOSIS — D35.2 PITUITARY ADENOMA (HCC): Primary | ICD-10-CM

## 2024-02-27 RX ORDER — LANOLIN ALCOHOL/MO/W.PET/CERES
CREAM (GRAM) TOPICAL
Qty: 180 TABLET | Refills: 3 | Status: SHIPPED | OUTPATIENT
Start: 2024-02-27

## 2024-02-27 NOTE — TELEPHONE ENCOUNTER
Request sent from [facility] pharmacy for refill of magnesium oxide 420 mg bid.  Last seen 1/10/24, next appt 5/1/24.  Med verified.  Order pended.

## 2024-03-10 DIAGNOSIS — K21.9 GASTROESOPHAGEAL REFLUX DISEASE WITHOUT ESOPHAGITIS: ICD-10-CM

## 2024-03-11 RX ORDER — SIMETHICONE 80 MG
TABLET,CHEWABLE ORAL
Qty: 180 TABLET | Refills: 3 | Status: SHIPPED | OUTPATIENT
Start: 2024-03-11

## 2024-03-11 NOTE — TELEPHONE ENCOUNTER
This medication refill is regarding a electronic request. Refill requested by  pharmacy .    Requested Prescriptions     Pending Prescriptions Disp Refills    simethicone (MYLICON) 80 MG chewable tablet [Pharmacy Med Name: SIMETHICONE 80 MG TAB CHEW] 180 tablet 3     Sig: take 1 tablet by mouth four times a day if needed       Date of last visit: 1/10/2024   Date of next visit: 5/1/2024  Date of last refill: 9/18/23  Pharmacy Name:     Last Lipid Panel:    Lab Results   Component Value Date/Time    CHOL 206 06/22/2023 12:15 PM    TRIG 96 06/22/2023 12:15 PM    HDL 43 06/22/2023 12:15 PM    LDLCALC 144 06/22/2023 12:15 PM     Last CMP:   Lab Results   Component Value Date     01/27/2024    K 3.6 01/27/2024     01/27/2024    CO2 25 01/27/2024    BUN 10 01/27/2024    CREATININE 0.9 01/27/2024    GLUCOSE 110 (H) 01/27/2024    CALCIUM 9.2 01/27/2024    PROT 7.4 10/30/2023    LABALBU 4.2 10/30/2023    BILITOT 0.4 10/30/2023    ALKPHOS 138 (H) 10/30/2023    AST 21 10/30/2023    ALT 25 10/30/2023    LABGLOM >60 01/27/2024       Last Thyroid:    Lab Results   Component Value Date    TSH 0.538 01/27/2024    T4FREE 1.08 01/27/2024     Last Hemoglobin A1C:    Lab Results   Component Value Date/Time    LABA1C 5.2 06/22/2023 12:15 PM       Rx verified, ordered and set to EP.

## 2024-03-14 RX ORDER — EPINEPHRINE 0.3 MG/.3ML
INJECTION SUBCUTANEOUS
Qty: 2 EACH | Refills: 0 | Status: SHIPPED | OUTPATIENT
Start: 2024-03-14

## 2024-03-14 NOTE — TELEPHONE ENCOUNTER
This medication refill is regarding a electronic request. Refill requested by  pharmacy .    Requested Prescriptions     Pending Prescriptions Disp Refills    EPINEPHrine (EPIPEN) 0.3 MG/0.3ML SOAJ injection [Pharmacy Med Name: EPINEPHRINE 0.3 MG AUTO-INJECT] 2 each      Sig: inject 1 PEN AS DIRECTED STAT FOR ALLERGIC REACTION       Date of last visit: 1/10/2024   Date of next visit: 5/1/2024  Date of last refill: 11/2/23  Pharmacy Name:     Last Lipid Panel:    Lab Results   Component Value Date/Time    CHOL 206 06/22/2023 12:15 PM    TRIG 96 06/22/2023 12:15 PM    HDL 43 06/22/2023 12:15 PM    LDLCALC 144 06/22/2023 12:15 PM     Last CMP:   Lab Results   Component Value Date     01/27/2024    K 3.6 01/27/2024     01/27/2024    CO2 25 01/27/2024    BUN 10 01/27/2024    CREATININE 0.9 01/27/2024    GLUCOSE 110 (H) 01/27/2024    CALCIUM 9.2 01/27/2024    PROT 7.4 10/30/2023    LABALBU 4.2 10/30/2023    BILITOT 0.4 10/30/2023    ALKPHOS 138 (H) 10/30/2023    AST 21 10/30/2023    ALT 25 10/30/2023    LABGLOM >60 01/27/2024       Last Thyroid:    Lab Results   Component Value Date    TSH 0.538 01/27/2024    T4FREE 1.08 01/27/2024     Last Hemoglobin A1C:    Lab Results   Component Value Date/Time    LABA1C 5.2 06/22/2023 12:15 PM       Rx verified, ordered and set to EP.

## 2024-03-24 DIAGNOSIS — I10 UNCONTROLLED HYPERTENSION: ICD-10-CM

## 2024-03-25 RX ORDER — HYDROCHLOROTHIAZIDE 25 MG/1
25 TABLET ORAL EVERY MORNING
Qty: 90 TABLET | Refills: 3 | Status: SHIPPED | OUTPATIENT
Start: 2024-03-25

## 2024-03-25 NOTE — TELEPHONE ENCOUNTER
Request sent from [facility] pharmacy for refill of hctz 25 mg qam.  Last seen 1/10/24, next appt 5/1/24.  Med verified.  Order pended.

## 2024-04-10 ENCOUNTER — TELEPHONE (OUTPATIENT)
Age: 54
End: 2024-04-10

## 2024-04-25 DIAGNOSIS — Z98.1 S/P CERVICAL SPINAL FUSION: ICD-10-CM

## 2024-04-25 RX ORDER — LIDOCAINE 50 MG/G
OINTMENT TOPICAL
Qty: 50 G | Refills: 2 | OUTPATIENT
Start: 2024-04-25

## 2024-04-25 NOTE — TELEPHONE ENCOUNTER
Called pt, spoke with pt to let him know he will need to send thisto Dr Gerber is he wants to see him. Pt apologized he thought it went to Dr gerber.

## 2024-04-25 NOTE — TELEPHONE ENCOUNTER
Patient missed FU last month and it appears he is following with pain management at [facility] as per OARRS he is filling his [place] from Dr. Ricardo. Refused this prescription as it will have to go to his current provider as he can't see two pain management clinics

## 2024-04-25 NOTE — TELEPHONE ENCOUNTER
This medication refill is regarding a electronic request. Refill requested by  [facility] .    Requested Prescriptions     Pending Prescriptions Disp Refills    butalbital-acetaminophen-caffeine (FIORICET, ESGIC) -40 MG per tablet [Pharmacy Med Name: OJWGFG-GVIORCFD-EUAJ -40] 90 tablet 0     Sig: take 1 tablet by mouth every 8 hours if needed for headache     Date of last visit: 1/10/2024   Date of next visit: 5/1/2024  Date of last refill: 11/2/23 #90/0    Rx verified, ordered and set to EP.

## 2024-04-25 NOTE — TELEPHONE ENCOUNTER
OARRS reviewed. UDS: + for  hydrocodone. Butalbital and gabapentin is present.  Last seen: 1/11/2024. Follow-up: none

## 2024-04-26 RX ORDER — BUTALBITAL, ACETAMINOPHEN AND CAFFEINE 50; 325; 40 MG/1; MG/1; MG/1
TABLET ORAL
Qty: 90 TABLET | Refills: 0 | Status: SHIPPED | OUTPATIENT
Start: 2024-04-26

## 2024-05-22 NOTE — DISCHARGE INSTRUCTIONS
You are seen today for chest pain. Your symptoms improved after getting medications in the emergency department. You can take Advil/ibuprofen for your chest pain to help with inflammation. Please follow-up with cardiology regarding today's visit.
no strength deficits were identified
b/l LE grossly 4+/5 t/o, UE WNL within sternal precautions.

## 2024-05-24 DIAGNOSIS — M54.50 CHRONIC MIDLINE LOW BACK PAIN WITHOUT SCIATICA: ICD-10-CM

## 2024-05-24 DIAGNOSIS — G89.29 CHRONIC MIDLINE LOW BACK PAIN WITHOUT SCIATICA: ICD-10-CM

## 2024-05-28 RX ORDER — MELOXICAM 15 MG/1
15 TABLET ORAL DAILY
Qty: 90 TABLET | Refills: 1 | Status: SHIPPED | OUTPATIENT
Start: 2024-05-28

## 2024-05-28 NOTE — TELEPHONE ENCOUNTER
This medication refill is regarding a electronic request. Refill requested by  [facility] .    Requested Prescriptions     Pending Prescriptions Disp Refills    meloxicam (MOBIC) 15 MG tablet [Pharmacy Med Name: MELOXICAM 15 MG TABLET] 90 tablet 1     Sig: take 1 tablet by mouth once daily     Date of last visit: 1/10/2024   Date of next visit: None  Date of last refill: 8/17/23 #90/1    Rx verified, ordered and set to EP.

## 2024-05-30 NOTE — TELEPHONE ENCOUNTER
This medication refill is regarding a electronic request. Refill requested by  [facility] .    Requested Prescriptions     Pending Prescriptions Disp Refills    BANOPHEN 25 MG capsule [Pharmacy Med Name: BANOPHEN 25 MG CAPSULE] 60 capsule 5     Sig: take 1 capsule by mouth every 6 hours if needed for itching     Date of last visit: 1/10/2024   Date of next visit: None  Date of last refill: 7/14/23 #30/5    Rx verified, ordered and set to EP.

## 2024-05-31 RX ORDER — DIPHENHYDRAMINE HYDROCHLORIDE 25 MG/1
CAPSULE ORAL
Qty: 60 CAPSULE | Refills: 5 | Status: SHIPPED | OUTPATIENT
Start: 2024-05-31

## 2024-06-18 ENCOUNTER — HOSPITAL ENCOUNTER (EMERGENCY)
Age: 54
Discharge: HOME | End: 2024-06-18
Payer: MEDICAID

## 2024-06-18 ENCOUNTER — APPOINTMENT (OUTPATIENT)
Dept: GENERAL RADIOLOGY | Age: 54
End: 2024-06-18
Payer: MEDICAID

## 2024-06-18 VITALS
WEIGHT: 315 LBS | BODY MASS INDEX: 41.75 KG/M2 | RESPIRATION RATE: 18 BRPM | DIASTOLIC BLOOD PRESSURE: 83 MMHG | HEART RATE: 70 BPM | SYSTOLIC BLOOD PRESSURE: 144 MMHG | OXYGEN SATURATION: 94 % | TEMPERATURE: 98.1 F | HEIGHT: 73 IN

## 2024-06-18 DIAGNOSIS — M62.838 SPASM OF MUSCLE: ICD-10-CM

## 2024-06-18 DIAGNOSIS — J45.21 MILD INTERMITTENT ASTHMA WITH EXACERBATION: Primary | ICD-10-CM

## 2024-06-18 LAB
ALBUMIN SERPL BCG-MCNC: 4.1 G/DL (ref 3.5–5.1)
ALP SERPL-CCNC: 148 U/L (ref 38–126)
ALT SERPL W/O P-5'-P-CCNC: 25 U/L (ref 11–66)
ANION GAP SERPL CALC-SCNC: 12 MEQ/L (ref 8–16)
AST SERPL-CCNC: 23 U/L (ref 5–40)
BASOPHILS ABSOLUTE: 0 THOU/MM3 (ref 0–0.1)
BASOPHILS NFR BLD AUTO: 0.6 %
BILIRUB SERPL-MCNC: 0.7 MG/DL (ref 0.3–1.2)
BUN SERPL-MCNC: 13 MG/DL (ref 7–22)
CALCIUM SERPL-MCNC: 9.2 MG/DL (ref 8.5–10.5)
CHLORIDE SERPL-SCNC: 101 MEQ/L (ref 98–111)
CO2 SERPL-SCNC: 26 MEQ/L (ref 23–33)
CREAT SERPL-MCNC: 0.9 MG/DL (ref 0.4–1.2)
DEPRECATED RDW RBC AUTO: 44.1 FL (ref 35–45)
EKG ATRIAL RATE: 74 BPM
EKG P AXIS: 73 DEGREES
EKG P-R INTERVAL: 166 MS
EKG Q-T INTERVAL: 406 MS
EKG QRS DURATION: 92 MS
EKG QTC CALCULATION (BAZETT): 450 MS
EKG R AXIS: 22 DEGREES
EKG T AXIS: 56 DEGREES
EKG VENTRICULAR RATE: 74 BPM
EOSINOPHIL NFR BLD AUTO: 2.1 %
EOSINOPHILS ABSOLUTE: 0.1 THOU/MM3 (ref 0–0.4)
ERYTHROCYTE [DISTWIDTH] IN BLOOD BY AUTOMATED COUNT: 12.6 % (ref 11.5–14.5)
GFR SERPL CREATININE-BSD FRML MDRD: > 90 ML/MIN/1.73M2
GLUCOSE SERPL-MCNC: 86 MG/DL (ref 70–108)
HCT VFR BLD AUTO: 39.9 % (ref 42–52)
HGB BLD-MCNC: 12.8 GM/DL (ref 14–18)
IMM GRANULOCYTES # BLD AUTO: 0.02 THOU/MM3 (ref 0–0.07)
IMM GRANULOCYTES NFR BLD AUTO: 0.3 %
LYMPHOCYTES ABSOLUTE: 1.4 THOU/MM3 (ref 1–4.8)
LYMPHOCYTES NFR BLD AUTO: 21.8 %
MCH RBC QN AUTO: 31 PG (ref 26–33)
MCHC RBC AUTO-ENTMCNC: 32.1 GM/DL (ref 32.2–35.5)
MCV RBC AUTO: 96.6 FL (ref 80–94)
MONOCYTES ABSOLUTE: 0.7 THOU/MM3 (ref 0.4–1.3)
MONOCYTES NFR BLD AUTO: 10.2 %
NEUTROPHILS ABSOLUTE: 4.3 THOU/MM3 (ref 1.8–7.7)
NEUTROPHILS NFR BLD AUTO: 65 %
NRBC BLD AUTO-RTO: 0 /100 WBC
NT-PROBNP SERPL IA-MCNC: < 36 PG/ML (ref 0–124)
OSMOLALITY SERPL CALC.SUM OF ELEC: 277 MOSMOL/KG (ref 275–300)
PLATELET # BLD AUTO: 230 THOU/MM3 (ref 130–400)
PMV BLD AUTO: 10.4 FL (ref 9.4–12.4)
POTASSIUM SERPL-SCNC: 3.6 MEQ/L (ref 3.5–5.2)
PROT SERPL-MCNC: 7.3 G/DL (ref 6.1–8)
RBC # BLD AUTO: 4.13 MILL/MM3 (ref 4.7–6.1)
SODIUM SERPL-SCNC: 139 MEQ/L (ref 135–145)
TROPONIN, HIGH SENSITIVITY: 10 NG/L (ref 0–12)
TROPONIN, HIGH SENSITIVITY: 9 NG/L (ref 0–12)
WBC # BLD AUTO: 6.6 THOU/MM3 (ref 4.8–10.8)

## 2024-06-18 PROCEDURE — 6360000002 HC RX W HCPCS: Performed by: PHYSICIAN ASSISTANT

## 2024-06-18 PROCEDURE — 96375 TX/PRO/DX INJ NEW DRUG ADDON: CPT

## 2024-06-18 PROCEDURE — 93005 ELECTROCARDIOGRAM TRACING: CPT | Performed by: PHYSICIAN ASSISTANT

## 2024-06-18 PROCEDURE — 85025 COMPLETE CBC W/AUTO DIFF WBC: CPT

## 2024-06-18 PROCEDURE — 36415 COLL VENOUS BLD VENIPUNCTURE: CPT

## 2024-06-18 PROCEDURE — 84484 ASSAY OF TROPONIN QUANT: CPT

## 2024-06-18 PROCEDURE — 93010 ELECTROCARDIOGRAM REPORT: CPT | Performed by: INTERNAL MEDICINE

## 2024-06-18 PROCEDURE — 83880 ASSAY OF NATRIURETIC PEPTIDE: CPT

## 2024-06-18 PROCEDURE — 2580000003 HC RX 258: Performed by: PHYSICIAN ASSISTANT

## 2024-06-18 PROCEDURE — 80053 COMPREHEN METABOLIC PANEL: CPT

## 2024-06-18 PROCEDURE — 99285 EMERGENCY DEPT VISIT HI MDM: CPT

## 2024-06-18 PROCEDURE — 96374 THER/PROPH/DIAG INJ IV PUSH: CPT

## 2024-06-18 PROCEDURE — 71046 X-RAY EXAM CHEST 2 VIEWS: CPT

## 2024-06-18 RX ORDER — ORPHENADRINE CITRATE 30 MG/ML
60 INJECTION INTRAMUSCULAR; INTRAVENOUS ONCE
Status: COMPLETED | OUTPATIENT
Start: 2024-06-18 | End: 2024-06-18

## 2024-06-18 RX ORDER — PREDNISONE 50 MG/1
50 TABLET ORAL DAILY
Qty: 4 TABLET | Refills: 0 | Status: SHIPPED | OUTPATIENT
Start: 2024-06-18 | End: 2024-06-22

## 2024-06-18 RX ADMIN — ORPHENADRINE CITRATE 60 MG: 60 INJECTION INTRAMUSCULAR; INTRAVENOUS at 03:18

## 2024-06-18 RX ADMIN — WATER 125 MG: 1 INJECTION INTRAMUSCULAR; INTRAVENOUS; SUBCUTANEOUS at 03:18

## 2024-06-18 ASSESSMENT — ENCOUNTER SYMPTOMS
DIARRHEA: 0
SHORTNESS OF BREATH: 1
COUGH: 0
ABDOMINAL PAIN: 0
VOMITING: 0
SORE THROAT: 0
PHOTOPHOBIA: 0
CHEST TIGHTNESS: 1
WHEEZING: 1
RHINORRHEA: 0
NAUSEA: 1

## 2024-06-18 ASSESSMENT — PAIN - FUNCTIONAL ASSESSMENT
PAIN_FUNCTIONAL_ASSESSMENT: NONE - DENIES PAIN
PAIN_FUNCTIONAL_ASSESSMENT: NONE - DENIES PAIN

## 2024-06-18 ASSESSMENT — HEART SCORE: ECG: NORMAL

## 2024-06-18 NOTE — ED PROVIDER NOTES
[facility] EMERGENCY DEPT      Pt Name: Osmin Stephen Jr  MRN: [id number]  Birthdate 1970  Date of evaluation: 6/18/2024  Provider: Cherise Kelley PA-C    CHIEF COMPLAINT       Chief Complaint   Patient presents with    Shortness of Breath       Nurses Notes reviewed and I agree except as noted in the HPI.      HISTORY OF PRESENT ILLNESS    Osmin Stephen Jr is a 54 y.o. male who presents from home by private vehicle for \"bad muscle spasms.\".  Since 6-15 patient has been having a flare of his asthma.  His asthma flare consists of shortness of breath, chest tightness, chills, and right-sided muscle spasms.  The spasms are in the right-sided abdomen, chest, and back.  Patient has had decreased sleep secondary to the muscle spasms.  He has used his nebulizer, steroid inhaler, with some relief.  For the last 6 hours the spasms were worse, he could not sleep, prompting him to drive himself here.  He experienced some nausea with the spasms additionally.  Patient denies coughing, fever, nasal congestion, sore throat, vomiting, diaphoresis, current chest pain, current shortness of breath, or other complaints.    Location/Symptom: Right-sided muscle spasms  Timing/Onset: 6-15  Context/Setting: Patient gets these muscle spasms with asthma flares and stress, this time with current asthma flare  Quality: Spasm  Duration: Intermittent  Modifying Factors: Worsened with stress and asthma; improved with Solu-Medrol and muscle relaxers usually  Severity: Moderate    Cardiac catheterization 7/1/2022  CORONARY ANGIOGRAM:  1.  RIGHT CORONARY ARTERY:  Proximal RCA and mid RCA with luminal  irregularities.  Distal RCA has mild diffuse disease with severe  tortuosity.  2.  LEFT MAIN CORONARY ARTERY:  Patent without significant stenotic  lesions, gives rise to ramus intermedius, left circumflex, and left  anterior descending arteries.  3.  LEFT CIRCUMFLEX ARTERY:  Mild luminal irregularities.  4.  RAMUS INTERMEDIUS:  Has 
0

## 2024-06-18 NOTE — ED TRIAGE NOTES
Pt to ED c/o SOB. Pt states this started yesterday. Pt states he took his inhaler with no relief. Pt states he is having muscle spasms throughout his right side and he took his muscle relaxer with no relief. Pt VS stable. EKG and labs obtained.

## 2024-06-19 DIAGNOSIS — S39.012S STRAIN OF LUMBAR REGION, SEQUELA: ICD-10-CM

## 2024-06-19 NOTE — TELEPHONE ENCOUNTER
This medication refill is regarding a electronic request. Refill requested by  [facility] .    Requested Prescriptions     Pending Prescriptions Disp Refills    betamethasone valerate (VALISONE) 0.1 % cream [Pharmacy Med Name: BETAMETHASONE VA 0.1% CREAM] 45 g 3     Sig: apply to affected area twice a day     Date of last visit: 1/10/2024   Date of next visit: 6/20/2024  Date of last refill: 11/2/23 #45g/3    Rx verified, ordered and set to EP.

## 2024-06-24 ENCOUNTER — TELEPHONE (OUTPATIENT)
Dept: FAMILY MEDICINE CLINIC | Age: 54
End: 2024-06-24

## 2024-06-24 RX ORDER — PREDNISONE 10 MG/1
TABLET ORAL
Qty: 30 TABLET | Refills: 0 | Status: SHIPPED | OUTPATIENT
Start: 2024-06-24 | End: 2024-07-04

## 2024-06-24 NOTE — TELEPHONE ENCOUNTER
Noted. Reviewed ER note.  OK for prednisone taper dose.  RX sent to pharmacy.  In person visit if not improving. -WS    Orders Placed This Encounter   Medications    predniSONE (DELTASONE) 10 MG tablet     Si po qd for 3 days, then 3 po qd for 3 days, then 2 po qd for 3 days, then 1 po qd for 3 days     Dispense:  30 tablet     Refill:  0

## 2024-06-24 NOTE — TELEPHONE ENCOUNTER
Pt was in srer on 6/18 foa asthma, allergies, was also having muscle spasms.  Was given predniSONE 50 mg Oral DAILY x 3 days. Pt took all meds and was starting to feel better but pt is now feeling the tightness again in his chest. He has been using the inhaler at work x 3 per day, is using his nebulizer and albuterol He is asking if you can do the dose pk or him where he starts it and then tapers down.     RONAL Maza  WCP

## 2024-06-28 DIAGNOSIS — F41.9 ANXIETY: ICD-10-CM

## 2024-06-28 RX ORDER — DULOXETIN HYDROCHLORIDE 60 MG/1
60 CAPSULE, DELAYED RELEASE ORAL DAILY
Qty: 90 CAPSULE | Refills: 1 | Status: SHIPPED | OUTPATIENT
Start: 2024-06-28

## 2024-06-28 NOTE — TELEPHONE ENCOUNTER
Request sent from [facility] pharmacy for refill of duloxetine 60 mg qd.  Last seen 1/10/24, next appt 7/23/24.  Med verified.  Order pended.

## 2024-08-11 ENCOUNTER — APPOINTMENT (OUTPATIENT)
Dept: CT IMAGING | Age: 54
End: 2024-08-11
Payer: MEDICAID

## 2024-08-11 ENCOUNTER — HOSPITAL ENCOUNTER (EMERGENCY)
Age: 54
Discharge: HOME | End: 2024-08-11
Payer: MEDICAID

## 2024-08-11 ENCOUNTER — APPOINTMENT (OUTPATIENT)
Dept: GENERAL RADIOLOGY | Age: 54
End: 2024-08-11
Payer: MEDICAID

## 2024-08-11 VITALS
HEIGHT: 73 IN | TEMPERATURE: 98.5 F | BODY MASS INDEX: 41.75 KG/M2 | DIASTOLIC BLOOD PRESSURE: 76 MMHG | RESPIRATION RATE: 19 BRPM | OXYGEN SATURATION: 97 % | HEART RATE: 59 BPM | SYSTOLIC BLOOD PRESSURE: 144 MMHG | WEIGHT: 315 LBS

## 2024-08-11 DIAGNOSIS — Z87.39 HISTORY OF FIBROMYALGIA: ICD-10-CM

## 2024-08-11 DIAGNOSIS — R07.89 INTERMITTENT LEFT-SIDED CHEST PAIN: ICD-10-CM

## 2024-08-11 DIAGNOSIS — M62.838 SPASM OF MUSCLE: ICD-10-CM

## 2024-08-11 DIAGNOSIS — R10.30 LOWER ABDOMINAL PAIN: Primary | ICD-10-CM

## 2024-08-11 LAB
ALBUMIN SERPL BCG-MCNC: 4.1 G/DL (ref 3.5–5.1)
ALP SERPL-CCNC: 164 U/L (ref 38–126)
ALT SERPL W/O P-5'-P-CCNC: 21 U/L (ref 11–66)
ANION GAP SERPL CALC-SCNC: 12 MEQ/L (ref 8–16)
AST SERPL-CCNC: 17 U/L (ref 5–40)
BASOPHILS ABSOLUTE: 0.1 THOU/MM3 (ref 0–0.1)
BASOPHILS NFR BLD AUTO: 0.6 %
BILIRUB CONJ SERPL-MCNC: 0.2 MG/DL (ref 0.1–13.8)
BILIRUB SERPL-MCNC: 0.5 MG/DL (ref 0.3–1.2)
BUN SERPL-MCNC: 10 MG/DL (ref 7–22)
CALCIUM SERPL-MCNC: 9.2 MG/DL (ref 8.5–10.5)
CHLORIDE SERPL-SCNC: 99 MEQ/L (ref 98–111)
CO2 SERPL-SCNC: 26 MEQ/L (ref 23–33)
CREAT SERPL-MCNC: 1 MG/DL (ref 0.4–1.2)
CRP SERPL-MCNC: 3.59 MG/DL (ref 0–1)
DEPRECATED RDW RBC AUTO: 43.8 FL (ref 35–45)
EKG ATRIAL RATE: 77 BPM
EKG P AXIS: 71 DEGREES
EKG P-R INTERVAL: 160 MS
EKG Q-T INTERVAL: 402 MS
EKG QRS DURATION: 84 MS
EKG QTC CALCULATION (BAZETT): 454 MS
EKG R AXIS: -8 DEGREES
EKG T AXIS: 36 DEGREES
EKG VENTRICULAR RATE: 77 BPM
EOSINOPHIL NFR BLD AUTO: 1.2 %
EOSINOPHILS ABSOLUTE: 0.1 THOU/MM3 (ref 0–0.4)
ERYTHROCYTE [DISTWIDTH] IN BLOOD BY AUTOMATED COUNT: 12.4 % (ref 11.5–14.5)
ERYTHROCYTE [SEDIMENTATION RATE] IN BLOOD BY WESTERGREN METHOD: 32 MM/HR (ref 0–10)
GFR SERPL CREATININE-BSD FRML MDRD: 90 ML/MIN/1.73M2
GLUCOSE SERPL-MCNC: 96 MG/DL (ref 70–108)
HCT VFR BLD AUTO: 41.3 % (ref 42–52)
HGB BLD-MCNC: 13.3 GM/DL (ref 14–18)
IMM GRANULOCYTES # BLD AUTO: 0.03 THOU/MM3 (ref 0–0.07)
IMM GRANULOCYTES NFR BLD AUTO: 0.3 %
LYMPHOCYTES ABSOLUTE: 1.3 THOU/MM3 (ref 1–4.8)
LYMPHOCYTES NFR BLD AUTO: 14.9 %
MCH RBC QN AUTO: 31 PG (ref 26–33)
MCHC RBC AUTO-ENTMCNC: 32.2 GM/DL (ref 32.2–35.5)
MCV RBC AUTO: 96.3 FL (ref 80–94)
MONOCYTES ABSOLUTE: 0.8 THOU/MM3 (ref 0.4–1.3)
MONOCYTES NFR BLD AUTO: 9.3 %
NEUTROPHILS ABSOLUTE: 6.6 THOU/MM3 (ref 1.8–7.7)
NEUTROPHILS NFR BLD AUTO: 73.7 %
NRBC BLD AUTO-RTO: 0 /100 WBC
OSMOLALITY SERPL CALC.SUM OF ELEC: 272.7 MOSMOL/KG (ref 275–300)
PLATELET # BLD AUTO: 242 THOU/MM3 (ref 130–400)
PMV BLD AUTO: 11.1 FL (ref 9.4–12.4)
POTASSIUM SERPL-SCNC: 3.8 MEQ/L (ref 3.5–5.2)
PROT SERPL-MCNC: 7.6 G/DL (ref 6.1–8)
RBC # BLD AUTO: 4.29 MILL/MM3 (ref 4.7–6.1)
SODIUM SERPL-SCNC: 137 MEQ/L (ref 135–145)
TROPONIN, HIGH SENSITIVITY: 6 NG/L (ref 0–12)
WBC # BLD AUTO: 8.9 THOU/MM3 (ref 4.8–10.8)

## 2024-08-11 PROCEDURE — 80053 COMPREHEN METABOLIC PANEL: CPT

## 2024-08-11 PROCEDURE — 85025 COMPLETE CBC W/AUTO DIFF WBC: CPT

## 2024-08-11 PROCEDURE — 84484 ASSAY OF TROPONIN QUANT: CPT

## 2024-08-11 PROCEDURE — 99285 EMERGENCY DEPT VISIT HI MDM: CPT

## 2024-08-11 PROCEDURE — 71046 X-RAY EXAM CHEST 2 VIEWS: CPT

## 2024-08-11 PROCEDURE — 6360000004 HC RX CONTRAST MEDICATION: Performed by: PHYSICIAN ASSISTANT

## 2024-08-11 PROCEDURE — 93010 ELECTROCARDIOGRAM REPORT: CPT | Performed by: INTERNAL MEDICINE

## 2024-08-11 PROCEDURE — 74177 CT ABD & PELVIS W/CONTRAST: CPT

## 2024-08-11 PROCEDURE — 96375 TX/PRO/DX INJ NEW DRUG ADDON: CPT

## 2024-08-11 PROCEDURE — 86140 C-REACTIVE PROTEIN: CPT

## 2024-08-11 PROCEDURE — 82248 BILIRUBIN DIRECT: CPT

## 2024-08-11 PROCEDURE — 36415 COLL VENOUS BLD VENIPUNCTURE: CPT

## 2024-08-11 PROCEDURE — 85651 RBC SED RATE NONAUTOMATED: CPT

## 2024-08-11 PROCEDURE — 96374 THER/PROPH/DIAG INJ IV PUSH: CPT

## 2024-08-11 PROCEDURE — 6360000002 HC RX W HCPCS: Performed by: PHYSICIAN ASSISTANT

## 2024-08-11 PROCEDURE — 93005 ELECTROCARDIOGRAM TRACING: CPT | Performed by: PHYSICIAN ASSISTANT

## 2024-08-11 RX ORDER — ORPHENADRINE CITRATE 30 MG/ML
60 INJECTION INTRAMUSCULAR; INTRAVENOUS ONCE
Status: DISCONTINUED | OUTPATIENT
Start: 2024-08-11 | End: 2024-08-11

## 2024-08-11 RX ORDER — LORAZEPAM 2 MG/ML
1 INJECTION INTRAMUSCULAR ONCE
Status: COMPLETED | OUTPATIENT
Start: 2024-08-11 | End: 2024-08-11

## 2024-08-11 RX ORDER — KETOROLAC TROMETHAMINE 30 MG/ML
30 INJECTION, SOLUTION INTRAMUSCULAR; INTRAVENOUS ONCE
Status: COMPLETED | OUTPATIENT
Start: 2024-08-11 | End: 2024-08-11

## 2024-08-11 RX ADMIN — IOPAMIDOL 80 ML: 755 INJECTION, SOLUTION INTRAVENOUS at 04:19

## 2024-08-11 RX ADMIN — KETOROLAC TROMETHAMINE 30 MG: 30 INJECTION, SOLUTION INTRAMUSCULAR at 04:29

## 2024-08-11 RX ADMIN — LORAZEPAM 1 MG: 2 INJECTION INTRAMUSCULAR; INTRAVENOUS at 04:29

## 2024-08-11 ASSESSMENT — PAIN - FUNCTIONAL ASSESSMENT
PAIN_FUNCTIONAL_ASSESSMENT: 0-10
PAIN_FUNCTIONAL_ASSESSMENT: NONE - DENIES PAIN

## 2024-08-11 ASSESSMENT — PAIN SCALES - GENERAL: PAINLEVEL_OUTOF10: 9

## 2024-08-11 NOTE — ED PROVIDER NOTES
[facility] EMERGENCY DEPT      Pt Name: Osmin Stephen Jr  MRN: [id number]  Birthdate 1970  Date of evaluation: 8/11/2024  Provider: Cherise Kelley PA-C    CHIEF COMPLAINT       Chief Complaint   Patient presents with    Nausea    Spasms       Nurses Notes reviewed and I agree except as noted in the HPI.      HISTORY OF PRESENT ILLNESS    Osmin Stephen Jr is a 54 y.o. male who presents from home driven by self for muscle spasms.  Patient reports for the past 4 days having left-sided chest spasms, lower abdominal spasms on both sides, and spasms in the right groin and thigh.  The pain gets so bad it causes nausea and shortness of breath.  Patient is in pain management at [facility] taking Raymond and Norflex for his fibromyalgia and chronic low back pain.  Patient has been out of his medications for the past few days as the pharmacy would not fill them due to insurance problem.  Patient is having difficulty sleeping secondary to his pain.  Patient denies fever, chills, numbness, weakness, vomiting, diarrhea, urinary problems, incontinence of bowel or bladder, or other complaints.    Location/Symptom: Left chest, bilateral lower abdomen, right groin, and right thigh spasms  Timing/Onset: 4 days  Context/Setting: Spasms are consistent with flare of fibromyalgia and patient has been out of his medications for the past few days  Quality: Muscle spasm, cramp  Duration: Intermittent  Modifying Factors: Worse at night with certain movements  Severity: 9/10    REVIEW OF SYSTEMS     Review of Systems   Constitutional:  Negative for chills, diaphoresis and fever.   Eyes:  Negative for visual disturbance.   Respiratory:  Positive for shortness of breath. Negative for cough.    Cardiovascular:  Positive for chest pain.   Gastrointestinal:  Positive for abdominal pain and nausea. Negative for diarrhea and vomiting.   Genitourinary:  Negative for difficulty urinating, dysuria, frequency and hematuria.   Musculoskeletal:

## 2024-08-11 NOTE — ED TRIAGE NOTES
Pt to ED c/o nausea and muscle spasms. Pt states this started last week. Pt states he was taking a muscle spasm medication that was working but he ran out. Pt states when the spasms get bad he feels like her is going to throw up. Pt V stable. EKG and labs obtained

## 2024-08-11 NOTE — ED NOTES
Pt VS stable. Patient resting in bed. Respirations easy and unlabored. No distress noted. Call light within reach.

## 2024-08-11 NOTE — DISCHARGE INSTRUCTIONS
Return to the ER if your abdominal pain worsens, does not improve, localizes in the right lower quadrant, if you develop a fever or start vomiting, or any symptoms of concern.

## 2024-09-05 ENCOUNTER — TELEPHONE (OUTPATIENT)
Dept: FAMILY MEDICINE CLINIC | Age: 54
End: 2024-09-05

## 2024-09-11 DIAGNOSIS — Z98.1 S/P CERVICAL SPINAL FUSION: ICD-10-CM

## 2024-09-12 RX ORDER — BUTALBITAL, ACETAMINOPHEN AND CAFFEINE 50; 325; 40 MG/1; MG/1; MG/1
1 TABLET ORAL EVERY 6 HOURS PRN
Qty: 90 TABLET | Refills: 0 | Status: SHIPPED | OUTPATIENT
Start: 2024-09-12

## 2024-09-13 ENCOUNTER — OFFICE VISIT (OUTPATIENT)
Dept: PULMONOLOGY | Age: 54
End: 2024-09-13
Payer: COMMERCIAL

## 2024-09-13 VITALS
SYSTOLIC BLOOD PRESSURE: 142 MMHG | OXYGEN SATURATION: 97 % | HEART RATE: 63 BPM | WEIGHT: 315 LBS | BODY MASS INDEX: 41.75 KG/M2 | HEIGHT: 73 IN | TEMPERATURE: 96.9 F | DIASTOLIC BLOOD PRESSURE: 80 MMHG

## 2024-09-13 DIAGNOSIS — G47.33 OSA ON CPAP: Primary | ICD-10-CM

## 2024-09-13 DIAGNOSIS — E66.01 MORBID OBESITY WITH BMI OF 50.0-59.9, ADULT (HCC): ICD-10-CM

## 2024-09-13 PROCEDURE — 99214 OFFICE O/P EST MOD 30 MIN: CPT | Performed by: NURSE PRACTITIONER

## 2024-09-13 RX ORDER — ORPHENADRINE CITRATE 100 MG/1
100 TABLET, EXTENDED RELEASE ORAL 2 TIMES DAILY PRN
COMMUNITY
Start: 2024-09-08

## 2024-09-13 RX ORDER — HYDROCODONE BITARTRATE AND ACETAMINOPHEN 5; 325 MG/1; MG/1
1 TABLET ORAL EVERY 8 HOURS PRN
COMMUNITY
Start: 2024-09-06

## 2024-09-13 ASSESSMENT — ENCOUNTER SYMPTOMS
DIARRHEA: 0
NAUSEA: 0
BACK PAIN: 1
STRIDOR: 0
EYES NEGATIVE: 1
ALLERGIC/IMMUNOLOGIC NEGATIVE: 1
RESPIRATORY NEGATIVE: 1
CHEST TIGHTNESS: 0
GASTROINTESTINAL NEGATIVE: 1
VOMITING: 0
WHEEZING: 0

## 2024-09-26 ENCOUNTER — HOSPITAL ENCOUNTER (OUTPATIENT)
Dept: MRI IMAGING | Age: 54
Discharge: HOME | End: 2024-09-26
Payer: COMMERCIAL

## 2024-09-26 DIAGNOSIS — D35.2 PITUITARY ADENOMA (HCC): ICD-10-CM

## 2024-09-26 PROCEDURE — 70553 MRI BRAIN STEM W/O & W/DYE: CPT

## 2024-09-26 PROCEDURE — 6360000004 HC RX CONTRAST MEDICATION: Performed by: PHYSICIAN ASSISTANT

## 2024-09-26 PROCEDURE — A9579 GAD-BASE MR CONTRAST NOS,1ML: HCPCS | Performed by: PHYSICIAN ASSISTANT

## 2024-09-26 RX ADMIN — GADOTERIDOL 20 ML: 279.3 INJECTION, SOLUTION INTRAVENOUS at 15:03

## 2024-10-02 ENCOUNTER — OFFICE VISIT (OUTPATIENT)
Dept: FAMILY MEDICINE CLINIC | Age: 54
End: 2024-10-02
Payer: COMMERCIAL

## 2024-10-02 ENCOUNTER — OFFICE VISIT (OUTPATIENT)
Dept: NEUROSURGERY | Age: 54
End: 2024-10-02
Payer: COMMERCIAL

## 2024-10-02 VITALS
HEART RATE: 60 BPM | WEIGHT: 315 LBS | DIASTOLIC BLOOD PRESSURE: 79 MMHG | SYSTOLIC BLOOD PRESSURE: 132 MMHG | BODY MASS INDEX: 52.95 KG/M2 | RESPIRATION RATE: 16 BRPM

## 2024-10-02 VITALS
BODY MASS INDEX: 41.75 KG/M2 | WEIGHT: 315 LBS | HEIGHT: 73 IN | HEART RATE: 70 BPM | SYSTOLIC BLOOD PRESSURE: 140 MMHG | DIASTOLIC BLOOD PRESSURE: 80 MMHG

## 2024-10-02 DIAGNOSIS — J45.40 MODERATE PERSISTENT ASTHMA WITHOUT COMPLICATION: ICD-10-CM

## 2024-10-02 DIAGNOSIS — E78.5 HYPERLIPIDEMIA, UNSPECIFIED HYPERLIPIDEMIA TYPE: ICD-10-CM

## 2024-10-02 DIAGNOSIS — E66.01 MORBID OBESITY WITH BMI OF 50.0-59.9, ADULT: ICD-10-CM

## 2024-10-02 DIAGNOSIS — D35.2 PITUITARY ADENOMA (HCC): Primary | ICD-10-CM

## 2024-10-02 DIAGNOSIS — J30.1 NON-SEASONAL ALLERGIC RHINITIS DUE TO POLLEN: ICD-10-CM

## 2024-10-02 DIAGNOSIS — F41.9 ANXIETY: ICD-10-CM

## 2024-10-02 DIAGNOSIS — I10 PRIMARY HYPERTENSION: Primary | ICD-10-CM

## 2024-10-02 PROBLEM — M54.50 LOWER BACK PAIN: Status: RESOLVED | Noted: 2018-01-03 | Resolved: 2024-10-02

## 2024-10-02 PROBLEM — Z86.79 H/O: HTN (HYPERTENSION): Status: RESOLVED | Noted: 2019-07-09 | Resolved: 2024-10-02

## 2024-10-02 PROBLEM — M79.604 PAIN IN BOTH LOWER EXTREMITIES: Status: RESOLVED | Noted: 2020-07-09 | Resolved: 2024-10-02

## 2024-10-02 PROBLEM — K80.20 GALLSTONES: Status: RESOLVED | Noted: 2023-02-15 | Resolved: 2024-10-02

## 2024-10-02 PROBLEM — Z51.6 ALLERGY DESENSITIZATION THERAPY: Status: RESOLVED | Noted: 2020-09-02 | Resolved: 2024-10-02

## 2024-10-02 PROBLEM — M79.605 PAIN IN BOTH LOWER EXTREMITIES: Status: RESOLVED | Noted: 2020-07-09 | Resolved: 2024-10-02

## 2024-10-02 PROBLEM — S39.012A STRAIN OF LUMBAR REGION: Status: RESOLVED | Noted: 2022-06-12 | Resolved: 2024-10-02

## 2024-10-02 PROCEDURE — 99214 OFFICE O/P EST MOD 30 MIN: CPT | Performed by: PHYSICIAN ASSISTANT

## 2024-10-02 PROCEDURE — 3078F DIAST BP <80 MM HG: CPT | Performed by: NURSE PRACTITIONER

## 2024-10-02 PROCEDURE — 99214 OFFICE O/P EST MOD 30 MIN: CPT | Performed by: NURSE PRACTITIONER

## 2024-10-02 PROCEDURE — 3075F SYST BP GE 130 - 139MM HG: CPT | Performed by: NURSE PRACTITIONER

## 2024-10-02 RX ORDER — HYDROXYZINE HYDROCHLORIDE 25 MG/1
TABLET, FILM COATED ORAL
Qty: 90 TABLET | Refills: 1 | Status: SHIPPED | OUTPATIENT
Start: 2024-10-02

## 2024-10-02 RX ORDER — HYDROCHLOROTHIAZIDE 25 MG/1
25 TABLET ORAL EVERY MORNING
Qty: 90 TABLET | Refills: 3 | Status: SHIPPED | OUTPATIENT
Start: 2024-10-02

## 2024-10-02 RX ORDER — METHYLPREDNISOLONE 4 MG
TABLET, DOSE PACK ORAL
Qty: 1 KIT | Refills: 0 | Status: SHIPPED | OUTPATIENT
Start: 2024-10-02 | End: 2024-10-08

## 2024-10-02 RX ORDER — DULOXETIN HYDROCHLORIDE 60 MG/1
60 CAPSULE, DELAYED RELEASE ORAL DAILY
Qty: 90 CAPSULE | Refills: 1 | Status: SHIPPED | OUTPATIENT
Start: 2024-10-02

## 2024-10-02 RX ORDER — MONTELUKAST SODIUM 10 MG/1
10 TABLET ORAL NIGHTLY
Qty: 90 TABLET | Refills: 3 | Status: SHIPPED | OUTPATIENT
Start: 2024-10-02

## 2024-10-02 ASSESSMENT — ENCOUNTER SYMPTOMS
SHORTNESS OF BREATH: 0
BACK PAIN: 0
VOMITING: 0
WHEEZING: 0
COUGH: 1
COLOR CHANGE: 0
SINUS PAIN: 0
ABDOMINAL PAIN: 0
SORE THROAT: 0
NAUSEA: 0
FACIAL SWELLING: 0
TROUBLE SWALLOWING: 0
EYE PAIN: 0
DIARRHEA: 0

## 2024-10-02 NOTE — PROGRESS NOTES
anxiety Yes Terri Phillips APRN - CNP   hydroCHLOROthiazide (HYDRODIURIL) 25 MG tablet Take 1 tablet by mouth every morning Yes Terri Phillips APRN - CNP   DULoxetine (CYMBALTA) 60 MG extended release capsule Take 1 capsule by mouth daily Yes Terri Phillips APRN - CNP   methylPREDNISolone (MEDROL DOSEPACK) 4 MG tablet Take by mouth. Yes Terri Phillips APRN - CNP   HYDROcodone-acetaminophen (NORCO) 5-325 MG per tablet Take 1 tablet by mouth every 8 hours as needed. Yes ProviderPerri MD   orphenadrine (NORFLEX) 100 MG extended release tablet Take 1 tablet by mouth 2 times daily as needed Yes ProviderPerri MD   butalbital-acetaminophen-caffeine (FIORICET, ESGIC) -40 MG per tablet Take 1 tablet by mouth every 6 hours as needed for Headaches Max Daily Amount: 4 tablets Yes Terri Phillips APRN - CNP   betamethasone valerate (VALISONE) 0.1 % cream apply to affected area twice a day Yes Terri Phillips APRN - CNP   simethicone (MYLICON) 80 MG chewable tablet take 1 tablet by mouth four times a day if needed Yes Terri Phillips APRN - CNP   Magnesium Oxide -Mg Supplement 420 (252 Mg) MG TABS take 1 tablet by mouth twice a day Yes Terri Phillips APRN - CNP   lidocaine (XYLOCAINE) 5 % ointment apply to affected area four times a day if needed for pain Yes Delvis Talley APRN - CNP   albuterol (PROVENTIL) (2.5 MG/3ML) 0.083% nebulizer solution inhale contents of 1 vial ( 3 milliliters ) in nebulizer by mouth and INTO THE LUNGS every 4 hours if needed for wheezing Yes Terri Phillips APRN - CNP   albuterol sulfate HFA (VENTOLIN HFA) 108 (90 Base) MCG/ACT inhaler Inhale 2 puffs into the lungs every 4 hours as needed for Wheezing Yes Terri Phillips APRN - CNP   ipratropium 0.5 mg-albuterol 2.5 mg (DUONEB) 0.5-2.5 (3) MG/3ML SOLN nebulizer solution Inhale 3 mLs into the lungs as needed for Shortness of Breath or Wheezing Yes Terri Phillips, MIRNA - CNP   budesonide-formoterol (SYMBICORT) 160-4.5

## 2024-10-02 NOTE — PROGRESS NOTES
[facility] [place] [facility]  [facility] NEUROSURGERY  [address]  [facility] [address]  Dept: [phone]  Dept Fax: [phone]  Loc: [phone]    Follow-up visit  Visit Date: 10/2/2024      Osmin  is a 54 y.o. male who is returning to the office today for a follow-up visit for continuing evaluation of pituitary adenoma findings.  Patient was last evaluated via telephone on 2/6/2024 with discussions centering on results of an MRI of the brain image with and without contrast as part of a 6-month comparison review.  The images were unchanged and compared identically to previous imaging with today's appointment scheduled to include a new MRI of the brain as an additional comparison, utilizing sella protocol.    He arrives today having undergone an MRI of the brain with and without contrast on 9/27/2024 which is considered stable image with no interval changes since the January image in comparison.  The microadenoma on the right side of the pituitary gland is again estimated at 6 mm and is unchanged.        He arrives today unaccompanied and ambulating with assistance and denies any significant changes in his general health over the last 6 months.  He does have appointments upcoming with endocrinology which he is encouraged to keep and maintain as well as appointments with other subspecialties including primary care.  We reviewed the latest image findings at today's visit and discussed transitioning from 6-month evaluations to annual evaluations.  He is in agreement with that plan moving forward with a new MRI of the brain image with and without contrast ordered in 1 year with a follow-up appointment for comparison.      Patient was evaluated today and is doing very well overall.  No new complaints were voiced.  Patient  lives with their family  Wound: none  Follow-up Studies: No orders of the defined types were placed in this encounter.       Assessment/Plan:  Status Post

## 2024-10-03 ENCOUNTER — TELEPHONE (OUTPATIENT)
Dept: FAMILY MEDICINE CLINIC | Age: 54
End: 2024-10-03

## 2024-10-03 ENCOUNTER — HOSPITAL ENCOUNTER (OUTPATIENT)
Age: 54
Discharge: HOME | End: 2024-10-03
Payer: COMMERCIAL

## 2024-10-03 ENCOUNTER — OFFICE VISIT (OUTPATIENT)
Dept: PHYSICAL MEDICINE AND REHAB | Age: 54
End: 2024-10-03
Payer: COMMERCIAL

## 2024-10-03 ENCOUNTER — HOSPITAL ENCOUNTER (OUTPATIENT)
Dept: GENERAL RADIOLOGY | Age: 54
Discharge: HOME | End: 2024-10-03
Payer: COMMERCIAL

## 2024-10-03 VITALS
SYSTOLIC BLOOD PRESSURE: 136 MMHG | HEIGHT: 73 IN | WEIGHT: 315 LBS | DIASTOLIC BLOOD PRESSURE: 80 MMHG | BODY MASS INDEX: 41.75 KG/M2

## 2024-10-03 DIAGNOSIS — G89.29 CHRONIC MYOFASCIAL PAIN: ICD-10-CM

## 2024-10-03 DIAGNOSIS — M54.17 LUMBOSACRAL RADICULITIS: ICD-10-CM

## 2024-10-03 DIAGNOSIS — M54.12 CERVICAL RADICULOPATHY: ICD-10-CM

## 2024-10-03 DIAGNOSIS — M48.062 SPINAL STENOSIS OF LUMBAR REGION WITH NEUROGENIC CLAUDICATION: ICD-10-CM

## 2024-10-03 DIAGNOSIS — M54.17 LUMBOSACRAL RADICULITIS: Primary | ICD-10-CM

## 2024-10-03 DIAGNOSIS — M54.50 LUMBAR PAIN: ICD-10-CM

## 2024-10-03 DIAGNOSIS — M50.30 DDD (DEGENERATIVE DISC DISEASE), CERVICAL: ICD-10-CM

## 2024-10-03 DIAGNOSIS — M25.50 POLYARTHRALGIA: ICD-10-CM

## 2024-10-03 DIAGNOSIS — M47.816 LUMBAR SPONDYLOSIS: ICD-10-CM

## 2024-10-03 DIAGNOSIS — I10 PRIMARY HYPERTENSION: ICD-10-CM

## 2024-10-03 DIAGNOSIS — G89.4 CHRONIC PAIN SYNDROME: ICD-10-CM

## 2024-10-03 DIAGNOSIS — E66.01 MORBID OBESITY WITH BMI OF 50.0-59.9, ADULT: ICD-10-CM

## 2024-10-03 DIAGNOSIS — M79.18 CHRONIC MYOFASCIAL PAIN: ICD-10-CM

## 2024-10-03 DIAGNOSIS — M79.18 MYOFASCIAL PAIN: ICD-10-CM

## 2024-10-03 DIAGNOSIS — E78.5 HYPERLIPIDEMIA, UNSPECIFIED HYPERLIPIDEMIA TYPE: ICD-10-CM

## 2024-10-03 LAB
ALBUMIN SERPL BCG-MCNC: 4 G/DL (ref 3.5–5.1)
ALP SERPL-CCNC: 145 U/L (ref 38–126)
ALT SERPL W/O P-5'-P-CCNC: 20 U/L (ref 11–66)
ANION GAP SERPL CALC-SCNC: 9 MEQ/L (ref 8–16)
AST SERPL-CCNC: 17 U/L (ref 5–40)
BASOPHILS ABSOLUTE: 0 THOU/MM3 (ref 0–0.1)
BASOPHILS NFR BLD AUTO: 0.7 %
BILIRUB SERPL-MCNC: 0.4 MG/DL (ref 0.3–1.2)
BUN SERPL-MCNC: 11 MG/DL (ref 7–22)
CALCIUM SERPL-MCNC: 8.9 MG/DL (ref 8.5–10.5)
CHLORIDE SERPL-SCNC: 104 MEQ/L (ref 98–111)
CHOLEST SERPL-MCNC: 190 MG/DL (ref 100–199)
CO2 SERPL-SCNC: 28 MEQ/L (ref 23–33)
CREAT SERPL-MCNC: 0.9 MG/DL (ref 0.4–1.2)
DEPRECATED MEAN GLUCOSE BLD GHB EST-ACNC: 87 MG/DL (ref 70–126)
DEPRECATED RDW RBC AUTO: 44.4 FL (ref 35–45)
EOSINOPHIL NFR BLD AUTO: 1.8 %
EOSINOPHILS ABSOLUTE: 0.1 THOU/MM3 (ref 0–0.4)
ERYTHROCYTE [DISTWIDTH] IN BLOOD BY AUTOMATED COUNT: 12.4 % (ref 11.5–14.5)
GFR SERPL CREATININE-BSD FRML MDRD: > 90 ML/MIN/1.73M2
GLUCOSE SERPL-MCNC: 100 MG/DL (ref 70–108)
HBA1C MFR BLD HPLC: 4.9 % (ref 4.4–6.4)
HCT VFR BLD AUTO: 39.9 % (ref 42–52)
HDLC SERPL-MCNC: 43 MG/DL
HGB BLD-MCNC: 12.5 GM/DL (ref 14–18)
IMM GRANULOCYTES # BLD AUTO: 0.02 THOU/MM3 (ref 0–0.07)
IMM GRANULOCYTES NFR BLD AUTO: 0.4 %
LDLC SERPL CALC-MCNC: 128 MG/DL
LYMPHOCYTES ABSOLUTE: 1.4 THOU/MM3 (ref 1–4.8)
LYMPHOCYTES NFR BLD AUTO: 25.1 %
MCH RBC QN AUTO: 30.6 PG (ref 26–33)
MCHC RBC AUTO-ENTMCNC: 31.3 GM/DL (ref 32.2–35.5)
MCV RBC AUTO: 97.8 FL (ref 80–94)
MONOCYTES ABSOLUTE: 0.6 THOU/MM3 (ref 0.4–1.3)
MONOCYTES NFR BLD AUTO: 10.2 %
NEUTROPHILS ABSOLUTE: 3.3 THOU/MM3 (ref 1.8–7.7)
NEUTROPHILS NFR BLD AUTO: 61.8 %
NRBC BLD AUTO-RTO: 0 /100 WBC
PLATELET # BLD AUTO: 213 THOU/MM3 (ref 130–400)
PMV BLD AUTO: 10.4 FL (ref 9.4–12.4)
POTASSIUM SERPL-SCNC: 3.6 MEQ/L (ref 3.5–5.2)
PROT SERPL-MCNC: 7 G/DL (ref 6.1–8)
RBC # BLD AUTO: 4.08 MILL/MM3 (ref 4.7–6.1)
SODIUM SERPL-SCNC: 141 MEQ/L (ref 135–145)
T4 FREE SERPL-MCNC: 0.93 NG/DL (ref 0.93–1.68)
TRIGL SERPL-MCNC: 97 MG/DL (ref 0–199)
TSH SERPL DL<=0.005 MIU/L-ACNC: 1.19 UIU/ML (ref 0.4–4.2)
WBC # BLD AUTO: 5.4 THOU/MM3 (ref 4.8–10.8)

## 2024-10-03 PROCEDURE — 85025 COMPLETE CBC W/AUTO DIFF WBC: CPT

## 2024-10-03 PROCEDURE — 80053 COMPREHEN METABOLIC PANEL: CPT

## 2024-10-03 PROCEDURE — 72100 X-RAY EXAM L-S SPINE 2/3 VWS: CPT

## 2024-10-03 PROCEDURE — 84439 ASSAY OF FREE THYROXINE: CPT

## 2024-10-03 PROCEDURE — 80061 LIPID PANEL: CPT

## 2024-10-03 PROCEDURE — 36415 COLL VENOUS BLD VENIPUNCTURE: CPT

## 2024-10-03 PROCEDURE — 84443 ASSAY THYROID STIM HORMONE: CPT

## 2024-10-03 PROCEDURE — 99215 OFFICE O/P EST HI 40 MIN: CPT | Performed by: NURSE PRACTITIONER

## 2024-10-03 PROCEDURE — 83036 HEMOGLOBIN GLYCOSYLATED A1C: CPT

## 2024-10-03 RX ORDER — HYDROCODONE BITARTRATE AND ACETAMINOPHEN 5; 325 MG/1; MG/1
1 TABLET ORAL EVERY 8 HOURS PRN
Qty: 90 TABLET | Refills: 0 | Status: SHIPPED | OUTPATIENT
Start: 2024-10-06 | End: 2024-11-05

## 2024-10-03 RX ORDER — DULOXETIN HYDROCHLORIDE 20 MG/1
20 CAPSULE, DELAYED RELEASE ORAL DAILY
Qty: 14 CAPSULE | Refills: 0 | Status: SHIPPED | OUTPATIENT
Start: 2024-10-03 | End: 2024-10-17

## 2024-10-03 ASSESSMENT — ENCOUNTER SYMPTOMS
ABDOMINAL PAIN: 1
NAUSEA: 0
ABDOMINAL DISTENTION: 0
PHOTOPHOBIA: 0
BACK PAIN: 1
SHORTNESS OF BREATH: 0
APNEA: 1
COUGH: 0

## 2024-10-03 NOTE — PROGRESS NOTES
[facility] [place] [facility]  [facility]  [address]  [place] [address]  Dept: [phone]  Dept Fax: [phone]  Loc: [phone]    Visit Date: 10/3/2024    Functionality Assessment/Goals Worksheet     On a scale of 0 (Does not Interfere) to 10 (Completely Interferes)     1.  Which number describes how during the past week pain has interfered with       the following:  A.  General Activity:  5  B.  Mood: 8  C.  Walking Ability:  8  D.  Normal Work (Includes both work outside the home and housework):  7  E.  Relations with Other People:   9  F.  Sleep:   9  G.  Enjoyment of Life:   10    2.  Patient Prefers to Take their Pain Medications:     []  On a regular basis   [x]  Only when necessary    []  Does not take pain medications    3.  What are the Patient's Goals/Expectations for Visiting Pain Management?     [x]  Learn about my pain    [x]  Receive Medication   [x]  Physical Therapy     []  Treat Depression   [x]  Receive Injections    []  Treat Sleep   []  Deal with Anxiety and Stress   []  Treat Opoid Dependence/Addiction   []  Other:        HPI:   Osmin Stephen  is a 54 y.o. male is here today for    Chief Complaint: Low back pain, leg pain, neck pain, generalized pain     HPI   This patient has not been seen by me on 1/11/2024 so it has been about 10 months since last visit with me. He states insurance changed with work so he was not able to come here some he was following with management at [facility]- notes reviewed. They did not other injections and continued same medications and he Continues Norco 5/325 TID prn. States that he is back here because his insurance changed again.     Osmin has a main compliant of muscle spasms across abdomen and low back continue Norflex BID  Continues to have pain in neck low back- aching and sharp and stabbing   Denies any numbness or tingling at this time   Again states current pain

## 2024-10-03 NOTE — TELEPHONE ENCOUNTER
I\"M CLOSING REFERRAL.  Osmin has been dismissed from pulm for lung issues, pulmonary medicine will only see Osmin for sleep dx.

## 2024-10-14 ENCOUNTER — TELEPHONE (OUTPATIENT)
Dept: PHYSICAL MEDICINE AND REHAB | Age: 54
End: 2024-10-14

## 2024-10-14 NOTE — TELEPHONE ENCOUNTER
Pt saw you last on 10/3 and you ordered norco and cymbalta. Due to finances he was unable to get and hopes he can get this frid. To start both. He is to see you thurs 17th.   He wants to know if should reschedule to about 2 week later after he starts the meds before seeing you.

## 2024-10-14 NOTE — TELEPHONE ENCOUNTER
Called pt. And LVM to recall and reschedule aptm for 2 weeks later from starting his pain meds as per Herber ALVAREZ CNP

## 2024-10-14 NOTE — TELEPHONE ENCOUNTER
Pt. Recalled and said he just meant the norco.  He has been taking the cymbalta already for over a year.

## 2024-10-18 DIAGNOSIS — Z98.1 S/P CERVICAL SPINAL FUSION: ICD-10-CM

## 2024-10-18 RX ORDER — BUTALBITAL, ACETAMINOPHEN AND CAFFEINE 50; 325; 40 MG/1; MG/1; MG/1
1 TABLET ORAL EVERY 6 HOURS PRN
Qty: 90 TABLET | Refills: 0 | Status: SHIPPED | OUTPATIENT
Start: 2024-10-18

## 2024-10-18 NOTE — TELEPHONE ENCOUNTER
OK for refill. -    Controlled Substance Monitoring:    Acute and Chronic Pain Monitoring:   RX Monitoring Periodic Controlled Substance Monitoring   10/18/2024  12:10 PM No signs of potential drug abuse or diversion identified.;Obtaining appropriate analgesic effect of treatment.

## 2024-10-18 NOTE — TELEPHONE ENCOUNTER
This medication refill is regarding a telephone request. Refill requested by patient.    Requested Prescriptions     Pending Prescriptions Disp Refills    butalbital-acetaminophen-caffeine (FIORICET, ESGIC) -40 MG per tablet 90 tablet 0     Sig: Take 1 tablet by mouth every 6 hours as needed for Headaches Max Daily Amount: 4 tablets       Date of last visit: 10/2/2024   Date of next visit: 4/2/2025  Date of last refill: 9/12/24 for 90/0  Pharmacy Name: [email or web address]    Rx verified, ordered and set to AARTI ISABEL

## 2024-10-31 ENCOUNTER — OFFICE VISIT (OUTPATIENT)
Dept: PHYSICAL MEDICINE AND REHAB | Age: 54
End: 2024-10-31
Payer: COMMERCIAL

## 2024-10-31 VITALS
HEIGHT: 73 IN | BODY MASS INDEX: 41.75 KG/M2 | WEIGHT: 315 LBS | DIASTOLIC BLOOD PRESSURE: 74 MMHG | SYSTOLIC BLOOD PRESSURE: 134 MMHG

## 2024-10-31 DIAGNOSIS — M48.062 SPINAL STENOSIS OF LUMBAR REGION WITH NEUROGENIC CLAUDICATION: ICD-10-CM

## 2024-10-31 DIAGNOSIS — M54.50 LUMBAR PAIN: ICD-10-CM

## 2024-10-31 DIAGNOSIS — M54.17 LUMBOSACRAL RADICULITIS: Primary | ICD-10-CM

## 2024-10-31 DIAGNOSIS — M54.12 CERVICAL RADICULOPATHY: ICD-10-CM

## 2024-10-31 DIAGNOSIS — M47.816 LUMBAR SPONDYLOSIS: ICD-10-CM

## 2024-10-31 DIAGNOSIS — G89.29 CHRONIC MYOFASCIAL PAIN: ICD-10-CM

## 2024-10-31 DIAGNOSIS — G89.4 CHRONIC PAIN SYNDROME: ICD-10-CM

## 2024-10-31 DIAGNOSIS — M50.30 DDD (DEGENERATIVE DISC DISEASE), CERVICAL: ICD-10-CM

## 2024-10-31 DIAGNOSIS — M25.50 POLYARTHRALGIA: ICD-10-CM

## 2024-10-31 DIAGNOSIS — M79.18 MYOFASCIAL PAIN: ICD-10-CM

## 2024-10-31 DIAGNOSIS — S83.241A ACUTE MEDIAL MENISCUS TEAR OF RIGHT KNEE, INITIAL ENCOUNTER: ICD-10-CM

## 2024-10-31 DIAGNOSIS — M79.18 CHRONIC MYOFASCIAL PAIN: ICD-10-CM

## 2024-10-31 PROCEDURE — 99214 OFFICE O/P EST MOD 30 MIN: CPT | Performed by: NURSE PRACTITIONER

## 2024-10-31 RX ORDER — ORPHENADRINE CITRATE 100 MG/1
100 TABLET ORAL 2 TIMES DAILY PRN
Qty: 60 TABLET | Refills: 0 | Status: SHIPPED | OUTPATIENT
Start: 2024-10-31

## 2024-10-31 RX ORDER — LIDOCAINE 50 MG/G
1-3 PATCH TOPICAL EVERY 12 HOURS
Qty: 90 PATCH | Refills: 0 | Status: SHIPPED | OUTPATIENT
Start: 2024-10-31 | End: 2024-11-30

## 2024-10-31 ASSESSMENT — ENCOUNTER SYMPTOMS
BACK PAIN: 1
SHORTNESS OF BREATH: 0
ABDOMINAL PAIN: 1
PHOTOPHOBIA: 0
APNEA: 1
COUGH: 0
ABDOMINAL DISTENTION: 0
NAUSEA: 0

## 2024-10-31 NOTE — PROGRESS NOTES
tablet     Sig: Take 1 tablet by mouth 2 times daily as needed for Muscle spasms     Dispense:  60 tablet     Refill:  0    lidocaine (LIDODERM) 5 %     Sig: Place 1-3 patches onto the skin every 12 hours To painful areas     Dispense:  90 patch     Refill:  0       Return in about 2 months (around 12/31/2024), or if symptoms worsen or fail to improve, for follow up  for medications.               Electronically signed by MIRNA Meredith CNP on10/31/2024 at 8:01 AM

## 2024-11-11 DIAGNOSIS — H10.10 ALLERGIC RHINOCONJUNCTIVITIS: ICD-10-CM

## 2024-11-11 DIAGNOSIS — J30.9 ALLERGIC RHINOCONJUNCTIVITIS: ICD-10-CM

## 2024-11-11 DIAGNOSIS — Z98.1 S/P CERVICAL SPINAL FUSION: ICD-10-CM

## 2024-11-11 DIAGNOSIS — S39.012S STRAIN OF LUMBAR REGION, SEQUELA: ICD-10-CM

## 2024-11-11 DIAGNOSIS — J30.1 NON-SEASONAL ALLERGIC RHINITIS DUE TO POLLEN: ICD-10-CM

## 2024-11-11 RX ORDER — BETAMETHASONE VALERATE 1.2 MG/G
CREAM TOPICAL 2 TIMES DAILY
Qty: 45 G | Refills: 3 | Status: SHIPPED | OUTPATIENT
Start: 2024-11-11

## 2024-11-11 RX ORDER — BUTALBITAL, ACETAMINOPHEN AND CAFFEINE 50; 325; 40 MG/1; MG/1; MG/1
1 TABLET ORAL EVERY 6 HOURS PRN
Qty: 90 TABLET | Refills: 0 | Status: SHIPPED | OUTPATIENT
Start: 2024-11-11

## 2024-11-11 RX ORDER — FLUTICASONE PROPIONATE 50 MCG
2 SPRAY, SUSPENSION (ML) NASAL DAILY
Qty: 3 EACH | Refills: 3 | Status: SHIPPED | OUTPATIENT
Start: 2024-11-11

## 2024-11-11 RX ORDER — AZELASTINE HYDROCHLORIDE 0.5 MG/ML
1 SOLUTION/ DROPS OPHTHALMIC 2 TIMES DAILY
Qty: 1 EACH | Refills: 11 | Status: SHIPPED | OUTPATIENT
Start: 2024-11-11

## 2024-11-11 NOTE — TELEPHONE ENCOUNTER
This medication refill is regarding a telephone request. Refill requested by patient.    Requested Prescriptions     Pending Prescriptions Disp Refills    azelastine (OPTIVAR) 0.05 % ophthalmic solution 1 each 11     Sig: Place 1 drop into both eyes in the morning and at bedtime    betamethasone valerate (VALISONE) 0.1 % cream 45 g 3     Sig: Apply topically 2 times daily APPLY TO AFFECTED AREA    butalbital-acetaminophen-caffeine (FIORICET, ESGIC) -40 MG per tablet 90 tablet 0     Sig: Take 1 tablet by mouth every 6 hours as needed for Headaches Max Daily Amount: 4 tablets    fluticasone (FLONASE) 50 MCG/ACT nasal spray 3 each 3     Si sprays by Each Nostril route daily       Date of last visit: 10/2/2024   Date of next visit: 2025  Date of last refill: Optivar 1/10/24 for 1/11 to RA, Valisone 24 for 45 G/3 to RA, Fioricet 10/18/24 for 90/0 (spoke to [facility] and pt picked up 90 tablets on 10/18/24), Flonase 23 for 48 G/5  Pharmacy Name: [facility]    Rx's verified, ordered and set to EP.     CHALO

## 2024-11-15 ENCOUNTER — HOSPITAL ENCOUNTER (EMERGENCY)
Age: 54
Discharge: HOME | End: 2024-11-15
Attending: STUDENT IN AN ORGANIZED HEALTH CARE EDUCATION/TRAINING PROGRAM
Payer: COMMERCIAL

## 2024-11-15 ENCOUNTER — APPOINTMENT (OUTPATIENT)
Dept: GENERAL RADIOLOGY | Age: 54
End: 2024-11-15
Payer: COMMERCIAL

## 2024-11-15 ENCOUNTER — APPOINTMENT (OUTPATIENT)
Dept: CT IMAGING | Age: 54
End: 2024-11-15
Payer: COMMERCIAL

## 2024-11-15 VITALS
RESPIRATION RATE: 23 BRPM | TEMPERATURE: 99.3 F | WEIGHT: 315 LBS | DIASTOLIC BLOOD PRESSURE: 77 MMHG | OXYGEN SATURATION: 91 % | SYSTOLIC BLOOD PRESSURE: 144 MMHG | HEART RATE: 72 BPM | HEIGHT: 73 IN | BODY MASS INDEX: 41.75 KG/M2

## 2024-11-15 DIAGNOSIS — J32.0 CHRONIC MAXILLARY SINUSITIS: ICD-10-CM

## 2024-11-15 DIAGNOSIS — J18.9 PNEUMONIA OF RIGHT LOWER LOBE DUE TO INFECTIOUS ORGANISM: Primary | ICD-10-CM

## 2024-11-15 DIAGNOSIS — U07.1 COVID: ICD-10-CM

## 2024-11-15 LAB
ALBUMIN SERPL BCG-MCNC: 3.9 G/DL (ref 3.5–5.1)
ALP SERPL-CCNC: 159 U/L (ref 38–126)
ALT SERPL W/O P-5'-P-CCNC: 20 U/L (ref 11–66)
ANION GAP SERPL CALC-SCNC: 13 MEQ/L (ref 8–16)
AST SERPL-CCNC: 18 U/L (ref 5–40)
BASOPHILS ABSOLUTE: 0 THOU/MM3 (ref 0–0.1)
BASOPHILS NFR BLD AUTO: 0.2 %
BILIRUB CONJ SERPL-MCNC: 0.2 MG/DL (ref 0.1–13.8)
BILIRUB SERPL-MCNC: 0.7 MG/DL (ref 0.3–1.2)
BUN SERPL-MCNC: 12 MG/DL (ref 7–22)
CALCIUM SERPL-MCNC: 8 MG/DL (ref 8.5–10.5)
CHLORIDE SERPL-SCNC: 94 MEQ/L (ref 98–111)
CO2 SERPL-SCNC: 23 MEQ/L (ref 23–33)
CREAT SERPL-MCNC: 0.9 MG/DL (ref 0.4–1.2)
DEPRECATED RDW RBC AUTO: 41.9 FL (ref 35–45)
EOSINOPHIL NFR BLD AUTO: 0 %
EOSINOPHILS ABSOLUTE: 0 THOU/MM3 (ref 0–0.4)
ERYTHROCYTE [DISTWIDTH] IN BLOOD BY AUTOMATED COUNT: 12.2 % (ref 11.5–14.5)
FLUAV RNA RESP QL NAA+PROBE: NOT DETECTED
FLUBV RNA RESP QL NAA+PROBE: NOT DETECTED
GFR SERPL CREATININE-BSD FRML MDRD: > 90 ML/MIN/1.73M2
GLUCOSE SERPL-MCNC: 109 MG/DL (ref 70–108)
HCT VFR BLD AUTO: 41.8 % (ref 42–52)
HGB BLD-MCNC: 13.7 GM/DL (ref 14–18)
IMM GRANULOCYTES # BLD AUTO: 0.03 THOU/MM3 (ref 0–0.07)
IMM GRANULOCYTES NFR BLD AUTO: 0.4 %
LACTIC ACID, SEPSIS: 0.8 MMOL/L (ref 0.5–1.9)
LIPASE SERPL-CCNC: 13.7 U/L (ref 5.6–51.3)
LYMPHOCYTES ABSOLUTE: 1 THOU/MM3 (ref 1–4.8)
LYMPHOCYTES NFR BLD AUTO: 12.4 %
MAGNESIUM SERPL-MCNC: 1.4 MG/DL (ref 1.6–2.4)
MCH RBC QN AUTO: 30.4 PG (ref 26–33)
MCHC RBC AUTO-ENTMCNC: 32.8 GM/DL (ref 32.2–35.5)
MCV RBC AUTO: 92.9 FL (ref 80–94)
MONOCYTES ABSOLUTE: 1 THOU/MM3 (ref 0.4–1.3)
MONOCYTES NFR BLD AUTO: 12 %
NEUTROPHILS ABSOLUTE: 6.2 THOU/MM3 (ref 1.8–7.7)
NEUTROPHILS NFR BLD AUTO: 75 %
NRBC BLD AUTO-RTO: 0 /100 WBC
OSMOLALITY SERPL CALC.SUM OF ELEC: 261.1 MOSMOL/KG (ref 275–300)
PLATELET # BLD AUTO: 217 THOU/MM3 (ref 130–400)
PMV BLD AUTO: 10.8 FL (ref 9.4–12.4)
POTASSIUM SERPL-SCNC: 4 MEQ/L (ref 3.5–5.2)
PROT SERPL-MCNC: 7.6 G/DL (ref 6.1–8)
RBC # BLD AUTO: 4.5 MILL/MM3 (ref 4.7–6.1)
SARS-COV-2 RNA RESP QL NAA+PROBE: DETECTED
SODIUM SERPL-SCNC: 130 MEQ/L (ref 135–145)
TROPONIN, HIGH SENSITIVITY: < 6 NG/L (ref 0–12)
WBC # BLD AUTO: 8.2 THOU/MM3 (ref 4.8–10.8)

## 2024-11-15 PROCEDURE — 96375 TX/PRO/DX INJ NEW DRUG ADDON: CPT

## 2024-11-15 PROCEDURE — 70450 CT HEAD/BRAIN W/O DYE: CPT

## 2024-11-15 PROCEDURE — 83605 ASSAY OF LACTIC ACID: CPT

## 2024-11-15 PROCEDURE — 80053 COMPREHEN METABOLIC PANEL: CPT

## 2024-11-15 PROCEDURE — 82248 BILIRUBIN DIRECT: CPT

## 2024-11-15 PROCEDURE — 93005 ELECTROCARDIOGRAM TRACING: CPT | Performed by: STUDENT IN AN ORGANIZED HEALTH CARE EDUCATION/TRAINING PROGRAM

## 2024-11-15 PROCEDURE — 6360000002 HC RX W HCPCS

## 2024-11-15 PROCEDURE — 84484 ASSAY OF TROPONIN QUANT: CPT

## 2024-11-15 PROCEDURE — 96365 THER/PROPH/DIAG IV INF INIT: CPT

## 2024-11-15 PROCEDURE — 85025 COMPLETE CBC W/AUTO DIFF WBC: CPT

## 2024-11-15 PROCEDURE — 99285 EMERGENCY DEPT VISIT HI MDM: CPT

## 2024-11-15 PROCEDURE — 70486 CT MAXILLOFACIAL W/O DYE: CPT

## 2024-11-15 PROCEDURE — 87636 SARSCOV2 & INF A&B AMP PRB: CPT

## 2024-11-15 PROCEDURE — 83735 ASSAY OF MAGNESIUM: CPT

## 2024-11-15 PROCEDURE — 6360000002 HC RX W HCPCS: Performed by: STUDENT IN AN ORGANIZED HEALTH CARE EDUCATION/TRAINING PROGRAM

## 2024-11-15 PROCEDURE — 6370000000 HC RX 637 (ALT 250 FOR IP)

## 2024-11-15 PROCEDURE — 2580000003 HC RX 258

## 2024-11-15 PROCEDURE — 96367 TX/PROPH/DG ADDL SEQ IV INF: CPT

## 2024-11-15 PROCEDURE — 83690 ASSAY OF LIPASE: CPT

## 2024-11-15 PROCEDURE — 36415 COLL VENOUS BLD VENIPUNCTURE: CPT

## 2024-11-15 PROCEDURE — 87040 BLOOD CULTURE FOR BACTERIA: CPT

## 2024-11-15 PROCEDURE — 71045 X-RAY EXAM CHEST 1 VIEW: CPT

## 2024-11-15 RX ORDER — HYDROCODONE BITARTRATE AND ACETAMINOPHEN 5; 325 MG/1; MG/1
2 TABLET ORAL ONCE
Status: DISCONTINUED | OUTPATIENT
Start: 2024-11-15 | End: 2024-11-15

## 2024-11-15 RX ORDER — MORPHINE SULFATE 4 MG/ML
4 INJECTION, SOLUTION INTRAMUSCULAR; INTRAVENOUS
Status: COMPLETED | OUTPATIENT
Start: 2024-11-15 | End: 2024-11-15

## 2024-11-15 RX ORDER — ONDANSETRON 2 MG/ML
4 INJECTION INTRAMUSCULAR; INTRAVENOUS ONCE
Status: COMPLETED | OUTPATIENT
Start: 2024-11-15 | End: 2024-11-15

## 2024-11-15 RX ORDER — AZITHROMYCIN 500 MG/1
500 TABLET, FILM COATED ORAL DAILY
Qty: 3 TABLET | Refills: 0 | Status: SHIPPED | OUTPATIENT
Start: 2024-11-15 | End: 2024-11-18

## 2024-11-15 RX ORDER — MAGNESIUM SULFATE IN WATER 40 MG/ML
2000 INJECTION, SOLUTION INTRAVENOUS ONCE
Status: COMPLETED | OUTPATIENT
Start: 2024-11-15 | End: 2024-11-15

## 2024-11-15 RX ORDER — KETOROLAC TROMETHAMINE 30 MG/ML
30 INJECTION, SOLUTION INTRAMUSCULAR; INTRAVENOUS ONCE
Status: COMPLETED | OUTPATIENT
Start: 2024-11-15 | End: 2024-11-15

## 2024-11-15 RX ORDER — AZITHROMYCIN 250 MG/1
500 TABLET, FILM COATED ORAL ONCE
Status: COMPLETED | OUTPATIENT
Start: 2024-11-15 | End: 2024-11-15

## 2024-11-15 RX ADMIN — KETOROLAC TROMETHAMINE 30 MG: 30 INJECTION, SOLUTION INTRAMUSCULAR at 21:35

## 2024-11-15 RX ADMIN — SODIUM CHLORIDE 3000 MG: 900 INJECTION INTRAVENOUS at 22:21

## 2024-11-15 RX ADMIN — AZITHROMYCIN DIHYDRATE 500 MG: 250 TABLET ORAL at 22:33

## 2024-11-15 RX ADMIN — MAGNESIUM SULFATE HEPTAHYDRATE 2000 MG: 40 INJECTION, SOLUTION INTRAVENOUS at 21:38

## 2024-11-15 RX ADMIN — MORPHINE SULFATE 4 MG: 4 INJECTION, SOLUTION INTRAMUSCULAR; INTRAVENOUS at 20:35

## 2024-11-15 RX ADMIN — ONDANSETRON 4 MG: 2 INJECTION INTRAMUSCULAR; INTRAVENOUS at 20:35

## 2024-11-15 ASSESSMENT — PAIN DESCRIPTION - LOCATION: LOCATION: CHEST;HEAD

## 2024-11-15 ASSESSMENT — PAIN SCALES - GENERAL
PAINLEVEL_OUTOF10: 10
PAINLEVEL_OUTOF10: 8
PAINLEVEL_OUTOF10: 4
PAINLEVEL_OUTOF10: 10
PAINLEVEL_OUTOF10: 7
PAINLEVEL_OUTOF10: 8
PAINLEVEL_OUTOF10: 10

## 2024-11-15 ASSESSMENT — PAIN - FUNCTIONAL ASSESSMENT
PAIN_FUNCTIONAL_ASSESSMENT: 0-10

## 2024-11-16 LAB
EKG ATRIAL RATE: 79 BPM
EKG P AXIS: 67 DEGREES
EKG P-R INTERVAL: 162 MS
EKG Q-T INTERVAL: 392 MS
EKG QRS DURATION: 90 MS
EKG QTC CALCULATION (BAZETT): 449 MS
EKG R AXIS: -26 DEGREES
EKG T AXIS: 50 DEGREES
EKG VENTRICULAR RATE: 79 BPM
TROPONIN, HIGH SENSITIVITY: 6 NG/L (ref 0–12)

## 2024-11-16 PROCEDURE — 93010 ELECTROCARDIOGRAM REPORT: CPT | Performed by: INTERNAL MEDICINE

## 2024-11-16 NOTE — ED NOTES
Antibiotic started per MAR. Patient resting in bed. Respirations easy and unlabored. No distress noted. Call light within reach.

## 2024-11-16 NOTE — ED PROVIDER NOTES
[facility] EMERGENCY DEPT  EMERGENCY DEPARTMENT ENCOUNTER          Pt Name: Osmin Stephen Jr  MRN: [id number]  Birthdate 1970  Date of evaluation: 11/15/2024  Physician: Verenice Garcia MD  Supervising Attending Physician: Jimena Arnold MD       CHIEF COMPLAINT       Chief Complaint   Patient presents with    Chest Pain    Headache         HISTORY OF PRESENT ILLNESS    HPI  Osmin Stephen Jr is a 54 y.o. male who presents to the emergency department from home, by private vehicle for evaluation of headache    Patient presents to the ED complaining of headache cough productive sputum and nasal discharge of greenish sputum as well in addition to headache and facial tenderness and pain.  He does report fever and chills.  Has been on feeling unwell for the past 7 days and progressively getting worse.  Decreased oral intake as well because of his symptoms.  The patient has no other acute complaints at this time.      REVIEW OF SYSTEMS   Review of Systems      PAST MEDICAL AND SURGICAL HISTORY     Past Medical History:   Diagnosis Date    Aneurysm (HCC)     pituitary gland    Arthritis     Asthma     Cardiomegaly     COVID-19 11/2021    [facility]    Depression     Fibromyalgia     GERD (gastroesophageal reflux disease)     Hypertension     melhem    Liver disease     CLARIBEL on CPAP      Past Surgical History:   Procedure Laterality Date    BACK SURGERY      CHOLECYSTECTOMY, LAPAROSCOPIC N/A 2/15/2023    ROOTIC CHOLECYSTECTOMY performed by Marli Beckwith MD at [facility] OR    COLONOSCOPY      KNEE SURGERY Left     NECK SURGERY      PAIN MANAGEMENT PROCEDURE Bilateral 02/11/2021    Bilateral L-facet MBB # 1 @ L3-4, L4-5, and L5-S1 performed by Vinod Douglas MD at [facility] OR    PAIN MANAGEMENT PROCEDURE Bilateral 04/08/2021    Bilateral L-facet MBB # 2 @ L3-4, L4-5, and L5-S1 performed by Vinod Douglas MD at [facility] [place] OR    PAIN MANAGEMENT PROCEDURE

## 2024-11-16 NOTE — ED TRIAGE NOTES
Pt presents to ED with chest pain and a headache that started a week ago, per pt. Pt states he started getting chest pain, a headache, and a cough with phlegm coming up. Pt states he has not been able to eat normally and has only been drinking water. Pt states he has been taking Norco and another pain medication with no relief. Pt reports occasional dizziness and SOB. EKG complete

## 2024-11-16 NOTE — ED NOTES
Meds administered per MAR. Patient resting in bed. Respirations easy and unlabored. No distress noted. Call light within reach.

## 2024-11-16 NOTE — DISCHARGE INSTRUCTIONS
and take the 2 antibiotics that were already prescribed for the next few days.    Take tylenol or motrin as needed for pain, always take motrin with a meal and plenty of fluids. Avoid taking for longer than 5 days.    Return to the emergency department immediately if there is any new or concerning symptom.

## 2024-11-17 LAB
BACTERIA BLD AEROBE CULT: NORMAL
BACTERIA BLD AEROBE CULT: NORMAL

## 2024-11-18 ENCOUNTER — TELEPHONE (OUTPATIENT)
Dept: FAMILY MEDICINE CLINIC | Age: 54
End: 2024-11-18

## 2024-11-18 DIAGNOSIS — M54.17 LUMBOSACRAL RADICULITIS: ICD-10-CM

## 2024-11-18 DIAGNOSIS — M47.816 LUMBAR SPONDYLOSIS: ICD-10-CM

## 2024-11-18 DIAGNOSIS — M48.062 SPINAL STENOSIS OF LUMBAR REGION WITH NEUROGENIC CLAUDICATION: ICD-10-CM

## 2024-11-18 DIAGNOSIS — G89.29 CHRONIC MYOFASCIAL PAIN: ICD-10-CM

## 2024-11-18 DIAGNOSIS — M50.30 DDD (DEGENERATIVE DISC DISEASE), CERVICAL: ICD-10-CM

## 2024-11-18 DIAGNOSIS — M54.50 LUMBAR PAIN: ICD-10-CM

## 2024-11-18 DIAGNOSIS — M25.50 POLYARTHRALGIA: ICD-10-CM

## 2024-11-18 DIAGNOSIS — M79.18 CHRONIC MYOFASCIAL PAIN: ICD-10-CM

## 2024-11-18 DIAGNOSIS — S83.241A ACUTE MEDIAL MENISCUS TEAR OF RIGHT KNEE, INITIAL ENCOUNTER: ICD-10-CM

## 2024-11-18 DIAGNOSIS — M79.18 MYOFASCIAL PAIN: ICD-10-CM

## 2024-11-18 DIAGNOSIS — G89.4 CHRONIC PAIN SYNDROME: ICD-10-CM

## 2024-11-18 RX ORDER — PREDNISONE 10 MG/1
TABLET ORAL
Qty: 30 TABLET | Refills: 0 | Status: SHIPPED | OUTPATIENT
Start: 2024-11-18 | End: 2024-11-28

## 2024-11-18 RX ORDER — HYDROCODONE BITARTRATE AND ACETAMINOPHEN 5; 325 MG/1; MG/1
1 TABLET ORAL EVERY 8 HOURS PRN
Qty: 90 TABLET | Refills: 0 | Status: SHIPPED | OUTPATIENT
Start: 2024-11-18 | End: 2024-12-18

## 2024-11-18 NOTE — TELEPHONE ENCOUNTER
Patient was last seen 10/31/2024 and his next appt is 1/13/2025.  He is calling in for his refills, he said he needs his norco 5-325 mg 3 times daily (unable to find on med list), lidocaine patches which insurance only approved once daily he said, and the orphenadrine 100 mg twice daily, to [facility] [address].  And he is asking for refills asap, he was seen at [facility] ER 11/15/2024 and is having a lot of pain.  Please advise.

## 2024-11-18 NOTE — TELEPHONE ENCOUNTER
Pt called in and states that he was seen at the ED for covid and has been in pain dude to his facial fullness and swelling. States that he is taking his antibiotics but not getting a lot of relief in the face. Pt denies an appt as he has his children at home with him.     [facility] luciano

## 2024-11-18 NOTE — TELEPHONE ENCOUNTER
OARRS reviewed. UDS: + for  .Butalbital,hydrocdone   Last seen: 10/31/2024. Follow-up:   Future Appointments   Date Time Provider Department Center   11/20/2024 10:20 AM Bernie Canales APRN - CNP N Pulm Med [facility] [place]   11/21/2024  9:40 AM Irma Fuentes APRN - CNP [facility] DEP   1/13/2025  8:00 AM Delvis Talley APRN - CNP N [facility] [place]   4/2/2025 11:30 AM Terri Phillips APRN - CNP [facility] DEP   9/10/2025  1:40 PM Bernie Canales APRN - CNP N Pul Med [facility]   9/24/2025  2:00 PM STR MRI RM1 STRZ MRI STR Rad/Card   10/1/2025  2:00 PM Theodroe Owens PA-C N SRPXNEURSU [facility]    Too soon on the norflex and lidocaine refills

## 2024-11-18 NOTE — TELEPHONE ENCOUNTER
Noted.  RX for prednisone taper dose sent to pharmacy. Call office with any questions or concerns, or if symptoms are getting worse or changing. -ER for any acute changes. -WS    Orders Placed This Encounter   Medications    predniSONE (DELTASONE) 10 MG tablet     Si po qd for 3 days, then 3 po qd for 3 days, then 2 po qd for 3 days, then 1 po qd for 3 days     Dispense:  30 tablet     Refill:  0

## 2024-11-20 LAB
BACTERIA BLD AEROBE CULT: NORMAL
BACTERIA BLD AEROBE CULT: NORMAL

## 2024-11-21 ENCOUNTER — HOSPITAL ENCOUNTER (OUTPATIENT)
Age: 54
Discharge: HOME | End: 2024-11-21
Payer: COMMERCIAL

## 2024-11-21 ENCOUNTER — OFFICE VISIT (OUTPATIENT)
Dept: FAMILY MEDICINE CLINIC | Age: 54
End: 2024-11-21

## 2024-11-21 VITALS
BODY MASS INDEX: 51.08 KG/M2 | RESPIRATION RATE: 16 BRPM | HEART RATE: 64 BPM | WEIGHT: 315 LBS | DIASTOLIC BLOOD PRESSURE: 80 MMHG | SYSTOLIC BLOOD PRESSURE: 138 MMHG

## 2024-11-21 DIAGNOSIS — D64.9 ANEMIA, UNSPECIFIED TYPE: ICD-10-CM

## 2024-11-21 DIAGNOSIS — J12.82 PNEUMONIA DUE TO COVID-19 VIRUS: ICD-10-CM

## 2024-11-21 DIAGNOSIS — U07.1 PNEUMONIA DUE TO COVID-19 VIRUS: ICD-10-CM

## 2024-11-21 DIAGNOSIS — D64.9 ANEMIA, UNSPECIFIED TYPE: Primary | ICD-10-CM

## 2024-11-21 DIAGNOSIS — F33.2 SEVERE EPISODE OF RECURRENT MAJOR DEPRESSIVE DISORDER, WITHOUT PSYCHOTIC FEATURES (HCC): ICD-10-CM

## 2024-11-21 LAB
ALBUMIN SERPL BCG-MCNC: 3.8 G/DL (ref 3.5–5.1)
ALP SERPL-CCNC: 116 U/L (ref 38–126)
ALT SERPL W/O P-5'-P-CCNC: 25 U/L (ref 11–66)
ANION GAP SERPL CALC-SCNC: 10 MEQ/L (ref 8–16)
AST SERPL-CCNC: 15 U/L (ref 5–40)
BASOPHILS ABSOLUTE: 0 THOU/MM3 (ref 0–0.1)
BASOPHILS NFR BLD AUTO: 0.3 %
BILIRUB SERPL-MCNC: 0.3 MG/DL (ref 0.3–1.2)
BUN SERPL-MCNC: 14 MG/DL (ref 7–22)
CALCIUM SERPL-MCNC: 9.3 MG/DL (ref 8.5–10.5)
CHLORIDE SERPL-SCNC: 105 MEQ/L (ref 98–111)
CO2 SERPL-SCNC: 28 MEQ/L (ref 23–33)
CREAT SERPL-MCNC: 0.8 MG/DL (ref 0.4–1.2)
DEPRECATED RDW RBC AUTO: 41.5 FL (ref 35–45)
EOSINOPHIL NFR BLD AUTO: 0.1 %
EOSINOPHILS ABSOLUTE: 0 THOU/MM3 (ref 0–0.4)
ERYTHROCYTE [DISTWIDTH] IN BLOOD BY AUTOMATED COUNT: 11.9 % (ref 11.5–14.5)
FERRITIN SERPL IA-MCNC: 224 NG/ML (ref 22–322)
FOLATE SERPL-MCNC: 5 NG/ML (ref 4.8–24.2)
GFR SERPL CREATININE-BSD FRML MDRD: > 90 ML/MIN/1.73M2
GLUCOSE SERPL-MCNC: 102 MG/DL (ref 70–108)
HCT VFR BLD AUTO: 38.1 % (ref 42–52)
HGB BLD-MCNC: 12.1 GM/DL (ref 14–18)
IMM GRANULOCYTES # BLD AUTO: 0.09 THOU/MM3 (ref 0–0.07)
IMM GRANULOCYTES NFR BLD AUTO: 0.8 %
IRON SERPL-MCNC: 80 UG/DL (ref 65–195)
LYMPHOCYTES ABSOLUTE: 1.2 THOU/MM3 (ref 1–4.8)
LYMPHOCYTES NFR BLD AUTO: 10.4 %
MCH RBC QN AUTO: 30.5 PG (ref 26–33)
MCHC RBC AUTO-ENTMCNC: 31.8 GM/DL (ref 32.2–35.5)
MCV RBC AUTO: 96 FL (ref 80–94)
MONOCYTES ABSOLUTE: 1 THOU/MM3 (ref 0.4–1.3)
MONOCYTES NFR BLD AUTO: 8.6 %
NEUTROPHILS ABSOLUTE: 9.3 THOU/MM3 (ref 1.8–7.7)
NEUTROPHILS NFR BLD AUTO: 79.8 %
NRBC BLD AUTO-RTO: 0 /100 WBC
PLATELET # BLD AUTO: 353 THOU/MM3 (ref 130–400)
PMV BLD AUTO: 10.1 FL (ref 9.4–12.4)
POTASSIUM SERPL-SCNC: 4.1 MEQ/L (ref 3.5–5.2)
PROT SERPL-MCNC: 7.3 G/DL (ref 6.1–8)
RBC # BLD AUTO: 3.97 MILL/MM3 (ref 4.7–6.1)
SODIUM SERPL-SCNC: 143 MEQ/L (ref 135–145)
TIBC SERPL-MCNC: 256 UG/DL (ref 171–450)
VIT B12 SERPL-MCNC: 444 PG/ML (ref 211–911)
WBC # BLD AUTO: 11.7 THOU/MM3 (ref 4.8–10.8)

## 2024-11-21 PROCEDURE — 85025 COMPLETE CBC W/AUTO DIFF WBC: CPT

## 2024-11-21 PROCEDURE — 83540 ASSAY OF IRON: CPT

## 2024-11-21 PROCEDURE — 36415 COLL VENOUS BLD VENIPUNCTURE: CPT

## 2024-11-21 PROCEDURE — 80053 COMPREHEN METABOLIC PANEL: CPT

## 2024-11-21 PROCEDURE — 82728 ASSAY OF FERRITIN: CPT

## 2024-11-21 PROCEDURE — 83550 IRON BINDING TEST: CPT

## 2024-11-21 PROCEDURE — 82607 VITAMIN B-12: CPT

## 2024-11-21 PROCEDURE — 82746 ASSAY OF FOLIC ACID SERUM: CPT

## 2024-11-21 RX ORDER — DIPHENHYDRAMINE HCL 25 MG
CAPSULE ORAL
COMMUNITY
Start: 2024-11-07

## 2024-11-21 RX ORDER — AMITRIPTYLINE HYDROCHLORIDE 50 MG/1
TABLET ORAL
Qty: 90 TABLET | Refills: 1 | Status: SHIPPED | OUTPATIENT
Start: 2024-11-21

## 2024-11-21 SDOH — ECONOMIC STABILITY: FOOD INSECURITY: WITHIN THE PAST 12 MONTHS, YOU WORRIED THAT YOUR FOOD WOULD RUN OUT BEFORE YOU GOT MONEY TO BUY MORE.: PATIENT DECLINED

## 2024-11-21 SDOH — ECONOMIC STABILITY: FOOD INSECURITY: WITHIN THE PAST 12 MONTHS, THE FOOD YOU BOUGHT JUST DIDN'T LAST AND YOU DIDN'T HAVE MONEY TO GET MORE.: PATIENT DECLINED

## 2024-11-21 SDOH — ECONOMIC STABILITY: INCOME INSECURITY: HOW HARD IS IT FOR YOU TO PAY FOR THE VERY BASICS LIKE FOOD, HOUSING, MEDICAL CARE, AND HEATING?: PATIENT DECLINED

## 2024-11-21 ASSESSMENT — ENCOUNTER SYMPTOMS
DIARRHEA: 0
SORE THROAT: 0
EYE DISCHARGE: 0
ANAL BLEEDING: 0
CONSTIPATION: 0
RHINORRHEA: 0
COUGH: 0
ABDOMINAL DISTENTION: 0
BLOOD IN STOOL: 0
ABDOMINAL PAIN: 0
SHORTNESS OF BREATH: 0
COLOR CHANGE: 0
EYE REDNESS: 0
NAUSEA: 0

## 2024-11-21 NOTE — PROGRESS NOTES
[facility]  [facility]  [address]  [facility] [address]  Dept: [phone]  Loc: [phone]      Visit Date: 11/21/2024    Osmin Stephen Jr is a 54 y.o. male who presents today for:  Chief Complaint   Patient presents with    ED Follow-up     [facility] ED follow up from 11/15/24 for pneumonia and Covid. Pt is needing a letter stating from 11/17/24 - 11/21/24 or 11/22/24 that he is cleared to go back to work.     HPI:     Seen at ER 11/15: positive COVID and pneumonia - feeling better now    Narrative & Impression  1 view chest x-ray     Comparison: CR/SR - XR CHEST STANDARD (2 VW) - 8/11/24 03:46 EDT     Findings:  There is subtle opacity at the right lung base.  No effusion.  Normal size heart.  No acute fracture.     IMPRESSION:  Subtle right lung base opacity. This could be overlapping shadows or   developing consolidation. Follow-up as needed.    Narrative   CT head without contrast    Comparison: CT/SR - CT HEAD WO CONTRAST - 1/27/24 17:44 EST    Findings:  No intra-axial mass, midline shift, hydrocephalus, or acute hemorrhage.  No significant atrophy-like change or white matter disease.    Mucosal thickening and fluid in the left maxillary sinus is new.  The orbits are within normal limits. ...   Impression   1. New left maxillary sinus disease.  2. No acute intracranial findings     Narrative & Impression  CT head without contrast     Comparison: CT/SR - CT HEAD WO CONTRAST - 1/27/24 17:44 EST     Findings:  No intra-axial mass, midline shift, hydrocephalus, or acute hemorrhage.  No significant atrophy-like change or white matter disease.     Mucosal thickening and fluid in the left maxillary sinus is new.  The orbits are within normal limits.  There is no acute fracture.     IMPRESSION:  1. New left maxillary sinus disease.  2. No acute intracranial findings.         Component  Ref Range & Units    SARS-CoV-2 RNA, RT PCR  NOT DETECTED DETECTED Abnormal

## 2024-11-26 ENCOUNTER — TELEPHONE (OUTPATIENT)
Dept: FAMILY MEDICINE CLINIC | Age: 54
End: 2024-11-26

## 2024-11-26 DIAGNOSIS — Z12.11 COLON CANCER SCREENING: ICD-10-CM

## 2024-11-26 DIAGNOSIS — D64.9 ANEMIA, UNSPECIFIED TYPE: Primary | ICD-10-CM

## 2024-11-26 NOTE — TELEPHONE ENCOUNTER
Pt notified of results and verbalized understanding. Pt is agreeable to referral to GI. Pt would like referral to be sent to [facility]. Pt notified they will call him to schedule an appt. Referral placed and faxed to:    [facility]:  Blanche Garsia  [address] [place] [address]  Phone number: [phone]   Fax number: [phone]

## 2024-11-26 NOTE — TELEPHONE ENCOUNTER
----- Message from MIRNA Parikh - CNP sent at 11/21/2024  1:07 PM EST -----  CBC shows ongoing anemia - waiting on iron level    Recommend referral to GI for colonoscopy due to anemia (HM says never done), this is an important test to have completed to identify source of anemia. Ok for referral DX anemia, colon cancer screening

## 2024-12-04 ENCOUNTER — TELEPHONE (OUTPATIENT)
Dept: FAMILY MEDICINE CLINIC | Age: 54
End: 2024-12-04

## 2024-12-04 RX ORDER — METHYLPREDNISOLONE 4 MG/1
TABLET ORAL
Qty: 1 KIT | Refills: 0 | Status: SHIPPED | OUTPATIENT
Start: 2024-12-04 | End: 2024-12-10

## 2024-12-04 NOTE — TELEPHONE ENCOUNTER
Noted.  Medrol dose pack pended.  Please confirm pharmacy.  Follow up in office next week if not improving. -WS

## 2024-12-04 NOTE — TELEPHONE ENCOUNTER
Pt left a voicemail asking if Terri could prescribe him the steroid he was prescribed last time by her. He is requesting a \" stronger dose, the ones that come in the pack work best.\" He stated once he was finished with the steroid his headaches came back and he started having nasal drainage out of one side and some congestion.    Please advise.

## 2024-12-05 NOTE — TELEPHONE ENCOUNTER
Patient notified. OARRS reviewed. No UDS. Narcan offered: Offered  Last seen: 04/23/2020.  Follow-up: 05/14/2020 Render Note In Bullet Format When Appropriate: No Show Applicator Variable?: Yes Detail Level: Detailed Duration Of Freeze Thaw-Cycle (Seconds): 0 Post-Care Instructions: I reviewed with the patient in detail post-care instructions. Patient is to wear sunprotection, and avoid picking at any of the treated lesions. Pt may apply Vaseline to crusted or scabbing areas. Consent: The patient's consent was obtained including but not limited to risks of crusting, scabbing, blistering, scarring, darker or lighter pigmentary change, recurrence, incomplete removal and infection.

## 2024-12-11 ENCOUNTER — TELEPHONE (OUTPATIENT)
Dept: PULMONOLOGY | Age: 54
End: 2024-12-11

## 2024-12-11 NOTE — TELEPHONE ENCOUNTER
Patient called in same day to r/s his appt. He stated he hadnt been to sleep yet and knew he wasn't going to wake up for his appt. I advised him that he should be seen within 30-90 days after set up and he was approaching that cut off , patient stated he just didn't think he would be able to wake up for appt . I r/s him out for 2.26.25 w [place].

## 2024-12-16 ENCOUNTER — TELEPHONE (OUTPATIENT)
Dept: PHYSICAL MEDICINE AND REHAB | Age: 54
End: 2024-12-16

## 2024-12-16 DIAGNOSIS — M54.17 LUMBOSACRAL RADICULITIS: ICD-10-CM

## 2024-12-16 DIAGNOSIS — M47.816 LUMBAR SPONDYLOSIS: ICD-10-CM

## 2024-12-16 DIAGNOSIS — M48.062 SPINAL STENOSIS OF LUMBAR REGION WITH NEUROGENIC CLAUDICATION: ICD-10-CM

## 2024-12-16 DIAGNOSIS — M50.30 DDD (DEGENERATIVE DISC DISEASE), CERVICAL: ICD-10-CM

## 2024-12-16 DIAGNOSIS — G89.4 CHRONIC PAIN SYNDROME: ICD-10-CM

## 2024-12-16 DIAGNOSIS — G89.29 CHRONIC MYOFASCIAL PAIN: ICD-10-CM

## 2024-12-16 DIAGNOSIS — S83.241A ACUTE MEDIAL MENISCUS TEAR OF RIGHT KNEE, INITIAL ENCOUNTER: ICD-10-CM

## 2024-12-16 DIAGNOSIS — M25.50 POLYARTHRALGIA: ICD-10-CM

## 2024-12-16 DIAGNOSIS — M79.18 CHRONIC MYOFASCIAL PAIN: ICD-10-CM

## 2024-12-16 DIAGNOSIS — M79.18 MYOFASCIAL PAIN: ICD-10-CM

## 2024-12-16 DIAGNOSIS — M54.50 LUMBAR PAIN: ICD-10-CM

## 2024-12-16 RX ORDER — LIDOCAINE 50 MG/G
1-3 PATCH TOPICAL EVERY 12 HOURS
Qty: 90 PATCH | Refills: 0 | Status: SHIPPED | OUTPATIENT
Start: 2024-12-16 | End: 2024-12-20 | Stop reason: SDUPTHER

## 2024-12-16 RX ORDER — ORPHENADRINE CITRATE 100 MG/1
100 TABLET ORAL 2 TIMES DAILY PRN
Qty: 60 TABLET | Refills: 0 | Status: SHIPPED | OUTPATIENT
Start: 2024-12-16

## 2024-12-16 RX ORDER — HYDROCODONE BITARTRATE AND ACETAMINOPHEN 5; 325 MG/1; MG/1
1 TABLET ORAL EVERY 8 HOURS PRN
Qty: 90 TABLET | Refills: 0 | Status: SHIPPED | OUTPATIENT
Start: 2024-12-18 | End: 2025-01-17

## 2024-12-16 RX ORDER — LIDOCAINE 50 MG/G
OINTMENT TOPICAL
Qty: 50 G | Refills: 2 | Status: SHIPPED | OUTPATIENT
Start: 2024-12-16

## 2024-12-16 NOTE — TELEPHONE ENCOUNTER
Osmin Stephen Jr called requesting a refill on the following medications:  Requested Prescriptions     Pending Prescriptions Disp Refills    HYDROcodone-acetaminophen (NORCO) 5-325 MG per tablet 90 tablet 0     Sig: Take 1 tablet by mouth every 8 hours as needed for Pain for up to 30 days. Max Daily Amount: 3 tablets    orphenadrine (NORFLEX) 100 MG extended release tablet 60 tablet 0     Sig: Take 1 tablet by mouth 2 times daily as needed for Muscle spasms    lidocaine (XYLOCAINE) 5 % ointment 50 g 2     Sig: apply to affected area four times a day if needed for pain    lidocaine (LIDODERM) 5 % 90 patch 0     Sig: Place 1-3 patches onto the skin every 12 hours To painful areas     Pharmacy verified: [facility] [address]  .pv      Date of last visit: 10/31/2024  Date of next visit (if applicable): Visit date not found

## 2024-12-16 NOTE — TELEPHONE ENCOUNTER
OARRS reviewed. UDS: + for  hydrocodone. Butalbital is present.  Last seen: 10/31/2024. Follow-up: 1/13/2025

## 2024-12-20 RX ORDER — LORATADINE 10 MG/1
10 TABLET ORAL DAILY
Qty: 90 TABLET | Refills: 3 | Status: SHIPPED | OUTPATIENT
Start: 2024-12-20

## 2024-12-20 RX ORDER — DIPHENHYDRAMINE HCL 25 MG
25 CAPSULE ORAL EVERY 6 HOURS PRN
Qty: 30 CAPSULE | Refills: 1 | Status: SHIPPED | OUTPATIENT
Start: 2024-12-20

## 2024-12-20 RX ORDER — LIDOCAINE 50 MG/G
1 PATCH TOPICAL DAILY
Qty: 30 PATCH | Refills: 0 | Status: SHIPPED | OUTPATIENT
Start: 2024-12-20 | End: 2025-01-19

## 2024-12-20 NOTE — TELEPHONE ENCOUNTER
Pt requesting refill of Claritin 10 mg qd and Benadryl 25 mg q6 hours prn to [facility] pharmacy.  Last seen 11/21/24, next appt 4/2/25.  Medications verified.  Orders pended.  WCP

## 2024-12-20 NOTE — TELEPHONE ENCOUNTER
Per pharmacy script for lidocaine patches 1-3 the insurance will only cover/allow for 1 patch Setting up new script. Also will need PA for the Lidocaine ointment.

## 2024-12-27 NOTE — TELEPHONE ENCOUNTER
Lady Rai called requesting a refill on the following medications:  Requested Prescriptions     Pending Prescriptions Disp Refills    HYDROcodone-acetaminophen (NORCO) 5-325 MG per tablet 90 tablet 0     Sig: Take 1 tablet by mouth every 8 hours as needed for Pain for up to 30 days.      Pharmacy verified:  [facility]      Date of last visit: 10-21-21  Date of next visit (if applicable): 0/2/2571
OARRS reviewed. UDS: + for  Negative screen. Last seen: 10/21/2021.  Follow-up: 1/6/2022
Spine appears normal, range of motion is not limited, no muscle or joint tenderness

## 2025-01-02 DIAGNOSIS — Z98.1 S/P CERVICAL SPINAL FUSION: ICD-10-CM

## 2025-01-03 NOTE — TELEPHONE ENCOUNTER
Osmin Stephen Jr called requesting a refill on the following medications:  Requested Prescriptions     Pending Prescriptions Disp Refills    butalbital-acetaminophen-caffeine (FIORICET, ESGIC) -40 MG per tablet [Pharmacy Med Name: BUT/ACETAMINOPHEN/CAFF -40 TB] 90 tablet 0     Sig: TAKE 1 TABLET BY MOUTH EVERY 6 HOURS AS NEEDED FOR HEADACHE. MAX DAILY AMOUNT: 4 TABLETS       Date of last visit: 11/21/2024  Date of next visit (if applicable):4/2/2025  Date of last refill: 11/11/24 x 90 tabs  Pharmacy Name: [facility] [address]      Thanks,  Deb Garcia MA

## 2025-01-03 NOTE — TELEPHONE ENCOUNTER
Can we please call pt and see how often he is taking Fioricet?  This is now a controlled substance and we need to cut back on the amount used.  If headaches are getting worse and not controlled, we need to follow up with neurology or a headache clinic.  I would recommend that pt needs to start using this only for break through headaches, 1-2 times per week.  Thanks -WS

## 2025-01-13 NOTE — TELEPHONE ENCOUNTER
This medication refill is regarding a electronic request. Refill requested by patient.    Requested Prescriptions     Pending Prescriptions Disp Refills    butalbital-acetaminophen-caffeine (FIORICET, ESGIC) -40 MG per tablet [Pharmacy Med Name: BUT/ACETAMINOPHEN/CAFF -40 TB] 90 tablet 0     Sig: TAKE 1 TABLET BY MOUTH EVERY 6 HOURS AS NEEDED FOR HEADACHE. MAX DAILY AMOUNT: 4 TABLETS     Date of last visit: 11/21/2024   Date of next visit: 4/2/2025  Date of last refill: 11/11/24 #90/0  Pharmacy Name: [id number] - [place], [place] - [address] - P [phone] - F [phone]  [address] [place] [address]  Phone: [phone]  Fax: [phone]           Rx verified, ordered and set to EP.

## 2025-01-15 ENCOUNTER — OFFICE VISIT (OUTPATIENT)
Dept: PHYSICAL MEDICINE AND REHAB | Age: 55
End: 2025-01-15
Payer: COMMERCIAL

## 2025-01-15 ENCOUNTER — TELEPHONE (OUTPATIENT)
Dept: PHYSICAL MEDICINE AND REHAB | Age: 55
End: 2025-01-15

## 2025-01-15 VITALS
SYSTOLIC BLOOD PRESSURE: 136 MMHG | HEIGHT: 73 IN | WEIGHT: 315 LBS | BODY MASS INDEX: 41.75 KG/M2 | DIASTOLIC BLOOD PRESSURE: 84 MMHG

## 2025-01-15 DIAGNOSIS — M48.062 SPINAL STENOSIS OF LUMBAR REGION WITH NEUROGENIC CLAUDICATION: ICD-10-CM

## 2025-01-15 DIAGNOSIS — Z98.1 HX OF FUSION OF CERVICAL SPINE: ICD-10-CM

## 2025-01-15 DIAGNOSIS — M79.18 CHRONIC MYOFASCIAL PAIN: ICD-10-CM

## 2025-01-15 DIAGNOSIS — M54.42 CHRONIC BILATERAL LOW BACK PAIN WITH BILATERAL SCIATICA: ICD-10-CM

## 2025-01-15 DIAGNOSIS — G89.29 CHRONIC BILATERAL LOW BACK PAIN WITH BILATERAL SCIATICA: ICD-10-CM

## 2025-01-15 DIAGNOSIS — G89.29 CHRONIC MYOFASCIAL PAIN: ICD-10-CM

## 2025-01-15 DIAGNOSIS — M54.50 LUMBAR PAIN: ICD-10-CM

## 2025-01-15 DIAGNOSIS — M50.30 DDD (DEGENERATIVE DISC DISEASE), CERVICAL: ICD-10-CM

## 2025-01-15 DIAGNOSIS — M47.816 LUMBAR SPONDYLOSIS: ICD-10-CM

## 2025-01-15 DIAGNOSIS — M54.41 CHRONIC BILATERAL LOW BACK PAIN WITH BILATERAL SCIATICA: ICD-10-CM

## 2025-01-15 DIAGNOSIS — M79.18 MYOFASCIAL PAIN: ICD-10-CM

## 2025-01-15 DIAGNOSIS — G89.4 CHRONIC PAIN SYNDROME: ICD-10-CM

## 2025-01-15 DIAGNOSIS — M54.12 CERVICAL RADICULOPATHY: ICD-10-CM

## 2025-01-15 DIAGNOSIS — M25.50 POLYARTHRALGIA: ICD-10-CM

## 2025-01-15 DIAGNOSIS — M54.17 LUMBOSACRAL RADICULITIS: Primary | ICD-10-CM

## 2025-01-15 DIAGNOSIS — S83.241A ACUTE MEDIAL MENISCUS TEAR OF RIGHT KNEE, INITIAL ENCOUNTER: ICD-10-CM

## 2025-01-15 PROCEDURE — 99214 OFFICE O/P EST MOD 30 MIN: CPT | Performed by: NURSE PRACTITIONER

## 2025-01-15 RX ORDER — ORPHENADRINE CITRATE 100 MG/1
100 TABLET ORAL 2 TIMES DAILY PRN
Qty: 60 TABLET | Refills: 0 | Status: SHIPPED | OUTPATIENT
Start: 2025-01-15

## 2025-01-15 RX ORDER — BUTALBITAL, ACETAMINOPHEN AND CAFFEINE 50; 325; 40 MG/1; MG/1; MG/1
TABLET ORAL
Qty: 90 TABLET | Refills: 0 | OUTPATIENT
Start: 2025-01-15

## 2025-01-15 RX ORDER — LIDOCAINE 50 MG/G
3 PATCH TOPICAL DAILY
Qty: 90 PATCH | Refills: 0 | Status: SHIPPED | OUTPATIENT
Start: 2025-01-15 | End: 2025-02-14

## 2025-01-15 RX ORDER — HYDROCODONE BITARTRATE AND ACETAMINOPHEN 5; 325 MG/1; MG/1
1 TABLET ORAL EVERY 8 HOURS PRN
Qty: 90 TABLET | Refills: 0 | Status: SHIPPED | OUTPATIENT
Start: 2025-01-17 | End: 2025-02-16

## 2025-01-15 ASSESSMENT — ENCOUNTER SYMPTOMS
NAUSEA: 0
ABDOMINAL PAIN: 1
SHORTNESS OF BREATH: 0
PHOTOPHOBIA: 0
ABDOMINAL DISTENTION: 0
APNEA: 1
COUGH: 0
BACK PAIN: 1

## 2025-01-15 NOTE — TELEPHONE ENCOUNTER
PA sent to plan  (Key: [id number])  The plan will fax you a determination, typically within 1 to 5 business days.  Drug  Lidocaine 5% patches    (Key: [id number])  The plan will fax you a determination, typically within 1 to 5 business days.  Drug  Lidocaine 5% ointment

## 2025-01-15 NOTE — PROGRESS NOTES
trying LESI here but he does not want at this time   Reviewed neurosurgery notes   Encouraged to work on weight loss which will likkely improve function and pain   Discussed physical therapy or aqua therapy but he declined states he can't participate due to work schedule at this time   Current medications continue to help. Continue Norco 5/325 TID prn-  ordered refill for 1/17/2024, Norflex 100 mg BID- ordered refill, Lidoderm patches- 3 per day- ordered refill   Discussed trying Neurontin again but he had side effects with both Lyrica and Neurontin. Not a candidate for NSAIDS d/t to stomach ulcers     Meds. Prescribed:   Orders Placed This Encounter   Medications    HYDROcodone-acetaminophen (NORCO) 5-325 MG per tablet     Sig: Take 1 tablet by mouth every 8 hours as needed for Pain for up to 30 days. Max Daily Amount: 3 tablets     Dispense:  90 tablet     Refill:  0     Reduce doses taken as pain becomes manageable    orphenadrine (NORFLEX) 100 MG extended release tablet     Sig: Take 1 tablet by mouth 2 times daily as needed for Muscle spasms     Dispense:  60 tablet     Refill:  0    lidocaine (LIDODERM) 5 %     Sig: Place 3 patches onto the skin daily To painful areas on low back, and neck     Dispense:  90 patch     Refill:  0       Return in about 2 months (around 3/15/2025), or if symptoms worsen or fail to improve, for follow up  for medications.               Electronically signed by MIRNA Meredith CNP on1/15/2025 at 8:01 AM

## 2025-02-04 ENCOUNTER — APPOINTMENT (OUTPATIENT)
Dept: GENERAL RADIOLOGY | Age: 55
End: 2025-02-04
Payer: COMMERCIAL

## 2025-02-04 ENCOUNTER — HOSPITAL ENCOUNTER (EMERGENCY)
Age: 55
Discharge: HOME | End: 2025-02-04
Payer: COMMERCIAL

## 2025-02-04 VITALS
TEMPERATURE: 98.3 F | OXYGEN SATURATION: 94 % | RESPIRATION RATE: 21 BRPM | SYSTOLIC BLOOD PRESSURE: 144 MMHG | HEART RATE: 86 BPM | DIASTOLIC BLOOD PRESSURE: 93 MMHG

## 2025-02-04 DIAGNOSIS — J10.1 INFLUENZA A: Primary | ICD-10-CM

## 2025-02-04 LAB
ANION GAP SERPL CALC-SCNC: 14 MEQ/L (ref 8–16)
BASOPHILS ABSOLUTE: 0 THOU/MM3 (ref 0–0.1)
BASOPHILS NFR BLD AUTO: 0.3 %
BUN SERPL-MCNC: 10 MG/DL (ref 7–22)
CALCIUM SERPL-MCNC: 9 MG/DL (ref 8.5–10.5)
CHLORIDE SERPL-SCNC: 99 MEQ/L (ref 98–111)
CO2 SERPL-SCNC: 23 MEQ/L (ref 23–33)
CREAT SERPL-MCNC: 0.8 MG/DL (ref 0.4–1.2)
CRP SERPL-MCNC: 2.93 MG/DL (ref 0–1)
DEPRECATED RDW RBC AUTO: 42.9 FL (ref 35–45)
EKG ATRIAL RATE: 71 BPM
EKG P AXIS: 62 DEGREES
EKG P-R INTERVAL: 160 MS
EKG Q-T INTERVAL: 406 MS
EKG QRS DURATION: 86 MS
EKG QTC CALCULATION (BAZETT): 441 MS
EKG R AXIS: 0 DEGREES
EKG T AXIS: 19 DEGREES
EKG VENTRICULAR RATE: 71 BPM
EOSINOPHIL NFR BLD AUTO: 0.8 %
EOSINOPHILS ABSOLUTE: 0.1 THOU/MM3 (ref 0–0.4)
ERYTHROCYTE [DISTWIDTH] IN BLOOD BY AUTOMATED COUNT: 12.4 % (ref 11.5–14.5)
FLUAV RNA RESP QL NAA+PROBE: DETECTED
FLUBV RNA RESP QL NAA+PROBE: NOT DETECTED
GFR SERPL CREATININE-BSD FRML MDRD: > 90 ML/MIN/1.73M2
GLUCOSE SERPL-MCNC: 117 MG/DL (ref 70–108)
HCT VFR BLD AUTO: 38.4 % (ref 42–52)
HGB BLD-MCNC: 12.6 GM/DL (ref 14–18)
IMM GRANULOCYTES # BLD AUTO: 0.02 THOU/MM3 (ref 0–0.07)
IMM GRANULOCYTES NFR BLD AUTO: 0.3 %
LYMPHOCYTES ABSOLUTE: 0.5 THOU/MM3 (ref 1–4.8)
LYMPHOCYTES NFR BLD AUTO: 7.6 %
MAGNESIUM SERPL-MCNC: 1.5 MG/DL (ref 1.6–2.4)
MCH RBC QN AUTO: 30.7 PG (ref 26–33)
MCHC RBC AUTO-ENTMCNC: 32.8 GM/DL (ref 32.2–35.5)
MCV RBC AUTO: 93.4 FL (ref 80–94)
MONOCYTES ABSOLUTE: 0.5 THOU/MM3 (ref 0.4–1.3)
MONOCYTES NFR BLD AUTO: 7.1 %
NEUTROPHILS ABSOLUTE: 6 THOU/MM3 (ref 1.8–7.7)
NEUTROPHILS NFR BLD AUTO: 83.9 %
NRBC BLD AUTO-RTO: 0 /100 WBC
OSMOLALITY SERPL CALC.SUM OF ELEC: 272 MOSMOL/KG (ref 275–300)
PLATELET # BLD AUTO: 230 THOU/MM3 (ref 130–400)
PMV BLD AUTO: 10.2 FL (ref 9.4–12.4)
POTASSIUM SERPL-SCNC: 3.7 MEQ/L (ref 3.5–5.2)
PROCALCITONIN SERPL IA-MCNC: 0.08 NG/ML (ref 0.01–0.09)
RBC # BLD AUTO: 4.11 MILL/MM3 (ref 4.7–6.1)
S PYO AG THROAT QL: NEGATIVE
S PYO THROAT QL CULT: NORMAL
SARS-COV-2 RNA RESP QL NAA+PROBE: NOT DETECTED
SODIUM SERPL-SCNC: 136 MEQ/L (ref 135–145)
WBC # BLD AUTO: 7.2 THOU/MM3 (ref 4.8–10.8)

## 2025-02-04 PROCEDURE — 87070 CULTURE OTHR SPECIMN AEROBIC: CPT

## 2025-02-04 PROCEDURE — 96375 TX/PRO/DX INJ NEW DRUG ADDON: CPT

## 2025-02-04 PROCEDURE — 85025 COMPLETE CBC W/AUTO DIFF WBC: CPT

## 2025-02-04 PROCEDURE — 99285 EMERGENCY DEPT VISIT HI MDM: CPT

## 2025-02-04 PROCEDURE — 2580000003 HC RX 258: Performed by: PHYSICIAN ASSISTANT

## 2025-02-04 PROCEDURE — 87880 STREP A ASSAY W/OPTIC: CPT

## 2025-02-04 PROCEDURE — 96374 THER/PROPH/DIAG INJ IV PUSH: CPT

## 2025-02-04 PROCEDURE — 83735 ASSAY OF MAGNESIUM: CPT

## 2025-02-04 PROCEDURE — 71046 X-RAY EXAM CHEST 2 VIEWS: CPT

## 2025-02-04 PROCEDURE — 87636 SARSCOV2 & INF A&B AMP PRB: CPT

## 2025-02-04 PROCEDURE — 93010 ELECTROCARDIOGRAM REPORT: CPT | Performed by: NUCLEAR MEDICINE

## 2025-02-04 PROCEDURE — 86140 C-REACTIVE PROTEIN: CPT

## 2025-02-04 PROCEDURE — 84145 PROCALCITONIN (PCT): CPT

## 2025-02-04 PROCEDURE — 80048 BASIC METABOLIC PNL TOTAL CA: CPT

## 2025-02-04 PROCEDURE — 6360000002 HC RX W HCPCS: Performed by: PHYSICIAN ASSISTANT

## 2025-02-04 PROCEDURE — 93005 ELECTROCARDIOGRAM TRACING: CPT | Performed by: PHYSICIAN ASSISTANT

## 2025-02-04 PROCEDURE — 36415 COLL VENOUS BLD VENIPUNCTURE: CPT

## 2025-02-04 RX ORDER — 0.9 % SODIUM CHLORIDE 0.9 %
1000 INTRAVENOUS SOLUTION INTRAVENOUS ONCE
Status: COMPLETED | OUTPATIENT
Start: 2025-02-04 | End: 2025-02-04

## 2025-02-04 RX ORDER — KETOROLAC TROMETHAMINE 30 MG/ML
15 INJECTION, SOLUTION INTRAMUSCULAR; INTRAVENOUS ONCE
Status: COMPLETED | OUTPATIENT
Start: 2025-02-04 | End: 2025-02-04

## 2025-02-04 RX ORDER — ONDANSETRON 4 MG/1
4 TABLET, ORALLY DISINTEGRATING ORAL 3 TIMES DAILY PRN
Qty: 21 TABLET | Refills: 0 | Status: SHIPPED | OUTPATIENT
Start: 2025-02-04

## 2025-02-04 RX ORDER — LORAZEPAM 2 MG/ML
1 INJECTION INTRAMUSCULAR ONCE
Status: COMPLETED | OUTPATIENT
Start: 2025-02-04 | End: 2025-02-04

## 2025-02-04 RX ORDER — ETODOLAC 300 MG/1
300 CAPSULE ORAL EVERY 8 HOURS
Qty: 30 CAPSULE | Refills: 0 | Status: SHIPPED | OUTPATIENT
Start: 2025-02-04

## 2025-02-04 RX ORDER — METOCLOPRAMIDE HYDROCHLORIDE 5 MG/ML
10 INJECTION INTRAMUSCULAR; INTRAVENOUS ONCE
Status: COMPLETED | OUTPATIENT
Start: 2025-02-04 | End: 2025-02-04

## 2025-02-04 RX ADMIN — SODIUM CHLORIDE 1000 ML: 9 INJECTION, SOLUTION INTRAVENOUS at 07:25

## 2025-02-04 RX ADMIN — LORAZEPAM 1 MG: 2 INJECTION INTRAMUSCULAR; INTRAVENOUS at 07:24

## 2025-02-04 RX ADMIN — METOCLOPRAMIDE HYDROCHLORIDE 10 MG: 5 INJECTION INTRAMUSCULAR; INTRAVENOUS at 07:21

## 2025-02-04 RX ADMIN — KETOROLAC TROMETHAMINE 15 MG: 30 INJECTION, SOLUTION INTRAMUSCULAR at 07:23

## 2025-02-04 NOTE — ED PROVIDER NOTES
[facility] EMERGENCY DEPARTMENT      EMERGENCY MEDICINE     Pt Name: Osmin Stephen Jr  MRN: [id number]  Birthdate 1970  Date of evaluation: 2/4/2025  Provider: OJ Cote    CHIEF COMPLAINT     No chief complaint on file.    HISTORY OF PRESENT ILLNESS   Osmin Stephen Jr is a pleasant 54 y.o. male who presents to the emergency department from from home, by private vehicle for evaluation of has been off this last night.  The patient had cough and congestion since 12 midnight.  The patient states that he has been nauseated has not vomited and has had no diarrhea.  He had negative flu shot this year.  He had pneumonia in November he is otherwise no complaints.  He has had headaches of similar quality and severity in the past.  He has not tried anything to alleviate his symptoms prior to arrival.        PASTMEDICAL HISTORY     Past Medical History:   Diagnosis Date    Aneurysm (HCC)     pituitary gland    Arthritis     Asthma     Cardiomegaly     COVID-19 11/2021    [facility]    Depression     Fibromyalgia     GERD (gastroesophageal reflux disease)     Hypertension     melhem    Liver disease     CLARIBEL on CPAP        Patient Active Problem List   Diagnosis Code    Cervical stenosis of spinal canal M48.02    Disease of spinal cord ([facility]) G95.9    Morbid obesity with BMI of 50.0-59.9, adult E66.01, Z68.43    Radiculopathy affecting upper extremity M54.10    S/P cervical spinal fusion Z98.1    Sleep apnea G47.30    Hyperlipidemia E78.5    Moderate asthma without complication J45.909    Enlarged heart I51.7    Chronic pansinusitis J32.4    Non-seasonal allergic rhinitis due to pollen J30.1    Severe episode of recurrent major depressive disorder, without psychotic features ([facility]) F33.2    Atherosclerotic heart disease of native coronary artery with other forms of angina pectoris ([facility]) I25.118    Heart failure, unspecified HF chronicity, unspecified heart failure type ([facility]) I50.9

## 2025-02-04 NOTE — ED NOTES
Pt medicated per MAR. Covid flu and strep swab obtained. Pt denies any additional needs. IV fluids infusing.

## 2025-02-04 NOTE — ED TRIAGE NOTES
Patient to ED via intake due to shortness of breath, chest congestion, and migraine. Patient states he has had symptoms for some time but got worse last night into this morning. Patient tried at home - norco, breathing tx, Fioricet and nothing has helped. Pt to room and placed onto monitor.

## 2025-02-06 LAB — BACTERIA THROAT AEROBE CULT: NORMAL

## 2025-02-12 ENCOUNTER — OFFICE VISIT (OUTPATIENT)
Dept: FAMILY MEDICINE CLINIC | Age: 55
End: 2025-02-12

## 2025-02-12 VITALS
WEIGHT: 315 LBS | HEIGHT: 73 IN | HEART RATE: 64 BPM | SYSTOLIC BLOOD PRESSURE: 142 MMHG | BODY MASS INDEX: 41.75 KG/M2 | DIASTOLIC BLOOD PRESSURE: 78 MMHG | RESPIRATION RATE: 20 BRPM | TEMPERATURE: 98.1 F

## 2025-02-12 DIAGNOSIS — Z98.1 S/P CERVICAL SPINAL FUSION: ICD-10-CM

## 2025-02-12 DIAGNOSIS — I10 PRIMARY HYPERTENSION: ICD-10-CM

## 2025-02-12 DIAGNOSIS — H10.10 ALLERGIC RHINOCONJUNCTIVITIS: ICD-10-CM

## 2025-02-12 DIAGNOSIS — E55.9 VITAMIN D DEFICIENCY: ICD-10-CM

## 2025-02-12 DIAGNOSIS — E78.5 HYPERLIPIDEMIA, UNSPECIFIED HYPERLIPIDEMIA TYPE: ICD-10-CM

## 2025-02-12 DIAGNOSIS — J01.91 ACUTE RECURRENT SINUSITIS, UNSPECIFIED LOCATION: ICD-10-CM

## 2025-02-12 DIAGNOSIS — D64.9 ANEMIA, UNSPECIFIED TYPE: ICD-10-CM

## 2025-02-12 DIAGNOSIS — J40 BRONCHITIS: ICD-10-CM

## 2025-02-12 DIAGNOSIS — F33.2 SEVERE EPISODE OF RECURRENT MAJOR DEPRESSIVE DISORDER, WITHOUT PSYCHOTIC FEATURES (HCC): Primary | ICD-10-CM

## 2025-02-12 DIAGNOSIS — J30.9 ALLERGIC RHINOCONJUNCTIVITIS: ICD-10-CM

## 2025-02-12 DIAGNOSIS — J45.40 MODERATE PERSISTENT ASTHMA WITHOUT COMPLICATION: ICD-10-CM

## 2025-02-12 DIAGNOSIS — J34.9 DISORDER OF MAXILLARY SINUS: ICD-10-CM

## 2025-02-12 DIAGNOSIS — K21.9 GASTROESOPHAGEAL REFLUX DISEASE WITHOUT ESOPHAGITIS: ICD-10-CM

## 2025-02-12 DIAGNOSIS — S39.012S STRAIN OF LUMBAR REGION, SEQUELA: ICD-10-CM

## 2025-02-12 RX ORDER — LORATADINE 10 MG/1
10 TABLET ORAL DAILY
Qty: 90 TABLET | Refills: 3 | Status: SHIPPED | OUTPATIENT
Start: 2025-02-12

## 2025-02-12 RX ORDER — DIPHENHYDRAMINE HCL 25 MG
25 CAPSULE ORAL EVERY 6 HOURS PRN
Qty: 30 CAPSULE | Refills: 1 | Status: SHIPPED | OUTPATIENT
Start: 2025-02-12

## 2025-02-12 RX ORDER — DOXYCYCLINE HYCLATE 100 MG
100 TABLET ORAL 2 TIMES DAILY
Qty: 20 TABLET | Refills: 0 | Status: SHIPPED | OUTPATIENT
Start: 2025-02-12 | End: 2025-02-22

## 2025-02-12 RX ORDER — BUTALBITAL, ACETAMINOPHEN AND CAFFEINE 50; 325; 40 MG/1; MG/1; MG/1
1 TABLET ORAL EVERY 6 HOURS PRN
Qty: 30 TABLET | Refills: 0 | Status: SHIPPED | OUTPATIENT
Start: 2025-02-12

## 2025-02-12 RX ORDER — METHYLPREDNISOLONE 4 MG/1
TABLET ORAL
Qty: 1 KIT | Refills: 0 | Status: SHIPPED | OUTPATIENT
Start: 2025-02-12 | End: 2025-02-18

## 2025-02-12 ASSESSMENT — ENCOUNTER SYMPTOMS
TROUBLE SWALLOWING: 0
EYE PAIN: 0
ABDOMINAL PAIN: 0
SHORTNESS OF BREATH: 0
SINUS COMPLAINT: 1
WHEEZING: 0
NAUSEA: 0
SINUS PAIN: 0
COUGH: 1
DIARRHEA: 0
FACIAL SWELLING: 0
VOMITING: 0
SORE THROAT: 0
COLOR CHANGE: 0
SINUS PRESSURE: 1
BACK PAIN: 1

## 2025-02-12 NOTE — PROGRESS NOTES
[facility]  [facility]  [facility]  [address] [facility] [address]  Dept: [phone]  Dept Fax: [phone]  Loc: [phone]     2025     Osmin Stephen Jr (:  1970) is a 54 y.o. male, here for evaluation of the following medical concerns:    Chief Complaint   Patient presents with    Follow-up     Still feeling bad. Cough, congestion, sinus pressure. Severe dry mouth. Referral for ENT       Pt presents to the office today for follow up from ER last week and DX for Flu A.  Still having cough, congestion and sinus drainage.  Pt was DX with sinus disease last .  He has been continually fighting sinus infections also.     Sinus Problem  This is a recurrent problem. The current episode started 1 to 4 weeks ago. The problem has been gradually worsening since onset. There has been no fever. Associated symptoms include congestion, coughing, sinus pressure and sneezing. Pertinent negatives include no chills, diaphoresis, headaches, neck pain, shortness of breath or sore throat. Past treatments include oral decongestants, sitting up and acetaminophen. The treatment provided mild relief.     Treatment Adherence:   Medication compliance:  compliant all of the time  Diet compliance:  compliant most of the time  Weight trend: stable    Tobacco history: He  reports that he has never smoked. He has never used smokeless tobacco.     Hypertension:  Home blood pressure monitoring: No.  He is adherent to a low sodium diet. Patient denies chest pain, shortness of breath, headache, and lightheadedness.  Antihypertensive medication side effects: no medication side effects noted.  Use of agents associated with hypertension: none.        Lab Results   Component Value Date    LABA1C 4.9 10/03/2024    LABA1C 5.2 2023    LABA1C 4.9 2022     Lab Results   Component Value Date    CREATININE 0.8 2025     Lab Results   Component Value Date    ALT 25 2024

## 2025-02-13 ENCOUNTER — APPOINTMENT (OUTPATIENT)
Dept: GENERAL RADIOLOGY | Age: 55
End: 2025-02-13
Payer: COMMERCIAL

## 2025-02-13 ENCOUNTER — HOSPITAL ENCOUNTER (EMERGENCY)
Age: 55
Discharge: HOME | End: 2025-02-13
Payer: COMMERCIAL

## 2025-02-13 VITALS
TEMPERATURE: 98.2 F | RESPIRATION RATE: 20 BRPM | BODY MASS INDEX: 51.73 KG/M2 | SYSTOLIC BLOOD PRESSURE: 121 MMHG | OXYGEN SATURATION: 96 % | HEART RATE: 90 BPM | WEIGHT: 315 LBS | DIASTOLIC BLOOD PRESSURE: 64 MMHG

## 2025-02-13 DIAGNOSIS — G89.4 CHRONIC PAIN SYNDROME: ICD-10-CM

## 2025-02-13 DIAGNOSIS — G89.29 CHRONIC MYOFASCIAL PAIN: ICD-10-CM

## 2025-02-13 DIAGNOSIS — M50.30 DDD (DEGENERATIVE DISC DISEASE), CERVICAL: ICD-10-CM

## 2025-02-13 DIAGNOSIS — M54.17 LUMBOSACRAL RADICULITIS: ICD-10-CM

## 2025-02-13 DIAGNOSIS — M79.18 MYOFASCIAL PAIN: ICD-10-CM

## 2025-02-13 DIAGNOSIS — M79.18 CHRONIC MYOFASCIAL PAIN: ICD-10-CM

## 2025-02-13 DIAGNOSIS — M48.062 SPINAL STENOSIS OF LUMBAR REGION WITH NEUROGENIC CLAUDICATION: ICD-10-CM

## 2025-02-13 DIAGNOSIS — R51.9 ACUTE NONINTRACTABLE HEADACHE, UNSPECIFIED HEADACHE TYPE: Primary | ICD-10-CM

## 2025-02-13 DIAGNOSIS — S83.241A ACUTE MEDIAL MENISCUS TEAR OF RIGHT KNEE, INITIAL ENCOUNTER: ICD-10-CM

## 2025-02-13 DIAGNOSIS — M54.50 LUMBAR PAIN: ICD-10-CM

## 2025-02-13 DIAGNOSIS — M47.816 LUMBAR SPONDYLOSIS: ICD-10-CM

## 2025-02-13 DIAGNOSIS — R09.81 SINUS CONGESTION: ICD-10-CM

## 2025-02-13 DIAGNOSIS — M25.50 POLYARTHRALGIA: ICD-10-CM

## 2025-02-13 LAB
ALBUMIN SERPL BCG-MCNC: 3.6 G/DL (ref 3.5–5.1)
ALP SERPL-CCNC: 137 U/L (ref 38–126)
ALT SERPL W/O P-5'-P-CCNC: 20 U/L (ref 11–66)
AST SERPL-CCNC: 16 U/L (ref 5–40)
BASOPHILS ABSOLUTE: 0 THOU/MM3 (ref 0–0.1)
BASOPHILS NFR BLD AUTO: 0.4 %
BILIRUB SERPL-MCNC: 0.5 MG/DL (ref 0.3–1.2)
BUN SERPL-MCNC: 9 MG/DL (ref 7–22)
CALCIUM SERPL-MCNC: 8.8 MG/DL (ref 8.5–10.5)
CHLORIDE SERPL-SCNC: 100 MEQ/L (ref 98–111)
CREAT SERPL-MCNC: 0.8 MG/DL (ref 0.4–1.2)
DEPRECATED RDW RBC AUTO: 42.9 FL (ref 35–45)
EOSINOPHIL NFR BLD AUTO: 2.3 %
EOSINOPHILS ABSOLUTE: 0.2 THOU/MM3 (ref 0–0.4)
ERYTHROCYTE [DISTWIDTH] IN BLOOD BY AUTOMATED COUNT: 12.5 % (ref 11.5–14.5)
GFR SERPL CREATININE-BSD FRML MDRD: > 90 ML/MIN/1.73M2
GLUCOSE SERPL-MCNC: 92 MG/DL (ref 70–108)
HCT VFR BLD AUTO: 35.7 % (ref 42–52)
HGB BLD-MCNC: 11.7 GM/DL (ref 14–18)
IMM GRANULOCYTES # BLD AUTO: 0.06 THOU/MM3 (ref 0–0.07)
IMM GRANULOCYTES NFR BLD AUTO: 0.8 %
LYMPHOCYTES ABSOLUTE: 1.4 THOU/MM3 (ref 1–4.8)
LYMPHOCYTES NFR BLD AUTO: 18.8 %
MAGNESIUM SERPL-MCNC: 1.8 MG/DL (ref 1.6–2.4)
MCH RBC QN AUTO: 30.8 PG (ref 26–33)
MCHC RBC AUTO-ENTMCNC: 32.8 GM/DL (ref 32.2–35.5)
MCV RBC AUTO: 93.9 FL (ref 80–94)
MONOCYTES ABSOLUTE: 0.8 THOU/MM3 (ref 0.4–1.3)
MONOCYTES NFR BLD AUTO: 10.3 %
NEUTROPHILS ABSOLUTE: 5 THOU/MM3 (ref 1.8–7.7)
NEUTROPHILS NFR BLD AUTO: 67.4 %
NRBC BLD AUTO-RTO: 0 /100 WBC
OSMOLALITY SERPL CALC.SUM OF ELEC: 275.9 MOSMOL/KG (ref 275–300)
PLATELET # BLD AUTO: 282 THOU/MM3 (ref 130–400)
PMV BLD AUTO: 9.6 FL (ref 9.4–12.4)
POTASSIUM SERPL-SCNC: 3.4 MEQ/L (ref 3.5–5.2)
PROT SERPL-MCNC: 6.9 G/DL (ref 6.1–8)
RBC # BLD AUTO: 3.8 MILL/MM3 (ref 4.7–6.1)
SODIUM SERPL-SCNC: 139 MEQ/L (ref 135–145)
TROPONIN, HIGH SENSITIVITY: 8 NG/L (ref 0–12)
WBC # BLD AUTO: 7.4 THOU/MM3 (ref 4.8–10.8)

## 2025-02-13 PROCEDURE — 36415 COLL VENOUS BLD VENIPUNCTURE: CPT

## 2025-02-13 PROCEDURE — 96374 THER/PROPH/DIAG INJ IV PUSH: CPT

## 2025-02-13 PROCEDURE — 99284 EMERGENCY DEPT VISIT MOD MDM: CPT

## 2025-02-13 PROCEDURE — 96375 TX/PRO/DX INJ NEW DRUG ADDON: CPT

## 2025-02-13 PROCEDURE — 82565 ASSAY OF CREATININE: CPT

## 2025-02-13 PROCEDURE — 83735 ASSAY OF MAGNESIUM: CPT

## 2025-02-13 PROCEDURE — 82040 ASSAY OF SERUM ALBUMIN: CPT

## 2025-02-13 PROCEDURE — 84484 ASSAY OF TROPONIN QUANT: CPT

## 2025-02-13 PROCEDURE — 82247 BILIRUBIN TOTAL: CPT

## 2025-02-13 PROCEDURE — 84155 ASSAY OF PROTEIN SERUM: CPT

## 2025-02-13 PROCEDURE — 84295 ASSAY OF SERUM SODIUM: CPT

## 2025-02-13 PROCEDURE — 84460 ALANINE AMINO (ALT) (SGPT): CPT

## 2025-02-13 PROCEDURE — 84075 ASSAY ALKALINE PHOSPHATASE: CPT

## 2025-02-13 PROCEDURE — 84520 ASSAY OF UREA NITROGEN: CPT

## 2025-02-13 PROCEDURE — 84132 ASSAY OF SERUM POTASSIUM: CPT

## 2025-02-13 PROCEDURE — 71046 X-RAY EXAM CHEST 2 VIEWS: CPT

## 2025-02-13 PROCEDURE — 84450 TRANSFERASE (AST) (SGOT): CPT

## 2025-02-13 PROCEDURE — 85025 COMPLETE CBC W/AUTO DIFF WBC: CPT

## 2025-02-13 PROCEDURE — 82947 ASSAY GLUCOSE BLOOD QUANT: CPT

## 2025-02-13 PROCEDURE — 6360000002 HC RX W HCPCS: Performed by: FAMILY MEDICINE

## 2025-02-13 PROCEDURE — 82435 ASSAY OF BLOOD CHLORIDE: CPT

## 2025-02-13 PROCEDURE — 82310 ASSAY OF CALCIUM: CPT

## 2025-02-13 RX ORDER — KETOROLAC TROMETHAMINE 30 MG/ML
15 INJECTION, SOLUTION INTRAMUSCULAR; INTRAVENOUS ONCE
Status: COMPLETED | OUTPATIENT
Start: 2025-02-13 | End: 2025-02-13

## 2025-02-13 RX ORDER — PROCHLORPERAZINE EDISYLATE 5 MG/ML
10 INJECTION INTRAMUSCULAR; INTRAVENOUS ONCE
Status: COMPLETED | OUTPATIENT
Start: 2025-02-13 | End: 2025-02-13

## 2025-02-13 RX ORDER — DEXAMETHASONE SODIUM PHOSPHATE 4 MG/ML
4 INJECTION, SOLUTION INTRA-ARTICULAR; INTRALESIONAL; INTRAMUSCULAR; INTRAVENOUS; SOFT TISSUE ONCE
Status: COMPLETED | OUTPATIENT
Start: 2025-02-13 | End: 2025-02-13

## 2025-02-13 RX ADMIN — DEXAMETHASONE SODIUM PHOSPHATE 4 MG: 4 INJECTION, SOLUTION INTRA-ARTICULAR; INTRALESIONAL; INTRAMUSCULAR; INTRAVENOUS; SOFT TISSUE at 14:40

## 2025-02-13 RX ADMIN — PROCHLORPERAZINE EDISYLATE 10 MG: 5 INJECTION INTRAMUSCULAR; INTRAVENOUS at 14:38

## 2025-02-13 RX ADMIN — KETOROLAC TROMETHAMINE 15 MG: 30 INJECTION, SOLUTION INTRAMUSCULAR at 14:36

## 2025-02-13 NOTE — TELEPHONE ENCOUNTER
Osmin Stephen Jr called requesting a refill on the following medications:  Requested Prescriptions     Pending Prescriptions Disp Refills    orphenadrine (NORFLEX) 100 MG extended release tablet 60 tablet 0     Sig: Take 1 tablet by mouth 2 times daily as needed for Muscle spasms    HYDROcodone-acetaminophen (NORCO) 5-325 MG per tablet 90 tablet 0     Sig: Take 1 tablet by mouth every 8 hours as needed for Pain for up to 30 days. Max Daily Amount: 3 tablets    lidocaine (LIDODERM) 5 % 90 patch 0     Sig: Place 3 patches onto the skin daily To painful areas on low back, and neck     Pharmacy verified:    [id number] - [place], [place] - [address] - P [phone] - F [phone]     Date of last visit: 01/15/2025  Date of next visit (if applicable): 3/26/2025

## 2025-02-13 NOTE — ED PROVIDER NOTES
[facility] EMERGENCY DEPARTMENT      EMERGENCY MEDICINE     Pt Name: Osmin Stephen Jr  MRN: [id number]  Birthdate 1970  Date of evaluation: 2/13/2025  Provider: MIRNA Yepez NP    CHIEF COMPLAINT       Chief Complaint   Patient presents with    Headache    Hypertension     HISTORY OF PRESENT ILLNESS   Osmin Stephen Jr is a pleasant 54 y.o. male who presents to the emergency department for migraine headache.  Patient is complaining of posterior headache along with dizziness and occasional nosebleeds.  Patient was diagnosed with influenza last Friday and he is still having a cough, and sinus congestion.  Patient saw primary care yesterday and was given a Solu-Medrol Dosepak that he is going to  today.  Patient also reports he thought maybe these headache pain was due to his blood pressure and took 2 blood pressure pills prior to arrival.  Patient did not take any medication for pain today for the headache.  PASTMEDICAL HISTORY     Past Medical History:   Diagnosis Date    Aneurysm ([facility])     pituitary gland    Arthritis     Asthma     Cardiomegaly     COVID-19 11/2021    [facility]    Depression     Fibromyalgia     GERD (gastroesophageal reflux disease)     Hypertension     melhem    Liver disease     CLARIBEL on CPAP        Patient Active Problem List   Diagnosis Code    Cervical stenosis of spinal canal M48.02    Disease of spinal cord ([facility]) G95.9    Morbid obesity with BMI of 50.0-59.9, adult E66.01, Z68.43    Radiculopathy affecting upper extremity M54.10    S/P cervical spinal fusion Z98.1    Sleep apnea G47.30    Hyperlipidemia E78.5    Moderate asthma without complication J45.909    Enlarged heart I51.7    Chronic pansinusitis J32.4    Non-seasonal allergic rhinitis due to pollen J30.1    Severe episode of recurrent major depressive disorder, without psychotic features ([facility]) F33.2    Atherosclerotic heart disease of native coronary artery with other forms of angina

## 2025-02-13 NOTE — DISCHARGE INSTRUCTIONS
Tylenol 1000 mg every 6 hours as needed for pain.  Ibuprofen 600 mg every 8 hours as needed for pain.  Mucinex 600 mg every 12 hours if needed for congestion.  Flonase daily for congestion.  Start Solu-Medrol Dosepak with food.  Follow-up primary care in 3 to 5 days if not better.

## 2025-02-13 NOTE — DISCHARGE INSTR - COC
Continuity of Care Form    Patient Name: Osmin Stephen Jr   :  1970  MRN:  [id number]    Admit date:  2025  Discharge date:  ***    Code Status Order: Prior   Advance Directives:   Advance Care Flowsheet Documentation             Admitting Physician:  No admitting provider for patient encounter.  PCP: Terri Phillips APRN - CNP    Discharging Nurse: ***  Discharging Hospital Unit/Room#: YANG S /YANG S  Discharging Unit Phone Number: ***    Emergency Contact:   Extended Emergency Contact Information  Primary Emergency Contact: Rigo Randolph  Address: [address] [place] [address] of [facility]  Home Phone: [phone]  Mobile Phone: [phone]  Relation: Other  Preferred language: English   needed? No    Past Surgical History:  Past Surgical History:   Procedure Laterality Date    BACK SURGERY      CHOLECYSTECTOMY, LAPAROSCOPIC N/A 2/15/2023    ROOTIC CHOLECYSTECTOMY performed by Marli Beckwith MD at [facility] OR    COLONOSCOPY      KNEE SURGERY Left     NECK SURGERY      PAIN MANAGEMENT PROCEDURE Bilateral 2021    Bilateral L-facet MBB # 1 @ L3-4, L4-5, and L5-S1 performed by Vinod Douglas MD at [facility] OR    PAIN MANAGEMENT PROCEDURE Bilateral 2021    Bilateral L-facet MBB # 2 @ L3-4, L4-5, and L5-S1 performed by Vinod Douglas MD at [facility] OR    PAIN MANAGEMENT PROCEDURE Bilateral 2023    Bilateral Lumbar 4-5, 5-sacral 1 facet Radiofrequency Ablation performed by Vinod Doulgas MD at [facility] OR    QUADRICEPS REPAIR Right 2022       Immunization History:   Immunization History   Administered Date(s) Administered    COVID-19, PFIZER PURPLE top, DILUTE for use, (age 12 y+), 30mcg/0.3mL 2021, 2021       Active Problems:  Patient Active Problem List   Diagnosis Code    Cervical stenosis of spinal canal M48.02    Disease of spinal cord (HCC) G95.9    Morbid obesity with BMI

## 2025-02-13 NOTE — ED NOTES
Pt. Presents to the ED with complaints of headache and HTN. Pt. States the symptoms started yesterday and has become worse. Pt. States he was diagnosed with Flu A last week.

## 2025-02-14 RX ORDER — LIDOCAINE 50 MG/G
3 PATCH TOPICAL DAILY
Qty: 90 PATCH | Refills: 0 | Status: SHIPPED | OUTPATIENT
Start: 2025-02-14 | End: 2025-03-16

## 2025-02-14 RX ORDER — ORPHENADRINE CITRATE 100 MG/1
100 TABLET ORAL 2 TIMES DAILY PRN
Qty: 60 TABLET | Refills: 0 | Status: SHIPPED | OUTPATIENT
Start: 2025-02-14

## 2025-02-14 RX ORDER — HYDROCODONE BITARTRATE AND ACETAMINOPHEN 5; 325 MG/1; MG/1
1 TABLET ORAL EVERY 8 HOURS PRN
Qty: 90 TABLET | Refills: 0 | Status: SHIPPED | OUTPATIENT
Start: 2025-02-16 | End: 2025-03-18

## 2025-02-14 NOTE — TELEPHONE ENCOUNTER
OARRS reviewed. UDS: + for  butalbital and hydrocodone. Amitriptyline is present.    Last seen: 1/15/2025. Follow-up: 3/26/2025

## 2025-02-25 NOTE — PROGRESS NOTES
[facility]      Osmin Stephen          [id number]  2/26/2025   Chief Complaint   Patient presents with    Follow-up     CLARIBEL 3 month follow up after setup         Patient of Dr. Hairston     Assessment/Plan   1. CLARIBEL on CPAP    2. Morbid obesity with BMI of 50.0-59.9, adult         -Yearly supply order placed? No  -Download was personally reviewed and discussed with patient at length.  -The symptoms of CLARIBEL have improved. The patient is benefiting from therapy and should continue use of PAP device.  -Patient's symptoms and AHI are controlled with current settings of 16.4 cmH2O. Continue current pressure settings.  -Advised to continue current positive airway pressure therapy with above described pressure.   -Advised to keep good compliance with current recommended pressure to get optimal results and clinical improvement  -Recommend 7-9 hours of sleep with PAP  -Instructed to call [employer] company regarding supplies if needed.   -Patient to call my office for earlier appointment if needed for worsening of sleep symptoms.   -Patient is not to drive if feeling sleepy  -Counseled patient on weight loss    Educated about my impression and plan. Patient verbalizes understanding.      Return in about 1 year (around 2/26/2026) for claribel. with download.      Subjective   Presentation:   Osmin presents for sleep medicine follow up for obstructive sleep apnea.  Since the last visit, Osmin has been doing very well on PAP therapy and reports good benefit from compliant use of PAP machine. No complaints of problems with machine, mask, or pressures at this time.   Works from 330 pm - 2 am  Wears it while he is awake sometimes if not feeling well      Sleep issues:  Do you feel better? Yes  More rested?Yes   Better concentration? yes  Difficulty falling sleep?  No  Difficulty staying asleep?  No  Snoring?  No    Progress History:   Since last visit any new medical issues? Yes recently had flu

## 2025-02-26 ENCOUNTER — OFFICE VISIT (OUTPATIENT)
Dept: PULMONOLOGY | Age: 55
End: 2025-02-26
Payer: COMMERCIAL

## 2025-02-26 VITALS
SYSTOLIC BLOOD PRESSURE: 120 MMHG | BODY MASS INDEX: 41.75 KG/M2 | WEIGHT: 315 LBS | HEART RATE: 70 BPM | DIASTOLIC BLOOD PRESSURE: 68 MMHG | HEIGHT: 73 IN | OXYGEN SATURATION: 96 % | TEMPERATURE: 98 F

## 2025-02-26 DIAGNOSIS — G47.33 OSA ON CPAP: Primary | ICD-10-CM

## 2025-02-26 DIAGNOSIS — E66.01 MORBID OBESITY WITH BMI OF 50.0-59.9, ADULT: ICD-10-CM

## 2025-02-26 PROCEDURE — 99214 OFFICE O/P EST MOD 30 MIN: CPT

## 2025-02-26 ASSESSMENT — ENCOUNTER SYMPTOMS
COUGH: 0
SORE THROAT: 0
SHORTNESS OF BREATH: 0
RHINORRHEA: 0
WHEEZING: 0

## 2025-02-27 ENCOUNTER — OFFICE VISIT (OUTPATIENT)
Dept: FAMILY MEDICINE CLINIC | Age: 55
End: 2025-02-27
Payer: COMMERCIAL

## 2025-02-27 VITALS
DIASTOLIC BLOOD PRESSURE: 80 MMHG | TEMPERATURE: 99.4 F | SYSTOLIC BLOOD PRESSURE: 132 MMHG | WEIGHT: 315 LBS | BODY MASS INDEX: 51.19 KG/M2 | HEART RATE: 70 BPM | RESPIRATION RATE: 16 BRPM

## 2025-02-27 DIAGNOSIS — H10.10 ALLERGIC RHINOCONJUNCTIVITIS: ICD-10-CM

## 2025-02-27 DIAGNOSIS — E78.5 HYPERLIPIDEMIA, UNSPECIFIED HYPERLIPIDEMIA TYPE: ICD-10-CM

## 2025-02-27 DIAGNOSIS — J01.90 ACUTE BACTERIAL SINUSITIS: Primary | ICD-10-CM

## 2025-02-27 DIAGNOSIS — B96.89 ACUTE BACTERIAL SINUSITIS: Primary | ICD-10-CM

## 2025-02-27 DIAGNOSIS — J34.9 DISORDER OF MAXILLARY SINUS: ICD-10-CM

## 2025-02-27 DIAGNOSIS — J32.4 CHRONIC PANSINUSITIS: ICD-10-CM

## 2025-02-27 DIAGNOSIS — J45.40 MODERATE PERSISTENT ASTHMA WITHOUT COMPLICATION: ICD-10-CM

## 2025-02-27 DIAGNOSIS — J30.9 ALLERGIC RHINOCONJUNCTIVITIS: ICD-10-CM

## 2025-02-27 DIAGNOSIS — I10 PRIMARY HYPERTENSION: ICD-10-CM

## 2025-02-27 DIAGNOSIS — K21.9 GASTROESOPHAGEAL REFLUX DISEASE WITHOUT ESOPHAGITIS: ICD-10-CM

## 2025-02-27 PROCEDURE — 3075F SYST BP GE 130 - 139MM HG: CPT | Performed by: NURSE PRACTITIONER

## 2025-02-27 PROCEDURE — G2211 COMPLEX E/M VISIT ADD ON: HCPCS | Performed by: NURSE PRACTITIONER

## 2025-02-27 PROCEDURE — 99214 OFFICE O/P EST MOD 30 MIN: CPT | Performed by: NURSE PRACTITIONER

## 2025-02-27 PROCEDURE — 3079F DIAST BP 80-89 MM HG: CPT | Performed by: NURSE PRACTITIONER

## 2025-02-27 RX ORDER — DOXYCYCLINE HYCLATE 100 MG
100 TABLET ORAL 2 TIMES DAILY
Qty: 20 TABLET | Refills: 0 | Status: CANCELLED | OUTPATIENT
Start: 2025-02-27 | End: 2025-03-09

## 2025-02-27 SDOH — ECONOMIC STABILITY: FOOD INSECURITY: WITHIN THE PAST 12 MONTHS, YOU WORRIED THAT YOUR FOOD WOULD RUN OUT BEFORE YOU GOT MONEY TO BUY MORE.: NEVER TRUE

## 2025-02-27 SDOH — ECONOMIC STABILITY: FOOD INSECURITY: WITHIN THE PAST 12 MONTHS, THE FOOD YOU BOUGHT JUST DIDN'T LAST AND YOU DIDN'T HAVE MONEY TO GET MORE.: NEVER TRUE

## 2025-02-27 ASSESSMENT — PATIENT HEALTH QUESTIONNAIRE - PHQ9
1. LITTLE INTEREST OR PLEASURE IN DOING THINGS: NOT AT ALL
SUM OF ALL RESPONSES TO PHQ QUESTIONS 1-9: 0
10. IF YOU CHECKED OFF ANY PROBLEMS, HOW DIFFICULT HAVE THESE PROBLEMS MADE IT FOR YOU TO DO YOUR WORK, TAKE CARE OF THINGS AT HOME, OR GET ALONG WITH OTHER PEOPLE: NOT DIFFICULT AT ALL
2. FEELING DOWN, DEPRESSED OR HOPELESS: NOT AT ALL
SUM OF ALL RESPONSES TO PHQ QUESTIONS 1-9: 0
8. MOVING OR SPEAKING SO SLOWLY THAT OTHER PEOPLE COULD HAVE NOTICED. OR THE OPPOSITE, BEING SO FIGETY OR RESTLESS THAT YOU HAVE BEEN MOVING AROUND A LOT MORE THAN USUAL: NOT AT ALL
3. TROUBLE FALLING OR STAYING ASLEEP: NOT AT ALL
SUM OF ALL RESPONSES TO PHQ QUESTIONS 1-9: 0
5. POOR APPETITE OR OVEREATING: NOT AT ALL
9. THOUGHTS THAT YOU WOULD BE BETTER OFF DEAD, OR OF HURTING YOURSELF: NOT AT ALL
7. TROUBLE CONCENTRATING ON THINGS, SUCH AS READING THE NEWSPAPER OR WATCHING TELEVISION: NOT AT ALL
4. FEELING TIRED OR HAVING LITTLE ENERGY: NOT AT ALL
6. FEELING BAD ABOUT YOURSELF - OR THAT YOU ARE A FAILURE OR HAVE LET YOURSELF OR YOUR FAMILY DOWN: NOT AT ALL
SUM OF ALL RESPONSES TO PHQ9 QUESTIONS 1 & 2: 0
SUM OF ALL RESPONSES TO PHQ QUESTIONS 1-9: 0

## 2025-02-27 NOTE — PROGRESS NOTES
[facility]  [facility]  [address] [facility] [address]  Dept: [phone]  Dept Fax: [phone]  Loc: [phone]     2025     Osmin Stephen Jr (:  1970) is a 54 y.o. male, here for evaluation of the following medical concerns:    Chief Complaint   Patient presents with    Sinusitis     HA,dizziness, not able to sleep,hurts to chew or blow nose       Pt presents to the office today for sinus pain and pressure.  Pt has been sick since he got Influenza A a month ago.  He was seen and treated for a sinus infection about 2 weeks ago and was starting to feel better, but this week symptoms returned again.  Referral was placed to ENT at last appt, but he has not scheduled that appt yet.     Sinusitis  This is a recurrent problem. The current episode started 1 to 4 weeks ago. The problem has been gradually worsening since onset. There has been no fever. The pain is moderate. Associated symptoms include congestion, coughing, ear pain, headaches, sinus pressure and a sore throat. Pertinent negatives include no chills, diaphoresis, hoarse voice, neck pain, shortness of breath, sneezing or swollen glands. Past treatments include sitting up, oral decongestants, antibiotics and acetaminophen. The treatment provided mild relief.     Treatment Adherence:   Medication compliance:  compliant all of the time  Diet compliance:  compliant most of the time  Weight trend: stable    Hypertension:  Home blood pressure monitoring: No. Patient denies chest pain, shortness of breath, peripheral edema, and palpitations.  Antihypertensive medication side effects: no medication side effects noted.  Use of agents associated with hypertension: none.                                        Sodium (meq/L)   Date Value   2025 139    BUN (mg/dL)   Date Value   2025 9    Glucose (mg/dL)   Date Value   2025 92   10/24/2022 98      Potassium reflex Magnesium (meq/L)

## 2025-02-28 ENCOUNTER — HOSPITAL ENCOUNTER (EMERGENCY)
Age: 55
Discharge: HOME | End: 2025-03-01
Attending: EMERGENCY MEDICINE
Payer: COMMERCIAL

## 2025-02-28 DIAGNOSIS — R51.9 SINUS HEADACHE: Primary | ICD-10-CM

## 2025-02-28 PROCEDURE — 99284 EMERGENCY DEPT VISIT MOD MDM: CPT

## 2025-02-28 ASSESSMENT — ENCOUNTER SYMPTOMS
RHINORRHEA: 1
DIARRHEA: 0
SINUS PRESSURE: 1
COLOR CHANGE: 0
NAUSEA: 0
HOARSE VOICE: 0
ABDOMINAL PAIN: 0
TROUBLE SWALLOWING: 0
SWOLLEN GLANDS: 0
VOMITING: 0
SORE THROAT: 1
SHORTNESS OF BREATH: 0
COUGH: 1

## 2025-03-01 ENCOUNTER — APPOINTMENT (OUTPATIENT)
Dept: CT IMAGING | Age: 55
End: 2025-03-01
Payer: COMMERCIAL

## 2025-03-01 VITALS
SYSTOLIC BLOOD PRESSURE: 132 MMHG | DIASTOLIC BLOOD PRESSURE: 83 MMHG | OXYGEN SATURATION: 97 % | RESPIRATION RATE: 16 BRPM | HEART RATE: 73 BPM | TEMPERATURE: 97.9 F | WEIGHT: 315 LBS | BODY MASS INDEX: 52.51 KG/M2

## 2025-03-01 PROCEDURE — 70450 CT HEAD/BRAIN W/O DYE: CPT

## 2025-03-01 PROCEDURE — 6360000002 HC RX W HCPCS

## 2025-03-01 RX ORDER — DROPERIDOL 2.5 MG/ML
1.25 INJECTION, SOLUTION INTRAMUSCULAR; INTRAVENOUS EVERY 6 HOURS PRN
Status: DISCONTINUED | OUTPATIENT
Start: 2025-03-01 | End: 2025-03-01 | Stop reason: HOSPADM

## 2025-03-01 RX ADMIN — DROPERIDOL 1.25 MG: 2.5 INJECTION, SOLUTION INTRAMUSCULAR; INTRAVENOUS at 01:10

## 2025-03-01 NOTE — ED NOTES
Patient states medication is helping at this time. Pain level now a 4/10. Patient resting in bed. Respirations easy and unlabored. No distress noted. Call light within reach.

## 2025-03-01 NOTE — ED NOTES
Patient presents to ED with chief complaint of a migraine. Patient rates pain at a 10/10. Patient states that ever since he had complications with covid, he has been getting migraines, and they found out that he had fluid in his brain. Patient is worried that this may be the case again, since he has had this migraine for 1 month. Patient resting in bed. Respirations easy and unlabored. No distress noted. Call light within reach.

## 2025-03-01 NOTE — ED NOTES
Patient resting in bed. Respirations easy and unlabored. No distress noted. Call light within reach.'

## 2025-03-01 NOTE — ED PROVIDER NOTES
[facility] EMERGENCY DEPARTMENT - VISIT NOTE    Patient Name: Osmin Stephen Jr  MRN: [id number]  YOB: 1970  Date of Evaluation: 2/28/2025  Treating Resident Physician: Rigo Casanova MD  Supervising Physician: García Bishop DO    CHIEF COMPLAINT       Chief Complaint   Patient presents with    Migraine       HISTORY OF PRESENT ILLNESS    HPI    History obtained from the patient and chart review    Osmni is a 54 y.o. old male who presents to the emergency department by Walk In for evaluation of a headache.  The patient has a history of morbid obesity, cervical spine stenosis, microadenoma of the pituitary gland.  Describes gradual onset of worsening posterior migraine that is squeezing, \" feels like and being hit over the head with a baseball bat\".  He denies any other focal neurologic deficit, blurred vision, double vision.  No trauma or injury.    Chart reviewed, relevant history summarized in HPI above.      REVIEW OF SYSTEMS   Review of Systems  Negative unless documented in HPI    PAST MEDICAL AND SURGICAL HISTORY   Osmin  has a past medical history of Aneurysm, Arthritis, Asthma, Cardiomegaly, COVID-19 (11/2021), Depression, Fibromyalgia, GERD (gastroesophageal reflux disease), Hypertension, Liver disease, and CLARIBEL on CPAP.    Osmin  has a past surgical history that includes back surgery; knee surgery (Left); Pain management procedure (Bilateral, 02/11/2021); Pain management procedure (Bilateral, 04/08/2021); Quadriceps repair (Right, 07/07/2022); Colonoscopy; Neck surgery; Cholecystectomy, laparoscopic (N/A, 2/15/2023); and Pain management procedure (Bilateral, 4/4/2023).    CURRENT MEDICATIONS   Osmin has a current medication list which includes the following long-term medication(s): butalbital-acetaminophen-caffeine, etodolac, amitriptyline, fluticasone, omeprazole, montelukast, hydrochlorothiazide, epinephrine, simethicone, magnesium oxide -mg supplement, albuterol, albuterol  with ENT next month.    ED COURSE   ED Medications administered this visit (None if left blank):   Medications   droPERidol (INAPSINE) injection 1.25 mg (1.25 mg IntraMUSCular Given 3/1/25 0110)       ED Course as of 03/01/25 0340   Sat Mar 01, 2025   0112 CT HEAD WO CONTRAST  Impression:  No acute intracranial pathology.  Extensive left sided sinus disease, progressed.   [JW]      ED Course User Index  [JW] Rigo Casanova MD       Procedures: (None if left blank)  Procedures:     Consultants:  None    Documentation:  N/A    MEDICATION CHANGES     Discharge Medication List as of 3/1/2025  2:58 AM          FINAL IMPRESSION     Final diagnoses:   Sinus headache       DISPOSITION   DISPOSITION Decision To Discharge 03/01/2025 02:56:48 AM   DISPOSITION CONDITION Stable           Results and plan discussed with patient at bedside. Patient is agreeable to plan.     Outpatient Follow-Up:  Terri Phillips, APRN - CNP  [address] [place] [address]  [phone]             This transcription was electronically signed. Parts of this transcriptions may have been dictated by use of voice recognition software and electronically transcribed. The transcription may contain errors not detected in proofreading. Please refer to my supervising physician's documentation if my documentation differs.    Electronically Signed: Rigo Casanova MD, 03/01/25, 3:40 AM

## 2025-03-01 NOTE — ED PROVIDER NOTES
[facility] EMERGENCY DEPARTMENT      EMERGENCY MEDICINE     Pt Name: Osmin Stephen Jr  MRN: [id number]  Birthdate 1970  Date of evaluation: 2/28/2025  Provider: MENDEL ANDERSON DO, FACEP    CHIEF COMPLAINT       Chief Complaint   Patient presents with    Migraine     HISTORY OF PRESENT ILLNESS   Osmin Stephen Jr is a pleasant 54 y.o. male who presents to the emergency department for evaluation of ***.    PASTMEDICAL HISTORY/Co-Morbid Conditions:     Past Medical History:   Diagnosis Date    Aneurysm     pituitary gland    Arthritis     Asthma     Cardiomegaly     COVID-19 11/2021    [facility]    Depression     Fibromyalgia     GERD (gastroesophageal reflux disease)     Hypertension     melhem    Liver disease     CLARIBEL on CPAP        Patient Active Problem List   Diagnosis Code    Cervical stenosis of spinal canal M48.02    Disease of spinal cord (HCC) G95.9    Morbid obesity with BMI of 50.0-59.9, adult E66.01, Z68.43    Radiculopathy affecting upper extremity M54.10    S/P cervical spinal fusion Z98.1    Sleep apnea G47.30    Hyperlipidemia E78.5    Moderate asthma without complication J45.909    Enlarged heart I51.7    Chronic pansinusitis J32.4    Non-seasonal allergic rhinitis due to pollen J30.1    Severe episode of recurrent major depressive disorder, 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.2    Atherosclerotic heart disease of native coronary artery with other forms of angina pectoris I25.118    Heart failure, unspecified HF chronicity, unspecified heart failure type (HCC) I50.9    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      DISPOSITION/PLAN   DISPOSITION                 OUTPATIENT FOLLOW UP THE PATIENT:  No follow-up provider specified.    MENDEL ANDERSON, DO

## 2025-03-10 DIAGNOSIS — M54.17 LUMBOSACRAL RADICULITIS: ICD-10-CM

## 2025-03-10 DIAGNOSIS — G89.4 CHRONIC PAIN SYNDROME: ICD-10-CM

## 2025-03-10 DIAGNOSIS — M25.50 POLYARTHRALGIA: ICD-10-CM

## 2025-03-10 DIAGNOSIS — M48.062 SPINAL STENOSIS OF LUMBAR REGION WITH NEUROGENIC CLAUDICATION: ICD-10-CM

## 2025-03-10 DIAGNOSIS — M79.18 MYOFASCIAL PAIN: ICD-10-CM

## 2025-03-10 DIAGNOSIS — G89.29 CHRONIC MYOFASCIAL PAIN: ICD-10-CM

## 2025-03-10 DIAGNOSIS — M50.30 DDD (DEGENERATIVE DISC DISEASE), CERVICAL: ICD-10-CM

## 2025-03-10 DIAGNOSIS — S83.241A ACUTE MEDIAL MENISCUS TEAR OF RIGHT KNEE, INITIAL ENCOUNTER: ICD-10-CM

## 2025-03-10 DIAGNOSIS — M54.50 LUMBAR PAIN: ICD-10-CM

## 2025-03-10 DIAGNOSIS — M79.18 CHRONIC MYOFASCIAL PAIN: ICD-10-CM

## 2025-03-10 DIAGNOSIS — M47.816 LUMBAR SPONDYLOSIS: ICD-10-CM

## 2025-03-10 NOTE — TELEPHONE ENCOUNTER
Osmin Stephen Jr called requesting a refill on the following medications:  Requested Prescriptions     Pending Prescriptions Disp Refills    HYDROcodone-acetaminophen (NORCO) 5-325 MG per tablet 90 tablet 0     Sig: Take 1 tablet by mouth every 8 hours as needed for Pain for up to 30 days. Max Daily Amount: 3 tablets    orphenadrine (NORFLEX) 100 MG extended release tablet 60 tablet 0     Sig: Take 1 tablet by mouth 2 times daily as needed for Muscle spasms    lidocaine (LIDODERM) 5 % 90 patch 0     Sig: Place 3 patches onto the skin daily To painful areas on low back, and neck     Pharmacy verified:[facility]      Date of last visit: 1/15/25  Date of next visit (if applicable): 3/26/2025

## 2025-03-12 RX ORDER — ORPHENADRINE CITRATE 100 MG/1
100 TABLET ORAL 2 TIMES DAILY PRN
Qty: 60 TABLET | Refills: 0 | Status: SHIPPED | OUTPATIENT
Start: 2025-03-16 | End: 2025-04-15

## 2025-03-12 RX ORDER — HYDROCODONE BITARTRATE AND ACETAMINOPHEN 5; 325 MG/1; MG/1
1 TABLET ORAL EVERY 8 HOURS PRN
Qty: 90 TABLET | Refills: 0 | Status: SHIPPED | OUTPATIENT
Start: 2025-03-18 | End: 2025-04-17

## 2025-03-12 RX ORDER — LIDOCAINE 50 MG/G
3 PATCH TOPICAL DAILY
Qty: 90 PATCH | Refills: 0 | Status: SHIPPED | OUTPATIENT
Start: 2025-03-18 | End: 2025-04-17

## 2025-03-12 NOTE — TELEPHONE ENCOUNTER
OARRS reviewed. UDS: + for Butalbital, Hydrocodone and Amitriptyline.   Last seen: 1/15/2025. Follow-up: 3/26/25

## 2025-03-13 ENCOUNTER — TELEPHONE (OUTPATIENT)
Dept: FAMILY MEDICINE CLINIC | Age: 55
End: 2025-03-13

## 2025-03-13 DIAGNOSIS — J01.91 ACUTE RECURRENT SINUSITIS, UNSPECIFIED LOCATION: Primary | ICD-10-CM

## 2025-03-13 NOTE — TELEPHONE ENCOUNTER
Patient has recently been treated with 2 different ATBs for sinusitis. Most recently finished Augmentin. (Prescribed by our office on 2/27/25 x 10 days)  Patient has completed treatment with Augmentin and has noticed his symptoms returning over the past 3-4 days--congestion and burning of nasal passages. He is requesting another ATB to get him through until seen by ENT on 4/10/25.    Pt did have a CT sinus completed at [facility] on 3/1/25 showing extensive left sided sinus disease that has progressed.     I did speak with pt about the risk of C-diff with long term ATB use and he verbalized understanding.    If you do plan on another treatment, pt will use [facility].   CPB

## 2025-03-14 RX ORDER — DOXYCYCLINE HYCLATE 100 MG
100 TABLET ORAL 2 TIMES DAILY
Qty: 28 TABLET | Refills: 0 | Status: SHIPPED | OUTPATIENT
Start: 2025-03-14 | End: 2025-03-28

## 2025-03-14 NOTE — TELEPHONE ENCOUNTER
Noted. RX sent.  Follow up with ENT as planned. -WS    Orders Placed This Encounter   Medications    doxycycline hyclate (VIBRA-TABS) 100 MG tablet     Sig: Take 1 tablet by mouth 2 times daily for 14 days     Dispense:  28 tablet     Refill:  0

## 2025-03-18 ENCOUNTER — TELEPHONE (OUTPATIENT)
Dept: PHYSICAL MEDICINE AND REHAB | Age: 55
End: 2025-03-18

## 2025-03-18 NOTE — TELEPHONE ENCOUNTER
PA sent to plan  (Key: [id number])  Rx #: [id number]  The plan will fax you a determination, typically within 1 to 5 business days.  Lidocaine 5% patches

## 2025-03-26 ENCOUNTER — OFFICE VISIT (OUTPATIENT)
Dept: PHYSICAL MEDICINE AND REHAB | Age: 55
End: 2025-03-26
Payer: COMMERCIAL

## 2025-03-26 VITALS
HEIGHT: 73 IN | WEIGHT: 315 LBS | BODY MASS INDEX: 41.75 KG/M2 | SYSTOLIC BLOOD PRESSURE: 132 MMHG | DIASTOLIC BLOOD PRESSURE: 84 MMHG

## 2025-03-26 DIAGNOSIS — M79.18 CHRONIC MYOFASCIAL PAIN: ICD-10-CM

## 2025-03-26 DIAGNOSIS — J30.1 NON-SEASONAL ALLERGIC RHINITIS DUE TO POLLEN: ICD-10-CM

## 2025-03-26 DIAGNOSIS — J30.9 ALLERGIC RHINOCONJUNCTIVITIS: ICD-10-CM

## 2025-03-26 DIAGNOSIS — Z98.1 S/P CERVICAL SPINAL FUSION: ICD-10-CM

## 2025-03-26 DIAGNOSIS — M54.12 CERVICAL RADICULOPATHY: ICD-10-CM

## 2025-03-26 DIAGNOSIS — M54.17 LUMBOSACRAL RADICULITIS: Primary | ICD-10-CM

## 2025-03-26 DIAGNOSIS — G89.4 CHRONIC PAIN SYNDROME: ICD-10-CM

## 2025-03-26 DIAGNOSIS — J45.40 MODERATE PERSISTENT ASTHMA WITHOUT COMPLICATION: ICD-10-CM

## 2025-03-26 DIAGNOSIS — M47.816 LUMBAR SPONDYLOSIS: ICD-10-CM

## 2025-03-26 DIAGNOSIS — M48.062 SPINAL STENOSIS OF LUMBAR REGION WITH NEUROGENIC CLAUDICATION: ICD-10-CM

## 2025-03-26 DIAGNOSIS — G89.29 CHRONIC MYOFASCIAL PAIN: ICD-10-CM

## 2025-03-26 DIAGNOSIS — Z98.1 HX OF FUSION OF CERVICAL SPINE: ICD-10-CM

## 2025-03-26 DIAGNOSIS — M54.41 CHRONIC BILATERAL LOW BACK PAIN WITH BILATERAL SCIATICA: ICD-10-CM

## 2025-03-26 DIAGNOSIS — M54.42 CHRONIC BILATERAL LOW BACK PAIN WITH BILATERAL SCIATICA: ICD-10-CM

## 2025-03-26 DIAGNOSIS — H10.10 ALLERGIC RHINOCONJUNCTIVITIS: ICD-10-CM

## 2025-03-26 DIAGNOSIS — M25.50 POLYARTHRALGIA: ICD-10-CM

## 2025-03-26 DIAGNOSIS — G89.29 CHRONIC BILATERAL LOW BACK PAIN WITH BILATERAL SCIATICA: ICD-10-CM

## 2025-03-26 DIAGNOSIS — I10 PRIMARY HYPERTENSION: ICD-10-CM

## 2025-03-26 PROCEDURE — 99214 OFFICE O/P EST MOD 30 MIN: CPT | Performed by: NURSE PRACTITIONER

## 2025-03-26 RX ORDER — LIDOCAINE 50 MG/G
1 PATCH TOPICAL DAILY
Qty: 30 PATCH | Refills: 0 | Status: SHIPPED | OUTPATIENT
Start: 2025-03-26 | End: 2025-04-25

## 2025-03-26 ASSESSMENT — ENCOUNTER SYMPTOMS
ABDOMINAL DISTENTION: 0
ABDOMINAL PAIN: 1
BACK PAIN: 1
COUGH: 0
APNEA: 1
PHOTOPHOBIA: 0
SHORTNESS OF BREATH: 0
NAUSEA: 0

## 2025-03-26 NOTE — TELEPHONE ENCOUNTER
This medication refill is regarding a telephone request. Refill requested by patient.    Requested Prescriptions     Pending Prescriptions Disp Refills    diphenhydrAMINE (BENADRYL) 25 MG capsule 30 capsule 1     Sig: Take 1 capsule by mouth every 6 hours as needed for Itching    fluticasone (FLONASE) 50 MCG/ACT nasal spray 3 each 3     Si sprays by Each Nostril route daily    omeprazole (PRILOSEC) 20 MG delayed release capsule 90 capsule 3     Sig: Take 1 capsule by mouth Daily    butalbital-acetaminophen-caffeine (FIORICET, ESGIC) -40 MG per tablet 30 tablet 0     Sig: Take 1 tablet by mouth every 6 hours as needed for Headaches Max Daily Amount: 4 tablets    budesonide-formoterol (SYMBICORT) 160-4.5 MCG/ACT AERO 30.6 g 3     Sig: inhale 2 puffs by mouth twice a day **RINSE MOUTH AFTER USE**    hydroCHLOROthiazide (HYDRODIURIL) 25 MG tablet 90 tablet 3     Sig: Take 1 tablet by mouth every morning     Date of last visit: 2025   Date of next visit: 2025    [facility]  Pt stated Dr Talley just wants you to take over Fioricet until the insurance contract is settled with [employer]    Last Lipid Panel:    Lab Results   Component Value Date/Time    CHOL 190 10/03/2024 11:54 AM    TRIG 97 10/03/2024 11:54 AM    HDL 43 10/03/2024 11:54 AM     Last CMP:   Lab Results   Component Value Date     2025    K 3.4 (L) 2025     2025    CO2 23 2025    BUN 9 2025    CREATININE 0.8 2025    GLUCOSE 92 2025    CALCIUM 8.8 2025    BILITOT 0.5 2025    ALKPHOS 137 (H) 2025    AST 16 2025    ALT 20 2025    LABGLOM > 90 2025       Last Thyroid:    Lab Results   Component Value Date    TSH 1.190 10/03/2024    T4FREE 0.93 10/03/2024     Last Hemoglobin A1C:    Lab Results   Component Value Date/Time    LABA1C 4.9 10/03/2024 11:54 AM       Rx verified, ordered and set to EP.

## 2025-03-26 NOTE — PROGRESS NOTES
[facility] [place] [facility]  [facility]  [address]  [facility] [address]  Dept: [phone]  Dept Fax: [phone]  Loc: [phone]    Visit Date: 3/26/2025    Functionality Assessment/Goals Worksheet     On a scale of 0 (Does not Interfere) to 10 (Completely Interferes)     1.  Which number describes how during the past week pain has interfered with       the following:  A.  General Activity:  9  B.  Mood: 9  C.  Walking Ability:  9  D.  Normal Work (Includes both work outside the home and housework):  10  E.  Relations with Other People:   10  F.  Sleep:   10  G.  Enjoyment of Life:   10    2.  Patient Prefers to Take their Pain Medications:     []  On a regular basis   [x]  Only when necessary    []  Does not take pain medications    3.  What are the Patient's Goals/Expectations for Visiting Pain Management?     [x]  Learn about my pain    [x]  Receive Medication   [x]  Physical Therapy     []  Treat Depression   [x]  Receive Injections    []  Treat Sleep   []  Deal with Anxiety and Stress   []  Treat Opoid Dependence/Addiction   []  Other:        HPI:   Osmin Stephen Jr is a 55 y.o. male is here today for    Chief Complaint: Low back and leg pain, muscle spasms and muscle pain     HPI   A little over 2 months since last visit. Osmin has continues pain in his low back across worse in right side today radiating down his right leg posterior thigh lately- aching and sharp stabbing and burning pain.     Continues to have pain in his posterior neck, shoulders, left knee aching pain and throbbing at times.   Continues to have muscle spasms across his abdomen   He is worried about his insurance not being covered her as she has [employer]    States he tries stretching daily and trying to walk more at work.     Current pain medications continue to help take the edge off of pain and makes it more tolerable with Norflex, Lidoderm patches,

## 2025-03-27 RX ORDER — BUTALBITAL, ACETAMINOPHEN AND CAFFEINE 50; 325; 40 MG/1; MG/1; MG/1
1 TABLET ORAL EVERY 8 HOURS PRN
Qty: 30 TABLET | Refills: 0 | Status: SHIPPED | OUTPATIENT
Start: 2025-03-27

## 2025-03-27 RX ORDER — BUDESONIDE AND FORMOTEROL FUMARATE DIHYDRATE 160; 4.5 UG/1; UG/1
AEROSOL RESPIRATORY (INHALATION)
Qty: 30.6 G | Refills: 3 | Status: SHIPPED | OUTPATIENT
Start: 2025-03-27

## 2025-03-27 RX ORDER — HYDROCHLOROTHIAZIDE 25 MG/1
25 TABLET ORAL EVERY MORNING
Qty: 90 TABLET | Refills: 3 | Status: SHIPPED | OUTPATIENT
Start: 2025-03-27

## 2025-03-27 RX ORDER — FLUTICASONE PROPIONATE 50 MCG
2 SPRAY, SUSPENSION (ML) NASAL DAILY
Qty: 3 EACH | Refills: 3 | Status: SHIPPED | OUTPATIENT
Start: 2025-03-27

## 2025-03-27 RX ORDER — DIPHENHYDRAMINE HCL 25 MG
25 CAPSULE ORAL EVERY 6 HOURS PRN
Qty: 30 CAPSULE | Refills: 1 | Status: SHIPPED | OUTPATIENT
Start: 2025-03-27

## 2025-03-27 RX ORDER — OMEPRAZOLE 20 MG/1
20 CAPSULE, DELAYED RELEASE ORAL DAILY
Qty: 90 CAPSULE | Refills: 3 | Status: SHIPPED | OUTPATIENT
Start: 2025-03-27

## 2025-03-27 NOTE — TELEPHONE ENCOUNTER
OK for refill. -    Controlled Substance Monitoring:    Acute and Chronic Pain Monitoring:   RX Monitoring Periodic Controlled Substance Monitoring   2/12/2025  10:24 AM Possible medication side effects, risk of tolerance/dependence & alternative treatments discussed.;No signs of potential drug abuse or diversion identified.;Assessed functional status (ability to engage in work or other purposeful activities, the pain intensity and its interference with activities of daily living, quality of family life and social activities, and the physical activity);Obtaining appropriate analgesic effect of treatment.

## 2025-03-31 ENCOUNTER — HOSPITAL ENCOUNTER (OUTPATIENT)
Dept: MRI IMAGING | Age: 55
Discharge: HOME | End: 2025-03-31
Payer: COMMERCIAL

## 2025-03-31 DIAGNOSIS — D35.2 PITUITARY ADENOMA (HCC): ICD-10-CM

## 2025-03-31 PROCEDURE — 70553 MRI BRAIN STEM W/O & W/DYE: CPT

## 2025-03-31 PROCEDURE — A9579 GAD-BASE MR CONTRAST NOS,1ML: HCPCS | Performed by: PHYSICIAN ASSISTANT

## 2025-03-31 PROCEDURE — 6360000004 HC RX CONTRAST MEDICATION: Performed by: PHYSICIAN ASSISTANT

## 2025-03-31 RX ADMIN — GADOTERIDOL 20 ML: 279.3 INJECTION, SOLUTION INTRAVENOUS at 08:59

## 2025-04-07 ENCOUNTER — TELEPHONE (OUTPATIENT)
Dept: PHYSICAL MEDICINE AND REHAB | Age: 55
End: 2025-04-07

## 2025-04-07 DIAGNOSIS — M25.50 POLYARTHRALGIA: ICD-10-CM

## 2025-04-07 DIAGNOSIS — M47.816 LUMBAR SPONDYLOSIS: ICD-10-CM

## 2025-04-07 DIAGNOSIS — M54.50 LUMBAR PAIN: ICD-10-CM

## 2025-04-07 DIAGNOSIS — M50.30 DDD (DEGENERATIVE DISC DISEASE), CERVICAL: ICD-10-CM

## 2025-04-07 DIAGNOSIS — M79.18 CHRONIC MYOFASCIAL PAIN: ICD-10-CM

## 2025-04-07 DIAGNOSIS — G89.29 CHRONIC MYOFASCIAL PAIN: ICD-10-CM

## 2025-04-07 DIAGNOSIS — M54.17 LUMBOSACRAL RADICULITIS: ICD-10-CM

## 2025-04-07 DIAGNOSIS — G89.4 CHRONIC PAIN SYNDROME: ICD-10-CM

## 2025-04-07 DIAGNOSIS — M48.062 SPINAL STENOSIS OF LUMBAR REGION WITH NEUROGENIC CLAUDICATION: ICD-10-CM

## 2025-04-07 RX ORDER — HYDROCODONE BITARTRATE AND ACETAMINOPHEN 5; 325 MG/1; MG/1
1 TABLET ORAL EVERY 8 HOURS PRN
Qty: 90 TABLET | Refills: 0 | Status: CANCELLED | OUTPATIENT
Start: 2025-04-07 | End: 2025-05-07

## 2025-04-07 NOTE — TELEPHONE ENCOUNTER
Patient is calling about his lidoderm patches. He states that there was an issue with getting the refill. Something about it needing a prior auth, but also he said that he was needing the directions updated. 1 box instead of 3 a month and the amt of times per day?    Pharmacy verified:  .[id number] - [place], [place] - [address] - P [phone] - F [phone]      Date of last visit: 03/26/2025  Date of next visit (if applicable): Visit date not found     Patient said he is going to call back to schedule his f/u appt

## 2025-04-07 NOTE — TELEPHONE ENCOUNTER
Osmin Stephen Jr called requesting a refill on the following medications:  Requested Prescriptions     Pending Prescriptions Disp Refills    orphenadrine (NORFLEX) 100 MG extended release tablet 60 tablet 0     Sig: Take 1 tablet by mouth 2 times daily as needed for Muscle spasms    HYDROcodone-acetaminophen (NORCO) 5-325 MG per tablet 90 tablet 0     Sig: Take 1 tablet by mouth every 8 hours as needed for Pain for up to 30 days. Max Daily Amount: 3 tablets     Pharmacy verified:  .[id number] - [place], [place] - [address] - P [phone] - F [phone]     Date of last visit: 03/26/2025  Date of next visit (if applicable): Visit date not found      Patient said he is going to call back to schedule his f/u appt

## 2025-04-07 NOTE — TELEPHONE ENCOUNTER
PA sent to plan  (Key: [id number])  Rx #: [id number]  The plan will fax you a determination, typically within 1 to 5 business days.  Drug  Lidocaine 5% patches    Form  [employer] Prior Authorization Request Form

## 2025-04-08 RX ORDER — ORPHENADRINE CITRATE 100 MG/1
100 TABLET ORAL 2 TIMES DAILY PRN
Qty: 60 TABLET | Refills: 0 | Status: SHIPPED | OUTPATIENT
Start: 2025-04-15 | End: 2025-05-15

## 2025-04-08 NOTE — TELEPHONE ENCOUNTER
OARRS reviewed. UDS: + for  amitriptyline,butalbital,hydrocodone.   Last seen: 3/26/2025. Follow-up: none    Pt request hydrocodone too soon.

## 2025-04-09 NOTE — PROGRESS NOTES
& Plan   Assessment & Plan   Diagnoses and all orders for this visit:     Diagnosis Orders   1. Chronic pansinusitis  CT FACIAL BONES WO CONTRAST      2. PND (post-nasal drip)  CT FACIAL BONES WO CONTRAST      3. Chronic maxillary sinusitis  CT FACIAL BONES WO CONTRAST      4. Chronic ethmoidal sinusitis  CT FACIAL BONES WO CONTRAST      5. Chronic frontal sinusitis  CT FACIAL BONES WO CONTRAST      6. Nasal congestion  CT FACIAL BONES WO CONTRAST    David Garcia MD, Allergy & Immunology, [place]      7. Allergy, initial encounter  David Garcia MD, Allergy & Immunology, [place]          The findings were explained and his questions were answered.  Recommend sinus work-up with 4 week course of NeilMed saline irrigations BID, nasal steroid spray and broad-spectrum antibiotic.  The rationale for prolonged antibiotic course was discussed including the importance of recovery of the mucociliary transport mechanism to prevent reinfection from retained secretions.  After 4 weeks of maximum medical therapy, will obtain CT sinus with IGS protocol and return to discuss results and further plan of care.  I also placed a new allergy referral for pt. All questions were answered.        Return in about 8 weeks (around 6/5/2025) for f/u CT sinus workup.           **This report has been created using voice recognition software. It may contain minor errors which are inherent in voice recognition technology.**    Electronically signed by Leann Pink PA-C on 4/10/2025 at 9:58 AM

## 2025-04-10 ENCOUNTER — OFFICE VISIT (OUTPATIENT)
Dept: ENT CLINIC | Age: 55
End: 2025-04-10
Payer: COMMERCIAL

## 2025-04-10 VITALS
WEIGHT: 315 LBS | BODY MASS INDEX: 41.75 KG/M2 | TEMPERATURE: 98 F | OXYGEN SATURATION: 97 % | DIASTOLIC BLOOD PRESSURE: 78 MMHG | HEIGHT: 73 IN | HEART RATE: 67 BPM | SYSTOLIC BLOOD PRESSURE: 128 MMHG

## 2025-04-10 DIAGNOSIS — J32.1 CHRONIC FRONTAL SINUSITIS: ICD-10-CM

## 2025-04-10 DIAGNOSIS — T78.40XA ALLERGY, INITIAL ENCOUNTER: ICD-10-CM

## 2025-04-10 DIAGNOSIS — J32.4 CHRONIC PANSINUSITIS: Primary | ICD-10-CM

## 2025-04-10 DIAGNOSIS — J32.0 CHRONIC MAXILLARY SINUSITIS: ICD-10-CM

## 2025-04-10 DIAGNOSIS — R09.81 NASAL CONGESTION: ICD-10-CM

## 2025-04-10 DIAGNOSIS — R09.82 PND (POST-NASAL DRIP): ICD-10-CM

## 2025-04-10 DIAGNOSIS — J32.2 CHRONIC ETHMOIDAL SINUSITIS: ICD-10-CM

## 2025-04-10 PROCEDURE — 99204 OFFICE O/P NEW MOD 45 MIN: CPT

## 2025-04-10 RX ORDER — FEXOFENADINE HCL 180 MG/1
180 TABLET ORAL DAILY
Qty: 30 TABLET | Refills: 2 | Status: SHIPPED | OUTPATIENT
Start: 2025-04-10 | End: 2025-07-09

## 2025-04-13 RX ORDER — SOD CHLOR,BICARB/SQUEEZ BOTTLE
1 PACKET, WITH RINSE DEVICE NASAL DAILY
Qty: 1 KIT | Refills: 0 | Status: SHIPPED | OUTPATIENT
Start: 2025-04-13

## 2025-04-18 DIAGNOSIS — M54.50 LUMBAR PAIN: ICD-10-CM

## 2025-04-18 DIAGNOSIS — G89.4 CHRONIC PAIN SYNDROME: ICD-10-CM

## 2025-04-18 DIAGNOSIS — M25.50 POLYARTHRALGIA: ICD-10-CM

## 2025-04-18 DIAGNOSIS — M47.816 LUMBAR SPONDYLOSIS: ICD-10-CM

## 2025-04-18 DIAGNOSIS — G89.29 CHRONIC MYOFASCIAL PAIN: ICD-10-CM

## 2025-04-18 DIAGNOSIS — M54.17 LUMBOSACRAL RADICULITIS: ICD-10-CM

## 2025-04-18 DIAGNOSIS — M50.30 DDD (DEGENERATIVE DISC DISEASE), CERVICAL: ICD-10-CM

## 2025-04-18 DIAGNOSIS — M79.18 CHRONIC MYOFASCIAL PAIN: ICD-10-CM

## 2025-04-18 DIAGNOSIS — M48.062 SPINAL STENOSIS OF LUMBAR REGION WITH NEUROGENIC CLAUDICATION: ICD-10-CM

## 2025-04-18 NOTE — TELEPHONE ENCOUNTER
Osmin Stephen Jr called requesting a refill on the following medications:  Requested Prescriptions     Pending Prescriptions Disp Refills    HYDROcodone-acetaminophen (NORCO) 5-325 MG per tablet 90 tablet 0     Sig: Take 1 tablet by mouth every 8 hours as needed for Pain for up to 30 days. Max Daily Amount: 3 tablets     Pharm[facility] verified:  [facility]      Date of last visit: 03-26-25  Date of next visit (if applicable): Visit date not found

## 2025-04-21 RX ORDER — HYDROCODONE BITARTRATE AND ACETAMINOPHEN 5; 325 MG/1; MG/1
1 TABLET ORAL EVERY 8 HOURS PRN
Qty: 90 TABLET | Refills: 0 | Status: SHIPPED | OUTPATIENT
Start: 2025-04-21 | End: 2025-05-21

## 2025-04-21 NOTE — TELEPHONE ENCOUNTER
OARRS reviewed. UDS: + for Amitriptyline, Butalbital, Hydrocodone.   Last seen: 3/26/2025. Follow-up: none on file.

## 2025-05-15 ENCOUNTER — OFFICE VISIT (OUTPATIENT)
Dept: PHYSICAL MEDICINE AND REHAB | Age: 55
End: 2025-05-15
Payer: COMMERCIAL

## 2025-05-15 VITALS
WEIGHT: 315 LBS | BODY MASS INDEX: 41.75 KG/M2 | HEIGHT: 73 IN | DIASTOLIC BLOOD PRESSURE: 82 MMHG | SYSTOLIC BLOOD PRESSURE: 126 MMHG

## 2025-05-15 DIAGNOSIS — M79.18 CHRONIC MYOFASCIAL PAIN: ICD-10-CM

## 2025-05-15 DIAGNOSIS — M48.062 SPINAL STENOSIS OF LUMBAR REGION WITH NEUROGENIC CLAUDICATION: ICD-10-CM

## 2025-05-15 DIAGNOSIS — M54.41 CHRONIC BILATERAL LOW BACK PAIN WITH BILATERAL SCIATICA: ICD-10-CM

## 2025-05-15 DIAGNOSIS — M50.30 DDD (DEGENERATIVE DISC DISEASE), CERVICAL: ICD-10-CM

## 2025-05-15 DIAGNOSIS — M47.816 LUMBAR SPONDYLOSIS: ICD-10-CM

## 2025-05-15 DIAGNOSIS — M25.50 POLYARTHRALGIA: ICD-10-CM

## 2025-05-15 DIAGNOSIS — M54.12 CERVICAL RADICULOPATHY: ICD-10-CM

## 2025-05-15 DIAGNOSIS — G89.29 CHRONIC BILATERAL LOW BACK PAIN WITH BILATERAL SCIATICA: ICD-10-CM

## 2025-05-15 DIAGNOSIS — G89.29 CHRONIC MYOFASCIAL PAIN: ICD-10-CM

## 2025-05-15 DIAGNOSIS — M54.42 CHRONIC BILATERAL LOW BACK PAIN WITH BILATERAL SCIATICA: ICD-10-CM

## 2025-05-15 DIAGNOSIS — G89.4 CHRONIC PAIN SYNDROME: ICD-10-CM

## 2025-05-15 DIAGNOSIS — M54.50 LUMBAR PAIN: ICD-10-CM

## 2025-05-15 DIAGNOSIS — M54.17 LUMBOSACRAL RADICULITIS: Primary | ICD-10-CM

## 2025-05-15 DIAGNOSIS — Z98.1 HX OF FUSION OF CERVICAL SPINE: ICD-10-CM

## 2025-05-15 PROCEDURE — G8417 CALC BMI ABV UP PARAM F/U: HCPCS | Performed by: NURSE PRACTITIONER

## 2025-05-15 PROCEDURE — 99214 OFFICE O/P EST MOD 30 MIN: CPT | Performed by: NURSE PRACTITIONER

## 2025-05-15 PROCEDURE — 1036F TOBACCO NON-USER: CPT | Performed by: NURSE PRACTITIONER

## 2025-05-15 PROCEDURE — G8427 DOCREV CUR MEDS BY ELIG CLIN: HCPCS | Performed by: NURSE PRACTITIONER

## 2025-05-15 PROCEDURE — 3017F COLORECTAL CA SCREEN DOC REV: CPT | Performed by: NURSE PRACTITIONER

## 2025-05-15 RX ORDER — ORPHENADRINE CITRATE 100 MG/1
100 TABLET ORAL 2 TIMES DAILY PRN
Qty: 60 TABLET | Refills: 0 | Status: SHIPPED | OUTPATIENT
Start: 2025-05-15 | End: 2025-06-14

## 2025-05-15 RX ORDER — LIDOCAINE 50 MG/G
OINTMENT TOPICAL
Qty: 1 EACH | Refills: 1 | Status: SHIPPED | OUTPATIENT
Start: 2025-05-15 | End: 2025-06-14

## 2025-05-15 RX ORDER — HYDROCODONE BITARTRATE AND ACETAMINOPHEN 5; 325 MG/1; MG/1
1 TABLET ORAL EVERY 8 HOURS PRN
Qty: 90 TABLET | Refills: 0 | Status: SHIPPED | OUTPATIENT
Start: 2025-05-21 | End: 2025-06-20

## 2025-05-15 RX ORDER — LIDOCAINE 50 MG/G
3 PATCH TOPICAL DAILY
Qty: 90 PATCH | Refills: 0 | Status: SHIPPED | OUTPATIENT
Start: 2025-05-15 | End: 2025-06-14

## 2025-05-15 ASSESSMENT — ENCOUNTER SYMPTOMS
NAUSEA: 0
PHOTOPHOBIA: 0
ABDOMINAL PAIN: 1
BACK PAIN: 1
COUGH: 0
SINUS PRESSURE: 1
ABDOMINAL DISTENTION: 0
APNEA: 1
SHORTNESS OF BREATH: 1

## 2025-05-15 NOTE — PROGRESS NOTES
[facility] [place] [facility]  [facility]  [address]  [facility] [address]  Dept: [phone]  Dept Fax: [phone]  Loc: [phone]    Visit Date: 5/15/2025    Functionality Assessment/Goals Worksheet     On a scale of 0 (Does not Interfere) to 10 (Completely Interferes)     1.  Which number describes how during the past week pain has interfered with       the following:  A.  General Activity:  10  B.  Mood: 10  C.  Walking Ability:  10  D.  Normal Work (Includes both work outside the home and housework):  10  E.  Relations with Other People:   10  F.  Sleep:   10  G.  Enjoyment of Life:   10    2.  Patient Prefers to Take their Pain Medications:     [x]  On a regular basis   [x]  Only when necessary    []  Does not take pain medications    3.  What are the Patient's Goals/Expectations for Visiting Pain Management?     [x]  Learn about my pain    [x]  Receive Medication   [x]  Physical Therapy     []  Treat Depression   [x]  Receive Injections    []  Treat Sleep   []  Deal with Anxiety and Stress   []  Treat Opoid Dependence/Addiction   []  Other:        HPI:   Osmin Stephen Jr is a 55 y.o. male is here today for    Chief Complaint: Low back pain, leg pain, muscle pain,, muscle spasms    HPI   1.5 month follow up. Osmin continues to have a main compliant of pain in his low back across worse radiating down his right leg posterior thigh lately- aching and sharp stabbing and burning pain and tight. Has tingling and pins and needles in his right foot. Has stabbing and burning in right anterior thigh. Denies left leg pain lately.     Continues to have pain in his posterior neck, shoulders and aching all over his body  Continues to have muscle spasms across his abdomen and in his legs     He states he continues home exercises and stretches   Current pain medications continue to help take the edge off of pain and makes it more tolerable

## 2025-05-30 ENCOUNTER — HOSPITAL ENCOUNTER (OUTPATIENT)
Dept: CT IMAGING | Age: 55
Discharge: HOME | End: 2025-05-30
Payer: COMMERCIAL

## 2025-05-30 DIAGNOSIS — R09.82 PND (POST-NASAL DRIP): ICD-10-CM

## 2025-05-30 DIAGNOSIS — J32.2 CHRONIC ETHMOIDAL SINUSITIS: ICD-10-CM

## 2025-05-30 DIAGNOSIS — J32.1 CHRONIC FRONTAL SINUSITIS: ICD-10-CM

## 2025-05-30 DIAGNOSIS — R09.81 NASAL CONGESTION: ICD-10-CM

## 2025-05-30 DIAGNOSIS — J32.0 CHRONIC MAXILLARY SINUSITIS: ICD-10-CM

## 2025-05-30 DIAGNOSIS — J32.4 CHRONIC PANSINUSITIS: ICD-10-CM

## 2025-05-30 PROCEDURE — 70486 CT MAXILLOFACIAL W/O DYE: CPT

## 2025-06-01 ENCOUNTER — RESULTS FOLLOW-UP (OUTPATIENT)
Dept: ENT CLINIC | Age: 55
End: 2025-06-01

## 2025-06-12 DIAGNOSIS — M54.17 LUMBOSACRAL RADICULITIS: ICD-10-CM

## 2025-06-12 DIAGNOSIS — M47.816 LUMBAR SPONDYLOSIS: ICD-10-CM

## 2025-06-12 DIAGNOSIS — G89.29 CHRONIC MYOFASCIAL PAIN: ICD-10-CM

## 2025-06-12 DIAGNOSIS — M48.062 SPINAL STENOSIS OF LUMBAR REGION WITH NEUROGENIC CLAUDICATION: ICD-10-CM

## 2025-06-12 DIAGNOSIS — M79.18 CHRONIC MYOFASCIAL PAIN: ICD-10-CM

## 2025-06-12 DIAGNOSIS — G89.4 CHRONIC PAIN SYNDROME: ICD-10-CM

## 2025-06-12 DIAGNOSIS — M50.30 DDD (DEGENERATIVE DISC DISEASE), CERVICAL: ICD-10-CM

## 2025-06-12 DIAGNOSIS — M54.50 LUMBAR PAIN: ICD-10-CM

## 2025-06-12 DIAGNOSIS — M25.50 POLYARTHRALGIA: ICD-10-CM

## 2025-06-12 RX ORDER — LIDOCAINE 50 MG/G
OINTMENT TOPICAL
Qty: 50 G | Refills: 2 | Status: SHIPPED | OUTPATIENT
Start: 2025-06-14

## 2025-06-12 RX ORDER — HYDROCODONE BITARTRATE AND ACETAMINOPHEN 5; 325 MG/1; MG/1
1 TABLET ORAL EVERY 8 HOURS PRN
Qty: 90 TABLET | Refills: 0 | Status: SHIPPED | OUTPATIENT
Start: 2025-06-20 | End: 2025-07-20

## 2025-06-12 RX ORDER — ORPHENADRINE CITRATE 100 MG/1
100 TABLET ORAL 2 TIMES DAILY PRN
Qty: 60 TABLET | Refills: 0 | Status: SHIPPED | OUTPATIENT
Start: 2025-06-14 | End: 2025-07-14

## 2025-06-12 RX ORDER — HYDROCODONE BITARTRATE AND ACETAMINOPHEN 5; 325 MG/1; MG/1
1 TABLET ORAL EVERY 8 HOURS PRN
Qty: 90 TABLET | Refills: 0 | Status: CANCELLED | OUTPATIENT
Start: 2025-06-12 | End: 2025-07-12

## 2025-06-12 NOTE — TELEPHONE ENCOUNTER
OARRS reviewed. UDS: + for  amitriptyline and hydrocodone. Butalbital Is non-compliant.   Last seen: 5/15/2025. Follow-up: 7/15/2025

## 2025-06-12 NOTE — TELEPHONE ENCOUNTER
Osmin Stephen Jr called requesting a refill on the following medications:  Requested Prescriptions     Pending Prescriptions Disp Refills    orphenadrine (NORFLEX) 100 MG extended release tablet 60 tablet 0     Sig: Take 1 tablet by mouth 2 times daily as needed for Muscle spasms    HYDROcodone-acetaminophen (NORCO) 5-325 MG per tablet 90 tablet 0     Sig: Take 1 tablet by mouth every 8 hours as needed for Pain for up to 30 days. Max Daily Amount: 3 tablets    lidocaine (XYLOCAINE) 5 % ointment 50 g 2     Sig: apply to affected area four times a day if needed for pain     Pharmacy verified: [facility] [address]  .pv      Date of last visit: 5/15/2025  Date of next visit (if applicable): 7/15/2025

## 2025-07-08 ENCOUNTER — OFFICE VISIT (OUTPATIENT)
Dept: ENT CLINIC | Age: 55
End: 2025-07-08
Payer: COMMERCIAL

## 2025-07-08 ENCOUNTER — PREP FOR PROCEDURE (OUTPATIENT)
Dept: ENT CLINIC | Age: 55
End: 2025-07-08

## 2025-07-08 VITALS
HEIGHT: 73 IN | DIASTOLIC BLOOD PRESSURE: 98 MMHG | RESPIRATION RATE: 16 BRPM | SYSTOLIC BLOOD PRESSURE: 148 MMHG | OXYGEN SATURATION: 96 % | BODY MASS INDEX: 41.75 KG/M2 | TEMPERATURE: 97.5 F | HEART RATE: 68 BPM | WEIGHT: 315 LBS

## 2025-07-08 DIAGNOSIS — J32.2 CHRONIC ETHMOIDAL SINUSITIS: ICD-10-CM

## 2025-07-08 DIAGNOSIS — J32.0 CHRONIC MAXILLARY SINUSITIS: ICD-10-CM

## 2025-07-08 DIAGNOSIS — J34.3 HYPERTROPHY OF INFERIOR NASAL TURBINATE: ICD-10-CM

## 2025-07-08 DIAGNOSIS — J34.3 HYPERTROPHY OF BOTH INFERIOR NASAL TURBINATES: ICD-10-CM

## 2025-07-08 DIAGNOSIS — J34.2 NASAL SEPTAL DEVIATION: ICD-10-CM

## 2025-07-08 DIAGNOSIS — J34.2 DEVIATED NASAL SEPTUM: Primary | ICD-10-CM

## 2025-07-08 PROCEDURE — G8427 DOCREV CUR MEDS BY ELIG CLIN: HCPCS | Performed by: OTOLARYNGOLOGY

## 2025-07-08 PROCEDURE — 3017F COLORECTAL CA SCREEN DOC REV: CPT | Performed by: OTOLARYNGOLOGY

## 2025-07-08 PROCEDURE — 1036F TOBACCO NON-USER: CPT | Performed by: OTOLARYNGOLOGY

## 2025-07-08 PROCEDURE — 31231 NASAL ENDOSCOPY DX: CPT | Performed by: OTOLARYNGOLOGY

## 2025-07-08 PROCEDURE — 99213 OFFICE O/P EST LOW 20 MIN: CPT | Performed by: OTOLARYNGOLOGY

## 2025-07-08 PROCEDURE — G8417 CALC BMI ABV UP PARAM F/U: HCPCS | Performed by: OTOLARYNGOLOGY

## 2025-07-08 NOTE — PROGRESS NOTES
[facility] [place] [facility]  [facility]  [address]  [address]  [facility] [address]  Dept: [phone]  Dept Fax: [phone]  Loc: [phone]    Osmin Stephen Jr is a 55 y.o. male who was referred byNo ref. provider found for:  Chief Complaint   Patient presents with    Follow-up     8 week follow sinus workup. Patient stated that he is feeling better but he is no longer using the sinus rinse because it was causing the back of his head to hurt.   .    HPI:     Osmin Stephen Jr is a 55 y.o. male who presents today for follow up.Patient stated that he is feeling better but he is no longer using the sinus rinse because it was causing the back of his head to hurt. CT FACIAL BONES WO CONTRAST 5/30/2025: The nasal septum deviates to the left. Moderate severity mucosal thickening along the floor of the left maxillary  sinus.Narrowed ostiomeatal unit on the right. Dental caries with apical abscesses.   Hx recurrent sinusitis for multiple years but since COVID 6 months ago it has been worse. He has been on multiple antibiotics and steroids through PCP, which help but it seems to come back. L sided headaches, congestion from L cheek to far back behind eyes, nasal drainage green/yellow, PND, bad taste, L sided ear pressure, L sided muffled hearing,  L sided eye pressure, and at times fevers and chills.He has been on Flonase, claritin for years off and on but has been on it daily for the last 4-6 weeks without benefit. Recommend sinus work-up with 4 week course of NeilMed saline irrigations BID, nasal steroid spray and broad-spectrum antibiotic.    History:     Allergies   Allergen Reactions    Dilantin [Phenytoin] Anaphylaxis and Swelling    Lyrica [Pregabalin] Other (See Comments)     Bleeding in bowels    Dupixent [Dupilumab]      HIVES, THROAT ITCHING    Oxycodone      THROAT TIGHTNESS    Valium [Diazepam]      Current Outpatient Medications   Medication Sig

## 2025-07-11 ENCOUNTER — OFFICE VISIT (OUTPATIENT)
Dept: FAMILY MEDICINE CLINIC | Age: 55
End: 2025-07-11
Payer: COMMERCIAL

## 2025-07-11 VITALS
RESPIRATION RATE: 20 BRPM | WEIGHT: 315 LBS | BODY MASS INDEX: 41.75 KG/M2 | TEMPERATURE: 99.1 F | HEIGHT: 73 IN | SYSTOLIC BLOOD PRESSURE: 138 MMHG | DIASTOLIC BLOOD PRESSURE: 88 MMHG | HEART RATE: 84 BPM

## 2025-07-11 DIAGNOSIS — Z98.1 S/P CERVICAL SPINAL FUSION: ICD-10-CM

## 2025-07-11 DIAGNOSIS — I25.10 CORONARY ARTERY DISEASE INVOLVING NATIVE CORONARY ARTERY OF NATIVE HEART WITHOUT ANGINA PECTORIS: Primary | ICD-10-CM

## 2025-07-11 DIAGNOSIS — J30.9 ALLERGIC RHINOCONJUNCTIVITIS: ICD-10-CM

## 2025-07-11 DIAGNOSIS — H10.10 ALLERGIC RHINOCONJUNCTIVITIS: ICD-10-CM

## 2025-07-11 DIAGNOSIS — I10 PRIMARY HYPERTENSION: ICD-10-CM

## 2025-07-11 PROCEDURE — 99214 OFFICE O/P EST MOD 30 MIN: CPT | Performed by: STUDENT IN AN ORGANIZED HEALTH CARE EDUCATION/TRAINING PROGRAM

## 2025-07-11 PROCEDURE — G8427 DOCREV CUR MEDS BY ELIG CLIN: HCPCS | Performed by: STUDENT IN AN ORGANIZED HEALTH CARE EDUCATION/TRAINING PROGRAM

## 2025-07-11 PROCEDURE — G8417 CALC BMI ABV UP PARAM F/U: HCPCS | Performed by: STUDENT IN AN ORGANIZED HEALTH CARE EDUCATION/TRAINING PROGRAM

## 2025-07-11 PROCEDURE — G2211 COMPLEX E/M VISIT ADD ON: HCPCS | Performed by: STUDENT IN AN ORGANIZED HEALTH CARE EDUCATION/TRAINING PROGRAM

## 2025-07-11 PROCEDURE — 3079F DIAST BP 80-89 MM HG: CPT | Performed by: STUDENT IN AN ORGANIZED HEALTH CARE EDUCATION/TRAINING PROGRAM

## 2025-07-11 PROCEDURE — 3075F SYST BP GE 130 - 139MM HG: CPT | Performed by: STUDENT IN AN ORGANIZED HEALTH CARE EDUCATION/TRAINING PROGRAM

## 2025-07-11 PROCEDURE — 3017F COLORECTAL CA SCREEN DOC REV: CPT | Performed by: STUDENT IN AN ORGANIZED HEALTH CARE EDUCATION/TRAINING PROGRAM

## 2025-07-11 PROCEDURE — 1036F TOBACCO NON-USER: CPT | Performed by: STUDENT IN AN ORGANIZED HEALTH CARE EDUCATION/TRAINING PROGRAM

## 2025-07-11 RX ORDER — BUTALBITAL, ACETAMINOPHEN AND CAFFEINE 50; 325; 40 MG/1; MG/1; MG/1
1 TABLET ORAL EVERY 8 HOURS PRN
Qty: 30 TABLET | Refills: 0 | Status: CANCELLED | OUTPATIENT
Start: 2025-07-11

## 2025-07-11 RX ORDER — DIPHENHYDRAMINE HCL 25 MG
25 CAPSULE ORAL EVERY 6 HOURS PRN
Qty: 30 CAPSULE | Refills: 1 | Status: SHIPPED | OUTPATIENT
Start: 2025-07-11

## 2025-07-11 RX ORDER — BUTALBITAL, ACETAMINOPHEN AND CAFFEINE 50; 325; 40 MG/1; MG/1; MG/1
1 TABLET ORAL EVERY 8 HOURS PRN
Qty: 30 TABLET | Refills: 0 | Status: SHIPPED | OUTPATIENT
Start: 2025-07-11

## 2025-07-11 RX ORDER — AZELASTINE HYDROCHLORIDE 0.5 MG/ML
1 SOLUTION/ DROPS OPHTHALMIC 2 TIMES DAILY
Qty: 1 EACH | Refills: 11 | Status: SHIPPED | OUTPATIENT
Start: 2025-07-11 | End: 2025-07-11

## 2025-07-11 RX ORDER — AZELASTINE HYDROCHLORIDE 0.5 MG/ML
SOLUTION/ DROPS OPHTHALMIC
Qty: 18 ML | Refills: 90 | Status: SHIPPED | OUTPATIENT
Start: 2025-07-11

## 2025-07-11 ASSESSMENT — ENCOUNTER SYMPTOMS
ABDOMINAL PAIN: 0
CONSTIPATION: 0
VOMITING: 0
DIARRHEA: 0
NAUSEA: 0
SHORTNESS OF BREATH: 1
COUGH: 0

## 2025-07-11 NOTE — PROGRESS NOTES
Gastrointestinal:  Negative for abdominal pain, constipation, diarrhea, nausea and vomiting.   Allergic/Immunologic: Positive for environmental allergies.   Neurological:  Negative for dizziness, syncope, light-headedness and headaches.   Psychiatric/Behavioral:  Negative for dysphoric mood. The patient is not nervous/anxious.        Physical Exam  Vitals and nursing note reviewed.   Constitutional:       General: He is not in acute distress.     Appearance: Normal appearance. He is obese.   HENT:      Head: Normocephalic and atraumatic.      Right Ear: External ear normal.      Left Ear: External ear normal.      Nose: Nose normal.      Mouth/Throat:      Mouth: Mucous membranes are moist.   Eyes:      Extraocular Movements: Extraocular movements intact.      Conjunctiva/sclera: Conjunctivae normal.      Pupils: Pupils are equal, round, and reactive to light.   Cardiovascular:      Rate and Rhythm: Normal rate and regular rhythm.      Pulses: Normal pulses.   Pulmonary:      Effort: Pulmonary effort is normal. No respiratory distress.   Abdominal:      General: Bowel sounds are normal. There is no distension.      Palpations: Abdomen is soft. There is no mass.      Tenderness: There is no abdominal tenderness.   Musculoskeletal:      Cervical back: Neck supple.      Right lower leg: Edema (trace) present.      Left lower leg: Edema (trace) present.   Skin:     General: Skin is warm.   Neurological:      General: No focal deficit present.      Mental Status: He is alert and oriented to person, place, and time.   Psychiatric:         Mood and Affect: Mood normal.         Behavior: Behavior normal.           Immunization History   Administered Date(s) Administered    COVID-19, PFIZER PURPLE top, DILUTE for use, (age 12 y+), 30mcg/0.3mL 04/14/2021, 05/04/2021         Assessment / Plan:     1. Coronary artery disease involving native coronary artery of native heart without angina pectoris  Chronic issue, overall

## 2025-07-11 NOTE — TELEPHONE ENCOUNTER
This medication refill is regarding a electronic request. Refill requested by Pharmacy.    Requested Prescriptions     Pending Prescriptions Disp Refills    azelastine (OPTIVAR) 0.05 % ophthalmic solution [Pharmacy Med Name: AZELASTINE 0.05% OPHTH SOLUTION 6ML] 18 mL      Sig: INSTILL 1 DROP IN BOTH EYES TWICE DAILY     Date of last visit: 7/11/2025   Date of next visit: Visit date not found  Date of last refill: 7/11/25  [facility]     Patient wanting 90 supply    Last Lipid Panel:    Lab Results   Component Value Date/Time    CHOL 190 10/03/2024 11:54 AM    TRIG 97 10/03/2024 11:54 AM    HDL 43 10/03/2024 11:54 AM     Last CMP:   Lab Results   Component Value Date     02/13/2025    K 3.4 (L) 02/13/2025     02/13/2025    CO2 23 02/04/2025    BUN 9 02/13/2025    CREATININE 0.8 02/13/2025    GLUCOSE 92 02/13/2025    CALCIUM 8.8 02/13/2025    BILITOT 0.5 02/13/2025    ALKPHOS 137 (H) 02/13/2025    AST 16 02/13/2025    ALT 20 02/13/2025    LABGLOM > 90 02/13/2025       Last Thyroid:    Lab Results   Component Value Date    TSH 1.190 10/03/2024    T4FREE 0.93 10/03/2024     Last Hemoglobin A1C:    Lab Results   Component Value Date/Time    LABA1C 4.9 10/03/2024 11:54 AM       Rx verified, ordered and set to EP.

## 2025-07-15 ENCOUNTER — OFFICE VISIT (OUTPATIENT)
Dept: PHYSICAL MEDICINE AND REHAB | Age: 55
End: 2025-07-15
Payer: COMMERCIAL

## 2025-07-15 VITALS
BODY MASS INDEX: 41.75 KG/M2 | WEIGHT: 315 LBS | SYSTOLIC BLOOD PRESSURE: 126 MMHG | DIASTOLIC BLOOD PRESSURE: 74 MMHG | HEIGHT: 73 IN

## 2025-07-15 DIAGNOSIS — G89.29 CHRONIC BILATERAL LOW BACK PAIN WITH BILATERAL SCIATICA: ICD-10-CM

## 2025-07-15 DIAGNOSIS — M47.816 LUMBAR SPONDYLOSIS: ICD-10-CM

## 2025-07-15 DIAGNOSIS — M54.12 CERVICAL RADICULOPATHY: ICD-10-CM

## 2025-07-15 DIAGNOSIS — M48.062 SPINAL STENOSIS OF LUMBAR REGION WITH NEUROGENIC CLAUDICATION: ICD-10-CM

## 2025-07-15 DIAGNOSIS — M54.17 LUMBOSACRAL RADICULITIS: Primary | ICD-10-CM

## 2025-07-15 DIAGNOSIS — E66.01 MORBID OBESITY (HCC): ICD-10-CM

## 2025-07-15 DIAGNOSIS — M62.838 SPASM OF MUSCLE: ICD-10-CM

## 2025-07-15 DIAGNOSIS — M79.18 CHRONIC MYOFASCIAL PAIN: ICD-10-CM

## 2025-07-15 DIAGNOSIS — M54.42 CHRONIC BILATERAL LOW BACK PAIN WITH BILATERAL SCIATICA: ICD-10-CM

## 2025-07-15 DIAGNOSIS — M54.41 CHRONIC BILATERAL LOW BACK PAIN WITH BILATERAL SCIATICA: ICD-10-CM

## 2025-07-15 DIAGNOSIS — G89.29 CHRONIC MYOFASCIAL PAIN: ICD-10-CM

## 2025-07-15 DIAGNOSIS — G89.4 CHRONIC PAIN SYNDROME: ICD-10-CM

## 2025-07-15 DIAGNOSIS — Z98.1 HX OF FUSION OF CERVICAL SPINE: ICD-10-CM

## 2025-07-15 DIAGNOSIS — M25.50 POLYARTHRALGIA: ICD-10-CM

## 2025-07-15 PROCEDURE — G8417 CALC BMI ABV UP PARAM F/U: HCPCS | Performed by: NURSE PRACTITIONER

## 2025-07-15 PROCEDURE — 99214 OFFICE O/P EST MOD 30 MIN: CPT | Performed by: NURSE PRACTITIONER

## 2025-07-15 PROCEDURE — G8427 DOCREV CUR MEDS BY ELIG CLIN: HCPCS | Performed by: NURSE PRACTITIONER

## 2025-07-15 PROCEDURE — 3017F COLORECTAL CA SCREEN DOC REV: CPT | Performed by: NURSE PRACTITIONER

## 2025-07-15 PROCEDURE — 1036F TOBACCO NON-USER: CPT | Performed by: NURSE PRACTITIONER

## 2025-07-15 RX ORDER — ORPHENADRINE CITRATE 100 MG/1
100 TABLET ORAL 2 TIMES DAILY PRN
Qty: 60 TABLET | Refills: 0 | Status: SHIPPED | OUTPATIENT
Start: 2025-07-15 | End: 2025-08-14

## 2025-07-15 RX ORDER — HYDROCODONE BITARTRATE AND ACETAMINOPHEN 5; 325 MG/1; MG/1
1 TABLET ORAL EVERY 8 HOURS PRN
Qty: 90 TABLET | Refills: 0 | Status: SHIPPED | OUTPATIENT
Start: 2025-07-20 | End: 2025-08-19

## 2025-07-15 RX ORDER — LIDOCAINE 50 MG/G
2 PATCH TOPICAL EVERY 12 HOURS
Qty: 60 PATCH | Refills: 2 | Status: SHIPPED | OUTPATIENT
Start: 2025-07-15 | End: 2025-10-13

## 2025-07-15 ASSESSMENT — ENCOUNTER SYMPTOMS
SHORTNESS OF BREATH: 1
COUGH: 0
BACK PAIN: 1
APNEA: 1
NAUSEA: 0
PHOTOPHOBIA: 0
SINUS PRESSURE: 1
ABDOMINAL PAIN: 1
ABDOMINAL DISTENTION: 0

## 2025-07-15 NOTE — PROGRESS NOTES
[facility] [place] [facility]  [facility]  [address]  [facility] [address]  Dept: [phone]  Dept Fax: [phone]  Loc: [phone]    Visit Date: 7/15/2025    Functionality Assessment/Goals Worksheet     On a scale of 0 (Does not Interfere) to 10 (Completely Interferes)     1.  Which number describes how during the past week pain has interfered with       the following:  A.  General Activity:  9  B.  Mood: 9  C.  Walking Ability:  9  D.  Normal Work (Includes both work outside the home and housework):  9  E.  Relations with Other People:   9  F.  Sleep:   9  G.  Enjoyment of Life:   9    2.  Patient Prefers to Take their Pain Medications:     []  On a regular basis   [x]  Only when necessary    []  Does not take pain medications    3.  What are the Patient's Goals/Expectations for Visiting Pain Management?     [x]  Learn about my pain    [x]  Receive Medication   [x]  Physical Therapy     []  Treat Depression   [x]  Receive Injections    []  Treat Sleep   []  Deal with Anxiety and Stress   []  Treat Opoid Dependence/Addiction   []  Other:      HPI:   Osmin Stephen Jr is a 55 y.o. male is here today for    Chief Complaint: Low back pain, leg pain, muscle pain and spasms     HPI   2 month follow up. Osmin continues to have pain in his low back across worse radiating down his right leg posterior thigh throbbing, stabbing and tight pain and some numbness Denies left leg pain lately but does get some at times in the legs     Continues to have muscle spasms across his abdomen and in his legs this remains most bothersome     He states he continues home exercises and stretches.  States he is trying to lose weight   urrent pain medications continue to help take the edge off of pain with Norflex, Norco prn, tylenol prn, Lidoderm patches, and continues Cymbalta from his pcp   Pain increases with bending, lifting, twisting , walking,

## 2025-07-30 ENCOUNTER — TELEPHONE (OUTPATIENT)
Dept: PHYSICAL MEDICINE AND REHAB | Age: 55
End: 2025-07-30

## 2025-07-30 DIAGNOSIS — Z98.1 S/P CERVICAL SPINAL FUSION: ICD-10-CM

## 2025-07-30 RX ORDER — BUTALBITAL, ACETAMINOPHEN AND CAFFEINE 50; 325; 40 MG/1; MG/1; MG/1
1 TABLET ORAL 2 TIMES DAILY PRN
Qty: 60 TABLET | Refills: 1 | Status: SHIPPED | OUTPATIENT
Start: 2025-07-30

## 2025-07-30 NOTE — TELEPHONE ENCOUNTER
This medication refill is regarding a telephone request. Refill requested by patient.    Requested Prescriptions     Pending Prescriptions Disp Refills    butalbital-acetaminophen-caffeine (FIORICET, ESGIC) -40 MG per tablet 30 tablet 0     Sig: Take 1 tablet by mouth every 8 hours as needed for Headaches     Date of last visit: 7/11/2025   Date of next visit: None  Date of last refill: 7/11/25 #30/0  Pharmacy Name: [facility] [address]    Rx verified, ordered and set to EP.

## 2025-07-30 NOTE — TELEPHONE ENCOUNTER
OK for refill. -    Controlled Substance Monitoring:    Acute and Chronic Pain Monitoring:   RX Monitoring Periodic Controlled Substance Monitoring   7/30/2025   1:21 PM No signs of potential drug abuse or diversion identified.;Obtaining appropriate analgesic effect of treatment.

## 2025-07-30 NOTE — TELEPHONE ENCOUNTER
Called pt and had to LVM to call [facility] clinic for setting up aptm that we had referred him to.

## 2025-08-04 ENCOUNTER — TELEPHONE (OUTPATIENT)
Dept: PHYSICAL MEDICINE AND REHAB | Age: 55
End: 2025-08-04

## 2025-08-18 DIAGNOSIS — M48.062 SPINAL STENOSIS OF LUMBAR REGION WITH NEUROGENIC CLAUDICATION: ICD-10-CM

## 2025-08-18 DIAGNOSIS — G89.4 CHRONIC PAIN SYNDROME: ICD-10-CM

## 2025-08-18 DIAGNOSIS — M79.18 CHRONIC MYOFASCIAL PAIN: ICD-10-CM

## 2025-08-18 DIAGNOSIS — M54.17 LUMBOSACRAL RADICULITIS: ICD-10-CM

## 2025-08-18 DIAGNOSIS — M25.50 POLYARTHRALGIA: ICD-10-CM

## 2025-08-18 DIAGNOSIS — M47.816 LUMBAR SPONDYLOSIS: ICD-10-CM

## 2025-08-18 DIAGNOSIS — G89.29 CHRONIC MYOFASCIAL PAIN: ICD-10-CM

## 2025-08-18 RX ORDER — HYDROCODONE BITARTRATE AND ACETAMINOPHEN 5; 325 MG/1; MG/1
1 TABLET ORAL EVERY 8 HOURS PRN
Qty: 90 TABLET | Refills: 0 | Status: SHIPPED | OUTPATIENT
Start: 2025-08-19 | End: 2025-09-18

## 2025-08-18 RX ORDER — HYDROCODONE BITARTRATE AND ACETAMINOPHEN 5; 325 MG/1; MG/1
1 TABLET ORAL EVERY 8 HOURS PRN
Qty: 90 TABLET | Refills: 0 | OUTPATIENT
Start: 2025-08-19 | End: 2025-09-18

## 2025-08-18 RX ORDER — LIDOCAINE 50 MG/G
2 PATCH TOPICAL EVERY 12 HOURS
Qty: 60 PATCH | Refills: 2 | OUTPATIENT
Start: 2025-08-18 | End: 2025-11-16

## 2025-08-21 RX ORDER — ORPHENADRINE CITRATE 100 MG/1
100 TABLET ORAL 2 TIMES DAILY PRN
Qty: 60 TABLET | Refills: 0 | Status: SHIPPED | OUTPATIENT
Start: 2025-08-21 | End: 2025-09-20

## 2025-08-21 RX ORDER — LIDOCAINE 50 MG/G
2 PATCH TOPICAL EVERY 12 HOURS
Qty: 60 PATCH | Refills: 2 | Status: CANCELLED | OUTPATIENT
Start: 2025-08-21 | End: 2025-11-19

## 2025-08-21 RX ORDER — LIDOCAINE 50 MG/G
OINTMENT TOPICAL
Qty: 50 G | Refills: 2 | Status: CANCELLED | OUTPATIENT
Start: 2025-08-21

## 2025-08-27 ENCOUNTER — TELEPHONE (OUTPATIENT)
Dept: PHYSICAL MEDICINE AND REHAB | Age: 55
End: 2025-08-27

## 2025-08-27 DIAGNOSIS — G89.4 CHRONIC PAIN SYNDROME: ICD-10-CM

## 2025-08-27 DIAGNOSIS — G89.29 CHRONIC MYOFASCIAL PAIN: ICD-10-CM

## 2025-08-27 DIAGNOSIS — M48.062 SPINAL STENOSIS OF LUMBAR REGION WITH NEUROGENIC CLAUDICATION: ICD-10-CM

## 2025-08-27 DIAGNOSIS — M25.50 POLYARTHRALGIA: ICD-10-CM

## 2025-08-27 DIAGNOSIS — M47.816 LUMBAR SPONDYLOSIS: ICD-10-CM

## 2025-08-27 DIAGNOSIS — M79.18 CHRONIC MYOFASCIAL PAIN: ICD-10-CM

## 2025-08-27 DIAGNOSIS — M54.17 LUMBOSACRAL RADICULITIS: ICD-10-CM

## 2025-08-27 RX ORDER — HYDROCODONE BITARTRATE AND ACETAMINOPHEN 5; 325 MG/1; MG/1
1 TABLET ORAL EVERY 8 HOURS PRN
Qty: 90 TABLET | Refills: 0 | Status: SHIPPED | OUTPATIENT
Start: 2025-08-27 | End: 2025-09-26

## 2025-09-03 ENCOUNTER — TELEPHONE (OUTPATIENT)
Dept: ENT CLINIC | Age: 55
End: 2025-09-03

## (undated) DEVICE — NEEDLE SYR 18GA L1.5IN RED PLAS HUB S STL BLNT FILL W/O

## (undated) DEVICE — GLOVE SURG SZ 7 L12IN FNGR THK94MIL TRNSLUC YEL LTX HYDRGEL

## (undated) DEVICE — 6 ML SYRINGE LUER-LOCK TIP: Brand: MONOJECT

## (undated) DEVICE — HYPODERMIC SAFETY NEEDLE: Brand: MAGELLAN

## (undated) DEVICE — BAG RETRIEVAL SPECIMEN SUPERBAG 5 SMALL NYLON ITRODUCER

## (undated) DEVICE — NEEDLE SPNL 22GA L3.5IN BLK HUB S STL REG WALL FIT STYL W/

## (undated) DEVICE — Z DISCONTINUED USE 2272117 DRAPE SURG 3 QTR N INVASIVE 2 LAYR DISP

## (undated) DEVICE — PUMP SUC IRR TBNG L10FT W/ HNDPC ASSEMB STRYKEFLOW 2

## (undated) DEVICE — GOWN,SIRUS,NON REINFRCD,LARGE,SET IN SL: Brand: MEDLINE

## (undated) DEVICE — BAG SPEC REM 224ML W4XL6IN DIA10MM 1 HND GYN DISP ENDOPCH

## (undated) DEVICE — TOWEL,OR,DSP,ST,BLUE,STD,4/PK,20PK/CS: Brand: MEDLINE

## (undated) DEVICE — REDUCER: Brand: ENDOWRIST

## (undated) DEVICE — GAUZE SPONGES,USP TYPE VII GAUZE, 12 PLY: Brand: CURITY

## (undated) DEVICE — SYRINGE MED 10ML LUERLOCK TIP W/O SFTY DISP

## (undated) DEVICE — SUTURE ETHBND D SPEC NO 0 UR 6 30IN D9436

## (undated) DEVICE — TIP COVER ACCESSORY

## (undated) DEVICE — BASIC SINGLE BASIN BTC-LF: Brand: MEDLINE INDUSTRIES, INC.

## (undated) DEVICE — LABEL MED DRUG CUST

## (undated) DEVICE — GLOVE ORANGE PI 7 1/2   MSG9075

## (undated) DEVICE — CANNULA SEAL

## (undated) DEVICE — BLADELESS OBTURATOR: Brand: WECK VISTA

## (undated) DEVICE — COLUMN DRAPE

## (undated) DEVICE — ARM DRAPE

## (undated) DEVICE — ADHESIVE SKIN CLSR 0.7ML TOP DERMBND ADV

## (undated) DEVICE — GENERAL LAPAROSCOPY-LF: Brand: MEDLINE INDUSTRIES, INC.

## (undated) DEVICE — SEAL

## (undated) DEVICE — SHEET,DRAPE,3/4,53X77,STERILE: Brand: MEDLINE

## (undated) DEVICE — ELECTRO LUBE IS A SINGLE PATIENT USE DEVICE THAT IS INTENDED TO BE USED ON ELECTROSURGICAL ELECTRODES TO REDUCE STICKING.: Brand: KEY SURGICAL ELECTRO LUBE

## (undated) DEVICE — GAUZE,SPONGE,4"X4",12PLY,STERILE,LF,2'S: Brand: MEDLINE

## (undated) DEVICE — TROCAR: Brand: KII FIOS FIRST ENTRY

## (undated) DEVICE — CHLORAPREP 26ML CLEAR

## (undated) DEVICE — TUBING INSUFFLATOR HEAT HUMIDIFIED SMK EVAC SET PNEUMOCLEAR